# Patient Record
Sex: FEMALE | Race: WHITE | Employment: OTHER | URBAN - METROPOLITAN AREA
[De-identification: names, ages, dates, MRNs, and addresses within clinical notes are randomized per-mention and may not be internally consistent; named-entity substitution may affect disease eponyms.]

---

## 2017-01-05 RX ORDER — PRAVASTATIN SODIUM 40 MG
TABLET ORAL
Qty: 30 TABLET | Refills: 0 | Status: SHIPPED | OUTPATIENT
Start: 2017-01-05 | End: 2017-02-06

## 2017-01-09 RX ORDER — LOSARTAN POTASSIUM 100 MG/1
TABLET ORAL
Qty: 90 TABLET | Refills: 0 | Status: SHIPPED | OUTPATIENT
Start: 2017-01-09 | End: 2017-01-13

## 2017-01-13 ENCOUNTER — OFFICE VISIT (OUTPATIENT)
Dept: INTERNAL MEDICINE CLINIC | Facility: CLINIC | Age: 77
End: 2017-01-13

## 2017-01-13 VITALS
DIASTOLIC BLOOD PRESSURE: 70 MMHG | TEMPERATURE: 98 F | SYSTOLIC BLOOD PRESSURE: 130 MMHG | HEART RATE: 88 BPM | WEIGHT: 124.25 LBS | OXYGEN SATURATION: 96 % | BODY MASS INDEX: 21 KG/M2

## 2017-01-13 DIAGNOSIS — M79.7 FIBROMYALGIA: ICD-10-CM

## 2017-01-13 DIAGNOSIS — J32.9 CHRONIC SINUSITIS, UNSPECIFIED LOCATION: ICD-10-CM

## 2017-01-13 DIAGNOSIS — F41.1 GAD (GENERALIZED ANXIETY DISORDER): ICD-10-CM

## 2017-01-13 DIAGNOSIS — F33.9 MAJOR DEPRESSIVE DISORDER, RECURRENT EPISODE, UNSPECIFIED: ICD-10-CM

## 2017-01-13 DIAGNOSIS — R11.2 NAUSEA AND VOMITING, INTRACTABILITY OF VOMITING NOT SPECIFIED, UNSPECIFIED VOMITING TYPE: Primary | ICD-10-CM

## 2017-01-13 PROCEDURE — 99214 OFFICE O/P EST MOD 30 MIN: CPT | Performed by: INTERNAL MEDICINE

## 2017-01-13 RX ORDER — ONDANSETRON 4 MG/1
4 TABLET, FILM COATED ORAL EVERY 8 HOURS PRN
Qty: 30 TABLET | Refills: 0 | Status: SHIPPED | OUTPATIENT
Start: 2017-01-13 | End: 2017-03-01 | Stop reason: ALTCHOICE

## 2017-01-13 RX ORDER — DULOXETIN HYDROCHLORIDE 60 MG/1
60 CAPSULE, DELAYED RELEASE ORAL DAILY
Refills: 1 | COMMUNITY
Start: 2016-12-16 | End: 2017-01-18

## 2017-01-13 NOTE — PROGRESS NOTES
Jimmie Correa is a 78year old female. HPI:   Patient presents for multiple issues. Accompanied by . 1. Started to feel poorly on 12/25/2016. Started vomiting following day and vomits daily or every other day.  Usually vomits every time she eat lb  11/29/16 : 125 lb 8 oz        Penicillins             Hives    Comment:Throat swells, difficulty breathing  Bactrim                 Nausea and vomiting    Comment:Tabs  Dairy Products              Comment:Itchy, eyes swelling  Seasonal Nicho Camilo NDL/CATH SPI DX/THER Benjie Chand  2/26/2013    Comment Procedure: LUMBAR EPIDURAL;  Surgeon: Pablito Izaguirre MD;  Location: [facility]    PATIENT [address] FOR IV ANTIBIOTIC SURGICAL SITE INFECTION PROPHYLAXIS.  2/26/ Packs/Day: 0.20  Years: 20        Types: Cigarettes      Quit date: 01/01/1997    Smokeless Status: Never Used                        Alcohol Use: No                      EXAM:   /70 mmHg  Pulse 88  Temp(Src) 97.9 °F (36.6 °C) (Oral)  Wt 124 lb 4 oz Valdo on 11/23/2016. Current management by Dr. Sanjeev Antony. On trazodone, seroquel, luvoxamine, buspirone. He plans to slowly wean her off clonopin per patient. She self d/c'ed the gabapentin as felt it was not helping.  She thinks she is still taking the dulox

## 2017-01-13 NOTE — PATIENT INSTRUCTIONS
Please make sure you are taking both the duloxetine and fluvoxamine. Confirm our medication list with what you are actually taking. Please take ondansetron (zofran) 4 mg 30 minutes prior to meals until your nausea/vomiting improves.

## 2017-01-17 RX ORDER — DULOXETIN HYDROCHLORIDE 60 MG/1
CAPSULE, DELAYED RELEASE ORAL
Qty: 90 CAPSULE | Refills: 0 | OUTPATIENT
Start: 2017-01-17

## 2017-01-17 NOTE — TELEPHONE ENCOUNTER
Should be ordered by her psychiatrist  Pt provided with Dr Dolan instructions and verbalized understanding.

## 2017-01-18 ENCOUNTER — APPOINTMENT (OUTPATIENT)
Dept: LAB | Age: 77
End: 2017-01-18
Attending: Other
Payer: MEDICARE

## 2017-01-18 DIAGNOSIS — E78.2 MIXED HYPERLIPIDEMIA: ICD-10-CM

## 2017-01-18 DIAGNOSIS — M81.0 OSTEOPOROSIS: ICD-10-CM

## 2017-01-18 DIAGNOSIS — E83.52 HYPERCALCEMIA: ICD-10-CM

## 2017-01-18 DIAGNOSIS — R11.2 NAUSEA AND VOMITING, INTRACTABILITY OF VOMITING NOT SPECIFIED, UNSPECIFIED VOMITING TYPE: ICD-10-CM

## 2017-01-18 LAB
ALBUMIN SERPL-MCNC: 3.8 G/DL (ref 3.5–4.8)
ALP LIVER SERPL-CCNC: 57 U/L (ref 55–142)
ALT SERPL-CCNC: 14 U/L (ref 14–54)
AST SERPL-CCNC: 19 U/L (ref 15–41)
BILIRUB SERPL-MCNC: 0.2 MG/DL (ref 0.1–2)
BUN BLD-MCNC: 20 MG/DL (ref 8–20)
CALCIUM BLD-MCNC: 9 MG/DL (ref 8.3–10.3)
CHLORIDE: 102 MMOL/L (ref 101–111)
CO2: 30 MMOL/L (ref 22–32)
CREAT BLD-MCNC: 1.37 MG/DL (ref 0.55–1.02)
GLUCOSE BLD-MCNC: 83 MG/DL (ref 70–99)
M PROTEIN MFR SERPL ELPH: 7.6 G/DL (ref 6.1–8.3)
POTASSIUM SERPL-SCNC: 3.4 MMOL/L (ref 3.6–5.1)
PROLACTIN: 8.4 NG/ML
SODIUM SERPL-SCNC: 140 MMOL/L (ref 136–144)

## 2017-01-18 PROCEDURE — 84146 ASSAY OF PROLACTIN: CPT

## 2017-01-18 PROCEDURE — 36415 COLL VENOUS BLD VENIPUNCTURE: CPT

## 2017-01-18 PROCEDURE — 80053 COMPREHEN METABOLIC PANEL: CPT

## 2017-01-19 NOTE — PROGRESS NOTES
Quick Note:    [phone] (home)   Patient informed of Dr. Tequila Hedrick result note.  Patient verbalized understanding and agrees with plan.     ______

## 2017-01-25 ENCOUNTER — APPOINTMENT (OUTPATIENT)
Dept: GENERAL RADIOLOGY | Age: 77
End: 2017-01-25
Attending: EMERGENCY MEDICINE
Payer: MEDICARE

## 2017-01-25 ENCOUNTER — TELEPHONE (OUTPATIENT)
Dept: INTERNAL MEDICINE CLINIC | Facility: CLINIC | Age: 77
End: 2017-01-25

## 2017-01-25 ENCOUNTER — APPOINTMENT (OUTPATIENT)
Dept: CT IMAGING | Age: 77
End: 2017-01-25
Attending: EMERGENCY MEDICINE
Payer: MEDICARE

## 2017-01-25 ENCOUNTER — HOSPITAL ENCOUNTER (EMERGENCY)
Age: 77
Discharge: HOME | End: 2017-01-25
Attending: EMERGENCY MEDICINE
Payer: MEDICARE

## 2017-01-25 VITALS
SYSTOLIC BLOOD PRESSURE: 126 MMHG | HEIGHT: 65 IN | WEIGHT: 120 LBS | HEART RATE: 76 BPM | TEMPERATURE: 99 F | BODY MASS INDEX: 19.99 KG/M2 | OXYGEN SATURATION: 100 % | DIASTOLIC BLOOD PRESSURE: 67 MMHG | RESPIRATION RATE: 16 BRPM

## 2017-01-25 DIAGNOSIS — R11.2 NAUSEA AND VOMITING IN ADULT: Primary | ICD-10-CM

## 2017-01-25 LAB
ALBUMIN SERPL-MCNC: 3.8 G/DL (ref 3.5–4.8)
ALP LIVER SERPL-CCNC: 51 U/L (ref 55–142)
ALT SERPL-CCNC: 17 U/L (ref 14–54)
AMPHETAMINE URINE: NEGATIVE
AST SERPL-CCNC: 14 U/L (ref 15–41)
BARBITURATES URINE: NEGATIVE
BASOPHILS # BLD AUTO: 0.08 X10(3) UL (ref 0–0.1)
BASOPHILS NFR BLD AUTO: 0.7 %
BILIRUB SERPL-MCNC: 0.2 MG/DL (ref 0.1–2)
BILIRUB UR QL STRIP.AUTO: NEGATIVE
BUN BLD-MCNC: 23 MG/DL (ref 8–20)
CALCIUM BLD-MCNC: 8.9 MG/DL (ref 8.3–10.3)
CHLORIDE: 106 MMOL/L (ref 101–111)
CLARITY UR REFRACT.AUTO: CLEAR
CO2: 27 MMOL/L (ref 22–32)
COCAINE URINE: NEGATIVE
COLOR UR AUTO: YELLOW
CREAT BLD-MCNC: 1.43 MG/DL (ref 0.55–1.02)
EOSINOPHIL # BLD AUTO: 0.17 X10(3) UL (ref 0–0.3)
EOSINOPHIL NFR BLD AUTO: 1.4 %
ERYTHROCYTE [DISTWIDTH] IN BLOOD BY AUTOMATED COUNT: 13.6 % (ref 11.5–16)
GLUCOSE BLD-MCNC: 139 MG/DL (ref 70–99)
GLUCOSE UR STRIP.AUTO-MCNC: NEGATIVE MG/DL
HCT VFR BLD AUTO: 32.5 % (ref 34–50)
HGB BLD-MCNC: 10.8 G/DL (ref 12–16)
IMMATURE GRANULOCYTE COUNT: 0.04 X10(3) UL (ref 0–1)
IMMATURE GRANULOCYTE RATIO %: 0.3 %
KETONES UR STRIP.AUTO-MCNC: NEGATIVE MG/DL
LEUKOCYTE ESTERASE UR QL STRIP.AUTO: NEGATIVE
LIPASE: 172 U/L (ref 73–393)
LYMPHOCYTES # BLD AUTO: 3.58 X10(3) UL (ref 0.9–4)
LYMPHOCYTES NFR BLD AUTO: 30 %
M PROTEIN MFR SERPL ELPH: 7.2 G/DL (ref 6.1–8.3)
MCH RBC QN AUTO: 30.7 PG (ref 27–33.2)
MCHC RBC AUTO-ENTMCNC: 33.2 G/DL (ref 31–37)
MCV RBC AUTO: 92.3 FL (ref 81–100)
MONOCYTES # BLD AUTO: 0.87 X10(3) UL (ref 0.1–0.6)
MONOCYTES NFR BLD AUTO: 7.3 %
NEUTROPHIL ABS PRELIM: 7.18 X10 (3) UL (ref 1.3–6.7)
NEUTROPHILS # BLD AUTO: 7.18 X10(3) UL (ref 1.3–6.7)
NEUTROPHILS NFR BLD AUTO: 60.3 %
NITRITE UR QL STRIP.AUTO: NEGATIVE
OPIATE URINE: NEGATIVE
PCP URINE: NEGATIVE
PH UR STRIP.AUTO: 7 [PH] (ref 4.5–8)
PLATELET # BLD AUTO: 299 10(3)UL (ref 150–450)
POTASSIUM SERPL-SCNC: 3 MMOL/L (ref 3.6–5.1)
PROT UR STRIP.AUTO-MCNC: NEGATIVE MG/DL
RBC # BLD AUTO: 3.52 X10(6)UL (ref 3.8–5.1)
RED CELL DISTRIBUTION WIDTH-SD: 46.3 FL (ref 35.1–46.3)
SODIUM SERPL-SCNC: 143 MMOL/L (ref 136–144)
SP GR UR STRIP.AUTO: 1.01 (ref 1–1.03)
UROBILINOGEN UR STRIP.AUTO-MCNC: 0.2 MG/DL
WBC # BLD AUTO: 11.9 X10(3) UL (ref 4–13)

## 2017-01-25 PROCEDURE — 96374 THER/PROPH/DIAG INJ IV PUSH: CPT

## 2017-01-25 PROCEDURE — 99284 EMERGENCY DEPT VISIT MOD MDM: CPT

## 2017-01-25 PROCEDURE — 85025 COMPLETE CBC W/AUTO DIFF WBC: CPT | Performed by: EMERGENCY MEDICINE

## 2017-01-25 PROCEDURE — 83690 ASSAY OF LIPASE: CPT | Performed by: EMERGENCY MEDICINE

## 2017-01-25 PROCEDURE — 81001 URINALYSIS AUTO W/SCOPE: CPT | Performed by: EMERGENCY MEDICINE

## 2017-01-25 PROCEDURE — 71020 XR CHEST PA + LAT CHEST (CPT=71020): CPT

## 2017-01-25 PROCEDURE — 80053 COMPREHEN METABOLIC PANEL: CPT | Performed by: EMERGENCY MEDICINE

## 2017-01-25 PROCEDURE — 80307 DRUG TEST PRSMV CHEM ANLYZR: CPT | Performed by: EMERGENCY MEDICINE

## 2017-01-25 PROCEDURE — 96361 HYDRATE IV INFUSION ADD-ON: CPT

## 2017-01-25 PROCEDURE — 96375 TX/PRO/DX INJ NEW DRUG ADDON: CPT

## 2017-01-25 PROCEDURE — 74176 CT ABD & PELVIS W/O CONTRAST: CPT

## 2017-01-25 RX ORDER — DIPHENHYDRAMINE HYDROCHLORIDE 50 MG/ML
50 INJECTION INTRAMUSCULAR; INTRAVENOUS ONCE
Status: COMPLETED | OUTPATIENT
Start: 2017-01-25 | End: 2017-01-25

## 2017-01-25 RX ORDER — METOCLOPRAMIDE HYDROCHLORIDE 5 MG/ML
10 INJECTION INTRAMUSCULAR; INTRAVENOUS ONCE
Status: COMPLETED | OUTPATIENT
Start: 2017-01-25 | End: 2017-01-25

## 2017-01-25 RX ORDER — METOCLOPRAMIDE 10 MG/1
10 TABLET ORAL 3 TIMES DAILY PRN
Qty: 20 TABLET | Refills: 0 | Status: SHIPPED | OUTPATIENT
Start: 2017-01-25 | End: 2017-02-07 | Stop reason: ALTCHOICE

## 2017-01-25 NOTE — TELEPHONE ENCOUNTER
Pt c/o multiple BM \"all day and night long, keeping her up at night\", vomiting, dry mouth, weakness, being light headed, PND, chills mild abdominal bloating/cramping and the inability to tolerate food-except for pasta x 2 weeks.  Pt denies fever/bright re

## 2017-01-26 NOTE — ED PROVIDER NOTES
Patient Seen in: THE [facility] Emergency Department In [place]    History   Patient presents with:  Nausea/Vomiting/Diarrhea (gastrointestinal)    Stated Complaint: VOMITING AND DIARRHEA FOR PAST 2 WEEKS    HPI    30-year-old female presents to the emergency Procedure: LUMBAR EPIDURAL;  Surgeon: Patricia Lopez MD;  Location: [address] [place] [address] BY [facility] [address] [facility] [address]  [address] [facility] 5+ YR  2/26/2013    Comment Procedure: LUMBAR EPIDURAL;  Surgeon: Patricia Lopez MD;  Location: [address] [place] [address] 2014    Comment parathyroidectomy    OTHER SURGICAL HISTORY      Comment splenectomy    TONSILLECTOMY      ANGIOPLASTY (CORONARY)      Comment stent    CATH PERCUTANEOUS  TRANSLUMINAL CORONARY ANGIOPLASTY         Medications :   Metoclopramide HCl 10 MG O Hypertension Father    • Heart Attack Father    • Hypertension Daughter    • Cancer Sister      lung with mets to the lymph nodes   • Lipids Sister    • Hypertension Sister    • Lipids Brother    • Heart Disorder Brother      rheumatic heart disease guarding no masses appreciated   Musculoskeletal: Normal range of motion. She exhibits no edema or tenderness. Lymphadenopathy:     She has no cervical adenopathy. Neurological: She is alert and oriented to person, place, and time.  No cranial nerve def CBC W/ DIFFERENTIAL[[id number]]          Abnormal            Final result                 Please view results for these tests on the individual orders.    RAINBOW DRAW BLUE   RAINBOW DRAW GOLD   RAINBOW DRAW LAVENDER   RAINBOW DRAW LIGHT GREE

## 2017-02-06 RX ORDER — PRAVASTATIN SODIUM 40 MG
TABLET ORAL
Qty: 30 TABLET | Refills: 4 | Status: SHIPPED | OUTPATIENT
Start: 2017-02-06 | End: 2017-06-26

## 2017-02-07 ENCOUNTER — TELEPHONE (OUTPATIENT)
Dept: SURGERY | Facility: CLINIC | Age: 77
End: 2017-02-07

## 2017-02-07 ENCOUNTER — OFFICE VISIT (OUTPATIENT)
Dept: SURGERY | Facility: CLINIC | Age: 77
End: 2017-02-07

## 2017-02-07 VITALS
SYSTOLIC BLOOD PRESSURE: 108 MMHG | BODY MASS INDEX: 20.49 KG/M2 | HEIGHT: 64 IN | HEART RATE: 70 BPM | WEIGHT: 120 LBS | RESPIRATION RATE: 16 BRPM | DIASTOLIC BLOOD PRESSURE: 64 MMHG

## 2017-02-07 DIAGNOSIS — R45.851 SUICIDAL IDEATION: ICD-10-CM

## 2017-02-07 DIAGNOSIS — M79.7 FIBROMYALGIA: ICD-10-CM

## 2017-02-07 DIAGNOSIS — M54.16 LUMBAR RADICULITIS: Primary | ICD-10-CM

## 2017-02-07 DIAGNOSIS — M46.1 SACROILIITIS (HCC): ICD-10-CM

## 2017-02-07 PROCEDURE — 99214 OFFICE O/P EST MOD 30 MIN: CPT | Performed by: PHYSICIAN ASSISTANT

## 2017-02-07 NOTE — PROGRESS NOTES
Name: Yuval Lew   : 1940   DOS: 2017     Pain Clinic Follow Up Visit:   Patient presents with:   Follow - Up: sharp pain in the legs      Yuval Lew is a 78year old female who presents for recheck of chronic neck and low back pain with HPI.      Penicillins             Hives    Comment:Throat swells, difficulty breathing  Bactrim                 Nausea and vomiting    Comment:Tabs  Dairy Products              Comment:Itchy, eyes swelling  Seasonal                  [facility]* daily. Disp:  Rfl:          EXAM:   /64 mmHg  Pulse 70  Resp 16  Ht 64\"  Wt 120 lb  BMI 20.59 kg/m2  General:  Patient is a(n) 78year old year old female in no acute distress.   Neurologic[de-identified] WNL-Orientation to time, place and person, low mood & affe of these issues and agrees to the plan. Return in about 1 day (around 2/8/2017) for see Dr Nadira Tolliver.     Abelardo Jean Baptiste PA-C, 2/7/2017, 1:29 PM

## 2017-02-07 NOTE — TELEPHONE ENCOUNTER
Spoke with pt who reports back pain that radiates to frandy legs. Scheduled follow up appointment with ALLEN Meehan today 1:30pm. Pt thankful, no further questions.

## 2017-02-07 NOTE — PATIENT INSTRUCTIONS
Refill policies:    • Allow 2 business days for refills; controlled substances may take longer.   • Contact your pharmacy at least 5 days prior to running out of medication and have them send an electronic request or submit request through the “request re your physician has recommended that you have a procedure or additional testing performed. [facility] will contact your insurance carrier to obtain pre-certification or prior authorization.     Unfortunately, SESAR has seen an increas

## 2017-02-08 ENCOUNTER — OFFICE VISIT (OUTPATIENT)
Dept: SURGERY | Facility: CLINIC | Age: 77
End: 2017-02-08

## 2017-02-08 VITALS
BODY MASS INDEX: 20.49 KG/M2 | HEIGHT: 64 IN | RESPIRATION RATE: 18 BRPM | SYSTOLIC BLOOD PRESSURE: 108 MMHG | HEART RATE: 60 BPM | WEIGHT: 120 LBS | DIASTOLIC BLOOD PRESSURE: 64 MMHG

## 2017-02-08 DIAGNOSIS — M54.16 LUMBAR RADICULOPATHY: Primary | ICD-10-CM

## 2017-02-08 DIAGNOSIS — M96.1 FAILED BACK SURGICAL SYNDROME: ICD-10-CM

## 2017-02-08 PROCEDURE — 99214 OFFICE O/P EST MOD 30 MIN: CPT | Performed by: ANESTHESIOLOGY

## 2017-02-10 ENCOUNTER — PRIOR ORIGINAL RECORDS (OUTPATIENT)
Dept: OTHER | Age: 77
End: 2017-02-10

## 2017-02-11 ENCOUNTER — HOSPITAL ENCOUNTER (EMERGENCY)
Age: 77
Discharge: HOME | End: 2017-02-11
Attending: EMERGENCY MEDICINE
Payer: MEDICARE

## 2017-02-11 VITALS
OXYGEN SATURATION: 96 % | HEART RATE: 76 BPM | DIASTOLIC BLOOD PRESSURE: 76 MMHG | BODY MASS INDEX: 20.49 KG/M2 | TEMPERATURE: 99 F | WEIGHT: 120 LBS | SYSTOLIC BLOOD PRESSURE: 136 MMHG | HEIGHT: 64 IN | RESPIRATION RATE: 18 BRPM

## 2017-02-11 DIAGNOSIS — M54.41 CHRONIC BILATERAL LOW BACK PAIN WITH BILATERAL SCIATICA: Primary | ICD-10-CM

## 2017-02-11 DIAGNOSIS — G89.29 CHRONIC BILATERAL LOW BACK PAIN WITH BILATERAL SCIATICA: Primary | ICD-10-CM

## 2017-02-11 DIAGNOSIS — M54.42 CHRONIC BILATERAL LOW BACK PAIN WITH BILATERAL SCIATICA: Primary | ICD-10-CM

## 2017-02-11 PROCEDURE — 99283 EMERGENCY DEPT VISIT LOW MDM: CPT

## 2017-02-11 RX ORDER — DIAZEPAM 5 MG/1
5 TABLET ORAL EVERY 6 HOURS PRN
Qty: 5 TABLET | Refills: 0 | Status: SHIPPED | OUTPATIENT
Start: 2017-02-11 | End: 2017-03-01 | Stop reason: ALTCHOICE

## 2017-02-11 RX ORDER — HYDROCODONE BITARTRATE AND ACETAMINOPHEN 5; 325 MG/1; MG/1
1-2 TABLET ORAL EVERY 4 HOURS PRN
Qty: 10 TABLET | Refills: 0 | Status: SHIPPED | OUTPATIENT
Start: 2017-02-11 | End: 2017-03-01 | Stop reason: ALTCHOICE

## 2017-02-11 NOTE — ED INITIAL ASSESSMENT (HPI)
Patient states that she was sent her by her pain doctor to get a rx for pain pills until she can follow-up.

## 2017-02-11 NOTE — ED PROVIDER NOTES
Patient Seen in: THE [facility] Emergency Department In [place]    History   Patient presents with:  Back Pain (musculoskeletal)    Stated Complaint: back pain     HPI    Patient is a 62-year-old woman presents with lower back pain.   This is an ongoing proble PAIN MANAGEMENT    FLUOR ARIANA & Denver Holman NDL/CATH SPI DX/THER NJX  2/26/2013    Comment Procedure: LUMBAR EPIDURAL;  Surgeon: Efren Agustin MD;  Location: [facility] FOR PAIN [facility]NA[facility]    PATIENT [address] FOR IV ANTIBIOTIC SURGICAL SIT HYDROcodone-acetaminophen 5-325 MG Oral Tab,  Take 1-2 tablets by mouth every 4 (four) hours as needed for Pain.   diazepam 5 MG Oral Tab,  Take 1 tablet (5 mg total) by mouth every 6 (six) hours as needed.    PRAVASTATIN SODIUM 40 MG Oral Tab,  TAKE 1 TABL Packs/Day: 0.20  Years: 20        Types: Cigarettes      Quit date: 01/01/1997    Smokeless Status: Never Used                        Alcohol Use: No                Review of Systems    Positive for stated complaint: back pain   Other systems are a [place] [id number] [phone]    In 2 days        Medications Prescribed:  Current Discharge Medication List    START taking these medications    HYDROcodone-acetaminophen 5-325 MG Oral Tab  Take 1-2 tablets by mouth every 4 (four) hours as needed for Pain.

## 2017-02-11 NOTE — ED NOTES
Patient informed that she needs to change into a gown to be evaluated by ERMD. Patient states that she is not changing into a gown that all she needs is a prescription of pain pills until she can follow-up with Dr. Adelaida Cardenas to get scheduled for a neurostimulat

## 2017-02-12 NOTE — PROGRESS NOTES
Name: Deja Dumont   : 1940   DOS: 2017     Pain Clinic Follow Up Visit:   Deja Dumont is a 78year old female who presents for recheck of her chronic low back and neck pain. Her lower back pain is significantly worse.   She has chronic emmanuel (30 mg total) by mouth 2 (two) times daily with meals. Disp: 180 tablet Rfl: 2   Losartan Potassium 100 MG Oral Tab Take 1 tablet (100 mg total) by mouth daily.  Disp: 90 tablet Rfl: 3   AmLODIPine Besylate 10 MG Oral Tab Take 1 tablet (10 mg total) by mout patient. The patient wanted to proceed with the procedure.   The patient was asking for narcotic but I could not prescribe narcotic because of history of suicidal ideation and drug overdose in the past.  I recommended her to be followed with the psychologi

## 2017-02-13 ENCOUNTER — TELEPHONE (OUTPATIENT)
Dept: SURGERY | Facility: CLINIC | Age: 77
End: 2017-02-13

## 2017-02-13 NOTE — TELEPHONE ENCOUNTER
Spoke w/ pt regarding SCS trial.  Pt states she has office visit w/ Dr. Og Bravo 2/21/17. Informed pt of SCS trial process. Once evaluation received from Dr. Michael Gan office, pt to receive call with next steps for scheduling if deemed appropriate.   Pt verb

## 2017-02-24 ENCOUNTER — TELEPHONE (OUTPATIENT)
Dept: SURGERY | Facility: CLINIC | Age: 77
End: 2017-02-24

## 2017-02-24 NOTE — TELEPHONE ENCOUNTER
Spoke with patient who states she woke up feeling ok this AM, but is now experiencing \"horrendous back and leg pain\". Reports pain to lower back/lumbar region with pain radiating into both legs.  Denies any incident this AM, states pain has been increasin

## 2017-02-28 ENCOUNTER — TELEPHONE (OUTPATIENT)
Dept: SURGERY | Facility: CLINIC | Age: 77
End: 2017-02-28

## 2017-02-28 NOTE — TELEPHONE ENCOUNTER
Spoke w/ pt regarding SCS process. Informed pt our office has not received evaluation documents from Dr. Jag Mesa office. Pt verbalized understanding. Pt also inquiring about medications as pain is not currently managed.   Informed pt providers would be

## 2017-02-28 NOTE — TELEPHONE ENCOUNTER
Received psyche evaluation from Dr. Patito Pisano office. Document endorsed to providers. Copy sent to scanning.

## 2017-03-01 ENCOUNTER — LAB ENCOUNTER (OUTPATIENT)
Dept: LAB | Age: 77
End: 2017-03-01
Attending: INTERNAL MEDICINE
Payer: MEDICARE

## 2017-03-01 ENCOUNTER — OFFICE VISIT (OUTPATIENT)
Dept: INTERNAL MEDICINE CLINIC | Facility: CLINIC | Age: 77
End: 2017-03-01

## 2017-03-01 VITALS
BODY MASS INDEX: 21.34 KG/M2 | HEIGHT: 64 IN | RESPIRATION RATE: 24 BRPM | TEMPERATURE: 99 F | HEART RATE: 92 BPM | OXYGEN SATURATION: 97 % | SYSTOLIC BLOOD PRESSURE: 126 MMHG | DIASTOLIC BLOOD PRESSURE: 70 MMHG | WEIGHT: 125 LBS

## 2017-03-01 DIAGNOSIS — F41.1 GAD (GENERALIZED ANXIETY DISORDER): ICD-10-CM

## 2017-03-01 DIAGNOSIS — F13.20 BENZODIAZEPINE DEPENDENCE (HCC): ICD-10-CM

## 2017-03-01 DIAGNOSIS — N20.0 NEPHROLITHIASIS: ICD-10-CM

## 2017-03-01 DIAGNOSIS — R10.9 LEFT FLANK PAIN: ICD-10-CM

## 2017-03-01 DIAGNOSIS — E78.2 MIXED HYPERLIPIDEMIA: ICD-10-CM

## 2017-03-01 DIAGNOSIS — I10 ESSENTIAL HYPERTENSION: ICD-10-CM

## 2017-03-01 DIAGNOSIS — M79.7 FIBROMYALGIA: ICD-10-CM

## 2017-03-01 DIAGNOSIS — G47.00 INSOMNIA, UNSPECIFIED TYPE: ICD-10-CM

## 2017-03-01 DIAGNOSIS — I25.118 CORONARY ARTERY DISEASE INVOLVING NATIVE CORONARY ARTERY OF NATIVE HEART WITH OTHER FORM OF ANGINA PECTORIS (HCC): ICD-10-CM

## 2017-03-01 DIAGNOSIS — D50.9 MICROCYTIC ANEMIA: ICD-10-CM

## 2017-03-01 DIAGNOSIS — Z00.00 ENCOUNTER FOR ANNUAL HEALTH EXAMINATION: ICD-10-CM

## 2017-03-01 DIAGNOSIS — Z13.31 DEPRESSION SCREENING: ICD-10-CM

## 2017-03-01 DIAGNOSIS — N28.1 BILATERAL RENAL CYSTS: ICD-10-CM

## 2017-03-01 DIAGNOSIS — M81.0 OSTEOPOROSIS: ICD-10-CM

## 2017-03-01 DIAGNOSIS — Z00.00 ENCOUNTER FOR ANNUAL HEALTH EXAMINATION: Primary | ICD-10-CM

## 2017-03-01 DIAGNOSIS — F41.9 ANXIETY: ICD-10-CM

## 2017-03-01 DIAGNOSIS — R63.4 WEIGHT LOSS: ICD-10-CM

## 2017-03-01 DIAGNOSIS — E83.52 HYPERCALCEMIA: ICD-10-CM

## 2017-03-01 DIAGNOSIS — F33.1 MODERATE EPISODE OF RECURRENT MAJOR DEPRESSIVE DISORDER (HCC): ICD-10-CM

## 2017-03-01 DIAGNOSIS — I31.3 PERICARDIAL EFFUSION: ICD-10-CM

## 2017-03-01 LAB
APPEARANCE: CLEAR
BASOPHILS # BLD AUTO: 0.08 X10(3) UL (ref 0–0.1)
BASOPHILS NFR BLD AUTO: 0.7 %
EOSINOPHIL # BLD AUTO: 0.07 X10(3) UL (ref 0–0.3)
EOSINOPHIL NFR BLD AUTO: 0.6 %
ERYTHROCYTE [DISTWIDTH] IN BLOOD BY AUTOMATED COUNT: 14.3 % (ref 11.5–16)
HCT VFR BLD AUTO: 38.6 % (ref 34–50)
HGB BLD-MCNC: 12.3 G/DL (ref 12–16)
IMMATURE GRANULOCYTE COUNT: 0.03 X10(3) UL (ref 0–1)
IMMATURE GRANULOCYTE RATIO %: 0.3 %
LYMPHOCYTES # BLD AUTO: 3.91 X10(3) UL (ref 0.9–4)
LYMPHOCYTES NFR BLD AUTO: 33.4 %
MCH RBC QN AUTO: 30.6 PG (ref 27–33.2)
MCHC RBC AUTO-ENTMCNC: 31.9 G/DL (ref 31–37)
MCV RBC AUTO: 96 FL (ref 81–100)
MONOCYTES # BLD AUTO: 1.14 X10(3) UL (ref 0.1–0.6)
MONOCYTES NFR BLD AUTO: 9.7 %
MULTISTIX LOT#: NORMAL NUMERIC
NEUTROPHIL ABS PRELIM: 6.49 X10 (3) UL (ref 1.3–6.7)
NEUTROPHILS # BLD AUTO: 6.49 X10(3) UL (ref 1.3–6.7)
NEUTROPHILS NFR BLD AUTO: 55.3 %
PH, URINE: 7 (ref 4.5–8)
PLATELET # BLD AUTO: 398 10(3)UL (ref 150–450)
PROTEIN (URINE DIPSTICK): 30 MG/DL
RBC # BLD AUTO: 4.02 X10(6)UL (ref 3.8–5.1)
RED CELL DISTRIBUTION WIDTH-SD: 50.4 FL (ref 35.1–46.3)
SPECIFIC GRAVITY: 1.02 (ref 1–1.03)
URINE-COLOR: YELLOW
UROBILINOGEN,SEMI-QN: 0.2 MG/DL (ref 0–1.9)
WBC # BLD AUTO: 11.7 X10(3) UL (ref 4–13)

## 2017-03-01 PROCEDURE — G0439 PPPS, SUBSEQ VISIT: HCPCS | Performed by: INTERNAL MEDICINE

## 2017-03-01 PROCEDURE — 81003 URINALYSIS AUTO W/O SCOPE: CPT | Performed by: INTERNAL MEDICINE

## 2017-03-01 PROCEDURE — 87086 URINE CULTURE/COLONY COUNT: CPT | Performed by: INTERNAL MEDICINE

## 2017-03-01 PROCEDURE — 84439 ASSAY OF FREE THYROXINE: CPT

## 2017-03-01 PROCEDURE — 36415 COLL VENOUS BLD VENIPUNCTURE: CPT

## 2017-03-01 PROCEDURE — G0444 DEPRESSION SCREEN ANNUAL: HCPCS | Performed by: INTERNAL MEDICINE

## 2017-03-01 PROCEDURE — 99214 OFFICE O/P EST MOD 30 MIN: CPT | Performed by: INTERNAL MEDICINE

## 2017-03-01 PROCEDURE — 85025 COMPLETE CBC W/AUTO DIFF WBC: CPT

## 2017-03-01 PROCEDURE — 82728 ASSAY OF FERRITIN: CPT

## 2017-03-01 PROCEDURE — 80048 BASIC METABOLIC PNL TOTAL CA: CPT

## 2017-03-01 PROCEDURE — 82746 ASSAY OF FOLIC ACID SERUM: CPT

## 2017-03-01 PROCEDURE — 84443 ASSAY THYROID STIM HORMONE: CPT

## 2017-03-01 PROCEDURE — 82607 VITAMIN B-12: CPT

## 2017-03-01 PROCEDURE — 84481 FREE ASSAY (FT-3): CPT

## 2017-03-01 RX ORDER — FLUVOXAMINE MALEATE 100 MG
50 TABLET ORAL NIGHTLY
Refills: 2 | COMMUNITY
Start: 2017-02-24 | End: 2017-04-17

## 2017-03-01 RX ORDER — MONTELUKAST SODIUM 10 MG/1
10 TABLET ORAL NIGHTLY
COMMUNITY
End: 2017-11-20

## 2017-03-01 NOTE — PROGRESS NOTES
HPI:   Casey Garcia is a 78year old female who presents for a medicare wellness exam and additional issues noted below. Comes in with . 1. HTN: tolerating losartan well  2.  L sided kidney pain began at 1 am today and woke her from her sleep artery disease     Insomnia     Lumbar radiculopathy     Hypertension     Hypercalcemia     Major depressive disorder, recurrent episode, unspecified     Benzodiazepine dependence ([facility] [address]      Last Cholesterol Labs:     Lab Results  Component Value Date   [facility] daily. Fluticasone Propionate (FLONASE) 50 MCG/ACT Nasal Suspension 2 sprays by Each Nare route daily. B Complex Vitamins (VITAMIN B COMPLEX) Oral Tab Take 1 tablet by mouth daily.       MEDICAL INFORMATION:   She  has a past medical history of Unspec and cath percutaneous  transluminal coronary angioplasty. Her family history includes Cancer in her sister; Heart Attack in her father; Heart Disorder in her brother; Hypertension in her daughter, father, and sister; Lipids in her brother and sister. nondistended no hsm bs throughout  NEURO: CN 2-12 grossly intact, strength 5/5 b/l LE  PSYCH: pleasant  EXTREMITIES: no cyanosis, clubbing or edema  BACK: she has pain to palpation over entire lumbar spine and paraspinal region.        Visual Acuity  Right at another office such as Influenza, Hepatitis B, Tetanus, or Pneumococcal?: No     Functional Ability     Bathing or Showering: Able without help    Toileting: Able without help    Dressing: Need some help    Eating: Able without help    Driving: Able wit as reading the newspaper or watching television: Nearly every day    Moving or speaking so slowly that other people could have noticed.  Or the opposite - being so fidgety or restless that you have been moving around a lot more than usual: Several days    T found for this or any previous visit. No flowsheet data found. Fecal Occult Blood Annually No results found for: FOB No flowsheet data found.     Glaucoma Screening      Ophthalmology Visit Annually: Diabetics, FHx Glaucoma, AA>50, > 65 No flows BUN (mg/dL)   Date Value   01/25/2017 23*   08/05/2014 20    No flowsheet data found. Drug Serum Conc  Annually No results found for: DIGOXIN, DIG, VALP No flowsheet data found.     Diabetes      HgbA1C  Annually HEMOGLOBIN A1C (%)   Date Value   01/28/ pain team) as she has h/o inappropriate use.    # Insomnia: already practices sleep hygiene. Has not seen sleep medicine yet. I have advised her to wean off the clonopin and trazodone under direction by Dr. Dean Dumas and melatonin did not help.

## 2017-03-01 NOTE — PATIENT INSTRUCTIONS
1. Please STOP any over the counter medications or supplements EXCEPT for the florastor and the Aspirin 325 mg.    2. Please try to only use the clonopin during the day and avoid the dose at night for 4 weeks.  Then try to stop taking the trazodone at night

## 2017-03-02 ENCOUNTER — TELEPHONE (OUTPATIENT)
Dept: SURGERY | Facility: CLINIC | Age: 77
End: 2017-03-02

## 2017-03-02 PROBLEM — D50.9 MICROCYTIC ANEMIA: Status: ACTIVE | Noted: 2017-03-02

## 2017-03-02 LAB
BUN BLD-MCNC: 15 MG/DL (ref 8–20)
CALCIUM BLD-MCNC: 10.5 MG/DL (ref 8.3–10.3)
CHLORIDE: 105 MMOL/L (ref 101–111)
CO2: 28 MMOL/L (ref 22–32)
CREAT BLD-MCNC: 1.14 MG/DL (ref 0.55–1.02)
DEPRECATED HBV CORE AB SER IA-ACNC: 38.6 NG/ML (ref 10–291)
FOLATE (FOLIC ACID), SERUM: 40.3 NG/ML (ref 8.7–24)
FREE T4: 1 NG/DL (ref 0.9–1.8)
GLUCOSE BLD-MCNC: 93 MG/DL (ref 70–99)
HAV AB SERPL IA-ACNC: 717 PG/ML (ref 193–986)
POTASSIUM SERPL-SCNC: 3.5 MMOL/L (ref 3.6–5.1)
SODIUM SERPL-SCNC: 142 MMOL/L (ref 136–144)
T3FREE SERPL-MCNC: 2.54 PG/ML (ref 2.3–4.2)
TSI SER-ACNC: 1.83 MIU/ML (ref 0.35–5.5)

## 2017-03-03 NOTE — TELEPHONE ENCOUNTER
Unfortunately, we are not able to offer anything additional for medications for this patient. Please track down the eval from Dr. Bonny Cain so we can proceed with SCS ASAP. Thanks!

## 2017-03-08 ENCOUNTER — TELEPHONE (OUTPATIENT)
Dept: SURGERY | Facility: CLINIC | Age: 77
End: 2017-03-08

## 2017-03-08 DIAGNOSIS — M96.1 FAILED BACK SURGICAL SYNDROME: Primary | ICD-10-CM

## 2017-03-09 NOTE — TELEPHONE ENCOUNTER
LMTCB. Call attempted to [employer]. Need to be made aware of scheduled SCS trial on 4/4/17 @ 2:30pm arriving at 1:30pm AND lead removal time on 4/7/17.

## 2017-03-09 NOTE — TELEPHONE ENCOUNTER
Spoke with pt and pt scheduled for SCS trial 4/4/17. Reviewed pre-procedural instructions with pt. Pt verbalized understanding. Pt informed OV for lead removal will need to be made for 4/7/17. Informed pt office will contact pt for OV scheduling.   Pt v ? Eliquis (Apixaban) 3 days  ? Xarelto (Rivaroxaban) 3 days  ? Lovenox (Enoxaparin) 24 hours  ? Aspirin  ? 81mg day of procedure  ? Greater than 81 mg (325mg) 7 days  ? Coumadin Procedure may be cancelled if INR is elevated. ? Epidural ____ - 7 days  ?  O It is normal to have increased pain at injection site for up to 3-5 days after procedure, you can use heat for the first 24 hours (20 minutes on 20 minutes off) after the first 24 hours you can use heat or cold.          Spinal Cord Stimulation  What is a s One of the pain clinic nurses will notify you if you are deemed appropriate for the stimulator.  We will then start the process of obtaining prior authorization from your insurance carrier for a trial. A trial will be scheduled before permanent implantation You will be monitored with an EKG, blood pressure cuff and an oxygen-monitoring device. The procedure is performed under sterile conditions.  In a spinal cord stimulator trial, temporary electrodes are placed and the patient uses an external device to gener If the procedure is successful, your pain may be gone or diminished. You may feel a constant sensation of stimulation, often described as warm or tingly. You may have soreness due to the needle placement for a day or two.  Most patients with successful stim Generally speaking, this procedure is safe. However, with any procedure there are risks, side effects and the possibility of complications. Common side effects are bruising and soreness.  Less common risks are short-term weakness or numbness, headache, blee

## 2017-03-10 PROBLEM — I25.118 CORONARY ARTERY DISEASE INVOLVING NATIVE CORONARY ARTERY OF NATIVE HEART WITH OTHER FORM OF ANGINA PECTORIS: Status: ACTIVE | Noted: 2017-03-10

## 2017-03-10 PROBLEM — I25.118 CORONARY ARTERY DISEASE INVOLVING NATIVE CORONARY ARTERY OF NATIVE HEART WITH OTHER FORM OF ANGINA PECTORIS (HCC): Status: ACTIVE | Noted: 2017-03-10

## 2017-03-10 PROBLEM — E21.0 PRIMARY HYPERPARATHYROIDISM (HCC): Status: ACTIVE | Noted: 2017-03-10

## 2017-03-10 NOTE — TELEPHONE ENCOUNTER
Hailee Santiago informed of pt's SCS trial date/time and lead removal date/time. Hailee Santiago verbalized understanding. No further questions.

## 2017-03-13 ENCOUNTER — OFFICE VISIT (OUTPATIENT)
Dept: FAMILY MEDICINE CLINIC | Facility: CLINIC | Age: 77
End: 2017-03-13

## 2017-03-13 VITALS
HEART RATE: 68 BPM | SYSTOLIC BLOOD PRESSURE: 90 MMHG | HEIGHT: 63.25 IN | DIASTOLIC BLOOD PRESSURE: 60 MMHG | BODY MASS INDEX: 21.87 KG/M2 | WEIGHT: 125 LBS

## 2017-03-13 DIAGNOSIS — J30.89 NON-SEASONAL ALLERGIC RHINITIS DUE TO OTHER ALLERGIC TRIGGER: ICD-10-CM

## 2017-03-13 DIAGNOSIS — Z91.09 ENVIRONMENTAL ALLERGIES: Primary | ICD-10-CM

## 2017-03-13 PROCEDURE — 99213 OFFICE O/P EST LOW 20 MIN: CPT | Performed by: FAMILY MEDICINE

## 2017-03-13 RX ORDER — IPRATROPIUM BROMIDE 42 UG/1
2 SPRAY, METERED NASAL 4 TIMES DAILY
Qty: 1 BOTTLE | Refills: 11 | Status: SHIPPED | OUTPATIENT
Start: 2017-03-13 | End: 2017-04-07 | Stop reason: ALTCHOICE

## 2017-03-13 NOTE — PATIENT INSTRUCTIONS
SCHEDULING [facility] LAB APPOINTMENTS ONLINE    Lab appointments can now be scheduled online at [email or web address]    · Go to [email or web address]  · In Search type Lab  · Click \"Lab services\"  · Click \"Schedule Your Test Online\"  · Follow the prompts  · If you box spring, and pillows with allergy covers.   · If possible, sleep in a room with no carpet, curtains, or upholstered furniture. Cockroaches:  · Store food in sealed containers. · Remove garbage from the home promptly.   · Fix water leaks  Mold:  · Keep a closet, with the door shut. · Make sure the room is not too humid. It should be below 50% humidity. · Choose wood, leather, or vinyl for furniture instead of upholstery. · Wash all bedding, pillows, and stuffed toys in hot water (130°F) every week.  Re with pull-down shades or vertical blinds. · Use washable curtains instead of heavy drapes. · Have as little carpeting as possible. · Cover your mattress, box spring, and pillows in allergy-proof casings.   Housecleaning  Here are some tips:  · Wash sheet bedding, carpets, and upholstered furniture. They can be carried on indoor dust. They thrive in warm, moist environments. ? Wash bedding in hot water (130°F/54.4°C) each week. This kills the dust mites.   ? Cover mattress and pillows with special dust-mite trees, grasses, and weeds is a common allergen. Flower pollens are generally not a problem. ?Yifan Austin to learn what types of pollen affect you most. Pollen levels vary depending on the plant, the season, and the time of day.   ? Use air conditioning instead of

## 2017-03-13 NOTE — PROGRESS NOTES
Senait Vazquez is a 78year old female. HPI:   New patient in this office here to possibly establish care with a new primary care provider. .  Lives in [place] and wants to be closer to her home.   Complains of allergy symptoms all the time does get some AmLODIPine Besylate 10 MG Oral Tab Take 1 tablet (10 mg total) by mouth once daily. Disp: 90 tablet Rfl: 3   Fexofenadine HCl (ALLEGRA ALLERGY) 180 MG Oral Tab Take 180 mg by mouth daily as needed.    Disp:  Rfl:    aspirin 325 MG Oral Tab EC Take 325 mg deficits  Psych see above  EXAM:   BP 90/60 mmHg  Pulse 68  Ht 63.25\"  Wt 125 lb  BMI 21.96 kg/m2  GENERAL: well developed, well nourished,in no apparent distress  SKIN: no rashes,no suspicious lesions  HEENT: TM clear bilaterally, nose no congestion fauzia

## 2017-03-16 ENCOUNTER — TELEPHONE (OUTPATIENT)
Dept: SURGERY | Facility: CLINIC | Age: 77
End: 2017-03-16

## 2017-03-16 NOTE — TELEPHONE ENCOUNTER
Pt requesting to cancel SCS trial and stating, \"everything in my being is telling me not to do this. \"  Confirmed pt request to cancel procedure on 4/4/17. Pt confirmed and verbalized understanding.   Call transferred to  for OV to discuss plan o

## 2017-03-16 NOTE — TELEPHONE ENCOUNTER
LMTCB for Yuni Richardson:        To needing to be informed:  4/4/17 @ 2:30pm CANCELLED  4/7/17 @ 12:00pm lead removal CANCELLED.

## 2017-03-17 NOTE — TELEPHONE ENCOUNTER
Informed [employer] rep Tim Fleet of cancellations below. Rep verbalized understanding. No further needs.

## 2017-03-21 ENCOUNTER — TELEPHONE (OUTPATIENT)
Dept: SURGERY | Facility: CLINIC | Age: 77
End: 2017-03-21

## 2017-03-21 DIAGNOSIS — M96.1 FAILED BACK SURGICAL SYNDROME: Primary | ICD-10-CM

## 2017-03-22 NOTE — TELEPHONE ENCOUNTER
Pt scheduled fup appt 5/5 with Dr Belinda Ayala in [address] [place] [phone] for lead removal

## 2017-03-22 NOTE — TELEPHONE ENCOUNTER
Spoke w/ pt who is requesting to proceed with SCS trial.  OR HOLD placed on 5/1/17 @ 12:30pm.  Pre-procedural instructions reviewed.       [address]  PRE-PROCEDURE INSTRUCTIONS WITH IV SEDATION    Appointment Date: 5/1/17   Arrival Time: NSAIDs: 24 hours    ?  Ibuprofen (Motrin, Advil, Vicoprofen), Naproxen (Naprosyn, Aleve), Piroxcam (Feldene), Meloxicam (Mobic), Oxaprozin (Daypro), Diclofenac (Voltaren),  Indomethacin (Indocin), Etodolac (Lodine), Nabumetone (Relafen) A spinal cord stimulator is a specialized device that stimulates the spinal cord and spinal nerves by emitting tiny electrical impulses via a small electrical wire placed behind and just outside the spinal cord in the epidural space.  The electrical wire or You will be scheduled for the trial once prior authorization had been obtained.  The final step is the follow up appointment after the trial. At this appointment it will be determined how well your pain was relieved and if you are a candidate for permanent You will be monitored with an EKG, blood pressure cuff and an oxygen-monitoring device. The procedure is performed under sterile conditions.  In a spinal cord stimulator trial, temporary electrodes are placed and the patient uses an external device to gener If the procedure is successful, your pain may be gone or diminished. You may feel a constant sensation of stimulation, often described as warm or tingly. You may have soreness due to the needle placement for a day or two.  Most patients with successful stim Generally speaking, this procedure is safe. However, with any procedure there are risks, side effects and the possibility of complications. Common side effects are bruising and soreness.  Less common risks are short-term weakness or numbness, headache, blee

## 2017-03-22 NOTE — TELEPHONE ENCOUNTER
Informed [employer] rep Niesha Oscar of procedure and lead removal dates and times. Rep verbalized understanding and dates placed in calendar. No further questions.

## 2017-04-07 ENCOUNTER — OFFICE VISIT (OUTPATIENT)
Dept: SURGERY | Facility: CLINIC | Age: 77
End: 2017-04-07

## 2017-04-07 VITALS
DIASTOLIC BLOOD PRESSURE: 62 MMHG | SYSTOLIC BLOOD PRESSURE: 124 MMHG | WEIGHT: 123 LBS | RESPIRATION RATE: 14 BRPM | HEART RATE: 78 BPM | BODY MASS INDEX: 22 KG/M2

## 2017-04-07 DIAGNOSIS — M96.1 FAILED BACK SURGICAL SYNDROME: Primary | ICD-10-CM

## 2017-04-07 PROCEDURE — 99214 OFFICE O/P EST MOD 30 MIN: CPT | Performed by: ANESTHESIOLOGY

## 2017-04-07 RX ORDER — LIDOCAINE 50 MG/G
2 PATCH TOPICAL EVERY 24 HOURS
Qty: 60 PATCH | Refills: 3 | Status: SHIPPED | OUTPATIENT
Start: 2017-04-07 | End: 2017-05-18

## 2017-04-07 NOTE — PATIENT INSTRUCTIONS
Refill policies:    • Allow 2 business days for refills; controlled substances may take longer.   • Contact your pharmacy at least 5 days prior to running out of medication and have them send an electronic request or submit request through the “request re insurance carrier to obtain pre-certification or prior authorization. Unfortunately, SESAR has seen an increase in denial of payment even though the procedure/test has been pre-certified.   You are strongly encouraged to contact your insurance carrier to v

## 2017-04-07 NOTE — PROGRESS NOTES
Patient had SCS procedure cancelled and rescheduled. States this appointment was kept. Here to discuss the next steps.

## 2017-04-09 NOTE — PROGRESS NOTES
Name: Wilfred Gastelum   : 1940   DOS: 2017     Pain Clinic Follow Up Visit:   Wilfred Gastelum is a 78year old female who presents for recheck of her chronic low back and neck pain. Her lower back pain is significantly worse.   She has chronic emmanuel total) by mouth 2 (two) times daily with meals. Disp: 180 tablet Rfl: 2   Losartan Potassium 100 MG Oral Tab Take 1 tablet (100 mg total) by mouth daily.  Disp: 90 tablet Rfl: 3   AmLODIPine Besylate 10 MG Oral Tab Take 1 tablet (10 mg total) by mouth once types were placed in this encounter. Medications filled today:  Signed Prescriptions Disp Refills    lidocaine 5 % External Patch 60 patch 3      Sig: Place 2 patches onto the skin daily.            Radiology orders and consultations:None    The patie

## 2017-04-17 ENCOUNTER — TELEPHONE (OUTPATIENT)
Dept: FAMILY MEDICINE CLINIC | Facility: CLINIC | Age: 77
End: 2017-04-17

## 2017-04-19 NOTE — TELEPHONE ENCOUNTER
1.  I have not seen this patient since May 2013.  2.  Patient saw Chacha Roberts one time in March 2017.  3.  It appears that in the last 6 months patient has been most frequently seeing Dr. Carlo Krishna.  4.  Since I have not personally seen and assessed this patient I

## 2017-04-20 ENCOUNTER — OFFICE VISIT (OUTPATIENT)
Dept: FAMILY MEDICINE CLINIC | Facility: CLINIC | Age: 77
End: 2017-04-20

## 2017-04-20 VITALS
DIASTOLIC BLOOD PRESSURE: 58 MMHG | BODY MASS INDEX: 21.73 KG/M2 | SYSTOLIC BLOOD PRESSURE: 104 MMHG | TEMPERATURE: 99 F | HEART RATE: 63 BPM | WEIGHT: 124.19 LBS | HEIGHT: 63.25 IN

## 2017-04-20 DIAGNOSIS — J01.10 ACUTE NON-RECURRENT FRONTAL SINUSITIS: ICD-10-CM

## 2017-04-20 DIAGNOSIS — J01.00 ACUTE NON-RECURRENT MAXILLARY SINUSITIS: Primary | ICD-10-CM

## 2017-04-20 PROCEDURE — 99213 OFFICE O/P EST LOW 20 MIN: CPT | Performed by: FAMILY MEDICINE

## 2017-04-20 RX ORDER — LEVOFLOXACIN 500 MG/1
500 TABLET, FILM COATED ORAL DAILY
Qty: 10 TABLET | Refills: 0 | Status: SHIPPED | OUTPATIENT
Start: 2017-04-20 | End: 2017-04-30

## 2017-04-20 RX ORDER — ONDANSETRON 4 MG/1
4 TABLET, FILM COATED ORAL EVERY 8 HOURS PRN
Qty: 15 TABLET | Refills: 0 | Status: SHIPPED | OUTPATIENT
Start: 2017-04-20 | End: 2017-05-22

## 2017-04-20 NOTE — PROGRESS NOTES
HPI:   Dania Mercer is a 78year old female who presents for upper respiratory symptoms for  1  weeks. Patient reports congestion, sinus pain, prior history of sinusitis. Sinus pain maxillary and frontal sinus. No fever,chills or bodyaches.    Sleep ok do 325 MG Oral Tab EC Take 325 mg by mouth daily. Disp:  Rfl:    Fluticasone Propionate (FLONASE) 50 MCG/ACT Nasal Suspension 2 sprays by Each Nare route daily.  (Patient taking differently: 2 sprays by Each Nare route daily as needed.  ) Disp: 1 Bottle Rfl: Location[facility]    PATIENT [address] FOR IV ANTIBIOTIC SURGICAL SITE INFECTION PROPHYLAXIS.  2/26/2013    Comment Procedure: LUMBAR EPIDURAL;  Surgeon: Fernando Gomez MD;  Location: [address] [place] [address] lymph nodes   • Lipids Sister    • Hypertension Sister    • Lipids Brother    • Heart Disorder Brother      rheumatic heart disease   • Depression Mother    • Cancer Sister      liver cancer   • Bipolar Disorder Sister         Smoking Status: Former Smoker as needed for Nausea. levofloxacin (LEVAQUIN) 500 MG Oral Tab 10 tablet 0      Sig: Take 1 tablet (500 mg total) by mouth daily. Imaging & Consults:  None  1.  Acute non-recurrent maxillary sinusitis   Acute non-recurrent frontal sinusitis  G

## 2017-04-20 NOTE — PATIENT INSTRUCTIONS
Guaifenesin generic (Mucinex) for expectorant   Josiane pot-- saline irrigation or saline spray  Vitamin C , Zinc and Echinacea   OTC Flonase for nasal congestion and sinus pressure  Warm steams increase humidity.     Gas X strip for gas         Acute Sinusit your sinuses make. If you have a bacterial infection, you will need to take antibiotic medicine for 10 to 14 days. Take this medicine until it is gone, even if you feel better.   Date Last Reviewed: 10/1/2016  © 5964-3054 [employer] sprays for short periods only under the direction of your doctor. If you have allergies, your doctor may prescribe medications to help relieve them.    Date Last Reviewed: 10/1/2016  © [id number] [address]

## 2017-04-21 ENCOUNTER — HOSPITAL ENCOUNTER (EMERGENCY)
Age: 77
Discharge: HOME | End: 2017-04-22
Attending: EMERGENCY MEDICINE
Payer: MEDICARE

## 2017-04-21 DIAGNOSIS — E87.6 HYPOKALEMIA: ICD-10-CM

## 2017-04-21 DIAGNOSIS — R11.2 NAUSEA AND VOMITING IN ADULT: Primary | ICD-10-CM

## 2017-04-21 DIAGNOSIS — M79.10 MYALGIA: ICD-10-CM

## 2017-04-21 PROCEDURE — 96365 THER/PROPH/DIAG IV INF INIT: CPT

## 2017-04-21 PROCEDURE — 93010 ELECTROCARDIOGRAM REPORT: CPT

## 2017-04-21 PROCEDURE — 99285 EMERGENCY DEPT VISIT HI MDM: CPT

## 2017-04-21 PROCEDURE — 85025 COMPLETE CBC W/AUTO DIFF WBC: CPT | Performed by: EMERGENCY MEDICINE

## 2017-04-21 PROCEDURE — 96375 TX/PRO/DX INJ NEW DRUG ADDON: CPT

## 2017-04-21 PROCEDURE — 80053 COMPREHEN METABOLIC PANEL: CPT | Performed by: EMERGENCY MEDICINE

## 2017-04-21 PROCEDURE — 96376 TX/PRO/DX INJ SAME DRUG ADON: CPT

## 2017-04-21 RX ORDER — ONDANSETRON 2 MG/ML
4 INJECTION INTRAMUSCULAR; INTRAVENOUS ONCE
Status: COMPLETED | OUTPATIENT
Start: 2017-04-21 | End: 2017-04-21

## 2017-04-22 ENCOUNTER — APPOINTMENT (OUTPATIENT)
Dept: CT IMAGING | Age: 77
End: 2017-04-22
Attending: EMERGENCY MEDICINE
Payer: MEDICARE

## 2017-04-22 VITALS
WEIGHT: 124 LBS | HEART RATE: 95 BPM | TEMPERATURE: 98 F | SYSTOLIC BLOOD PRESSURE: 136 MMHG | BODY MASS INDEX: 22 KG/M2 | DIASTOLIC BLOOD PRESSURE: 79 MMHG | OXYGEN SATURATION: 99 % | RESPIRATION RATE: 18 BRPM

## 2017-04-22 PROCEDURE — 74176 CT ABD & PELVIS W/O CONTRAST: CPT

## 2017-04-22 PROCEDURE — 93005 ELECTROCARDIOGRAM TRACING: CPT

## 2017-04-22 PROCEDURE — 81001 URINALYSIS AUTO W/SCOPE: CPT | Performed by: EMERGENCY MEDICINE

## 2017-04-22 RX ORDER — POTASSIUM CHLORIDE 14.9 MG/ML
20 INJECTION INTRAVENOUS ONCE
Status: COMPLETED | OUTPATIENT
Start: 2017-04-22 | End: 2017-04-22

## 2017-04-22 RX ORDER — DIPHENHYDRAMINE HYDROCHLORIDE 50 MG/ML
12.5 INJECTION INTRAMUSCULAR; INTRAVENOUS ONCE
Status: COMPLETED | OUTPATIENT
Start: 2017-04-22 | End: 2017-04-22

## 2017-04-22 RX ORDER — ONDANSETRON 2 MG/ML
4 INJECTION INTRAMUSCULAR; INTRAVENOUS ONCE
Status: COMPLETED | OUTPATIENT
Start: 2017-04-22 | End: 2017-04-22

## 2017-04-22 RX ORDER — ONDANSETRON 4 MG/1
4 TABLET, ORALLY DISINTEGRATING ORAL EVERY 4 HOURS PRN
Qty: 10 TABLET | Refills: 0 | Status: SHIPPED | OUTPATIENT
Start: 2017-04-22 | End: 2017-04-29

## 2017-04-22 RX ORDER — POTASSIUM CHLORIDE 20 MEQ/1
20 TABLET, EXTENDED RELEASE ORAL 2 TIMES DAILY
Qty: 2 TABLET | Refills: 0 | Status: SHIPPED | OUTPATIENT
Start: 2017-04-22 | End: 2017-04-23

## 2017-04-22 RX ORDER — METOCLOPRAMIDE HYDROCHLORIDE 5 MG/ML
5 INJECTION INTRAMUSCULAR; INTRAVENOUS ONCE
Status: COMPLETED | OUTPATIENT
Start: 2017-04-22 | End: 2017-04-22

## 2017-04-22 NOTE — ED NOTES
Report and care endorsed to linda. Pt c/o nausea and pain to left arm of po  tasium drip, potassium slowed down pt states pain improved, ice pack applied pt states pain improved.

## 2017-04-22 NOTE — ED PROVIDER NOTES
Patient Seen in: THE [facility] Emergency Department In [place]    History   Patient presents with:  Abdomen/Flank Pain (GI/)  Nausea/Vomiting/Diarrhea (gastrointestinal)    Stated Complaint: abdominal pain, vomiting    HPI    This is a 72-year-old female w years old           Past Surgical History    TOTAL ABDOM HYSTERECTOMY      INJECTION, W/WO CONTRAST, DX/THERAPEUTIC SUBSTANCE, EPIDURAL/SUBARACHNOID; LUMBAR/SACRAL  2/26/2013    Comment Procedure: LUMBAR EPIDURAL;  Surgeon: Marcella Rose MD;  Location: [facility]    SPINAL FUSION  2001    Comment Neck Fusion; [facility], [place], [address] removed ovaries    TUBAL LIGATION      OTHER SURGICAL HISTORY  2009    Comment Cath Stent Placement x 1    OTHER SURGICAL HISTORY total) by mouth once daily. Fexofenadine HCl (ALLEGRA ALLERGY) 180 MG Oral Tab,  Take 180 mg by mouth daily as needed. aspirin 325 MG Oral Tab EC,  Take 325 mg by mouth daily.      Fluticasone Propionate (FLONASE) 50 MCG/ACT Nasal Suspension,  2 spray evening. HEENT:  Pupils equally round and reactive to light. Conjuctiva clear. Oropharynx is clear and moist.   Lungs: Clear to auscultation bilaterally with no rales, no retractions, and no wheezing. HEART:  Regular rate and rhythm. S1 and S2.  No murmu count was slightly elevated at 16.8. Hemoglobin 12.8. Platelet 310. CMP: BUN 20. Creatinine 1.2. Glucose 141. Potassium is low at 3. Bicarb 27. Urinalysis was positive for ketones. Patient was given a potassium rider for her low potassium.   Repeat 0

## 2017-05-01 ENCOUNTER — HOSPITAL ENCOUNTER (OUTPATIENT)
Facility: HOSPITAL | Age: 77
Setting detail: HOSPITAL OUTPATIENT SURGERY
Discharge: HOME | End: 2017-05-01
Attending: ANESTHESIOLOGY | Admitting: ANESTHESIOLOGY
Payer: MEDICARE

## 2017-05-01 ENCOUNTER — SURGERY (OUTPATIENT)
Age: 77
End: 2017-05-01

## 2017-05-01 ENCOUNTER — APPOINTMENT (OUTPATIENT)
Dept: GENERAL RADIOLOGY | Facility: HOSPITAL | Age: 77
End: 2017-05-01
Attending: ANESTHESIOLOGY
Payer: MEDICARE

## 2017-05-01 VITALS
DIASTOLIC BLOOD PRESSURE: 68 MMHG | TEMPERATURE: 99 F | RESPIRATION RATE: 18 BRPM | HEART RATE: 79 BPM | OXYGEN SATURATION: 90 % | SYSTOLIC BLOOD PRESSURE: 113 MMHG

## 2017-05-01 DIAGNOSIS — M96.1 FAILED BACK SURGICAL SYNDROME: ICD-10-CM

## 2017-05-01 PROCEDURE — 99152 MOD SED SAME PHYS/QHP 5/>YRS: CPT | Performed by: ANESTHESIOLOGY

## 2017-05-01 PROCEDURE — S0077 INJECTION, CLINDAMYCIN PHOSP: HCPCS | Performed by: NURSE PRACTITIONER

## 2017-05-01 PROCEDURE — 00HU3MZ INSERTION OF NEUROSTIMULATOR LEAD INTO SPINAL CANAL, PERCUTANEOUS APPROACH: ICD-10-PCS | Performed by: ANESTHESIOLOGY

## 2017-05-01 RX ORDER — CLINDAMYCIN PHOSPHATE 900 MG/50ML
900 INJECTION INTRAVENOUS ONCE
Status: DISCONTINUED | OUTPATIENT
Start: 2017-05-01 | End: 2017-05-01

## 2017-05-01 RX ORDER — CLINDAMYCIN PHOSPHATE 600 MG/50ML
600 INJECTION INTRAVENOUS ONCE
Status: DISCONTINUED | OUTPATIENT
Start: 2017-05-01 | End: 2017-05-01

## 2017-05-01 RX ORDER — SODIUM CHLORIDE, SODIUM LACTATE, POTASSIUM CHLORIDE, CALCIUM CHLORIDE 600; 310; 30; 20 MG/100ML; MG/100ML; MG/100ML; MG/100ML
100 INJECTION, SOLUTION INTRAVENOUS CONTINUOUS
Status: DISCONTINUED | OUTPATIENT
Start: 2017-05-01 | End: 2017-05-01

## 2017-05-01 RX ORDER — CLINDAMYCIN PHOSPHATE 900 MG/50ML
INJECTION INTRAVENOUS
Status: COMPLETED
Start: 2017-05-01 | End: 2017-05-01

## 2017-05-01 RX ORDER — LIDOCAINE HYDROCHLORIDE 10 MG/ML
INJECTION, SOLUTION EPIDURAL; INFILTRATION; INTRACAUDAL; PERINEURAL AS NEEDED
Status: DISCONTINUED | OUTPATIENT
Start: 2017-05-01 | End: 2017-05-01 | Stop reason: HOSPADM

## 2017-05-01 NOTE — OPERATIVE REPORT
[facility]  Operative Report  2017     Lilian  Patient Status:  Hospital Outpatient Surgery    1940 MRN [id number]   Location [address] [place] [address] Attending Daphney Jefferson MD   [facility] Day # 0 PCP Patricia Pittman Tuohy needle was inserted and directed to T12-L1 intervertebral interlaminar space in the left of the midline. The needle was advanced under AP and lateral fluoroscopy. The epidural space was accessed by using the loss of resistance technique.   The needl patient understood fully and was discharged in good condition. Complications: None. Follow up: The patient will be followed in the pain clinic for trial evaluation in 7 days.     Kacey Ahmadi MD
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
## 2017-05-05 ENCOUNTER — OFFICE VISIT (OUTPATIENT)
Dept: SURGERY | Facility: CLINIC | Age: 77
End: 2017-05-05

## 2017-05-05 VITALS
RESPIRATION RATE: 18 BRPM | BODY MASS INDEX: 21.26 KG/M2 | OXYGEN SATURATION: 97 % | WEIGHT: 120 LBS | DIASTOLIC BLOOD PRESSURE: 74 MMHG | SYSTOLIC BLOOD PRESSURE: 110 MMHG | HEART RATE: 89 BPM | HEIGHT: 63 IN

## 2017-05-05 DIAGNOSIS — M54.16 LUMBAR RADICULOPATHY: Primary | ICD-10-CM

## 2017-05-05 PROCEDURE — 99024 POSTOP FOLLOW-UP VISIT: CPT | Performed by: ANESTHESIOLOGY

## 2017-05-06 NOTE — PROGRESS NOTES
Name: Andrea Gomez   : 1940   DOS: 2017     Pain Clinic Follow Up Visit:   Andrea Gomez is a 78year old female who presents for recheck of her chronic low back pain.   She is status post spinal cord stimulator trial.  Today she was here for 100 MG Oral Tab Take 1 tablet (100 mg total) by mouth daily. Disp: 90 tablet Rfl: 3   AmLODIPine Besylate 10 MG Oral Tab Take 1 tablet (10 mg total) by mouth once daily.  Disp: 90 tablet Rfl: 3   Fexofenadine HCl (ALLEGRA ALLERGY) 180 MG Oral Tab Take 180 m

## 2017-05-16 ENCOUNTER — TELEPHONE (OUTPATIENT)
Dept: SURGERY | Facility: CLINIC | Age: 77
End: 2017-05-16

## 2017-05-16 ENCOUNTER — OFFICE VISIT (OUTPATIENT)
Dept: SURGERY | Facility: CLINIC | Age: 77
End: 2017-05-16

## 2017-05-16 VITALS
DIASTOLIC BLOOD PRESSURE: 64 MMHG | SYSTOLIC BLOOD PRESSURE: 106 MMHG | HEART RATE: 72 BPM | BODY MASS INDEX: 21.26 KG/M2 | HEIGHT: 63 IN | WEIGHT: 120 LBS | RESPIRATION RATE: 16 BRPM

## 2017-05-16 DIAGNOSIS — M51.36 DDD (DEGENERATIVE DISC DISEASE), LUMBAR: ICD-10-CM

## 2017-05-16 DIAGNOSIS — G89.29 CHRONIC LOW BACK PAIN WITH SCIATICA, SCIATICA LATERALITY UNSPECIFIED, UNSPECIFIED BACK PAIN LATERALITY: ICD-10-CM

## 2017-05-16 DIAGNOSIS — M54.16 LUMBAR RADICULOPATHY: Primary | ICD-10-CM

## 2017-05-16 DIAGNOSIS — M54.40 CHRONIC LOW BACK PAIN WITH SCIATICA, SCIATICA LATERALITY UNSPECIFIED, UNSPECIFIED BACK PAIN LATERALITY: ICD-10-CM

## 2017-05-16 PROBLEM — M54.9 BACK PAIN: Status: ACTIVE | Noted: 2017-05-16

## 2017-05-16 PROCEDURE — 99203 OFFICE O/P NEW LOW 30 MIN: CPT | Performed by: NEUROLOGICAL SURGERY

## 2017-05-16 RX ORDER — AZELASTINE HCL 205.5 UG/1
SPRAY NASAL
Refills: 5 | COMMUNITY
Start: 2017-05-09 | End: 2017-11-20

## 2017-05-16 NOTE — TELEPHONE ENCOUNTER
You are scheduled for a Spinal cord stimulator implant on 5/31/17 with Dr. Ja Brown.     Pre-op instructions discussed with patient and surgical packet provided:      · You will need to contact the Pre-admission department at [phone] to schedule your p
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
## 2017-05-16 NOTE — PATIENT INSTRUCTIONS
Refill policies:    • Allow 2 business days for refills; controlled substances may take longer.   • Contact your pharmacy at least 5 days prior to running out of medication and have them send an electronic request or submit request through the “request re insurance carrier to obtain pre-certification or prior authorization. Unfortunately, [facility] has seen an increase in denial of payment even though the procedure/test has been pre-certified.   You are strongly encouraged to contact your insurance carrier to v implant.    · If you were on blood thinners (such as Coumadin, Pradaxa, Xarelto, etc) prior to surgery that we had you stop for surgery, please make sure you get instructions about when to resume the medication before you are discharged from the hospital  ·

## 2017-05-22 ENCOUNTER — HOSPITAL ENCOUNTER (OUTPATIENT)
Dept: MRI IMAGING | Age: 77
End: 2017-05-22
Attending: NEUROLOGICAL SURGERY
Payer: MEDICARE

## 2017-05-22 ENCOUNTER — OFFICE VISIT (OUTPATIENT)
Dept: FAMILY MEDICINE CLINIC | Facility: CLINIC | Age: 77
End: 2017-05-22

## 2017-05-22 ENCOUNTER — HOSPITAL ENCOUNTER (OUTPATIENT)
Dept: MRI IMAGING | Age: 77
Discharge: HOME | End: 2017-05-22
Attending: NEUROLOGICAL SURGERY
Payer: MEDICARE

## 2017-05-22 VITALS
BODY MASS INDEX: 21 KG/M2 | SYSTOLIC BLOOD PRESSURE: 122 MMHG | DIASTOLIC BLOOD PRESSURE: 80 MMHG | HEART RATE: 76 BPM | WEIGHT: 120 LBS

## 2017-05-22 DIAGNOSIS — E21.0 PRIMARY HYPERPARATHYROIDISM (HCC): ICD-10-CM

## 2017-05-22 DIAGNOSIS — M54.40 CHRONIC LOW BACK PAIN WITH SCIATICA, SCIATICA LATERALITY UNSPECIFIED, UNSPECIFIED BACK PAIN LATERALITY: ICD-10-CM

## 2017-05-22 DIAGNOSIS — G89.29 CHRONIC LOW BACK PAIN WITH SCIATICA, SCIATICA LATERALITY UNSPECIFIED, UNSPECIFIED BACK PAIN LATERALITY: ICD-10-CM

## 2017-05-22 DIAGNOSIS — I25.118 CORONARY ARTERY DISEASE INVOLVING NATIVE CORONARY ARTERY OF NATIVE HEART WITH OTHER FORM OF ANGINA PECTORIS (HCC): ICD-10-CM

## 2017-05-22 DIAGNOSIS — J01.01 ACUTE RECURRENT MAXILLARY SINUSITIS: ICD-10-CM

## 2017-05-22 DIAGNOSIS — M54.16 LUMBAR RADICULOPATHY: ICD-10-CM

## 2017-05-22 DIAGNOSIS — M51.36 DDD (DEGENERATIVE DISC DISEASE), LUMBAR: ICD-10-CM

## 2017-05-22 DIAGNOSIS — Z01.818 PRE-OP TESTING: Primary | ICD-10-CM

## 2017-05-22 DIAGNOSIS — N18.30 CKD (CHRONIC KIDNEY DISEASE) STAGE 3, GFR 30-59 ML/MIN (HCC): ICD-10-CM

## 2017-05-22 PROCEDURE — 87081 CULTURE SCREEN ONLY: CPT | Performed by: FAMILY MEDICINE

## 2017-05-22 PROCEDURE — 99214 OFFICE O/P EST MOD 30 MIN: CPT | Performed by: FAMILY MEDICINE

## 2017-05-22 PROCEDURE — 72146 MRI CHEST SPINE W/O DYE: CPT | Performed by: NEUROLOGICAL SURGERY

## 2017-05-22 RX ORDER — ONDANSETRON 4 MG/1
4 TABLET, FILM COATED ORAL EVERY 8 HOURS PRN
Qty: 15 TABLET | Refills: 0 | Status: SHIPPED | OUTPATIENT
Start: 2017-05-22 | End: 2017-06-07

## 2017-05-22 RX ORDER — AZITHROMYCIN 250 MG/1
TABLET, FILM COATED ORAL
Qty: 6 TABLET | Refills: 0 | Status: SHIPPED | OUTPATIENT
Start: 2017-05-22 | End: 2017-06-02 | Stop reason: ALTCHOICE

## 2017-05-22 NOTE — PROGRESS NOTES
Spoke to Agata/Dr. Satish Duarte office and she said that patient has surgical clearance and it was placed in his OV note. She will fax it over.

## 2017-05-22 NOTE — PATIENT INSTRUCTIONS
Rebound Headache     Overuse of pain medications can lead to rebound headaches. You use pain medicines called analgesics to treat your headaches. You are now having more frequent or intense headaches (rebound headaches).  They are your body’s response t · Caffeine can make rebound headaches worse. If you have caffeinated drinks every day, slowly cut your intake. · Keep a written log of your headaches. This can help you and your healthcare provider track your progress. · Be patient.  It can take about 2 t

## 2017-05-22 NOTE — H&P
Andrea Gomez is a 78year old female who presents for a pre-operative physical exam.    Prior anesthesia no prior issues with anesthesia.   PMH negative for angina, arrhythmia, CHF, liver disease,  no coagulation delay, no primary hypercoagulability, no P tablet Rfl: 2   ClonazePAM 0.5 MG Oral Tab Take 1 tablet (0.5 mg total) by mouth 2 (two) times daily as needed for Anxiety. Disp: 60 tablet Rfl: 2   BusPIRone HCl 5 MG Oral Tab Take 1 tablet (5 mg total) by mouth 2 (two) times daily.  Disp: 60 tablet Rfl: 1 • Parathyroid adenoma    • Atherosclerosis of coronary artery    • Autoimmune disease ([facility] [address]      fibro   • History of blood transfusion    • Visual impairment    • Tinnitus    • Heart murmur    • Cataract    • Hearing impairment      bilateral hearing ai MD;  Location: [facility]    PATIENT [address] FOR IV ANTIBIOTIC SURGICAL SITE INFECTION PROPHYLAXIS.   3/18/2013    Comment Procedure: LUMBAR EPIDURAL;  Surgeon: Ro Hernandez MD;  Location: [address] [place] [address] antibiotics.  It has been awhile since antibiotics  LUNGS: denies shortness of breath with exertion  CARDIOVASCULAR: denies chest pain on exertion  GI: denies abdominal pain,denies heartburn  : denies dysuria, vaginal discharge or itching,  MUSCULOSKELETA Ondansetron HCl (ZOFRAN) 4 mg tablet 15 tablet 0      Sig: Take 1 tablet (4 mg total) by mouth every 8 (eight) hours as needed for Nausea.       azithromycin (ZITHROMAX Z-RODO) 250 MG Oral Tab 6 tablet 0      Sig: Take two tablets by mouth today, then one ta sinusitis  Z-Doug   Follow-up on Friday with symptoms. Patient can proceed on with surgery if sinus symptoms resolved with antibiotic.

## 2017-05-23 ENCOUNTER — LAB ENCOUNTER (OUTPATIENT)
Dept: LAB | Age: 77
End: 2017-05-23
Attending: FAMILY MEDICINE
Payer: MEDICARE

## 2017-05-23 DIAGNOSIS — Z01.818 PRE-OP TESTING: ICD-10-CM

## 2017-05-23 PROCEDURE — 85730 THROMBOPLASTIN TIME PARTIAL: CPT

## 2017-05-23 PROCEDURE — 85025 COMPLETE CBC W/AUTO DIFF WBC: CPT

## 2017-05-23 PROCEDURE — 85610 PROTHROMBIN TIME: CPT

## 2017-05-23 PROCEDURE — 80053 COMPREHEN METABOLIC PANEL: CPT

## 2017-05-23 PROCEDURE — 36415 COLL VENOUS BLD VENIPUNCTURE: CPT

## 2017-05-24 ENCOUNTER — TELEPHONE (OUTPATIENT)
Dept: NEUROLOGY | Facility: CLINIC | Age: 77
End: 2017-05-24

## 2017-05-24 ENCOUNTER — TELEPHONE (OUTPATIENT)
Dept: FAMILY MEDICINE CLINIC | Facility: CLINIC | Age: 77
End: 2017-05-24

## 2017-05-24 DIAGNOSIS — E87.6 LOW SERUM POTASSIUM LEVEL: Primary | ICD-10-CM

## 2017-05-24 DIAGNOSIS — R94.4 DECREASED GFR: ICD-10-CM

## 2017-05-24 DIAGNOSIS — R77.8 ELEVATED TOTAL PROTEIN: ICD-10-CM

## 2017-05-24 NOTE — TELEPHONE ENCOUNTER
Patient called back, asking why surgery was cancelled  Explained that hospital due to lack of reimbursement has decided we can no longer do implant her at THE [facility]  Patient verbalized understanding.

## 2017-05-24 NOTE — PROGRESS NOTES
Quick Note:    Refer to nephrology the GFR is lower @ 37 and was @ 71 on 10/2016  Start Anirudh Sears (potassium) 20 meq daily repeat electrolytes in 1 week  Add protein electrophoresis elevated protein  CBC is now normal  Call patient to review electrolytes before

## 2017-05-24 NOTE — TELEPHONE ENCOUNTER
Informed patient that [facility] has informed us that we will no longer be doing SCS placement due to lack of reimbursement  Patient verbalized understanding.

## 2017-05-25 RX ORDER — POTASSIUM CHLORIDE 1500 MG/1
20 TABLET, FILM COATED, EXTENDED RELEASE ORAL DAILY
Qty: 30 TABLET | Refills: 0 | Status: SHIPPED | OUTPATIENT
Start: 2017-05-25 | End: 2017-06-24

## 2017-06-02 ENCOUNTER — OFFICE VISIT (OUTPATIENT)
Dept: FAMILY MEDICINE CLINIC | Facility: CLINIC | Age: 77
End: 2017-06-02

## 2017-06-02 VITALS
BODY MASS INDEX: 21.44 KG/M2 | TEMPERATURE: 98 F | RESPIRATION RATE: 16 BRPM | SYSTOLIC BLOOD PRESSURE: 126 MMHG | HEIGHT: 63 IN | WEIGHT: 121 LBS | DIASTOLIC BLOOD PRESSURE: 80 MMHG | HEART RATE: 75 BPM | OXYGEN SATURATION: 98 %

## 2017-06-02 DIAGNOSIS — R68.83 CHILLS: ICD-10-CM

## 2017-06-02 DIAGNOSIS — R19.7 DIARRHEA OF PRESUMED INFECTIOUS ORIGIN: Primary | ICD-10-CM

## 2017-06-02 PROCEDURE — 99213 OFFICE O/P EST LOW 20 MIN: CPT | Performed by: FAMILY MEDICINE

## 2017-06-02 NOTE — PROGRESS NOTES
Ilir Beach is a 78year old female. HPI:     Patient is in for evaluation of chills and diarrhea that started yesterday. She denies any documented fever, no nausea or vomiting. Has had  2 episodes of watery diarrhea yesterday and a slight headache. Disp: 90 tablet Rfl: 3   AmLODIPine Besylate 10 MG Oral Tab Take 1 tablet (10 mg total) by mouth once daily. Disp: 90 tablet Rfl: 3   Fexofenadine HCl (ALLEGRA ALLERGY) 180 MG Oral Tab Take 180 mg by mouth daily as needed.    Disp:  Rfl:    aspirin 325 MG O lesions or rashes  RESPIRATORY: denies shortness of breath with exertion  CARDIOVASCULAR: denies chest pain on exertion  GI: see above   NEURO: denies headaches  Musculoskeletal: No motor deficits    EXAM:   /80 mmHg  Pulse 75  Temp(Src) 98.4 °F (36.

## 2017-06-02 NOTE — PATIENT INSTRUCTIONS
Treating Diarrhea    Diarrhea happens when you have loose, watery, or frequent bowel movements. It is a common problem with many causes. Most cases of diarrhea clear up on their own. But certain cases may need treatment.  Be sure to see your healthcare pr © 2228-9756 [address], [address]. All rights reserved. This information is not intended as a substitute for professional medical care. Always follow your healthcare professional's instructions.         Angelique Fuchs · You may use acetaminophen or NSAID medicines like ibuprofen or naproxen to reduce pain and fever. Don’t use these if you have chronic liver or kidney disease, or ever had a stomach ulcer or gastrointestinal bleeding.  Don't use NSAID medicines if you are · Don’t force yourself to eat, especially if you are having cramping, vomiting, or diarrhea. Don’t eat large amounts at a time, even if you are hungry. · If you eat, avoid fatty, greasy, spicy, or fried foods.   · Don’t eat dairy foods or drink milk if you · Abdominal pain that gets worse  · Constant lower right abdominal pain  · Continued vomiting and inability to keep liquids down  · Diarrhea more than 5 times a day  · Blood in vomit or stool  · Dark urine or no urine for 8 hours, dry mouth and tongue, tir

## 2017-06-07 RX ORDER — ONDANSETRON 4 MG/1
TABLET, FILM COATED ORAL
Qty: 15 TABLET | Refills: 0 | Status: ON HOLD | OUTPATIENT
Start: 2017-06-07 | End: 2017-06-29

## 2017-06-19 ENCOUNTER — TELEPHONE (OUTPATIENT)
Dept: FAMILY MEDICINE CLINIC | Facility: CLINIC | Age: 77
End: 2017-06-19

## 2017-06-26 ENCOUNTER — TELEPHONE (OUTPATIENT)
Dept: FAMILY MEDICINE CLINIC | Facility: CLINIC | Age: 77
End: 2017-06-26

## 2017-06-26 RX ORDER — PRAVASTATIN SODIUM 40 MG
TABLET ORAL
Qty: 30 TABLET | Refills: 0 | Status: SHIPPED | OUTPATIENT
Start: 2017-06-26 | End: 2017-07-23

## 2017-06-26 NOTE — TELEPHONE ENCOUNTER
Recommend that patient be seen for this since she has had symptoms for 2 weeks  Future Appointments  Date Time Provider Damaris Linares   6/27/2017 12:30 PM Cora Perrin MD EM[facility] [place]   7/22/2017 3:15 PM  CARD STRESS ECHO RM 1 ECNUNU ECHO E

## 2017-06-27 ENCOUNTER — OFFICE VISIT (OUTPATIENT)
Dept: FAMILY MEDICINE CLINIC | Facility: CLINIC | Age: 77
End: 2017-06-27

## 2017-06-27 VITALS
HEIGHT: 63 IN | TEMPERATURE: 98 F | HEART RATE: 90 BPM | DIASTOLIC BLOOD PRESSURE: 74 MMHG | SYSTOLIC BLOOD PRESSURE: 130 MMHG | WEIGHT: 119.81 LBS | BODY MASS INDEX: 21.23 KG/M2 | RESPIRATION RATE: 18 BRPM

## 2017-06-27 DIAGNOSIS — R11.2 NON-INTRACTABLE VOMITING WITH NAUSEA, UNSPECIFIED VOMITING TYPE: Primary | ICD-10-CM

## 2017-06-27 PROCEDURE — 99214 OFFICE O/P EST MOD 30 MIN: CPT | Performed by: FAMILY MEDICINE

## 2017-06-27 PROCEDURE — 93000 ELECTROCARDIOGRAM COMPLETE: CPT | Performed by: FAMILY MEDICINE

## 2017-06-27 RX ORDER — ONDANSETRON 4 MG/1
4 TABLET, FILM COATED ORAL EVERY 8 HOURS PRN
Qty: 20 TABLET | Refills: 0 | Status: SHIPPED | OUTPATIENT
Start: 2017-06-27 | End: 2017-07-17

## 2017-06-27 NOTE — PROGRESS NOTES
Lolis Pablo is a 78year old female here for Patient presents with:  Nausea: Rm 3: x 2 weeks, body aches, chills and fatigue.  cw  Vomiting      HPI:     Nausea/Vomiting  -started 2 weeks ago in setting of sinus issues  -nausea and vomiting continued   - NDL/CATH SPI DX/THER YAK      Comment: Procedure: LUMBAR EPIDURAL;  Surgeon:                Olga Bautista MD;  Location: [facility] 860: HYSTERECTOMY      Comment: removed ovaries  2/26/2013: INJECTION, W/WO CONTRAST date:  TOTAL ABDOM HYSTERECTOMY  No date: TUBAL LIGATION   Family History   Problem Relation Age of Onset   • Hypertension Father    • Heart Attack Father    • Hypertension Daughter    • Cancer Sister      lung with mets to the lymph nodes   • Lipids Sister Potassium 100 MG Oral Tab Take 1 tablet (100 mg total) by mouth daily. Disp: 90 tablet Rfl: 3   AmLODIPine Besylate 10 MG Oral Tab Take 1 tablet (10 mg total) by mouth once daily.  Disp: 90 tablet Rfl: 3   Fexofenadine HCl (ALLEGRA ALLERGY) 180 MG Oral Tab Lipase [E]      UA/M With Culture Reflex [E]    Meds This Visit:    Signed Prescriptions Disp Refills    Ondansetron HCl (ZOFRAN) 4 mg tablet 20 tablet 0      Sig: Take 1 tablet (4 mg total) by mouth every 8 (eight) hours as needed for Nausea.            Im

## 2017-06-27 NOTE — PATIENT INSTRUCTIONS
-- go to lab for bloodwork and urine tests  -- we will call with results  -- slowly try to advance diet as tolerated, start with bland foods and soups  -- ondansetron as needed for nausea  -- followup in 1-2 wks if not improving, sooner if worsening  --emily

## 2017-06-28 ENCOUNTER — LAB ENCOUNTER (OUTPATIENT)
Dept: LAB | Age: 77
End: 2017-06-28
Attending: FAMILY MEDICINE
Payer: MEDICARE

## 2017-06-28 ENCOUNTER — HOSPITAL ENCOUNTER (OUTPATIENT)
Facility: HOSPITAL | Age: 77
Setting detail: OBSERVATION
LOS: 1 days | Discharge: HOME | End: 2017-06-29
Attending: STUDENT IN AN ORGANIZED HEALTH CARE EDUCATION/TRAINING PROGRAM | Admitting: HOSPITALIST
Payer: MEDICARE

## 2017-06-28 ENCOUNTER — TELEPHONE (OUTPATIENT)
Dept: FAMILY MEDICINE CLINIC | Facility: CLINIC | Age: 77
End: 2017-06-28

## 2017-06-28 DIAGNOSIS — E87.6 HYPOKALEMIA: ICD-10-CM

## 2017-06-28 DIAGNOSIS — R19.7 NAUSEA VOMITING AND DIARRHEA: ICD-10-CM

## 2017-06-28 DIAGNOSIS — R11.2 NON-INTRACTABLE VOMITING WITH NAUSEA, UNSPECIFIED VOMITING TYPE: ICD-10-CM

## 2017-06-28 DIAGNOSIS — N28.9 RENAL INSUFFICIENCY: ICD-10-CM

## 2017-06-28 DIAGNOSIS — R11.2 NAUSEA VOMITING AND DIARRHEA: ICD-10-CM

## 2017-06-28 DIAGNOSIS — E86.0 DEHYDRATION: Primary | ICD-10-CM

## 2017-06-28 DIAGNOSIS — R77.8 ELEVATED TOTAL PROTEIN: ICD-10-CM

## 2017-06-28 PROBLEM — K85.90 ACUTE PANCREATITIS (HCC): Status: ACTIVE | Noted: 2017-06-28

## 2017-06-28 PROBLEM — K85.90 ACUTE PANCREATITIS: Status: ACTIVE | Noted: 2017-06-28

## 2017-06-28 LAB
ALBUMIN SERPL-MCNC: 3.8 G/DL (ref 3.5–4.8)
ALP LIVER SERPL-CCNC: 57 U/L (ref 55–142)
ALT SERPL-CCNC: 13 U/L (ref 14–54)
AST SERPL-CCNC: 11 U/L (ref 15–41)
BASOPHILS # BLD AUTO: 0.09 X10(3) UL (ref 0–0.1)
BASOPHILS NFR BLD AUTO: 0.8 %
BILIRUB SERPL-MCNC: 0.4 MG/DL (ref 0.1–2)
BUN BLD-MCNC: 24 MG/DL (ref 8–20)
CALCIUM BLD-MCNC: 8.2 MG/DL (ref 8.3–10.3)
CHLORIDE: 101 MMOL/L (ref 101–111)
CO2: 30 MMOL/L (ref 22–32)
CREAT BLD-MCNC: 1.68 MG/DL (ref 0.55–1.02)
EOSINOPHIL # BLD AUTO: 0.13 X10(3) UL (ref 0–0.3)
EOSINOPHIL NFR BLD AUTO: 1.1 %
ERYTHROCYTE [DISTWIDTH] IN BLOOD BY AUTOMATED COUNT: 13.1 % (ref 11.5–16)
GLUCOSE BLD-MCNC: 85 MG/DL (ref 70–99)
HCT VFR BLD AUTO: 31.5 % (ref 34–50)
HGB BLD-MCNC: 10.6 G/DL (ref 12–16)
IMMATURE GRANULOCYTE COUNT: 0.03 X10(3) UL (ref 0–1)
IMMATURE GRANULOCYTE RATIO %: 0.3 %
LIPASE: 353 U/L (ref 73–393)
LYMPHOCYTES # BLD AUTO: 5 X10(3) UL (ref 0.9–4)
LYMPHOCYTES NFR BLD AUTO: 43 %
M PROTEIN MFR SERPL ELPH: 7.5 G/DL (ref 6.1–8.3)
MCH RBC QN AUTO: 30 PG (ref 27–33.2)
MCHC RBC AUTO-ENTMCNC: 33.7 G/DL (ref 31–37)
MCV RBC AUTO: 89.2 FL (ref 81–100)
MONOCYTES # BLD AUTO: 1.05 X10(3) UL (ref 0.1–0.6)
MONOCYTES NFR BLD AUTO: 9 %
NEUTROPHIL ABS PRELIM: 5.34 X10 (3) UL (ref 1.3–6.7)
NEUTROPHILS # BLD AUTO: 5.34 X10(3) UL (ref 1.3–6.7)
NEUTROPHILS NFR BLD AUTO: 45.8 %
PLATELET # BLD AUTO: 296 10(3)UL (ref 150–450)
POTASSIUM SERPL-SCNC: 3.1 MMOL/L (ref 3.6–5.1)
RBC # BLD AUTO: 3.53 X10(6)UL (ref 3.8–5.1)
RED CELL DISTRIBUTION WIDTH-SD: 42.5 FL (ref 35.1–46.3)
SODIUM SERPL-SCNC: 140 MMOL/L (ref 136–144)
WBC # BLD AUTO: 11.6 X10(3) UL (ref 4–13)

## 2017-06-28 PROCEDURE — 86334 IMMUNOFIX E-PHORESIS SERUM: CPT

## 2017-06-28 PROCEDURE — 83690 ASSAY OF LIPASE: CPT

## 2017-06-28 PROCEDURE — 83883 ASSAY NEPHELOMETRY NOT SPEC: CPT

## 2017-06-28 PROCEDURE — 87086 URINE CULTURE/COLONY COUNT: CPT

## 2017-06-28 PROCEDURE — 36415 COLL VENOUS BLD VENIPUNCTURE: CPT

## 2017-06-28 PROCEDURE — 85025 COMPLETE CBC W/AUTO DIFF WBC: CPT

## 2017-06-28 PROCEDURE — 99223 1ST HOSP IP/OBS HIGH 75: CPT | Performed by: HOSPITALIST

## 2017-06-28 PROCEDURE — 82150 ASSAY OF AMYLASE: CPT

## 2017-06-28 PROCEDURE — 81001 URINALYSIS AUTO W/SCOPE: CPT

## 2017-06-28 PROCEDURE — 80053 COMPREHEN METABOLIC PANEL: CPT

## 2017-06-28 PROCEDURE — 84165 PROTEIN E-PHORESIS SERUM: CPT

## 2017-06-28 RX ORDER — BUSPIRONE HYDROCHLORIDE 5 MG/1
5 TABLET ORAL 2 TIMES DAILY
Status: DISCONTINUED | OUTPATIENT
Start: 2017-06-28 | End: 2017-06-29

## 2017-06-28 RX ORDER — ATORVASTATIN CALCIUM 20 MG/1
20 TABLET, FILM COATED ORAL NIGHTLY
Status: DISCONTINUED | OUTPATIENT
Start: 2017-06-28 | End: 2017-06-29

## 2017-06-28 RX ORDER — SODIUM CHLORIDE 9 MG/ML
INJECTION, SOLUTION INTRAVENOUS CONTINUOUS
Status: DISCONTINUED | OUTPATIENT
Start: 2017-06-28 | End: 2017-06-29

## 2017-06-28 RX ORDER — ONDANSETRON 2 MG/ML
4 INJECTION INTRAMUSCULAR; INTRAVENOUS AS NEEDED
Status: ACTIVE | OUTPATIENT
Start: 2017-06-28 | End: 2017-06-28

## 2017-06-28 RX ORDER — LOSARTAN POTASSIUM 100 MG/1
100 TABLET ORAL DAILY
Status: DISCONTINUED | OUTPATIENT
Start: 2017-06-29 | End: 2017-06-29

## 2017-06-28 RX ORDER — ACETAMINOPHEN 325 MG/1
650 TABLET ORAL EVERY 6 HOURS PRN
Status: DISCONTINUED | OUTPATIENT
Start: 2017-06-28 | End: 2017-06-29

## 2017-06-28 RX ORDER — HEPARIN SODIUM 5000 [USP'U]/ML
5000 INJECTION, SOLUTION INTRAVENOUS; SUBCUTANEOUS EVERY 8 HOURS SCHEDULED
Status: DISCONTINUED | OUTPATIENT
Start: 2017-06-28 | End: 2017-06-29

## 2017-06-28 RX ORDER — MONTELUKAST SODIUM 10 MG/1
10 TABLET ORAL NIGHTLY
Status: DISCONTINUED | OUTPATIENT
Start: 2017-06-28 | End: 2017-06-29

## 2017-06-28 RX ORDER — CLONAZEPAM 0.5 MG/1
0.5 TABLET ORAL 2 TIMES DAILY PRN
Status: DISCONTINUED | OUTPATIENT
Start: 2017-06-28 | End: 2017-06-29

## 2017-06-28 RX ORDER — CINACALCET 30 MG/1
30 TABLET, FILM COATED ORAL 2 TIMES DAILY WITH MEALS
Status: DISCONTINUED | OUTPATIENT
Start: 2017-06-29 | End: 2017-06-29

## 2017-06-28 RX ORDER — POTASSIUM CHLORIDE 14.9 MG/ML
20 INJECTION INTRAVENOUS ONCE
Status: COMPLETED | OUTPATIENT
Start: 2017-06-28 | End: 2017-06-29

## 2017-06-28 RX ORDER — SODIUM CHLORIDE 9 MG/ML
INJECTION, SOLUTION INTRAVENOUS CONTINUOUS
Status: ACTIVE | OUTPATIENT
Start: 2017-06-28 | End: 2017-06-29

## 2017-06-28 RX ORDER — METOCLOPRAMIDE HYDROCHLORIDE 5 MG/ML
10 INJECTION INTRAMUSCULAR; INTRAVENOUS ONCE
Status: COMPLETED | OUTPATIENT
Start: 2017-06-28 | End: 2017-06-28

## 2017-06-28 RX ORDER — ONDANSETRON 4 MG/1
4 TABLET, FILM COATED ORAL EVERY 8 HOURS PRN
Status: DISCONTINUED | OUTPATIENT
Start: 2017-06-28 | End: 2017-06-29

## 2017-06-28 RX ORDER — ZOLPIDEM TARTRATE 5 MG/1
5 TABLET ORAL NIGHTLY PRN
Status: DISCONTINUED | OUTPATIENT
Start: 2017-06-28 | End: 2017-06-29

## 2017-06-28 RX ORDER — POTASSIUM CHLORIDE 20 MEQ/1
40 TABLET, EXTENDED RELEASE ORAL ONCE
Status: COMPLETED | OUTPATIENT
Start: 2017-06-28 | End: 2017-06-28

## 2017-06-28 RX ORDER — ONDANSETRON 2 MG/ML
8 INJECTION INTRAMUSCULAR; INTRAVENOUS EVERY 6 HOURS PRN
Status: DISCONTINUED | OUTPATIENT
Start: 2017-06-28 | End: 2017-06-29

## 2017-06-28 RX ORDER — TRAZODONE HYDROCHLORIDE 50 MG/1
50 TABLET ORAL NIGHTLY PRN
Status: DISCONTINUED | OUTPATIENT
Start: 2017-06-28 | End: 2017-06-29

## 2017-06-28 RX ORDER — ASPIRIN 325 MG
325 TABLET, DELAYED RELEASE (ENTERIC COATED) ORAL DAILY
Status: DISCONTINUED | OUTPATIENT
Start: 2017-06-29 | End: 2017-06-29

## 2017-06-28 RX ORDER — DIPHENHYDRAMINE HYDROCHLORIDE 50 MG/ML
25 INJECTION INTRAMUSCULAR; INTRAVENOUS ONCE
Status: COMPLETED | OUTPATIENT
Start: 2017-06-28 | End: 2017-06-28

## 2017-06-28 RX ORDER — ONDANSETRON 2 MG/ML
4 INJECTION INTRAMUSCULAR; INTRAVENOUS ONCE
Status: DISCONTINUED | OUTPATIENT
Start: 2017-06-28 | End: 2017-06-28

## 2017-06-28 RX ORDER — AZELASTINE 1 MG/ML
2 SPRAY, METERED NASAL 2 TIMES DAILY
Status: DISCONTINUED | OUTPATIENT
Start: 2017-06-28 | End: 2017-06-29

## 2017-06-28 RX ORDER — FLUVOXAMINE MALEATE 50 MG/1
150 TABLET, COATED ORAL NIGHTLY
Status: DISCONTINUED | OUTPATIENT
Start: 2017-06-28 | End: 2017-06-29

## 2017-06-28 RX ORDER — AMLODIPINE BESYLATE 5 MG/1
10 TABLET ORAL
Status: DISCONTINUED | OUTPATIENT
Start: 2017-06-29 | End: 2017-06-29

## 2017-06-29 ENCOUNTER — SURGERY (OUTPATIENT)
Age: 77
End: 2017-06-29

## 2017-06-29 ENCOUNTER — APPOINTMENT (OUTPATIENT)
Dept: ULTRASOUND IMAGING | Facility: HOSPITAL | Age: 77
End: 2017-06-29
Attending: HOSPITALIST
Payer: MEDICARE

## 2017-06-29 VITALS
HEART RATE: 72 BPM | SYSTOLIC BLOOD PRESSURE: 130 MMHG | RESPIRATION RATE: 18 BRPM | HEIGHT: 63 IN | TEMPERATURE: 98 F | BODY MASS INDEX: 22.86 KG/M2 | WEIGHT: 129 LBS | OXYGEN SATURATION: 99 % | DIASTOLIC BLOOD PRESSURE: 87 MMHG

## 2017-06-29 LAB
ATRIAL RATE: 67 BPM
BUN BLD-MCNC: 20 MG/DL (ref 8–20)
CALCIUM BLD-MCNC: 7.7 MG/DL (ref 8.3–10.3)
CHLORIDE: 110 MMOL/L (ref 101–111)
CO2: 28 MMOL/L (ref 22–32)
CREAT BLD-MCNC: 1.29 MG/DL (ref 0.55–1.02)
GLUCOSE BLD-MCNC: 84 MG/DL (ref 70–99)
HAV IGM SER QL: 1.6 MG/DL (ref 1.7–3)
P AXIS: 46 DEGREES
P-R INTERVAL: 172 MS
POTASSIUM SERPL-SCNC: 3.2 MMOL/L (ref 3.6–5.1)
Q-T INTERVAL: 442 MS
QRS DURATION: 70 MS
QTC CALCULATION (BEZET): 467 MS
R AXIS: -18 DEGREES
SODIUM SERPL-SCNC: 145 MMOL/L (ref 136–144)
T AXIS: 62 DEGREES
VENTRICULAR RATE: 67 BPM

## 2017-06-29 PROCEDURE — 0DB68ZX EXCISION OF STOMACH, VIA NATURAL OR ARTIFICIAL OPENING ENDOSCOPIC, DIAGNOSTIC: ICD-10-PCS | Performed by: INTERNAL MEDICINE

## 2017-06-29 PROCEDURE — 0DB98ZX EXCISION OF DUODENUM, VIA NATURAL OR ARTIFICIAL OPENING ENDOSCOPIC, DIAGNOSTIC: ICD-10-PCS | Performed by: INTERNAL MEDICINE

## 2017-06-29 PROCEDURE — 99225 SUBSEQUENT OBSERVATION CARE: CPT | Performed by: HOSPITALIST

## 2017-06-29 PROCEDURE — 76700 US EXAM ABDOM COMPLETE: CPT | Performed by: HOSPITALIST

## 2017-06-29 RX ORDER — PANTOPRAZOLE SODIUM 40 MG/1
40 TABLET, DELAYED RELEASE ORAL
Status: DISCONTINUED | OUTPATIENT
Start: 2017-06-29 | End: 2017-06-29

## 2017-06-29 RX ORDER — POTASSIUM CHLORIDE 14.9 MG/ML
20 INJECTION INTRAVENOUS ONCE
Status: COMPLETED | OUTPATIENT
Start: 2017-06-29 | End: 2017-06-29

## 2017-06-29 RX ORDER — MAGNESIUM OXIDE 400 MG (241.3 MG MAGNESIUM) TABLET
400 TABLET ONCE
Status: COMPLETED | OUTPATIENT
Start: 2017-06-29 | End: 2017-06-29

## 2017-06-29 RX ORDER — POTASSIUM CHLORIDE 20 MEQ/1
40 TABLET, EXTENDED RELEASE ORAL EVERY 4 HOURS
Status: DISCONTINUED | OUTPATIENT
Start: 2017-06-29 | End: 2017-06-29

## 2017-06-29 RX ORDER — PANTOPRAZOLE SODIUM 40 MG/1
40 TABLET, DELAYED RELEASE ORAL
Qty: 30 TABLET | Refills: 0 | Status: SHIPPED | OUTPATIENT
Start: 2017-06-30 | End: 2017-08-03

## 2017-06-29 RX ORDER — SODIUM CHLORIDE 9 MG/ML
INJECTION, SOLUTION INTRAVENOUS ONCE
Status: COMPLETED | OUTPATIENT
Start: 2017-06-29 | End: 2017-06-29

## 2017-06-29 RX ORDER — PANTOPRAZOLE SODIUM 40 MG/1
40 TABLET, DELAYED RELEASE ORAL
Status: DISCONTINUED | OUTPATIENT
Start: 2017-06-30 | End: 2017-06-29

## 2017-06-29 NOTE — CONSULTS
[facility]                       Gastroenterology [address] [facility] Gastroenterology    Wes Lyman Patient Status:  Inpatient    1940 MRN [id number]   [facility] 5NW-A Attending Ava Merlos MD   [facility] Day (CORONARY)      Comment: stent  No date: CATH PERCUTANEOUS  TRANSLUMINAL CORONARY ANGIO*  1963:  DELIVERY ONLY      [place]: [place] NDL/CATH SPI DX/THER BEKA      Comment: Procedure: LUMBAR EPIDURAL;  Surgeon: MANAGEMENT  3/18/2013: PATIENT [place] PREOPERATIVE ORDER FOR IV ANT*      Comment: Procedure: LUMBAR EPIDURAL;  Surgeon:                Fernando Gomez MD;  Location: [address] MANAGEMENT  2001: SPINAL FUSION      Comment: Neck mg Oral Q8H PRN   0.9%  NaCl infusion  Intravenous Continuous   ondansetron HCl (ZOFRAN) injection 8 mg 8 mg Intravenous Q6H PRN   Heparin Sodium (Porcine) 5000 UNIT/ML injection 5,000 Units 5,000 Units Subcutaneous Q8H [address]   acetaminophen (TYLENOL) tab 65 frequent headaches    PE: /87 (BP Location: Right arm)   Pulse 87   Temp 98 °F (36.7 °C) (Oral)   Resp 18   Ht 160 cm (5' 3\")   Wt 129 lb (58.5 kg)   SpO2 96%   BMI 22.85 kg/m²   Gen: AAO x 3, able to speak in complete sentences  HENT: NCAT, EOMI, P ABDOMEN+PELVIS(CPT=74176), 4/22/2017, 0:58. INDICATIONS:  VOMITING, ABN LIVER ENZYMES/LABS LABS     TECHNIQUE:  Real time gray-scale ultrasound was used to evaluate the abdomen.   The exam includes images of the liver, gallbladder, common bile duct, pan abd/pelvis      Thank you for the consultation, we will follow the patient with you.     Fran Alanis MD  12:52 PM  6/29/2017  [facility] Gastroenterology  [phone]

## 2017-06-29 NOTE — H&P
[facility]  History and Physical     Ade Serra Patient Status:  Emergency    1940 MRN [id number]   Location [address] [facility] Attending Irma Franco MD   Hosp Day # 0 PCP Turner Lynn DO     Chief Complaint: EPIDURAL;  Surgeon:                Clif Rios MD;  Location: [place] [address] MANAGEMENT  3/18/2013: [address] Alonso Coombs NDL/CATH SPI DX/THER CQW      Comment: Procedure: LUMBAR EPIDURAL;  Surgeon:                Clif Rios MD Comment: Neck Fusion; [facility], [address]: SPLENECTOMY      Comment: [facility], [facility]  No date: TONSILLECTOMY  No date: TOTAL ABDOM HYSTERECTOMY  No date: TUBAL LIGATION    Social History:  reports that she quit smoking abo Sleep (insomnia). Disp: 30 tablet Rfl: 2   ClonazePAM 0.5 MG Oral Tab Take 1 tablet (0.5 mg total) by mouth 2 (two) times daily as needed for Anxiety.  Disp: 60 tablet Rfl: 2   BusPIRone HCl 5 MG Oral Tab Take 1 tablet (5 mg total) by mouth 2 (two) times da organomegaly. Neurologic: No focal neurological deficits. CNII-XII grossly intact. Musculoskeletal: Moves all extremities. Extremities: No edema or cyanosis. Integument: No rashes or lesions. Psychiatric: Appropriate mood and affect.       Diagnostic

## 2017-06-29 NOTE — TELEPHONE ENCOUNTER
Roberto Hardy PA-C on-call for Dr. Ana de dios from laboratory critical value potassium 2.9 on 6/28/17. Upon review of labs patient has elevated amylase and lipase.   Patient was notified to go to [facility] [place] [facility] emergency room and Kris for further medical

## 2017-06-29 NOTE — ED PROVIDER NOTES
Patient Seen in: [facility] Emergency Department    History   Patient presents with:  Nausea/Vomiting/Diarrhea (gastrointestinal)  Abnormal Result (metabolic, cardiac)    Stated Complaint: VOMITING    HPI    Patient is a 72-year-old female presentin Comment: stent  No date: CATH PERCUTANEOUS  TRANSLUMINAL CORONARY ANGIO*  1963:  DELIVERY ONLY      [place]: Seymour Sykes NDL/CATH SPI DX/THER KATELYN      Comment: Procedure: LUMBAR EPIDURAL;  Surgeon:                Aida aRo, Suleman Alarcon PREOPERATIVE ORDER FOR IV ANT*      Comment: Procedure: LUMBAR EPIDURAL;  Surgeon:                Tita Lee MD;  Location: [place] [address] MANAGEMENT  2001: SPINAL FUSION      Comment: Neck Fusion; [facility], [place] daily.        Family History   Problem Relation Age of Onset   • Hypertension Father    • Heart Attack Father    • Hypertension Daughter    • Cancer Sister      lung with mets to the lymph nodes   • Lipids Sister    • Hypertension Sister    • Lipids Brother mass. There is no tenderness. There is no rebound and no guarding. Musculoskeletal: Normal range of motion, no edema. Neurological: alert and oriented to person, place, and time. Skin: Skin is warm and dry. No rash noted.    Psychiatric: normal mood a was also initiated. Dehydrated with worsening renal insufficiency and anemia and hypokalemia. No significant abdominal pain was identified. Abdomen is soft and nontender.   Patient will be admitted to the hospital for rehydration and correction of her

## 2017-06-29 NOTE — PROGRESS NOTES
Pt is being discharged home, she ate no nausea, no vomiting. All dc info discussed, spoke to Dr James Newsome, she stated it was ok for aspirin , also script given for protonix, follow-up appts discussed, pt verbalized understanding. IV and tele dcd.  Transport c

## 2017-06-29 NOTE — ED INITIAL ASSESSMENT (HPI)
Pt with vomiting and diarrhea for past couple weeks. Called with abnormal blood work results today. Denies pain at this time.

## 2017-06-29 NOTE — PROGRESS NOTES
[facility]  Progress Note     Nate Heath Patient Status:  Inpatient    1940 MRN [id number]   [facility] 5NW-A Attending Juan J Fairchild MD   Hosp Day # 1 PCP Marko Mathias DO     Chief Complaint: nausea and vomiting    S: Cinacalcet HCl  30 mg Oral BID with meals   • Montelukast Sodium  10 mg Oral Nightly   • FluvoxaMINE Maleate  150 mg Oral Nightly   • BusPIRone HCl  5 mg Oral BID   • Azelastine HCl  2 spray Nasal BID   • atorvastatin  20 mg Oral Nightly   • Heparin Sodium

## 2017-06-29 NOTE — OPERATIVE REPORT
Operative Report-Esophagogastroduodenoscopy      PREOPERATIVE DIAGNOSIS/INDICATION:  1. Nausea, vomiting  POSTOPERTATIVE DIAGNOSIS:  1. Erosive gastritis with gastric ulcers  2. Bulbar duodenitis  3.  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  If she has recurrent vomiting, plan CT abd/pelvis  - Follow up in office in 3-4 wks, will need OP colonosco

## 2017-06-29 NOTE — CM/SW NOTE
06/29/17 1400   CM/SW Screening   Referral [id number] staff; Chart review;Nursing rounds   Patient's Mental Status Alert;Oriented   Patient lives with Spouse   Patient Status Prior to Admission   Independent with ADLs an

## 2017-06-29 NOTE — CM/SW NOTE
Patient failed inpatient criteria. Second level of review completed and supports observation. UR committee in agreement. Discussed with Dr. Jonathan Villalobos  who approves observation status. MOON given to the patient and order written.

## 2017-06-30 ENCOUNTER — TELEPHONE (OUTPATIENT)
Dept: FAMILY MEDICINE CLINIC | Facility: CLINIC | Age: 77
End: 2017-06-30

## 2017-06-30 ENCOUNTER — PATIENT OUTREACH (OUTPATIENT)
Dept: CASE MANAGEMENT | Age: 77
End: 2017-06-30

## 2017-06-30 DIAGNOSIS — Z02.9 ENCOUNTERS FOR ADMINISTRATIVE PURPOSE: ICD-10-CM

## 2017-06-30 NOTE — PROGRESS NOTES
Initial Post Discharge Follow Up   Discharge Date: 6/29/17  Contact Date: 6/30/2017    Consent Verification:  Assessment Completed With: Patient  HIPAA Verified?   Yes    Discharge Dx:  Erosive gastritis with gastric ulcers    General:   • How have you b mouth 2 (two) times daily as needed for Anxiety. Disp: 60 tablet Rfl: 2   BusPIRone HCl 5 MG Oral Tab Take 1 tablet (5 mg total) by mouth 2 (two) times daily. Disp: 60 tablet Rfl: 1   Montelukast Sodium 10 MG Oral Tab Take 10 mg by mouth nightly.  Disp:  Rf TCMORTHOSPINETEMP  Re-admit:  . TCMREADMITTEMP      Needs post D/C:   Now that you are home, are there any needs or concerns you need addressed before your next visit with your PCP?  (DME, meds, disease concerns, Etc): No     Follow up appointments:   NCM o pt.  Reviewed when to call MD vs when to go to ER/call 911. Pt verbalized understanding and will contact office with any further questions or concerns. TE sent to PCP office regarding HFU appt.       [x]  Discharge Summary, medications reviewed/discussed/

## 2017-06-30 NOTE — DISCHARGE SUMMARY
[facility] [place] HOSPITALIST  DISCHARGE SUMMARY     Senait Vazquez Patient Status:  Observation    1940 MRN [id number]   [facility] 5NW-A Attending No att. providers found   Hosp Day # 1 PCP Jovani Mcbride DO     Date of Admission: 2017  D and she tolerated it well and she is discharged home in good condition    Procedures during hospitalization:   • EGD    Incidental or significant findings and recommendations (brief descriptions):  • none    Lab/Test results pending at Discharge:   · none COZAAR      Take 1 tablet (100 mg total) by mouth daily. Quantity:  90 tablet  Refills:  3     Montelukast Sodium 10 MG Tabs  Commonly known as:  SINGULAIR      Take 10 mg by mouth nightly.    Refills:  0     Ondansetron HCl 4 mg tablet  Commonly known as Positive bowel sounds. No rebound or guarding. Neurologic: No focal neurological deficits. Musculoskeletal: Moves all extremities. Extremities: No edema.   -----------------------------------------------------------------------------------------------

## 2017-06-30 NOTE — TELEPHONE ENCOUNTER
Spoke to pt for TCM today. Pt does not have HFU appt scheduled yet. Pt stated she is too tired to schedule appt today and requested call back next week to schedule. TCM/HFU appt recommended by 7/13/17 as pt is a moderate risk for readmission.   Please ad

## 2017-07-05 RX ORDER — LIDOCAINE 50 MG/G
2 PATCH TOPICAL EVERY 24 HOURS
Qty: 60 PATCH | Refills: 3 | Status: SHIPPED | OUTPATIENT
Start: 2017-07-05 | End: 2018-10-29

## 2017-07-05 NOTE — TELEPHONE ENCOUNTER
Pt states that she is in \"dire pain. \" Can't move and cannot get out of bed this morning. Pain to low back down to legs. Upset that she has to wait 48 hours for new patches. Pt states she put her last patch on yesterday and that they do not help.  Informed

## 2017-07-05 NOTE — TELEPHONE ENCOUNTER
We cannot offer pt pain meds due to major depression. I can refill her lidocaine patches. I would also like her to try topical compounding cream sample. And see if that helps her.

## 2017-07-07 NOTE — TELEPHONE ENCOUNTER
Spoke with patient and informed patient of message below. Pt verbalized understanding and states she already received cream in mail yesterday. No further questions at this time.

## 2017-07-11 ENCOUNTER — OFFICE VISIT (OUTPATIENT)
Dept: FAMILY MEDICINE CLINIC | Facility: CLINIC | Age: 77
End: 2017-07-11

## 2017-07-11 ENCOUNTER — LAB ENCOUNTER (OUTPATIENT)
Dept: LAB | Age: 77
End: 2017-07-11
Attending: FAMILY MEDICINE
Payer: MEDICARE

## 2017-07-11 VITALS
SYSTOLIC BLOOD PRESSURE: 102 MMHG | BODY MASS INDEX: 21.26 KG/M2 | HEIGHT: 63 IN | WEIGHT: 120 LBS | TEMPERATURE: 98 F | HEART RATE: 72 BPM | DIASTOLIC BLOOD PRESSURE: 58 MMHG

## 2017-07-11 DIAGNOSIS — D50.9 MICROCYTIC ANEMIA: ICD-10-CM

## 2017-07-11 DIAGNOSIS — K29.00 ACUTE SUPERFICIAL GASTRITIS WITHOUT HEMORRHAGE: Primary | ICD-10-CM

## 2017-07-11 DIAGNOSIS — M79.7 FIBROMYALGIA: ICD-10-CM

## 2017-07-11 DIAGNOSIS — I10 ESSENTIAL HYPERTENSION: ICD-10-CM

## 2017-07-11 DIAGNOSIS — K25.3 ACUTE GASTRIC ULCER WITHOUT HEMORRHAGE OR PERFORATION: ICD-10-CM

## 2017-07-11 DIAGNOSIS — N18.30 CHRONIC KIDNEY INSUFFICIENCY, STAGE 3 (MODERATE) (HCC): ICD-10-CM

## 2017-07-11 DIAGNOSIS — R79.0 LOW MAGNESIUM LEVEL: ICD-10-CM

## 2017-07-11 DIAGNOSIS — N18.30 CKD (CHRONIC KIDNEY DISEASE) STAGE 3, GFR 30-59 ML/MIN (HCC): ICD-10-CM

## 2017-07-11 DIAGNOSIS — E87.6 HYPOKALEMIA: ICD-10-CM

## 2017-07-11 LAB
BUN BLD-MCNC: 16 MG/DL (ref 8–20)
CALCIUM BLD-MCNC: 8.4 MG/DL (ref 8.3–10.3)
CHLORIDE: 104 MMOL/L (ref 101–111)
CO2: 29 MMOL/L (ref 22–32)
CREAT BLD-MCNC: 1.49 MG/DL (ref 0.55–1.02)
GLUCOSE BLD-MCNC: 92 MG/DL (ref 70–99)
HAV IGM SER QL: 1.6 MG/DL (ref 1.7–3)
POTASSIUM SERPL-SCNC: 3.2 MMOL/L (ref 3.6–5.1)
SODIUM SERPL-SCNC: 140 MMOL/L (ref 136–144)

## 2017-07-11 PROCEDURE — 36415 COLL VENOUS BLD VENIPUNCTURE: CPT

## 2017-07-11 PROCEDURE — 99495 TRANSJ CARE MGMT MOD F2F 14D: CPT | Performed by: FAMILY MEDICINE

## 2017-07-11 PROCEDURE — 80048 BASIC METABOLIC PNL TOTAL CA: CPT

## 2017-07-11 PROCEDURE — 83735 ASSAY OF MAGNESIUM: CPT

## 2017-07-11 RX ORDER — HYDROCODONE BITARTRATE AND ACETAMINOPHEN 5; 325 MG/1; MG/1
1 TABLET ORAL EVERY 6 HOURS PRN
Qty: 15 TABLET | Refills: 0 | Status: SHIPPED | OUTPATIENT
Start: 2017-07-11 | End: 2018-03-09 | Stop reason: ALTCHOICE

## 2017-07-11 RX ORDER — AMLODIPINE BESYLATE 10 MG/1
10 TABLET ORAL
Qty: 90 TABLET | Refills: 1 | Status: SHIPPED | OUTPATIENT
Start: 2017-07-11 | End: 2018-02-08

## 2017-07-11 RX ORDER — NYSTATIN 500000 [USP'U]/1
1 TABLET, COATED ORAL 4 TIMES DAILY PRN
Refills: 2 | COMMUNITY
Start: 2017-07-11 | End: 2021-10-09

## 2017-07-11 NOTE — PATIENT INSTRUCTIONS
SCHEDULING [facility] LAB APPOINTMENTS ONLINE    Lab appointments can now be scheduled online at [email or web address]    · Go to [email or web address]  · In Search type Lab  · Click \"Lab services\"  · Click \"Schedule Your Test Online\"  · Follow the prompts  · If you evening to avoid having to get up to urinate. Other things you can do  No one knows what causes fibromyalgia. But stress, poor eating habits, and extra weight can make it worse.  These tips may help you feel better:  · Eat a balanced diet with plenty of fr lab test to diagnose fibromyalgia. Instead, your healthcare provider will take your health history and examine your joints and muscles. Certain criteria are used when diagnosing fibromyalgia. Symptoms need to be present for at least 3 months.  Tests may be

## 2017-07-11 NOTE — PROGRESS NOTES
HPI:    Jackelyn Mann is a 78year old female here today for hospital follow up. She was discharged from Inpatient hospital, [facility] to Home   Admission Date: 6/28/17   Discharge Date: 6/29/17  Hospital Discharge Diagnosis:   1.  Erosive Delford Almas Protonix has no abdominal pain, mild nausea and no vomiting. Has follow-up with Dr. Domo Brandt on Friday. Has been avoiding ibuprofen due to ulcer. Was told to continue with enteric-coated aspirin due to history of coronary artery disease and stent. PROTEIN      6.1 - 8.3 g/dL  7.5   Albumin      3.5 - 4.8 g/dL  3.8   Sodium      136 - 144 mmol/L 145 (H) 140   Potassium      3.6 - 5.1 mmol/L 3.2 (L) 3.1 (L)   Chloride      101 - 111 mmol/L 110 101   Carbon Dioxide, Total      22.0 - 32.0 mmol/L 28.0 3 Take 1 tablet (5 mg total) by mouth 2 (two) times daily. Montelukast Sodium 10 MG Oral Tab Take 10 mg by mouth nightly. SENSIPAR 30 MG Oral Tab Take 1 tablet (30 mg total) by mouth 2 (two) times daily with meals.    Losartan Potassium 100 MG Oral Tab Ta surgical site infection prophylaxis.  (2/26/2013); patient documented not to have experienced any of the following events (2/26/2013); injection, w/wo contrast, dx/therapeutic substance, epidural/subarachnoid; lumbar/sacral (3/18/2013); fluor dimitriosd & kathy n Estimated body mass index is 21.26 kg/m² as calculated from the following:    Height as of this encounter: 63\". Weight as of this encounter: 120 lb. /58 (BP Location: Left arm, Patient Position: Sitting, Cuff Size: adult)   Pulse 72   Temp 98. 2 with significant decreased GFR needs evaluation    Component      Latest Ref Rng & Units 7/11/2017 6/29/2017 6/28/2017 6/28/2017             8:54 PM  2:27 PM   CREATININE      0.55 - 1.02 mg/dL 1.49 (H) 1.29 (H) 1.68 (H) 1.67 (H)   GFR      >=60 34 (L) 41 made within 2 business days of discharge on date above   Medical Decision Making- Based on service period of discharge to 30 days:   · Number of Possible Diagnoses and/or Management Options: moderate  · Amount and/or Complexity of Data to Be Reviewed: alta

## 2017-07-12 PROBLEM — R19.7 NAUSEA VOMITING AND DIARRHEA: Status: RESOLVED | Noted: 2017-06-28 | Resolved: 2017-07-12

## 2017-07-12 PROBLEM — K25.3 ACUTE GASTRIC ULCER WITHOUT HEMORRHAGE OR PERFORATION: Status: ACTIVE | Noted: 2017-07-12

## 2017-07-12 PROBLEM — K85.90 ACUTE PANCREATITIS: Status: RESOLVED | Noted: 2017-06-28 | Resolved: 2017-07-12

## 2017-07-12 PROBLEM — E86.0 DEHYDRATION: Status: RESOLVED | Noted: 2017-06-28 | Resolved: 2017-07-12

## 2017-07-12 PROBLEM — R11.2 NAUSEA VOMITING AND DIARRHEA: Status: RESOLVED | Noted: 2017-06-28 | Resolved: 2017-07-12

## 2017-07-12 PROBLEM — K29.00 ACUTE SUPERFICIAL GASTRITIS WITHOUT HEMORRHAGE: Status: ACTIVE | Noted: 2017-07-12

## 2017-07-12 PROBLEM — K85.90 ACUTE PANCREATITIS (HCC): Status: RESOLVED | Noted: 2017-06-28 | Resolved: 2017-07-12

## 2017-07-12 PROBLEM — N28.9 RENAL INSUFFICIENCY: Status: RESOLVED | Noted: 2017-06-28 | Resolved: 2017-07-12

## 2017-07-12 NOTE — PROGRESS NOTES
GFR still low despite no NSAID, refer her to nephrologist for management in the next 1-2 weeks.   Potassium low start Potassium 20 meq and repeat in 1 week BMP  Magnesium low start magnesium 400-500 mg OTC  Call patient and review  Order future tests/medica

## 2017-07-17 ENCOUNTER — PRIOR ORIGINAL RECORDS (OUTPATIENT)
Dept: OTHER | Age: 77
End: 2017-07-17

## 2017-07-17 RX ORDER — ONDANSETRON 4 MG/1
TABLET, FILM COATED ORAL
Qty: 20 TABLET | Refills: 0 | Status: SHIPPED | OUTPATIENT
Start: 2017-07-17 | End: 2017-08-04 | Stop reason: ALTCHOICE

## 2017-07-21 ENCOUNTER — TELEPHONE (OUTPATIENT)
Dept: FAMILY MEDICINE CLINIC | Facility: CLINIC | Age: 77
End: 2017-07-21

## 2017-07-21 RX ORDER — ONDANSETRON HYDROCHLORIDE 8 MG/1
8 TABLET, FILM COATED ORAL EVERY 8 HOURS PRN
Qty: 30 TABLET | Refills: 0 | Status: SHIPPED | OUTPATIENT
Start: 2017-07-21 | End: 2017-08-03

## 2017-07-21 NOTE — TELEPHONE ENCOUNTER
Per bessy Rushing to prescribe zofran 8 mg 1tab q 8 hours prn  Rx has been sent to pharmacy  Patient informed

## 2017-07-21 NOTE — TELEPHONE ENCOUNTER
Patria Jeff is calling to let Marcia Coto know that she has been taking her nausea meds for 2 weeks now and she is not feeling any better, she woke up last night with severe pain in her kidney, she is worried she may have a infection, she just wants to know what sh

## 2017-07-22 ENCOUNTER — HOSPITAL ENCOUNTER (OUTPATIENT)
Dept: CV DIAGNOSTICS | Facility: HOSPITAL | Age: 77
Discharge: HOME | End: 2017-07-22
Attending: INTERNAL MEDICINE

## 2017-07-22 ENCOUNTER — HOSPITAL ENCOUNTER (EMERGENCY)
Facility: HOSPITAL | Age: 77
Discharge: HOME | End: 2017-07-22
Attending: EMERGENCY MEDICINE
Payer: MEDICARE

## 2017-07-22 ENCOUNTER — MYAURORA ACCOUNT LINK (OUTPATIENT)
Dept: OTHER | Age: 77
End: 2017-07-22

## 2017-07-22 VITALS
OXYGEN SATURATION: 96 % | HEART RATE: 87 BPM | DIASTOLIC BLOOD PRESSURE: 78 MMHG | BODY MASS INDEX: 21.09 KG/M2 | SYSTOLIC BLOOD PRESSURE: 140 MMHG | HEIGHT: 63 IN | RESPIRATION RATE: 16 BRPM | TEMPERATURE: 97 F | WEIGHT: 119 LBS

## 2017-07-22 DIAGNOSIS — R01.1 MURMUR: ICD-10-CM

## 2017-07-22 DIAGNOSIS — I25.119 ATHEROSCLEROSIS OF NATIVE CORONARY ARTERY WITH ANGINA PECTORIS, UNSPECIFIED WHETHER NATIVE OR TRANSPLANTED HEART (HCC): ICD-10-CM

## 2017-07-22 DIAGNOSIS — I35.1 AORTIC VALVE REGURGITATION, UNSPECIFIED ETIOLOGY: ICD-10-CM

## 2017-07-22 DIAGNOSIS — R11.0 NAUSEA: Primary | ICD-10-CM

## 2017-07-22 DIAGNOSIS — I10 ESSENTIAL HYPERTENSION, BENIGN: ICD-10-CM

## 2017-07-22 LAB
ALBUMIN SERPL-MCNC: 4.3 G/DL (ref 3.5–4.8)
ALP LIVER SERPL-CCNC: 67 U/L (ref 55–142)
ALT SERPL-CCNC: 18 U/L (ref 14–54)
AST SERPL-CCNC: 16 U/L (ref 15–41)
BASOPHILS # BLD AUTO: 0.09 X10(3) UL (ref 0–0.1)
BASOPHILS NFR BLD AUTO: 0.7 %
BILIRUB SERPL-MCNC: 0.4 MG/DL (ref 0.1–2)
BILIRUB UR QL STRIP.AUTO: NEGATIVE
BUN BLD-MCNC: 22 MG/DL (ref 8–20)
CALCIUM BLD-MCNC: 9.3 MG/DL (ref 8.3–10.3)
CHLORIDE: 103 MMOL/L (ref 101–111)
CLARITY UR REFRACT.AUTO: CLEAR
CO2: 26 MMOL/L (ref 22–32)
COLOR UR AUTO: YELLOW
CREAT BLD-MCNC: 1.71 MG/DL (ref 0.55–1.02)
EOSINOPHIL # BLD AUTO: 0.13 X10(3) UL (ref 0–0.3)
EOSINOPHIL NFR BLD AUTO: 1 %
ERYTHROCYTE [DISTWIDTH] IN BLOOD BY AUTOMATED COUNT: 13.3 % (ref 11.5–16)
GLUCOSE BLD-MCNC: 100 MG/DL (ref 70–99)
GLUCOSE UR STRIP.AUTO-MCNC: NEGATIVE MG/DL
HCT VFR BLD AUTO: 36 % (ref 34–50)
HGB BLD-MCNC: 12.2 G/DL (ref 12–16)
HYALINE CASTS #/AREA URNS AUTO: PRESENT /LPF
IMMATURE GRANULOCYTE COUNT: 0.02 X10(3) UL (ref 0–1)
IMMATURE GRANULOCYTE RATIO %: 0.2 %
KETONES UR STRIP.AUTO-MCNC: NEGATIVE MG/DL
LEUKOCYTE ESTERASE UR QL STRIP.AUTO: NEGATIVE
LIPASE: 128 U/L (ref 73–393)
LYMPHOCYTES # BLD AUTO: 4.56 X10(3) UL (ref 0.9–4)
LYMPHOCYTES NFR BLD AUTO: 35.7 %
M PROTEIN MFR SERPL ELPH: 8.2 G/DL (ref 6.1–8.3)
MCH RBC QN AUTO: 29.9 PG (ref 27–33.2)
MCHC RBC AUTO-ENTMCNC: 33.9 G/DL (ref 31–37)
MCV RBC AUTO: 88.2 FL (ref 81–100)
MONOCYTES # BLD AUTO: 1.1 X10(3) UL (ref 0.1–0.6)
MONOCYTES NFR BLD AUTO: 8.6 %
NEUTROPHIL ABS PRELIM: 6.89 X10 (3) UL (ref 1.3–6.7)
NEUTROPHILS # BLD AUTO: 6.89 X10(3) UL (ref 1.3–6.7)
NEUTROPHILS NFR BLD AUTO: 53.8 %
NITRITE UR QL STRIP.AUTO: NEGATIVE
PH UR STRIP.AUTO: 7 [PH] (ref 4.5–8)
PLATELET # BLD AUTO: 318 10(3)UL (ref 150–450)
POTASSIUM SERPL-SCNC: 3.6 MMOL/L (ref 3.6–5.1)
PROT UR STRIP.AUTO-MCNC: 30 MG/DL
RBC # BLD AUTO: 4.08 X10(6)UL (ref 3.8–5.1)
RED CELL DISTRIBUTION WIDTH-SD: 43 FL (ref 35.1–46.3)
SODIUM SERPL-SCNC: 140 MMOL/L (ref 136–144)
SP GR UR STRIP.AUTO: 1.01 (ref 1–1.03)
UROBILINOGEN UR STRIP.AUTO-MCNC: <2 MG/DL
WBC # BLD AUTO: 12.8 X10(3) UL (ref 4–13)

## 2017-07-22 PROCEDURE — 96361 HYDRATE IV INFUSION ADD-ON: CPT

## 2017-07-22 PROCEDURE — 96374 THER/PROPH/DIAG INJ IV PUSH: CPT

## 2017-07-22 PROCEDURE — 99284 EMERGENCY DEPT VISIT MOD MDM: CPT

## 2017-07-22 PROCEDURE — 85025 COMPLETE CBC W/AUTO DIFF WBC: CPT | Performed by: EMERGENCY MEDICINE

## 2017-07-22 PROCEDURE — 96375 TX/PRO/DX INJ NEW DRUG ADDON: CPT

## 2017-07-22 PROCEDURE — 81001 URINALYSIS AUTO W/SCOPE: CPT | Performed by: EMERGENCY MEDICINE

## 2017-07-22 PROCEDURE — 83690 ASSAY OF LIPASE: CPT | Performed by: EMERGENCY MEDICINE

## 2017-07-22 PROCEDURE — 80053 COMPREHEN METABOLIC PANEL: CPT | Performed by: EMERGENCY MEDICINE

## 2017-07-22 PROCEDURE — 93010 ELECTROCARDIOGRAM REPORT: CPT

## 2017-07-22 PROCEDURE — 93005 ELECTROCARDIOGRAM TRACING: CPT

## 2017-07-22 PROCEDURE — 87086 URINE CULTURE/COLONY COUNT: CPT | Performed by: EMERGENCY MEDICINE

## 2017-07-22 RX ORDER — LORAZEPAM 2 MG/ML
1 INJECTION INTRAMUSCULAR ONCE
Status: COMPLETED | OUTPATIENT
Start: 2017-07-22 | End: 2017-07-22

## 2017-07-22 RX ORDER — PROCHLORPERAZINE MALEATE 10 MG
5 TABLET ORAL EVERY 6 HOURS PRN
Qty: 12 TABLET | Refills: 0 | Status: SHIPPED | OUTPATIENT
Start: 2017-07-22 | End: 2017-08-04 | Stop reason: ALTCHOICE

## 2017-07-22 RX ORDER — ONDANSETRON 2 MG/ML
4 INJECTION INTRAMUSCULAR; INTRAVENOUS ONCE
Status: COMPLETED | OUTPATIENT
Start: 2017-07-22 | End: 2017-07-22

## 2017-07-22 NOTE — ED INITIAL ASSESSMENT (HPI)
Pt presents to the ED with complaints of nausea for 2 weeks. Pt states that the first week she was vomiting, but no emesis for past week. Pt was scheduled for an echo today so decided to come to the ED. Pt denies any pain.  Pt awake and alert,skin w/d,resps

## 2017-07-22 NOTE — ED NOTES
Pt stating she is always sick . Pt is smiling stating I have a colonoscopy scheduled for this Friday. Pt stating nausea has subsided but she had a slight h/a.  at bs.

## 2017-07-22 NOTE — ED PROVIDER NOTES
Patient Seen in: [facility] Emergency Department    History   Patient presents with:  Nausea/Vomiting/Diarrhea (gastrointestinal): vomiting for 2 weeks. No abdominal pain. Stated Complaint: vomiting. possible kidney infection.     HPI    77-ye Comment: stent  No date: CATH PERCUTANEOUS  TRANSLUMINAL CORONARY ANGIO*  1963:  DELIVERY ONLY      [place]  2013: Jennifer Sy NDL/CATH SPI DX/THER EJK      Comment: Procedure: LUMBAR EPIDURAL;  Surgeon:                Beth Frias, [facility] PREOPERATIVE ORDER FOR IV ANT*      Comment: Procedure: LUMBAR EPIDURAL;  Surgeon:                Pablito Izaguirre MD;  Location: [address] MANAGEMENT  2001: SPINAL FUSION      Comment: Neck Fusion; [facility], John Montgomery mg total) by mouth nightly as needed for Sleep (insomnia). ClonazePAM 0.5 MG Oral Tab,  Take 1 tablet (0.5 mg total) by mouth 2 (two) times daily as needed for Anxiety.    BusPIRone HCl 5 MG Oral Tab,  Take 1 tablet (5 mg total) by mouth 2 (two) times mariann HPI.    Physical Exam   ED Triage Vitals [07/22/17 3235]  BP: 130/80  Pulse: 74  Resp: 18  Temp: (!) 97 °F (36.1 °C)  Temp src: Temporal  SpO2: 99 %  O2 Device: None (Room air)    Current:/78   Pulse 87   Temp (!) 97 °F (36.1 °C) (Temporal)   Resp 16 for panel order CBC WITH DIFFERENTIAL WITH PLATELET.   Procedure                               Abnormality         Status                     ---------                               -----------         ------                     CBC W/ DIFFERENTIAL[[id number] total) by mouth every 6 (six) hours as needed for Nausea.   Qty: 12 tablet Refills: 0

## 2017-07-23 LAB
ATRIAL RATE: 96 BPM
P AXIS: 67 DEGREES
P-R INTERVAL: 186 MS
Q-T INTERVAL: 380 MS
QRS DURATION: 68 MS
QTC CALCULATION (BEZET): 480 MS
R AXIS: 0 DEGREES
T AXIS: 77 DEGREES
VENTRICULAR RATE: 96 BPM

## 2017-07-24 RX ORDER — PRAVASTATIN SODIUM 40 MG
TABLET ORAL
Qty: 30 TABLET | Refills: 0 | Status: SHIPPED | OUTPATIENT
Start: 2017-07-24 | End: 2017-08-22

## 2017-07-27 ENCOUNTER — PRIOR ORIGINAL RECORDS (OUTPATIENT)
Dept: OTHER | Age: 77
End: 2017-07-27

## 2017-08-01 ENCOUNTER — OFFICE VISIT (OUTPATIENT)
Dept: NEPHROLOGY | Facility: CLINIC | Age: 77
End: 2017-08-01

## 2017-08-01 VITALS — BODY MASS INDEX: 21 KG/M2 | DIASTOLIC BLOOD PRESSURE: 72 MMHG | WEIGHT: 121 LBS | SYSTOLIC BLOOD PRESSURE: 136 MMHG

## 2017-08-01 DIAGNOSIS — N17.9 AKI (ACUTE KIDNEY INJURY) (HCC): Primary | ICD-10-CM

## 2017-08-01 DIAGNOSIS — N18.30 CKD (CHRONIC KIDNEY DISEASE) STAGE 3, GFR 30-59 ML/MIN (HCC): ICD-10-CM

## 2017-08-01 PROCEDURE — 99204 OFFICE O/P NEW MOD 45 MIN: CPT | Performed by: INTERNAL MEDICINE

## 2017-08-01 NOTE — PROGRESS NOTES
Nephrology Consult Note    REASON FOR CONSULT: CKD 3    ASSESSMENT/PLAN:        1) CKD 3- serum creatinine is increased from 1.0 -> 1.7 mg/dL over the last year in the absence of any clear insults and is suggestive of an underlying myelopathy or interstiti renal failure gross or microscopic hematuria proteinuria kidney stones or infections. Denies any history of cardiac pulmonary or hepatic disease. Was taking aspirin for back pain and headaches over the past year but discontinued this about a month ago. [facility]  3/18/2013: [address] [facility] Alonso Coombs NDL/CATH SPI DX/THER ZKF      Comment: Procedure: LUMBAR EPIDURAL;  Surgeon:                Clif Rios MD;  Location: [place] [address]: Nick Catalans Comment: [address], [place]  No date: TONSILLECTOMY  No date: TOTAL ABDOM HYSTERECTOMY  No date: TUBAL LIGATION    Medications (Active prior to today's visit):    Current Outpatient Prescriptions:  PRAVASTATIN SODIUM 40 MG Oral Tab TAKE 1 TAB Potassium Chloride ER 20 MEQ Oral Tab CR Take 20 mEq by mouth daily. Disp: 30 tablet Rfl: 1   nystatin 582015 units Oral Tab Take 1 tablet (500,000 Units total) by mouth 4 (four) times daily as needed.  Daily Disp:  Rfl: 2   HYDROcodone-acetaminophen (NOR 20. 00          Types: Cigarettes       Quit date: 1/1/1997    Smokeless tobacco: Never Used                        Alcohol use: No              Drug use:  No                 Comment: formerly medical marijuana  Other Topics            Concern  Caffeine Conc

## 2017-08-02 ENCOUNTER — LAB ENCOUNTER (OUTPATIENT)
Dept: LAB | Age: 77
End: 2017-08-02
Attending: FAMILY MEDICINE
Payer: MEDICARE

## 2017-08-02 DIAGNOSIS — E87.6 HYPOKALEMIA: ICD-10-CM

## 2017-08-02 DIAGNOSIS — E78.00 HYPERCHOLESTEREMIA: ICD-10-CM

## 2017-08-02 DIAGNOSIS — E83.42 HYPOMAGNESEMIA: ICD-10-CM

## 2017-08-02 DIAGNOSIS — D64.9 ANEMIA, UNSPECIFIED TYPE: ICD-10-CM

## 2017-08-02 DIAGNOSIS — I10 HYPERTENSION, UNSPECIFIED TYPE: ICD-10-CM

## 2017-08-02 DIAGNOSIS — R11.0 NAUSEA: ICD-10-CM

## 2017-08-02 LAB
ALBUMIN SERPL-MCNC: 3.9 G/DL (ref 3.5–4.8)
ALP LIVER SERPL-CCNC: 73 U/L (ref 55–142)
ALT SERPL-CCNC: 14 U/L (ref 14–54)
AST SERPL-CCNC: 9 U/L (ref 15–41)
BASOPHILS # BLD AUTO: 0.07 X10(3) UL (ref 0–0.1)
BASOPHILS NFR BLD AUTO: 0.6 %
BILIRUB SERPL-MCNC: 0.4 MG/DL (ref 0.1–2)
BUN BLD-MCNC: 18 MG/DL (ref 8–20)
CALCIUM BLD-MCNC: 9.2 MG/DL (ref 8.3–10.3)
CHLORIDE: 102 MMOL/L (ref 101–111)
CO2: 31 MMOL/L (ref 22–32)
CREAT BLD-MCNC: 1.51 MG/DL (ref 0.55–1.02)
DEPRECATED HBV CORE AB SER IA-ACNC: 70 NG/ML (ref 10–291)
EOSINOPHIL # BLD AUTO: 0.03 X10(3) UL (ref 0–0.3)
EOSINOPHIL NFR BLD AUTO: 0.2 %
ERYTHROCYTE [DISTWIDTH] IN BLOOD BY AUTOMATED COUNT: 13.7 % (ref 11.5–16)
FOLATE (FOLIC ACID), SERUM: 29.3 NG/ML (ref 8.7–24)
FREE T4: 1.2 NG/DL (ref 0.9–1.8)
GLUCOSE BLD-MCNC: 104 MG/DL (ref 70–99)
HAV AB SERPL IA-ACNC: 527 PG/ML (ref 193–986)
HAV IGM SER QL: 1.8 MG/DL (ref 1.7–3)
HCT VFR BLD AUTO: 34.5 % (ref 34–50)
HGB BLD-MCNC: 11 G/DL (ref 12–16)
IMMATURE GRANULOCYTE COUNT: 0.05 X10(3) UL (ref 0–1)
IMMATURE GRANULOCYTE RATIO %: 0.4 %
IMMUNOGLOBULIN A: 177 MG/DL (ref 70–312)
IRON SATURATION: 7 % (ref 13–45)
IRON: 24 UG/DL (ref 28–170)
LYMPHOCYTES # BLD AUTO: 3.01 X10(3) UL (ref 0.9–4)
LYMPHOCYTES NFR BLD AUTO: 25 %
M PROTEIN MFR SERPL ELPH: 8.1 G/DL (ref 6.1–8.3)
MCH RBC QN AUTO: 29.8 PG (ref 27–33.2)
MCHC RBC AUTO-ENTMCNC: 31.9 G/DL (ref 31–37)
MCV RBC AUTO: 93.5 FL (ref 81–100)
MONOCYTES # BLD AUTO: 1.23 X10(3) UL (ref 0.1–0.6)
MONOCYTES NFR BLD AUTO: 10.2 %
NEUTROPHIL ABS PRELIM: 7.66 X10 (3) UL (ref 1.3–6.7)
NEUTROPHILS # BLD AUTO: 7.66 X10(3) UL (ref 1.3–6.7)
NEUTROPHILS NFR BLD AUTO: 63.6 %
PLATELET # BLD AUTO: 314 10(3)UL (ref 150–450)
POTASSIUM SERPL-SCNC: 3.3 MMOL/L (ref 3.6–5.1)
RBC # BLD AUTO: 3.69 X10(6)UL (ref 3.8–5.1)
RED CELL DISTRIBUTION WIDTH-SD: 46.8 FL (ref 35.1–46.3)
SODIUM SERPL-SCNC: 141 MMOL/L (ref 136–144)
TOTAL IRON BINDING CAPACITY: 326 UG/DL (ref 298–536)
TRANSFERRIN: 219 MG/DL (ref 200–360)
TSI SER-ACNC: 1.75 MIU/ML (ref 0.35–5.5)
WBC # BLD AUTO: 12.1 X10(3) UL (ref 4–13)

## 2017-08-02 PROCEDURE — 84439 ASSAY OF FREE THYROXINE: CPT

## 2017-08-02 PROCEDURE — 82784 ASSAY IGA/IGD/IGG/IGM EACH: CPT

## 2017-08-02 PROCEDURE — 82746 ASSAY OF FOLIC ACID SERUM: CPT

## 2017-08-02 PROCEDURE — 83540 ASSAY OF IRON: CPT

## 2017-08-02 PROCEDURE — 83516 IMMUNOASSAY NONANTIBODY: CPT

## 2017-08-02 PROCEDURE — 84443 ASSAY THYROID STIM HORMONE: CPT

## 2017-08-02 PROCEDURE — 82607 VITAMIN B-12: CPT

## 2017-08-02 PROCEDURE — 83735 ASSAY OF MAGNESIUM: CPT

## 2017-08-02 PROCEDURE — 36415 COLL VENOUS BLD VENIPUNCTURE: CPT

## 2017-08-02 PROCEDURE — 85025 COMPLETE CBC W/AUTO DIFF WBC: CPT

## 2017-08-02 PROCEDURE — 80053 COMPREHEN METABOLIC PANEL: CPT

## 2017-08-02 PROCEDURE — 82728 ASSAY OF FERRITIN: CPT

## 2017-08-02 PROCEDURE — 83550 IRON BINDING TEST: CPT

## 2017-08-03 ENCOUNTER — TELEPHONE (OUTPATIENT)
Dept: FAMILY MEDICINE CLINIC | Facility: CLINIC | Age: 77
End: 2017-08-03

## 2017-08-03 LAB
TISSUE TRANSGLUTAMINASE AB,IGA: 5.4 U/ML (ref ?–15)
TISSUE TRANSGLUTAMINASE IGA QUALITATIVE: NEGATIVE

## 2017-08-03 RX ORDER — ONDANSETRON HYDROCHLORIDE 8 MG/1
8 TABLET, FILM COATED ORAL EVERY 8 HOURS PRN
Qty: 20 TABLET | Refills: 0 | Status: SHIPPED | OUTPATIENT
Start: 2017-08-03 | End: 2017-08-04 | Stop reason: ALTCHOICE

## 2017-08-03 RX ORDER — PANTOPRAZOLE SODIUM 40 MG/1
40 TABLET, DELAYED RELEASE ORAL
Qty: 30 TABLET | Refills: 2 | Status: SHIPPED | OUTPATIENT
Start: 2017-08-03 | End: 2017-09-25 | Stop reason: ALTCHOICE

## 2017-08-03 NOTE — TELEPHONE ENCOUNTER
It looks like she has been tapering off.   This was probably from her psychiatrist. If she is tapering off and has not found it helpful she can go off or she can speak to her psychiatrist.

## 2017-08-03 NOTE — TELEPHONE ENCOUNTER
Pt called in and asked for refills on the Pantoprazole and the Zofran 8mg tabs. Refills sent      Pt also asking though if she can stop the buspar? She does not feel that it is working and she takes so many pills now as is. Please advise on the Buspar.

## 2017-08-04 ENCOUNTER — OFFICE VISIT (OUTPATIENT)
Dept: SURGERY | Facility: CLINIC | Age: 77
End: 2017-08-04

## 2017-08-04 VITALS
HEART RATE: 88 BPM | RESPIRATION RATE: 16 BRPM | WEIGHT: 120 LBS | SYSTOLIC BLOOD PRESSURE: 142 MMHG | BODY MASS INDEX: 19.99 KG/M2 | DIASTOLIC BLOOD PRESSURE: 82 MMHG | HEIGHT: 65 IN

## 2017-08-04 DIAGNOSIS — G89.29 CHRONIC LOW BACK PAIN WITH BILATERAL SCIATICA, UNSPECIFIED BACK PAIN LATERALITY: ICD-10-CM

## 2017-08-04 DIAGNOSIS — M54.41 CHRONIC LOW BACK PAIN WITH BILATERAL SCIATICA, UNSPECIFIED BACK PAIN LATERALITY: ICD-10-CM

## 2017-08-04 DIAGNOSIS — M47.816 FACET ARTHROPATHY, LUMBAR: ICD-10-CM

## 2017-08-04 DIAGNOSIS — M46.1 SACROILIITIS (HCC): ICD-10-CM

## 2017-08-04 DIAGNOSIS — M43.07 LUMBOSACRAL SPONDYLOLYSIS: Primary | ICD-10-CM

## 2017-08-04 DIAGNOSIS — M54.16 LUMBAR RADICULITIS: ICD-10-CM

## 2017-08-04 DIAGNOSIS — M54.42 CHRONIC LOW BACK PAIN WITH BILATERAL SCIATICA, UNSPECIFIED BACK PAIN LATERALITY: ICD-10-CM

## 2017-08-04 PROCEDURE — 99213 OFFICE O/P EST LOW 20 MIN: CPT | Performed by: NURSE PRACTITIONER

## 2017-08-04 RX ORDER — HYDROCODONE BITARTRATE AND ACETAMINOPHEN 5; 325 MG/1; MG/1
1 TABLET ORAL DAILY PRN
Qty: 30 TABLET | Refills: 0 | Status: SHIPPED | OUTPATIENT
Start: 2017-08-04 | End: 2017-09-03

## 2017-08-04 NOTE — PATIENT INSTRUCTIONS
Refill policies:    • Allow 2-3 business days for refills; controlled substances may take longer.   • Contact your pharmacy at least 5 days prior to running out of medication and have them send an electronic request or submit request through the [facility] have a procedure or additional testing performed. [facility]) will contact your insurance carrier to obtain pre-certification or prior authorization.     Unfortunately, [facility]AR has seen an increase in denial of payment even though the p

## 2017-08-04 NOTE — PROGRESS NOTES
Name: Katerine Esteves   : 1940   DOS: 2017     Pain Clinic Follow Up Visit:   Katerine Esteves is a 78year old female who presents for recheck of her chronic low back pain.  Patient is s/p SCS trial with 100% pain relief from her low back and radia to affected area three to four times daily for treatment of pain. Dispense one 480 gram tube for 30 day supply.  Disp: 1 each Rfl: 11   Azelastine HCl 0.15 % Nasal Solution U 2 SPRAYS IEN ONCE TO BID Disp:  Rfl: 5   FluvoxaMINE Maleate 100 MG Oral Tab Take lumbar  Sacroiliitis (hcc)     1) Patient to have SCS implantation on 8/31/17 at [facility] by Dr. Oj Serrato. 2) Per Dr. Satish noble to give 1 month prescription of Norco 5/325mg 1 tab daily. #30 given today in office.  Patient and  advised that this is only a

## 2017-08-07 ENCOUNTER — OFFICE VISIT (OUTPATIENT)
Dept: FAMILY MEDICINE CLINIC | Facility: CLINIC | Age: 77
End: 2017-08-07

## 2017-08-07 VITALS
HEART RATE: 110 BPM | WEIGHT: 120 LBS | BODY MASS INDEX: 20 KG/M2 | SYSTOLIC BLOOD PRESSURE: 130 MMHG | RESPIRATION RATE: 20 BRPM | OXYGEN SATURATION: 99 % | TEMPERATURE: 99 F | DIASTOLIC BLOOD PRESSURE: 82 MMHG

## 2017-08-07 DIAGNOSIS — J01.00 ACUTE MAXILLARY SINUSITIS, RECURRENCE NOT SPECIFIED: Primary | ICD-10-CM

## 2017-08-07 PROCEDURE — 99213 OFFICE O/P EST LOW 20 MIN: CPT | Performed by: NURSE PRACTITIONER

## 2017-08-07 RX ORDER — DOXYCYCLINE HYCLATE 100 MG/1
100 CAPSULE ORAL 2 TIMES DAILY
Qty: 20 CAPSULE | Refills: 0 | Status: SHIPPED | OUTPATIENT
Start: 2017-08-07 | End: 2017-10-10 | Stop reason: ALTCHOICE

## 2017-08-07 RX ORDER — DOXYCYCLINE HYCLATE 100 MG/1
100 CAPSULE ORAL 2 TIMES DAILY
Qty: 20 CAPSULE | Refills: 0 | Status: SHIPPED | OUTPATIENT
Start: 2017-08-07 | End: 2017-08-07 | Stop reason: CLARIF

## 2017-08-07 NOTE — PROGRESS NOTES
CHIEF COMPLAINT:   Patient presents with:  Nasal Congestion: headache, runny nose, sore throat and coughing       HPI:   Lolis Pablo is a 78year old female who presents for cold symptoms for  10  days.  Symptoms have progressed into sinus congestion and 0.15 % Nasal Solution U 2 SPRAYS IEN ONCE TO BID Disp:  Rfl: 5   FluvoxaMINE Maleate 100 MG Oral Tab Take 1.5 tablets (150 mg total) by mouth nightly.  (Patient taking differently: Take 100 mg by mouth nightly.  ) Disp: 45 tablet Rfl: 2   TraZODone HCl 50 M • PONV (postoperative nausea and vomiting)     only with ether years ago   • Postmenopausal status     Age of onset: 30years old   • Rheumatic heart disease, unspecified    • Tinnitus    • Unspecified essential hypertension    • Visual impairment Alycia Fields MD;  Location: [place] [address] MANAGEMENT  2/26/2013: [facility] FOR IV ANT*      Comment: Procedure: LUMBAR EPIDURAL;  Surgeon:                Alycia Fields MD;  Location: [address] [place] [address]  normocephalic, +  tenderness on palpation of maxillary sinuses  EYES: conjunctiva clear, EOM intact  EARS: TM's Pearly grey bilaterally.   NOSE: nostrils patent, scant nasal mucous, nasal mucosa reddened and swollen bilateral turbinates  THROAT: oral mucosa

## 2017-08-14 ENCOUNTER — TELEPHONE (OUTPATIENT)
Dept: FAMILY MEDICINE CLINIC | Facility: CLINIC | Age: 77
End: 2017-08-14

## 2017-08-14 RX ORDER — ONDANSETRON HYDROCHLORIDE 8 MG/1
TABLET, FILM COATED ORAL
Qty: 20 TABLET | Refills: 0 | Status: SHIPPED | OUTPATIENT
Start: 2017-08-14 | End: 2017-09-12

## 2017-08-14 NOTE — TELEPHONE ENCOUNTER
Patria Jeff is calling to let Kennedy Nnamdi know that she has been vomiting for the last 2 weeks,she cannot hold anything down, and she said it is all mucus that she is throwing up. She would like to see if Marcia Coto can give her something for the vomiting.  Please emily

## 2017-08-15 ENCOUNTER — TELEPHONE (OUTPATIENT)
Dept: FAMILY MEDICINE CLINIC | Facility: CLINIC | Age: 77
End: 2017-08-15

## 2017-08-15 DIAGNOSIS — R11.2 NAUSEA AND VOMITING, INTRACTABILITY OF VOMITING NOT SPECIFIED, UNSPECIFIED VOMITING TYPE: Primary | ICD-10-CM

## 2017-08-15 NOTE — TELEPHONE ENCOUNTER
Give her Dexilant samples  For 1 month take instead of Protonix.   Check CMP, CBC and Lipase  May need appointment with Dr Milady Richardson tomorrow

## 2017-08-15 NOTE — TELEPHONE ENCOUNTER
Can't keep any food down for the past week. Stomach always upset. Pt has not taken anything over the counter. No fever. Anything she eats comes up in 20 minutes. I explained per Saba Andrade, medication zofran was sent over.   Pt would still like to

## 2017-08-16 ENCOUNTER — LAB ENCOUNTER (OUTPATIENT)
Dept: LAB | Age: 77
End: 2017-08-16
Attending: FAMILY MEDICINE
Payer: MEDICARE

## 2017-08-16 ENCOUNTER — PRIOR ORIGINAL RECORDS (OUTPATIENT)
Dept: OTHER | Age: 77
End: 2017-08-16

## 2017-08-16 DIAGNOSIS — R11.2 NAUSEA AND VOMITING, INTRACTABILITY OF VOMITING NOT SPECIFIED, UNSPECIFIED VOMITING TYPE: ICD-10-CM

## 2017-08-16 LAB
ALBUMIN SERPL-MCNC: 3.5 G/DL (ref 3.5–4.8)
ALP LIVER SERPL-CCNC: 62 U/L (ref 55–142)
ALT SERPL-CCNC: 20 U/L (ref 14–54)
AST SERPL-CCNC: 16 U/L (ref 15–41)
BASOPHILS # BLD AUTO: 0.07 X10(3) UL (ref 0–0.1)
BASOPHILS NFR BLD AUTO: 0.5 %
BILIRUB SERPL-MCNC: 0.3 MG/DL (ref 0.1–2)
BUN BLD-MCNC: 31 MG/DL (ref 8–20)
CALCIUM BLD-MCNC: 9.1 MG/DL (ref 8.3–10.3)
CHLORIDE: 102 MMOL/L (ref 101–111)
CO2: 28 MMOL/L (ref 22–32)
CREAT BLD-MCNC: 1.76 MG/DL (ref 0.55–1.02)
EOSINOPHIL # BLD AUTO: 0.15 X10(3) UL (ref 0–0.3)
EOSINOPHIL NFR BLD AUTO: 1.2 %
ERYTHROCYTE [DISTWIDTH] IN BLOOD BY AUTOMATED COUNT: 13.3 % (ref 11.5–16)
GLUCOSE BLD-MCNC: 98 MG/DL (ref 70–99)
HCT VFR BLD AUTO: 31.8 % (ref 34–50)
HGB BLD-MCNC: 10.4 G/DL (ref 12–16)
IMMATURE GRANULOCYTE COUNT: 0.05 X10(3) UL (ref 0–1)
IMMATURE GRANULOCYTE RATIO %: 0.4 %
LIPASE: 97 U/L (ref 73–393)
LYMPHOCYTES # BLD AUTO: 3.58 X10(3) UL (ref 0.9–4)
LYMPHOCYTES NFR BLD AUTO: 27.5 %
M PROTEIN MFR SERPL ELPH: 6.9 G/DL (ref 6.1–8.3)
MCH RBC QN AUTO: 30.2 PG (ref 27–33.2)
MCHC RBC AUTO-ENTMCNC: 32.7 G/DL (ref 31–37)
MCV RBC AUTO: 92.4 FL (ref 81–100)
MONOCYTES # BLD AUTO: 1.21 X10(3) UL (ref 0.1–0.6)
MONOCYTES NFR BLD AUTO: 9.3 %
NEUTROPHIL ABS PRELIM: 7.95 X10 (3) UL (ref 1.3–6.7)
NEUTROPHILS # BLD AUTO: 7.95 X10(3) UL (ref 1.3–6.7)
NEUTROPHILS NFR BLD AUTO: 61.1 %
PLATELET # BLD AUTO: 305 10(3)UL (ref 150–450)
POTASSIUM SERPL-SCNC: 3.7 MMOL/L (ref 3.6–5.1)
RBC # BLD AUTO: 3.44 X10(6)UL (ref 3.8–5.1)
RED CELL DISTRIBUTION WIDTH-SD: 45.4 FL (ref 35.1–46.3)
SODIUM SERPL-SCNC: 139 MMOL/L (ref 136–144)
WBC # BLD AUTO: 13 X10(3) UL (ref 4–13)

## 2017-08-16 PROCEDURE — 36415 COLL VENOUS BLD VENIPUNCTURE: CPT

## 2017-08-16 PROCEDURE — 83690 ASSAY OF LIPASE: CPT

## 2017-08-16 PROCEDURE — 85025 COMPLETE CBC W/AUTO DIFF WBC: CPT

## 2017-08-16 PROCEDURE — 80053 COMPREHEN METABOLIC PANEL: CPT

## 2017-08-16 RX ORDER — DEXLANSOPRAZOLE 60 MG/1
60 CAPSULE, DELAYED RELEASE ORAL DAILY
Qty: 30 CAPSULE | Refills: 0 | COMMUNITY
Start: 2017-08-16 | End: 2018-03-09 | Stop reason: ALTCHOICE

## 2017-08-16 NOTE — TELEPHONE ENCOUNTER
Labs ordered and dexilant samples put aside for patient  Patient advised of lab orders and dexilant samples. Patient advised to hold protonix while taking dexilant. Samples at  for pickup. Patient will try to get in today before the lab closes.

## 2017-08-17 ENCOUNTER — TELEPHONE (OUTPATIENT)
Dept: FAMILY MEDICINE CLINIC | Facility: CLINIC | Age: 77
End: 2017-08-17

## 2017-08-17 NOTE — TELEPHONE ENCOUNTER
----- Message from Gladys Hartman PA-C sent at 8/16/2017  8:27 PM CDT -----  Forward to GI is the colonoscopy scheduled? The hemoglobin is going down. Also forward the CMP to Dr Alejandro Champion the GFR continues to decline.   Lipase is normal.  Did she get Clayburn Barrington

## 2017-08-17 NOTE — PROGRESS NOTES
Forward to GI is the colonoscopy scheduled? The hemoglobin is going down. Also forward the CMP to Dr Gloria Harada the GFR continues to decline. Lipase is normal.  Did she get Dexilant for the stomach issues is it helping?   Order future tests/medications sent to

## 2017-08-17 NOTE — TELEPHONE ENCOUNTER
CMP was successfully faxed to Dr. Jose R Cummins at [phone]. Per Coletet Carl PA-C, I asked the patient if she scheduled the colonoscopy, and she stated that she will call to schedule sometime next month. The patient was informed of her test results.  Negrita

## 2017-08-18 ENCOUNTER — TELEPHONE (OUTPATIENT)
Dept: SURGERY | Facility: CLINIC | Age: 77
End: 2017-08-18

## 2017-08-18 NOTE — TELEPHONE ENCOUNTER
Spoke w/ pt regarding pain. Pt states she has not been taking her pain medication and pain has increased. Last dose taken 3 days prior.   Educated pt on breakthrough medication timing and need to medicate safely and appropriately prior to pain getting out

## 2017-08-22 RX ORDER — PRAVASTATIN SODIUM 40 MG
TABLET ORAL
Qty: 30 TABLET | Refills: 0 | Status: SHIPPED | OUTPATIENT
Start: 2017-08-22 | End: 2017-09-18

## 2017-08-28 ENCOUNTER — TELEPHONE (OUTPATIENT)
Dept: NEUROLOGY | Facility: CLINIC | Age: 77
End: 2017-08-28

## 2017-08-29 ENCOUNTER — TELEPHONE (OUTPATIENT)
Dept: FAMILY MEDICINE CLINIC | Facility: CLINIC | Age: 77
End: 2017-08-29

## 2017-08-29 NOTE — TELEPHONE ENCOUNTER
Needs colonoscopy ASAP has had low hemoglobin and now with the constipation.   Can try samples of Linzess 290 mcg daily give #20 if we have it

## 2017-08-29 NOTE — TELEPHONE ENCOUNTER
Patient advised of linzess samples-at  for pickup  Encouraged patient to schedule colonoscopy asap  Patient will call back tomorrow to get contact inf for Dr Anaid Alcantar

## 2017-08-29 NOTE — TELEPHONE ENCOUNTER
Pt said she hasn't had a BM in 2 months without taking something. She wants to know what she can use naturally.

## 2017-08-29 NOTE — TELEPHONE ENCOUNTER
Kristan,please advise. Patient states that she has tried many OTC's. Mylanta,miralax,ducolax. She said the ducolax causes diarrhea so she can't take that.   I think patient's ultimate goal is to be able to have a BM without having to take something first.

## 2017-08-31 NOTE — TELEPHONE ENCOUNTER
Patient given contact info for Dr Tomasz Smith.  Again stressed to the patient the importance of scheduling a colonoscopy

## 2017-09-05 RX ORDER — PRAVASTATIN SODIUM 40 MG
TABLET ORAL
Qty: 30 TABLET | Refills: 0 | Status: SHIPPED | OUTPATIENT
Start: 2017-09-05 | End: 2017-09-12

## 2017-09-07 ENCOUNTER — TELEPHONE (OUTPATIENT)
Dept: SURGERY | Facility: CLINIC | Age: 77
End: 2017-09-07

## 2017-09-07 NOTE — TELEPHONE ENCOUNTER
Spoke to pt regarding severe back pain. Lower part radiating to her legs. PerTom last OV's note pt to have SCS implanted by August 31, 2017. Pt explained that she will not have SCS placed until October or November of this year.  Provider noti

## 2017-09-11 ENCOUNTER — TELEPHONE (OUTPATIENT)
Dept: FAMILY MEDICINE CLINIC | Facility: CLINIC | Age: 77
End: 2017-09-11

## 2017-09-12 ENCOUNTER — LAB ENCOUNTER (OUTPATIENT)
Dept: LAB | Age: 77
End: 2017-09-12
Attending: FAMILY MEDICINE
Payer: MEDICARE

## 2017-09-12 ENCOUNTER — TELEPHONE (OUTPATIENT)
Dept: FAMILY MEDICINE CLINIC | Facility: CLINIC | Age: 77
End: 2017-09-12

## 2017-09-12 ENCOUNTER — OFFICE VISIT (OUTPATIENT)
Dept: FAMILY MEDICINE CLINIC | Facility: CLINIC | Age: 77
End: 2017-09-12

## 2017-09-12 VITALS
HEART RATE: 87 BPM | HEIGHT: 62.5 IN | OXYGEN SATURATION: 99 % | WEIGHT: 120.5 LBS | TEMPERATURE: 98 F | DIASTOLIC BLOOD PRESSURE: 60 MMHG | SYSTOLIC BLOOD PRESSURE: 100 MMHG | BODY MASS INDEX: 21.62 KG/M2 | RESPIRATION RATE: 16 BRPM

## 2017-09-12 DIAGNOSIS — K25.7 CHRONIC STOMACH ULCER DUE TO NONSTEROIDAL ANTI-INFLAMMATORY DRUG (NSAID): Primary | ICD-10-CM

## 2017-09-12 DIAGNOSIS — E87.6 HYPOKALEMIA: Primary | ICD-10-CM

## 2017-09-12 DIAGNOSIS — R11.2 INTRACTABLE VOMITING WITH NAUSEA, UNSPECIFIED VOMITING TYPE: ICD-10-CM

## 2017-09-12 DIAGNOSIS — N18.30 CKD (CHRONIC KIDNEY DISEASE) STAGE 3, GFR 30-59 ML/MIN (HCC): ICD-10-CM

## 2017-09-12 DIAGNOSIS — T39.395A CHRONIC STOMACH ULCER DUE TO NONSTEROIDAL ANTI-INFLAMMATORY DRUG (NSAID): Primary | ICD-10-CM

## 2017-09-12 DIAGNOSIS — D64.9 LOW HEMOGLOBIN: ICD-10-CM

## 2017-09-12 LAB
ALBUMIN SERPL-MCNC: 3.7 G/DL (ref 3.5–4.8)
ALP LIVER SERPL-CCNC: 60 U/L (ref 55–142)
ALT SERPL-CCNC: 15 U/L (ref 14–54)
AST SERPL-CCNC: 14 U/L (ref 15–41)
BASOPHILS # BLD AUTO: 0.08 X10(3) UL (ref 0–0.1)
BASOPHILS NFR BLD AUTO: 0.7 %
BILIRUB SERPL-MCNC: 0.3 MG/DL (ref 0.1–2)
BUN BLD-MCNC: 22 MG/DL (ref 8–20)
CALCIUM BLD-MCNC: 8.8 MG/DL (ref 8.3–10.3)
CHLORIDE: 101 MMOL/L (ref 101–111)
CO2: 32 MMOL/L (ref 22–32)
CREAT BLD-MCNC: 1.46 MG/DL (ref 0.55–1.02)
EOSINOPHIL # BLD AUTO: 0.17 X10(3) UL (ref 0–0.3)
EOSINOPHIL NFR BLD AUTO: 1.6 %
ERYTHROCYTE [DISTWIDTH] IN BLOOD BY AUTOMATED COUNT: 13.7 % (ref 11.5–16)
GLUCOSE BLD-MCNC: 88 MG/DL (ref 70–99)
HCT VFR BLD AUTO: 33.8 % (ref 34–50)
HGB BLD-MCNC: 11.5 G/DL (ref 12–16)
IMMATURE GRANULOCYTE COUNT: 0.03 X10(3) UL (ref 0–1)
IMMATURE GRANULOCYTE RATIO %: 0.3 %
LIPASE: 135 U/L (ref 73–393)
LYMPHOCYTES # BLD AUTO: 3.34 X10(3) UL (ref 0.9–4)
LYMPHOCYTES NFR BLD AUTO: 30.8 %
M PROTEIN MFR SERPL ELPH: 7.7 G/DL (ref 6.1–8.3)
MCH RBC QN AUTO: 31.2 PG (ref 27–33.2)
MCHC RBC AUTO-ENTMCNC: 34 G/DL (ref 31–37)
MCV RBC AUTO: 91.6 FL (ref 81–100)
MONOCYTES # BLD AUTO: 1.11 X10(3) UL (ref 0.1–0.6)
MONOCYTES NFR BLD AUTO: 10.2 %
NEUTROPHIL ABS PRELIM: 6.1 X10 (3) UL (ref 1.3–6.7)
NEUTROPHILS # BLD AUTO: 6.1 X10(3) UL (ref 1.3–6.7)
NEUTROPHILS NFR BLD AUTO: 56.4 %
PLATELET # BLD AUTO: 321 10(3)UL (ref 150–450)
POTASSIUM SERPL-SCNC: 2.9 MMOL/L (ref 3.6–5.1)
RBC # BLD AUTO: 3.69 X10(6)UL (ref 3.8–5.1)
RED CELL DISTRIBUTION WIDTH-SD: 46.5 FL (ref 35.1–46.3)
SODIUM SERPL-SCNC: 140 MMOL/L (ref 136–144)
WBC # BLD AUTO: 10.8 X10(3) UL (ref 4–13)

## 2017-09-12 PROCEDURE — 99214 OFFICE O/P EST MOD 30 MIN: CPT | Performed by: FAMILY MEDICINE

## 2017-09-12 PROCEDURE — 85025 COMPLETE CBC W/AUTO DIFF WBC: CPT

## 2017-09-12 PROCEDURE — 83690 ASSAY OF LIPASE: CPT

## 2017-09-12 PROCEDURE — 36415 COLL VENOUS BLD VENIPUNCTURE: CPT

## 2017-09-12 PROCEDURE — 80053 COMPREHEN METABOLIC PANEL: CPT

## 2017-09-12 RX ORDER — ONDANSETRON HYDROCHLORIDE 8 MG/1
TABLET, FILM COATED ORAL
Qty: 30 TABLET | Refills: 3 | Status: SHIPPED | OUTPATIENT
Start: 2017-09-12 | End: 2017-11-20

## 2017-09-12 NOTE — TELEPHONE ENCOUNTER
Potassium 40 meq right now and another 40 meq tonight then again in AM 40 meq and recheck potassium in the AM

## 2017-09-12 NOTE — TELEPHONE ENCOUNTER
Spoke to pt with the directions and pt verbalized understanding. Pt states she has 20meq tablets of potassium already at home so she will take 2 tabs now, 2 tabs before bed and 2 tabs in the morning then go have her lab drawn in the morning as well.   Shanita

## 2017-09-12 NOTE — PATIENT INSTRUCTIONS
Stomach Ulcer (Gastric Ulcer)   What is a stomach ulcer? A stomach ulcer, also called a gastric ulcer, is a raw area or open sore in the lining of the stomach. How does it occur?    The stomach's lining has a protective layer of cells that produce mucus from a bleeding ulcer)   blood test for anemia (which may be a sign of internal bleeding)   upper GI X-ray (for this test you swallow liquid barium, which may allow your healthcare provider to see the ulcer on an X-ray)   an upper endoscopy, which may allo percent of stomach ulcers become stomach cancer. Make sure you report all continuing or recurrent symptoms to your healthcare provider. How can I take care of myself? Follow the full treatment prescribed by your healthcare provider.  Keep your follow-up

## 2017-09-13 ENCOUNTER — LAB ENCOUNTER (OUTPATIENT)
Dept: LAB | Age: 77
End: 2017-09-13
Attending: FAMILY MEDICINE
Payer: MEDICARE

## 2017-09-13 ENCOUNTER — PRIOR ORIGINAL RECORDS (OUTPATIENT)
Dept: OTHER | Age: 77
End: 2017-09-13

## 2017-09-13 DIAGNOSIS — E87.6 HYPOKALEMIA: ICD-10-CM

## 2017-09-13 LAB — POTASSIUM SERPL-SCNC: 4.4 MMOL/L (ref 3.6–5.1)

## 2017-09-13 PROCEDURE — 84132 ASSAY OF SERUM POTASSIUM: CPT

## 2017-09-13 PROCEDURE — 36415 COLL VENOUS BLD VENIPUNCTURE: CPT

## 2017-09-13 NOTE — PROGRESS NOTES
Take potassium as instructed. Repeat electrolytes tomorrow. Slight improvement in creatinine get blood test by Dr. Carrillo Waldron ordered as soon as possible. Hemoglobin improved enough that she can go for procedure with neurosurgeon.   Sent to my chart

## 2017-09-13 NOTE — PROGRESS NOTES
Wes Mast is a 78year old female. HPI:   Patient is in for symptoms of severe nausea and vomiting over the last several weeks. Patient was changed from Protonix to [facility] viavoo Treece 3 weeks ago due to history of gastric ulcer.   Negative H. pylori endoscopy Cinacalcet HCl (SENSIPAR) 30 MG Oral Tab Take 1 tablet twice daily.  APPT NEEDED FOR REFILLS Disp: 180 tablet Rfl: 0   PRAVASTATIN SODIUM 40 MG Oral Tab TAKE 1 TABLET BY MOUTH EVERY NIGHT AT BEDTIME Disp: 30 tablet Rfl: 0   Dexlansoprazole (DEXILANT) 61 M nightly. Disp:  Rfl:    Losartan Potassium 100 MG Oral Tab Take 1 tablet (100 mg total) by mouth daily. Disp: 90 tablet Rfl: 3   Fexofenadine HCl (ALLEGRA ALLERGY) 180 MG Oral Tab Take 180 mg by mouth daily as needed.    Disp:  Rfl:    Fluticasone Propionat breath with exertion  CARDIOVASCULAR: denies chest pain on exertion  GI: see above  NEURO: denies headaches  Musculoskeletal: No motor deficits  EXAM:   /60 (BP Location: Left arm, Patient Position: Sitting, Cuff Size: adult)   Pulse 87   Temp 98.1 ° HCl (ZOFRAN) 8 MG tablet; TAKE 1 TABLET(8 MG) BY MOUTH EVERY 8 HOURS AS NEEDED FOR NAUSEA  Dispense: 30 tablet; Refill: 3    2.  Intractable vomiting with nausea, unspecified vomiting type  Stop NSAID  - Ondansetron HCl (ZOFRAN) 8 MG tablet; TAKE 1 TABLET(8

## 2017-09-14 ENCOUNTER — TELEPHONE (OUTPATIENT)
Dept: FAMILY MEDICINE CLINIC | Facility: CLINIC | Age: 77
End: 2017-09-14

## 2017-09-14 DIAGNOSIS — E87.6 HYPOKALEMIA: Primary | ICD-10-CM

## 2017-09-14 RX ORDER — POTASSIUM CHLORIDE 750 MG/1
20 TABLET, FILM COATED, EXTENDED RELEASE ORAL DAILY
Qty: 60 TABLET | Refills: 0 | Status: SHIPPED | OUTPATIENT
Start: 2017-09-14 | End: 2017-11-20 | Stop reason: DRUGHIGH

## 2017-09-14 NOTE — TELEPHONE ENCOUNTER
----- Message from Roberto Hardy PA-C sent at 9/14/2017 11:24 AM CDT -----  Potassium is normal take 20 meq daily and repeat the potassium in 7-10 days

## 2017-09-14 NOTE — TELEPHONE ENCOUNTER
lmom for pt with results/instructions and med sent to pharmacy and lab in system  Asked pt after reviewing message to call office with any questions.

## 2017-09-15 ENCOUNTER — OFFICE VISIT (OUTPATIENT)
Dept: SURGERY | Facility: CLINIC | Age: 77
End: 2017-09-15

## 2017-09-15 VITALS — HEART RATE: 64 BPM | DIASTOLIC BLOOD PRESSURE: 60 MMHG | SYSTOLIC BLOOD PRESSURE: 110 MMHG

## 2017-09-15 DIAGNOSIS — G89.29 CHRONIC LOW BACK PAIN WITH BILATERAL SCIATICA, UNSPECIFIED BACK PAIN LATERALITY: Primary | ICD-10-CM

## 2017-09-15 DIAGNOSIS — M54.41 CHRONIC LOW BACK PAIN WITH BILATERAL SCIATICA, UNSPECIFIED BACK PAIN LATERALITY: Primary | ICD-10-CM

## 2017-09-15 DIAGNOSIS — M54.42 CHRONIC LOW BACK PAIN WITH BILATERAL SCIATICA, UNSPECIFIED BACK PAIN LATERALITY: Primary | ICD-10-CM

## 2017-09-15 PROCEDURE — 99213 OFFICE O/P EST LOW 20 MIN: CPT | Performed by: PHYSICIAN ASSISTANT

## 2017-09-15 NOTE — H&P
Neurosurgery Clinic Visit  9/15/2017    Anthony Chauhan PCP:  Adan Amador DO    1940 MRN [id number]     HISTORY OF PRESENT ILLNESS:  Anthony Chauhan is a(n) 78year old female who is here for follow-up of low back pain.   She was last seen in May EXAMINATION:  Vital Signs:  /60 (BP Location: Right arm, Patient Position: Sitting, Cuff Size: adult)   Pulse 64   Gen:  NAD. Neuro:   A&Ox4. Speech fluent. Able to follow commands.    Heart:  Regular rate and rhythm  Lungs: Clear to auscultation bi

## 2017-09-15 NOTE — PROGRESS NOTES
Neurosurgery Clinic Visit  9/15/2017    Nate Heath PCP:  Marko Mathias DO DOB 1940 MRN [id number]     HISTORY OF PRESENT ILLNESS:  Nate Heath is a(n) 78year old female ***      PHYSICAL EXAMINATION:  Vital Signs:  /60 (BP Location:

## 2017-09-18 RX ORDER — PRAVASTATIN SODIUM 40 MG
TABLET ORAL
Qty: 30 TABLET | Refills: 0 | Status: SHIPPED | OUTPATIENT
Start: 2017-09-18 | End: 2018-03-27

## 2017-09-25 ENCOUNTER — OFFICE VISIT (OUTPATIENT)
Dept: FAMILY MEDICINE CLINIC | Facility: CLINIC | Age: 77
End: 2017-09-25

## 2017-09-25 VITALS
WEIGHT: 119 LBS | TEMPERATURE: 99 F | SYSTOLIC BLOOD PRESSURE: 126 MMHG | HEIGHT: 62.5 IN | BODY MASS INDEX: 21.35 KG/M2 | HEART RATE: 75 BPM | DIASTOLIC BLOOD PRESSURE: 72 MMHG

## 2017-09-25 DIAGNOSIS — R30.0 DYSURIA: Primary | ICD-10-CM

## 2017-09-25 LAB
BILIRUBIN: NEGATIVE
GLUCOSE (URINE DIPSTICK): NEGATIVE MG/DL
KETONES (URINE DIPSTICK): NEGATIVE MG/DL
MULTISTIX LOT#: NORMAL NUMERIC
NITRITE, URINE: POSITIVE
PH, URINE: 7.5 (ref 4.5–8)
PROTEIN (URINE DIPSTICK): 30 MG/DL
SPECIFIC GRAVITY: 1.01 (ref 1–1.03)
URINE-COLOR: YELLOW
UROBILINOGEN,SEMI-QN: 1 MG/DL (ref 0–1.9)

## 2017-09-25 PROCEDURE — 87186 SC STD MICRODIL/AGAR DIL: CPT | Performed by: FAMILY MEDICINE

## 2017-09-25 PROCEDURE — 81003 URINALYSIS AUTO W/O SCOPE: CPT | Performed by: FAMILY MEDICINE

## 2017-09-25 PROCEDURE — 87086 URINE CULTURE/COLONY COUNT: CPT | Performed by: FAMILY MEDICINE

## 2017-09-25 PROCEDURE — 87184 SC STD DISK METHOD PER PLATE: CPT | Performed by: FAMILY MEDICINE

## 2017-09-25 PROCEDURE — 87088 URINE BACTERIA CULTURE: CPT | Performed by: FAMILY MEDICINE

## 2017-09-25 PROCEDURE — 99214 OFFICE O/P EST MOD 30 MIN: CPT | Performed by: FAMILY MEDICINE

## 2017-09-25 RX ORDER — CIPROFLOXACIN 500 MG/1
500 TABLET, FILM COATED ORAL 2 TIMES DAILY
Qty: 14 TABLET | Refills: 0 | Status: SHIPPED | OUTPATIENT
Start: 2017-09-25 | End: 2017-10-02

## 2017-09-25 NOTE — PATIENT INSTRUCTIONS
-- start antibiotics 2x/day with food for next 7 days  -lots of water throughout the day  --otc azo as needed for discomfort  -- we will call with culture results  -- come back in 1 wk if not improving or worsening

## 2017-09-25 NOTE — PROGRESS NOTES
Anthony Chauhan is a 78year old female here for Patient presents with:  Burning On Urination: started yesterday       HPI:     ? UTI  -dysuria started about 3 days ago  -continues to have daily nausea - taking zofran daily - no change since urine symptoms removed ovaries  2/26/2013: INJECTION, W/WO CONTRAST, DX/THERAPEUTIC SUBST*      Comment: Procedure: LUMBAR EPIDURAL;  Surgeon:                Ashia Forte MD;  Location: [address] [place] [address] Drive MANAGEMENT  3/18/2013: INJECTION, W/WO CONTRAS • Cancer Sister      lung with mets to the lymph nodes   • Lipids Sister    • Hypertension Sister    • Lipids Brother    • Heart Disorder Brother      rheumatic heart disease   • Cancer Sister      liver cancer   • Bipolar Disorder Sister       Social Hi (500,000 Units total) by mouth 4 (four) times daily as needed. Daily Disp:  Rfl: 2   AmLODIPine Besylate 10 MG Oral Tab Take 1 tablet (10 mg total) by mouth once daily.  Disp: 90 tablet Rfl: 1   HYDROcodone-acetaminophen (NORCO) 5-325 MG Oral Tab Take 1 tab Pulse 75   Temp 98.8 °F (37.1 °C) (Oral)   Ht 62.5\"   Wt 119 lb   BMI 21.42 kg/m²     Gen: NAD, alert and oriented x 3  HEENT: NCAT, pupils equal and round  Pulm: CTAB, no wheezing  CV: RRR, no murmurs  Abd: soft, ND, NT, +BS, no cva tenderness b/l  Ext:

## 2017-09-28 ENCOUNTER — OFFICE VISIT (OUTPATIENT)
Dept: SURGERY | Facility: CLINIC | Age: 77
End: 2017-09-28

## 2017-09-28 VITALS
HEIGHT: 62 IN | OXYGEN SATURATION: 97 % | WEIGHT: 120 LBS | HEART RATE: 85 BPM | DIASTOLIC BLOOD PRESSURE: 84 MMHG | RESPIRATION RATE: 16 BRPM | BODY MASS INDEX: 22.08 KG/M2 | SYSTOLIC BLOOD PRESSURE: 138 MMHG

## 2017-09-28 DIAGNOSIS — M47.816 LUMBAR FACET ARTHROPATHY: ICD-10-CM

## 2017-09-28 DIAGNOSIS — M46.1 SACROILIITIS (HCC): Primary | ICD-10-CM

## 2017-09-28 PROCEDURE — 99214 OFFICE O/P EST MOD 30 MIN: CPT | Performed by: NURSE PRACTITIONER

## 2017-09-28 NOTE — PROGRESS NOTES
Name: Ilir Beach   : 1940   DOS: 2017     Pain Clinic Follow Up Visit:   Ilir Beach is a 78year old female who presents for recheck of her chronic low back pain which is currently in the left lumbar and sacroiliac areas.  Pain is radiat the pain. Psychiatric: History of suicidal ideation related to pain. Current Outpatient Prescriptions:  Ciprofloxacin HCl 500 MG Oral Tab Take 1 tablet (500 mg total) by mouth 2 (two) times daily.  Disp: 14 tablet Rfl: 0   PRAVASTATIN SODIUM 40 MG Ora Gabapentin 1%, Lidocaine 2.25%, Prilocaine 2.25%  Apply 4 grams to affected area three to four times daily for treatment of pain. Dispense one 480 gram tube for 30 day supply.  Disp: 1 each Rfl: 11   Azelastine HCl 0.15 % Nasal Solution U 2 SPRAYS IEN ONCE have discussed repeating RFA procedures further with Dr. Michael Parekh. We are going to defer scheduling them at this time.  We would like to see her back in the office approximately 1 month after she has had her stimulator placed and will see how she is doing at th

## 2017-09-28 NOTE — PATIENT INSTRUCTIONS
Refill policies:    • Allow 2-3 business days for refills; controlled substances may take longer.   • Contact your pharmacy at least 5 days prior to running out of medication and have them send an electronic request or submit request through the [facility] have a procedure or additional testing performed. [facility]) will contact your insurance carrier to obtain pre-certification or prior authorization.     Unfortunately, [facility]AR has seen an increase in denial of payment even though the p royce.  • Please note-No prescriptions will be written by Pain Clinic in [place] on the day of procedure. If you require a refill of medications, please contact the office 48 hours prior to your procedure.   • If you have an implanted Spinal Cord or Peripheral N visit within 6 to 10 weeks after your last procedure date   Please call our office with any questions or concerns before or after your procedure at [phone].   If you are a diabetic, please increase the frequency of your glucose monitoring after the pro is numbed with a local anesthetic. Then X-ray or fluoroscopy is used to guide placement of the introducer needles.  Since nerves cannot actually be seen on x-ray, the introducer needles are positioned using bony landmarks that indicate where the nerves usua to be awake enough to communicate easily with the physician during the procedure. However, some patients receive enough sedation that they have amnesia and cannot always remember parts or all of the actual procedure.    What should I expect after the radiof risks, side effects and the possibility of complications. The risks and complications are dependent upon the sites that are ablated.  Since the introducer needles have to go through skin and soft tissues, there will usually be some soreness and occasionally

## 2017-09-28 NOTE — PROGRESS NOTES
Spoke with patient and scheduled injections. Reviewed pre-op instructions. Patient verbalized understanding, no further questions at this time. Pre-op instructions sent via [employer].

## 2017-09-29 ENCOUNTER — PRIOR ORIGINAL RECORDS (OUTPATIENT)
Dept: OTHER | Age: 77
End: 2017-09-29

## 2017-09-29 ENCOUNTER — MYAURORA ACCOUNT LINK (OUTPATIENT)
Dept: OTHER | Age: 77
End: 2017-09-29

## 2017-09-29 ENCOUNTER — TELEPHONE (OUTPATIENT)
Dept: SURGERY | Facility: CLINIC | Age: 77
End: 2017-09-29

## 2017-09-29 DIAGNOSIS — M46.1 SACROILIITIS (HCC): Primary | ICD-10-CM

## 2017-09-29 NOTE — TELEPHONE ENCOUNTER
Spoke with patient and informed patient of message below. Pt verbalized understanding. No further questions at this time. OR holds for RFA removed.

## 2017-09-29 NOTE — TELEPHONE ENCOUNTER
I have discussed with Dr. Jaylin Calle radiofrequency ablation procedures. We are going to defer scheduling them at this time please cancel the dates that we have given her and let her know.   We would like to see her back in the office approximately 1 month after

## 2017-10-02 ENCOUNTER — TELEPHONE (OUTPATIENT)
Dept: FAMILY MEDICINE CLINIC | Facility: CLINIC | Age: 77
End: 2017-10-02

## 2017-10-02 NOTE — TELEPHONE ENCOUNTER
----- Message from Camila Maxwell MD sent at 9/30/2017 12:40 PM CDT -----  Culture shows that cipro should have treated infection - if symptoms continue, followup

## 2017-10-02 NOTE — TELEPHONE ENCOUNTER
I left a detailed message on the patient's confidential stating that per Dr. Mitzy Ortiz, the urine culture shows that cipro should have treated the infection. Per Dr. Mitzy Ortiz, I stated that if her symptoms continue she should follow up.  I asked the patient to emily

## 2017-10-04 ENCOUNTER — TELEPHONE (OUTPATIENT)
Dept: SURGERY | Facility: CLINIC | Age: 77
End: 2017-10-04

## 2017-10-04 NOTE — TELEPHONE ENCOUNTER
Spouse calling to verify date and time of surgery at [facility]. Informed patient's spouse that we have no access to any information for [facility]. Recommended he contact Dr. Lu office to get information.  I did tell him that Dr. Princess Bullock

## 2017-10-05 LAB
ALBUMIN: 3.5 G/DL
ALBUMIN: 3.7 G/DL
ALKALINE PHOSPHATATE(ALK PHOS): 60 IU/L
ALKALINE PHOSPHATATE(ALK PHOS): 62 IU/L
BILIRUBIN TOTAL: 0.3 MG/DL
BILIRUBIN TOTAL: 0.3 MG/DL
BUN: 22 MG/DL
BUN: 31 MG/DL
CALCIUM: 8.8 MG/DL
CALCIUM: 9.1 MG/DL
CHLORIDE: 101 MEQ/L
CHLORIDE: 102 MEQ/L
CREATININE, SERUM: 1.46 MG/DL
CREATININE, SERUM: 1.76 MG/DL
GLUCOSE: 88 MG/DL
GLUCOSE: 98 MG/DL
HEMATOCRIT: 31.8 %
HEMATOCRIT: 33.8 %
HEMOGLOBIN: 10.4 G/DL
HEMOGLOBIN: 11.5 G/DL
PLATELETS: 305 K/UL
PLATELETS: 321 K/UL
POTASSIUM, SERUM: 2.9 MEQ/L
POTASSIUM, SERUM: 3.7 MEQ/L
POTASSIUM, SERUM: 4.4 MEQ/L
PROTEIN, TOTAL: 6.9 G/DL
PROTEIN, TOTAL: 7.7 G/DL
RED BLOOD COUNT: 3.44 X 10-6/U
RED BLOOD COUNT: 3.69 X 10-6/U
SGOT (AST): 14 IU/L
SGOT (AST): 16 IU/L
SGPT (ALT): 15 IU/L
SGPT (ALT): 20 IU/L
SODIUM: 139 MEQ/L
SODIUM: 140 MEQ/L
WHITE BLOOD COUNT: 10.8 X 10-3/U
WHITE BLOOD COUNT: 13 X 10-3/U

## 2017-10-09 NOTE — TELEPHONE ENCOUNTER
Returned call from [employer] Dr. Criss Muse office. Need to see if they were able to open email and if not a fax # to where things can be sent. Email sent provided patients name,  and request for an appt with Dr. Latrice Chavez for SCS.  It also asked what they ne

## 2017-10-10 ENCOUNTER — OFFICE VISIT (OUTPATIENT)
Dept: FAMILY MEDICINE CLINIC | Facility: CLINIC | Age: 77
End: 2017-10-10

## 2017-10-10 ENCOUNTER — LABORATORY ENCOUNTER (OUTPATIENT)
Dept: LAB | Age: 77
End: 2017-10-10
Attending: FAMILY MEDICINE
Payer: MEDICARE

## 2017-10-10 VITALS
HEART RATE: 62 BPM | SYSTOLIC BLOOD PRESSURE: 124 MMHG | WEIGHT: 120 LBS | BODY MASS INDEX: 21.53 KG/M2 | DIASTOLIC BLOOD PRESSURE: 62 MMHG | HEIGHT: 62.5 IN

## 2017-10-10 DIAGNOSIS — Z01.818 PREOP EXAMINATION: Primary | ICD-10-CM

## 2017-10-10 DIAGNOSIS — D50.9 MICROCYTIC ANEMIA: ICD-10-CM

## 2017-10-10 DIAGNOSIS — Z01.818 PREOPERATIVE CLEARANCE: Primary | ICD-10-CM

## 2017-10-10 DIAGNOSIS — I25.118 CORONARY ARTERY DISEASE INVOLVING NATIVE CORONARY ARTERY OF NATIVE HEART WITH OTHER FORM OF ANGINA PECTORIS (HCC): ICD-10-CM

## 2017-10-10 DIAGNOSIS — N17.9 AKI (ACUTE KIDNEY INJURY) (HCC): ICD-10-CM

## 2017-10-10 DIAGNOSIS — Z01.818 PREOPERATIVE CLEARANCE: ICD-10-CM

## 2017-10-10 DIAGNOSIS — N18.30 CKD (CHRONIC KIDNEY DISEASE) STAGE 3, GFR 30-59 ML/MIN (HCC): ICD-10-CM

## 2017-10-10 DIAGNOSIS — E87.6 HYPOKALEMIA: ICD-10-CM

## 2017-10-10 DIAGNOSIS — I10 ESSENTIAL HYPERTENSION: ICD-10-CM

## 2017-10-10 PROCEDURE — 81001 URINALYSIS AUTO W/SCOPE: CPT

## 2017-10-10 PROCEDURE — 86160 COMPLEMENT ANTIGEN: CPT

## 2017-10-10 PROCEDURE — 82570 ASSAY OF URINE CREATININE: CPT

## 2017-10-10 PROCEDURE — 86255 FLUORESCENT ANTIBODY SCREEN: CPT

## 2017-10-10 PROCEDURE — 84165 PROTEIN E-PHORESIS SERUM: CPT

## 2017-10-10 PROCEDURE — 36415 COLL VENOUS BLD VENIPUNCTURE: CPT

## 2017-10-10 PROCEDURE — 86038 ANTINUCLEAR ANTIBODIES: CPT

## 2017-10-10 PROCEDURE — 85025 COMPLETE CBC W/AUTO DIFF WBC: CPT

## 2017-10-10 PROCEDURE — 82043 UR ALBUMIN QUANTITATIVE: CPT

## 2017-10-10 PROCEDURE — 85610 PROTHROMBIN TIME: CPT

## 2017-10-10 PROCEDURE — 86334 IMMUNOFIX E-PHORESIS SERUM: CPT

## 2017-10-10 PROCEDURE — 84132 ASSAY OF SERUM POTASSIUM: CPT

## 2017-10-10 PROCEDURE — 83883 ASSAY NEPHELOMETRY NOT SPEC: CPT

## 2017-10-10 PROCEDURE — 83876 ASSAY MYELOPEROXIDASE: CPT

## 2017-10-10 PROCEDURE — 83516 IMMUNOASSAY NONANTIBODY: CPT

## 2017-10-10 PROCEDURE — 99215 OFFICE O/P EST HI 40 MIN: CPT | Performed by: FAMILY MEDICINE

## 2017-10-10 PROCEDURE — 80053 COMPREHEN METABOLIC PANEL: CPT

## 2017-10-10 NOTE — PROGRESS NOTES
PREOPERATIVE HISTORY AND PHYSICAL     Pre-op clearance requested by:  Dr. Lena Prado   Proposed surgery: Spine Cord Stimulator   Date of Procedure: 10/13/17  Location: hospital/[facility]       Patient presents with:  Pre-Op Exam: Stimulator in spine by  Location: [facility] FOR PAIN MANAGEMENT   • Fluor gid & loclzj ndl/cath spi dx/ther njx  3/18/2013    Procedure: LUMBAR EPIDURAL;  Surgeon: Ken Fairbanks MD;  Location: [address] [place] [address] FOR PAIN MANAGEMENT   • Hysterectomy  1979    removed ovaries   • Inje MEDS:    PRAVASTATIN SODIUM 40 MG Oral Tab TAKE 1 TABLET BY MOUTH EVERY NIGHT AT BEDTIME Disp: 30 tablet Rfl: 0   Potassium Chloride ER 10 MEQ Oral Tab CR Take 2 tablets (20 mEq total) by mouth daily.  Disp: 60 tablet Rfl: 0   Ondansetron HCl (ZOFRAN) Fexofenadine HCl (ALLEGRA ALLERGY) 180 MG Oral Tab Take 180 mg by mouth daily as needed. Disp:  Rfl:    Fluticasone Propionate (FLONASE) 50 MCG/ACT Nasal Suspension 2 sprays by Each Nare route daily.  (Patient taking differently: 2 sprays by Each Nare r labs reviewed - stable - she does not have symptoms of UTI at this point - Potassium mildly low - will increase oral K  -NPO after midnight day of procedure    CAD  -cleared by cardiology  -advised patient to have cardiologist office fax EKG and consult no

## 2017-10-10 NOTE — PATIENT INSTRUCTIONS
--stop all vitamins  --no blood thinner - aspirin, ibuprofen, aleve  -- call preop about your morning medication  -- nothing by mouth after midnight before procedure  -- call with issues or questions

## 2017-10-11 ENCOUNTER — TELEPHONE (OUTPATIENT)
Dept: FAMILY MEDICINE CLINIC | Facility: CLINIC | Age: 77
End: 2017-10-11

## 2017-10-11 DIAGNOSIS — E87.6 LOW BLOOD POTASSIUM: Primary | ICD-10-CM

## 2017-10-11 NOTE — TELEPHONE ENCOUNTER
----- Message from Jessy Call PA-C sent at 10/10/2017  9:09 PM CDT -----  Increase potassium to 40 mEq daily repeat potassium in 1 week.   Order future tests/medications sent to my chart    Left message for patient with test results  Asked patient to

## 2017-10-11 NOTE — PROGRESS NOTES
Increase potassium to 40 mEq daily repeat potassium in 1 week.   Order future tests/medications sent to my chart

## 2017-10-12 RX ORDER — POTASSIUM CHLORIDE 1500 MG/1
TABLET, FILM COATED, EXTENDED RELEASE ORAL
Qty: 60 TABLET | Refills: 0 | Status: SHIPPED | OUTPATIENT
Start: 2017-10-12 | End: 2018-09-17

## 2017-10-13 RX ORDER — PRAVASTATIN SODIUM 40 MG
TABLET ORAL
Qty: 30 TABLET | Refills: 0 | Status: SHIPPED | OUTPATIENT
Start: 2017-10-13 | End: 2017-11-02

## 2017-10-15 ENCOUNTER — TELEPHONE (OUTPATIENT)
Dept: NEPHROLOGY | Facility: CLINIC | Age: 77
End: 2017-10-15

## 2017-10-15 NOTE — TELEPHONE ENCOUNTER
Left VM for pt- serologies / SPEP negative but UA with microscopic hematuria and proteinuria; may need renal biopsy.     Yesi- can you please call pt to schedule an office appt- karo Armenta

## 2017-10-16 ENCOUNTER — TELEPHONE (OUTPATIENT)
Dept: NEPHROLOGY | Facility: CLINIC | Age: 77
End: 2017-10-16

## 2017-11-02 ENCOUNTER — OFFICE VISIT (OUTPATIENT)
Dept: SURGERY | Facility: CLINIC | Age: 77
End: 2017-11-02

## 2017-11-02 VITALS
BODY MASS INDEX: 22 KG/M2 | SYSTOLIC BLOOD PRESSURE: 100 MMHG | DIASTOLIC BLOOD PRESSURE: 60 MMHG | HEART RATE: 72 BPM | WEIGHT: 120 LBS

## 2017-11-02 DIAGNOSIS — M96.1 FAILED BACK SURGICAL SYNDROME: Primary | ICD-10-CM

## 2017-11-02 PROCEDURE — 99024 POSTOP FOLLOW-UP VISIT: CPT | Performed by: NEUROLOGICAL SURGERY

## 2017-11-02 NOTE — PROGRESS NOTES
Neurosurgery Clinic Visit  2017    Wes Lyman PCP:  Kristan Wheeler DO    1940 MRN [id number]     HISTORY OF PRESENT ILLNESS:  Wes Lyman is a(n) 78year old female here for f/u s/p scs placement  Doing well  100% pain relief  Walking

## 2017-11-02 NOTE — PROGRESS NOTES
Patient here to follow up after SCS implant 10/13/17. States pain is more under control and able to walk better. No complaints of pain today.

## 2017-11-02 NOTE — PATIENT INSTRUCTIONS
Refill policies:    • Allow 2-3 business days for refills; controlled substances may take longer.   • Contact your pharmacy at least 5 days prior to running out of medication and have them send an electronic request or submit request through the [facility] have a procedure or additional testing performed. [facility]) will contact your insurance carrier to obtain pre-certification or prior authorization.     Unfortunately, [facility] has seen an increase in denial of payment even though the p

## 2017-11-04 DIAGNOSIS — E83.52 HYPERCALCEMIA: ICD-10-CM

## 2017-11-04 DIAGNOSIS — I10 ESSENTIAL HYPERTENSION: ICD-10-CM

## 2017-11-08 RX ORDER — LOSARTAN POTASSIUM 100 MG/1
TABLET ORAL
Qty: 90 TABLET | Refills: 0 | OUTPATIENT
Start: 2017-11-08

## 2017-11-08 NOTE — TELEPHONE ENCOUNTER
Requesting: Losartan 100 mg  LOV: 03/01/2017  RTC: No Future Appointment  Last Relevant Labs: CMP 10/10/17  Filled:   Losartan Potassium 100 MG Oral Tab 90 tablet 3 11/1/2016         Future Appointments  Date Time Provider Damaris Linares   11/13/2017 2:

## 2017-11-14 RX ORDER — PRAVASTATIN SODIUM 40 MG
TABLET ORAL
Qty: 30 TABLET | Refills: 0 | Status: SHIPPED | OUTPATIENT
Start: 2017-11-14 | End: 2017-11-20

## 2017-11-20 ENCOUNTER — OFFICE VISIT (OUTPATIENT)
Dept: FAMILY MEDICINE CLINIC | Facility: CLINIC | Age: 77
End: 2017-11-20

## 2017-11-20 ENCOUNTER — OFFICE VISIT (OUTPATIENT)
Dept: SURGERY | Facility: CLINIC | Age: 77
End: 2017-11-20

## 2017-11-20 VITALS
BODY MASS INDEX: 20.47 KG/M2 | WEIGHT: 117 LBS | HEART RATE: 72 BPM | SYSTOLIC BLOOD PRESSURE: 98 MMHG | DIASTOLIC BLOOD PRESSURE: 58 MMHG | TEMPERATURE: 98 F | HEIGHT: 63.25 IN

## 2017-11-20 VITALS
WEIGHT: 117 LBS | HEIGHT: 62 IN | HEART RATE: 78 BPM | SYSTOLIC BLOOD PRESSURE: 138 MMHG | BODY MASS INDEX: 21.53 KG/M2 | DIASTOLIC BLOOD PRESSURE: 72 MMHG

## 2017-11-20 DIAGNOSIS — M96.1 FAILED BACK SURGICAL SYNDROME: ICD-10-CM

## 2017-11-20 DIAGNOSIS — K59.01 SLOW TRANSIT CONSTIPATION: ICD-10-CM

## 2017-11-20 DIAGNOSIS — J01.00 ACUTE NON-RECURRENT MAXILLARY SINUSITIS: Primary | ICD-10-CM

## 2017-11-20 DIAGNOSIS — J30.2 CHRONIC SEASONAL ALLERGIC RHINITIS DUE TO OTHER ALLERGEN: ICD-10-CM

## 2017-11-20 DIAGNOSIS — M54.16 LUMBAR RADICULITIS: ICD-10-CM

## 2017-11-20 DIAGNOSIS — Z96.89 S/P INSERTION OF SPINAL CORD STIMULATOR: Primary | ICD-10-CM

## 2017-11-20 DIAGNOSIS — R11.2 INTRACTABLE VOMITING WITH NAUSEA, UNSPECIFIED VOMITING TYPE: ICD-10-CM

## 2017-11-20 PROBLEM — M54.9 BACK PAIN: Status: RESOLVED | Noted: 2017-05-16 | Resolved: 2017-11-20

## 2017-11-20 PROBLEM — K25.3 ACUTE GASTRIC ULCER WITHOUT HEMORRHAGE OR PERFORATION: Status: RESOLVED | Noted: 2017-07-12 | Resolved: 2017-11-20

## 2017-11-20 PROBLEM — K29.00 ACUTE SUPERFICIAL GASTRITIS WITHOUT HEMORRHAGE: Status: RESOLVED | Noted: 2017-07-12 | Resolved: 2017-11-20

## 2017-11-20 PROBLEM — J30.9 ALLERGIC RHINITIS DUE TO ALLERGEN: Status: ACTIVE | Noted: 2017-11-20

## 2017-11-20 PROCEDURE — 99214 OFFICE O/P EST MOD 30 MIN: CPT | Performed by: FAMILY MEDICINE

## 2017-11-20 PROCEDURE — 99214 OFFICE O/P EST MOD 30 MIN: CPT | Performed by: PHYSICIAN ASSISTANT

## 2017-11-20 RX ORDER — ONDANSETRON HYDROCHLORIDE 8 MG/1
TABLET, FILM COATED ORAL
Qty: 30 TABLET | Refills: 3 | Status: SHIPPED | OUTPATIENT
Start: 2017-11-20 | End: 2018-04-17 | Stop reason: ALTCHOICE

## 2017-11-20 RX ORDER — AZELASTINE HCL 205.5 UG/1
SPRAY NASAL
Qty: 1 EACH | Refills: 5 | Status: SHIPPED | OUTPATIENT
Start: 2017-11-20 | End: 2018-04-12

## 2017-11-20 RX ORDER — AZITHROMYCIN 250 MG/1
TABLET, FILM COATED ORAL
Qty: 6 TABLET | Refills: 0 | Status: SHIPPED | OUTPATIENT
Start: 2017-11-20 | End: 2018-01-02

## 2017-11-20 RX ORDER — FLUTICASONE PROPIONATE 50 MCG
2 SPRAY, SUSPENSION (ML) NASAL DAILY
Qty: 1 BOTTLE | Refills: 5 | Status: SHIPPED | OUTPATIENT
Start: 2017-11-20 | End: 2018-01-02

## 2017-11-20 RX ORDER — MONTELUKAST SODIUM 10 MG/1
10 TABLET ORAL NIGHTLY
Qty: 30 TABLET | Refills: 5 | Status: SHIPPED | OUTPATIENT
Start: 2017-11-20 | End: 2018-05-28

## 2017-11-20 NOTE — PATIENT INSTRUCTIONS
Refill policies:    • Allow 2-3 business days for refills; controlled substances may take longer.   • Contact your pharmacy at least 5 days prior to running out of medication and have them send an electronic request or submit request through the [facility] have a procedure or additional testing performed. [facility]) will contact your insurance carrier to obtain pre-certification or prior authorization.     Unfortunately, [facility] has seen an increase in denial of payment even though the p

## 2017-11-20 NOTE — PROGRESS NOTES
Hollie Sheppard is a 78year old female. HPI:   Patient is in for evaluation of her extra congestion causing her to get postnasal drainage and emesis over the last 2 weeks.   Does state that she ran out of Zofran which does help usually using it every 8 jessi total) by mouth nightly as needed for Sleep (insomnia). Disp: 30 tablet Rfl: 2   ClonazePAM 0.5 MG Oral Tab Take 1 tablet (0.5 mg total) by mouth 2 (two) times daily as needed for Anxiety.  Disp: 60 tablet Rfl: 2   QUEtiapine Fumarate 50 MG Oral Tab Take 1 fibro   • Back problem    • Calculus of kidney    • Cataract    • Chronic back pain    • Dairy allergy    • Depression    • Fibromyalgia    • Hearing impairment     bilateral hearing aids   • Heart murmur    • High blood pressure    • High cholesterol    • over the maxillary sinuses bilateral.  EYES: PERRLA, EOM intact, sclera clear without injection  NECK: supple,no adenopathy, thyroid normal to palpation  LUNGS: clear to auscultation no rales, rhonchi or wheezes  CARDIO: RRR without murmur  GI: Normal lyndon Fluticasone Propionate 50 MCG/ACT Nasal Suspension; 2 sprays by Each Nare route daily. Dispense: 1 Bottle; Refill: 5  - azithromycin (ZITHROMAX Z-RODO) 250 MG Oral Tab; Take two tablets by mouth today, then one tablet daily. Dispense: 6 tablet;  Refill: 0

## 2017-11-20 NOTE — PATIENT INSTRUCTIONS
SCHEDULING [facility] LAB APPOINTMENTS ONLINE    Lab appointments can now be scheduled online at [email or web address]    · Go to [email or web address]  · In Search type Lab  · Click \"Lab services\"  · Click \"Schedule Your Test Online\"  · Follow the prompts  · If you rights reserved. This information is not intended as a substitute for professional medical care. Always follow your healthcare professional's instructions.

## 2017-11-20 NOTE — PROGRESS NOTES
Name: Kar Chapman   : 1940   DOS: 2017     Pain Clinic Follow Up Visit:   Patient presents with: Follow - Up: SCS implant      Kar Chapman is a 78year old female who presents for recheck of chronic low back pain and radicular pain.  Pt 30 tablet Rfl: 3   Azelastine HCl 0.15 % Nasal Solution U 2 SPRAYS IEN ONCE TO BID Disp: 1 each Rfl: 5   Montelukast Sodium 10 MG Oral Tab Take 1 tablet (10 mg total) by mouth nightly.  Disp: 30 tablet Rfl: 5   Fluticasone Propionate 50 MCG/ACT Nasal Suspen grams to affected area three to four times daily for treatment of pain. Dispense one 480 gram tube for 30 day supply. Disp: 1 each Rfl: 11   Losartan Potassium 100 MG Oral Tab Take 1 tablet (100 mg total) by mouth daily.  Disp: 90 tablet Rfl: 3   Fexofenad consultations:None  The patient indicates understanding of these issues and agrees to the plan. Return if symptoms worsen or fail to improve.     Surekha Gomez PA-C, 11/20/2017, 2:42 PM

## 2017-12-07 ENCOUNTER — TELEPHONE (OUTPATIENT)
Dept: FAMILY MEDICINE CLINIC | Facility: CLINIC | Age: 77
End: 2017-12-07

## 2017-12-07 NOTE — TELEPHONE ENCOUNTER
lmom for pt that our office is now closed but she could either 1) go to a walk in clinic tonight or call our office in the AM and we will work with her in getting her in to see Dena Tillman

## 2017-12-07 NOTE — TELEPHONE ENCOUNTER
Pt called and said she believes she has a bladder infection because she has burning with urination. She wants to know if she can get an antibiotic.   I told her Alecia Carrillo may not prescribe an antibiotic without seeing her and she wants to if Alecia Carrillo will s

## 2017-12-13 DIAGNOSIS — I10 ESSENTIAL HYPERTENSION: ICD-10-CM

## 2017-12-13 DIAGNOSIS — E83.52 HYPERCALCEMIA: ICD-10-CM

## 2017-12-19 RX ORDER — LOSARTAN POTASSIUM 100 MG/1
TABLET ORAL
Qty: 90 TABLET | Refills: 0 | Status: SHIPPED | OUTPATIENT
Start: 2017-12-19 | End: 2018-03-09

## 2018-01-02 ENCOUNTER — OFFICE VISIT (OUTPATIENT)
Dept: FAMILY MEDICINE CLINIC | Facility: CLINIC | Age: 78
End: 2018-01-02

## 2018-01-02 VITALS
HEART RATE: 100 BPM | OXYGEN SATURATION: 96 % | TEMPERATURE: 99 F | BODY MASS INDEX: 21.53 KG/M2 | WEIGHT: 117 LBS | HEIGHT: 62 IN | SYSTOLIC BLOOD PRESSURE: 146 MMHG | DIASTOLIC BLOOD PRESSURE: 80 MMHG

## 2018-01-02 DIAGNOSIS — R30.0 DYSURIA: ICD-10-CM

## 2018-01-02 DIAGNOSIS — R25.1 TREMORS OF NERVOUS SYSTEM: ICD-10-CM

## 2018-01-02 DIAGNOSIS — R09.81 SINUS CONGESTION: ICD-10-CM

## 2018-01-02 DIAGNOSIS — R11.15 NON-INTRACTABLE CYCLICAL VOMITING WITH NAUSEA: ICD-10-CM

## 2018-01-02 DIAGNOSIS — J32.9 RECURRENT SINUS INFECTIONS: Primary | ICD-10-CM

## 2018-01-02 LAB
BILIRUBIN: NEGATIVE
GLUCOSE (URINE DIPSTICK): NEGATIVE MG/DL
KETONES (URINE DIPSTICK): NEGATIVE MG/DL
MULTISTIX LOT#: ABNORMAL NUMERIC
NITRITE, URINE: NEGATIVE
PH, URINE: 7 (ref 4.5–8)
PROTEIN (URINE DIPSTICK): 30 MG/DL
SPECIFIC GRAVITY: 1.01 (ref 1–1.03)
URINE-COLOR: YELLOW
UROBILINOGEN,SEMI-QN: 0.2 MG/DL (ref 0–1.9)

## 2018-01-02 PROCEDURE — 87077 CULTURE AEROBIC IDENTIFY: CPT | Performed by: FAMILY MEDICINE

## 2018-01-02 PROCEDURE — 87086 URINE CULTURE/COLONY COUNT: CPT | Performed by: FAMILY MEDICINE

## 2018-01-02 PROCEDURE — 87186 SC STD MICRODIL/AGAR DIL: CPT | Performed by: FAMILY MEDICINE

## 2018-01-02 PROCEDURE — 81003 URINALYSIS AUTO W/O SCOPE: CPT | Performed by: FAMILY MEDICINE

## 2018-01-02 PROCEDURE — 99214 OFFICE O/P EST MOD 30 MIN: CPT | Performed by: FAMILY MEDICINE

## 2018-01-02 RX ORDER — LEVOFLOXACIN 250 MG/1
250 TABLET ORAL DAILY
Qty: 20 TABLET | Refills: 0 | Status: SHIPPED | OUTPATIENT
Start: 2018-01-02 | End: 2018-03-09 | Stop reason: ALTCHOICE

## 2018-01-02 NOTE — PROGRESS NOTES
HPI:   Andrea Gomez is a 78year old female who presents for upper respiratory symptoms for  3  weeks. Patient reports congestion, sinus pain. Shaking past 2 days  Zofran using it not helping with the nausea and vomiting.   Patient has not had any abdomin (insomnia). Disp: 30 tablet Rfl: 2   ClonazePAM 0.5 MG Oral Tab Take 1 tablet (0.5 mg total) by mouth 2 (two) times daily as needed for Anxiety. Disp: 60 tablet Rfl: 2   QUEtiapine Fumarate 50 MG Oral Tab Take 1 tablet (50 mg total) by mouth nightly.  Disp: Hearing impairment     bilateral hearing aids   • Heart murmur    • High blood pressure    • High cholesterol    • History of blood transfusion    • IBS (irritable bowel syndrome)     constipation   • Kidney stones    • Muscle weakness     bilateral legs SURGICAL HISTORY      Comment: Cath Stent Placement x 1  2014: OTHER SURGICAL HISTORY      Comment: parathyroidectomy  2/26/2013: PATIENT DOCUMENTED NOT TO HAVE EXPERIENCED ANY*      Comment: Procedure: LUMBAR EPIDURAL;  Surgeon:                Viviana Dewitt in vision  HENT: See above   LUNGS: Denies shortness of breath, wheezing gets a postnasal drainage cough that usually causes her to feel nauseous and throw up sputum   CARDIOVASCULAR: denies chest pain  GI: See above  NEURO: denies headache/weakness.   Has infections  Patient is to use Mucinex 1200 mg every 12 hours along with saline irrigation  Due to recurrent sinus infection CT of sinuses indicated follow-up with Dr. Howard Abarca  - CT SINUS (TGU=61577); Future  - levofloxacin (LEVAQUIN) 250 MG Oral Tab;  Take 1 t

## 2018-01-03 PROBLEM — J32.9 RECURRENT SINUS INFECTIONS: Status: ACTIVE | Noted: 2018-01-03

## 2018-01-03 PROBLEM — R25.1 TREMORS OF NERVOUS SYSTEM: Status: ACTIVE | Noted: 2018-01-03

## 2018-01-03 PROBLEM — R11.15 NON-INTRACTABLE CYCLICAL VOMITING WITH NAUSEA: Status: ACTIVE | Noted: 2018-01-03

## 2018-01-08 ENCOUNTER — TELEPHONE (OUTPATIENT)
Dept: FAMILY MEDICINE CLINIC | Facility: CLINIC | Age: 78
End: 2018-01-08

## 2018-01-08 NOTE — TELEPHONE ENCOUNTER
----- Message from Mely Forman PA-C sent at 1/6/2018  9:16 AM CST -----  Patient is on Levaquin get update on urinary symptoms repeat urine culture in 2 weeks.

## 2018-01-11 ENCOUNTER — TELEPHONE (OUTPATIENT)
Dept: FAMILY MEDICINE CLINIC | Facility: CLINIC | Age: 78
End: 2018-01-11

## 2018-01-11 NOTE — TELEPHONE ENCOUNTER
Per Paco Tavera PA-C, the patient was informed that Wolfgang Barajas reviewed her chart and did not see a follow-up with Dr. James Arciniega.   The patient was informed that there was a message by Dr. James Arciniega stating that he wanted to see her back in the office for her sta

## 2018-01-11 NOTE — TELEPHONE ENCOUNTER
lmom for pt to please call office to schedule a nurse visit for a urine specimen/culture repeat to make sure the bacteria is gone.

## 2018-01-15 ENCOUNTER — TELEPHONE (OUTPATIENT)
Dept: FAMILY MEDICINE CLINIC | Facility: CLINIC | Age: 78
End: 2018-01-15

## 2018-01-15 NOTE — TELEPHONE ENCOUNTER
Rosa Liane is having hot flashes, and she is getting them frequently, she is just wondering what could be going on with her, Please call her at [phone]

## 2018-01-22 ENCOUNTER — TELEPHONE (OUTPATIENT)
Dept: FAMILY MEDICINE CLINIC | Facility: CLINIC | Age: 78
End: 2018-01-22

## 2018-01-22 NOTE — TELEPHONE ENCOUNTER
Patient called back and she was given Kristan's advice below.    Patient will contact Dr Ochoa Spotted office

## 2018-01-22 NOTE — TELEPHONE ENCOUNTER
Per Kristan,patient needs to complete ordered CT of sinus and f/u with ENT.  She can see Dr Walt Antony who she has seen in the past or BODØ recommends she see Dr Soni Greer [phone]  Per phone consent left message for patient

## 2018-01-22 NOTE — TELEPHONE ENCOUNTER
Nel Ocampo is calling to let Elijah Martinez know that for the past 2 weeks she has been coughing up mucus and she cannot stop, she would like Elijah Martinez to give her something or have her see somebody regarding this, Please call Nel Ocampo at [phone]

## 2018-02-07 ENCOUNTER — TELEPHONE (OUTPATIENT)
Dept: FAMILY MEDICINE CLINIC | Facility: CLINIC | Age: 78
End: 2018-02-07

## 2018-02-07 RX ORDER — LIDOCAINE HCL AND HYDROCORTISONE ACETATE 20; 20 MG/G; MG/G
1 CREAM RECTAL AS NEEDED
Qty: 1 KIT | Refills: 0 | Status: SHIPPED | OUTPATIENT
Start: 2018-02-07 | End: 2018-02-12

## 2018-02-07 NOTE — TELEPHONE ENCOUNTER
lmom for pt with instructions and med was sent to pharmacy. Asked pt after reviewing message to call office with any questions.

## 2018-02-07 NOTE — TELEPHONE ENCOUNTER
Rx Natalie-Wilian (lidocaine/hydrocortisone rectal gel 2%/2.5%) Local Anesthetic / Corticosteroid Combination  If not covered by Medicare is $33 in good Rx     MiraLAX over-the-counter with a magnesium citrate today if constipation if abdominal pain is persistent

## 2018-02-07 NOTE — TELEPHONE ENCOUNTER
Pt called in states that she started yesterday with external hemorrhoids and they are very painful. She states she has tried OTC meds with no relief. She states she is constipated with belly ache.  She is asking for something for the pain if possible    P

## 2018-02-08 RX ORDER — AMLODIPINE BESYLATE 10 MG/1
TABLET ORAL
Qty: 90 TABLET | Refills: 0 | Status: SHIPPED | OUTPATIENT
Start: 2018-02-08 | End: 2018-03-09

## 2018-02-10 ENCOUNTER — HOSPITAL ENCOUNTER (EMERGENCY)
Facility: HOSPITAL | Age: 78
Discharge: HOME | End: 2018-02-10
Attending: EMERGENCY MEDICINE
Payer: MEDICARE

## 2018-02-10 VITALS
DIASTOLIC BLOOD PRESSURE: 83 MMHG | TEMPERATURE: 98 F | HEART RATE: 71 BPM | WEIGHT: 116 LBS | HEIGHT: 63 IN | SYSTOLIC BLOOD PRESSURE: 155 MMHG | RESPIRATION RATE: 16 BRPM | BODY MASS INDEX: 20.55 KG/M2 | OXYGEN SATURATION: 100 %

## 2018-02-10 DIAGNOSIS — K64.9 HEMORRHOIDS, UNSPECIFIED HEMORRHOID TYPE: Primary | ICD-10-CM

## 2018-02-10 PROCEDURE — 99282 EMERGENCY DEPT VISIT SF MDM: CPT

## 2018-02-10 PROCEDURE — 93005 ELECTROCARDIOGRAM TRACING: CPT

## 2018-02-10 NOTE — ED PROVIDER NOTES
Patient Seen in: [facility] Emergency Department    History   Patient presents with:  Hemorrhoids    Stated Complaint: gi bleed    HPI    Patient is a 33-year-old female, presenting for evaluation of hemorrhoids.       Patient has a long-standing his Comment: Procedure: LUMBAR EPIDURAL;  Surgeon:                Lamonte Lawn, MD;  Location: [facility] 860: HYSTERECTOMY      Comment: removed ovaries  2/26/2013: INJECTION, W/WO CONTRAST, DX/THERAPEUTIC SUBST* No date: TOTAL ABDOM HYSTERECTOMY  No date: TUBAL LIGATION        Smoking status: Former Smoker                                                              Packs/day: 0.20      Years: 20.00        Types: Cigarettes     Quit date: 1/1/1997  Smokeless tobac Discharge and close outpatient surgical follow-up was recommended. Prescription for Anusol suppositories was provided for use at home. Sitz baths were also encouraged.   Patient was invited to return for reevaluation of any problems, worsening symptoms, o

## 2018-02-12 ENCOUNTER — OFFICE VISIT (OUTPATIENT)
Dept: SURGERY | Facility: CLINIC | Age: 78
End: 2018-02-12

## 2018-02-12 VITALS
WEIGHT: 117 LBS | TEMPERATURE: 98 F | BODY MASS INDEX: 20.73 KG/M2 | SYSTOLIC BLOOD PRESSURE: 118 MMHG | HEIGHT: 63 IN | HEART RATE: 75 BPM | DIASTOLIC BLOOD PRESSURE: 59 MMHG

## 2018-02-12 DIAGNOSIS — K60.0 ACUTE POSTERIOR ANAL FISSURE: Primary | ICD-10-CM

## 2018-02-12 PROCEDURE — 99203 OFFICE O/P NEW LOW 30 MIN: CPT | Performed by: SURGERY

## 2018-02-12 RX ORDER — LIDOCAINE HCL AND HYDROCORTISONE ACETATE 20; 20 MG/G; MG/G
1 CREAM RECTAL AS NEEDED
Qty: 1 KIT | Refills: 0 | Status: SHIPPED | OUTPATIENT
Start: 2018-02-12 | End: 2018-04-17

## 2018-02-12 NOTE — PATIENT INSTRUCTIONS
HIGH FIBER DIET    This high fiber diet includes two major components. One is a natural high fiber that is dietary high. The other is a fiber supplement.     Natural Dietary Fiber:    These are very few products on the market that have high enough fiber c are simply an equivalent to a ground up apple peel. That is they just provide extra natural fiber. They will enter your colon in a non-digestable form providing bulk to your stool.   Therefore, they are safe to take on a daily basis for the rest of you li problems:    1. Most people describe a gassy or bloated feeling for the first ten days to two weeks. This will pass with time. I recommend that once you get on the diet that you stay on it and complete it as prescribed.   Again, you will enjoy normal lyndon
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
## 2018-02-21 ENCOUNTER — OFFICE VISIT (OUTPATIENT)
Dept: SURGERY | Facility: CLINIC | Age: 78
End: 2018-02-21

## 2018-02-21 VITALS — BODY MASS INDEX: 20.73 KG/M2 | WEIGHT: 117 LBS | HEART RATE: 75 BPM | HEIGHT: 63 IN | TEMPERATURE: 98 F

## 2018-02-21 DIAGNOSIS — R11.2 INTRACTABLE VOMITING WITH NAUSEA, UNSPECIFIED VOMITING TYPE: ICD-10-CM

## 2018-02-21 DIAGNOSIS — K60.0 ACUTE ANTERIOR ANAL FISSURE: Primary | ICD-10-CM

## 2018-02-21 DIAGNOSIS — K64.4 EXTERNAL HEMORRHOID: ICD-10-CM

## 2018-02-21 DIAGNOSIS — K64.8 INTERNAL PROLAPSED HEMORRHOIDS: ICD-10-CM

## 2018-02-21 PROCEDURE — 99212 OFFICE O/P EST SF 10 MIN: CPT | Performed by: SURGERY

## 2018-02-21 RX ORDER — ONDANSETRON HYDROCHLORIDE 8 MG/1
TABLET, FILM COATED ORAL
Qty: 30 TABLET | Refills: 0 | OUTPATIENT
Start: 2018-02-21

## 2018-02-21 NOTE — PROGRESS NOTES
Follow Up Visit Note       Active Problems      1. Acute anterior anal fissure    2. Internal prolapsed hemorrhoids    3. External hemorrhoid          Chief Complaint   Patient presents with:  Anal / Rectal Problem: 3 week follow up---anal fissure.  Using N cholesterol    • History of blood transfusion    • History of cardiac murmur    • History of eating disorder    • History of rheumatic fever    • IBS (irritable bowel syndrome)     constipation   • Irregular bowel habits    • Kidney stones    • Muscle weak PAIN MANAGEMENT  2/26/2013: M-SEDAJ BY HOMERO CROWELL [facility] 5[facility]R      Comment: Procedure: LUMBAR EPIDURAL;  Surgeon:                Ashia Forte MD;  Location: [place] [address] MANAGEMENT  2009: OTHER SURGICAL HISTORY      Comment: Ca Years of education:                 Number of children:               Social History Main Topics    Smoking status: Former Smoker                                                                Packs/day: 0.20      Years: 20.00          Types: Cigarettes tablet (0.5 mg total) by mouth 2 (two) times daily as needed for Anxiety. Disp: 60 tablet Rfl: 2   QUEtiapine Fumarate 50 MG Oral Tab Take 1 tablet (50 mg total) by mouth nightly.  Disp: 30 tablet Rfl: 2   Potassium Chloride ER 20 MEQ Oral Tab CR Take 2 tab rectal pain. Negative for abdominal distention, abdominal pain, blood in stool, constipation, diarrhea, nausea and vomiting. Genitourinary: Negative for difficulty urinating, dysuria, frequency and urgency.    Musculoskeletal: Negative for arthralgias and (primary encounter diagnosis)  Internal prolapsed hemorrhoids  External hemorrhoid    Plan   The patient has a persistent anterior midline anal fissure.   She also has circumferential external hemorrhoids which do not appear to be thrombosed or contributing

## 2018-03-09 ENCOUNTER — OFFICE VISIT (OUTPATIENT)
Dept: FAMILY MEDICINE CLINIC | Facility: CLINIC | Age: 78
End: 2018-03-09

## 2018-03-09 VITALS
TEMPERATURE: 98 F | HEART RATE: 64 BPM | WEIGHT: 117 LBS | SYSTOLIC BLOOD PRESSURE: 94 MMHG | DIASTOLIC BLOOD PRESSURE: 58 MMHG | BODY MASS INDEX: 20.73 KG/M2 | HEIGHT: 62.87 IN

## 2018-03-09 DIAGNOSIS — F51.04 PSYCHOPHYSIOLOGICAL INSOMNIA: ICD-10-CM

## 2018-03-09 DIAGNOSIS — I10 ESSENTIAL HYPERTENSION: Primary | ICD-10-CM

## 2018-03-09 DIAGNOSIS — F41.1 GAD (GENERALIZED ANXIETY DISORDER): ICD-10-CM

## 2018-03-09 DIAGNOSIS — R19.8 GAGGING EPISODE: ICD-10-CM

## 2018-03-09 DIAGNOSIS — Z79.899 MEDICATION MANAGEMENT: ICD-10-CM

## 2018-03-09 DIAGNOSIS — J32.0 CHRONIC MAXILLARY SINUSITIS: ICD-10-CM

## 2018-03-09 PROCEDURE — 99214 OFFICE O/P EST MOD 30 MIN: CPT | Performed by: FAMILY MEDICINE

## 2018-03-09 RX ORDER — LOSARTAN POTASSIUM 100 MG/1
TABLET ORAL
Qty: 90 TABLET | Refills: 1 | Status: SHIPPED | OUTPATIENT
Start: 2018-03-09 | End: 2018-10-02

## 2018-03-09 RX ORDER — ONDANSETRON 4 MG/1
TABLET, FILM COATED ORAL
Refills: 2 | COMMUNITY
Start: 2018-02-24 | End: 2018-04-17

## 2018-03-09 RX ORDER — IPRATROPIUM BROMIDE 42 UG/1
2 SPRAY, METERED NASAL 4 TIMES DAILY
Qty: 1 BOTTLE | Refills: 11 | Status: ON HOLD | OUTPATIENT
Start: 2018-03-09 | End: 2018-04-09

## 2018-03-09 RX ORDER — TRAZODONE HYDROCHLORIDE 50 MG/1
50 TABLET ORAL NIGHTLY PRN
Qty: 30 TABLET | Refills: 5 | Status: SHIPPED | OUTPATIENT
Start: 2018-03-09 | End: 2018-08-30

## 2018-03-09 RX ORDER — AMLODIPINE BESYLATE 10 MG/1
TABLET ORAL
Qty: 90 TABLET | Refills: 1 | Status: SHIPPED | OUTPATIENT
Start: 2018-03-09 | End: 2018-11-26

## 2018-03-09 RX ORDER — AZITHROMYCIN 250 MG/1
TABLET, FILM COATED ORAL
Qty: 6 TABLET | Refills: 1 | Status: SHIPPED | OUTPATIENT
Start: 2018-03-09 | End: 2018-03-29

## 2018-03-09 RX ORDER — CLONAZEPAM 0.5 MG/1
0.5 TABLET ORAL 2 TIMES DAILY PRN
Qty: 60 TABLET | Refills: 2 | Status: SHIPPED | OUTPATIENT
Start: 2018-03-09 | End: 2018-09-13

## 2018-03-09 NOTE — PROGRESS NOTES
Keerthi Jones is a 78year old female.   HPI:     Complete Form: Handicap Parking Placard for Fibromyalgia/degenerative disc disease does cause it to be difficult for her to walk more than 200 feet without having to rest.  Had been seeing pain clinic in th Patient does tolerate well does not drink or drive with medication. PHQ 9 is at a 11 without any suicidal or homicidal ideations. Mostly suffers from trouble concentrating and little energy.   Does have issues with little interest and pleasure in doing th Oral Tab TAKE 1 TABLET BY MOUTH EVERY NIGHT AT BEDTIME Disp: 30 tablet Rfl: 0   Cinacalcet HCl (SENSIPAR) 30 MG Oral Tab Take 1 tablet twice daily.  APPT NEEDED FOR REFILLS Disp: 180 tablet Rfl: 0   nystatin 745633 units Oral Tab Take 1 tablet (500,000 Unit years ago   • Postmenopausal status     Age of onset: 30years old   • Presence of other cardiac implants and grafts    • Rheumatic heart disease, unspecified    • Sleep disturbance    • Stented coronary artery    • Stress    • Tinnitus    • Uncomfortable is severely anxious anxious has some tremors when her anxiety worsens. Is accompanied with her  who helps with the communication of her issues. Patient gets confused easily when discussing medications and symptoms.   ASSESSMENT AND PLAN:   Lavelle Solis recently retired. Is benzodiazepine dependent.  - ClonazePAM 0.5 MG Oral Tab; Take 1 tablet (0.5 mg total) by mouth 2 (two) times daily as needed for Anxiety. Dispense: 60 tablet; Refill: 2    3.  Psychophysiological insomnia  Continue with appropriate sl

## 2018-03-10 PROBLEM — I10 ESSENTIAL HYPERTENSION: Status: ACTIVE | Noted: 2018-03-10

## 2018-03-10 PROBLEM — J32.9 RECURRENT SINUS INFECTIONS: Status: RESOLVED | Noted: 2018-01-03 | Resolved: 2018-03-10

## 2018-03-10 PROBLEM — Z79.899 MEDICATION MANAGEMENT: Status: ACTIVE | Noted: 2018-03-10

## 2018-03-10 PROBLEM — R19.8 GAGGING EPISODE: Status: ACTIVE | Noted: 2018-03-10

## 2018-03-10 PROBLEM — J32.0 CHRONIC MAXILLARY SINUSITIS: Status: ACTIVE | Noted: 2018-03-10

## 2018-03-10 PROBLEM — F51.04 PSYCHOPHYSIOLOGICAL INSOMNIA: Status: ACTIVE | Noted: 2018-03-10

## 2018-03-12 RX ORDER — PRAVASTATIN SODIUM 40 MG
TABLET ORAL
Qty: 30 TABLET | Refills: 0 | OUTPATIENT
Start: 2018-03-12

## 2018-03-26 RX ORDER — PRAVASTATIN SODIUM 40 MG
TABLET ORAL
Qty: 30 TABLET | Refills: 0 | OUTPATIENT
Start: 2018-03-26

## 2018-03-26 NOTE — TELEPHONE ENCOUNTER
Refill Request: Pravastatin 40 mg     Failed protocol due to labs    Last appt 3/1/17 - Has seen PCP's at different offices since  Next appt - Not scheduled  Last refill: 9/18/17 #30 NR        Last lipid? 11/18/16    LMTCB for pt - has she changed her PCP?

## 2018-03-27 ENCOUNTER — TELEPHONE (OUTPATIENT)
Dept: FAMILY MEDICINE CLINIC | Facility: CLINIC | Age: 78
End: 2018-03-27

## 2018-03-27 DIAGNOSIS — E78.2 MIXED HYPERLIPIDEMIA: Primary | ICD-10-CM

## 2018-03-27 RX ORDER — PRAVASTATIN SODIUM 40 MG
TABLET ORAL
Qty: 30 TABLET | Refills: 0 | Status: SHIPPED | OUTPATIENT
Start: 2018-03-27 | End: 2018-04-28

## 2018-03-27 NOTE — TELEPHONE ENCOUNTER
A message was left on the patient's voicemail notifying her that Lacie Scheuermann would like her to return to the office soon for a Medicare Annual Wellness Visit.   The patient was informed that she was supposed to repeat a urinalysis at the last office visit, but

## 2018-03-27 NOTE — TELEPHONE ENCOUNTER
30 day supply of pravastatin sent to pharmacy for patient  Patient's last lipid panel was 11/18/16    [facility] you want to order lipid panel for patient?

## 2018-03-29 ENCOUNTER — OFFICE VISIT (OUTPATIENT)
Dept: FAMILY MEDICINE CLINIC | Facility: CLINIC | Age: 78
End: 2018-03-29

## 2018-03-29 VITALS
RESPIRATION RATE: 20 BRPM | WEIGHT: 117 LBS | OXYGEN SATURATION: 98 % | DIASTOLIC BLOOD PRESSURE: 62 MMHG | TEMPERATURE: 98 F | HEIGHT: 63 IN | SYSTOLIC BLOOD PRESSURE: 110 MMHG | HEART RATE: 76 BPM | BODY MASS INDEX: 20.73 KG/M2

## 2018-03-29 DIAGNOSIS — J01.90 ACUTE SINUSITIS TREATED WITH ANTIBIOTICS IN THE PAST 60 DAYS: Primary | ICD-10-CM

## 2018-03-29 PROCEDURE — 99213 OFFICE O/P EST LOW 20 MIN: CPT | Performed by: NURSE PRACTITIONER

## 2018-03-29 RX ORDER — DOXYCYCLINE HYCLATE 100 MG
100 TABLET ORAL 2 TIMES DAILY
Qty: 14 TABLET | Refills: 0 | Status: SHIPPED | OUTPATIENT
Start: 2018-03-29 | End: 2018-04-05

## 2018-03-29 NOTE — PROGRESS NOTES
CHIEF COMPLAINT:   Patient presents with:  Nasal Congestion: sinus pressure,nausea,sore throat and headache x 2 wks      HPI:   Mir Post is a 78year old female who presents for cold symptoms for  3  weeks.  Symptoms have progressed into sinus congest FluvoxaMINE Maleate 100 MG Oral Tab Take 1.5 tablets (150 mg total) by mouth nightly. Disp: 45 tablet Rfl: 2   QUEtiapine Fumarate 50 MG Oral Tab Take 1 tablet (50 mg total) by mouth nightly.  Disp: 30 tablet Rfl: 2   Potassium Chloride ER 20 MEQ Oral Tab C only with ether years ago   • Postmenopausal status     Age of onset: 30years old   • Presence of other cardiac implants and grafts    • Rheumatic heart disease, unspecified    • Sleep disturbance    • Stented coronary artery    • Stress    • Tinnitus PAIN MANAGEMENT  3/18/2013: PATIENT DOCUMENTED NOT TO HAVE EXPERIENCED ANY*      Comment: Procedure: LUMBAR EPIDURAL;  Surgeon:                Oscar Mcfadden MD;  Location: [address] [place] [address] Drive MANAGEMENT  2/26/2013: PATIENT [place] /62   Pulse 76   Temp 98 °F (36.7 °C) (Oral)   Resp 20   Ht 63\"   Wt 117 lb   SpO2 98%   BMI 20.73 kg/m²   GENERAL: well developed, well nourished,in no apparent distress  SKIN: no rashes,no suspicious lesions  HEAD: atraumatic, normocephalic, + ten The sinuses are air-filled spaces within the bones of the face. They connect to the inside of the nose. Sinusitis is an inflammation of the tissue lining the sinus cavity. Sinus inflammation can occur during a cold.  It can also be due to allergies to polle · Do not use nasal rinses or irrigation during an acute sinus infection, unless told to by your health care provider. Rinsing may spread the infection to other sinuses.   · Use acetaminophen or ibuprofen to control pain, unless another pain medicine was pre

## 2018-03-29 NOTE — PATIENT INSTRUCTIONS
Humidifier in room  Sleep propped  Push fluids  Limit dairy  Claritin in the day  Flonase  Can take Benadryl at night, but be cautious because it makes you sleepy  Follow up with PCP for worsening symptoms or no improvement    Sinusitis (Antibiotic Treat · Over-the-counter decongestants may be used unless a similar medicine was prescribed. Nasal sprays work the fastest. Use one that contains phenylephrine or oxymetazoline. First blow the nose gently. Then use the spray.  Do not use these medicines more ofte © 1723-9845 [employer] [address] [facility], [facility] [place]. All rights reserved. This information is not intended as a substitute for professional medical care. Always follow your healthcare professional's instructions.

## 2018-04-09 ENCOUNTER — APPOINTMENT (OUTPATIENT)
Dept: CT IMAGING | Facility: HOSPITAL | Age: 78
End: 2018-04-09
Attending: EMERGENCY MEDICINE
Payer: MEDICARE

## 2018-04-09 ENCOUNTER — APPOINTMENT (OUTPATIENT)
Dept: GENERAL RADIOLOGY | Facility: HOSPITAL | Age: 78
End: 2018-04-09
Attending: EMERGENCY MEDICINE
Payer: MEDICARE

## 2018-04-09 ENCOUNTER — HOSPITAL ENCOUNTER (OUTPATIENT)
Facility: HOSPITAL | Age: 78
Setting detail: OBSERVATION
Discharge: HOME | End: 2018-04-11
Attending: EMERGENCY MEDICINE | Admitting: INTERNAL MEDICINE
Payer: MEDICARE

## 2018-04-09 ENCOUNTER — HOSPITAL ENCOUNTER (OUTPATIENT)
Age: 78
Discharge: ANOTHER HOSPITAL | End: 2018-04-09
Attending: FAMILY MEDICINE
Payer: MEDICARE

## 2018-04-09 VITALS
RESPIRATION RATE: 20 BRPM | SYSTOLIC BLOOD PRESSURE: 151 MMHG | DIASTOLIC BLOOD PRESSURE: 62 MMHG | OXYGEN SATURATION: 100 % | HEART RATE: 95 BPM

## 2018-04-09 DIAGNOSIS — E87.6 HYPOKALEMIA: Primary | ICD-10-CM

## 2018-04-09 DIAGNOSIS — R10.13 EPIGASTRIC PAIN: ICD-10-CM

## 2018-04-09 DIAGNOSIS — R93.5 ABNORMAL CT OF THE ABDOMEN: ICD-10-CM

## 2018-04-09 DIAGNOSIS — R10.13 ABDOMINAL PAIN, EPIGASTRIC: Primary | ICD-10-CM

## 2018-04-09 DIAGNOSIS — R19.8 GAGGING EPISODE: ICD-10-CM

## 2018-04-09 DIAGNOSIS — E87.6 HYPOKALEMIA: ICD-10-CM

## 2018-04-09 DIAGNOSIS — R11.2 NON-INTRACTABLE VOMITING WITH NAUSEA, UNSPECIFIED VOMITING TYPE: ICD-10-CM

## 2018-04-09 PROBLEM — E87.20 METABOLIC ACIDOSIS: Status: ACTIVE | Noted: 2018-04-09

## 2018-04-09 PROBLEM — R73.9 HYPERGLYCEMIA: Status: ACTIVE | Noted: 2018-04-09

## 2018-04-09 PROBLEM — E87.2 METABOLIC ACIDOSIS: Status: ACTIVE | Noted: 2018-04-09

## 2018-04-09 PROBLEM — N17.9 ACUTE KIDNEY INJURY: Status: ACTIVE | Noted: 2018-04-09

## 2018-04-09 PROBLEM — N17.9 ACUTE KIDNEY INJURY (HCC): Status: ACTIVE | Noted: 2018-04-09

## 2018-04-09 PROBLEM — D64.9 ANEMIA: Status: ACTIVE | Noted: 2018-04-09

## 2018-04-09 PROCEDURE — 96374 THER/PROPH/DIAG INJ IV PUSH: CPT

## 2018-04-09 PROCEDURE — 93005 ELECTROCARDIOGRAM TRACING: CPT

## 2018-04-09 PROCEDURE — 80053 COMPREHEN METABOLIC PANEL: CPT | Performed by: PHYSICIAN ASSISTANT

## 2018-04-09 PROCEDURE — 99220 INITIAL OBSERVATION CARE,LEVL III: CPT | Performed by: INTERNAL MEDICINE

## 2018-04-09 PROCEDURE — 71045 X-RAY EXAM CHEST 1 VIEW: CPT | Performed by: EMERGENCY MEDICINE

## 2018-04-09 PROCEDURE — 93010 ELECTROCARDIOGRAM REPORT: CPT

## 2018-04-09 PROCEDURE — 85025 COMPLETE CBC W/AUTO DIFF WBC: CPT | Performed by: PHYSICIAN ASSISTANT

## 2018-04-09 PROCEDURE — 83690 ASSAY OF LIPASE: CPT | Performed by: PHYSICIAN ASSISTANT

## 2018-04-09 PROCEDURE — 99214 OFFICE O/P EST MOD 30 MIN: CPT

## 2018-04-09 PROCEDURE — 99205 OFFICE O/P NEW HI 60 MIN: CPT

## 2018-04-09 PROCEDURE — 80047 BASIC METABLC PNL IONIZED CA: CPT

## 2018-04-09 PROCEDURE — 74176 CT ABD & PELVIS W/O CONTRAST: CPT | Performed by: EMERGENCY MEDICINE

## 2018-04-09 PROCEDURE — 96361 HYDRATE IV INFUSION ADD-ON: CPT

## 2018-04-09 RX ORDER — ENOXAPARIN SODIUM 100 MG/ML
30 INJECTION SUBCUTANEOUS DAILY
Status: DISCONTINUED | OUTPATIENT
Start: 2018-04-09 | End: 2018-04-11

## 2018-04-09 RX ORDER — ACETAMINOPHEN 500 MG
1000 TABLET ORAL ONCE
Status: COMPLETED | OUTPATIENT
Start: 2018-04-09 | End: 2018-04-09

## 2018-04-09 RX ORDER — SODIUM CHLORIDE 9 MG/ML
INJECTION, SOLUTION INTRAVENOUS ONCE
Status: COMPLETED | OUTPATIENT
Start: 2018-04-09 | End: 2018-04-09

## 2018-04-09 RX ORDER — POTASSIUM CHLORIDE 20 MEQ/1
40 TABLET, EXTENDED RELEASE ORAL ONCE
Status: COMPLETED | OUTPATIENT
Start: 2018-04-09 | End: 2018-04-09

## 2018-04-09 RX ORDER — FLUVOXAMINE MALEATE 50 MG/1
150 TABLET, COATED ORAL NIGHTLY
Status: DISCONTINUED | OUTPATIENT
Start: 2018-04-09 | End: 2018-04-11

## 2018-04-09 RX ORDER — CLONAZEPAM 0.5 MG/1
0.5 TABLET ORAL 2 TIMES DAILY PRN
Status: DISCONTINUED | OUTPATIENT
Start: 2018-04-09 | End: 2018-04-11

## 2018-04-09 RX ORDER — AZELASTINE 1 MG/ML
1-2 SPRAY, METERED NASAL 2 TIMES DAILY
Status: DISCONTINUED | OUTPATIENT
Start: 2018-04-09 | End: 2018-04-11

## 2018-04-09 RX ORDER — IPRATROPIUM BROMIDE 42 UG/1
2 SPRAY, METERED NASAL 4 TIMES DAILY
Status: DISCONTINUED | OUTPATIENT
Start: 2018-04-09 | End: 2018-04-11

## 2018-04-09 RX ORDER — ATORVASTATIN CALCIUM 10 MG/1
10 TABLET, FILM COATED ORAL NIGHTLY
Status: DISCONTINUED | OUTPATIENT
Start: 2018-04-09 | End: 2018-04-11

## 2018-04-09 RX ORDER — ONDANSETRON 2 MG/ML
4 INJECTION INTRAMUSCULAR; INTRAVENOUS ONCE
Status: COMPLETED | OUTPATIENT
Start: 2018-04-09 | End: 2018-04-09

## 2018-04-09 RX ORDER — SODIUM CHLORIDE AND POTASSIUM CHLORIDE .9; .15 G/100ML; G/100ML
SOLUTION INTRAVENOUS CONTINUOUS
Status: DISCONTINUED | OUTPATIENT
Start: 2018-04-09 | End: 2018-04-11

## 2018-04-09 RX ORDER — ONDANSETRON 2 MG/ML
4 INJECTION INTRAMUSCULAR; INTRAVENOUS EVERY 6 HOURS PRN
Status: DISCONTINUED | OUTPATIENT
Start: 2018-04-09 | End: 2018-04-11

## 2018-04-09 RX ORDER — QUETIAPINE 50 MG/1
50 TABLET, FILM COATED ORAL NIGHTLY
Status: DISCONTINUED | OUTPATIENT
Start: 2018-04-09 | End: 2018-04-11

## 2018-04-09 RX ORDER — ONDANSETRON 2 MG/ML
4 INJECTION INTRAMUSCULAR; INTRAVENOUS EVERY 4 HOURS PRN
Status: CANCELLED | OUTPATIENT
Start: 2018-04-09

## 2018-04-09 RX ORDER — LOSARTAN POTASSIUM 100 MG/1
100 TABLET ORAL DAILY
Status: DISCONTINUED | OUTPATIENT
Start: 2018-04-09 | End: 2018-04-11

## 2018-04-09 RX ORDER — AMLODIPINE BESYLATE 5 MG/1
10 TABLET ORAL DAILY
Status: DISCONTINUED | OUTPATIENT
Start: 2018-04-09 | End: 2018-04-11

## 2018-04-09 RX ORDER — DICYCLOMINE HYDROCHLORIDE 10 MG/ML
20 INJECTION INTRAMUSCULAR ONCE
Status: COMPLETED | OUTPATIENT
Start: 2018-04-09 | End: 2018-04-09

## 2018-04-09 RX ORDER — MONTELUKAST SODIUM 10 MG/1
10 TABLET ORAL NIGHTLY
Status: DISCONTINUED | OUTPATIENT
Start: 2018-04-09 | End: 2018-04-11

## 2018-04-09 RX ORDER — METOCLOPRAMIDE HYDROCHLORIDE 5 MG/ML
10 INJECTION INTRAMUSCULAR; INTRAVENOUS EVERY 8 HOURS PRN
Status: DISCONTINUED | OUTPATIENT
Start: 2018-04-09 | End: 2018-04-11

## 2018-04-09 RX ORDER — TRAZODONE HYDROCHLORIDE 50 MG/1
50 TABLET ORAL NIGHTLY PRN
Status: DISCONTINUED | OUTPATIENT
Start: 2018-04-09 | End: 2018-04-11

## 2018-04-09 RX ORDER — SODIUM CHLORIDE 9 MG/ML
INJECTION, SOLUTION INTRAVENOUS CONTINUOUS
Status: CANCELLED | OUTPATIENT
Start: 2018-04-09 | End: 2018-04-09

## 2018-04-09 RX ORDER — LIDOCAINE 50 MG/G
2 PATCH TOPICAL EVERY 24 HOURS
Status: DISCONTINUED | OUTPATIENT
Start: 2018-04-09 | End: 2018-04-11

## 2018-04-09 RX ORDER — CINACALCET 30 MG/1
30 TABLET, FILM COATED ORAL 2 TIMES DAILY WITH MEALS
Status: DISCONTINUED | OUTPATIENT
Start: 2018-04-10 | End: 2018-04-11

## 2018-04-09 RX ORDER — SODIUM CHLORIDE 9 MG/ML
INJECTION, SOLUTION INTRAVENOUS ONCE
Status: COMPLETED | OUTPATIENT
Start: 2018-04-09 | End: 2018-04-10

## 2018-04-09 NOTE — ED NOTES
>patient ambulatory to room 2 , alert x oriented x 3 breathing easy,  with gait steady. Pt noted to be shaking appears a little pale. Here for c/o vomiting x 2 weeks with left upper abdominal pain x 1 month.  Pt just recently completed 2 antibiotics for si

## 2018-04-09 NOTE — ED PROVIDER NOTES
Patient Seen in: [facility] In [place]    History   Patient presents with:  Vomiting  Abdominal Pain    Stated Complaint: vomitting with excessive shaking x 1 week     HPI    Patient is a 68-year-old female with moderate medical history. Postmenopausal status     Age of onset: 30years old   • Presence of other cardiac implants and grafts    • Rheumatic heart disease, unspecified    • Sleep disturbance    • Stented coronary artery    • Stress    • Tinnitus    • Uncomfortable fullness after MANAGEMENT  3/18/2013: PATIENT DOCUMENTED NOT TO HAVE EXPERIENCED ANY*      Comment: Procedure: LUMBAR EPIDURAL;  Surgeon:                Oscar Mcfadden MD;  Location: [address]               PAIN MANAGEMENT  2/26/2013: PATIENT Roger Gaona mucosa without audible nasal congestion. Oropharynx is patent without evidence of erythema, exudates or deviation.   No stridor to auscultation  Lung: No distress, RR, no retraction, breath sounds are clear bilaterally  Cardio: Regular rate and rhythm, nor pancreatitis    Creatinine of 1.7. BUN 22. We will cancel CT scan and arrange for patient to be transported to the [employer] via ambulance.   My supervising physician is in agreement    Disposition and Plan     Clinical Impression:  Hypokalemi

## 2018-04-09 NOTE — ED NOTES
Warm blanket and pillow provided. Pt made comfortable. Bed on low fowlers. Lights dimmed. 1 side rail up. Call light within reach.  Spouse at bedside

## 2018-04-09 NOTE — ED PROVIDER NOTES
Patient Seen in: [facility] Emergency Department    History   Patient presents with:  Nausea/Vomiting/Diarrhea (gastrointestinal)    Stated Complaint: Nausea Vomiting    HPI    Patient is a pleasant 53-year-old female, presenting for evaluation of n • Presence of other cardiac implants and grafts    • Rheumatic heart disease, unspecified    • Sleep disturbance    • Stented coronary artery    • Stress    • Tinnitus    • Uncomfortable fullness after meals    • Unspecified essential hypertension    • V ANY*      Comment: Procedure: LUMBAR EPIDURAL;  Surgeon:                Sindi Mendez MD;  Location:  [address] MANAGEMENT  [facility]26[facility]2013: [facility]*      Comment: Procedure: LUMBAR EPIDURAL;  [address] moist.  NECK: Neck is supple and nontender. The trachea is midline. LUNGS: Lungs are clear to auscultation bilaterally, respirations are unlabored. HEART: Regular rate and rhythm. There are no rubs or gallops.    ABDOMEN: The abdomen is soft, nondistend Please view results for these tests on the individual orders. EKG: The EKG revealed rate, intervals, and axis as noted on the EKG report. I have reviewed and agree with these readings. Sinus rhythm 83 bpm.  No acute changes.   Ct Abdomen+pelvis(c right kidney there is mild left renal cortical thinning. ADRENALS:  There is a 1.3 x 0.7 cm stable right adrenal mass. AORTA/VASCULAR:  Atherosclerotic calcified plaque seen throughout the aorta and aortic branches. No evidence for aortic aneurysm.  RETROP by: Saul Espino MD on 4/09/2018 at 19:27     Approved by: Saul Espino MD            Xr Chest Ap Portable  (cpt=71045)    Result Date: 4/9/2018  PROCEDURE:  XR CHEST AP PORTABLE  (CPT=71045)  TECHNIQUE:  AP chest radiograph was obtained.   COMP Clinical Impression:  Abdominal pain, epigastric  (primary encounter diagnosis)  Non-intractable vomiting with nausea, unspecified vomiting type  Hypokalemia  Abnormal CT of the abdomen    Disposition:  Admit  4/9/2018  9:05 pm    Follow-up:  No follow

## 2018-04-09 NOTE — ED INITIAL ASSESSMENT (HPI)
Left upper abdominal - pain x 1 month  Pain localized , denies blood in urine or stool. Denies fever  Vomiting- clear mucus after eating x 2 weeks. Took claritin , benadryl, aspirin.   Pt was on 2 antibiotics ,which was completed 2 days ago for sinus infec

## 2018-04-09 NOTE — ED INITIAL ASSESSMENT (HPI)
Pt here for nausea and vomiting. Pt vomited after lunch and went to immediate care for treatment. Pt potassium is 2.6 pt was transported to ED for further evaluation.

## 2018-04-09 NOTE — ED NOTES
Waiting for ambulance to arrive. Ptls family asked if she could go be car.  Dr. Janann Skiff aware and  Spoke to family

## 2018-04-10 ENCOUNTER — ANESTHESIA EVENT (OUTPATIENT)
Dept: ENDOSCOPY | Facility: HOSPITAL | Age: 78
End: 2018-04-10
Payer: MEDICARE

## 2018-04-10 PROCEDURE — 99225 SUBSEQUENT OBSERVATION CARE: CPT | Performed by: HOSPITALIST

## 2018-04-10 RX ORDER — MAGNESIUM OXIDE 400 MG (241.3 MG MAGNESIUM) TABLET
800 TABLET ONCE
Status: COMPLETED | OUTPATIENT
Start: 2018-04-10 | End: 2018-04-10

## 2018-04-10 RX ORDER — ACETAMINOPHEN 500 MG
1000 TABLET ORAL EVERY 6 HOURS PRN
Status: DISCONTINUED | OUTPATIENT
Start: 2018-04-10 | End: 2018-04-11

## 2018-04-10 NOTE — ED NOTES
RE-EVAL PER MD WITH DETAILED DISCUSSION ON BOTH LABS/RADIOGRAPHICS. TO BE ADMITTED. QUESTIONS ANSWERED PER ZAK.MD.  IN AGREEMENT WITH POC.

## 2018-04-10 NOTE — PLAN OF CARE
GASTROINTESTINAL - ADULT    • Minimal or absence of nausea and vomiting Progressing        PAIN - ADULT    • Verbalizes/displays adequate comfort level or patient's stated pain goal Progressing        Pt is alert oriented x 4.  Pt c/o belly pain x 1 month,

## 2018-04-10 NOTE — PROGRESS NOTES
Pt was to be discharged today, she ate her lunch and is having phlegm and dry heaves at bedside, and nauseated, zofran IV given.  Pt crying and upset, she was wanting to go home but she stated she doesn't think she can go home, just spoke to Dr Felicita Bhakta and alee

## 2018-04-10 NOTE — PROGRESS NOTES
04/10/18 [address] [place] [address] Rd of Sick-Anointing Anointed  (Father Soco Odell provided prayer, Scripture, support and Sacrament of the Sick.    to remain available at pager [phone].)

## 2018-04-10 NOTE — ED NOTES
PCT 5 M/S WITH REPORT GIVEN TO AND ACCEPTED BY [facility] RN. PREPARED FOR TX  PER CART VIA TRANSPORT.

## 2018-04-10 NOTE — H&P
[facility]                                                               History & Physical         Kar Chapman Patient Status:  Emergency    1940 MRN [id number]   Location [address] [facility] Attending Vic Rodríguez MD generalized or localized, unspecified site    • Other and unspecified hyperlipidemia    • Parathyroid adenoma    • PONV (postoperative nausea and vomiting)     only with ether years ago   • Postmenopausal status     Age of onset: 30years old   • Presence PATIENT DOCUMENTED NOT TO HAVE EXPERIENCED ANY*      Comment: Procedure: LUMBAR EPIDURAL;  Surgeon:                Charyl Hatchet, MD;  Location: [place] [address] MANAGEMENT  3/18/2013: PATIENT DOCUMENTED NOT TO HAVE EXPERIENCED ANY* [facility]*    Nausea and vomiting    Home Medications:    (Not in a hospital admission)    Review of Systems:  A comprehensive 14 point review of systems was completed. Pertinent positives and negatives noted in the the HPI.     Physical Exam: pubic symphysis. Dose reduction techniques were used. Dose information is transmitted to the [facility] [employer] of Radiology) NRDR ([address]) which includes the Dose Index Registry.      PATIENT STATED HISTORY: (As transcribed There is no evidence for bowel   obstruction. There is no free air or free fluid seen. No evidence for appendicitis. ABDOMINAL WALL:  No mass or hernia.   There is a nerve stimulator battery pack seen projecting in the subcutaneous soft tissues of the ri pulmonary opacity. Stimulator device projected over the lower thoracic spine.   Postsurgical changes of the cervical spine and degenerative changes of   thoracic spine again demonstrated.     =====  CONCLUSION:  No evidence of active cardiopulmonary diseas

## 2018-04-10 NOTE — PROGRESS NOTES
04/10/18 0118   Provider Notification   Reason for Communication Patient request;Review case   Provider Name Other (comment)  (Singal)   Method of Communication Page   Response Waiting for response   Notification Time 0119   Pt with chronic back/RLE emmanuel

## 2018-04-10 NOTE — CM/SW NOTE
04/10/18 1400   CM/SW Screening   Referral Source    Information Source Chart review;Nursing rounds   Patient's Mental Status Alert;Oriented;Memory Impairments   Patient's [address]   Patient lives with Spouse   Patient Status P

## 2018-04-10 NOTE — PROGRESS NOTES
NURSING ADMISSION NOTE      Patient admitted via Cart  Oriented to room. Safety precautions initiated. Bed in low position. Call light in reach. Received pt from ED. VSS upon arrival. Admission database completed. Admission orders received.

## 2018-04-10 NOTE — CONSULTS
Gastroenterology Initial Consultation  I have personally seen and examined the patient.     Patient Name: Kar Chapman  Referring physician: Dr. Timmy Joseph  Reason for consultation: Abdominal pain, vomiting  CC: Abdominal pain, vomiting  HPI: This is a 77 yo • Back problem    • Calculus of kidney    • Cataract    • Chronic back pain    • Constipation    • Dairy allergy    • Depression    • Fatigue    • Fibromyalgia    • Food intolerance    • Hearing impairment     bilateral hearing aids   • Heart murmur    • LUMBAR EPIDURAL;  Surgeon:                May Kamara MD;  Location: [place] [address] MANAGEMENT  3/18/2013: INJECTION, W/WO CONTRAST, DX/THERAPEUTIC SUBST*      Comment: Procedure: LUMBAR EPIDURAL;  Surgeon:                Hiram Weller 0.9%  NaCl infusion  Intravenous Once   [COMPLETED] ondansetron HCl (ZOFRAN) injection 4 mg 4 mg Intravenous Once   [COMPLETED] ondansetron HCl (ZOFRAN) injection 4 mg 4 mg Intravenous Once   [COMPLETED] sodium chloride 0.9% IV bolus 1,000 mL 1,000 mL Intr and vomiting  SocHx: (+) former smoker; The patient drinks alcohol on social occasions; The patient has no history of IV drug use or other illicit substances.   FamHx: The patient has no family history of colon cancer or other gastrointestinal malignancies No hepatosplenomegaly; no rebound or guarding;  No ascites is clinically apparent; no tympany to percussion  Ext: No peripheral edema or cyanosis  Skin: Warm and dry  Psychiatric: Appropriate mood and congruent affect without obvious depression or anxiety been vomiting since yesterday and feels nauseous.        FINDINGS:    LIVER:  Unremarkable noncontrast appearance of the liver. BILIARY:  The gallbladder is contracted.  No obvious gallstones.  No obvious gallstones seen in the common bile duct.   PANCREA extending into the lower thoracic spinal canal.  There is a stable intramuscular lipomatous   lesion in the lateral mid abdominal wall that measures 3.5 x 1.7 cm.  No change from previous.   URINARY BLADDER:  No visible focal wall thickening, lesion, or emily within the next 1-2 weeks. If unable to tolerate, would switch to clear liquid diet, and prep for colonoscopy tomorrow.

## 2018-04-10 NOTE — PROGRESS NOTES
[facility]  Progress Note     Keerthi Jones Patient Status:  Observation    1940 MRN [id number]   [facility] 5NW-A Attending Essence Stuart MD   Hosp Day # 0 PCP Radha Garcia DO     Chief Complaint: N, V, abdominal pain data reviewed in Epic.     Medications:   • AmLODIPine Besylate  10 mg Oral Daily   • Azelastine HCl  1-2 spray Nasal BID   • Cinacalcet HCl  30 mg Oral BID with meals   • FluvoxaMINE Maleate  150 mg Oral Nightly   • Ipratropium Bromide  2 spray Nasal QID

## 2018-04-10 NOTE — ANESTHESIA PREPROCEDURE EVALUATION
PRE-OP EVALUATION    Patient Name: Jackelyn Mann    Pre-op Diagnosis: INPT    Procedure(s):  ESOPHAGOGASTRODUODENOSCOPY     Surgeon(s) and Role:     * Efe Lloyd MD - Primary    Pre-op vitals reviewed.   Temp: 98 °F (36.7 °C)  Pulse: 63  Resp: 18  B Enoxaparin Sodium (LOVENOX) 30 MG/0.3ML injection 30 mg 30 mg Subcutaneous Daily   ondansetron HCl (ZOFRAN) injection 4 mg 4 mg Intravenous Q6H PRN   Metoclopramide HCl (REGLAN) injection 10 mg 10 mg Intravenous Q8H PRN   [COMPLETED] 0.9%  NaCl infusion mouth 4 (four) times daily as needed. Daily Disp:  Rfl: 2   lidocaine 5 % External Patch Place 2 patches onto the skin daily. Disp: 60 patch Rfl: 3   Fexofenadine HCl (ALLEGRA ALLERGY) 180 MG Oral Tab Take 180 mg by mouth daily as needed.    Disp:  Rfl: Non-intractable vomiting with nausea, unspecified vomiting type     Abnormal CT of the abdomen          Past Surgical History:  2006: ANGIOPLASTY (CORONARY)      Comment: stent  No date: BACK SURGERY  No date: CATH PERCUTANEOUS  TRANSLUMINAL CORONARY ANGIO ORDER FOR IV ANT*      Comment: Procedure: LUMBAR EPIDURAL;  Surgeon:                Sindi Mendez MD;  Location: [place] [address] MANAGEMENT  3/18/2013: [facility] FOR [facility]T*      Comment: Procedure: LUMBAR E

## 2018-04-10 NOTE — ED NOTES
Final result received today:  CBC with diff  Medication:   Pending test:  Sent for Review to: Dr. Briana Poole  Notes: pt transferred to [facility] ER

## 2018-04-10 NOTE — PROGRESS NOTES
04/10/18 0100   Provider Notification   Reason for Communication New consult   Provider Name Other (comment)  Orpah Jayden)   Method of Communication Page   Response Waiting for response; Other (Comment)  (answering service will page [phone])   Notification

## 2018-04-10 NOTE — PROGRESS NOTES
[facility] Pharmacy Note: Renal dose adjustment for Enoxaparin (Lovenox)  Keerthi Jones has been prescribed Enoxaparin (Lovenox)  40 mg subcutaneously every 24 hours. Estimated Creatinine Clearance: 23.4 mL/min (A) (based on SCr of 1.64 mg/dL (H)).     Her ca

## 2018-04-11 ENCOUNTER — SURGERY (OUTPATIENT)
Age: 78
End: 2018-04-11

## 2018-04-11 ENCOUNTER — ANESTHESIA (OUTPATIENT)
Dept: ENDOSCOPY | Facility: HOSPITAL | Age: 78
End: 2018-04-11
Payer: MEDICARE

## 2018-04-11 VITALS
BODY MASS INDEX: 22.3 KG/M2 | OXYGEN SATURATION: 100 % | RESPIRATION RATE: 20 BRPM | HEIGHT: 62 IN | TEMPERATURE: 98 F | HEART RATE: 96 BPM | SYSTOLIC BLOOD PRESSURE: 157 MMHG | DIASTOLIC BLOOD PRESSURE: 88 MMHG | WEIGHT: 121.19 LBS

## 2018-04-11 PROCEDURE — 99217 OBSERVATION CARE DISCHARGE: CPT | Performed by: HOSPITALIST

## 2018-04-11 PROCEDURE — 0DB68ZX EXCISION OF STOMACH, VIA NATURAL OR ARTIFICIAL OPENING ENDOSCOPIC, DIAGNOSTIC: ICD-10-PCS | Performed by: INTERNAL MEDICINE

## 2018-04-11 PROCEDURE — 0DB78ZX EXCISION OF STOMACH, PYLORUS, VIA NATURAL OR ARTIFICIAL OPENING ENDOSCOPIC, DIAGNOSTIC: ICD-10-PCS | Performed by: INTERNAL MEDICINE

## 2018-04-11 PROCEDURE — 0DB98ZX EXCISION OF DUODENUM, VIA NATURAL OR ARTIFICIAL OPENING ENDOSCOPIC, DIAGNOSTIC: ICD-10-PCS | Performed by: INTERNAL MEDICINE

## 2018-04-11 RX ORDER — METOCLOPRAMIDE HYDROCHLORIDE 5 MG/ML
5 INJECTION INTRAMUSCULAR; INTRAVENOUS EVERY 8 HOURS PRN
Status: DISCONTINUED | OUTPATIENT
Start: 2018-04-11 | End: 2018-04-11

## 2018-04-11 RX ORDER — PANTOPRAZOLE SODIUM 40 MG/1
40 TABLET, DELAYED RELEASE ORAL
Qty: 60 TABLET | Refills: 0 | Status: SHIPPED | OUTPATIENT
Start: 2018-04-11 | End: 2018-12-10

## 2018-04-11 RX ORDER — PANTOPRAZOLE SODIUM 40 MG/1
40 TABLET, DELAYED RELEASE ORAL
Status: DISCONTINUED | OUTPATIENT
Start: 2018-04-11 | End: 2018-04-11

## 2018-04-11 RX ORDER — ENOXAPARIN SODIUM 100 MG/ML
40 INJECTION SUBCUTANEOUS DAILY
Status: DISCONTINUED | OUTPATIENT
Start: 2018-04-11 | End: 2018-04-11

## 2018-04-11 RX ORDER — ONDANSETRON 4 MG/1
4 TABLET, FILM COATED ORAL 3 TIMES DAILY PRN
Qty: 30 TABLET | Refills: 0 | Status: SHIPPED | OUTPATIENT
Start: 2018-04-11 | End: 2018-08-02

## 2018-04-11 RX ORDER — POTASSIUM CHLORIDE 20 MEQ/1
40 TABLET, EXTENDED RELEASE ORAL EVERY 4 HOURS
Status: DISCONTINUED | OUTPATIENT
Start: 2018-04-11 | End: 2018-04-11

## 2018-04-11 NOTE — PROGRESS NOTES
Pharmacy Note: Renal dose adjustment for Metaclopramide (Reglan)  Dania Mercer has been prescribed Metoclopramide (Reglan) 10 mg every 8 hours as needed. Estimated Creatinine Clearance: 31.8 mL/min (A) (based on SCr of 1.21 mg/dL (H)).     Her calculat

## 2018-04-11 NOTE — PROGRESS NOTES
Pharmacy Note: Renal dose adjustment   Alex Alvarez was originally prescribed Lovenox 40 mg once daily which was renally dose adjusted at the time of the original order per P&T approved renal dosing protocol. Renal function has now improved.     Estimate

## 2018-04-11 NOTE — DISCHARGE SUMMARY
[facility] [place] HOSPITALIST  DISCHARGE SUMMARY     Keerthi Jones Patient Status:  Observation    1940 MRN [id number]   [facility] 5NW-A Attending Mynor Quevedo MD   Hosp Day # 0 PCP Radha Garcia DO     Date of Admission: 2018  Date of Akilah Kaur medications      Instructions Prescription details   ALLEGRA ALLERGY 180 MG Tabs  Generic drug:  Fexofenadine HCl      Take 180 mg by mouth daily as needed.    Refills:  0     AmLODIPine Besylate 10 MG Tabs  Commonly known as:  NORVASC      TAKE 1 TABLET(10 Quantity:  60 tablet  Refills:  0     Pravastatin Sodium 40 MG Tabs  Commonly known as:  PRAVACHOL      TAKE 1 TABLET BY MOUTH EVERY NIGHT AT BEDTIME   Quantity:  30 tablet  Refills:  0     QUEtiapine Fumarate 50 MG Tabs  Commonly known as:  SEROQUEL

## 2018-04-11 NOTE — ANESTHESIA POSTPROCEDURE EVALUATION
[address] Patient Status:  Observation   Age/Gender 78year old female MRN [id number]   Location [address] [facility]. Attending Kanwal Alberto MD   Hosp Day # 0 PCP Siri Pastrana DO       Anesthesia Post-op Note    Procedure(s)

## 2018-04-11 NOTE — OPERATIVE REPORT
EGD operative report  Patient Name: Ila Hernandez  Procedure: Esophagogastroduodenoscopy with biopsy   Indication: Nausea/vomiting  Attending: Ros Waters M.D. Consent:  The risks, benefits, and alternatives were discussed with the patient / POA. antrum reveals mild edema, but no obstruction. The fundus, cardia, and angularis were normal, without masses, polyps, ulcers, erosions, diverticula, or varices. Biopsies were obtained from the antrum, body, and fundus, to evaluate for H.pylori.    Duodenu

## 2018-04-11 NOTE — PROGRESS NOTES
NURSING DISCHARGE NOTE    Discharged Home via Wheelchair. Accompanied by Family member  Belongings Taken by patient/family. VSS. Iv discontinued. Patient able to tolerate full liquids. Around 1430 c/o nausea and anxiety. No emesis or diarrhea.  Iv z

## 2018-04-12 ENCOUNTER — TELEPHONE (OUTPATIENT)
Dept: FAMILY MEDICINE CLINIC | Facility: CLINIC | Age: 78
End: 2018-04-12

## 2018-04-12 ENCOUNTER — PATIENT OUTREACH (OUTPATIENT)
Dept: CASE MANAGEMENT | Age: 78
End: 2018-04-12

## 2018-04-12 DIAGNOSIS — D64.9 LOW HEMOGLOBIN: ICD-10-CM

## 2018-04-12 DIAGNOSIS — J01.00 ACUTE NON-RECURRENT MAXILLARY SINUSITIS: ICD-10-CM

## 2018-04-12 DIAGNOSIS — R79.0 LOW MAGNESIUM LEVEL: ICD-10-CM

## 2018-04-12 DIAGNOSIS — R11.15 INTRACTABLE CYCLICAL VOMITING WITH NAUSEA: ICD-10-CM

## 2018-04-12 DIAGNOSIS — R10.13 ABDOMINAL PAIN, EPIGASTRIC: ICD-10-CM

## 2018-04-12 DIAGNOSIS — K29.80 DUODENITIS DETERMINED BY BIOPSY: ICD-10-CM

## 2018-04-12 RX ORDER — IPRATROPIUM BROMIDE 42 UG/1
SPRAY, METERED NASAL
Refills: 11 | COMMUNITY
Start: 2018-03-09 | End: 2018-06-12

## 2018-04-12 RX ORDER — AZELASTINE HCL 205.5 UG/1
SPRAY NASAL
Qty: 1 EACH | Refills: 5 | Status: SHIPPED | OUTPATIENT
Start: 2018-04-12 | End: 2019-01-21 | Stop reason: ALTCHOICE

## 2018-04-12 NOTE — TELEPHONE ENCOUNTER
lmom for pt with instructions. Refills have been sent. Asked pt after reviewing message to call office with any questions.

## 2018-04-12 NOTE — TELEPHONE ENCOUNTER
Restart Astelin and Ipratropium Bromide 0.06 % Nasal Solution 2 sprays by nasal 4 times per day.   Make appointment with Dr Massimo Adams ENT for second opinion

## 2018-04-12 NOTE — TELEPHONE ENCOUNTER
Pt called and states that she is having an excessive amount of mucus that she cant seem to get out. She states she seems to swallow more than she can cough up and it is making her nauseated.   She states she tried Mucinex OTC weeks ago prior to her [employer]

## 2018-04-17 ENCOUNTER — OFFICE VISIT (OUTPATIENT)
Dept: FAMILY MEDICINE CLINIC | Facility: CLINIC | Age: 78
End: 2018-04-17

## 2018-04-17 VITALS
DIASTOLIC BLOOD PRESSURE: 60 MMHG | HEART RATE: 72 BPM | BODY MASS INDEX: 20.55 KG/M2 | WEIGHT: 116 LBS | HEIGHT: 63 IN | SYSTOLIC BLOOD PRESSURE: 106 MMHG | TEMPERATURE: 98 F

## 2018-04-17 DIAGNOSIS — D64.9 LOW HEMOGLOBIN: ICD-10-CM

## 2018-04-17 DIAGNOSIS — K25.3 ACUTE GASTRIC ULCER WITHOUT HEMORRHAGE OR PERFORATION: ICD-10-CM

## 2018-04-17 DIAGNOSIS — R11.15 INTRACTABLE CYCLICAL VOMITING WITH NAUSEA: ICD-10-CM

## 2018-04-17 DIAGNOSIS — E87.6 HYPOKALEMIA: ICD-10-CM

## 2018-04-17 DIAGNOSIS — K29.80 DUODENITIS DETERMINED BY BIOPSY: ICD-10-CM

## 2018-04-17 DIAGNOSIS — R79.0 LOW MAGNESIUM LEVEL: ICD-10-CM

## 2018-04-17 DIAGNOSIS — R93.5 ABNORMAL CT OF THE ABDOMEN: ICD-10-CM

## 2018-04-17 PROCEDURE — 1111F DSCHRG MED/CURRENT MED MERGE: CPT | Performed by: FAMILY MEDICINE

## 2018-04-17 PROCEDURE — 99495 TRANSJ CARE MGMT MOD F2F 14D: CPT | Performed by: FAMILY MEDICINE

## 2018-04-17 RX ORDER — ONDANSETRON HYDROCHLORIDE 8 MG/1
8 TABLET, FILM COATED ORAL EVERY 8 HOURS PRN
Qty: 30 TABLET | Refills: 0 | Status: SHIPPED | OUTPATIENT
Start: 2018-04-17 | End: 2018-05-21

## 2018-04-17 NOTE — PROGRESS NOTES
HPI:    Jimmie Correa is a 78year old female here today for hospital follow up.    She was discharged from Inpatient hospital, [facility] to Home   Admission Date: 4/9/18   Discharge Date: 4/11/18  Hospital Discharge Diagnoses (since 3/18/2018) hyp clearing of her throat with sinus pain. Over the past 2 weeks abdominal pain was getting worse. She was admitted had a CT scan and GI consultation. Upon GI consultation endoscopy done and findings of several 5 mm to 1 cm ulcers in the gastric body.   The %  10.8    Eosinophils %      %  1.4    Basophils %      %  0.7    Immature Granulocyte %      %  0.2    Glucose      70 - 99 mg/dL  85 115 (H)   BUN      8 - 20 mg/dL  15 22 (H)   CREATININE      0.55 - 1.02 mg/dL  1.21 (H) 1.64 (H)   GFR, Non- A Visit:  Ipratropium Bromide 0.06 % Nasal Solution U 2 SPRAYS IEN QID   Azelastine HCl 0.15 % Nasal Solution U 2 SPRAYS IEN ONCE TO BID   Pantoprazole Sodium 40 MG Oral Tab EC Take 1 tablet (40 mg total) by mouth 2 (two) times daily before meals.    Odessia Moritz Arthritis; Atherosclerosis of coronary artery; Autoimmune disease ([facility] [address] Back pain; Back problem; Calculus of kidney; Cataract; Chronic back pain; Constipation; Dairy allergy; Depression; Fatigue; Fibromyalgia; Food intolerance; Hearing impairment;  Heart m surgical history (2009); other surgical history (2014); tonsillectomy; angioplasty (coronary) (2006); cath percutaneous  transluminal coronary angioplasty; and back surgery.     She family history includes Bipolar Disorder in her sister; Cancer in her siste atraumatic, normocephalic, ears and throat are clear sinus pain  EYES: PERRLA, EOMI, conjunctiva are clear  NECK: supple, no adenopathy, no bruits  CHEST: no chest tenderness  LUNGS: clear to auscultation  CARDIO: RRR without murmur  GI: good BS's, no mass Management Certification:  I certify that the following are true:  Communication with the patient was made within 2 business days of discharge on date above   Medical Decision Making- Based on service period of discharge to 30 days:   · Number of Possible

## 2018-04-18 PROBLEM — K29.80 DUODENITIS DETERMINED BY BIOPSY: Status: ACTIVE | Noted: 2018-04-18

## 2018-04-18 PROBLEM — R11.15 INTRACTABLE CYCLICAL VOMITING WITH NAUSEA: Status: ACTIVE | Noted: 2018-04-18

## 2018-04-18 PROBLEM — D64.9 LOW HEMOGLOBIN: Status: ACTIVE | Noted: 2018-04-18

## 2018-04-18 PROBLEM — R79.0 LOW MAGNESIUM LEVEL: Status: ACTIVE | Noted: 2018-04-18

## 2018-04-23 ENCOUNTER — TELEPHONE (OUTPATIENT)
Dept: FAMILY MEDICINE CLINIC | Facility: CLINIC | Age: 78
End: 2018-04-23

## 2018-04-23 DIAGNOSIS — J32.0 CHRONIC MAXILLARY SINUSITIS: Primary | ICD-10-CM

## 2018-04-23 RX ORDER — LIDOCAINE HCL AND HYDROCORTISONE ACETATE 20; 20 MG/G; MG/G
CREAM RECTAL
Refills: 0 | COMMUNITY
Start: 2018-02-09 | End: 2018-06-12 | Stop reason: ALTCHOICE

## 2018-04-23 NOTE — TELEPHONE ENCOUNTER
Pt called and said she was given antibiotics about 2 weeks ago and she finished them but her symptoms have returned. She wants to know if Marcia Coto would call in more antibiotics.

## 2018-04-23 NOTE — TELEPHONE ENCOUNTER
I referred her to Dr Ladi Ortiz for further management she has been on antibiotics constantly. Also they determined nausea was from ulcers when she was hospitalized. Lincoln Culp

## 2018-04-27 ENCOUNTER — LAB ENCOUNTER (OUTPATIENT)
Dept: LAB | Age: 78
End: 2018-04-27
Attending: FAMILY MEDICINE
Payer: MEDICARE

## 2018-04-27 DIAGNOSIS — E87.6 HYPOKALEMIA: ICD-10-CM

## 2018-04-27 DIAGNOSIS — D64.9 LOW HEMOGLOBIN: ICD-10-CM

## 2018-04-27 DIAGNOSIS — R79.0 LOW MAGNESIUM LEVEL: ICD-10-CM

## 2018-04-27 PROCEDURE — 80048 BASIC METABOLIC PNL TOTAL CA: CPT

## 2018-04-27 PROCEDURE — 82728 ASSAY OF FERRITIN: CPT

## 2018-04-27 PROCEDURE — 83735 ASSAY OF MAGNESIUM: CPT

## 2018-04-27 PROCEDURE — 36415 COLL VENOUS BLD VENIPUNCTURE: CPT

## 2018-04-27 PROCEDURE — 83540 ASSAY OF IRON: CPT

## 2018-04-28 NOTE — PROGRESS NOTES
Iron storage and iron level  normal, magnesium normal.  Kidney function has decreased from last check make appointment with nephrology as soon as possible. If she does not get in next week repeat the kidney function next week.   Please call patient do not

## 2018-04-30 RX ORDER — PRAVASTATIN SODIUM 40 MG
TABLET ORAL
Qty: 30 TABLET | Refills: 0 | Status: SHIPPED | OUTPATIENT
Start: 2018-04-30 | End: 2018-05-28

## 2018-05-02 ENCOUNTER — TELEPHONE (OUTPATIENT)
Dept: FAMILY MEDICINE CLINIC | Facility: CLINIC | Age: 78
End: 2018-05-02

## 2018-05-02 DIAGNOSIS — N28.9 DECREASED RENAL FUNCTION: Primary | ICD-10-CM

## 2018-05-02 NOTE — TELEPHONE ENCOUNTER
The patient called back to inform me that she wasn't able to get in this week, but she did schedule an appointment with Dr. Tanja Valle for 05/25/2018. The patient was directed to return to the lab for a BMP this Friday.

## 2018-05-02 NOTE — TELEPHONE ENCOUNTER
A message was left on the patient's cell voicemail asking her to call back to go over her test results. Another attempt was made to reach the patient on her home number. The results were discussed with her , who is on her HIPAA.   He was inform

## 2018-05-02 NOTE — TELEPHONE ENCOUNTER
----- Message from Marco Rankin PA-C sent at 4/27/2018  9:01 PM CDT -----  Iron storage and iron level  normal, magnesium normal.  Kidney function has decreased from last check make appointment with nephrology as soon as possible.   If she does not get

## 2018-05-07 RX ORDER — AMLODIPINE BESYLATE 10 MG/1
TABLET ORAL
Qty: 90 TABLET | Refills: 0 | OUTPATIENT
Start: 2018-05-07

## 2018-05-10 ENCOUNTER — TELEPHONE (OUTPATIENT)
Dept: FAMILY MEDICINE CLINIC | Facility: CLINIC | Age: 78
End: 2018-05-10

## 2018-05-10 NOTE — TELEPHONE ENCOUNTER
Pt called requesting an antibiotic for a sore throat, she also states she is swallowing a lot of mucus. Would like the antibiotic sent to her [facility] on [address] in [place].

## 2018-05-10 NOTE — TELEPHONE ENCOUNTER
lmom for pt that she will need to be seen or go to a walk in clinic because the providers do not prescribe antibiotics over the phone without being seen. Asked pt after reviewing message to call office with any questions.

## 2018-05-16 DIAGNOSIS — J32.0 CHRONIC MAXILLARY SINUSITIS: ICD-10-CM

## 2018-05-16 RX ORDER — AZITHROMYCIN 250 MG/1
TABLET, FILM COATED ORAL
Qty: 6 TABLET | Refills: 0 | OUTPATIENT
Start: 2018-05-16

## 2018-05-18 DIAGNOSIS — J32.0 CHRONIC MAXILLARY SINUSITIS: ICD-10-CM

## 2018-05-18 RX ORDER — AZITHROMYCIN 250 MG/1
TABLET, FILM COATED ORAL
Qty: 6 TABLET | Refills: 0 | OUTPATIENT
Start: 2018-05-18

## 2018-05-18 NOTE — TELEPHONE ENCOUNTER
Unable to refill zpak for patient  Future Appointments  Date Time Provider Damaris Linares   5/21/2018 12:30 PM Lit River MD [facility] [place]   5/25/2018 11:30 AM Matteo Cuba MD [facility]

## 2018-05-21 ENCOUNTER — OFFICE VISIT (OUTPATIENT)
Dept: FAMILY MEDICINE CLINIC | Facility: CLINIC | Age: 78
End: 2018-05-21

## 2018-05-21 VITALS
HEART RATE: 58 BPM | OXYGEN SATURATION: 96 % | HEIGHT: 63 IN | DIASTOLIC BLOOD PRESSURE: 58 MMHG | TEMPERATURE: 99 F | WEIGHT: 119 LBS | BODY MASS INDEX: 21.09 KG/M2 | SYSTOLIC BLOOD PRESSURE: 132 MMHG

## 2018-05-21 DIAGNOSIS — J30.2 SEASONAL ALLERGIES: Primary | ICD-10-CM

## 2018-05-21 PROCEDURE — 99214 OFFICE O/P EST MOD 30 MIN: CPT | Performed by: FAMILY MEDICINE

## 2018-05-21 NOTE — PROGRESS NOTES
CC:  Abran Ortiz is a 70year old female here for Patient presents with:  Sinus Problem: Sinus pressure, post nasal drip x 1 week      HPI:     URI  -started 1 wk ago  -associated with sinus pressure, throat pain, post nasal drip  -previous treatment: b tablets (150 mg total) by mouth nightly.  (Patient taking differently: Take 100 mg by mouth nightly.  ) Disp: 45 tablet Rfl: 2   Potassium Chloride ER 20 MEQ Oral Tab CR Take 2 tablets daily Disp: 60 tablet Rfl: 0   nystatin 224350 units Oral Tab Take 1 tab Back problem    • Calculus of kidney    • Cataract    • Chronic back pain    • Constipation    • Dairy allergy    • Depression    • Fatigue    • Fibromyalgia    • Food intolerance    • Hearing impairment     bilateral hearing aids   • Heart murmur    • Hem SUBST*      Comment: Procedure: LUMBAR EPIDURAL;  Surgeon:                Garcia Lmoas MD;  Location: [address] [place] [address] Drive MANAGEMENT  3/18/2013: INJECTION, W/WO CONTRAST, DX/THERAPEUTIC SUBST*      Comment: Procedure: LUMBAR EPIDURAL;  S Never Used                      Alcohol use:  No                    EXAM:   /58 (BP Location: Left arm, Patient Position: Sitting, Cuff Size: adult)   Pulse 58   Temp 98.5 °F (36.9 °C) (Oral)   Ht 63\"   Wt 119 lb   SpO2 96%   BMI 21.08 kg/m²     GENE

## 2018-05-21 NOTE — PATIENT INSTRUCTIONS
--rest, fluids, soups, humidifier, tea with lemon or honey, tylenol/advil as needed for discomfort  -- start flonase nasal spray as daily for 4-6 wks to encourage continued sinus drainage   -- generic claritin, zyrtec or allegra daily for 2-4 wks at [employer]

## 2018-05-23 ENCOUNTER — TELEPHONE (OUTPATIENT)
Dept: FAMILY MEDICINE CLINIC | Facility: CLINIC | Age: 78
End: 2018-05-23

## 2018-05-23 RX ORDER — AZITHROMYCIN 250 MG/1
TABLET, FILM COATED ORAL
Qty: 6 TABLET | Refills: 0 | Status: SHIPPED | OUTPATIENT
Start: 2018-05-23 | End: 2018-06-12 | Stop reason: ALTCHOICE

## 2018-05-23 NOTE — TELEPHONE ENCOUNTER
Pt was seen for a sinus infection on 5/21 was not prescribed anything advised to call if things did not get better, pt states the sinus infection has gotten worse and would like something called in to her [address] on [facility]

## 2018-05-25 ENCOUNTER — OFFICE VISIT (OUTPATIENT)
Dept: NEPHROLOGY | Facility: CLINIC | Age: 78
End: 2018-05-25

## 2018-05-25 VITALS — DIASTOLIC BLOOD PRESSURE: 56 MMHG | SYSTOLIC BLOOD PRESSURE: 90 MMHG | WEIGHT: 118 LBS | BODY MASS INDEX: 21 KG/M2

## 2018-05-25 DIAGNOSIS — N18.30 CKD (CHRONIC KIDNEY DISEASE) STAGE 3, GFR 30-59 ML/MIN (HCC): ICD-10-CM

## 2018-05-25 DIAGNOSIS — N17.9 AKI (ACUTE KIDNEY INJURY) (HCC): Primary | ICD-10-CM

## 2018-05-25 PROCEDURE — 99214 OFFICE O/P EST MOD 30 MIN: CPT | Performed by: INTERNAL MEDICINE

## 2018-05-26 NOTE — PROGRESS NOTES
Nephrology Progress Note      ASSESSMENT/PLAN:        1) CKD 3- fluctuating serum creatinine 1.9 mg/dl on admit to [facility] last month for peptic ulcers which dropped rapidly to 1.2 mg/dl within 24 hrs with IVF; now back up to 2.0 mg/dl in large par discontinued this about a month ago. There is no family history of kidney disease.     ROS:    Denies fever/chills  Denies wt loss/gain  Denies HA or visual changes  Denies CP or palpitations  Denies SOB/cough/hemoptysis  Denies abd or flank pain  Denies N CORONARY ANGIO*  1963:  DELIVERY ONLY      Comment: NBonifacioYBonifacio  2013: [place] Deep NDL/CATH SPI DX/THER WVX      Comment: Procedure: LUMBAR EPIDURAL;  Surgeon:                Paul Arzola MD;  Location: [address] [place] [address] Procedure: LUMBAR EPIDURAL;  Surgeon:                Charyl Hatchet, MD;  Location: [address] MANAGEMENT  2001: SPINAL FUSION      Comment: Neck Fusion; [facility], [facility], [address]: SPLENECTOMY      Comment: Lucretia Faria total) by mouth nightly.  (Patient taking differently: Take 100 mg by mouth nightly.  ) Disp: 45 tablet Rfl: 2   Potassium Chloride ER 20 MEQ Oral Tab CR Take 2 tablets daily Disp: 60 tablet Rfl: 0   nystatin 008008 units Oral Tab Take 1 tablet (500,000 Uni Yes         Family History:  Family History   Problem Relation Age of Onset   • Hypertension Father    • Heart Attack Father    • Depression Mother    • Hypertension Daughter    • Cancer Sister      lung with mets to the lymph nodes   • Lipids Sister

## 2018-05-28 DIAGNOSIS — J01.00 ACUTE NON-RECURRENT MAXILLARY SINUSITIS: ICD-10-CM

## 2018-05-29 RX ORDER — PRAVASTATIN SODIUM 40 MG
TABLET ORAL
Qty: 30 TABLET | Refills: 0 | Status: SHIPPED | OUTPATIENT
Start: 2018-05-29 | End: 2018-07-02

## 2018-05-29 RX ORDER — MONTELUKAST SODIUM 10 MG/1
TABLET ORAL
Qty: 30 TABLET | Refills: 2 | Status: SHIPPED | OUTPATIENT
Start: 2018-05-29 | End: 2018-08-21

## 2018-05-29 NOTE — TELEPHONE ENCOUNTER
singulair approved qty 30 + 2 refills  Pravastatin approved qty 30 NR  Copy of lipid panel order mailed to patient

## 2018-06-12 ENCOUNTER — OFFICE VISIT (OUTPATIENT)
Dept: FAMILY MEDICINE CLINIC | Facility: CLINIC | Age: 78
End: 2018-06-12

## 2018-06-12 ENCOUNTER — LABORATORY ENCOUNTER (OUTPATIENT)
Dept: LAB | Age: 78
End: 2018-06-12
Attending: FAMILY MEDICINE
Payer: MEDICARE

## 2018-06-12 VITALS
BODY MASS INDEX: 21.26 KG/M2 | HEIGHT: 63 IN | DIASTOLIC BLOOD PRESSURE: 60 MMHG | HEART RATE: 60 BPM | WEIGHT: 120 LBS | SYSTOLIC BLOOD PRESSURE: 100 MMHG

## 2018-06-12 DIAGNOSIS — R10.12 LUQ ABDOMINAL PAIN: ICD-10-CM

## 2018-06-12 DIAGNOSIS — N18.30 CKD (CHRONIC KIDNEY DISEASE) STAGE 3, GFR 30-59 ML/MIN (HCC): ICD-10-CM

## 2018-06-12 DIAGNOSIS — K27.9 PEPTIC ULCER: ICD-10-CM

## 2018-06-12 DIAGNOSIS — N28.9 DECREASED RENAL FUNCTION: ICD-10-CM

## 2018-06-12 DIAGNOSIS — D64.9 LOW HEMOGLOBIN: ICD-10-CM

## 2018-06-12 DIAGNOSIS — D50.9 MICROCYTIC ANEMIA: ICD-10-CM

## 2018-06-12 DIAGNOSIS — R10.13 EPIGASTRIC PAIN: Primary | ICD-10-CM

## 2018-06-12 DIAGNOSIS — N17.9 AKI (ACUTE KIDNEY INJURY) (HCC): ICD-10-CM

## 2018-06-12 PROCEDURE — 99214 OFFICE O/P EST MOD 30 MIN: CPT | Performed by: FAMILY MEDICINE

## 2018-06-12 PROCEDURE — 83690 ASSAY OF LIPASE: CPT

## 2018-06-12 PROCEDURE — 85025 COMPLETE CBC W/AUTO DIFF WBC: CPT

## 2018-06-12 PROCEDURE — 36415 COLL VENOUS BLD VENIPUNCTURE: CPT

## 2018-06-12 PROCEDURE — 80048 BASIC METABOLIC PNL TOTAL CA: CPT

## 2018-06-12 RX ORDER — ACETAMINOPHEN AND CODEINE PHOSPHATE 300; 30 MG/1; MG/1
1 TABLET ORAL EVERY 4 HOURS PRN
Qty: 20 TABLET | Refills: 0 | Status: SHIPPED | OUTPATIENT
Start: 2018-06-12 | End: 2018-08-02 | Stop reason: ALTCHOICE

## 2018-06-12 NOTE — PATIENT INSTRUCTIONS
SCHEDULING [facility] LAB APPOINTMENTS ONLINE    Lab appointments can now be scheduled online at [email or web address]    · Go to [email or web address]  · In Search type Lab  · Click \"Lab services\"  · Click \"Schedule Your Test Online\"  · Follow the prompts  · If you heartburn   nausea   pain that may get better when you eat or take antacids   pain that may be worse just before meals or that may get worse a couple of hours after meals   pain that awakens you during the night.    If an ulcer is bleeding, you may have: while. Follow your healthcare provider's instructions carefully, and report any problems promptly. You will probably take the antibiotics for 1 to 2 weeks. You may take medicine to decrease acid for at least 6 weeks.  Sometimes medicine needs to be taken Change your lifestyle in ways that might help prevent ulcers. Ask your healthcare provider if you need to take medicine to prevent new ulcers.

## 2018-06-12 NOTE — PROGRESS NOTES
Senait Vazquez is a 70year old female. HPI:   Patient presents with:  Pain: Rm. 9  Patient is in for evaluation of left upper quadrant abdominal pain that started 2 weeks ago.   Is not using Protonix uses over-the-counter omeprazole usually once a day rabia ClonazePAM 0.5 MG Oral Tab Take 1 tablet (0.5 mg total) by mouth 2 (two) times daily as needed for Anxiety.  Disp: 60 tablet Rfl: 2   losartan 100 MG Oral Tab TAKE 1 TABLET(100 MG) BY MOUTH DAILY Disp: 90 tablet Rfl: 1   AmLODIPine Besylate 10 MG Oral Tab pressure    • High cholesterol    • History of blood transfusion    • History of cardiac murmur    • History of eating disorder    • History of rheumatic fever    • IBS (irritable bowel syndrome)     constipation   • Irregular bowel habits    • Kidney ston rales, rhonchi or wheezes  CARDIO: RRR without murmur  GI: Normal bowel sounds ×4 quadrant, no hepatosplenomegaly or masses no CVA tenderness tenderness in the midepigastric area without guarding, rigidity or rebound.    EXTREMITIES: no cyanosis, clubbing o Dr. Waqas Malloy for calcium level also. The patient indicates understanding of these issues and agrees to the plan. The patient is asked to return in as needed for worsening symptoms including change in bowel movements or any vomiting. Kristi Zheng

## 2018-06-13 ENCOUNTER — TELEPHONE (OUTPATIENT)
Dept: FAMILY MEDICINE CLINIC | Facility: CLINIC | Age: 78
End: 2018-06-13

## 2018-06-13 NOTE — PROGRESS NOTES
Forward BMP to Dr. Cabrera Medeiros  and Dr. Jass Gabriel. Improved GFR and decreased creatinine. Calcium elevated again Dr. Cabrera Medeiros managing  Lipase normal  CBC hemoglobin improving follow-up with GI as discussed.   Forward labs sent to my chart

## 2018-06-13 NOTE — TELEPHONE ENCOUNTER
Per Lincoln Carter PA-C, the patient's BMP was successfully faxed to Dr. Dago Chapman and Dr. Carrillo Waldron.

## 2018-06-13 NOTE — TELEPHONE ENCOUNTER
----- Message from Jessy Call PA-C sent at 6/12/2018  7:08 PM CDT -----  Forward BMP to Dr. Marbella Gilmore  and Dr. Mateusz Newton. Improved GFR and decreased creatinine.   Calcium elevated again Dr. Marbella Gilmore managing  Lipase normal  CBC hemoglobin impr

## 2018-06-14 ENCOUNTER — TELEPHONE (OUTPATIENT)
Dept: NEPHROLOGY | Facility: CLINIC | Age: 78
End: 2018-06-14

## 2018-06-15 NOTE — TELEPHONE ENCOUNTER
Left VM for pt- repeat Cr 1.22 mg/dl with hydration- back to baseline- no need for further w/u- thx fang

## 2018-06-22 ENCOUNTER — TELEPHONE (OUTPATIENT)
Dept: NEPHROLOGY | Facility: CLINIC | Age: 78
End: 2018-06-22

## 2018-06-25 ENCOUNTER — OFFICE VISIT (OUTPATIENT)
Dept: FAMILY MEDICINE CLINIC | Facility: CLINIC | Age: 78
End: 2018-06-25

## 2018-06-25 VITALS
TEMPERATURE: 99 F | SYSTOLIC BLOOD PRESSURE: 100 MMHG | DIASTOLIC BLOOD PRESSURE: 60 MMHG | HEIGHT: 62.28 IN | HEART RATE: 68 BPM | BODY MASS INDEX: 21.8 KG/M2 | WEIGHT: 120 LBS

## 2018-06-25 DIAGNOSIS — E21.0 PRIMARY HYPERPARATHYROIDISM (HCC): ICD-10-CM

## 2018-06-25 DIAGNOSIS — E83.52 HYPERCALCEMIA: ICD-10-CM

## 2018-06-25 DIAGNOSIS — J01.01 ACUTE RECURRENT MAXILLARY SINUSITIS: ICD-10-CM

## 2018-06-25 DIAGNOSIS — J32.9 RECURRENT SINUS INFECTIONS: Primary | ICD-10-CM

## 2018-06-25 PROBLEM — E87.2 METABOLIC ACIDOSIS: Status: RESOLVED | Noted: 2018-04-09 | Resolved: 2018-06-25

## 2018-06-25 PROBLEM — R93.5 ABNORMAL CT OF THE ABDOMEN: Status: RESOLVED | Noted: 2018-04-09 | Resolved: 2018-06-25

## 2018-06-25 PROBLEM — R11.2 NON-INTRACTABLE VOMITING WITH NAUSEA, UNSPECIFIED VOMITING TYPE: Status: RESOLVED | Noted: 2018-04-09 | Resolved: 2018-06-25

## 2018-06-25 PROBLEM — R19.8 GAGGING EPISODE: Status: RESOLVED | Noted: 2018-03-10 | Resolved: 2018-06-25

## 2018-06-25 PROBLEM — R11.15 INTRACTABLE CYCLICAL VOMITING WITH NAUSEA: Status: RESOLVED | Noted: 2018-04-18 | Resolved: 2018-06-25

## 2018-06-25 PROBLEM — E87.20 METABOLIC ACIDOSIS: Status: RESOLVED | Noted: 2018-04-09 | Resolved: 2018-06-25

## 2018-06-25 PROBLEM — R10.13 ABDOMINAL PAIN, EPIGASTRIC: Status: RESOLVED | Noted: 2018-04-09 | Resolved: 2018-06-25

## 2018-06-25 PROBLEM — N17.9 ACUTE KIDNEY INJURY: Status: RESOLVED | Noted: 2018-04-09 | Resolved: 2018-06-25

## 2018-06-25 PROBLEM — N17.9 ACUTE KIDNEY INJURY (HCC): Status: RESOLVED | Noted: 2018-04-09 | Resolved: 2018-06-25

## 2018-06-25 PROBLEM — R11.15 NON-INTRACTABLE CYCLICAL VOMITING WITH NAUSEA: Status: RESOLVED | Noted: 2018-01-03 | Resolved: 2018-06-25

## 2018-06-25 PROCEDURE — 99214 OFFICE O/P EST MOD 30 MIN: CPT | Performed by: FAMILY MEDICINE

## 2018-06-25 RX ORDER — LEVOFLOXACIN 250 MG/1
250 TABLET ORAL DAILY
Qty: 20 TABLET | Refills: 0 | Status: SHIPPED | OUTPATIENT
Start: 2018-06-25 | End: 2018-08-02 | Stop reason: ALTCHOICE

## 2018-06-25 NOTE — PROGRESS NOTES
HPI:   Marianne Spencer is a 70year old female who presents for sinus pain and pressure for  1  weeks. Patient reports congestion, dry cough, ear pain, sinus pain, prior history of sinusitis, denies fever.   Patient states every time she goes on antibiotics Pantoprazole Sodium 40 MG Oral Tab EC Take 1 tablet (40 mg total) by mouth 2 (two) times daily before meals. Disp: 60 tablet Rfl: 0   Ondansetron HCl (ZOFRAN) 4 mg tablet Take 1 tablet (4 mg total) by mouth 3 (three) times daily as needed for Nausea.  Dis problem    • Calculus of kidney    • Cataract    • Chronic back pain    • Constipation    • Dairy allergy    • Depression    • Fatigue    • Fibromyalgia    • Food intolerance    • Hearing impairment     bilateral hearing aids   • Heart murmur    • Hemorrho SUBST*      Comment: Procedure: LUMBAR EPIDURAL;  Surgeon:                Paul Arzola MD;  Location: [place] [address] MANAGEMENT  3/18/2013: INJECTION, W/WO CONTRAST, DX/THERAPEUTIC SUBST*      Comment: Procedure: LUMBAR EPIDURAL;  S Sister    • Hypertension Sister    • Lipids Brother    • Heart Disorder Brother      rheumatic heart disease   • Cancer Sister      liver cancer   • Bipolar Disorder Sister       Smoking status: Former Smoker Oral Tab 20 tablet 0      Sig: Take 1 tablet (250 mg total) by mouth daily. Imaging & Consults:  ENDOCRINOLOGY - INTERNAL  1. Recurrent sinus infections   Acute recurrent maxillary sinusitis  - levofloxacin (LEVAQUIN) 250 MG Oral Tab;  Take 1 tabl

## 2018-06-27 RX ORDER — PRAVASTATIN SODIUM 40 MG
TABLET ORAL
Qty: 30 TABLET | Refills: 0 | OUTPATIENT
Start: 2018-06-27

## 2018-07-02 RX ORDER — PRAVASTATIN SODIUM 40 MG
TABLET ORAL
Qty: 30 TABLET | Refills: 0 | Status: SHIPPED | OUTPATIENT
Start: 2018-07-02 | End: 2018-07-29

## 2018-07-05 ENCOUNTER — LAB ENCOUNTER (OUTPATIENT)
Dept: LAB | Age: 78
End: 2018-07-05
Attending: FAMILY MEDICINE
Payer: MEDICARE

## 2018-07-05 DIAGNOSIS — I25.118 CORONARY ARTERY DISEASE INVOLVING NATIVE CORONARY ARTERY OF NATIVE HEART WITH OTHER FORM OF ANGINA PECTORIS (HCC): ICD-10-CM

## 2018-07-05 DIAGNOSIS — I10 ESSENTIAL HYPERTENSION: ICD-10-CM

## 2018-07-05 DIAGNOSIS — M81.8 OTHER OSTEOPOROSIS WITHOUT CURRENT PATHOLOGICAL FRACTURE: ICD-10-CM

## 2018-07-05 DIAGNOSIS — E78.2 MIXED HYPERLIPIDEMIA: ICD-10-CM

## 2018-07-05 DIAGNOSIS — E21.0 PRIMARY HYPERPARATHYROIDISM (HCC): ICD-10-CM

## 2018-07-05 DIAGNOSIS — E83.52 HYPERCALCEMIA: ICD-10-CM

## 2018-07-05 LAB
ALBUMIN SERPL-MCNC: 4 G/DL (ref 3.5–4.8)
ALP LIVER SERPL-CCNC: 52 U/L (ref 55–142)
ALT SERPL-CCNC: 20 U/L (ref 14–54)
AST SERPL-CCNC: 15 U/L (ref 15–41)
BILIRUB SERPL-MCNC: 0.4 MG/DL (ref 0.1–2)
BUN BLD-MCNC: 22 MG/DL (ref 8–20)
CALCIUM BLD-MCNC: 9.7 MG/DL (ref 8.3–10.3)
CHLORIDE: 110 MMOL/L (ref 101–111)
CHOLEST SMN-MCNC: 145 MG/DL (ref ?–200)
CO2: 27 MMOL/L (ref 22–32)
CREAT BLD-MCNC: 1.31 MG/DL (ref 0.55–1.02)
GLUCOSE BLD-MCNC: 84 MG/DL (ref 70–99)
HDLC SERPL-MCNC: 51 MG/DL (ref 45–?)
HDLC SERPL: 2.84 {RATIO} (ref ?–4.44)
LDLC SERPL CALC-MCNC: 69 MG/DL (ref ?–130)
M PROTEIN MFR SERPL ELPH: 7.7 G/DL (ref 6.1–8.3)
NONHDLC SERPL-MCNC: 94 MG/DL (ref ?–130)
POTASSIUM SERPL-SCNC: 4 MMOL/L (ref 3.6–5.1)
PTH-INTACT SERPL-MCNC: 130.1 PG/ML (ref 11.1–79.5)
SODIUM SERPL-SCNC: 143 MMOL/L (ref 136–144)
TRIGL SERPL-MCNC: 124 MG/DL (ref ?–150)
VLDLC SERPL CALC-MCNC: 25 MG/DL (ref 5–40)

## 2018-07-05 PROCEDURE — 80061 LIPID PANEL: CPT

## 2018-07-05 PROCEDURE — 36415 COLL VENOUS BLD VENIPUNCTURE: CPT

## 2018-07-05 PROCEDURE — 82941 ASSAY OF GASTRIN: CPT

## 2018-07-05 PROCEDURE — 83970 ASSAY OF PARATHORMONE: CPT

## 2018-07-05 PROCEDURE — 80053 COMPREHEN METABOLIC PANEL: CPT

## 2018-07-06 LAB — GASTRIN LEVEL: 185 PG/ML

## 2018-07-30 ENCOUNTER — TELEPHONE (OUTPATIENT)
Dept: FAMILY MEDICINE CLINIC | Facility: CLINIC | Age: 78
End: 2018-07-30

## 2018-07-30 RX ORDER — PRAVASTATIN SODIUM 40 MG
TABLET ORAL
Qty: 90 TABLET | Refills: 1 | Status: SHIPPED | OUTPATIENT
Start: 2018-07-30 | End: 2019-01-19

## 2018-07-30 NOTE — TELEPHONE ENCOUNTER
Elle Mclaughlin would like to see Barrow Cassette today her allergies are really bad and she thinks she may have a kidney infection, she is having pain in it.  Please call hui at [phone]

## 2018-08-02 ENCOUNTER — OFFICE VISIT (OUTPATIENT)
Dept: FAMILY MEDICINE CLINIC | Facility: CLINIC | Age: 78
End: 2018-08-02
Payer: MEDICARE

## 2018-08-02 ENCOUNTER — LAB ENCOUNTER (OUTPATIENT)
Dept: LAB | Age: 78
End: 2018-08-02
Attending: FAMILY MEDICINE
Payer: MEDICARE

## 2018-08-02 ENCOUNTER — HOSPITAL ENCOUNTER (OUTPATIENT)
Dept: CT IMAGING | Age: 78
Discharge: HOME | End: 2018-08-02
Attending: FAMILY MEDICINE
Payer: MEDICARE

## 2018-08-02 VITALS
SYSTOLIC BLOOD PRESSURE: 128 MMHG | HEART RATE: 64 BPM | TEMPERATURE: 99 F | HEIGHT: 62.8 IN | DIASTOLIC BLOOD PRESSURE: 64 MMHG | BODY MASS INDEX: 21.44 KG/M2 | WEIGHT: 121 LBS

## 2018-08-02 DIAGNOSIS — Z87.448 HISTORY OF CYSTIC KIDNEY DISEASE: ICD-10-CM

## 2018-08-02 DIAGNOSIS — R10.9 LEFT FLANK PAIN: ICD-10-CM

## 2018-08-02 DIAGNOSIS — N18.30 CHRONIC RENAL INSUFFICIENCY, STAGE 3 (MODERATE) (HCC): ICD-10-CM

## 2018-08-02 DIAGNOSIS — R10.9 LEFT FLANK PAIN: Primary | ICD-10-CM

## 2018-08-02 DIAGNOSIS — Z87.442 HISTORY OF KIDNEY STONES: ICD-10-CM

## 2018-08-02 DIAGNOSIS — R11.0 NAUSEA: ICD-10-CM

## 2018-08-02 DIAGNOSIS — R82.90 ABNORMAL URINALYSIS: ICD-10-CM

## 2018-08-02 LAB
ANION GAP SERPL CALC-SCNC: 10 MMOL/L (ref 0–18)
APPEARANCE: CLEAR
BILIRUBIN: NEGATIVE
BUN BLD-MCNC: 19 MG/DL (ref 8–20)
BUN/CREAT SERPL: 15.3 (ref 10–20)
CALCIUM BLD-MCNC: 9.9 MG/DL (ref 8.3–10.3)
CHLORIDE SERPL-SCNC: 106 MMOL/L (ref 101–111)
CO2 SERPL-SCNC: 25 MMOL/L (ref 22–32)
CREAT BLD-MCNC: 1.24 MG/DL (ref 0.55–1.02)
GLUCOSE (URINE DIPSTICK): NEGATIVE MG/DL
GLUCOSE BLD-MCNC: 86 MG/DL (ref 70–99)
KETONES (URINE DIPSTICK): NEGATIVE MG/DL
MULTISTIX LOT#: ABNORMAL NUMERIC
NITRITE, URINE: POSITIVE
OSMOLALITY SERPL CALC.SUM OF ELEC: 294 MOSM/KG (ref 275–295)
PH, URINE: 7 (ref 4.5–8)
POTASSIUM SERPL-SCNC: 3.1 MMOL/L (ref 3.6–5.1)
SODIUM SERPL-SCNC: 141 MMOL/L (ref 136–144)
SPECIFIC GRAVITY: 1.02 (ref 1–1.03)
URINE-COLOR: YELLOW
UROBILINOGEN,SEMI-QN: 0.2 MG/DL (ref 0–1.9)

## 2018-08-02 PROCEDURE — 36415 COLL VENOUS BLD VENIPUNCTURE: CPT

## 2018-08-02 PROCEDURE — 87086 URINE CULTURE/COLONY COUNT: CPT | Performed by: FAMILY MEDICINE

## 2018-08-02 PROCEDURE — 80048 BASIC METABOLIC PNL TOTAL CA: CPT

## 2018-08-02 PROCEDURE — 87088 URINE BACTERIA CULTURE: CPT | Performed by: FAMILY MEDICINE

## 2018-08-02 PROCEDURE — 87186 SC STD MICRODIL/AGAR DIL: CPT | Performed by: FAMILY MEDICINE

## 2018-08-02 PROCEDURE — 74176 CT ABD & PELVIS W/O CONTRAST: CPT | Performed by: FAMILY MEDICINE

## 2018-08-02 PROCEDURE — 81003 URINALYSIS AUTO W/O SCOPE: CPT | Performed by: FAMILY MEDICINE

## 2018-08-02 PROCEDURE — 99214 OFFICE O/P EST MOD 30 MIN: CPT | Performed by: FAMILY MEDICINE

## 2018-08-02 RX ORDER — CIPROFLOXACIN 250 MG/1
250 TABLET, FILM COATED ORAL 2 TIMES DAILY
Qty: 14 TABLET | Refills: 0 | Status: SHIPPED | OUTPATIENT
Start: 2018-08-02 | End: 2018-08-09

## 2018-08-02 RX ORDER — ACETAMINOPHEN AND CODEINE PHOSPHATE 300; 30 MG/1; MG/1
1 TABLET ORAL EVERY 4 HOURS PRN
Qty: 20 TABLET | Refills: 0 | Status: SHIPPED | OUTPATIENT
Start: 2018-08-02 | End: 2018-09-05 | Stop reason: ALTCHOICE

## 2018-08-02 RX ORDER — ONDANSETRON 4 MG/1
4 TABLET, FILM COATED ORAL 3 TIMES DAILY PRN
Qty: 30 TABLET | Refills: 2 | Status: SHIPPED | OUTPATIENT
Start: 2018-08-02 | End: 2018-11-29

## 2018-08-02 NOTE — PROGRESS NOTES
CT scan stable sending antibiotic ciprofloxacin to pharmacy since nitrates in the urine.   Patient notified of test results at 2:47pm 8/2/18

## 2018-08-02 NOTE — PROGRESS NOTES
Chloé Dunn is a 70year old female. HPI:   Patient presents with symptoms of left flank painr 7 days. Symptoms include no urinary frequency, urgency, dysuria, or suprapubic pressure.   No fever, chills, bodyaches, c/o nausea, without vomiting or diarrh BOWEL/MESENTERY:  There is mild focal wall thickening wall thickening in the mid transverse colon. No pericolonic fat infiltration identified. Recommend direct visualization to exclude possibility of colon carcinoma.   There is no evidence for bowel   o needed for Pain. Disp: 20 tablet Rfl: 0   Ciprofloxacin HCl 250 MG Oral Tab Take 1 tablet (250 mg total) by mouth 2 (two) times daily.  Disp: 14 tablet Rfl: 0   PRAVASTATIN SODIUM 40 MG Oral Tab TAKE 1 TABLET BY MOUTH EVERY NIGHT AT BEDTIME Disp: 90 tablet differently: Place 2 patches onto the skin Q24H PRN.  ) Disp: 60 patch Rfl: 3   B Complex Vitamins (VITAMIN B COMPLEX) Oral Tab Take 1 tablet by mouth daily.  Disp:  Rfl:       Past Medical History:   Diagnosis Date   • Anxiety state, unspecified    • Arthr rashes  RESPIRATORY: no shortness of breath with exertion  CARDIOVASCULAR: denies chest pain on exertion  GI: no nausea, vomiting or diarrhea. Denies abdominal pain or CVA tenderness.   : see HPI  NEURO: denies headaches    EXAM:   /64 (BP Location URINALYSIS, AUTO, W/O SCOPE  - Ciprofloxacin HCl 250 MG Oral Tab; Take 1 tablet (250 mg total) by mouth 2 (two) times daily. Dispense: 14 tablet; Refill: 0  - UROLOGY - INTERNAL    2. Nausea  - Ondansetron HCl (ZOFRAN) 4 mg tablet;  Take 1 tablet (4 mg tot

## 2018-08-02 NOTE — PROGRESS NOTES
The kidney function is stable. Potassium is low is she taking her potassium and how much? She is supposed to be taking 40 mEq daily. Please call her and get her present dose. Will need to go up to 60 mEq if she is been taking the 40 regularly.   Repeat april

## 2018-08-06 ENCOUNTER — TELEPHONE (OUTPATIENT)
Dept: FAMILY MEDICINE CLINIC | Facility: CLINIC | Age: 78
End: 2018-08-06

## 2018-08-06 DIAGNOSIS — E87.6 HYPOKALEMIA: Primary | ICD-10-CM

## 2018-08-06 NOTE — PROGRESS NOTES
Ciprofloxacin and sensitive E. coli throughout from urine culture call patient get update on symptoms tell her antibiotics should be effective.

## 2018-08-06 NOTE — TELEPHONE ENCOUNTER
Spoke to patient and she confirms she is taking the 40meq daily so she will go up to the 60meq daily and repeat the lab in 1 week.

## 2018-08-06 NOTE — TELEPHONE ENCOUNTER
----- Message from Mark Bah PA-C sent at 8/2/2018  5:56 PM CDT -----  The kidney function is stable. Potassium is low is she taking her potassium and how much? She is supposed to be taking 40 mEq daily. Please call her and get her present dose.   Migel Arndt

## 2018-08-07 ENCOUNTER — TELEPHONE (OUTPATIENT)
Dept: FAMILY MEDICINE CLINIC | Facility: CLINIC | Age: 78
End: 2018-08-07

## 2018-08-07 NOTE — TELEPHONE ENCOUNTER
----- Message from Jessy Call PA-C sent at 8/6/2018  3:46 PM CDT -----  Ciprofloxacin and sensitive E. coli throughout from urine culture call patient get update on symptoms tell her antibiotics should be effective.

## 2018-08-21 DIAGNOSIS — J01.00 ACUTE NON-RECURRENT MAXILLARY SINUSITIS: ICD-10-CM

## 2018-08-21 RX ORDER — MONTELUKAST SODIUM 10 MG/1
TABLET ORAL
Qty: 30 TABLET | Refills: 1 | Status: SHIPPED | OUTPATIENT
Start: 2018-08-21 | End: 2018-10-14

## 2018-08-24 ENCOUNTER — OFFICE VISIT (OUTPATIENT)
Dept: FAMILY MEDICINE CLINIC | Facility: CLINIC | Age: 78
End: 2018-08-24
Payer: MEDICARE

## 2018-08-24 ENCOUNTER — TELEPHONE (OUTPATIENT)
Dept: FAMILY MEDICINE CLINIC | Facility: CLINIC | Age: 78
End: 2018-08-24

## 2018-08-24 VITALS
OXYGEN SATURATION: 98 % | SYSTOLIC BLOOD PRESSURE: 142 MMHG | HEART RATE: 74 BPM | BODY MASS INDEX: 22.08 KG/M2 | HEIGHT: 62 IN | TEMPERATURE: 98 F | RESPIRATION RATE: 20 BRPM | DIASTOLIC BLOOD PRESSURE: 80 MMHG | WEIGHT: 120 LBS

## 2018-08-24 DIAGNOSIS — J01.40 ACUTE PANSINUSITIS, RECURRENCE NOT SPECIFIED: Primary | ICD-10-CM

## 2018-08-24 PROCEDURE — 99213 OFFICE O/P EST LOW 20 MIN: CPT | Performed by: NURSE PRACTITIONER

## 2018-08-24 RX ORDER — AZITHROMYCIN 250 MG/1
TABLET, FILM COATED ORAL
Qty: 6 TABLET | Refills: 0 | Status: SHIPPED | OUTPATIENT
Start: 2018-08-24 | End: 2018-09-05 | Stop reason: ALTCHOICE

## 2018-08-24 NOTE — PATIENT INSTRUCTIONS
Humidifier in room  Sleep propped  Push fluids  Limit dairy  Mucinex as directed  Zofran as directed  GO TO ER FOR WORSENING SYMPTOMS    Sinusitis (Antibiotic Treatment)    The sinuses are air-filled spaces within the bones of the face.  They connect to t · You can use an over-the-counter decongestant, unless a similar medicine was prescribed to you. Nasal sprays work the fastest. Use one that contains phenylephrine or oxymetazoline. First blow your nose gently. Then use the spray.  Do not use these medicine · Don’t have close contact with people who have sore throats, colds, or other upper respiratory infections. · Don’t smoke, and stay away from secondhand smoke. · Stay up to date with of your vaccines.   Date Last Reviewed: 11/1/2017  © 7189-1408 [employer]

## 2018-08-24 NOTE — TELEPHONE ENCOUNTER
Pt called and said she has a sinus infection and she needs an an antibiotic,  She would like it called in to [facility] in [place].

## 2018-08-25 ENCOUNTER — HOSPITAL ENCOUNTER (EMERGENCY)
Facility: HOSPITAL | Age: 78
Discharge: HOME | End: 2018-08-25
Attending: EMERGENCY MEDICINE
Payer: MEDICARE

## 2018-08-25 ENCOUNTER — APPOINTMENT (OUTPATIENT)
Dept: CT IMAGING | Facility: HOSPITAL | Age: 78
End: 2018-08-25
Attending: EMERGENCY MEDICINE
Payer: MEDICARE

## 2018-08-25 VITALS
DIASTOLIC BLOOD PRESSURE: 80 MMHG | TEMPERATURE: 98 F | HEART RATE: 86 BPM | SYSTOLIC BLOOD PRESSURE: 159 MMHG | BODY MASS INDEX: 21.9 KG/M2 | WEIGHT: 119 LBS | OXYGEN SATURATION: 100 % | HEIGHT: 62 IN | RESPIRATION RATE: 20 BRPM

## 2018-08-25 DIAGNOSIS — E87.6 HYPOKALEMIA: ICD-10-CM

## 2018-08-25 DIAGNOSIS — R10.12 ABDOMINAL PAIN, LEFT UPPER QUADRANT: Primary | ICD-10-CM

## 2018-08-25 LAB
ALBUMIN SERPL-MCNC: 4.3 G/DL (ref 3.5–4.8)
ALBUMIN/GLOB SERPL: 1.1 {RATIO} (ref 1–2)
ALP LIVER SERPL-CCNC: 66 U/L (ref 55–142)
ALT SERPL-CCNC: 23 U/L (ref 14–54)
ANION GAP SERPL CALC-SCNC: 9 MMOL/L (ref 0–18)
AST SERPL-CCNC: 18 U/L (ref 15–41)
BASOPHILS # BLD AUTO: 0.08 X10(3) UL (ref 0–0.1)
BASOPHILS NFR BLD AUTO: 0.7 %
BILIRUB SERPL-MCNC: 0.6 MG/DL (ref 0.1–2)
BILIRUB UR QL STRIP.AUTO: NEGATIVE
BUN BLD-MCNC: 23 MG/DL (ref 8–20)
BUN/CREAT SERPL: 13.7 (ref 10–20)
CALCIUM BLD-MCNC: 10.1 MG/DL (ref 8.3–10.3)
CHLORIDE SERPL-SCNC: 106 MMOL/L (ref 101–111)
CO2 SERPL-SCNC: 26 MMOL/L (ref 22–32)
COLOR UR AUTO: YELLOW
CREAT BLD-MCNC: 1.68 MG/DL (ref 0.55–1.02)
EOSINOPHIL # BLD AUTO: 0.09 X10(3) UL (ref 0–0.3)
EOSINOPHIL NFR BLD AUTO: 0.8 %
ERYTHROCYTE [DISTWIDTH] IN BLOOD BY AUTOMATED COUNT: 12.8 % (ref 11.5–16)
GLOBULIN PLAS-MCNC: 3.8 G/DL (ref 2.5–4)
GLUCOSE BLD-MCNC: 82 MG/DL (ref 70–99)
GLUCOSE UR STRIP.AUTO-MCNC: NEGATIVE MG/DL
HCT VFR BLD AUTO: 35.7 % (ref 34–50)
HGB BLD-MCNC: 12 G/DL (ref 12–16)
HYALINE CASTS #/AREA URNS AUTO: PRESENT /LPF
IMMATURE GRANULOCYTE COUNT: 0.04 X10(3) UL (ref 0–1)
IMMATURE GRANULOCYTE RATIO %: 0.3 %
KETONES UR STRIP.AUTO-MCNC: NEGATIVE MG/DL
LIPASE: 131 U/L (ref 73–393)
LYMPHOCYTES # BLD AUTO: 5.65 X10(3) UL (ref 0.9–4)
LYMPHOCYTES NFR BLD AUTO: 47.6 %
M PROTEIN MFR SERPL ELPH: 8.1 G/DL (ref 6.1–8.3)
MCH RBC QN AUTO: 30.8 PG (ref 27–33.2)
MCHC RBC AUTO-ENTMCNC: 33.6 G/DL (ref 31–37)
MCV RBC AUTO: 91.5 FL (ref 81–100)
MONOCYTES # BLD AUTO: 1.26 X10(3) UL (ref 0.1–1)
MONOCYTES NFR BLD AUTO: 10.6 %
NEUTROPHIL ABS PRELIM: 4.76 X10 (3) UL (ref 1.3–6.7)
NEUTROPHILS # BLD AUTO: 4.76 X10(3) UL (ref 1.3–6.7)
NEUTROPHILS NFR BLD AUTO: 40 %
NITRITE UR QL STRIP.AUTO: NEGATIVE
OSMOLALITY SERPL CALC.SUM OF ELEC: 295 MOSM/KG (ref 275–295)
PH UR STRIP.AUTO: 5 [PH] (ref 4.5–8)
PLATELET # BLD AUTO: 255 10(3)UL (ref 150–450)
POTASSIUM SERPL-SCNC: 2.8 MMOL/L (ref 3.6–5.1)
PROT UR STRIP.AUTO-MCNC: 100 MG/DL
RBC # BLD AUTO: 3.9 X10(6)UL (ref 3.8–5.1)
RBC UR QL AUTO: NEGATIVE
RED CELL DISTRIBUTION WIDTH-SD: 42.6 FL (ref 35.1–46.3)
SODIUM SERPL-SCNC: 141 MMOL/L (ref 136–144)
SP GR UR STRIP.AUTO: 1.01 (ref 1–1.03)
UROBILINOGEN UR STRIP.AUTO-MCNC: <2 MG/DL
WBC # BLD AUTO: 11.9 X10(3) UL (ref 4–13)

## 2018-08-25 PROCEDURE — 96372 THER/PROPH/DIAG INJ SC/IM: CPT

## 2018-08-25 PROCEDURE — 96361 HYDRATE IV INFUSION ADD-ON: CPT

## 2018-08-25 PROCEDURE — 87086 URINE CULTURE/COLONY COUNT: CPT | Performed by: EMERGENCY MEDICINE

## 2018-08-25 PROCEDURE — 96366 THER/PROPH/DIAG IV INF ADDON: CPT

## 2018-08-25 PROCEDURE — 93005 ELECTROCARDIOGRAM TRACING: CPT

## 2018-08-25 PROCEDURE — 80053 COMPREHEN METABOLIC PANEL: CPT | Performed by: EMERGENCY MEDICINE

## 2018-08-25 PROCEDURE — 85025 COMPLETE CBC W/AUTO DIFF WBC: CPT | Performed by: EMERGENCY MEDICINE

## 2018-08-25 PROCEDURE — 74176 CT ABD & PELVIS W/O CONTRAST: CPT | Performed by: EMERGENCY MEDICINE

## 2018-08-25 PROCEDURE — 96375 TX/PRO/DX INJ NEW DRUG ADDON: CPT

## 2018-08-25 PROCEDURE — 83690 ASSAY OF LIPASE: CPT | Performed by: EMERGENCY MEDICINE

## 2018-08-25 PROCEDURE — 81001 URINALYSIS AUTO W/SCOPE: CPT | Performed by: EMERGENCY MEDICINE

## 2018-08-25 PROCEDURE — 96376 TX/PRO/DX INJ SAME DRUG ADON: CPT

## 2018-08-25 PROCEDURE — 93010 ELECTROCARDIOGRAM REPORT: CPT

## 2018-08-25 PROCEDURE — 96365 THER/PROPH/DIAG IV INF INIT: CPT

## 2018-08-25 PROCEDURE — 99285 EMERGENCY DEPT VISIT HI MDM: CPT

## 2018-08-25 RX ORDER — NICOTINE POLACRILEX 4 MG/1
20 GUM, CHEWING ORAL DAILY
Qty: 30 TABLET | Refills: 0 | Status: SHIPPED | OUTPATIENT
Start: 2018-08-25 | End: 2018-09-24

## 2018-08-25 RX ORDER — MORPHINE SULFATE 4 MG/ML
4 INJECTION, SOLUTION INTRAMUSCULAR; INTRAVENOUS EVERY 30 MIN PRN
Status: DISCONTINUED | OUTPATIENT
Start: 2018-08-25 | End: 2018-08-25

## 2018-08-25 RX ORDER — MAGNESIUM HYDROXIDE/ALUMINUM HYDROXICE/SIMETHICONE 120; 1200; 1200 MG/30ML; MG/30ML; MG/30ML
30 SUSPENSION ORAL ONCE
Status: COMPLETED | OUTPATIENT
Start: 2018-08-25 | End: 2018-08-25

## 2018-08-25 RX ORDER — SODIUM CHLORIDE 9 MG/ML
125 INJECTION, SOLUTION INTRAVENOUS CONTINUOUS
Status: DISCONTINUED | OUTPATIENT
Start: 2018-08-25 | End: 2018-08-25

## 2018-08-25 RX ORDER — POTASSIUM CHLORIDE 20 MEQ/1
40 TABLET, EXTENDED RELEASE ORAL ONCE
Status: COMPLETED | OUTPATIENT
Start: 2018-08-25 | End: 2018-08-25

## 2018-08-25 RX ORDER — DICYCLOMINE HYDROCHLORIDE 10 MG/ML
20 INJECTION INTRAMUSCULAR ONCE
Status: COMPLETED | OUTPATIENT
Start: 2018-08-25 | End: 2018-08-25

## 2018-08-25 RX ORDER — POTASSIUM CHLORIDE 14.9 MG/ML
20 INJECTION INTRAVENOUS ONCE
Status: COMPLETED | OUTPATIENT
Start: 2018-08-25 | End: 2018-08-25

## 2018-08-25 NOTE — ED INITIAL ASSESSMENT (HPI)
Patient presents with left upper quadrant pain that started about 1 week ago. She states it has been increasing over the last week. She states it is a sharp/aching pain. She denies injury to the area.

## 2018-08-25 NOTE — ED PROVIDER NOTES
Patient Seen in: [facility] Emergency Department    History   Patient presents with:  Abdomen/Flank Pain (GI/)    Stated Complaint: abdominal pain    HPI    61-year-old female complaining of abdominal pain the patient has left upper quadrant abdom (postoperative nausea and vomiting)     only with ether years ago   • Postmenopausal status     Age of onset: 30years old   • Presence of other cardiac implants and grafts    • Rheumatic heart disease, unspecified    • Sleep disturbance    • Stented coron EPIDURAL;  Surgeon:                Olga Bautista MD;  Location: [address] [place] [address] Drive MANAGEMENT  3/18/2013: PATIENT DOCUMENTED NOT TO HAVE EXPERIENCED ANY*      Comment: Procedure: LUMBAR EPIDURAL;  Surgeon:                Olga Bautista exam shows regular rate and rhythm without murmurs.   Abdomen is soft there is moderate left upper quadrant tenderness there is mild left lower quadrant and mild epigastric and right upper quadrant tenderness there is no masses guarding rebound there is mil [address]  ------------------------------------------------------------      MDM     Patient's potassium was 2.8 the rest labs unremarkable  Urinalysis shows 11-20 WBCs small leukocyte esterase negative nitrites no bacteria was seen large squamous epithelial ce medications    Omeprazole 20 MG Oral Tab EC  Take 20 mg by mouth daily.   Qty: 30 tablet Refills: 0

## 2018-08-25 NOTE — ED NOTES
While EDRN is pushing morphine, \"I think I am getting a little too used to morphine. \" EDMD notified.

## 2018-08-25 NOTE — PROGRESS NOTES
CHIEF COMPLAINT:   Patient presents with:  Cold: dry cough s/s for 3 days  Nausea: Zofran was taken today      HPI:   Katerine Esteves is a 70year old female who presents for cold symptoms for  3  days.  Symptoms have progressed into sinus congestion and bee TraZODone HCl 50 MG Oral Tab Take 1 tablet (50 mg total) by mouth nightly as needed for Sleep (insomnia). Disp: 30 tablet Rfl: 5   FluvoxaMINE Maleate 100 MG Oral Tab Take 1.5 tablets (150 mg total) by mouth nightly.  (Patient taking differently: Take 100 m • Parathyroid adenoma    • PONV (postoperative nausea and vomiting)     only with ether years ago   • Postmenopausal status     Age of onset: 30years old   • Presence of other cardiac implants and grafts    • Rheumatic heart disease, unspecified    • Slee Comment: Procedure: LUMBAR EPIDURAL;  Surgeon:                Paul Arzola MD;  Location: [place] [address] MANAGEMENT  3/18/2013: PATIENT DOCUMENTED NOT TO HAVE EXPERIENCED ANY*      Comment: Procedure: LUMBAR EPIDURAL;  Surgeon: NEURO: + sinus headaches. No numbness or tingling in face. EXAM:   /80   Pulse 74   Temp 98 °F (36.7 °C) (Oral)   Resp 20   Ht 62\"   Wt 120 lb   SpO2 98%   Breastfeeding?  No   BMI 21.95 kg/m²   GENERAL: well developed, well nourished,in no appar The sinuses are air-filled spaces within the bones of the face. They connect to the inside of the nose. Sinusitis is an inflammation of the tissue that lines the sinuses. Sinusitis can occur during a cold.  It can also happen due to allergies to pollens and · Do not use nasal rinses or irrigation during an acute sinus infection, unless your healthcare provider tells you to. Rinsing may spread the infection to other areas in your sinuses.   · Use acetaminophen or ibuprofen to control pain, unless another pain m

## 2018-08-25 NOTE — ED NOTES
While EDRN wheels patient back to d0, she states, \"Is the doctor I am going to have white? Last time I had a black doctor with randy who told me I was a druggie. I should have sued him for that. \"

## 2018-08-25 NOTE — ED NOTES
Patient sends her  to nurses station and states, \"I don't feel the morphine you just gave me, I want more. \"

## 2018-08-26 LAB
ATRIAL RATE: 59 BPM
P AXIS: 38 DEGREES
P-R INTERVAL: 190 MS
Q-T INTERVAL: 440 MS
QRS DURATION: 74 MS
QTC CALCULATION (BEZET): 435 MS
R AXIS: -9 DEGREES
T AXIS: 13 DEGREES
VENTRICULAR RATE: 59 BPM

## 2018-08-26 NOTE — ED NOTES
Pt reports feeling hungry. Up for dispo at this time. Pt reminded to take her potassium supplements as directed that she currently has at home.

## 2018-08-30 DIAGNOSIS — F51.04 PSYCHOPHYSIOLOGICAL INSOMNIA: ICD-10-CM

## 2018-08-31 RX ORDER — TRAZODONE HYDROCHLORIDE 50 MG/1
TABLET ORAL
Qty: 30 TABLET | Refills: 2 | Status: SHIPPED | OUTPATIENT
Start: 2018-08-31 | End: 2018-11-25

## 2018-09-05 ENCOUNTER — OFFICE VISIT (OUTPATIENT)
Dept: FAMILY MEDICINE CLINIC | Facility: CLINIC | Age: 78
End: 2018-09-05
Payer: MEDICARE

## 2018-09-05 ENCOUNTER — TELEPHONE (OUTPATIENT)
Dept: FAMILY MEDICINE CLINIC | Facility: CLINIC | Age: 78
End: 2018-09-05

## 2018-09-05 VITALS
HEART RATE: 68 BPM | SYSTOLIC BLOOD PRESSURE: 116 MMHG | HEIGHT: 62.8 IN | BODY MASS INDEX: 21.79 KG/M2 | WEIGHT: 123 LBS | DIASTOLIC BLOOD PRESSURE: 70 MMHG | TEMPERATURE: 99 F

## 2018-09-05 DIAGNOSIS — J01.10 ACUTE FRONTAL SINUSITIS, RECURRENCE NOT SPECIFIED: Primary | ICD-10-CM

## 2018-09-05 PROCEDURE — 99214 OFFICE O/P EST MOD 30 MIN: CPT | Performed by: FAMILY MEDICINE

## 2018-09-05 RX ORDER — DOXYCYCLINE HYCLATE 100 MG/1
100 CAPSULE ORAL 2 TIMES DAILY
Qty: 14 CAPSULE | Refills: 0 | Status: SHIPPED | OUTPATIENT
Start: 2018-09-05 | End: 2018-10-29 | Stop reason: ALTCHOICE

## 2018-09-05 NOTE — PROGRESS NOTES
CC:  Jimmie Correa is a 70year old female here for Patient presents with:  URI: post nasal drip, headache, sinus pressure x 3 days      HPI:     URI  -started 3-4 days ago  -associated with sore throat and headache  -worsening into nausea, sinus pressure times daily as needed for Anxiety.  Disp: 60 tablet Rfl: 2   losartan 100 MG Oral Tab TAKE 1 TABLET(100 MG) BY MOUTH DAILY Disp: 90 tablet Rfl: 1   AmLODIPine Besylate 10 MG Oral Tab TAKE 1 TABLET(10 MG) BY MOUTH EVERY DAY Disp: 90 tablet Rfl: 1   FluvoxaMI coronary artery    • Autoimmune disease (HCC)     fibro   • Back pain    • Back problem    • Calculus of kidney    • Cataract    • Chronic back pain    • Constipation    • Dairy allergy    • Depression    • Fatigue    • Fibromyalgia    • Food intolerance Comment: removed ovaries  2/26/2013: INJECTION, W/WO CONTRAST, DX/THERAPEUTIC SUBST*      Comment: Procedure: LUMBAR EPIDURAL;  Surgeon:                Ken Fairbanks MD;  Location: [place] [address] MANAGEMENT  3/18/2013: INJECTION, W/W 20. 00        Types: Cigarettes     Quit date: 1/1/1997  Smokeless tobacco: Never Used                      Alcohol use:  No                    EXAM:   /70 (BP Location: Left arm, Patient Position: Sitting, Cuff Size: adult)   Pulse 68   Temp 98.7 °F (

## 2018-09-05 NOTE — TELEPHONE ENCOUNTER
Pt called requesting a RX for a sinus infection states she was advised by Kip Sifuentes to call and request if ever needed. Pt is aware that Kip Sifuentes is not in office today. Pt still uses the [facility] in [place] on [address]

## 2018-09-05 NOTE — PATIENT INSTRUCTIONS
--rest, fluids, soups, humidifier, tea with lemon or honey, tylenol/advil as needed for discomfort  -- otc generic mucinex (or with DM if coughing)  can help loosen up congestion if getting thicker  -- start flonase nasal spray as daily for 4-6 wks to en

## 2018-09-13 DIAGNOSIS — F41.1 GAD (GENERALIZED ANXIETY DISORDER): ICD-10-CM

## 2018-09-13 RX ORDER — CLONAZEPAM 0.5 MG/1
TABLET ORAL
Qty: 60 TABLET | Refills: 0 | Status: SHIPPED | OUTPATIENT
Start: 2018-09-13 | End: 2018-11-29

## 2018-09-17 RX ORDER — POTASSIUM CHLORIDE 20 MEQ/1
TABLET, EXTENDED RELEASE ORAL
Qty: 60 TABLET | Refills: 0 | Status: SHIPPED | OUTPATIENT
Start: 2018-09-17 | End: 2018-10-30

## 2018-10-02 DIAGNOSIS — I10 ESSENTIAL HYPERTENSION: ICD-10-CM

## 2018-10-02 RX ORDER — LOSARTAN POTASSIUM 100 MG/1
TABLET ORAL
Qty: 90 TABLET | Refills: 0 | Status: SHIPPED | OUTPATIENT
Start: 2018-10-02 | End: 2018-12-31

## 2018-10-11 ENCOUNTER — LAB ENCOUNTER (OUTPATIENT)
Dept: LAB | Age: 78
End: 2018-10-11
Attending: FAMILY MEDICINE
Payer: MEDICARE

## 2018-10-11 ENCOUNTER — OFFICE VISIT (OUTPATIENT)
Dept: FAMILY MEDICINE CLINIC | Facility: CLINIC | Age: 78
End: 2018-10-11
Payer: MEDICARE

## 2018-10-11 VITALS
HEIGHT: 62.8 IN | HEART RATE: 68 BPM | BODY MASS INDEX: 21.62 KG/M2 | WEIGHT: 122 LBS | DIASTOLIC BLOOD PRESSURE: 62 MMHG | SYSTOLIC BLOOD PRESSURE: 100 MMHG | TEMPERATURE: 98 F

## 2018-10-11 DIAGNOSIS — N18.30 CKD (CHRONIC KIDNEY DISEASE) STAGE 3, GFR 30-59 ML/MIN (HCC): ICD-10-CM

## 2018-10-11 DIAGNOSIS — J32.0 CHRONIC MAXILLARY SINUSITIS: ICD-10-CM

## 2018-10-11 DIAGNOSIS — J31.0 CHRONIC RHINITIS: Primary | ICD-10-CM

## 2018-10-11 DIAGNOSIS — E87.6 HYPOKALEMIA: ICD-10-CM

## 2018-10-11 DIAGNOSIS — J30.89 NON-SEASONAL ALLERGIC RHINITIS: Primary | ICD-10-CM

## 2018-10-11 DIAGNOSIS — Z78.9 POOR HISTORIAN: ICD-10-CM

## 2018-10-11 PROCEDURE — 82785 ASSAY OF IGE: CPT

## 2018-10-11 PROCEDURE — 36415 COLL VENOUS BLD VENIPUNCTURE: CPT

## 2018-10-11 PROCEDURE — 86003 ALLG SPEC IGE CRUDE XTRC EA: CPT

## 2018-10-11 PROCEDURE — 99214 OFFICE O/P EST MOD 30 MIN: CPT | Performed by: FAMILY MEDICINE

## 2018-10-11 PROCEDURE — 80048 BASIC METABOLIC PNL TOTAL CA: CPT

## 2018-10-11 RX ORDER — IPRATROPIUM BROMIDE 21 UG/1
2 SPRAY, METERED NASAL EVERY 12 HOURS
Qty: 1 BOTTLE | Refills: 0 | Status: SHIPPED | OUTPATIENT
Start: 2018-10-11 | End: 2019-01-21

## 2018-10-11 RX ORDER — LEVOFLOXACIN 250 MG/1
250 TABLET ORAL DAILY
Qty: 14 TABLET | Refills: 0 | Status: SHIPPED | OUTPATIENT
Start: 2018-10-11 | End: 2018-10-25

## 2018-10-11 NOTE — PROGRESS NOTES
HPI:   Lolis Pablo is a 70year old female who presents for chronic sinusitis symptoms. Patient is presenting on a almost monthly basis for antibiotics. Was seen by Dr. Devra Dancer on 7/17/18 had a nasopharyngeal scope done showing deviated septum.   Dr. Chaim Castleman TRAZODONE HCL 50 MG Oral Tab TAKE 1 TABLET(50 MG) BY MOUTH EVERY NIGHT AS NEEDED FOR SLEEP OR INSOMNIA Disp: 30 tablet Rfl: 2   MONTELUKAST SODIUM 10 MG Oral Tab TAKE 1 TABLET(10 MG) BY MOUTH EVERY NIGHT Disp: 30 tablet Rfl: 1   Ondansetron HCl (ZOFRAN) 4 • Autoimmune disease ([facility] [address]     fibro   • Back pain    • Back problem    • Calculus of kidney    • Cataract    • Chronic back pain    • Constipation    • Dairy allergy    • Depression    • Fatigue    • Fibromyalgia    • Food intolerance    • Hearing impairm Procedure: LUMBAR EPIDURAL;  Surgeon: Ken Fairbanks MD;  Location: [address]    removed ovaries   • INJECTION, W/WO CONTRAST, DX/THERAPEUTIC SUBSTANCE, EPIDURAL/SUBARACHNOID; LUMBAR/SACRAL  2/26/2013    Proc Procedure: LUMBAR EPIDURAL;  Surgeon: Siri Horton MD;  Location: [address] [place] [address]   • SACROILIAC JOINT INJECTION BILATERAL Bilateral 6/13/2016    Performed by Minoo Louis MD at [facility] OR   • SACROILIAC JOINT INJECTION RIGHT OR L SKIN: no rashes  EYES: denies change in vision  HENT: Sinus headache ear pain and congestion nasal congestion, marketed post nasal drip, runny nose, and ST  LUNGS: Denies shortness of breath or wheezing intermittent cough secondary to postnasal drainage Si sprays by Nasal route every 12 (twelve) hours. • levofloxacin (LEVAQUIN) 250 MG Oral Tab 14 tablet 0     Sig: Take 1 tablet (250 mg total) by mouth daily for 14 days. Imaging & Consults:  None  1.  Chronic rhinitis  Continue with Flonase an · If you have questions, contact Central Scheduling at [phone] at [phone]    Controlling Allergens:  In the Home  Even a clean home can be full of allergens, so take a m Avoid yard work and pulling weeds. These and other outdoor activities increase your exposure to pollen. If that’s not possible, wear a filter mask. When you’re done, bathe, wash your hair, and change your clothes.    Date Last Reviewed: 9/1/2016  © 2000-201 Your doctor will ask about your symptoms and health history. The doctor will look at your nose and face. You may have imaging studies such as an X-ray or CT scan of the sinuses. Your doctor may also take a sample of the drainage to check for bacteria.  You When traveling on an airplane, use saline nasal spray to keep your sinuses moist. Drink plenty of fluids. You may also want to take a decongestant before you get on the plane. Prevent colds  Do what you can to avoid being exposed to colds and flu.  When p © 3563-5755 [employer] [address]. [address] [facility] [place], [address]. All rights reserved. This information is not intended as a substitute for professional medical care. Always follow your healthcare professional's instructions.         [employer] © 6770-9419 [employer] [address]. 1407 [facility] [place], [facility] [place]. All rights reserved. This information is not intended as a substitute for professional medical care. Always follow your healthcare professional's instructions.

## 2018-10-11 NOTE — PATIENT INSTRUCTIONS
SCHEDULING [facility] LAB APPOINTMENTS ONLINE    Lab appointments can now be scheduled online at [email or web address]    · Go to [email or web address]  · In Search type Lab  · Click \"Lab services\"  · Click \"Schedule Your Test Online\"  · Follow the prompts  · If you a fan to keep the bathroom well ventilated.        Avoid yard work and pulling weeds. These and other outdoor activities increase your exposure to pollen. If that’s not possible, wear a filter mask.  When you’re done, bathe, wash your hair, and change your You may have imaging studies such as an X-ray or CT scan of the sinuses. Your doctor may also take a sample of the drainage to check for bacteria. You may also have an endoscopy.  During this test, the doctor puts a lighted tube through your nose up into yo When possible, take more time to rest when you feel something “coming on.”  · Wash your hands often. This is especially important during cold and flu season. Try not to touch your face. · As much as possible, stay away from infected people.   · Follow thes comfortable. Remember to follow your doctor's instructions closely. This can make a big difference in getting your sinus problem under control. Drink fluids  Drinking extra fluids helps thin your mucus. This lets it drain from your sinuses more easily.  Sam Whitlock

## 2018-10-13 PROBLEM — K60.0 ACUTE ANTERIOR ANAL FISSURE: Status: RESOLVED | Noted: 2018-02-12 | Resolved: 2018-10-13

## 2018-10-13 PROBLEM — K25.3 ACUTE GASTRIC ULCER WITHOUT HEMORRHAGE OR PERFORATION: Status: RESOLVED | Noted: 2017-07-12 | Resolved: 2018-10-13

## 2018-10-13 PROBLEM — J31.0 CHRONIC RHINITIS: Status: ACTIVE | Noted: 2018-10-13

## 2018-10-13 PROBLEM — Z78.9 POOR HISTORIAN: Status: ACTIVE | Noted: 2018-10-13

## 2018-10-13 PROBLEM — D64.9 LOW HEMOGLOBIN: Status: RESOLVED | Noted: 2018-04-18 | Resolved: 2018-10-13

## 2018-10-13 PROBLEM — R79.0 LOW MAGNESIUM LEVEL: Status: RESOLVED | Noted: 2018-04-18 | Resolved: 2018-10-13

## 2018-10-14 DIAGNOSIS — J01.00 ACUTE NON-RECURRENT MAXILLARY SINUSITIS: ICD-10-CM

## 2018-10-14 NOTE — PROGRESS NOTES
Negative for allergens through blood work follow up with ENT to get the skin testing.   Sent to my chart

## 2018-10-15 RX ORDER — MONTELUKAST SODIUM 10 MG/1
TABLET ORAL
Qty: 30 TABLET | Refills: 0 | Status: SHIPPED | OUTPATIENT
Start: 2018-10-15 | End: 2018-11-11

## 2018-10-19 ENCOUNTER — LABORATORY ENCOUNTER (OUTPATIENT)
Dept: LAB | Age: 78
End: 2018-10-19
Attending: FAMILY MEDICINE
Payer: MEDICARE

## 2018-10-19 DIAGNOSIS — J30.89 ENVIRONMENTAL AND SEASONAL ALLERGIES: ICD-10-CM

## 2018-10-19 PROCEDURE — 86003 ALLG SPEC IGE CRUDE XTRC EA: CPT

## 2018-10-19 PROCEDURE — 36415 COLL VENOUS BLD VENIPUNCTURE: CPT

## 2018-10-22 NOTE — PROGRESS NOTES
Informed pt of results per SK. Pt c/o itchy nose and left ear for several months. Wondering next step in treatment.

## 2018-10-26 RX ORDER — LIDOCAINE 50 MG/G
PATCH TOPICAL
Qty: 60 PATCH | Refills: 0 | OUTPATIENT
Start: 2018-10-26

## 2018-10-29 ENCOUNTER — OFFICE VISIT (OUTPATIENT)
Dept: PAIN CLINIC | Facility: CLINIC | Age: 78
End: 2018-10-29
Payer: MEDICARE

## 2018-10-29 VITALS
WEIGHT: 122 LBS | OXYGEN SATURATION: 98 % | BODY MASS INDEX: 21.62 KG/M2 | DIASTOLIC BLOOD PRESSURE: 76 MMHG | HEART RATE: 57 BPM | SYSTOLIC BLOOD PRESSURE: 118 MMHG | HEIGHT: 62.8 IN

## 2018-10-29 DIAGNOSIS — M54.16 LUMBAR RADICULITIS: Primary | ICD-10-CM

## 2018-10-29 DIAGNOSIS — Z96.89 SPINAL CORD STIMULATOR STATUS: ICD-10-CM

## 2018-10-29 PROCEDURE — 99213 OFFICE O/P EST LOW 20 MIN: CPT | Performed by: NURSE PRACTITIONER

## 2018-10-29 RX ORDER — LIDOCAINE 50 MG/G
PATCH TOPICAL
Qty: 60 PATCH | Refills: 3 | Status: SHIPPED | OUTPATIENT
Start: 2018-10-29 | End: 2019-11-30

## 2018-10-29 NOTE — PATIENT INSTRUCTIONS
Refill policies:    • Allow 2-3 business days for refills; controlled substances may take longer.   • Contact your pharmacy at least 5 days prior to running out of medication and have them send an electronic request or submit request through the “request re entire amount billed. Precertification and Prior Authorizations: If your physician has recommended that you have a procedure or additional testing performed.   [facility]) will contact your insurance carrier to obtain pre-certi

## 2018-10-29 NOTE — PROGRESS NOTES
Name: Alex Alvarez   : 1940   DOS: 10/29/2018     Pain Clinic Follow Up Visit:   Alex Alvarez is a 70year old female who presents for recheck of her bilateral radiates into the right leg to the ankle and sometimes to the foot and toes.   She req Rfl: 2   PRAVASTATIN SODIUM 40 MG Oral Tab TAKE 1 TABLET BY MOUTH EVERY NIGHT AT BEDTIME Disp: 90 tablet Rfl: 1   DiphenhydrAMINE HCl 25 MG Oral Tab Take 25 mg by mouth every 6 (six) hours as needed for Itching.  Disp:  Rfl:    Cinacalcet HCl (SENSIPAR) 30 leaning and twisting motions. Pain to palpation over lumbar facet joints and sacroiliac joints bilaterally. Straight leg raises are negative bilaterally.    Extremities: Bilateral lower extremity strength is 5/5 and equal. Alexander's test is negative bilater

## 2018-10-29 NOTE — PROGRESS NOTES
Name: Dania Mercer   : 1940   DOS: 10/29/2018     Pain Clinic Follow Up Visit:   Dania Mercer is a 70year old female who presents for recheck of her bilateral radiates into the right leg to the ankle and sometimes to the foot and toes.   She req tablet (4 mg total) by mouth 3 (three) times daily as needed for Nausea.  Disp: 30 tablet Rfl: 2   PRAVASTATIN SODIUM 40 MG Oral Tab TAKE 1 TABLET BY MOUTH EVERY NIGHT AT BEDTIME Disp: 90 tablet Rfl: 1   Cinacalcet HCl (SENSIPAR) 30 MG Oral Tab Take 1 table two patches over painful areas daily, wear for 12 hours, remove for 12 hours. May cut to fit. Consider gabapentin. I have discussed with patient and she would like to defer this option. Patient would like to defer injection procedures at this time.

## 2018-10-30 RX ORDER — POTASSIUM CHLORIDE 20 MEQ/1
TABLET, EXTENDED RELEASE ORAL
Qty: 60 TABLET | Refills: 1 | Status: SHIPPED | OUTPATIENT
Start: 2018-10-30 | End: 2021-06-04

## 2018-11-08 ENCOUNTER — LAB ENCOUNTER (OUTPATIENT)
Dept: LAB | Age: 78
End: 2018-11-08
Attending: FAMILY MEDICINE
Payer: MEDICARE

## 2018-11-08 ENCOUNTER — OFFICE VISIT (OUTPATIENT)
Dept: FAMILY MEDICINE CLINIC | Facility: CLINIC | Age: 78
End: 2018-11-08
Payer: MEDICARE

## 2018-11-08 VITALS
BODY MASS INDEX: 22.32 KG/M2 | HEART RATE: 72 BPM | WEIGHT: 126 LBS | SYSTOLIC BLOOD PRESSURE: 90 MMHG | TEMPERATURE: 99 F | DIASTOLIC BLOOD PRESSURE: 60 MMHG | HEIGHT: 62.8 IN

## 2018-11-08 DIAGNOSIS — M54.9 CVA TENDERNESS: ICD-10-CM

## 2018-11-08 DIAGNOSIS — R10.12 ABDOMINAL PAIN, LEFT UPPER QUADRANT: Primary | ICD-10-CM

## 2018-11-08 DIAGNOSIS — Z87.442 HISTORY OF KIDNEY STONES: ICD-10-CM

## 2018-11-08 DIAGNOSIS — R82.90 ABNORMAL FINDING ON URINALYSIS: ICD-10-CM

## 2018-11-08 DIAGNOSIS — N18.30 CKD (CHRONIC KIDNEY DISEASE) STAGE 3, GFR 30-59 ML/MIN (HCC): ICD-10-CM

## 2018-11-08 DIAGNOSIS — R10.12 ABDOMINAL PAIN, LEFT UPPER QUADRANT: ICD-10-CM

## 2018-11-08 DIAGNOSIS — E21.3 HYPERPARATHYROIDISM (HCC): ICD-10-CM

## 2018-11-08 PROCEDURE — 36415 COLL VENOUS BLD VENIPUNCTURE: CPT

## 2018-11-08 PROCEDURE — 99214 OFFICE O/P EST MOD 30 MIN: CPT | Performed by: FAMILY MEDICINE

## 2018-11-08 PROCEDURE — 81003 URINALYSIS AUTO W/O SCOPE: CPT | Performed by: FAMILY MEDICINE

## 2018-11-08 PROCEDURE — 87086 URINE CULTURE/COLONY COUNT: CPT | Performed by: FAMILY MEDICINE

## 2018-11-08 PROCEDURE — 80053 COMPREHEN METABOLIC PANEL: CPT

## 2018-11-08 NOTE — PROGRESS NOTES
Hollie Sheppard is a 70year old female. HPI:   #1  Over 25 101-week earlier with left thumb superficial swelling lump over the vein on the distal palmar aspect has been uncomfortable for 3 days.       #2 back pain for 2 days left side   No fevers, chills o Outpatient Medications:  POTASSIUM CHLORIDE ER 20 MEQ Oral Tab CR TAKE 2 TABLETS BY MOUTH EVERY DAY (Patient taking differently: TAKE 1 TABLETS BY MOUTH EVERY DAY) Disp: 60 tablet Rfl: 1   lidocaine 5 % External Patch Place up to two patches daily over emmanuel (VITAMIN B COMPLEX) Oral Tab Take 1 tablet by mouth twice a week. Disp:  Rfl:    Pantoprazole Sodium 40 MG Oral Tab EC Take 1 tablet (40 mg total) by mouth 2 (two) times daily before meals.  (Patient taking differently: Take 40 mg by mouth daily as needed use: No      Alcohol/week: 0.0 oz      Frequency: Never    Drug use: Yes      Types: Cannabis      Comment: marijuana last used \"a few days ago\" as of 10/29/18       REVIEW OF SYSTEMS:   GENERAL HEALTH: feels well otherwise  SKIN: denies any unusual skin XR ABDOMEN (1 VIEW) (CPT=74018); Future  - URINALYSIS, AUTO, W/O SCOPE  - COMP METABOLIC PANEL (14); Future  - URINE CULTURE, ROUTINE; Future  - URINE CULTURE, ROUTINE  Increase water intake  2. Abnormal finding on urinalysis  - URINE CULTURE, ROUTINE;  Fut

## 2018-11-09 NOTE — PROGRESS NOTES
Creatinine has increased again awaiting KUB possible kidney stone. May need repeat CT abdomen and pelvis without contrast if KUB is nonspecific. Awaiting urine culture. Drink plenty of water throughout the day. Repeat BMP in 1 week.   Call patient to re

## 2018-11-10 ENCOUNTER — HOSPITAL ENCOUNTER (OUTPATIENT)
Dept: GENERAL RADIOLOGY | Age: 78
Discharge: HOME | End: 2018-11-10
Attending: FAMILY MEDICINE
Payer: MEDICARE

## 2018-11-10 ENCOUNTER — HOSPITAL ENCOUNTER (OUTPATIENT)
Dept: CT IMAGING | Age: 78
Discharge: HOME | End: 2018-11-10
Attending: OTOLARYNGOLOGY
Payer: MEDICARE

## 2018-11-10 DIAGNOSIS — J32.0 CHRONIC MAXILLARY SINUSITIS: ICD-10-CM

## 2018-11-10 DIAGNOSIS — M54.9 CVA TENDERNESS: ICD-10-CM

## 2018-11-10 DIAGNOSIS — Z87.442 HISTORY OF KIDNEY STONES: ICD-10-CM

## 2018-11-10 DIAGNOSIS — R10.12 ABDOMINAL PAIN, LEFT UPPER QUADRANT: ICD-10-CM

## 2018-11-10 PROCEDURE — 74018 RADEX ABDOMEN 1 VIEW: CPT | Performed by: FAMILY MEDICINE

## 2018-11-10 PROCEDURE — 70486 CT MAXILLOFACIAL W/O DYE: CPT | Performed by: OTOLARYNGOLOGY

## 2018-11-10 NOTE — PROGRESS NOTES
Urine culture is negative for infection still awaiting KUB please get update on patient's condition.   Call patient also sent to y chart

## 2018-11-10 NOTE — PROGRESS NOTES
X-ray does not show any specific change in the kidney stones. If the discomfort is still present will order a CT of abdomen and pelvis kidney stone profile without contrast on Monday.   If pain does become worse over the weekend go to immediate care center

## 2018-11-11 DIAGNOSIS — J01.00 ACUTE NON-RECURRENT MAXILLARY SINUSITIS: ICD-10-CM

## 2018-11-12 ENCOUNTER — TELEPHONE (OUTPATIENT)
Dept: FAMILY MEDICINE CLINIC | Facility: CLINIC | Age: 78
End: 2018-11-12

## 2018-11-12 DIAGNOSIS — R94.4 ABNORMAL KIDNEY FUNCTION STUDY: Primary | ICD-10-CM

## 2018-11-12 RX ORDER — MONTELUKAST SODIUM 10 MG/1
TABLET ORAL
Qty: 30 TABLET | Refills: 0 | Status: SHIPPED | OUTPATIENT
Start: 2018-11-12 | End: 2018-12-09

## 2018-11-12 NOTE — TELEPHONE ENCOUNTER
----- Message from Margaret Diallo PA-C sent at 11/10/2018  4:52 PM CST -----  X-ray does not show any specific change in the kidney stones.   If the discomfort is still present will order a CT of abdomen and pelvis kidney stone profile without contrast o

## 2018-11-12 NOTE — TELEPHONE ENCOUNTER
Spoke to patient with all the test results/instructions and pt verbalized understanding. Pt states that the pain has decreased considerably so she is going to wait and see how she does. Lab in system for one week.

## 2018-11-12 NOTE — TELEPHONE ENCOUNTER
----- Message from Pricila Stallings PA-C sent at 11/8/2018  6:36 PM CST -----  Creatinine has increased again awaiting KUB possible kidney stone. May need repeat CT abdomen and pelvis without contrast if KUB is nonspecific. Awaiting urine culture.   [place]

## 2018-11-19 ENCOUNTER — OFFICE VISIT (OUTPATIENT)
Dept: FAMILY MEDICINE CLINIC | Facility: CLINIC | Age: 78
End: 2018-11-19
Payer: MEDICARE

## 2018-11-19 VITALS
WEIGHT: 127 LBS | HEART RATE: 62 BPM | TEMPERATURE: 98 F | OXYGEN SATURATION: 96 % | DIASTOLIC BLOOD PRESSURE: 62 MMHG | HEIGHT: 62.8 IN | BODY MASS INDEX: 22.5 KG/M2 | SYSTOLIC BLOOD PRESSURE: 110 MMHG

## 2018-11-19 DIAGNOSIS — R30.0 DYSURIA: Primary | ICD-10-CM

## 2018-11-19 PROCEDURE — 99214 OFFICE O/P EST MOD 30 MIN: CPT | Performed by: FAMILY MEDICINE

## 2018-11-19 PROCEDURE — 81003 URINALYSIS AUTO W/O SCOPE: CPT | Performed by: FAMILY MEDICINE

## 2018-11-19 PROCEDURE — 87086 URINE CULTURE/COLONY COUNT: CPT | Performed by: FAMILY MEDICINE

## 2018-11-19 RX ORDER — NITROFURANTOIN 25; 75 MG/1; MG/1
100 CAPSULE ORAL 2 TIMES DAILY
Qty: 14 CAPSULE | Refills: 0 | Status: SHIPPED | OUTPATIENT
Start: 2018-11-19 | End: 2019-01-21 | Stop reason: ALTCHOICE

## 2018-11-19 RX ORDER — ACETAMINOPHEN 500 MG
1000 TABLET ORAL DAILY PRN
Status: ON HOLD | COMMUNITY
End: 2021-03-26

## 2018-11-19 NOTE — PROGRESS NOTES
Rita Salomon is a 70year old female here for Patient presents with:  Urinary Frequency: rm 1  Sore Throat  Cough      HPI:       1.  Dysuria  -started 2 days ago with increased frequency and dysuria  -tried pushing fluids which helps, but still with symp ONLY  1961    [place]   • ESOPHAGOGASTRODUODENOSCOPY (EGD) N/A 4/11/2018    Performed by Linus Florian MD at [facility] ENDOSCOPY   • ESOPHAGOGASTRODUODENOSCOPY (EGD) N/A 6/29/2017    Performed by Nguyen Morrell MD at [address] Hypertension Sister    • Lipids Brother    • Heart Disorder Brother         rheumatic heart disease   • Cancer Sister         liver cancer   • Bipolar Disorder Sister       Social History: Social History    Tobacco Use      Smoking status: Former Smoker PRAVASTATIN SODIUM 40 MG Oral Tab TAKE 1 TABLET BY MOUTH EVERY NIGHT AT BEDTIME Disp: 90 tablet Rfl: 1   DiphenhydrAMINE HCl 25 MG Oral Tab Take 25 mg by mouth every 6 (six) hours as needed for Itching.  Disp:  Rfl:    Cinacalcet HCl (SENSIPAR) 30 MG Oral 3  HEENT: NCAT, pupils equal and round  Pulm: CTAB, no wheezing  CV: RRR, no murmurs  Abd: soft, ND, NT, +BS, no cva tenderness  Ext: full ROM  Psych: normal affect     ASSESSMENT/PLAN:     1.  Dysuria  -start macrobid as prescribed  -push fluids  -nsaids p

## 2018-11-19 NOTE — PATIENT INSTRUCTIONS
-- push fluids  -- tylenol or ibuprofen as needed  -- start antibiotics  -- if pain not improving despite antibiotics, come back

## 2018-11-25 DIAGNOSIS — F51.04 PSYCHOPHYSIOLOGICAL INSOMNIA: ICD-10-CM

## 2018-11-26 DIAGNOSIS — I10 ESSENTIAL HYPERTENSION: ICD-10-CM

## 2018-11-26 RX ORDER — TRAZODONE HYDROCHLORIDE 50 MG/1
TABLET ORAL
Qty: 30 TABLET | Refills: 2 | Status: SHIPPED | OUTPATIENT
Start: 2018-11-26 | End: 2019-02-09

## 2018-11-26 RX ORDER — AMLODIPINE BESYLATE 10 MG/1
TABLET ORAL
Qty: 90 TABLET | Refills: 0 | Status: SHIPPED | OUTPATIENT
Start: 2018-11-26 | End: 2019-03-01

## 2018-11-29 DIAGNOSIS — F41.1 GAD (GENERALIZED ANXIETY DISORDER): ICD-10-CM

## 2018-11-29 DIAGNOSIS — R11.0 NAUSEA: ICD-10-CM

## 2018-11-30 RX ORDER — ONDANSETRON 4 MG/1
TABLET, FILM COATED ORAL
Qty: 30 TABLET | Refills: 0 | Status: SHIPPED | OUTPATIENT
Start: 2018-11-30 | End: 2018-12-17

## 2018-11-30 RX ORDER — CLONAZEPAM 0.5 MG/1
TABLET ORAL
Qty: 60 TABLET | Refills: 0 | Status: SHIPPED | OUTPATIENT
Start: 2018-11-30 | End: 2019-02-04

## 2018-12-06 ENCOUNTER — TELEPHONE (OUTPATIENT)
Dept: FAMILY MEDICINE CLINIC | Facility: CLINIC | Age: 78
End: 2018-12-06

## 2018-12-06 RX ORDER — PROCHLORPERAZINE MALEATE 10 MG
TABLET ORAL
Qty: 12 TABLET | Refills: 0 | Status: SHIPPED | OUTPATIENT
Start: 2018-12-06 | End: 2019-09-17

## 2018-12-06 NOTE — TELEPHONE ENCOUNTER
Pt called and said all this week she has had nausea and vomiting. She wants to know if BODMANDA can call something in to the pharmacy for her. [facility] uses [facility].

## 2018-12-06 NOTE — TELEPHONE ENCOUNTER
rx for compazine has been sent to [address]  Left message for the patient that new rx has been sent  Future Appointments   Date Time Provider Damaris Linares   12/10/2018 10:00 AM Syl Iqbal PA-C [facility] [place]   1/21/2019 11:00 AM

## 2018-12-09 DIAGNOSIS — J01.00 ACUTE NON-RECURRENT MAXILLARY SINUSITIS: ICD-10-CM

## 2018-12-10 ENCOUNTER — OFFICE VISIT (OUTPATIENT)
Dept: FAMILY MEDICINE CLINIC | Facility: CLINIC | Age: 78
End: 2018-12-10
Payer: MEDICARE

## 2018-12-10 ENCOUNTER — LAB ENCOUNTER (OUTPATIENT)
Dept: LAB | Age: 78
End: 2018-12-10
Attending: FAMILY MEDICINE
Payer: MEDICARE

## 2018-12-10 VITALS
SYSTOLIC BLOOD PRESSURE: 88 MMHG | WEIGHT: 127 LBS | TEMPERATURE: 99 F | BODY MASS INDEX: 22.5 KG/M2 | HEIGHT: 62.8 IN | DIASTOLIC BLOOD PRESSURE: 52 MMHG | HEART RATE: 64 BPM

## 2018-12-10 DIAGNOSIS — I25.10 CORONARY ARTERY DISEASE INVOLVING NATIVE HEART WITHOUT ANGINA PECTORIS, UNSPECIFIED VESSEL OR LESION TYPE: ICD-10-CM

## 2018-12-10 DIAGNOSIS — Z91.19 NONCOMPLIANCE: ICD-10-CM

## 2018-12-10 DIAGNOSIS — R09.81 CHRONIC NASAL CONGESTION: ICD-10-CM

## 2018-12-10 DIAGNOSIS — Z87.11 HISTORY OF PEPTIC ULCER: ICD-10-CM

## 2018-12-10 DIAGNOSIS — R53.82 CHRONIC FATIGUE: ICD-10-CM

## 2018-12-10 DIAGNOSIS — R10.13 EPIGASTRIC PAIN: ICD-10-CM

## 2018-12-10 DIAGNOSIS — R07.89 LEFT-SIDED CHEST WALL PAIN: ICD-10-CM

## 2018-12-10 DIAGNOSIS — Z78.9 POOR HISTORIAN: ICD-10-CM

## 2018-12-10 DIAGNOSIS — R94.4 ABNORMAL KIDNEY FUNCTION STUDY: ICD-10-CM

## 2018-12-10 DIAGNOSIS — R07.9 CHEST PAIN AT REST: Primary | ICD-10-CM

## 2018-12-10 DIAGNOSIS — Z95.5 HISTORY OF CORONARY ARTERY STENT PLACEMENT: ICD-10-CM

## 2018-12-10 DIAGNOSIS — R07.9 CHEST PAIN AT REST: ICD-10-CM

## 2018-12-10 DIAGNOSIS — R30.0 DYSURIA: ICD-10-CM

## 2018-12-10 DIAGNOSIS — R11.0 NAUSEA: ICD-10-CM

## 2018-12-10 PROBLEM — Z91.199 NONCOMPLIANCE: Status: ACTIVE | Noted: 2018-12-10

## 2018-12-10 PROCEDURE — 36415 COLL VENOUS BLD VENIPUNCTURE: CPT

## 2018-12-10 PROCEDURE — 93000 ELECTROCARDIOGRAM COMPLETE: CPT | Performed by: FAMILY MEDICINE

## 2018-12-10 PROCEDURE — 84443 ASSAY THYROID STIM HORMONE: CPT

## 2018-12-10 PROCEDURE — 82607 VITAMIN B-12: CPT

## 2018-12-10 PROCEDURE — 85025 COMPLETE CBC W/AUTO DIFF WBC: CPT

## 2018-12-10 PROCEDURE — 81003 URINALYSIS AUTO W/O SCOPE: CPT | Performed by: FAMILY MEDICINE

## 2018-12-10 PROCEDURE — 80053 COMPREHEN METABOLIC PANEL: CPT

## 2018-12-10 PROCEDURE — 87086 URINE CULTURE/COLONY COUNT: CPT | Performed by: FAMILY MEDICINE

## 2018-12-10 PROCEDURE — 84439 ASSAY OF FREE THYROXINE: CPT

## 2018-12-10 PROCEDURE — 99215 OFFICE O/P EST HI 40 MIN: CPT | Performed by: FAMILY MEDICINE

## 2018-12-10 RX ORDER — PANTOPRAZOLE SODIUM 40 MG/1
40 TABLET, DELAYED RELEASE ORAL DAILY PRN
Qty: 30 TABLET | Refills: 1 | Status: SHIPPED | OUTPATIENT
Start: 2018-12-10 | End: 2019-12-27

## 2018-12-10 RX ORDER — MONTELUKAST SODIUM 10 MG/1
TABLET ORAL
Qty: 90 TABLET | Refills: 0 | Status: SHIPPED | OUTPATIENT
Start: 2018-12-10 | End: 2019-04-01

## 2018-12-10 NOTE — PATIENT INSTRUCTIONS
Flonase nasal spray 2 srays once daily    Ipatropium Bromide 1-2 sprays twice daiy    Astelaline nasal spray 1 spray twice daily    Mucinex PLAIN 1,200 mg every 12 hours     Dr Vira Johnson cardiologist.follow up overdue    Overdue follow up with GI Dr Kimberly Monroy
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
## 2018-12-11 NOTE — PROGRESS NOTES
Labs are looking stable with the kidney function  Electrolytes and liver function tests are normal.  CBC white blood cell count is essentially normal with some elevated lymphocytes consistent with a viral infection.   Vitamin B12 and thyroid testing is norm

## 2018-12-12 NOTE — PROGRESS NOTES
Urine no growth follow-up with Dr. Reji Birmingham urologist  if symptoms are persistent.   Sent to my chart

## 2018-12-17 DIAGNOSIS — R11.0 NAUSEA: ICD-10-CM

## 2018-12-17 RX ORDER — ONDANSETRON 4 MG/1
TABLET, FILM COATED ORAL
Qty: 15 TABLET | Refills: 0 | Status: SHIPPED | OUTPATIENT
Start: 2018-12-17 | End: 2018-12-28

## 2018-12-17 NOTE — TELEPHONE ENCOUNTER
15 tablets sent and     Memory Kocher, PA-C   You 12 minutes ago (4:14 PM)      She needs to see GI tell her only #15 she needs the GI consult I have been telling her for months she needs endoscopy.        15 tablets sent and pt notified that she needs

## 2018-12-19 ENCOUNTER — TELEPHONE (OUTPATIENT)
Dept: SURGERY | Facility: CLINIC | Age: 78
End: 2018-12-19

## 2018-12-19 NOTE — TELEPHONE ENCOUNTER
Per last OV 10/29/18    ASSESSMENT AND PLAN:   Lumbar radiculitis  (primary encounter diagnosis)  Spinal cord stimulator status      I have recommended:  Continue lidocaine patches 5%, place up to two patches over painful areas daily, wear for 12 hours, re

## 2018-12-20 NOTE — TELEPHONE ENCOUNTER
Dr. Jese Nicholas has recommended that she meet again with [employer] representative for adjustment of her spinal cord stimulator again prior to additional injections. Would you ask her to call us back if this doesn't help.   Thanks

## 2018-12-28 DIAGNOSIS — R11.0 NAUSEA: ICD-10-CM

## 2018-12-28 RX ORDER — ONDANSETRON 4 MG/1
TABLET, FILM COATED ORAL
Qty: 15 TABLET | Refills: 0 | Status: SHIPPED | OUTPATIENT
Start: 2018-12-28 | End: 2019-02-06 | Stop reason: ALTCHOICE

## 2018-12-31 DIAGNOSIS — I10 ESSENTIAL HYPERTENSION: ICD-10-CM

## 2018-12-31 RX ORDER — LOSARTAN POTASSIUM 100 MG/1
TABLET ORAL
Qty: 90 TABLET | Refills: 0 | Status: SHIPPED | OUTPATIENT
Start: 2018-12-31 | End: 2019-04-23

## 2019-01-03 ENCOUNTER — APPOINTMENT (OUTPATIENT)
Dept: CT IMAGING | Facility: HOSPITAL | Age: 79
End: 2019-01-03
Attending: EMERGENCY MEDICINE
Payer: MEDICARE

## 2019-01-03 ENCOUNTER — HOSPITAL ENCOUNTER (EMERGENCY)
Facility: HOSPITAL | Age: 79
Discharge: HOME | End: 2019-01-03
Attending: EMERGENCY MEDICINE
Payer: MEDICARE

## 2019-01-03 VITALS
HEART RATE: 54 BPM | SYSTOLIC BLOOD PRESSURE: 153 MMHG | BODY MASS INDEX: 21.26 KG/M2 | OXYGEN SATURATION: 98 % | HEIGHT: 63 IN | WEIGHT: 120 LBS | DIASTOLIC BLOOD PRESSURE: 71 MMHG | TEMPERATURE: 98 F | RESPIRATION RATE: 18 BRPM

## 2019-01-03 DIAGNOSIS — K52.9 COLITIS: Primary | ICD-10-CM

## 2019-01-03 LAB
ALBUMIN SERPL-MCNC: 4.1 G/DL (ref 3.1–4.5)
ALBUMIN/GLOB SERPL: 1.1 {RATIO} (ref 1–2)
ALP LIVER SERPL-CCNC: 71 U/L (ref 55–142)
ALT SERPL-CCNC: 18 U/L (ref 14–54)
ANION GAP SERPL CALC-SCNC: 5 MMOL/L (ref 0–18)
AST SERPL-CCNC: 17 U/L (ref 15–41)
BASOPHILS # BLD AUTO: 0.08 X10(3) UL (ref 0–0.1)
BASOPHILS NFR BLD AUTO: 0.7 %
BILIRUB SERPL-MCNC: 0.5 MG/DL (ref 0.1–2)
BILIRUB UR QL STRIP.AUTO: NEGATIVE
BUN BLD-MCNC: 16 MG/DL (ref 8–20)
BUN/CREAT SERPL: 13.6 (ref 10–20)
CALCIUM BLD-MCNC: 10.3 MG/DL (ref 8.3–10.3)
CHLORIDE SERPL-SCNC: 110 MMOL/L (ref 101–111)
CLARITY UR REFRACT.AUTO: CLEAR
CO2 SERPL-SCNC: 26 MMOL/L (ref 22–32)
COLOR UR AUTO: YELLOW
CREAT BLD-MCNC: 1.18 MG/DL (ref 0.55–1.02)
EOSINOPHIL # BLD AUTO: 0.11 X10(3) UL (ref 0–0.3)
EOSINOPHIL NFR BLD AUTO: 1 %
ERYTHROCYTE [DISTWIDTH] IN BLOOD BY AUTOMATED COUNT: 13 % (ref 11.5–16)
GLOBULIN PLAS-MCNC: 3.8 G/DL (ref 2.8–4.4)
GLUCOSE BLD-MCNC: 93 MG/DL (ref 70–99)
GLUCOSE UR STRIP.AUTO-MCNC: NEGATIVE MG/DL
HCT VFR BLD AUTO: 39.5 % (ref 34–50)
HGB BLD-MCNC: 13.3 G/DL (ref 12–16)
IMMATURE GRANULOCYTE COUNT: 0.03 X10(3) UL (ref 0–1)
IMMATURE GRANULOCYTE RATIO %: 0.3 %
KETONES UR STRIP.AUTO-MCNC: NEGATIVE MG/DL
LEUKOCYTE ESTERASE UR QL STRIP.AUTO: NEGATIVE
LIPASE: 109 U/L (ref 73–393)
LYMPHOCYTES # BLD AUTO: 4.16 X10(3) UL (ref 0.9–4)
LYMPHOCYTES NFR BLD AUTO: 37.4 %
M PROTEIN MFR SERPL ELPH: 7.9 G/DL (ref 6.4–8.2)
MCH RBC QN AUTO: 31.4 PG (ref 27–33.2)
MCHC RBC AUTO-ENTMCNC: 33.7 G/DL (ref 31–37)
MCV RBC AUTO: 93.4 FL (ref 81–100)
MONOCYTES # BLD AUTO: 0.83 X10(3) UL (ref 0.1–1)
MONOCYTES NFR BLD AUTO: 7.5 %
NEUTROPHIL ABS PRELIM: 5.91 X10 (3) UL (ref 1.3–6.7)
NEUTROPHILS # BLD AUTO: 5.91 X10(3) UL (ref 1.3–6.7)
NEUTROPHILS NFR BLD AUTO: 53.1 %
NITRITE UR QL STRIP.AUTO: NEGATIVE
OSMOLALITY SERPL CALC.SUM OF ELEC: 293 MOSM/KG (ref 275–295)
PH UR STRIP.AUTO: 6 [PH] (ref 4.5–8)
PLATELET # BLD AUTO: 332 10(3)UL (ref 150–450)
POTASSIUM SERPL-SCNC: 3.7 MMOL/L (ref 3.6–5.1)
PROT UR STRIP.AUTO-MCNC: NEGATIVE MG/DL
RBC # BLD AUTO: 4.23 X10(6)UL (ref 3.8–5.1)
RED CELL DISTRIBUTION WIDTH-SD: 44.5 FL (ref 35.1–46.3)
SODIUM SERPL-SCNC: 141 MMOL/L (ref 136–144)
SP GR UR STRIP.AUTO: 1.01 (ref 1–1.03)
UROBILINOGEN UR STRIP.AUTO-MCNC: <2 MG/DL
WBC # BLD AUTO: 11.1 X10(3) UL (ref 4–13)

## 2019-01-03 PROCEDURE — 81001 URINALYSIS AUTO W/SCOPE: CPT | Performed by: EMERGENCY MEDICINE

## 2019-01-03 PROCEDURE — 85025 COMPLETE CBC W/AUTO DIFF WBC: CPT | Performed by: EMERGENCY MEDICINE

## 2019-01-03 PROCEDURE — 96361 HYDRATE IV INFUSION ADD-ON: CPT

## 2019-01-03 PROCEDURE — 80053 COMPREHEN METABOLIC PANEL: CPT | Performed by: EMERGENCY MEDICINE

## 2019-01-03 PROCEDURE — 83690 ASSAY OF LIPASE: CPT | Performed by: EMERGENCY MEDICINE

## 2019-01-03 PROCEDURE — 74177 CT ABD & PELVIS W/CONTRAST: CPT | Performed by: EMERGENCY MEDICINE

## 2019-01-03 PROCEDURE — 96360 HYDRATION IV INFUSION INIT: CPT

## 2019-01-03 PROCEDURE — 99284 EMERGENCY DEPT VISIT MOD MDM: CPT

## 2019-01-03 RX ORDER — ONDANSETRON 4 MG/1
4 TABLET, ORALLY DISINTEGRATING ORAL EVERY 4 HOURS PRN
Qty: 10 TABLET | Refills: 0 | Status: SHIPPED | OUTPATIENT
Start: 2019-01-03 | End: 2019-01-10

## 2019-01-03 NOTE — ED INITIAL ASSESSMENT (HPI)
Pt to ED for c/o frequent urination and pain in left flank since yesterday. Pt states history of bladder infections. Pt denies any pain or burning when urinating.  Pt denies n/v.

## 2019-01-07 NOTE — TELEPHONE ENCOUNTER
Spoke with patient and informed patient of message below. Pt verbalized understanding and states she has made adjustments and is getting relief. Encouraged pt to call back with any further issues. Pt verbalized understanding. . No further questions at [facility]

## 2019-01-14 ENCOUNTER — TELEPHONE (OUTPATIENT)
Dept: FAMILY MEDICINE CLINIC | Facility: CLINIC | Age: 79
End: 2019-01-14

## 2019-01-14 NOTE — TELEPHONE ENCOUNTER
Adrian Bunch about 4 days ago for a sinus infection, she is not feeling any better, does she need to come back in or can she get a different prescription sent to her [address], please call Cheyenne Oliver at [phone]

## 2019-01-14 NOTE — TELEPHONE ENCOUNTER
Referral was placed for pt to see Dr. Peterson Rolling back in Dec. 2018. Spoke to patient and asked if she ever made that appt and pt said no.   Then pt states she has the info for Dr. [place] Members and then discussed that pt needs to contact his office to make an appt

## 2019-01-21 ENCOUNTER — OFFICE VISIT (OUTPATIENT)
Dept: FAMILY MEDICINE CLINIC | Facility: CLINIC | Age: 79
End: 2019-01-21
Payer: MEDICARE

## 2019-01-21 VITALS
BODY MASS INDEX: 20.37 KG/M2 | DIASTOLIC BLOOD PRESSURE: 58 MMHG | WEIGHT: 115 LBS | HEIGHT: 62.8 IN | SYSTOLIC BLOOD PRESSURE: 106 MMHG | HEART RATE: 60 BPM

## 2019-01-21 DIAGNOSIS — E78.2 MIXED HYPERLIPIDEMIA: ICD-10-CM

## 2019-01-21 DIAGNOSIS — M54.16 LUMBAR RADICULOPATHY: ICD-10-CM

## 2019-01-21 DIAGNOSIS — Z00.00 ENCOUNTER FOR ANNUAL HEALTH EXAMINATION: Primary | ICD-10-CM

## 2019-01-21 DIAGNOSIS — I25.10 CORONARY ARTERY DISEASE INVOLVING NATIVE CORONARY ARTERY OF NATIVE HEART WITHOUT ANGINA PECTORIS: ICD-10-CM

## 2019-01-21 DIAGNOSIS — M81.0 AGE-RELATED OSTEOPOROSIS WITHOUT CURRENT PATHOLOGICAL FRACTURE: ICD-10-CM

## 2019-01-21 DIAGNOSIS — Z87.442 HISTORY OF NEPHROLITHIASIS: ICD-10-CM

## 2019-01-21 DIAGNOSIS — N18.30 CKD (CHRONIC KIDNEY DISEASE) STAGE 3, GFR 30-59 ML/MIN (HCC): ICD-10-CM

## 2019-01-21 DIAGNOSIS — F33.1 MODERATE EPISODE OF RECURRENT MAJOR DEPRESSIVE DISORDER (HCC): ICD-10-CM

## 2019-01-21 DIAGNOSIS — Z95.5 HISTORY OF CORONARY ARTERY STENT PLACEMENT: ICD-10-CM

## 2019-01-21 DIAGNOSIS — F41.1 GAD (GENERALIZED ANXIETY DISORDER): ICD-10-CM

## 2019-01-21 DIAGNOSIS — Z78.0 POSTMENOPAUSAL: ICD-10-CM

## 2019-01-21 DIAGNOSIS — Z12.31 SCREENING MAMMOGRAM, ENCOUNTER FOR: ICD-10-CM

## 2019-01-21 DIAGNOSIS — I10 ESSENTIAL HYPERTENSION: ICD-10-CM

## 2019-01-21 DIAGNOSIS — R25.1 TREMORS OF NERVOUS SYSTEM: ICD-10-CM

## 2019-01-21 DIAGNOSIS — E87.6 HYPOKALEMIA: ICD-10-CM

## 2019-01-21 DIAGNOSIS — E21.0 PRIMARY HYPERPARATHYROIDISM (HCC): ICD-10-CM

## 2019-01-21 DIAGNOSIS — F13.20 BENZODIAZEPINE DEPENDENCE (HCC): ICD-10-CM

## 2019-01-21 DIAGNOSIS — R09.81 CHRONIC NASAL CONGESTION: ICD-10-CM

## 2019-01-21 DIAGNOSIS — Z87.11 HISTORY OF PEPTIC ULCER: ICD-10-CM

## 2019-01-21 DIAGNOSIS — K29.80 DUODENITIS DETERMINED BY BIOPSY: ICD-10-CM

## 2019-01-21 DIAGNOSIS — K59.01 SLOW TRANSIT CONSTIPATION: ICD-10-CM

## 2019-01-21 PROBLEM — D64.9 ANEMIA: Status: RESOLVED | Noted: 2018-04-09 | Resolved: 2019-01-21

## 2019-01-21 PROBLEM — D50.9 MICROCYTIC ANEMIA: Status: RESOLVED | Noted: 2017-03-02 | Resolved: 2019-01-21

## 2019-01-21 PROBLEM — J32.0 CHRONIC MAXILLARY SINUSITIS: Status: RESOLVED | Noted: 2018-03-10 | Resolved: 2019-01-21

## 2019-01-21 PROBLEM — R10.13 EPIGASTRIC PAIN: Status: RESOLVED | Noted: 2018-12-10 | Resolved: 2019-01-21

## 2019-01-21 PROCEDURE — G0439 PPPS, SUBSEQ VISIT: HCPCS | Performed by: FAMILY MEDICINE

## 2019-01-21 RX ORDER — PRAVASTATIN SODIUM 40 MG
TABLET ORAL
Qty: 90 TABLET | Refills: 0 | Status: SHIPPED | OUTPATIENT
Start: 2019-01-21 | End: 2019-04-19

## 2019-01-21 RX ORDER — IPRATROPIUM BROMIDE 42 UG/1
SPRAY, METERED NASAL
Refills: 6 | COMMUNITY
Start: 2019-01-08 | End: 2021-05-13

## 2019-01-21 NOTE — PROGRESS NOTES
HPI:   Rudy Hayes is a 70year old female who presents for a Medicare Subsequent Annual Wellness visit (Pt already had Initial Annual Wellness).   Last bone density 1/30/15  Last mammogram 2012 patient has compliancy issues  Bilateral hearing aids  Re had been on fluoxetine 100 mg but has gone off of it feels the Klonopin works well enough for the anxiety and does not have extreme depression symptoms  Insomnia uses trazodone 50 mg  5.  Benzodiazepine dependence ([address]  Patient is on Klonopin twice a day w 1 year: Annual Physical due on 2018         Fall/Risk Assessment   She has been screened for Falls and is low risk: Fall/Risk Scorin    Cognitive Assessment   She had a completely normal cognitive assessment- see flowsheet entries    Functional A recurring gastric ulcers aspirin not indicated.   Anthony Palencia is unable to use daily aspirin therapy For the following reasons:   History of GI Bleeding        CAGE Alcohol screening   Anthony Palencia was screened for Alcohol abuse and had a score of 0 so 145 07/05/2018    HDL 51 07/05/2018    LDL 69 07/05/2018    TRIG 124 07/05/2018          Last Chemistry Labs:   Lab Results   Component Value Date    AST 17 01/03/2019    ALT 18 01/03/2019    CA 10.3 01/03/2019    ALB 4.1 01/03/2019    TSH 1.570 12/10/2018 MG Oral Tab Take 1 tablet twice daily. (Patient taking differently: Take 1 tablet daily. )   nystatin 487595 units Oral Tab Take 1 tablet (500,000 Units total) by mouth 4 (four) times daily as needed.  Daily      MEDICAL INFORMATION:   She  has a past medic EPIDURAL STEROID INJECTION MULTIPLE LEVEL (Bilateral, 8/2/2016); TRANSFORAMINAL LUMBAR EPIDURAL STEROID INJECTION SINGLE LEVEL (Bilateral, 7/14/2016); TRANSFORAMINAL LUMBAR EPIDURAL STEROID INJECTION MULTIPLE LEVEL (Bilateral, 7/7/2016); SACROILIAC JOINT I Hearing Assessment  (Required for AWV/SWV)    Finger Rub patient wears hearing aids with hearing aid she is able to hear without is unable to hear.        Visual Acuity                           General Appearance:  Alert, cooperative, no distress, appears Prsv Free 0.5 ml (99756) 09/25/2015        ASSESSMENT AND OTHER RELEVANT CHRONIC CONDITIONS:   Deja Dumont is a 70year old female who presents for a Medicare Assessment.      PLAN SUMMARY:   Diagnoses and all orders for this visit:    Encounter for nina drive, operate machinery or drink alcohol when taking this medication.   Slow transit constipation  Patient is going to add Metamucil or Benefiber every day along with increasing water intake avoid laxatives  Lumbar radiculopathy  Patient has intermittent p describe your daily physical activity?: Light  How would you describe your current health state?: Fair  How do you maintain positive mental well-being?: Visiting Family      This section provided for quick review of chart, separate sheet to patient  PREVEN are no preventive care reminders to display for this patient.  Update Health Maintenance if applicable     Immunizations (Update Immunization Activity if applicable)     Influenza  Covered Annually  Please get every year    Pneumococcal 13 (Prevnar)  Covere

## 2019-01-21 NOTE — PATIENT INSTRUCTIONS
SCHEDULING [facility] LAB APPOINTMENTS ONLINE    Lab appointments can now be scheduled online at [email or web address]    · Go to [email or web address]  · In Search type Lab  · Click \"Lab services\"  · Click \"Schedule Your Test Online\"  · Follow the prompts  · If you Cholesterol, Total (mg/dL)   Date Value   07/05/2018 145     TRIGLYCERIDES (mg/dL)   Date Value   12/03/2013 81     Triglycerides (mg/dL)   Date Value   07/05/2018 124        EKG - covered if needed at Welcome to Medicare, and non-screening if indicate found.     Recommended for 2 year anniversary or as ordered in After Visit Summary   Pap and Pelvic      Pap: Every 3 yrs age 21-65 or Pap+HPV every 5 yrs age 33-67, Covered every 2 yrs up to age 79 or Yearly if High Risk   There are no preventive care luis prescription benefits     Recommended Websites for Advanced Directives    [email or web address]/[email or web address] pdf  An information packet, including necessary form from the [employer] website. http://www. idph.stat

## 2019-02-04 DIAGNOSIS — F41.1 GAD (GENERALIZED ANXIETY DISORDER): ICD-10-CM

## 2019-02-04 RX ORDER — CLONAZEPAM 0.5 MG/1
TABLET ORAL
Qty: 60 TABLET | Refills: 0 | Status: SHIPPED | OUTPATIENT
Start: 2019-02-04 | End: 2019-03-28

## 2019-02-06 ENCOUNTER — OFFICE VISIT (OUTPATIENT)
Dept: FAMILY MEDICINE CLINIC | Facility: CLINIC | Age: 79
End: 2019-02-06
Payer: MEDICARE

## 2019-02-06 ENCOUNTER — HOSPITAL ENCOUNTER (OUTPATIENT)
Age: 79
Discharge: HOME | End: 2019-02-06
Attending: FAMILY MEDICINE
Payer: MEDICARE

## 2019-02-06 ENCOUNTER — APPOINTMENT (OUTPATIENT)
Dept: CT IMAGING | Age: 79
End: 2019-02-06
Attending: FAMILY MEDICINE
Payer: MEDICARE

## 2019-02-06 VITALS
OXYGEN SATURATION: 97 % | DIASTOLIC BLOOD PRESSURE: 80 MMHG | WEIGHT: 123.81 LBS | BODY MASS INDEX: 22 KG/M2 | HEART RATE: 67 BPM | SYSTOLIC BLOOD PRESSURE: 140 MMHG | RESPIRATION RATE: 18 BRPM | TEMPERATURE: 97 F

## 2019-02-06 VITALS
TEMPERATURE: 98 F | SYSTOLIC BLOOD PRESSURE: 120 MMHG | RESPIRATION RATE: 20 BRPM | OXYGEN SATURATION: 99 % | HEART RATE: 60 BPM | DIASTOLIC BLOOD PRESSURE: 70 MMHG

## 2019-02-06 DIAGNOSIS — R11.2 NAUSEA VOMITING AND DIARRHEA: Primary | ICD-10-CM

## 2019-02-06 DIAGNOSIS — R19.7 NAUSEA VOMITING AND DIARRHEA: Primary | ICD-10-CM

## 2019-02-06 DIAGNOSIS — Z02.9 ADMINISTRATIVE ENCOUNTER: Primary | ICD-10-CM

## 2019-02-06 LAB
#MXD IC: 1.2 X10ˆ3/UL (ref 0.1–1)
CREAT SERPL-MCNC: 1 MG/DL (ref 0.55–1.02)
GLUCOSE BLD-MCNC: 104 MG/DL (ref 70–99)
HCT VFR BLD AUTO: 38.5 % (ref 35–48)
HGB BLD-MCNC: 12.8 G/DL (ref 12–16)
ISTAT BUN: 20 MG/DL (ref 8–20)
ISTAT CHLORIDE: 108 MMOL/L (ref 101–111)
ISTAT HEMATOCRIT: 40 % (ref 34–50)
ISTAT IONIZED CALCIUM: 1.28 MMOL/L
ISTAT POTASSIUM: 3.9 MMOL/L (ref 3.6–5.1)
ISTAT SODIUM: 141 MMOL/L (ref 136–144)
LYMPHOCYTES # BLD AUTO: 3.9 X10ˆ3/UL (ref 1–4)
LYMPHOCYTES NFR BLD AUTO: 31.1 %
MCH RBC QN AUTO: 31.4 PG (ref 26–34)
MCHC RBC AUTO-ENTMCNC: 33.2 G/DL (ref 31–37)
MCV RBC AUTO: 94.6 FL (ref 80–100)
MIXED CELL %: 9.9 %
NEUTROPHILS # BLD AUTO: 7.5 X10ˆ3/UL (ref 1.5–7.7)
NEUTROPHILS NFR BLD AUTO: 59 %
PLATELET # BLD AUTO: 334 X10ˆ3/UL (ref 150–450)
POCT BILIRUBIN URINE: NEGATIVE
POCT BLOOD URINE: NEGATIVE
POCT GLUCOSE URINE: NEGATIVE MG/DL
POCT INFLUENZA A: NEGATIVE
POCT INFLUENZA B: NEGATIVE
POCT KETONE URINE: NEGATIVE MG/DL
POCT LEUKOCYTE ESTERASE URINE: NEGATIVE
POCT NITRITE URINE: NEGATIVE
POCT PH URINE: 6 (ref 5–8)
POCT PROTEIN URINE: 30 MG/DL
POCT SPECIFIC GRAVITY URINE: 1.01
POCT URINE CLARITY: CLEAR
POCT URINE COLOR: YELLOW
POCT UROBILINOGEN URINE: 0.2 MG/DL
RBC # BLD AUTO: 4.07 X10ˆ6/UL (ref 3.8–5.3)
WBC # BLD AUTO: 12.6 X10ˆ3/UL (ref 4–11)

## 2019-02-06 PROCEDURE — 81002 URINALYSIS NONAUTO W/O SCOPE: CPT | Performed by: FAMILY MEDICINE

## 2019-02-06 PROCEDURE — 96361 HYDRATE IV INFUSION ADD-ON: CPT

## 2019-02-06 PROCEDURE — 85025 COMPLETE CBC W/AUTO DIFF WBC: CPT | Performed by: FAMILY MEDICINE

## 2019-02-06 PROCEDURE — 99215 OFFICE O/P EST HI 40 MIN: CPT

## 2019-02-06 PROCEDURE — 80047 BASIC METABLC PNL IONIZED CA: CPT

## 2019-02-06 PROCEDURE — 74176 CT ABD & PELVIS W/O CONTRAST: CPT | Performed by: FAMILY MEDICINE

## 2019-02-06 PROCEDURE — 99214 OFFICE O/P EST MOD 30 MIN: CPT

## 2019-02-06 PROCEDURE — 87502 INFLUENZA DNA AMP PROBE: CPT | Performed by: FAMILY MEDICINE

## 2019-02-06 PROCEDURE — 96374 THER/PROPH/DIAG INJ IV PUSH: CPT

## 2019-02-06 RX ORDER — ACETAMINOPHEN 500 MG
1000 TABLET ORAL ONCE
Status: COMPLETED | OUTPATIENT
Start: 2019-02-06 | End: 2019-02-06

## 2019-02-06 RX ORDER — ONDANSETRON 2 MG/ML
4 INJECTION INTRAMUSCULAR; INTRAVENOUS ONCE
Status: COMPLETED | OUTPATIENT
Start: 2019-02-06 | End: 2019-02-06

## 2019-02-06 RX ORDER — SODIUM CHLORIDE 9 MG/ML
1000 INJECTION, SOLUTION INTRAVENOUS ONCE
Status: COMPLETED | OUTPATIENT
Start: 2019-02-06 | End: 2019-02-06

## 2019-02-06 RX ORDER — ONDANSETRON 4 MG/1
TABLET, ORALLY DISINTEGRATING ORAL
Qty: 12 TABLET | Refills: 0 | Status: SHIPPED | OUTPATIENT
Start: 2019-02-06 | End: 2019-07-08

## 2019-02-06 NOTE — PROGRESS NOTES
Pt presented with low abdominal cramping, general body aches, nausea and diarrhea X 2 days. Feeling shaky. Able to tolerate some PO. Denies blood in stool or black stool. Patient was seen in ED one month ago and diagnosed with colitis.   Reports symptom

## 2019-02-06 NOTE — ED INITIAL ASSESSMENT (HPI)
Pt presents visible shaking, states she is freezing and has been shaking since last night. Warm blankets provided, pt soon slowed her shaking. Onset diarrhea with nausea and chills during night. 4 episodes of diarrhea. No emesis. Afebrile.   States \

## 2019-02-06 NOTE — ED PROVIDER NOTES
Patient Seen in: THE [facility]    History   Patient presents with:  Nausea/Vomiting/Diarrhea (gastrointestinal)    Stated Complaint: nausea headache trembling with diar x since yester (sent from [facility])    HPI    This 70 status     Age of onset: 30years old   • Presence of other cardiac implants and grafts    • Rheumatic heart disease, unspecified    • Sleep disturbance    • Stented coronary artery    • Stress    • Tinnitus    • Uncomfortable fullness after meals    • Uns Mirza De Leon MD at [address] [place] [address]   • TRANSFORAMINAL LUMBAR EPIDURAL STEROID INJECTION MULTIPLE LEVEL Bilateral 7/7/2016    Performed by Mirza De Leon MD at [facility] MAIN OR   • TRANSFORAMINAL LUMBAR EPIDURAL STEROID INJECTION SINGLE LEVEL Bilateral 7/14/20 rebound, no masses or hepatosplenomegaly, normal bowel sounds in all four quadrants. EXTREMITIES: No edema noted. SKIN: Warm and dry.       ED Course     Labs Reviewed   POCT CBC - Abnormal; Notable for the following components:       Result Value    WBC kidney measuring up to 5.2 cm. Probable cyst in the mid right kidney measuring up to 2.6 cm. Probable cyst in the lower  pole right kidney measuring up to 3.5 cm. Subcentimeter hypodensities in the kidneys are too small to further characterize.   No obst better.         Disposition and Plan     Clinical Impression:  Nausea vomiting and diarrhea  (primary encounter diagnosis)    Disposition:  Discharge  2/6/2019  2:28 pm    Follow-up:  Ruthann Bird, [address]  [address]

## 2019-02-09 DIAGNOSIS — F51.04 PSYCHOPHYSIOLOGICAL INSOMNIA: ICD-10-CM

## 2019-02-11 RX ORDER — TRAZODONE HYDROCHLORIDE 50 MG/1
TABLET ORAL
Qty: 30 TABLET | Refills: 2 | Status: SHIPPED | OUTPATIENT
Start: 2019-02-11 | End: 2019-02-20

## 2019-02-19 ENCOUNTER — TELEPHONE (OUTPATIENT)
Dept: FAMILY MEDICINE CLINIC | Facility: CLINIC | Age: 79
End: 2019-02-19

## 2019-02-19 NOTE — TELEPHONE ENCOUNTER
Spoke to patient and she said that she has really bad diarrhea with bad abdominal cramps started yesterday. Probably having 4 loose stools daily.   appt made with Dr. Lizzie Tyler for tomorrow 10am.

## 2019-02-19 NOTE — TELEPHONE ENCOUNTER
Pt called stating she is not feeling well at all, states she is having horrible stomach pain along with diarrhea. Pt would like to know what she should do since there are no appointments available.

## 2019-02-20 ENCOUNTER — OFFICE VISIT (OUTPATIENT)
Dept: FAMILY MEDICINE CLINIC | Facility: CLINIC | Age: 79
End: 2019-02-20
Payer: MEDICARE

## 2019-02-20 VITALS
BODY MASS INDEX: 22.02 KG/M2 | SYSTOLIC BLOOD PRESSURE: 118 MMHG | HEART RATE: 64 BPM | WEIGHT: 124.25 LBS | DIASTOLIC BLOOD PRESSURE: 62 MMHG | TEMPERATURE: 98 F | HEIGHT: 62.8 IN

## 2019-02-20 DIAGNOSIS — J11.1 INFLUENZA-LIKE ILLNESS: Primary | ICD-10-CM

## 2019-02-20 DIAGNOSIS — G47.09 OTHER INSOMNIA: ICD-10-CM

## 2019-02-20 PROCEDURE — 99214 OFFICE O/P EST MOD 30 MIN: CPT | Performed by: FAMILY MEDICINE

## 2019-02-20 RX ORDER — TRAZODONE HYDROCHLORIDE 100 MG/1
100 TABLET ORAL NIGHTLY
Qty: 30 TABLET | Refills: 1 | Status: SHIPPED | OUTPATIENT
Start: 2019-02-20 | End: 2019-04-09

## 2019-02-20 NOTE — PATIENT INSTRUCTIONS
-- increase trazodone to 100mg nightly  -- start melatonin consisently    -- --rest, fluids, soups, humidifier, tea with lemon or honey, tylenol/advil as needed for discomfort  -- otc generic mucinex (or with DM if coughing)  can help loosen up congestio
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
## 2019-02-22 ENCOUNTER — TELEPHONE (OUTPATIENT)
Dept: FAMILY MEDICINE CLINIC | Facility: CLINIC | Age: 79
End: 2019-02-22

## 2019-02-22 NOTE — TELEPHONE ENCOUNTER
Patient has had diarrhea for the last 4 days.  It is constant throughout the day and she is also experiencing abdominal pain

## 2019-02-22 NOTE — TELEPHONE ENCOUNTER
Advised patient that per Faizan Mccall she should go to ER or [facility] for further evaluation due to her increased risk for C.dif from frequent rounds of abx.   Patient verbalized understanding and agreement

## 2019-02-22 NOTE — TELEPHONE ENCOUNTER
Jin Cates is calling to let Eleni Palumbo know she seen Dr Hailee Thomas a couple of days ago and he did not prescribe her nothing for her symptoms, today she has the shakes and diarrhea, she needs something to help her with her symptoms, she would like a call from the off

## 2019-02-27 ENCOUNTER — OFFICE VISIT (OUTPATIENT)
Dept: FAMILY MEDICINE CLINIC | Facility: CLINIC | Age: 79
End: 2019-02-27
Payer: MEDICARE

## 2019-02-27 VITALS
HEART RATE: 72 BPM | TEMPERATURE: 98 F | DIASTOLIC BLOOD PRESSURE: 62 MMHG | BODY MASS INDEX: 22.32 KG/M2 | SYSTOLIC BLOOD PRESSURE: 128 MMHG | HEIGHT: 62.8 IN | WEIGHT: 126 LBS

## 2019-02-27 DIAGNOSIS — J01.10 ACUTE FRONTAL SINUSITIS, RECURRENCE NOT SPECIFIED: Primary | ICD-10-CM

## 2019-02-27 PROCEDURE — 99214 OFFICE O/P EST MOD 30 MIN: CPT | Performed by: FAMILY MEDICINE

## 2019-02-27 RX ORDER — AZITHROMYCIN 250 MG/1
TABLET, FILM COATED ORAL
Qty: 11 TABLET | Refills: 0 | Status: SHIPPED | OUTPATIENT
Start: 2019-02-27 | End: 2019-03-12

## 2019-02-27 RX ORDER — ALPRAZOLAM 0.25 MG/1
TABLET ORAL
Refills: 1 | COMMUNITY
Start: 2019-02-21 | End: 2019-09-17 | Stop reason: ALTCHOICE

## 2019-02-27 NOTE — PROGRESS NOTES
CC:  Marco Brambila is a 70year old female here for Patient presents with:  Sore Throat: Bodyaches, headaches x 3 days. Patient denies fever.       HPI:     URI  -started 10 days ago  -seen last week - viral - tried otc meds  -feels like has worsening sinu 500 MG Oral Tab Take 1,000 mg by mouth daily as needed for Pain.  Disp:  Rfl:    POTASSIUM CHLORIDE ER 20 MEQ Oral Tab CR TAKE 2 TABLETS BY MOUTH EVERY DAY (Patient taking differently: TAKE 1 TABLETS BY MOUTH EVERY DAY) Disp: 60 tablet Rfl: 1   lidocaine 5 pressure    • High cholesterol    • History of blood transfusion    • History of cardiac murmur    • History of eating disorder    • History of rheumatic fever    • IBS (irritable bowel syndrome)     constipation   • Irregular bowel habits    • Kidney ston INJECTION BILATERAL Bilateral 6/13/2016    Performed by Zamzam Rhodes MD at [facility]   • SACROILIAC JOINT INJECTION RIGHT OR LEFT Left 10/25/2016    Performed by Zamzam Rhodes MD at [facility] MAIN OR   • SPINAL CORD STIMULATION TRIAL N/A 5/1/2017    Per unremarkable  -start zpack (per patient, has frequently needed 2 courses to help her recurrent infection, so given 10 day course)  -probiotics daily  - encouraged supportive care (rest, fluids, honey, humidifier, tylenol/ibuprofen prn)  - followup if not i

## 2019-02-27 NOTE — PATIENT INSTRUCTIONS
-- start azithromycin as prescribed  -- take probiotics 2x/day for next 2 wks    -- keep food - symptom diary for bad days  -- limit immodium  -- pepto bismol is ok    -- followup as needed

## 2019-02-28 ENCOUNTER — MED REC SCAN ONLY (OUTPATIENT)
Dept: FAMILY MEDICINE CLINIC | Facility: CLINIC | Age: 79
End: 2019-02-28

## 2019-02-28 VITALS — DIASTOLIC BLOOD PRESSURE: 80 MMHG | WEIGHT: 121 LBS | HEART RATE: 68 BPM | SYSTOLIC BLOOD PRESSURE: 136 MMHG

## 2019-03-01 VITALS
BODY MASS INDEX: 22.32 KG/M2 | WEIGHT: 126 LBS | SYSTOLIC BLOOD PRESSURE: 96 MMHG | DIASTOLIC BLOOD PRESSURE: 52 MMHG | HEIGHT: 63 IN | HEART RATE: 68 BPM

## 2019-03-01 DIAGNOSIS — I10 ESSENTIAL HYPERTENSION: ICD-10-CM

## 2019-03-01 RX ORDER — AMLODIPINE BESYLATE 10 MG/1
TABLET ORAL
Qty: 90 TABLET | Refills: 0 | Status: SHIPPED | OUTPATIENT
Start: 2019-03-01 | End: 2019-06-01

## 2019-03-12 ENCOUNTER — OFFICE VISIT (OUTPATIENT)
Dept: FAMILY MEDICINE CLINIC | Facility: CLINIC | Age: 79
End: 2019-03-12
Payer: MEDICARE

## 2019-03-12 VITALS
TEMPERATURE: 99 F | WEIGHT: 126 LBS | HEIGHT: 62.8 IN | RESPIRATION RATE: 18 BRPM | OXYGEN SATURATION: 96 % | DIASTOLIC BLOOD PRESSURE: 68 MMHG | HEART RATE: 75 BPM | BODY MASS INDEX: 22.32 KG/M2 | SYSTOLIC BLOOD PRESSURE: 130 MMHG

## 2019-03-12 DIAGNOSIS — J30.9 ALLERGIC RHINITIS, UNSPECIFIED SEASONALITY, UNSPECIFIED TRIGGER: ICD-10-CM

## 2019-03-12 DIAGNOSIS — J32.4 CHRONIC PANSINUSITIS: Primary | ICD-10-CM

## 2019-03-12 PROCEDURE — 99213 OFFICE O/P EST LOW 20 MIN: CPT | Performed by: NURSE PRACTITIONER

## 2019-03-12 RX ORDER — DOXYCYCLINE HYCLATE 100 MG
100 TABLET ORAL 2 TIMES DAILY
Qty: 20 TABLET | Refills: 0 | Status: SHIPPED | OUTPATIENT
Start: 2019-03-12 | End: 2019-03-22

## 2019-03-12 RX ORDER — MONTELUKAST SODIUM 10 MG/1
10 TABLET ORAL NIGHTLY
Qty: 7 TABLET | Refills: 0 | Status: SHIPPED | OUTPATIENT
Start: 2019-03-12 | End: 2019-07-08

## 2019-03-12 NOTE — PROGRESS NOTES
CHIEF COMPLAINT:   Patient presents with:  Sinus Problem: OTC mes taken, s/s for 1 week      HPI:   Marisel Ramirez is a 70year old female who presents for cold symptoms for  1  weeks.  Symptoms have progressed into sinus congestion and been worsening since MONTELUKAST SODIUM 10 MG Oral Tab TAKE 1 TABLET(10 MG) BY MOUTH EVERY NIGHT Disp: 90 tablet Rfl: 0   Pantoprazole Sodium 40 MG Oral Tab EC Take 1 tablet (40 mg total) by mouth daily as needed.  Disp: 30 tablet Rfl: 1   Prochlorperazine Maleate (COMPAZINE) 1 • Osteoarthrosis, unspecified whether generalized or localized, unspecified site    • Other and unspecified hyperlipidemia    • Parathyroid adenoma    • PONV (postoperative nausea and vomiting)     only with ether years ago   • Postmenopausal status     Ag Neck Fusion; Kris Prater [place]ela Danas   • TONSILLECTOMY     • TOTAL ABDOM HYSTERECTOMY     • TRANSFORAMINAL LUMBAR EPIDURAL STEROID INJECTION MULTIPLE LEVEL Bilateral 8/2/2016    Performed by SKIN: no rashes,no suspicious lesions  HEAD: atraumatic, normocephalic, + tenderness on palpation of frontal and maxillary sinuses  EYES: conjunctiva clear, EOM intact  EARS: TM's without erythema, no bulging, no retraction, no fluid, bony landmarks visibl Allergies can cause nasal passages to swell. This narrows the air passages. Allergies also cause increased mucus production in the nose. These changes result in nasal allergy symptoms. Common symptoms include itching, sneezing, stuffy nose, and runny nose. Sinuses are air-filled spaces in the skull behind the face. They are kept moist and clean by a lining of mucosa. Things such as pollen, smoke, and chemical fumes can irritate the mucosa.  Constant exposure to irritants can cause ongoing inflammation of this · Flushing your sinuses. Your doctor may suggest sinus irrigation. This is flushing your sinuses with saltwater or saline solution. This helps to clear out mucus. · Controlling allergies. If you have allergies, you should have a plan to help control them.

## 2019-03-12 NOTE — PATIENT INSTRUCTIONS
Follow up with PCP, allergist, and ENT    Nasal Allergies: Related Problems  Allergies can cause nasal passages to swell. This narrows the air passages.  Allergies also cause increased mucus production in the nose. These changes result in nasal allergy sy Sinuses are air-filled spaces in the skull behind the face. They are kept moist and clean by a lining of mucosa. Things such as pollen, smoke, and chemical fumes can irritate the mucosa.  Constant exposure to irritants can cause ongoing inflammation of this · Flushing your sinuses. Your doctor may suggest sinus irrigation. This is flushing your sinuses with saltwater or saline solution. This helps to clear out mucus. · Controlling allergies. If you have allergies, you should have a plan to help control them.

## 2019-03-23 ENCOUNTER — OFFICE VISIT (OUTPATIENT)
Dept: FAMILY MEDICINE CLINIC | Facility: CLINIC | Age: 79
End: 2019-03-23
Payer: MEDICARE

## 2019-03-23 VITALS
DIASTOLIC BLOOD PRESSURE: 72 MMHG | WEIGHT: 126 LBS | TEMPERATURE: 98 F | HEART RATE: 79 BPM | RESPIRATION RATE: 20 BRPM | BODY MASS INDEX: 22.32 KG/M2 | OXYGEN SATURATION: 97 % | SYSTOLIC BLOOD PRESSURE: 120 MMHG | HEIGHT: 62.8 IN

## 2019-03-23 DIAGNOSIS — N18.30 CKD (CHRONIC KIDNEY DISEASE) STAGE 3, GFR 30-59 ML/MIN (HCC): ICD-10-CM

## 2019-03-23 DIAGNOSIS — J32.4 CHRONIC PANSINUSITIS: Primary | ICD-10-CM

## 2019-03-23 PROCEDURE — 99213 OFFICE O/P EST LOW 20 MIN: CPT | Performed by: NURSE PRACTITIONER

## 2019-03-23 RX ORDER — FLUTICASONE PROPIONATE 50 MCG
2 SPRAY, SUSPENSION (ML) NASAL DAILY PRN
COMMUNITY
End: 2020-11-04

## 2019-03-23 RX ORDER — LEVOFLOXACIN 250 MG/1
TABLET ORAL
Qty: 11 TABLET | Refills: 0 | Status: SHIPPED | OUTPATIENT
Start: 2019-03-23 | End: 2019-04-25

## 2019-03-23 NOTE — PROGRESS NOTES
CHIEF COMPLAINT:   Patient presents with:  Sinus Problem: s/s for 1 week. OTC meds taken      HPI:   Yuval Lew is a 70year old female who presents for sinus congestion. Pt has hx of chronic sinusitis.   Was seen on 2/27 and treated with azithromycin MONTELUKAST SODIUM 10 MG Oral Tab TAKE 1 TABLET(10 MG) BY MOUTH EVERY NIGHT Disp: 90 tablet Rfl: 0   Pantoprazole Sodium 40 MG Oral Tab EC Take 1 tablet (40 mg total) by mouth daily as needed.  Disp: 30 tablet Rfl: 1   Prochlorperazine Maleate (COMPAZINE) 1 • Osteoarthrosis, unspecified whether generalized or localized, unspecified site    • Other and unspecified hyperlipidemia    • Parathyroid adenoma    • PONV (postoperative nausea and vomiting)     only with ether years ago   • Postmenopausal status     Ag Neck Fusion; Kris Prater, [place]   • TONSILLECTOMY     • TOTAL ABDOM HYSTERECTOMY     • TRANSFORAMINAL LUMBAR EPIDURAL STEROID INJECTION MULTIPLE LEVEL Bilateral 8/2/2016    Performed by HEAD: atraumatic, normocephalic,  tenderness on palpation of all sinuses; worse at maxillary  EYES: conjunctiva clear  EARS: TM's clear gray, no bulging, no retraction, no fluid  NOSE: nostrils patent, nasal mucosa reddened and swollen  THROAT: oral mucosa To f/u with PCP in 7-10 days for recheck. Has next available appt with ENT which is in approx 3 1/2 weeks. - levofloxacin 250 MG Oral Tab; Take 2 tablets on day 1, then 1 tablet daily  Dispense: 11 tablet; Refill: 0    2.  CKD (chronic kidney disease) s · Use acetaminophen to control pain, unless another pain medicine was prescribed to you. If you have chronic liver or kidney disease or ever had a stomach ulcer, talk with your healthcare provider before using these medicines.  (Aspirin should never be take

## 2019-03-23 NOTE — PATIENT INSTRUCTIONS
Follow up with Marcia Coto in 7-10 days. Sinusitis (Antibiotic Treatment)    The sinuses are air-filled spaces within the bones of the face. They connect to the inside of the nose. Sinusitis is an inflammation of the tissue that lines the sinuses.  Sinus Follow up with your healthcare provider or our staff if you are better in 1 week.   When to seek medical advice  Call your healthcare provider if any of these occur:  · Facial pain or headache that gets worse  · Stiff neck  · Unusual drowsiness or confusion

## 2019-03-28 DIAGNOSIS — F41.1 GAD (GENERALIZED ANXIETY DISORDER): ICD-10-CM

## 2019-03-29 RX ORDER — CLONAZEPAM 0.5 MG/1
TABLET ORAL
Qty: 60 TABLET | Refills: 0 | OUTPATIENT
Start: 2019-03-29 | End: 2019-04-30

## 2019-04-01 DIAGNOSIS — J01.00 ACUTE NON-RECURRENT MAXILLARY SINUSITIS: ICD-10-CM

## 2019-04-01 RX ORDER — MONTELUKAST SODIUM 10 MG/1
TABLET ORAL
Qty: 90 TABLET | Refills: 0 | Status: SHIPPED | OUTPATIENT
Start: 2019-04-01 | End: 2019-04-10

## 2019-04-09 RX ORDER — TRAZODONE HYDROCHLORIDE 100 MG/1
TABLET ORAL
Qty: 30 TABLET | Refills: 0 | Status: SHIPPED | OUTPATIENT
Start: 2019-04-09 | End: 2019-05-07

## 2019-04-10 ENCOUNTER — OFFICE VISIT (OUTPATIENT)
Dept: PAIN CLINIC | Facility: CLINIC | Age: 79
End: 2019-04-10
Payer: MEDICARE

## 2019-04-10 VITALS
OXYGEN SATURATION: 92 % | BODY MASS INDEX: 22 KG/M2 | HEART RATE: 62 BPM | DIASTOLIC BLOOD PRESSURE: 64 MMHG | HEIGHT: 63 IN | SYSTOLIC BLOOD PRESSURE: 118 MMHG

## 2019-04-10 DIAGNOSIS — M54.16 LUMBAR RADICULOPATHY: Primary | ICD-10-CM

## 2019-04-10 PROCEDURE — 99214 OFFICE O/P EST MOD 30 MIN: CPT | Performed by: ANESTHESIOLOGY

## 2019-04-10 RX ORDER — NAPROXEN 500 MG/1
500 TABLET ORAL 2 TIMES DAILY WITH MEALS
Qty: 60 TABLET | Refills: 0 | Status: SHIPPED | OUTPATIENT
Start: 2019-04-10 | End: 2019-05-24

## 2019-04-10 RX ORDER — METHYLPREDNISOLONE 4 MG/1
TABLET ORAL
Qty: 1 KIT | Refills: 0 | Status: SHIPPED | OUTPATIENT
Start: 2019-04-10 | End: 2019-04-25

## 2019-04-13 NOTE — PROGRESS NOTES
Name: Dania Mercer   : 1940   DOS: 4/10/2019     Pain Clinic Follow Up Visit:   Dania Mercer is a 70year old female who presents for recheck of her chronic low back pain.   The patient has a spinal cord stimulator which was helping her significa (COMPAZINE) 10 mg tablet Take 1/2 tab every 6-8 hours as needed Disp: 12 tablet Rfl: 0   acetaminophen 500 MG Oral Tab Take 1,000 mg by mouth daily as needed for Pain.  Disp:  Rfl:    POTASSIUM CHLORIDE ER 20 MEQ Oral Tab CR TAKE 2 TABLETS BY MOUTH EVERY DA file.    Yo Rivera MD, 4/10/2019, 11:05 PM

## 2019-04-19 RX ORDER — PRAVASTATIN SODIUM 40 MG
TABLET ORAL
Qty: 90 TABLET | Refills: 0 | Status: SHIPPED | OUTPATIENT
Start: 2019-04-19 | End: 2019-07-11

## 2019-04-23 DIAGNOSIS — I10 ESSENTIAL HYPERTENSION: ICD-10-CM

## 2019-04-23 RX ORDER — LOSARTAN POTASSIUM 100 MG/1
TABLET ORAL
Qty: 90 TABLET | Refills: 0 | Status: SHIPPED | OUTPATIENT
Start: 2019-04-23 | End: 2019-07-14

## 2019-04-25 ENCOUNTER — OFFICE VISIT (OUTPATIENT)
Dept: FAMILY MEDICINE CLINIC | Facility: CLINIC | Age: 79
End: 2019-04-25
Payer: MEDICARE

## 2019-04-25 VITALS
WEIGHT: 123.38 LBS | HEIGHT: 63 IN | BODY MASS INDEX: 21.86 KG/M2 | SYSTOLIC BLOOD PRESSURE: 138 MMHG | TEMPERATURE: 99 F | DIASTOLIC BLOOD PRESSURE: 60 MMHG | HEART RATE: 69 BPM

## 2019-04-25 DIAGNOSIS — R19.7 DIARRHEA OF PRESUMED INFECTIOUS ORIGIN: Primary | ICD-10-CM

## 2019-04-25 PROCEDURE — 99214 OFFICE O/P EST MOD 30 MIN: CPT | Performed by: FAMILY MEDICINE

## 2019-04-25 NOTE — PATIENT INSTRUCTIONS
Diarrhea with Uncertain Cause (Adult)    Diarrhea is when stools are loose and watery.  This can be caused by:  · Viral infections  · Bacterial infections  · Food poisoning  · Parasites  · Irritable bowel syndrome (IBS)  · Inflammatory bowel diseases such · You may use acetaminophen or NSAID medicines like ibuprofen or naproxen to reduce pain and fever. Don’t use these if you have chronic liver or kidney disease, or ever had a stomach ulcer or gastrointestinal bleeding.  Don't use NSAID medicines if you are · Don’t force yourself to eat, especially if you are having cramping, vomiting, or diarrhea. Don’t eat large amounts at a time, even if you are hungry. · If you eat, avoid fatty, greasy, spicy, or fried foods.   · Don’t eat dairy foods or drink milk if you · Abdominal pain that gets worse  · Constant lower right abdominal pain  · Continued vomiting and inability to keep liquids down  · Diarrhea more than 5 times a day  · Blood in vomit or stool  · Dark urine or no urine for 8 hours, dry mouth and tongue, tir During the next 24 hours (the second day) you may add the following to the above if you have improved  · Hot cereal, plain toast, bread, rolls, crackers  · Plain noodles, rice, mashed potatoes, chicken noodle or rice soup  · Unsweetened canned fruit like a Some people are more prone to risks from diarrhea, like dehydration. These people include the very elderly, those with weak immune systems (for cancer treatment, for example), or those who have inflammatory bowel disease (Crohn's or colitis).  If you are in · Return to your normal diet slowly. You may want to eat bland foods at first, such as rice and toast. Also, you may need to avoid certain foods for a while, such as dairy products. These can make symptoms worse.  Ask your healthcare provider if there are a

## 2019-04-25 NOTE — PROGRESS NOTES
Jimmie Correa is a 70year old female. HPI:   Patient has a complicated past medical history see chronic medications and health issues including generalized anxiety disorder. Patient is in for concerns of 2 weeks of diarrhea.   She states every time she Solution USE 1 TO 2 SPRAYS IEN BID Disp:  Rfl: 6   Pantoprazole Sodium 40 MG Oral Tab EC Take 1 tablet (40 mg total) by mouth daily as needed.  Disp: 30 tablet Rfl: 1   Prochlorperazine Maleate (COMPAZINE) 10 mg tablet Take 1/2 tab every 6-8 hours as needed blood transfusion    • History of cardiac murmur    • History of eating disorder    • History of rheumatic fever    • IBS (irritable bowel syndrome)     constipation   • Irregular bowel habits    • Kidney stones    • Muscle weakness     bilateral legs   • supple,no adenopathy, thyroid normal to palpation  LUNGS: clear to auscultation no rales, rhonchi or wheezes  CARDIO: RRR without murmur  GI: Hyperactive bowel sounds ×4 quadrant, no hepatosplenomegaly or masses, and no tenderness besides slight tenderness

## 2019-04-26 ENCOUNTER — LAB ENCOUNTER (OUTPATIENT)
Dept: LAB | Age: 79
End: 2019-04-26
Attending: FAMILY MEDICINE
Payer: MEDICARE

## 2019-04-26 DIAGNOSIS — R19.7 DIARRHEA OF PRESUMED INFECTIOUS ORIGIN: ICD-10-CM

## 2019-04-26 PROCEDURE — 87329 GIARDIA AG IA: CPT

## 2019-04-26 PROCEDURE — 87045 FECES CULTURE AEROBIC BACT: CPT

## 2019-04-26 PROCEDURE — 87427 SHIGA-LIKE TOXIN AG IA: CPT

## 2019-04-26 PROCEDURE — 87272 CRYPTOSPORIDIUM AG IF: CPT

## 2019-04-26 PROCEDURE — 87177 OVA AND PARASITES SMEARS: CPT

## 2019-04-26 PROCEDURE — 87046 STOOL CULTR AEROBIC BACT EA: CPT

## 2019-04-26 PROCEDURE — 87493 C DIFF AMPLIFIED PROBE: CPT

## 2019-04-26 PROCEDURE — 87209 SMEAR COMPLEX STAIN: CPT

## 2019-04-27 NOTE — PROGRESS NOTES
So far negative Giardia and Cryptosporidium still awaiting stool culture  C. difficile is negative continue with probiotic  Sent to my chart but also call patient to notify her of test results.

## 2019-04-29 NOTE — PROGRESS NOTES
Negative E coli and shigatoxin  Stool culture is negative  Still awaiting ova and parasite  Sent to my chart

## 2019-04-30 ENCOUNTER — TELEPHONE (OUTPATIENT)
Dept: FAMILY MEDICINE CLINIC | Facility: CLINIC | Age: 79
End: 2019-04-30

## 2019-04-30 DIAGNOSIS — F41.1 GAD (GENERALIZED ANXIETY DISORDER): ICD-10-CM

## 2019-04-30 RX ORDER — CLONAZEPAM 0.5 MG/1
TABLET ORAL
Qty: 60 TABLET | Refills: 0 | Status: SHIPPED | OUTPATIENT
Start: 2019-04-30 | End: 2019-05-01

## 2019-04-30 NOTE — TELEPHONE ENCOUNTER
Pt called and wants to know if something will be called in for her diarrhea/constipation. She said she is going back and forth with both of them.

## 2019-04-30 NOTE — PROGRESS NOTES
Negative Ova and parasites. Trial Bentyl 10 mg every 6 hours as needed #30 NR. Please call her and discuss side effects constipation and dry mouth.

## 2019-05-01 ENCOUNTER — TELEPHONE (OUTPATIENT)
Dept: FAMILY MEDICINE CLINIC | Facility: CLINIC | Age: 79
End: 2019-05-01

## 2019-05-01 DIAGNOSIS — F41.1 GAD (GENERALIZED ANXIETY DISORDER): ICD-10-CM

## 2019-05-01 RX ORDER — DICYCLOMINE HYDROCHLORIDE 10 MG/1
10 CAPSULE ORAL EVERY 6 HOURS PRN
Qty: 30 CAPSULE | Refills: 0 | Status: SHIPPED | OUTPATIENT
Start: 2019-05-01 | End: 2019-05-11

## 2019-05-01 NOTE — TELEPHONE ENCOUNTER
Per Elsy De La Fuente left detailed message with instructions and recommendations. Advised pt to call office with any questions or concerns. Medication was sent to [facility].

## 2019-05-01 NOTE — TELEPHONE ENCOUNTER
----- Message from Graham Hartman PA-C sent at 4/30/2019  4:57 PM CDT -----  Negative Ova and parasites. Trial Bentyl 10 mg every 6 hours as needed #30 NR. Please call her and discuss side effects constipation and dry mouth.

## 2019-05-01 NOTE — TELEPHONE ENCOUNTER
Med called to pharmacy/pt informed. Lucia Calvo was printed but for some reason still was unsigned so I pended it back to you. I already called it in to the pharmacy.

## 2019-05-02 RX ORDER — CLONAZEPAM 0.5 MG/1
TABLET ORAL
Qty: 60 TABLET | Refills: 0 | Status: SHIPPED | OUTPATIENT
Start: 2019-05-02 | End: 2019-05-29

## 2019-05-03 ENCOUNTER — TELEPHONE (OUTPATIENT)
Dept: PAIN CLINIC | Facility: CLINIC | Age: 79
End: 2019-05-03

## 2019-05-03 DIAGNOSIS — T85.192A MALFUNCTION OF SPINAL CORD STIMULATOR, INITIAL ENCOUNTER (HCC): Primary | ICD-10-CM

## 2019-05-03 NOTE — TELEPHONE ENCOUNTER
Pt seen by Dr. Rebecca Galeas and Rosalia Rivera with [employer] of new pain/shooting in recent weeks. She denies any recent fall or injury.  New imaging needed to assess placement of SCS

## 2019-05-07 RX ORDER — TRAZODONE HYDROCHLORIDE 100 MG/1
TABLET ORAL
Qty: 90 TABLET | Refills: 0 | Status: SHIPPED | OUTPATIENT
Start: 2019-05-07 | End: 2019-07-18

## 2019-05-13 ENCOUNTER — TELEPHONE (OUTPATIENT)
Dept: FAMILY MEDICINE CLINIC | Facility: CLINIC | Age: 79
End: 2019-05-13
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
## 2019-05-13 NOTE — TELEPHONE ENCOUNTER
Sam Fernández is calling to let BOD[facility] know that she is having a lot of pain in her legs and she can hardly walk that goes all the way up to her hips, she would like to know if [facility] can give her something for the pain she would like it sent to her [facility]

## 2019-05-14 ENCOUNTER — OFFICE VISIT (OUTPATIENT)
Dept: FAMILY MEDICINE CLINIC | Facility: CLINIC | Age: 79
End: 2019-05-14
Payer: MEDICARE

## 2019-05-14 VITALS
HEART RATE: 68 BPM | HEIGHT: 62.8 IN | BODY MASS INDEX: 22.15 KG/M2 | SYSTOLIC BLOOD PRESSURE: 110 MMHG | WEIGHT: 125 LBS | TEMPERATURE: 98 F | DIASTOLIC BLOOD PRESSURE: 70 MMHG

## 2019-05-14 DIAGNOSIS — R19.5 LOOSE STOOLS: ICD-10-CM

## 2019-05-14 DIAGNOSIS — M54.16 LUMBAR RADICULOPATHY: ICD-10-CM

## 2019-05-14 DIAGNOSIS — M51.36 DDD (DEGENERATIVE DISC DISEASE), LUMBAR: ICD-10-CM

## 2019-05-14 DIAGNOSIS — J34.89 SINUS PRESSURE: ICD-10-CM

## 2019-05-14 DIAGNOSIS — R10.9 ABDOMINAL CRAMPING: Primary | ICD-10-CM

## 2019-05-14 PROCEDURE — 99214 OFFICE O/P EST MOD 30 MIN: CPT | Performed by: FAMILY MEDICINE

## 2019-05-14 RX ORDER — METHYLPREDNISOLONE 4 MG/1
TABLET ORAL
Qty: 1 KIT | Refills: 0 | Status: SHIPPED | OUTPATIENT
Start: 2019-05-14 | End: 2019-07-08 | Stop reason: ALTCHOICE

## 2019-05-14 NOTE — PROGRESS NOTES
Auncarmen Reis is a 70year old female. HPI:   Patient presents with:  Low Back Pain  Leg Pain: b/l leg pain  Has been getting worse has been an issue   Pain doctor 1 month ago pain stimulator.   Ordered XR    Throat Problem/Headache  For last 24 hours meenakshi tablet Rfl: 0   CLONAZEPAM 0.5 MG Oral Tab TAKE 1 TABLET BY MOUTH TWICE DAILY Disp: 60 tablet Rfl: 0   LOSARTAN 100 MG Oral Tab TAKE 1 TABLET(100 MG) BY MOUTH DAILY Disp: 90 tablet Rfl: 0   PRAVASTATIN SODIUM 40 MG Oral Tab TAKE 1 TABLET BY MOUTH EVERY NIG Take 1 tablet (500,000 Units total) by mouth 4 (four) times daily as needed.  Daily Disp:  Rfl: 2      Past Medical History:   Diagnosis Date   • Abdominal pain    • Anxiety state, unspecified    • Arthritis    • Atherosclerosis of coronary artery    • Auto Frequency: Never    Drug use: Not Currently      Types: Cannabis      Comment: marijuana last used 10/29/18       REVIEW OF SYSTEMS:   GENERAL HEALTH: feels well otherwise  SKIN: denies any unusual skin lesions or rashes   HEENT see above  RESPIRATORY: d Visit:  Requested Prescriptions     Signed Prescriptions Disp Refills   • methylPREDNISolone (MEDROL) 4 MG Oral Tablet Therapy Pack 1 kit 0     Sig: As directed. Imaging & Consults:  None  1.  Abdominal cramping  Encouraged a bland diet for the next 2

## 2019-05-17 ENCOUNTER — HOSPITAL ENCOUNTER (OUTPATIENT)
Dept: GENERAL RADIOLOGY | Age: 79
Discharge: HOME | End: 2019-05-17
Attending: NURSE PRACTITIONER
Payer: MEDICARE

## 2019-05-17 DIAGNOSIS — T85.192A MALFUNCTION OF SPINAL CORD STIMULATOR, INITIAL ENCOUNTER (HCC): ICD-10-CM

## 2019-05-17 PROCEDURE — 72072 X-RAY EXAM THORAC SPINE 3VWS: CPT | Performed by: NURSE PRACTITIONER

## 2019-05-21 ENCOUNTER — TELEPHONE (OUTPATIENT)
Dept: FAMILY MEDICINE CLINIC | Facility: CLINIC | Age: 79
End: 2019-05-21

## 2019-05-21 NOTE — TELEPHONE ENCOUNTER
Faizan Mccall please advise? On 5/14/19 she saw you for this and she got a MDP.         5. Lumbar radiculopathy  As above  - methylPREDNISolone (MEDROL) 4 MG Oral Tablet Therapy Pack; As directed. Dispense: 1 kit;  Refill: 0  Time spent was 30 minutes more debbie

## 2019-05-21 NOTE — TELEPHONE ENCOUNTER
Kristina Araceli called requesting a renewal of her pain medication does not remember medication name. States her legs are in a lot of pain and needs something to help with the pain.

## 2019-05-22 PROBLEM — K25.9 GASTRIC ULCER WITHOUT HEMORRHAGE OR PERFORATION: Status: ACTIVE | Noted: 2019-05-22

## 2019-05-22 PROBLEM — K29.80 DUODENITIS WITHOUT BLEEDING: Status: ACTIVE | Noted: 2019-05-22

## 2019-05-22 PROBLEM — R19.4 CHANGE IN BOWEL HABITS: Status: ACTIVE | Noted: 2019-05-22

## 2019-05-22 PROBLEM — R10.84 ABDOMINAL PAIN, GENERALIZED: Status: ACTIVE | Noted: 2019-05-22

## 2019-05-22 NOTE — TELEPHONE ENCOUNTER
Please have her call the pain clinic she is being seen by Dr. Vee Sparrow they should be doing the medical management on her leg pain. I do not feel refilling her Medrol Dosepak at this point is the answer. Lincoln Culp

## 2019-05-23 ENCOUNTER — TELEPHONE (OUTPATIENT)
Dept: PAIN CLINIC | Facility: CLINIC | Age: 79
End: 2019-05-23

## 2019-05-23 NOTE — TELEPHONE ENCOUNTER
Xray thoracic spine reviewed with Adelaida Cardenas; and did not show that her spinal cord stimulator had moved. Please call pt and schedule her for an OV for evaluation with Dr. Adelaida Cardenas to further assess back pain. Thank you.

## 2019-05-23 NOTE — TELEPHONE ENCOUNTER
Medication: naproxen 500 MG Oral Tab    Date of last refill: 4/10/19  [place]    Last office visit: 4/10/19  Due back to clinic per last office note:  Not indicated  Date next office visit scheduled:  None scheduled

## 2019-05-24 RX ORDER — NAPROXEN 500 MG/1
500 TABLET ORAL 2 TIMES DAILY WITH MEALS
Qty: 60 TABLET | Refills: 0 | Status: SHIPPED | OUTPATIENT
Start: 2019-05-24 | End: 2019-10-15 | Stop reason: ALTCHOICE

## 2019-05-29 ENCOUNTER — OFFICE VISIT (OUTPATIENT)
Dept: PAIN CLINIC | Facility: CLINIC | Age: 79
End: 2019-05-29
Payer: MEDICARE

## 2019-05-29 VITALS
SYSTOLIC BLOOD PRESSURE: 122 MMHG | HEIGHT: 63 IN | OXYGEN SATURATION: 98 % | DIASTOLIC BLOOD PRESSURE: 62 MMHG | BODY MASS INDEX: 22.15 KG/M2 | WEIGHT: 125 LBS | HEART RATE: 56 BPM

## 2019-05-29 DIAGNOSIS — F41.1 GAD (GENERALIZED ANXIETY DISORDER): ICD-10-CM

## 2019-05-29 DIAGNOSIS — M96.1 FAILED BACK SYNDROME: Primary | ICD-10-CM

## 2019-05-29 PROCEDURE — 99214 OFFICE O/P EST MOD 30 MIN: CPT | Performed by: ANESTHESIOLOGY

## 2019-05-29 RX ORDER — GABAPENTIN 100 MG/1
100 CAPSULE ORAL 3 TIMES DAILY
Qty: 90 CAPSULE | Refills: 0 | Status: SHIPPED | OUTPATIENT
Start: 2019-05-29 | End: 2019-06-25 | Stop reason: DRUGHIGH

## 2019-05-29 RX ORDER — CLONAZEPAM 0.5 MG/1
TABLET ORAL
Qty: 60 TABLET | Refills: 0 | Status: SHIPPED | OUTPATIENT
Start: 2019-05-29 | End: 2019-07-01

## 2019-05-31 NOTE — PROGRESS NOTES
Name: Senait Vazquez   : 1940   DOS: 2019     Pain Clinic Follow Up Visit:   Senait Vazquez is a 70year old female who presents for recheck of her chronic low back pain.   The patient has a spinal cord stimulator which was helping her significa daily as needed. Disp: 30 tablet Rfl: 1   Prochlorperazine Maleate (COMPAZINE) 10 mg tablet Take 1/2 tab every 6-8 hours as needed Disp: 12 tablet Rfl: 0   acetaminophen 500 MG Oral Tab Take 1,000 mg by mouth daily as needed for Pain.  Disp:  Rfl:    LUCIEN understanding of these issues and agrees to the plan. No follow-ups on file.     Aislinn Arce MD, 5/29/2019, 11:05 PM

## 2019-06-01 DIAGNOSIS — I10 ESSENTIAL HYPERTENSION: ICD-10-CM

## 2019-06-03 RX ORDER — AMLODIPINE BESYLATE 10 MG/1
TABLET ORAL
Qty: 90 TABLET | Refills: 0 | Status: SHIPPED | OUTPATIENT
Start: 2019-06-03 | End: 2019-08-27

## 2019-06-17 ENCOUNTER — TELEPHONE (OUTPATIENT)
Dept: FAMILY MEDICINE CLINIC | Facility: CLINIC | Age: 79
End: 2019-06-17

## 2019-06-17 NOTE — TELEPHONE ENCOUNTER
BRAT diet avoid too much Imodium try to increase fiber and take probiotic. Check C dif if watery stools (has been on antibiotics on and off).

## 2019-06-17 NOTE — TELEPHONE ENCOUNTER
Sonal Grove is complaining of severe diarrhea and abd cramping. Since Saturday. No fever, No vomiting. No blood in stool. Took one imodium yesterday. Did settle down a little after taking. Isn't sure if she should continue taking today.

## 2019-06-17 NOTE — TELEPHONE ENCOUNTER
Phoned patient. Instructions given. She states she is feeling better this afternoon. In agreement with BRAT diet and probiotics. Will call if further stools.

## 2019-06-17 NOTE — TELEPHONE ENCOUNTER
Sonal Grove is calling to see if Miki Torres can call in something for her because she has had diarrhea all weekend, she uses the [facility] at [facility], Please call Sonal Grove with any questions or concers at [phone]

## 2019-06-19 ENCOUNTER — TELEPHONE (OUTPATIENT)
Dept: SURGERY | Facility: CLINIC | Age: 79
End: 2019-06-19

## 2019-06-19 NOTE — TELEPHONE ENCOUNTER
Discussed initial msg w/ APN Radha who states the next step would be to increase gabapentin to 200 mg TID. Spoke to pt who states she has not contacted [employer] to have SCS adjusted b/c she does not feel that will help \"because I shake. \" Carlos Nelson

## 2019-06-22 DIAGNOSIS — J01.00 ACUTE NON-RECURRENT MAXILLARY SINUSITIS: ICD-10-CM

## 2019-06-24 RX ORDER — MONTELUKAST SODIUM 10 MG/1
TABLET ORAL
Qty: 90 TABLET | Refills: 0 | Status: SHIPPED | OUTPATIENT
Start: 2019-06-24 | End: 2019-09-16

## 2019-06-24 NOTE — TELEPHONE ENCOUNTER
Refill request for montelukast. Last OV 1/19. Mychart reminder sent to patient for 6 month wellness visit.

## 2019-06-25 ENCOUNTER — TELEPHONE (OUTPATIENT)
Dept: SURGERY | Facility: CLINIC | Age: 79
End: 2019-06-25

## 2019-06-25 RX ORDER — GABAPENTIN 300 MG/1
300 CAPSULE ORAL 3 TIMES DAILY
Qty: 90 CAPSULE | Refills: 0 | Status: SHIPPED | OUTPATIENT
Start: 2019-06-25 | End: 2019-07-08

## 2019-06-25 NOTE — TELEPHONE ENCOUNTER
Pt wants a stronger medication. States Dr. Anita Santana said he would give her a stronger rx if she needed it.

## 2019-06-25 NOTE — TELEPHONE ENCOUNTER
Spoke with patient. She stated she would like to increase her Gabapentin. She is currently taking Gabapentin 100 mg TID and stated Dr Jese Nicholas informed her she could increase it further if needed.      Patient stated Gabapentin helps with pain relief but quin

## 2019-06-29 DIAGNOSIS — F41.1 GAD (GENERALIZED ANXIETY DISORDER): ICD-10-CM

## 2019-07-01 DIAGNOSIS — F41.1 GAD (GENERALIZED ANXIETY DISORDER): ICD-10-CM

## 2019-07-01 RX ORDER — CLONAZEPAM 0.5 MG/1
0.5 TABLET ORAL 2 TIMES DAILY
Qty: 60 TABLET | Refills: 0 | Status: SHIPPED | OUTPATIENT
Start: 2019-07-01 | End: 2019-08-05

## 2019-07-01 RX ORDER — CLONAZEPAM 0.5 MG/1
TABLET ORAL
Qty: 60 TABLET | Refills: 0 | OUTPATIENT
Start: 2019-07-01

## 2019-07-01 NOTE — TELEPHONE ENCOUNTER
Chelly De Leon is calling to get a refill on her CLONAZEPAM 0.5 MG Oral Tab sent to her [address] at [facility], she is completely out.  Please call Chelly De Leon with any questions or concerns at [phone]

## 2019-07-08 ENCOUNTER — LAB ENCOUNTER (OUTPATIENT)
Dept: LAB | Age: 79
End: 2019-07-08
Attending: FAMILY MEDICINE
Payer: MEDICARE

## 2019-07-08 ENCOUNTER — OFFICE VISIT (OUTPATIENT)
Dept: FAMILY MEDICINE CLINIC | Facility: CLINIC | Age: 79
End: 2019-07-08
Payer: MEDICARE

## 2019-07-08 VITALS
BODY MASS INDEX: 23.04 KG/M2 | HEIGHT: 62.8 IN | WEIGHT: 130 LBS | TEMPERATURE: 98 F | DIASTOLIC BLOOD PRESSURE: 58 MMHG | HEART RATE: 60 BPM | SYSTOLIC BLOOD PRESSURE: 90 MMHG

## 2019-07-08 DIAGNOSIS — K59.01 SLOW TRANSIT CONSTIPATION: ICD-10-CM

## 2019-07-08 DIAGNOSIS — M79.7 FIBROMYALGIA: ICD-10-CM

## 2019-07-08 DIAGNOSIS — J01.01 ACUTE RECURRENT MAXILLARY SINUSITIS: ICD-10-CM

## 2019-07-08 DIAGNOSIS — R11.0 NAUSEA: ICD-10-CM

## 2019-07-08 DIAGNOSIS — M25.551 PAIN OF BOTH HIP JOINTS: Primary | ICD-10-CM

## 2019-07-08 DIAGNOSIS — M79.10 MYALGIA: ICD-10-CM

## 2019-07-08 DIAGNOSIS — M25.552 PAIN OF BOTH HIP JOINTS: Primary | ICD-10-CM

## 2019-07-08 DIAGNOSIS — I10 ESSENTIAL HYPERTENSION: ICD-10-CM

## 2019-07-08 DIAGNOSIS — M54.16 LUMBAR RADICULOPATHY: ICD-10-CM

## 2019-07-08 PROBLEM — K29.80 DUODENITIS WITHOUT BLEEDING: Status: RESOLVED | Noted: 2019-05-22 | Resolved: 2019-07-08

## 2019-07-08 PROBLEM — R10.84 ABDOMINAL PAIN, GENERALIZED: Status: RESOLVED | Noted: 2019-05-22 | Resolved: 2019-07-08

## 2019-07-08 PROBLEM — R19.4 CHANGE IN BOWEL HABITS: Status: RESOLVED | Noted: 2019-05-22 | Resolved: 2019-07-08

## 2019-07-08 PROBLEM — R09.81 CHRONIC NASAL CONGESTION: Status: RESOLVED | Noted: 2018-12-10 | Resolved: 2019-07-08

## 2019-07-08 PROBLEM — R10.13 EPIGASTRIC PAIN: Status: RESOLVED | Noted: 2018-12-10 | Resolved: 2019-07-08

## 2019-07-08 PROBLEM — K25.9 GASTRIC ULCER WITHOUT HEMORRHAGE OR PERFORATION: Status: RESOLVED | Noted: 2019-05-22 | Resolved: 2019-07-08

## 2019-07-08 LAB
ALBUMIN SERPL-MCNC: 3.9 G/DL (ref 3.4–5)
ALBUMIN/GLOB SERPL: 1 {RATIO} (ref 1–2)
ALP LIVER SERPL-CCNC: 78 U/L (ref 55–142)
ALT SERPL-CCNC: 25 U/L (ref 13–56)
ANION GAP SERPL CALC-SCNC: 7 MMOL/L (ref 0–18)
AST SERPL-CCNC: 19 U/L (ref 15–37)
BASOPHILS # BLD AUTO: 0.12 X10(3) UL (ref 0–0.2)
BASOPHILS NFR BLD AUTO: 1.1 %
BILIRUB SERPL-MCNC: 0.3 MG/DL (ref 0.1–2)
BUN BLD-MCNC: 21 MG/DL (ref 7–18)
BUN/CREAT SERPL: 17.4 (ref 10–20)
CALCIUM BLD-MCNC: 10.1 MG/DL (ref 8.5–10.1)
CHLORIDE SERPL-SCNC: 106 MMOL/L (ref 98–112)
CK SERPL-CCNC: 106 U/L (ref 26–192)
CO2 SERPL-SCNC: 27 MMOL/L (ref 21–32)
CREAT BLD-MCNC: 1.21 MG/DL (ref 0.55–1.02)
DEPRECATED RDW RBC AUTO: 48.9 FL (ref 35.1–46.3)
EOSINOPHIL # BLD AUTO: 0.22 X10(3) UL (ref 0–0.7)
EOSINOPHIL NFR BLD AUTO: 2.1 %
ERYTHROCYTE [DISTWIDTH] IN BLOOD BY AUTOMATED COUNT: 13.4 % (ref 11–15)
GLOBULIN PLAS-MCNC: 3.9 G/DL (ref 2.8–4.4)
GLUCOSE BLD-MCNC: 70 MG/DL (ref 70–99)
HCT VFR BLD AUTO: 38.5 % (ref 35–48)
HGB BLD-MCNC: 12.4 G/DL (ref 12–16)
IMM GRANULOCYTES # BLD AUTO: 0.03 X10(3) UL (ref 0–1)
IMM GRANULOCYTES NFR BLD: 0.3 %
LYMPHOCYTES # BLD AUTO: 2.85 X10(3) UL (ref 1–4)
LYMPHOCYTES NFR BLD AUTO: 27 %
M PROTEIN MFR SERPL ELPH: 7.8 G/DL (ref 6.4–8.2)
MCH RBC QN AUTO: 31.6 PG (ref 26–34)
MCHC RBC AUTO-ENTMCNC: 32.2 G/DL (ref 31–37)
MCV RBC AUTO: 98 FL (ref 80–100)
MONOCYTES # BLD AUTO: 1.22 X10(3) UL (ref 0.1–1)
MONOCYTES NFR BLD AUTO: 11.6 %
NEUTROPHILS # BLD AUTO: 6.12 X10 (3) UL (ref 1.5–7.7)
NEUTROPHILS # BLD AUTO: 6.12 X10(3) UL (ref 1.5–7.7)
NEUTROPHILS NFR BLD AUTO: 57.9 %
OSMOLALITY SERPL CALC.SUM OF ELEC: 291 MOSM/KG (ref 275–295)
PLATELET # BLD AUTO: 364 10(3)UL (ref 150–450)
POTASSIUM SERPL-SCNC: 4.3 MMOL/L (ref 3.5–5.1)
RBC # BLD AUTO: 3.93 X10(6)UL (ref 3.8–5.3)
SODIUM SERPL-SCNC: 140 MMOL/L (ref 136–145)
WBC # BLD AUTO: 10.6 X10(3) UL (ref 4–11)

## 2019-07-08 PROCEDURE — 85025 COMPLETE CBC W/AUTO DIFF WBC: CPT

## 2019-07-08 PROCEDURE — 82550 ASSAY OF CK (CPK): CPT

## 2019-07-08 PROCEDURE — 80053 COMPREHEN METABOLIC PANEL: CPT

## 2019-07-08 PROCEDURE — 99214 OFFICE O/P EST MOD 30 MIN: CPT | Performed by: FAMILY MEDICINE

## 2019-07-08 PROCEDURE — 36415 COLL VENOUS BLD VENIPUNCTURE: CPT

## 2019-07-08 RX ORDER — DULOXETIN HYDROCHLORIDE 30 MG/1
30 CAPSULE, DELAYED RELEASE ORAL DAILY
Qty: 30 CAPSULE | Refills: 1 | Status: SHIPPED | OUTPATIENT
Start: 2019-07-08 | End: 2021-01-15

## 2019-07-08 RX ORDER — PROCHLORPERAZINE MALEATE 10 MG
TABLET ORAL
Qty: 12 TABLET | Refills: 0 | Status: CANCELLED | OUTPATIENT
Start: 2019-07-08

## 2019-07-08 RX ORDER — LEVOFLOXACIN 250 MG/1
250 TABLET ORAL DAILY
Qty: 10 TABLET | Refills: 0 | Status: SHIPPED | OUTPATIENT
Start: 2019-07-08 | End: 2019-07-17

## 2019-07-08 RX ORDER — GABAPENTIN 400 MG/1
400 CAPSULE ORAL 3 TIMES DAILY
Qty: 90 CAPSULE | Refills: 1 | Status: SHIPPED | OUTPATIENT
Start: 2019-07-08 | End: 2019-10-15 | Stop reason: ALTCHOICE

## 2019-07-08 RX ORDER — ONDANSETRON 4 MG/1
TABLET, ORALLY DISINTEGRATING ORAL
Qty: 12 TABLET | Refills: 0 | Status: SHIPPED | OUTPATIENT
Start: 2019-07-08 | End: 2019-09-17

## 2019-07-08 NOTE — PROGRESS NOTES
Stable GFR/kidney function. Normal electrolytes and normal liver function tests. Creatinine kinase is normal  CBC is normal.  Sent to my chart.

## 2019-07-08 NOTE — PROGRESS NOTES
Wilfred Gastelum is a 70year old female. HPI:   Patient presents with:  Leg Pain:   B/L radiating from the back  Dr. Mary Packer gave patient 300 mg Neurontin 3 times a day she is not feeling a difference with the radiating pain.     Leg pain is been going on for times daily. Disp: 90 capsule Rfl: 1   clonazePAM 0.5 MG Oral Tab Take 1 tablet (0.5 mg total) by mouth 2 (two) times daily.  Disp: 60 tablet Rfl: 0   MONTELUKAST SODIUM 10 MG Oral Tab TAKE 1 TABLET(10 MG) BY MOUTH EVERY NIGHT Disp: 90 tablet Rfl: 0   Cinac (four) times daily as needed.  Daily Disp:  Rfl: 2   Prochlorperazine Maleate (COMPAZINE) 10 mg tablet Take 1/2 tab every 6-8 hours as needed Disp: 12 tablet Rfl: 0      Past Medical History:   Diagnosis Date   • Abdominal pain    • Anxiety state, unspecifi tobacco: Never Used    Alcohol use: No      Alcohol/week: 0.0 oz      Frequency: Never    Drug use: Not Currently      Types: Cannabis      Comment: marijuana last used 10/29/18       REVIEW OF SYSTEMS:   GENERAL HEALTH: feels well otherwise  SKIN: denies Placed This Encounter      CK (Creatine Kinase) (Not Creatinine) (E)      mmm cmp      mmm cbc      Meds & Refills for this Visit:  Requested Prescriptions     Signed Prescriptions Disp Refills   • ondansetron 4 MG Oral Tablet Dispersible 12 tablet 0     S problems call office immediately especially if gets increased depression, anxiety or any homicidal or suicidal symptoms.   - DULoxetine HCl (CYMBALTA) 30 MG Oral Cap DR Particles; Take 1 capsule (30 mg total) by mouth daily. Dispense: 30 capsule;  Refill:

## 2019-07-12 ENCOUNTER — HOSPITAL ENCOUNTER (EMERGENCY)
Age: 79
Discharge: HOME | End: 2019-07-12
Attending: EMERGENCY MEDICINE
Payer: MEDICARE

## 2019-07-12 ENCOUNTER — TELEPHONE (OUTPATIENT)
Dept: SURGERY | Facility: CLINIC | Age: 79
End: 2019-07-12

## 2019-07-12 ENCOUNTER — APPOINTMENT (OUTPATIENT)
Dept: GENERAL RADIOLOGY | Age: 79
End: 2019-07-12
Attending: EMERGENCY MEDICINE
Payer: MEDICARE

## 2019-07-12 VITALS
DIASTOLIC BLOOD PRESSURE: 59 MMHG | HEIGHT: 63 IN | BODY MASS INDEX: 21.26 KG/M2 | SYSTOLIC BLOOD PRESSURE: 153 MMHG | WEIGHT: 120 LBS | OXYGEN SATURATION: 97 % | HEART RATE: 55 BPM | TEMPERATURE: 100 F | RESPIRATION RATE: 18 BRPM

## 2019-07-12 DIAGNOSIS — M47.27 OSTEOARTHRITIS OF SPINE WITH RADICULOPATHY, LUMBOSACRAL REGION: Primary | ICD-10-CM

## 2019-07-12 DIAGNOSIS — M16.12 OSTEOARTHRITIS OF LEFT HIP, UNSPECIFIED OSTEOARTHRITIS TYPE: ICD-10-CM

## 2019-07-12 LAB
ALBUMIN SERPL-MCNC: 4.2 G/DL (ref 3.4–5)
ALBUMIN/GLOB SERPL: 1.3 {RATIO} (ref 1–2)
ALP LIVER SERPL-CCNC: 75 U/L (ref 55–142)
ALT SERPL-CCNC: 18 U/L (ref 13–56)
ANION GAP SERPL CALC-SCNC: 9 MMOL/L (ref 0–18)
AST SERPL-CCNC: 12 U/L (ref 15–37)
BASOPHILS # BLD AUTO: 0.08 X10(3) UL (ref 0–0.2)
BASOPHILS NFR BLD AUTO: 0.8 %
BILIRUB SERPL-MCNC: 0.3 MG/DL (ref 0.1–2)
BUN BLD-MCNC: 23 MG/DL (ref 7–18)
BUN/CREAT SERPL: 17.7 (ref 10–20)
CALCIUM BLD-MCNC: 9.8 MG/DL (ref 8.5–10.1)
CHLORIDE SERPL-SCNC: 105 MMOL/L (ref 98–112)
CO2 SERPL-SCNC: 25 MMOL/L (ref 21–32)
CREAT BLD-MCNC: 1.3 MG/DL (ref 0.55–1.02)
DEPRECATED RDW RBC AUTO: 45.6 FL (ref 35.1–46.3)
EOSINOPHIL # BLD AUTO: 0.17 X10(3) UL (ref 0–0.7)
EOSINOPHIL NFR BLD AUTO: 1.7 %
ERYTHROCYTE [DISTWIDTH] IN BLOOD BY AUTOMATED COUNT: 13.1 % (ref 11–15)
GLOBULIN PLAS-MCNC: 3.2 G/DL (ref 2.8–4.4)
GLUCOSE BLD-MCNC: 105 MG/DL (ref 70–99)
HCT VFR BLD AUTO: 34.2 % (ref 35–48)
HGB BLD-MCNC: 11.2 G/DL (ref 12–16)
IMM GRANULOCYTES # BLD AUTO: 0.02 X10(3) UL (ref 0–1)
IMM GRANULOCYTES NFR BLD: 0.2 %
LYMPHOCYTES # BLD AUTO: 4.28 X10(3) UL (ref 1–4)
LYMPHOCYTES NFR BLD AUTO: 41.9 %
M PROTEIN MFR SERPL ELPH: 7.4 G/DL (ref 6.4–8.2)
MCH RBC QN AUTO: 31 PG (ref 26–34)
MCHC RBC AUTO-ENTMCNC: 32.7 G/DL (ref 31–37)
MCV RBC AUTO: 94.7 FL (ref 80–100)
MONOCYTES # BLD AUTO: 1.07 X10(3) UL (ref 0.1–1)
MONOCYTES NFR BLD AUTO: 10.5 %
NEUTROPHILS # BLD AUTO: 4.6 X10 (3) UL (ref 1.5–7.7)
NEUTROPHILS # BLD AUTO: 4.6 X10(3) UL (ref 1.5–7.7)
NEUTROPHILS NFR BLD AUTO: 44.9 %
OSMOLALITY SERPL CALC.SUM OF ELEC: 292 MOSM/KG (ref 275–295)
PLATELET # BLD AUTO: 334 10(3)UL (ref 150–450)
POTASSIUM SERPL-SCNC: 3.5 MMOL/L (ref 3.5–5.1)
RBC # BLD AUTO: 3.61 X10(6)UL (ref 3.8–5.3)
SODIUM SERPL-SCNC: 139 MMOL/L (ref 136–145)
WBC # BLD AUTO: 10.2 X10(3) UL (ref 4–11)

## 2019-07-12 PROCEDURE — 80053 COMPREHEN METABOLIC PANEL: CPT | Performed by: EMERGENCY MEDICINE

## 2019-07-12 PROCEDURE — 85025 COMPLETE CBC W/AUTO DIFF WBC: CPT | Performed by: EMERGENCY MEDICINE

## 2019-07-12 PROCEDURE — 73502 X-RAY EXAM HIP UNI 2-3 VIEWS: CPT | Performed by: EMERGENCY MEDICINE

## 2019-07-12 PROCEDURE — 99284 EMERGENCY DEPT VISIT MOD MDM: CPT | Performed by: EMERGENCY MEDICINE

## 2019-07-12 PROCEDURE — 96375 TX/PRO/DX INJ NEW DRUG ADDON: CPT | Performed by: EMERGENCY MEDICINE

## 2019-07-12 PROCEDURE — 96374 THER/PROPH/DIAG INJ IV PUSH: CPT | Performed by: EMERGENCY MEDICINE

## 2019-07-12 PROCEDURE — 72100 X-RAY EXAM L-S SPINE 2/3 VWS: CPT | Performed by: EMERGENCY MEDICINE

## 2019-07-12 RX ORDER — PRAVASTATIN SODIUM 40 MG
TABLET ORAL
Qty: 90 TABLET | Refills: 0 | Status: SHIPPED | OUTPATIENT
Start: 2019-07-12 | End: 2019-10-07

## 2019-07-12 RX ORDER — DEXAMETHASONE SODIUM PHOSPHATE 4 MG/ML
10 VIAL (ML) INJECTION ONCE
Status: COMPLETED | OUTPATIENT
Start: 2019-07-12 | End: 2019-07-12

## 2019-07-12 RX ORDER — KETOROLAC TROMETHAMINE 30 MG/ML
30 INJECTION, SOLUTION INTRAMUSCULAR; INTRAVENOUS ONCE
Status: COMPLETED | OUTPATIENT
Start: 2019-07-12 | End: 2019-07-12

## 2019-07-12 RX ORDER — HYDROCODONE BITARTRATE AND ACETAMINOPHEN 5; 325 MG/1; MG/1
1 TABLET ORAL EVERY 6 HOURS PRN
Qty: 12 TABLET | Refills: 0 | Status: SHIPPED | OUTPATIENT
Start: 2019-07-12 | End: 2019-09-17 | Stop reason: ALTCHOICE

## 2019-07-12 NOTE — ED PROVIDER NOTES
[facility] [place]    History   Patient presents with:  Pain (neurologic)    Stated Complaint: pain in left leg- chronic pain    HPI    The patient is a 28-year-old female who has a spinal cord stimulator due to ch blood pressure    • High cholesterol    • History of blood transfusion    • History of cardiac murmur    • History of eating disorder    • History of rheumatic fever    • IBS (irritable bowel syndrome)     constipation   • Irregular bowel habits    • Dalila • SACROILIAC JOINT INJECTION BILATERAL Bilateral 6/13/2016    Performed by Dacia Ryan MD at [address]   • SACROILIAC JOINT INJECTION RIGHT OR LEFT Left 10/25/2016    Performed by Dacia Ryan MD at [address] [facility] 285 kg/m²         Physical Exam    General: Well-developed, well-nourished elderly female who is wrapped up in several blankets, laying back in a recumbent position stretcher, appearing somewhat uncomfortable, trying to avoid applying pressure to her left butt asymmetry, tenderness or cords. Skin: No masses or nodules or abnormalities.   Psych: Normal interaction, cooperative with exam       ED Course     Labs Reviewed   COMP METABOLIC PANEL (14) - Abnormal; Notable for the following components:       Result Leti within the left iliac     bone. There is a battery pack projecting over the right iliac bone with     leads extending towards the spine. Numerous calcifications are seen in the lower abdomen and pelvis     consistent with phleboliths.   There is le No acute fracture identified. No     in dislocation. Nerve stimulator battery pack and lead identified. The leads extend into     the spinal canal at T9-10.   Threaded screws are seen in the left iliac     crest.  Extensive atherosclerotic vascul take with other Tylenol containing products. ).   Qty: 12 tablet Refills: 0

## 2019-07-12 NOTE — TELEPHONE ENCOUNTER
Spoke to pt to relay info below. Pt states Gavin Castro did call, however spoke to her . Encouraged pt to continue w/ Cymbalta.  Advised pt that she should seek tx in ED if pain becomes out of control, completely intolerable, or her functional status change

## 2019-07-12 NOTE — TELEPHONE ENCOUNTER
I would recommend that she get the hip xray done that was ordered by her PCP on Monday. It appears her PCP started her on Cymbalta so I wouldn't make additional medication adjustments at this time. . SCS adjustment has helped her pain in the past, I have re

## 2019-07-12 NOTE — ED INITIAL ASSESSMENT (HPI)
Pt states that she has a spinal stimulator for pain control. Pt states that she has pain that radiates down her LEFT hip. Pt denies any trauma.  Pt state that she turned off of the stimulator per Dr. Yesi Mcgill, and then turned it back on d/t the pain getting wor

## 2019-07-12 NOTE — TELEPHONE ENCOUNTER
Patient calling with pain to:  Left leg, can't walk d/t pain. Can move the leg, it just hurts. Rating pain: 10/10  States pain is: Aching  Currently taking: Naproxen 500 mg, gabapentin, and OTC Tylenol with no relief.      Pt states pain began as an ache

## 2019-07-14 DIAGNOSIS — I10 ESSENTIAL HYPERTENSION: ICD-10-CM

## 2019-07-15 RX ORDER — LOSARTAN POTASSIUM 100 MG/1
TABLET ORAL
Qty: 90 TABLET | Refills: 0 | Status: SHIPPED | OUTPATIENT
Start: 2019-07-15 | End: 2019-10-07

## 2019-07-17 ENCOUNTER — TELEPHONE (OUTPATIENT)
Dept: PAIN CLINIC | Facility: CLINIC | Age: 79
End: 2019-07-17

## 2019-07-17 ENCOUNTER — OFFICE VISIT (OUTPATIENT)
Dept: PAIN CLINIC | Facility: CLINIC | Age: 79
End: 2019-07-17
Payer: MEDICARE

## 2019-07-17 VITALS
DIASTOLIC BLOOD PRESSURE: 68 MMHG | WEIGHT: 130 LBS | HEIGHT: 62.8 IN | BODY MASS INDEX: 23.04 KG/M2 | HEART RATE: 65 BPM | SYSTOLIC BLOOD PRESSURE: 118 MMHG | OXYGEN SATURATION: 95 %

## 2019-07-17 DIAGNOSIS — M54.16 LUMBAR RADICULOPATHY: Primary | ICD-10-CM

## 2019-07-17 PROCEDURE — 99213 OFFICE O/P EST LOW 20 MIN: CPT | Performed by: ANESTHESIOLOGY

## 2019-07-17 NOTE — TELEPHONE ENCOUNTER
Please contact patient to schedule appointment with neurosurgery department for lumbar radiculopathy, I have placed a referral today. Thanks!

## 2019-07-17 NOTE — PROGRESS NOTES
Spoke with patient and scheduled injection(s). Reviewed pre-op instructions.  Patient verbalized understanding, and agreeable to holding naproxen medications as indicated in instructions listed in AVS.  Encouraged pt to contact office if changes in health s

## 2019-07-17 NOTE — PROGRESS NOTES
Patient presents in office today with reported pain in left hip and left leg above knee    Recent ED visit 7/12/19    Current pain level reported = 8/10    Last reported dose of HYDROCODONE/ACETAMINOPHEN 5-325 TB = yesterday    With Kenisha Sawant, spouse, in [de-identified]

## 2019-07-17 NOTE — PATIENT INSTRUCTIONS
Refill policies:    • Allow 2-3 business days for refills; controlled substances may take longer.   • Contact your pharmacy at least 5 days prior to running out of medication and have them send an electronic request or submit request through the “request re Depending on your insurance carrier, approval may take 3-10 days. It is highly recommended patients contact their insurance carrier directly to determine coverage.   If test is done without insurance authorization, patient may be responsible for the entire 8/6/19, 8/13/19   Check-In/Arrival Time:  11:30 am, 10:45 am, 10:30 am    ** TO AVOID CANCELLATION AND/OR RESCHEDULING: PLEASE CALL [facility] PRE-PROCEDURE LINE -[phone] FOR DETAILED INSTRUCTIONS FIVE TO SEVEN DAYS PRIOR TO PROCEDURE**        Prior to the off for the following medications:  • Aggrenox 10 days   • Agrylin (Anagrelide) 10 days   • Enbrel (Etanercept) 24 hours   • Fragmin (Dalteparin) 24 hours   • Pletal (Cilostazol) 7 days  • Effient (Prasugrel) 7 days  • Pradaxa 10 days  • Trental 7 days  • cause a temporary increase in your blood sugar. Contact your primary care physician if your blood sugar rises as you may require some medication adjustment.         It is normal to have increased pain at injection site for up to 3-5 days after procedure, y intravenous sedation that can make the procedure easier to tolerate. Patients may experience some amnesia from sedation and might not remember parts or all of the actual procedure. How is the transforaminal injection performed?   It is done either with th second injection may be recommended. Similarly, if the second transforaminal injection does not completely relieve your symptoms in 1 to 2 weeks a third injection may be recommended. Can I have more than three transforaminal injections?   In a six-month p

## 2019-07-18 DIAGNOSIS — G47.09 OTHER INSOMNIA: Primary | ICD-10-CM

## 2019-07-18 DIAGNOSIS — F51.04 PSYCHOPHYSIOLOGICAL INSOMNIA: ICD-10-CM

## 2019-07-18 RX ORDER — TRAZODONE HYDROCHLORIDE 100 MG/1
TABLET ORAL
Qty: 90 TABLET | Refills: 0 | Status: SHIPPED | OUTPATIENT
Start: 2019-07-18 | End: 2019-10-07

## 2019-07-18 NOTE — TELEPHONE ENCOUNTER
Pt requesting refill of trazodone     Last Time Medication was Filled: 5/7/2019 qty#90    Last Office Visit with PCP: 7/8/19

## 2019-07-23 ENCOUNTER — TELEPHONE (OUTPATIENT)
Dept: PAIN CLINIC | Facility: CLINIC | Age: 79
End: 2019-07-23

## 2019-07-23 DIAGNOSIS — M54.16 LUMBAR RADICULITIS: Primary | ICD-10-CM

## 2019-07-23 NOTE — TELEPHONE ENCOUNTER
Please place cases with sedation with Dr. Adelaida Cardenas:    Shlomo Gonzalez x3 left L5-S2    Scheduled for:    Appointment Date:  7/30/19  Procedure Time:  0145  Check-in Time:  7401    Appointment Date:  8/6/19  Procedure Time:  1564  Check-in Time:  [facility]    Appointment [place]

## 2019-07-23 NOTE — PROGRESS NOTES
Name: Yuval Lew   : 1940   DOS: 2019     Pain Clinic Follow Up Visit:   Yuval Lew is a 70year old female who presents for recheck of her chronic low back pain.   The patient has a spinal cord stimulator which was helping her significa daily. Disp: 30 capsule Rfl: 1   gabapentin 400 MG Oral Cap Take 1 capsule (400 mg total) by mouth 3 (three) times daily. Disp: 90 capsule Rfl: 1   clonazePAM 0.5 MG Oral Tab Take 1 tablet (0.5 mg total) by mouth 2 (two) times daily.  Disp: 60 tablet Rfl: 0 (500,000 Units total) by mouth 4 (four) times daily as needed.  Daily Disp:  Rfl: 2         EXAM:   /68 (BP Location: Right arm, Patient Position: Sitting, Cuff Size: adult)   Pulse 65   Ht 62.8\"   Wt 130 lb   SpO2 95%   BMI 23.18 kg/m²   Moderate te

## 2019-07-25 ENCOUNTER — TELEPHONE (OUTPATIENT)
Dept: SURGERY | Facility: CLINIC | Age: 79
End: 2019-07-25

## 2019-07-25 NOTE — TELEPHONE ENCOUNTER
Spoke to patient, confirmed procedure date of 7/30/19 with Dr Nadira Tolliver and to be checked in at outpatient registration at 11:30 am.Patient instructed to call pre-procedure line before procedure at [phone].  Patient instructed to call office if there are ad

## 2019-07-30 ENCOUNTER — APPOINTMENT (OUTPATIENT)
Dept: GENERAL RADIOLOGY | Facility: HOSPITAL | Age: 79
End: 2019-07-30
Attending: ANESTHESIOLOGY
Payer: MEDICARE

## 2019-07-30 ENCOUNTER — HOSPITAL ENCOUNTER (OUTPATIENT)
Facility: HOSPITAL | Age: 79
Setting detail: HOSPITAL OUTPATIENT SURGERY
Discharge: HOME | End: 2019-07-30
Attending: ANESTHESIOLOGY | Admitting: ANESTHESIOLOGY
Payer: MEDICARE

## 2019-07-30 VITALS
DIASTOLIC BLOOD PRESSURE: 79 MMHG | HEART RATE: 81 BPM | TEMPERATURE: 99 F | RESPIRATION RATE: 20 BRPM | OXYGEN SATURATION: 97 % | SYSTOLIC BLOOD PRESSURE: 183 MMHG

## 2019-07-30 DIAGNOSIS — M54.16 LUMBAR RADICULITIS: ICD-10-CM

## 2019-07-30 PROCEDURE — 99152 MOD SED SAME PHYS/QHP 5/>YRS: CPT | Performed by: ANESTHESIOLOGY

## 2019-07-30 PROCEDURE — 3E0R3BZ INTRODUCTION OF ANESTHETIC AGENT INTO SPINAL CANAL, PERCUTANEOUS APPROACH: ICD-10-PCS | Performed by: NEUROLOGICAL SURGERY

## 2019-07-30 PROCEDURE — 3E0R33Z INTRODUCTION OF ANTI-INFLAMMATORY INTO SPINAL CANAL, PERCUTANEOUS APPROACH: ICD-10-PCS | Performed by: NEUROLOGICAL SURGERY

## 2019-07-30 RX ORDER — DIPHENHYDRAMINE HYDROCHLORIDE 50 MG/ML
50 INJECTION INTRAMUSCULAR; INTRAVENOUS ONCE AS NEEDED
Status: DISCONTINUED | OUTPATIENT
Start: 2019-07-30 | End: 2019-07-30

## 2019-07-30 RX ORDER — MIDAZOLAM HYDROCHLORIDE 1 MG/ML
INJECTION INTRAMUSCULAR; INTRAVENOUS AS NEEDED
Status: DISCONTINUED | OUTPATIENT
Start: 2019-07-30 | End: 2019-07-30

## 2019-07-30 RX ORDER — DEXAMETHASONE SODIUM PHOSPHATE 10 MG/ML
INJECTION, SOLUTION INTRAMUSCULAR; INTRAVENOUS AS NEEDED
Status: DISCONTINUED | OUTPATIENT
Start: 2019-07-30 | End: 2019-07-30

## 2019-07-30 RX ORDER — SODIUM CHLORIDE, SODIUM LACTATE, POTASSIUM CHLORIDE, CALCIUM CHLORIDE 600; 310; 30; 20 MG/100ML; MG/100ML; MG/100ML; MG/100ML
100 INJECTION, SOLUTION INTRAVENOUS CONTINUOUS
Status: DISCONTINUED | OUTPATIENT
Start: 2019-07-30 | End: 2019-07-30

## 2019-07-30 RX ORDER — ONDANSETRON 2 MG/ML
4 INJECTION INTRAMUSCULAR; INTRAVENOUS ONCE AS NEEDED
Status: DISCONTINUED | OUTPATIENT
Start: 2019-07-30 | End: 2019-07-30

## 2019-07-30 RX ORDER — LIDOCAINE HYDROCHLORIDE 10 MG/ML
INJECTION, SOLUTION EPIDURAL; INFILTRATION; INTRACAUDAL; PERINEURAL AS NEEDED
Status: DISCONTINUED | OUTPATIENT
Start: 2019-07-30 | End: 2019-07-30

## 2019-07-30 NOTE — H&P
History & Physical Examination    Patient Name: Yuval Lew  MRN: [id number]  CSN: [id number]  YOB: 1940    Pre-Operative Diagnosis:  Lumbar radiculitis [M54.16]    Present Illness: A 70year old female with low back pain is here for left tr tablet Rfl: 0 More than a month at Unknown time   omeprazole 20 MG Oral Capsule Delayed Release Take 1 capsule (20 mg total) by mouth daily.  Disp: 30 capsule Rfl: 3 Taking   Fluticasone Propionate 50 MCG/ACT Nasal Suspension 2 sprays by Each Nare route mariann • Calculus of kidney    • Cataract    • Chronic back pain    • Constipation    • Dairy allergy    • Depression    • Diarrhea, unspecified    • Fatigue    • Fibromyalgia    • Flatulence/gas pain/belching    • Food intolerance    • Frequent use of laxative Pablito Izaguirre MD at [place] [address] Left 9/15/2016    Performed by Ruchi Jimenez MD at [facility] MAIN OR   • OTHER SURGICAL HISTORY  2009    Cath Stent Placement x 1   • OTHER SURGICAL HISTORY  2014    parathyroid Smokeless tobacco: Never Used    Alcohol use: No      Alcohol/week: 0.0 standard drinks      Frequency: Never      SYSTEM Check if Review is Normal Check if Physical Exam is Normal If not normal, please explain:   HEENT [x ] [x ]    NECK & BACK [ ] [ ] L

## 2019-07-30 NOTE — OPERATIVE REPORT
[facility]  Operative Report  2019     Virginia Gonsalez Patient Status:  Hospital Outpatient Surgery    1940 MRN [id number]   [facility] ENDOSCOPY Attending No att. providers found   Hosp Day # 0 PCP DO Nichole Short 5 inch Quincke spinal needle was introduced toward the inferior aspect of the junction between the transverse process and pedicle of the left L3-L4 level atraumatically under fluoroscopic guidance.   The needle was advanced into the anterior epidural space

## 2019-08-01 ENCOUNTER — TELEPHONE (OUTPATIENT)
Dept: PAIN CLINIC | Facility: CLINIC | Age: 79
End: 2019-08-01

## 2019-08-01 NOTE — TELEPHONE ENCOUNTER
Spoke to patient, confirmed procedure date of 8/6/19 and to be checked in at outpatient registration at 10:45am. Patient called pre-procedure line and understood instructions at [phone].  Patient instructed to call office if there are additional questi

## 2019-08-05 DIAGNOSIS — F41.1 GAD (GENERALIZED ANXIETY DISORDER): ICD-10-CM

## 2019-08-05 RX ORDER — CLONAZEPAM 0.5 MG/1
TABLET ORAL
Qty: 60 TABLET | Refills: 0 | Status: SHIPPED | OUTPATIENT
Start: 2019-08-05 | End: 2019-09-13

## 2019-08-05 NOTE — PATIENT INSTRUCTIONS
EXAMINATION note    Chief Complaint   Patient presents with   • Physical     yearly physical         History:    Hipolito Shearer is a 40 year old male who presents for a Yearly Physical. States overall doing well. His mood has been good and is sleeping fine at night. His weight has increased 2 lbs in the past 2 years, weight today 262 lbs. He is due for fasting lab work. Denies any dizziness or lightheadedness when going from a sitting to standing position. States taking medications as directed with no side effects. The patient denies chest pain, shortness of breath, palpitations, headaches or lower extremity edema. I have reviewed the past medical, family and social history sections including the medications and allergies listed in the above medical record as well as the nursing notes.     There are no active problems to display for this patient.      Current Outpatient Medications   Medication Sig   • venlafaxine (EFFEXOR) 75 MG tablet Take 1 tablet by mouth daily. Please follow up for additional refills   • atorvastatin (LIPITOR) 20 MG tablet Take 1 tablet by mouth daily.   • escitalopram (LEXAPRO) 10 MG tablet Take 1 tablet by mouth daily. Patient must follow up with MD prior to additional refills.   • carisoprodol (SOMA) 350 MG tablet Take 1 tablet by mouth nightly as needed for Muscle spasms.     No current facility-administered medications for this visit.        Vital Signs:    Vitals:    08/05/19 1052   BP: 146/80   Pulse: 82   Temp: 98.1 °F (36.7 °C)   TempSrc: Temporal   SpO2: 96%   Weight: 119.1 kg   Height: 6' 1\" (1.854 m)       Review of systems;    Constitutional:  Patient denies fever, chills, tiredness or malaise.    Eyes:  Denies change in visual acuity, pain, burning or itching.    HENT:  Denies sinus problems, ear infections, nasal congestion or sore throat.    Respiratory:  Denies cough, shortness of breath.    Cardiovascular:  Denies chest pain, edema.    Gastrointestinal:  Denies abdominal  Refill policies:    • Allow 2 business days for refills; controlled substances may take longer.   • Contact your pharmacy at least 5 days prior to running out of medication and have them send an electronic request or submit request through the “request re pain, nausea, vomiting, bloody stools or diarrhea.    Musculoskeletal:  Denies back pain, neck pain, joint pain or leg swelling.    Integument:  Denies rash, itching.    Neurologic:  Denies headache, focal weakness or sensory changes.      All other systems reviewed and negative      Physical ExamINATION:    Constitutional:  In no acute distress.  Appears stated age.  Cooperative throughout exam.   Integument:  Warm.  Dry.  No erythema.  No rash.    Eyes:  PERRL (Pupils equal, round, reactive to light), EOMI (extraocular movements intact).  Conjunctivae normal.  No discharge.   HENT:  Normocephalic.  Atraumatic.  Bilateral external ears normal.  Oropharynx moist. No oral exudates.  Nose normal.   Neck:  Normal range of motion.  No masses.  No tenderness.  Supple.  No stridor.      Respiratory:  Normal breath sounds.  No respiratory distress.  No wheezing.  No crackles.  No rhonchi.  No chest tenderness.    Cardiovascular:  Normal heart rate.  Normal rhythm.  No murmurs.  No rubs.  No gallops.    Gastrointestinal:  Bowel sounds normal.  Soft.  No tenderness.  No masses.  No pulsatile masses.  No hepatomegaly.  No splenomegaly.  Musculoskeletal:  Normal strength.  Normal reflexes.    Assessment AND Plan:      1. Lipid screening  Continue Lipitor 20 mg daily   He was sent for lab work, we'll call with the final results and recommendations   - CBC & AUTO DIFFERENTIAL; Future  - LIPID PANEL WITH REFLEX; Future  - COMPREHENSIVE METABOLIC PANEL; Future    2. Anxiety/Depression. Continue Lexapro 10 mg daily including Effexor 75 mg daily   3. Physical  Exam   4. Follow up in 1 year         On 8/5/2019, Latisha BULLOCK scribed the services personally performed by Jonathan Red MD.       The documentation recorded by the scribe accurately and completely reflects the service(s) I personally performed and the decisions made by me.                            your physician has recommended that you have a procedure or additional testing performed. [facility] will contact your insurance carrier to obtain pre-certification or prior authorization.     Unfortunately, SESAR has seen an increas

## 2019-08-05 NOTE — TELEPHONE ENCOUNTER
Pt requesting refill of CLONAZEPAM    Last Time Medication was Filled:  7/1/19 qty 60    Last Office Visit with PCP: 7/8/19       No future appointments.

## 2019-08-06 ENCOUNTER — APPOINTMENT (OUTPATIENT)
Dept: GENERAL RADIOLOGY | Facility: HOSPITAL | Age: 79
End: 2019-08-06
Attending: ANESTHESIOLOGY
Payer: MEDICARE

## 2019-08-06 ENCOUNTER — HOSPITAL ENCOUNTER (OUTPATIENT)
Facility: HOSPITAL | Age: 79
Setting detail: HOSPITAL OUTPATIENT SURGERY
Discharge: HOME | End: 2019-08-06
Attending: ANESTHESIOLOGY | Admitting: ANESTHESIOLOGY
Payer: MEDICARE

## 2019-08-06 VITALS
DIASTOLIC BLOOD PRESSURE: 56 MMHG | RESPIRATION RATE: 18 BRPM | OXYGEN SATURATION: 93 % | SYSTOLIC BLOOD PRESSURE: 139 MMHG | HEART RATE: 58 BPM | TEMPERATURE: 98 F

## 2019-08-06 DIAGNOSIS — M54.16 LUMBAR RADICULITIS: ICD-10-CM

## 2019-08-06 PROCEDURE — 3E0R3BZ INTRODUCTION OF ANESTHETIC AGENT INTO SPINAL CANAL, PERCUTANEOUS APPROACH: ICD-10-PCS | Performed by: ANESTHESIOLOGY

## 2019-08-06 PROCEDURE — 3E0R33Z INTRODUCTION OF ANTI-INFLAMMATORY INTO SPINAL CANAL, PERCUTANEOUS APPROACH: ICD-10-PCS | Performed by: ANESTHESIOLOGY

## 2019-08-06 PROCEDURE — 99152 MOD SED SAME PHYS/QHP 5/>YRS: CPT | Performed by: ANESTHESIOLOGY

## 2019-08-06 RX ORDER — ONDANSETRON 2 MG/ML
4 INJECTION INTRAMUSCULAR; INTRAVENOUS ONCE AS NEEDED
Status: COMPLETED | OUTPATIENT
Start: 2019-08-06 | End: 2019-08-06

## 2019-08-06 RX ORDER — DEXAMETHASONE SODIUM PHOSPHATE 10 MG/ML
INJECTION, SOLUTION INTRAMUSCULAR; INTRAVENOUS AS NEEDED
Status: DISCONTINUED | OUTPATIENT
Start: 2019-08-06 | End: 2019-08-06

## 2019-08-06 RX ORDER — LIDOCAINE HYDROCHLORIDE 10 MG/ML
INJECTION, SOLUTION EPIDURAL; INFILTRATION; INTRACAUDAL; PERINEURAL AS NEEDED
Status: DISCONTINUED | OUTPATIENT
Start: 2019-08-06 | End: 2019-08-06

## 2019-08-06 RX ORDER — MIDAZOLAM HYDROCHLORIDE 1 MG/ML
INJECTION INTRAMUSCULAR; INTRAVENOUS AS NEEDED
Status: DISCONTINUED | OUTPATIENT
Start: 2019-08-06 | End: 2019-08-06

## 2019-08-06 RX ORDER — SODIUM CHLORIDE, SODIUM LACTATE, POTASSIUM CHLORIDE, CALCIUM CHLORIDE 600; 310; 30; 20 MG/100ML; MG/100ML; MG/100ML; MG/100ML
100 INJECTION, SOLUTION INTRAVENOUS CONTINUOUS
Status: DISCONTINUED | OUTPATIENT
Start: 2019-08-06 | End: 2019-08-06

## 2019-08-06 RX ORDER — DIPHENHYDRAMINE HYDROCHLORIDE 50 MG/ML
50 INJECTION INTRAMUSCULAR; INTRAVENOUS ONCE AS NEEDED
Status: DISCONTINUED | OUTPATIENT
Start: 2019-08-06 | End: 2019-08-06

## 2019-08-06 RX ORDER — ONDANSETRON 2 MG/ML
INJECTION INTRAMUSCULAR; INTRAVENOUS
Status: COMPLETED
Start: 2019-08-06 | End: 2019-08-06

## 2019-08-06 RX ORDER — ONDANSETRON 2 MG/ML
4 INJECTION INTRAMUSCULAR; INTRAVENOUS ONCE AS NEEDED
Status: DISCONTINUED | OUTPATIENT
Start: 2019-08-06 | End: 2019-08-06

## 2019-08-06 NOTE — H&P (VIEW-ONLY)
History & Physical Examination    Patient Name: Chloé Dunn  MRN: [id number]  CSN: [id number]  YOB: 1940    Pre-Operative Diagnosis:  Lumbar radiculitis [M54.16]    Present Illness: A 70year old female with low back pain is here for left tr at Unknown time   omeprazole 20 MG Oral Capsule Delayed Release Take 1 capsule (20 mg total) by mouth daily. Disp: 30 capsule Rfl: 3 Taking   Fluticasone Propionate 50 MCG/ACT Nasal Suspension 2 sprays by Each Nare route daily as needed.    Disp:  Rfl:  Not [facility] disease [facility]     fibro   • Back pain    • Back problem    • Bad breath    • Calculus of kidney    • Cataract    • Chronic back pain    • Constipation    • Dairy allergy    • Depression    • Diarrhea, unspecified    • Fatigue    • Fibromyalgia at [address] N/A 2/26/2013    Performed by May Nunn MD at [address] 9/15/2016    Performed by Chele Foreman MD at [address] [place] [address] Social History    Tobacco Use      Smoking status: Former Smoker        Packs/day: 0.20        Years: 20.00        Pack years: 4        Types: Cigarettes        Quit date: 1997        Years since quittin.6      Smokeless tobacco: Never Used

## 2019-08-06 NOTE — H&P
History & Physical Examination    Patient Name: William Blanc  MRN: [id number]  CSN: [id number]  YOB: 1940    Pre-Operative Diagnosis:  Lumbar radiculitis [M54.16]    Present Illness: A 70year old female with low back pain is here for left tr at Unknown time   omeprazole 20 MG Oral Capsule Delayed Release Take 1 capsule (20 mg total) by mouth daily. Disp: 30 capsule Rfl: 3 Taking   Fluticasone Propionate 50 MCG/ACT Nasal Suspension 2 sprays by Each Nare route daily as needed.    Disp:  Rfl:  Not Autoimmune disease ([facility]     fibro   • Back pain    • Back problem    • Bad breath    • Calculus of kidney    • Cataract    • Chronic back pain    • Constipation    • Dairy allergy    • Depression    • Diarrhea, unspecified    • Fatigue    • Fibromyalgia at [address] [facility] N/A 2/26/2013    Performed by Charyl Hatchet, MD at [address] 9/15/2016    Performed by Sammie Osborne MD at [address] [place] [address] Social History    Tobacco Use      Smoking status: Former Smoker        Packs/day: 0.20        Years: 20.00        Pack years: 4        Types: Cigarettes        Quit date: 1997        Years since quittin.6      Smokeless tobacco: Never Used

## 2019-08-07 NOTE — OPERATIVE REPORT
[facility]  Operative Report  2019     Carlton Houston Patient Status:  Hospital Outpatient Surgery    1940 MRN [id number]   [facility] ENDOSCOPY Attending No att. providers found   Hosp Day # 0 PCP Tracee Wu DO     Indic skin and subcutaneous tissue was anesthetized via 25-gauge 1.5 inch needle with approximately 2 mL of 1% lidocaine.   A 22-gauge 5 inch Quincke spinal needle was introduced toward the inferior aspect of the junction between the transverse process and pedicl

## 2019-08-08 ENCOUNTER — TELEPHONE (OUTPATIENT)
Dept: SURGERY | Facility: CLINIC | Age: 79
End: 2019-08-08

## 2019-08-08 NOTE — TELEPHONE ENCOUNTER
Spoke to patient, confirmed procedure date of 8/13/19 with Dr Yesi Mcgill and to be checked in at outpatient registration at 10:30 am. Patient called pre-procedure line and understood instructions.  Patient instructed to call office if there are additional questio

## 2019-08-10 ENCOUNTER — OFFICE VISIT (OUTPATIENT)
Dept: FAMILY MEDICINE CLINIC | Facility: CLINIC | Age: 79
End: 2019-08-10
Payer: MEDICARE

## 2019-08-10 ENCOUNTER — APPOINTMENT (OUTPATIENT)
Dept: CT IMAGING | Age: 79
End: 2019-08-10
Attending: EMERGENCY MEDICINE
Payer: MEDICARE

## 2019-08-10 ENCOUNTER — HOSPITAL ENCOUNTER (EMERGENCY)
Age: 79
Discharge: HOME | End: 2019-08-10
Attending: EMERGENCY MEDICINE
Payer: MEDICARE

## 2019-08-10 VITALS
HEART RATE: 60 BPM | DIASTOLIC BLOOD PRESSURE: 60 MMHG | SYSTOLIC BLOOD PRESSURE: 148 MMHG | HEIGHT: 64 IN | RESPIRATION RATE: 18 BRPM | BODY MASS INDEX: 21.34 KG/M2 | WEIGHT: 125 LBS | TEMPERATURE: 98 F | OXYGEN SATURATION: 100 %

## 2019-08-10 VITALS
OXYGEN SATURATION: 98 % | BODY MASS INDEX: 22.97 KG/M2 | RESPIRATION RATE: 20 BRPM | TEMPERATURE: 98 F | HEIGHT: 62.5 IN | HEART RATE: 64 BPM | SYSTOLIC BLOOD PRESSURE: 110 MMHG | WEIGHT: 128 LBS | DIASTOLIC BLOOD PRESSURE: 64 MMHG

## 2019-08-10 DIAGNOSIS — Z02.9 ENCOUNTERS FOR ADMINISTRATIVE PURPOSE: Primary | ICD-10-CM

## 2019-08-10 DIAGNOSIS — R19.7 DIARRHEA, UNSPECIFIED TYPE: Primary | ICD-10-CM

## 2019-08-10 LAB
ALBUMIN SERPL-MCNC: 3.9 G/DL (ref 3.4–5)
ALBUMIN/GLOB SERPL: 1.2 {RATIO} (ref 1–2)
ALP LIVER SERPL-CCNC: 77 U/L (ref 55–142)
ALT SERPL-CCNC: 24 U/L (ref 13–56)
ANION GAP SERPL CALC-SCNC: 10 MMOL/L (ref 0–18)
AST SERPL-CCNC: 14 U/L (ref 15–37)
BASOPHILS # BLD AUTO: 0.07 X10(3) UL (ref 0–0.2)
BASOPHILS NFR BLD AUTO: 0.5 %
BILIRUB SERPL-MCNC: 0.5 MG/DL (ref 0.1–2)
BILIRUB UR QL STRIP.AUTO: NEGATIVE
BUN BLD-MCNC: 18 MG/DL (ref 7–18)
BUN/CREAT SERPL: 15.8 (ref 10–20)
CALCIUM BLD-MCNC: 10 MG/DL (ref 8.5–10.1)
CHLORIDE SERPL-SCNC: 107 MMOL/L (ref 98–112)
CLARITY UR REFRACT.AUTO: CLEAR
CO2 SERPL-SCNC: 23 MMOL/L (ref 21–32)
COLOR UR AUTO: YELLOW
CREAT BLD-MCNC: 1.14 MG/DL (ref 0.55–1.02)
DEPRECATED RDW RBC AUTO: 48.6 FL (ref 35.1–46.3)
EOSINOPHIL # BLD AUTO: 0.12 X10(3) UL (ref 0–0.7)
EOSINOPHIL NFR BLD AUTO: 0.9 %
ERYTHROCYTE [DISTWIDTH] IN BLOOD BY AUTOMATED COUNT: 14.1 % (ref 11–15)
GLOBULIN PLAS-MCNC: 3.3 G/DL (ref 2.8–4.4)
GLUCOSE BLD-MCNC: 98 MG/DL (ref 70–99)
GLUCOSE UR STRIP.AUTO-MCNC: NEGATIVE MG/DL
HCT VFR BLD AUTO: 37.1 % (ref 35–48)
HELMET CELLS BLD QL SMEAR: PRESENT
HGB BLD-MCNC: 12 G/DL (ref 12–16)
IMM GRANULOCYTES # BLD AUTO: 0.04 X10(3) UL (ref 0–1)
IMM GRANULOCYTES NFR BLD: 0.3 %
KETONES UR STRIP.AUTO-MCNC: NEGATIVE MG/DL
LEUKOCYTE ESTERASE UR QL STRIP.AUTO: NEGATIVE
LYMPHOCYTES # BLD AUTO: 5.07 X10(3) UL (ref 1–4)
LYMPHOCYTES NFR BLD AUTO: 37.3 %
M PROTEIN MFR SERPL ELPH: 7.2 G/DL (ref 6.4–8.2)
MCH RBC QN AUTO: 30.2 PG (ref 26–34)
MCHC RBC AUTO-ENTMCNC: 32.3 G/DL (ref 31–37)
MCV RBC AUTO: 93.5 FL (ref 80–100)
MONOCYTES # BLD AUTO: 1.58 X10(3) UL (ref 0.1–1)
MONOCYTES NFR BLD AUTO: 11.6 %
NEUTROPHILS # BLD AUTO: 6.72 X10 (3) UL (ref 1.5–7.7)
NEUTROPHILS # BLD AUTO: 6.72 X10(3) UL (ref 1.5–7.7)
NEUTROPHILS NFR BLD AUTO: 49.4 %
NITRITE UR QL STRIP.AUTO: NEGATIVE
OSMOLALITY SERPL CALC.SUM OF ELEC: 292 MOSM/KG (ref 275–295)
PH UR STRIP.AUTO: 7 [PH] (ref 4.5–8)
PLATELET # BLD AUTO: 377 10(3)UL (ref 150–450)
PLATELET MORPHOLOGY: NORMAL
POTASSIUM SERPL-SCNC: 3.6 MMOL/L (ref 3.5–5.1)
PROT UR STRIP.AUTO-MCNC: NEGATIVE MG/DL
RBC # BLD AUTO: 3.97 X10(6)UL (ref 3.8–5.3)
SODIUM SERPL-SCNC: 140 MMOL/L (ref 136–145)
SP GR UR STRIP.AUTO: 1.01 (ref 1–1.03)
UROBILINOGEN UR STRIP.AUTO-MCNC: 0.2 MG/DL
WBC # BLD AUTO: 13.6 X10(3) UL (ref 4–11)

## 2019-08-10 PROCEDURE — 99284 EMERGENCY DEPT VISIT MOD MDM: CPT

## 2019-08-10 PROCEDURE — 74177 CT ABD & PELVIS W/CONTRAST: CPT | Performed by: EMERGENCY MEDICINE

## 2019-08-10 PROCEDURE — 96374 THER/PROPH/DIAG INJ IV PUSH: CPT

## 2019-08-10 PROCEDURE — 96361 HYDRATE IV INFUSION ADD-ON: CPT

## 2019-08-10 PROCEDURE — 81003 URINALYSIS AUTO W/O SCOPE: CPT | Performed by: EMERGENCY MEDICINE

## 2019-08-10 PROCEDURE — 80053 COMPREHEN METABOLIC PANEL: CPT | Performed by: EMERGENCY MEDICINE

## 2019-08-10 PROCEDURE — 85025 COMPLETE CBC W/AUTO DIFF WBC: CPT | Performed by: EMERGENCY MEDICINE

## 2019-08-10 RX ORDER — DICYCLOMINE HCL 20 MG
20 TABLET ORAL 4 TIMES DAILY PRN
Qty: 30 TABLET | Refills: 0 | Status: SHIPPED | OUTPATIENT
Start: 2019-08-10 | End: 2019-09-09

## 2019-08-10 RX ORDER — MORPHINE SULFATE 4 MG/ML
4 INJECTION, SOLUTION INTRAMUSCULAR; INTRAVENOUS ONCE
Status: COMPLETED | OUTPATIENT
Start: 2019-08-10 | End: 2019-08-10

## 2019-08-10 NOTE — PROGRESS NOTES
Pt presented accompanied by spouse for c/o sinus congestion x 2 days and diarrhea and abd pain since last night. Reports sinus pressure at nasal bridge and cheeks; no nasal discharge. Reports chills. Denies fever, cough, or sore throat.   Took 2 tylenol

## 2019-08-10 NOTE — ED PROVIDER NOTES
Patient Seen in: THE [facility] Emergency Department In [place]    History   Patient presents with:  Abdomen/Flank Pain (GI/)  Nausea/Vomiting/Diarrhea (gastrointestinal)    Stated Complaint: sent from walk-in clinic for ABD pain, diarrhea, and sinus conges Parathyroid adenoma    • PONV (postoperative nausea and vomiting)     only with ether years ago   • Postmenopausal status     Age of onset: 30years old   • Presence of other cardiac implants and grafts    • Rheumatic heart disease, unspecified    • Sleep TONSILLECTOMY     • TOTAL ABDOM HYSTERECTOMY     • TRANSFORAMINAL LUMBAR EPIDURAL STEROID INJECTION MULTIPLE LEVEL Left 8/6/2019    Performed by Ruchi Jimenez MD at [facility] ENDOSCOPY   • TRANSFORAMINAL LUMBAR EPIDURAL STEROID INJECTION MULTIPLE LEVEL Left 7 discomfort or distress. Vital signs are stable she is afebrile    Head is normocephalic atraumatic conjunctiva is clear. Sclerae anicteric. Neck is supple. Lungs are clear to auscultation bilaterally.   Heart is regular rate and rhythm without murmu Please view results for these tests on the individual orders.    SCAN SLIDE   PATH COMMENT CBC   RAINBOW DRAW BLUE   RAINBOW DRAW LAVENDER   RAINBOW DRAW LIGHT GREEN   RAINBOW DRAW GOLD   C. DIFFICILE(TOXIGENIC)PCR   OCCULT BLOOD, STOOL   STOOL CULTURE maximum thickness 7 mm.  Coronary artery calcification noted. Rommie Ruff is cardiomegaly. OTHER:  Negative.        Impression     CONCLUSION:    1.  No acute intra-abdominal or pelvic process.  Uncomplicated diverticulosis of the sigmoid and left colon.  No ob [address]  [phone]    In 2 days          Medications Prescribed:  Discharge Medication List as of 8/10/2019  5:20 PM    START taking these medications    Dicyclomine HCl 20 MG Oral Tab  Take 1 tablet (20 mg total) by mouth 4 (four) times daily as

## 2019-08-10 NOTE — ED INITIAL ASSESSMENT (HPI)
Pt c/o mid ABD pain since last night after experiencing diarrhea x 2 episodes. Pt states she went to the walk-in clinic first and was recommended to come to ED. Pt states she has \"sinus congestion\" as well. Denies fever.

## 2019-08-13 ENCOUNTER — APPOINTMENT (OUTPATIENT)
Dept: GENERAL RADIOLOGY | Facility: HOSPITAL | Age: 79
End: 2019-08-13
Attending: ANESTHESIOLOGY
Payer: MEDICARE

## 2019-08-13 ENCOUNTER — HOSPITAL ENCOUNTER (OUTPATIENT)
Facility: HOSPITAL | Age: 79
Setting detail: HOSPITAL OUTPATIENT SURGERY
Discharge: HOME | End: 2019-08-13
Attending: ANESTHESIOLOGY | Admitting: ANESTHESIOLOGY
Payer: MEDICARE

## 2019-08-13 VITALS
SYSTOLIC BLOOD PRESSURE: 154 MMHG | DIASTOLIC BLOOD PRESSURE: 63 MMHG | RESPIRATION RATE: 16 BRPM | OXYGEN SATURATION: 99 % | TEMPERATURE: 99 F | HEART RATE: 65 BPM

## 2019-08-13 DIAGNOSIS — M54.16 LUMBAR RADICULITIS: ICD-10-CM

## 2019-08-13 PROCEDURE — 3E0R3BZ INTRODUCTION OF ANESTHETIC AGENT INTO SPINAL CANAL, PERCUTANEOUS APPROACH: ICD-10-PCS | Performed by: ANESTHESIOLOGY

## 2019-08-13 PROCEDURE — 3E0R33Z INTRODUCTION OF ANTI-INFLAMMATORY INTO SPINAL CANAL, PERCUTANEOUS APPROACH: ICD-10-PCS | Performed by: ANESTHESIOLOGY

## 2019-08-13 RX ORDER — LIDOCAINE HYDROCHLORIDE 10 MG/ML
INJECTION, SOLUTION EPIDURAL; INFILTRATION; INTRACAUDAL; PERINEURAL AS NEEDED
Status: DISCONTINUED | OUTPATIENT
Start: 2019-08-13 | End: 2019-08-13

## 2019-08-13 RX ORDER — ONDANSETRON 2 MG/ML
4 INJECTION INTRAMUSCULAR; INTRAVENOUS ONCE AS NEEDED
Status: DISCONTINUED | OUTPATIENT
Start: 2019-08-13 | End: 2019-08-13

## 2019-08-13 RX ORDER — DIPHENHYDRAMINE HYDROCHLORIDE 50 MG/ML
50 INJECTION INTRAMUSCULAR; INTRAVENOUS ONCE AS NEEDED
Status: DISCONTINUED | OUTPATIENT
Start: 2019-08-13 | End: 2019-08-13

## 2019-08-13 RX ORDER — DEXAMETHASONE SODIUM PHOSPHATE 10 MG/ML
INJECTION, SOLUTION INTRAMUSCULAR; INTRAVENOUS AS NEEDED
Status: DISCONTINUED | OUTPATIENT
Start: 2019-08-13 | End: 2019-08-13

## 2019-08-13 RX ORDER — SODIUM CHLORIDE, SODIUM LACTATE, POTASSIUM CHLORIDE, CALCIUM CHLORIDE 600; 310; 30; 20 MG/100ML; MG/100ML; MG/100ML; MG/100ML
100 INJECTION, SOLUTION INTRAVENOUS CONTINUOUS
Status: DISCONTINUED | OUTPATIENT
Start: 2019-08-13 | End: 2019-08-13

## 2019-08-13 NOTE — INTERVAL H&P NOTE
Pre-op Diagnosis: Lumbar radiculitis [M54.16]    The above referenced H&P was reviewed by JOVANNI Starks on 8/13/2019, the patient was examined and no significant changes have occurred in the patient's condition since the H&P was performed.   I discu

## 2019-08-13 NOTE — OPERATIVE REPORT
[facility]  Operative Report  2019     Senait Vazquez Patient Status:  Hospital Outpatient Surgery    1940 MRN [id number]   [facility] ENDOSCOPY Attending Anish Malik MD   Hosp Day # 0 PCP DO Chad Wolft was advanced into the anterior epidural space at this level. The needle position was confirmed under AP and lateral fluoroscopic view. Following negative aspiration for CSF and blood, approximately 1 cc of Omnipaque 240 was injected.   An excellent contras

## 2019-08-22 ENCOUNTER — OFFICE VISIT (OUTPATIENT)
Dept: FAMILY MEDICINE CLINIC | Facility: CLINIC | Age: 79
End: 2019-08-22
Payer: MEDICARE

## 2019-08-22 VITALS
HEIGHT: 62.8 IN | SYSTOLIC BLOOD PRESSURE: 88 MMHG | BODY MASS INDEX: 22.86 KG/M2 | TEMPERATURE: 99 F | WEIGHT: 129 LBS | HEART RATE: 64 BPM | DIASTOLIC BLOOD PRESSURE: 58 MMHG

## 2019-08-22 DIAGNOSIS — J32.0 CHRONIC MAXILLARY SINUSITIS: Primary | ICD-10-CM

## 2019-08-22 DIAGNOSIS — E21.0 PRIMARY HYPERPARATHYROIDISM (HCC): ICD-10-CM

## 2019-08-22 DIAGNOSIS — E83.52 HYPERCALCEMIA: ICD-10-CM

## 2019-08-22 PROCEDURE — 99214 OFFICE O/P EST MOD 30 MIN: CPT | Performed by: FAMILY MEDICINE

## 2019-08-22 RX ORDER — CINACALCET 30 MG/1
TABLET, FILM COATED ORAL
Qty: 60 TABLET | Refills: 0 | Status: SHIPPED | OUTPATIENT
Start: 2019-08-22 | End: 2019-09-17

## 2019-08-22 RX ORDER — LEVOFLOXACIN 250 MG/1
250 TABLET ORAL DAILY
Qty: 5 TABLET | Refills: 0 | Status: SHIPPED | OUTPATIENT
Start: 2019-08-22 | End: 2019-09-17 | Stop reason: ALTCHOICE
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
## 2019-08-27 DIAGNOSIS — I10 ESSENTIAL HYPERTENSION: ICD-10-CM

## 2019-08-27 RX ORDER — AMLODIPINE BESYLATE 10 MG/1
TABLET ORAL
Qty: 90 TABLET | Refills: 1 | Status: ON HOLD | OUTPATIENT
Start: 2019-08-27 | End: 2020-01-27

## 2019-08-29 PROBLEM — K20.80 OTHER SPECIFIED OESOPHAGITIS: Status: ACTIVE | Noted: 2019-08-29

## 2019-08-29 PROBLEM — K25.9 GASTRIC ULCER: Status: ACTIVE | Noted: 2019-08-29

## 2019-08-29 PROBLEM — K20.80 OTHER ESOPHAGITIS: Status: ACTIVE | Noted: 2019-08-29

## 2019-09-13 DIAGNOSIS — F41.1 GAD (GENERALIZED ANXIETY DISORDER): ICD-10-CM

## 2019-09-16 DIAGNOSIS — J01.00 ACUTE NON-RECURRENT MAXILLARY SINUSITIS: ICD-10-CM

## 2019-09-16 RX ORDER — MONTELUKAST SODIUM 10 MG/1
TABLET ORAL
Qty: 30 TABLET | Refills: 0 | Status: SHIPPED | OUTPATIENT
Start: 2019-09-16 | End: 2019-10-23

## 2019-09-16 RX ORDER — CLONAZEPAM 0.5 MG/1
0.5 TABLET ORAL 2 TIMES DAILY PRN
Qty: 60 TABLET | Refills: 0 | Status: SHIPPED
Start: 2019-09-16 | End: 2019-10-18

## 2019-09-16 NOTE — TELEPHONE ENCOUNTER
Pt requesting refill of MONTELUKAST SODIUM 10 MG Oral Tab.  Refill approved with message to schedule appointment     Last Time Medication was Filled:  6/24/2019    Last Office Visit with PCP: 8/22/19 (all recent visits have been acute.) due for follow up

## 2019-09-16 NOTE — TELEPHONE ENCOUNTER
Pt requesting refill of CLONAZEPAM 0.5 MG Oral Tab    Last Time Medication was Filled:  8/5/2019    Last Office Visit with PCP: 8/22/19 (all recent visits have been acute.) due for follow up       No future appointments.   Left message to call back for an

## 2019-09-17 ENCOUNTER — OFFICE VISIT (OUTPATIENT)
Dept: FAMILY MEDICINE CLINIC | Facility: CLINIC | Age: 79
End: 2019-09-17
Payer: MEDICARE

## 2019-09-17 VITALS
BODY MASS INDEX: 22.86 KG/M2 | SYSTOLIC BLOOD PRESSURE: 130 MMHG | TEMPERATURE: 99 F | HEIGHT: 62.8 IN | WEIGHT: 129 LBS | HEART RATE: 60 BPM | DIASTOLIC BLOOD PRESSURE: 70 MMHG

## 2019-09-17 DIAGNOSIS — J32.9 CHRONIC RECURRENT SINUSITIS: Primary | ICD-10-CM

## 2019-09-17 DIAGNOSIS — G89.29 CHRONIC MIDLINE LOW BACK PAIN WITHOUT SCIATICA: ICD-10-CM

## 2019-09-17 DIAGNOSIS — M54.50 CHRONIC MIDLINE LOW BACK PAIN WITHOUT SCIATICA: ICD-10-CM

## 2019-09-17 DIAGNOSIS — E83.52 HYPERCALCEMIA: ICD-10-CM

## 2019-09-17 DIAGNOSIS — R11.0 NAUSEA: ICD-10-CM

## 2019-09-17 DIAGNOSIS — E21.0 PRIMARY HYPERPARATHYROIDISM (HCC): ICD-10-CM

## 2019-09-17 PROCEDURE — 99214 OFFICE O/P EST MOD 30 MIN: CPT | Performed by: FAMILY MEDICINE

## 2019-09-17 RX ORDER — CINACALCET 30 MG/1
TABLET, FILM COATED ORAL
Qty: 60 TABLET | Refills: 0 | Status: SHIPPED | OUTPATIENT
Start: 2019-09-17 | End: 2019-12-23

## 2019-09-17 RX ORDER — ONDANSETRON 4 MG/1
TABLET, ORALLY DISINTEGRATING ORAL
Qty: 12 TABLET | Refills: 0 | Status: SHIPPED | OUTPATIENT
Start: 2019-09-17 | End: 2020-05-28

## 2019-09-17 RX ORDER — METHYLPREDNISOLONE 4 MG/1
TABLET ORAL
Qty: 1 KIT | Refills: 0 | Status: SHIPPED | OUTPATIENT
Start: 2019-09-17 | End: 2019-10-15 | Stop reason: ALTCHOICE

## 2019-09-17 RX ORDER — HYDROCODONE BITARTRATE AND ACETAMINOPHEN 5; 325 MG/1; MG/1
1 TABLET ORAL EVERY 6 HOURS PRN
Qty: 15 TABLET | Refills: 0 | Status: SHIPPED | OUTPATIENT
Start: 2019-09-17 | End: 2019-10-17
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
## 2019-09-17 NOTE — PROGRESS NOTES
Per Pricila Stallings PA-C, the following note was successfully faxed to the patient's ENT, Jayde Shannon. Char Homans, M.D.,:    Luisana Portillo PA-C saw your mutual patient today, 9/17/19, for recurrent sinus infections.   According to our records, the patien

## 2019-10-07 DIAGNOSIS — I10 ESSENTIAL HYPERTENSION: ICD-10-CM

## 2019-10-07 DIAGNOSIS — E78.2 MIXED HYPERLIPIDEMIA: Primary | ICD-10-CM

## 2019-10-07 DIAGNOSIS — F51.04 PSYCHOPHYSIOLOGICAL INSOMNIA: ICD-10-CM

## 2019-10-07 RX ORDER — LOSARTAN POTASSIUM 100 MG/1
TABLET ORAL
Qty: 90 TABLET | Refills: 0 | Status: SHIPPED | OUTPATIENT
Start: 2019-10-07 | End: 2020-01-06

## 2019-10-07 RX ORDER — TRAZODONE HYDROCHLORIDE 100 MG/1
TABLET ORAL
Qty: 90 TABLET | Refills: 0 | Status: SHIPPED | OUTPATIENT
Start: 2019-10-07 | End: 2019-12-29

## 2019-10-07 RX ORDER — PRAVASTATIN SODIUM 40 MG
TABLET ORAL
Qty: 90 TABLET | Refills: 0 | Status: SHIPPED | OUTPATIENT
Start: 2019-10-07 | End: 2019-12-16

## 2019-10-07 NOTE — TELEPHONE ENCOUNTER
Pt requesting refill of TRAZODONE, LOSARTAN AND PRAVASTATIN refill approved, sent to pharmacy:     Last Time Medication was Filled: 7/15/2019    Last Office Visit with PCP: 9/17/19    Has future appointment 10/17/19

## 2019-10-10 ENCOUNTER — TELEPHONE (OUTPATIENT)
Dept: SURGERY | Facility: CLINIC | Age: 79
End: 2019-10-10

## 2019-10-10 ENCOUNTER — OFFICE VISIT (OUTPATIENT)
Dept: SURGERY | Facility: CLINIC | Age: 79
End: 2019-10-10
Payer: MEDICARE

## 2019-10-10 NOTE — TELEPHONE ENCOUNTER
patient is wondering if she can have some hydrocodone pills until she is able to come in and see Dr. Bula Gottron.     She had made an appointment to see Dr. Abena Davis when she should of been follow up with Dr. Bula Gottron has been waiting for appointment on Dr. Theresa Barros

## 2019-10-10 NOTE — TELEPHONE ENCOUNTER
No contract on file. Most recent script for Norco 5-325 provided by [facility] on 9/17/19, qty #15. Has appt w/ Dr Danielle Hammer on 10/15/19.

## 2019-10-11 NOTE — TELEPHONE ENCOUNTER
We have not seen patient in office since 7/2019 and there has never been any discussion of narcotic medications. Unfortunately we don't do narcotic medications over the phone. At this time we are unable to prescribe for her.

## 2019-10-11 NOTE — TELEPHONE ENCOUNTER
Spoke to pt to relay information below. Encouraged pt to continue with naproxen and gabapentin as prescribed and discuss adding medications to her regimen w/ Dr Chicas Form on Tuesday during [address] Pt verbalized understanding.

## 2019-10-15 ENCOUNTER — OFFICE VISIT (OUTPATIENT)
Dept: SURGERY | Facility: CLINIC | Age: 79
End: 2019-10-15
Payer: MEDICARE

## 2019-10-15 VITALS
BODY MASS INDEX: 22.15 KG/M2 | DIASTOLIC BLOOD PRESSURE: 70 MMHG | WEIGHT: 125 LBS | SYSTOLIC BLOOD PRESSURE: 122 MMHG | HEART RATE: 78 BPM | HEIGHT: 63 IN

## 2019-10-15 DIAGNOSIS — M54.16 LUMBAR RADICULITIS: ICD-10-CM

## 2019-10-15 DIAGNOSIS — Z96.89 S/P INSERTION OF SPINAL CORD STIMULATOR: ICD-10-CM

## 2019-10-15 DIAGNOSIS — M96.1 FAILED BACK SURGICAL SYNDROME: Primary | ICD-10-CM

## 2019-10-15 PROCEDURE — 99214 OFFICE O/P EST MOD 30 MIN: CPT | Performed by: PHYSICIAN ASSISTANT

## 2019-10-15 RX ORDER — PREGABALIN 75 MG/1
75 CAPSULE ORAL 2 TIMES DAILY
Qty: 60 CAPSULE | Refills: 2 | Status: SHIPPED | OUTPATIENT
Start: 2019-10-15 | End: 2019-12-26

## 2019-10-15 NOTE — PROGRESS NOTES
Neurosurgery Clinic Visit  10/15/2019    Keerthi Jones PCP:  Radha Garcia DO    1940 MRN [id number]     HISTORY OF PRESENT ILLNESS:  Keerthi Jones is a(n) 70year old female who is here for evaluation of left leg pain.   She was last seen in No and extension. Nontender to palpation over cervical/lumbar area without spasms. Negative Spurling's. Negative L'Hermitte's. REVIEW OF STUDIES:  Imaging studies were personally reviewed. MRI lumbar spine 6/2016. ASSESSMENT and PLAN:  1.   L

## 2019-10-15 NOTE — PROGRESS NOTES
Pt is here for follow up. Pt was last seen in office 11/2/17. Dr Princess Bullock. Pt states that for the past couple of months she has been having more issues with left leg pain with numbness and tingling.  Pt states that she has no issues with B/B.

## 2019-10-17 ENCOUNTER — OFFICE VISIT (OUTPATIENT)
Dept: FAMILY MEDICINE CLINIC | Facility: CLINIC | Age: 79
End: 2019-10-17
Payer: MEDICARE

## 2019-10-17 VITALS
WEIGHT: 130 LBS | TEMPERATURE: 98 F | SYSTOLIC BLOOD PRESSURE: 102 MMHG | BODY MASS INDEX: 23 KG/M2 | HEART RATE: 52 BPM | OXYGEN SATURATION: 96 % | DIASTOLIC BLOOD PRESSURE: 56 MMHG

## 2019-10-17 DIAGNOSIS — J32.0 CHRONIC MAXILLARY SINUSITIS: ICD-10-CM

## 2019-10-17 DIAGNOSIS — G89.29 CHRONIC MIDLINE LOW BACK PAIN WITHOUT SCIATICA: Primary | ICD-10-CM

## 2019-10-17 DIAGNOSIS — M54.50 CHRONIC MIDLINE LOW BACK PAIN WITHOUT SCIATICA: Primary | ICD-10-CM

## 2019-10-17 PROBLEM — K25.9 GASTRIC ULCER: Status: RESOLVED | Noted: 2019-08-29 | Resolved: 2019-10-17

## 2019-10-17 PROCEDURE — 99214 OFFICE O/P EST MOD 30 MIN: CPT | Performed by: FAMILY MEDICINE

## 2019-10-17 RX ORDER — HYDROCODONE BITARTRATE AND ACETAMINOPHEN 5; 325 MG/1; MG/1
1 TABLET ORAL 2 TIMES DAILY PRN
Qty: 20 TABLET | Refills: 0 | Status: SHIPPED | OUTPATIENT
Start: 2019-10-17 | End: 2019-11-25
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
## 2019-10-17 NOTE — PATIENT INSTRUCTIONS
Self-Care for Sinusitis     Drinking plenty of water can help sinuses drain. Sinusitis can often be managed with self-care. Self-care can keep sinuses moist and make you feel more comfortable. Remember to follow your doctor's instructions closely.  This Antibiotics are medicines used to treat infections caused by bacteria. They don’t work for illnesses caused by viruses or an allergic reaction. In fact, taking antibiotics for reasons other than a bacterial infection can cause problems.  For example, you ma · Most sinus infections (sinusitis). This kind of infection causes sinus pain and swelling, and a runny nose. In most cases, sinusitis goes away on its own, and antibiotics don’t make recovery quicker. · Allergic rhinitis.  This is a set of symptoms caused · Use over-the-counter medicine such as acetaminophen to ease pain or fever, as directed by your healthcare provider.   To treat sinus pain or nasal congestion:   · Put a warm, moist washcloth on your face where you feel sinus pain or pressure.   · Use a na

## 2019-10-18 DIAGNOSIS — F41.1 GAD (GENERALIZED ANXIETY DISORDER): ICD-10-CM

## 2019-10-21 RX ORDER — CLONAZEPAM 0.5 MG/1
TABLET ORAL
Qty: 60 TABLET | Refills: 2 | Status: SHIPPED | OUTPATIENT
Start: 2019-10-21 | End: 2020-01-20

## 2019-10-21 NOTE — TELEPHONE ENCOUNTER
Pt requesting refill of CLONAZEPAM 0.5 MG Oral Tab,  refill request sent to provider    Last Time Medication was Filled: 9/16/19 qty#60    Last Office Visit with PCP: 10/17/2019   No future appointments.

## 2019-10-23 ENCOUNTER — TELEPHONE (OUTPATIENT)
Dept: FAMILY MEDICINE CLINIC | Facility: CLINIC | Age: 79
End: 2019-10-23

## 2019-10-23 DIAGNOSIS — J01.00 ACUTE NON-RECURRENT MAXILLARY SINUSITIS: ICD-10-CM

## 2019-10-23 RX ORDER — MONTELUKAST SODIUM 10 MG/1
TABLET ORAL
Qty: 90 TABLET | Refills: 0 | Status: SHIPPED | OUTPATIENT
Start: 2019-10-23 | End: 2020-01-20

## 2019-10-23 NOTE — TELEPHONE ENCOUNTER
Pt requesting refill of MONTELUKAST SODIUM 10 MG Oral Tab, passed protocol , refill approved, sent to pharmacy

## 2019-10-23 NOTE — TELEPHONE ENCOUNTER
Patient was seen last week and states her sinus is still bad, has a sore throat and now has diarrhea wants to know if she can get an antibiotic

## 2019-10-23 NOTE — TELEPHONE ENCOUNTER
Phoned patient and instructed her to call ENT Dr. Aurora Casas as advised by Tara Harvey. She stated she would call and make an appointment as she has not yet done that.

## 2019-10-31 ENCOUNTER — HOSPITAL ENCOUNTER (OUTPATIENT)
Dept: MRI IMAGING | Facility: HOSPITAL | Age: 79
Discharge: HOME | End: 2019-10-31
Attending: PHYSICIAN ASSISTANT
Payer: MEDICARE

## 2019-10-31 DIAGNOSIS — Z96.89 S/P INSERTION OF SPINAL CORD STIMULATOR: ICD-10-CM

## 2019-10-31 DIAGNOSIS — M54.16 LUMBAR RADICULITIS: ICD-10-CM

## 2019-10-31 DIAGNOSIS — M96.1 FAILED BACK SURGICAL SYNDROME: ICD-10-CM

## 2019-10-31 PROCEDURE — 72148 MRI LUMBAR SPINE W/O DYE: CPT | Performed by: PHYSICIAN ASSISTANT

## 2019-11-07 ENCOUNTER — OFFICE VISIT (OUTPATIENT)
Dept: SURGERY | Facility: CLINIC | Age: 79
End: 2019-11-07
Payer: MEDICARE

## 2019-11-07 ENCOUNTER — TELEPHONE (OUTPATIENT)
Dept: SURGERY | Facility: CLINIC | Age: 79
End: 2019-11-07

## 2019-11-07 VITALS
BODY MASS INDEX: 23.04 KG/M2 | OXYGEN SATURATION: 96 % | WEIGHT: 130 LBS | HEIGHT: 63 IN | SYSTOLIC BLOOD PRESSURE: 122 MMHG | HEART RATE: 68 BPM | DIASTOLIC BLOOD PRESSURE: 60 MMHG

## 2019-11-07 DIAGNOSIS — M96.1 FAILED BACK SURGICAL SYNDROME: Primary | ICD-10-CM

## 2019-11-07 DIAGNOSIS — M54.16 LUMBAR RADICULITIS: ICD-10-CM

## 2019-11-07 DIAGNOSIS — M54.16 LUMBAR RADICULITIS: Primary | ICD-10-CM

## 2019-11-07 DIAGNOSIS — Z96.89 S/P INSERTION OF SPINAL CORD STIMULATOR: ICD-10-CM

## 2019-11-07 PROCEDURE — 99213 OFFICE O/P EST LOW 20 MIN: CPT | Performed by: PHYSICIAN ASSISTANT

## 2019-11-07 NOTE — TELEPHONE ENCOUNTER
Since it appears they are requesting Left S1 nerve root block as a diagnostic we can go ahead and schedule her. Case request placed.      Dano Rodriguez MD             Can you please schedule a left S1 nerve root block for her?  Th

## 2019-11-07 NOTE — TELEPHONE ENCOUNTER
Patient evaluated by Dr. Karol Son  11/7, please advise if OV is required prior to scheduling recommended procedure. ASSESSMENT and PLAN:  1.  Left lumbar radiculopathy.   2.  S/p implantation of SCS at [facility] in October 2017.     Reviewed imaging with the pa

## 2019-11-07 NOTE — PROGRESS NOTES
Neurosurgery Clinic Visit  2019    Wes Lyman PCP:  Kristan Wheeler DO DOB 1940 MRN [id number]     HISTORY OF PRESENT ILLNESS:  Wes Lyman is a(n) 70year old female who is here for follow-up for lumbar radiculopathy.   She continues to

## 2019-11-07 NOTE — TELEPHONE ENCOUNTER
pt states dr Berenice Santiago referring her back to Dr Jerris Cranker for injection.  Please call cell# to schedule procedure or appt

## 2019-11-07 NOTE — TELEPHONE ENCOUNTER
[address]  PRE-PROCEDURE INSTRUCTIONS WITH IV SEDATION        Appointment Date: 11/14/19   Check-In Time: 12:45 PM     ** TO AVOID CANCELLATION AND/OR RESCHEDULING: PLEASE CALL [facility] PRE-PROCEDURE LINE [phone] FOR DETAILED INSTRUCTI ? If you have an implanted Spinal Cord or Peripheral Nerve Stimulator: Please remember to turn device off for procedure      Medication:   Number of days you need to be off for the following medications:  ? Aggrenox 10 days   ?  Agrylin (Anagrelide) 10 days Please call our office with any questions or concerns before or after your procedure at [phone].   If you are a diabetic, please increase the frequency of your glucose monitoring after the procedure as this may cause a temporary increase in your blood

## 2019-11-11 ENCOUNTER — TELEPHONE (OUTPATIENT)
Dept: PAIN CLINIC | Facility: CLINIC | Age: 79
End: 2019-11-11

## 2019-11-11 NOTE — TELEPHONE ENCOUNTER
Spoke to patient, confirmed procedure date of 11/14/2019 and to be checked in at outpatient registration at [phone] pm. Patient called pre-procedure line and understood instructions/Patient instructed to call pre-procedure line before procedure at [phone]

## 2019-11-14 ENCOUNTER — HOSPITAL ENCOUNTER (OUTPATIENT)
Facility: HOSPITAL | Age: 79
Setting detail: HOSPITAL OUTPATIENT SURGERY
Discharge: HOME | End: 2019-11-14
Attending: ANESTHESIOLOGY | Admitting: ANESTHESIOLOGY
Payer: MEDICARE

## 2019-11-14 ENCOUNTER — APPOINTMENT (OUTPATIENT)
Dept: GENERAL RADIOLOGY | Facility: HOSPITAL | Age: 79
End: 2019-11-14
Attending: ANESTHESIOLOGY
Payer: MEDICARE

## 2019-11-14 ENCOUNTER — TELEPHONE (OUTPATIENT)
Dept: PAIN CLINIC | Facility: CLINIC | Age: 79
End: 2019-11-14

## 2019-11-14 VITALS
TEMPERATURE: 99 F | HEART RATE: 93 BPM | RESPIRATION RATE: 17 BRPM | DIASTOLIC BLOOD PRESSURE: 83 MMHG | SYSTOLIC BLOOD PRESSURE: 183 MMHG | OXYGEN SATURATION: 100 %

## 2019-11-14 DIAGNOSIS — M54.16 LUMBAR RADICULITIS: ICD-10-CM

## 2019-11-14 PROCEDURE — 3E0R3BZ INTRODUCTION OF ANESTHETIC AGENT INTO SPINAL CANAL, PERCUTANEOUS APPROACH: ICD-10-PCS | Performed by: ANESTHESIOLOGY

## 2019-11-14 PROCEDURE — 99152 MOD SED SAME PHYS/QHP 5/>YRS: CPT | Performed by: ANESTHESIOLOGY

## 2019-11-14 PROCEDURE — 3E0R33Z INTRODUCTION OF ANTI-INFLAMMATORY INTO SPINAL CANAL, PERCUTANEOUS APPROACH: ICD-10-PCS | Performed by: ANESTHESIOLOGY

## 2019-11-14 RX ORDER — ONDANSETRON 2 MG/ML
4 INJECTION INTRAMUSCULAR; INTRAVENOUS ONCE AS NEEDED
Status: DISCONTINUED | OUTPATIENT
Start: 2019-11-14 | End: 2019-11-14

## 2019-11-14 RX ORDER — SODIUM CHLORIDE, SODIUM LACTATE, POTASSIUM CHLORIDE, CALCIUM CHLORIDE 600; 310; 30; 20 MG/100ML; MG/100ML; MG/100ML; MG/100ML
100 INJECTION, SOLUTION INTRAVENOUS CONTINUOUS
Status: DISCONTINUED | OUTPATIENT
Start: 2019-11-14 | End: 2019-11-14

## 2019-11-14 RX ORDER — LIDOCAINE HYDROCHLORIDE 10 MG/ML
INJECTION, SOLUTION EPIDURAL; INFILTRATION; INTRACAUDAL; PERINEURAL AS NEEDED
Status: DISCONTINUED | OUTPATIENT
Start: 2019-11-14 | End: 2019-11-14

## 2019-11-14 RX ORDER — DIPHENHYDRAMINE HYDROCHLORIDE 50 MG/ML
50 INJECTION INTRAMUSCULAR; INTRAVENOUS ONCE AS NEEDED
Status: DISCONTINUED | OUTPATIENT
Start: 2019-11-14 | End: 2019-11-14

## 2019-11-14 RX ORDER — DEXAMETHASONE SODIUM PHOSPHATE 10 MG/ML
INJECTION, SOLUTION INTRAMUSCULAR; INTRAVENOUS AS NEEDED
Status: DISCONTINUED | OUTPATIENT
Start: 2019-11-14 | End: 2019-11-14

## 2019-11-14 RX ORDER — MIDAZOLAM HYDROCHLORIDE 1 MG/ML
INJECTION INTRAMUSCULAR; INTRAVENOUS AS NEEDED
Status: DISCONTINUED | OUTPATIENT
Start: 2019-11-14 | End: 2019-11-14

## 2019-11-14 NOTE — H&P
History & Physical Examination    Patient Name: Senait Vazquez  MRN: [id number]  [facility]: [id number]  YOB: 1940    Pre-Operative Diagnosis:  Lumbar radiculitis [M54.16]    Present Illness: A 70year old female with back  pain is here for left S1 se Rfl: , Taking  Ipratropium Bromide 0.06 % Nasal Solution, USE 1 TO 2 SPRAYS IEN BID AS NEEDED, Disp: , Rfl: 6, Taking  Pantoprazole Sodium 40 MG Oral Tab EC, Take 1 tablet (40 mg total) by mouth daily as needed. , Disp: 30 tablet, Rfl: 1, Taking  acetaminop blood transfusion    • History of cardiac murmur    • History of eating disorder    • History of rheumatic fever    • IBS (irritable bowel syndrome)     constipation   • Irregular bowel habits    • Kidney stones    • Muscle weakness     bilateral legs   • 6/13/2016    Performed by Erasmo Hampton MD at [address] [place] [address]   • SACROILIAC JOINT INJECTION RIGHT OR LEFT Left 10/25/2016    Performed by Erasmo Hampton MD at [facility] MAIN OR   • Karen 1343 STIMULATION TRIAL N/A 5/1/2017    Performed by Erasmo Hampton MD 22.8      Smokeless tobacco: Never Used    Alcohol use: No      Alcohol/week: 0.0 standard drinks      Frequency: Never      SYSTEM Check if Review is Normal Check if Physical Exam is Normal If not normal, please explain:   HEENT [x ] [x ]    NECK & BACK [

## 2019-11-17 NOTE — OPERATIVE REPORT
[facility]  Operative Report  2019     Katerine Esteves Patient Status:  Hospital Outpatient Surgery    1940 MRN [id number]   [facility] ENDOSCOPY Attending No att. providers found   Hosp Day # 0 PCP Berenice Pallas,      Ind introduced toward the inferior aspect of the junction between the transverse process and pedicle of the left S1-S2 level atraumatically under fluoroscopic guidance. The needle was advanced into the anterior epidural space at this level.  The needle position

## 2019-11-18 NOTE — TELEPHONE ENCOUNTER
Appointment for 12/12/19 fine to keep. If nerve block is successful, there will be surgical discussion.   Thank you

## 2019-11-25 ENCOUNTER — OFFICE VISIT (OUTPATIENT)
Dept: FAMILY MEDICINE CLINIC | Facility: CLINIC | Age: 79
End: 2019-11-25
Payer: MEDICARE

## 2019-11-25 VITALS
HEIGHT: 62.8 IN | BODY MASS INDEX: 23.57 KG/M2 | TEMPERATURE: 99 F | SYSTOLIC BLOOD PRESSURE: 118 MMHG | HEART RATE: 56 BPM | DIASTOLIC BLOOD PRESSURE: 60 MMHG | WEIGHT: 133 LBS

## 2019-11-25 DIAGNOSIS — J02.9 SORE THROAT: ICD-10-CM

## 2019-11-25 DIAGNOSIS — J06.9 ACUTE URI: ICD-10-CM

## 2019-11-25 DIAGNOSIS — G89.29 CHRONIC MIDLINE LOW BACK PAIN WITHOUT SCIATICA: Primary | ICD-10-CM

## 2019-11-25 DIAGNOSIS — M54.50 CHRONIC MIDLINE LOW BACK PAIN WITHOUT SCIATICA: Primary | ICD-10-CM

## 2019-11-25 PROCEDURE — 99214 OFFICE O/P EST MOD 30 MIN: CPT | Performed by: FAMILY MEDICINE

## 2019-11-25 PROCEDURE — 87880 STREP A ASSAY W/OPTIC: CPT | Performed by: FAMILY MEDICINE

## 2019-11-25 PROCEDURE — 87081 CULTURE SCREEN ONLY: CPT | Performed by: FAMILY MEDICINE

## 2019-11-25 RX ORDER — HYDROCODONE BITARTRATE AND ACETAMINOPHEN 5; 325 MG/1; MG/1
1 TABLET ORAL 2 TIMES DAILY PRN
Qty: 20 TABLET | Refills: 0 | Status: SHIPPED | OUTPATIENT
Start: 2019-11-25 | End: 2020-01-16
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
## 2019-11-29 ENCOUNTER — TELEPHONE (OUTPATIENT)
Dept: FAMILY MEDICINE CLINIC | Facility: CLINIC | Age: 79
End: 2019-11-29

## 2019-11-29 NOTE — TELEPHONE ENCOUNTER
Spoke to patient. She is going to call ENT for recommendations and to find out when her follow up is.

## 2019-12-02 RX ORDER — LIDOCAINE 50 MG/G
PATCH TOPICAL
Qty: 60 PATCH | Refills: 2 | Status: SHIPPED | OUTPATIENT
Start: 2019-12-02 | End: 2020-08-07

## 2019-12-16 DIAGNOSIS — E78.2 MIXED HYPERLIPIDEMIA: ICD-10-CM

## 2019-12-16 RX ORDER — PRAVASTATIN SODIUM 40 MG
TABLET ORAL
Qty: 90 TABLET | Refills: 0 | Status: ON HOLD | OUTPATIENT
Start: 2019-12-16 | End: 2020-01-27

## 2019-12-17 ENCOUNTER — TELEPHONE (OUTPATIENT)
Dept: FAMILY MEDICINE CLINIC | Facility: CLINIC | Age: 79
End: 2019-12-17

## 2019-12-17 NOTE — TELEPHONE ENCOUNTER
Pt called stating she needs something for her sinus infection she can not come in for an appointment since she had a procedure done on her toe and can not wear shoes.

## 2019-12-17 NOTE — TELEPHONE ENCOUNTER
Spoke with patient and asked if she ever followed up with ENT. States Geovani Yusuf gave her an antibiotic about 2 weeks ago but her symptoms didn't resolve completely.      Advised patient to follow up with Geovani Yusuf with condition update and for further r

## 2019-12-23 DIAGNOSIS — E21.0 PRIMARY HYPERPARATHYROIDISM (HCC): ICD-10-CM

## 2019-12-23 DIAGNOSIS — E83.52 HYPERCALCEMIA: ICD-10-CM

## 2019-12-23 RX ORDER — CINACALCET 30 MG/1
TABLET, FILM COATED ORAL
Qty: 60 TABLET | Refills: 0 | Status: SHIPPED | OUTPATIENT
Start: 2019-12-23 | End: 2019-12-23

## 2019-12-23 NOTE — TELEPHONE ENCOUNTER
Spoke with patient and she states she has not seen Endo but will call to schedule soonest appointment available.      Refill sent

## 2019-12-23 NOTE — TELEPHONE ENCOUNTER
Pt requesting refill of CINACALCET HCL 30 MG Oral Tab     Last Time Medication was Filled:  9/17/19    Last Office Visit with PCP: 11/25/19      No future appointments.

## 2019-12-23 NOTE — TELEPHONE ENCOUNTER
In the past I temporary refill that medication until she can get in with endocrinology Dr. John Plan please confirm that she has appointment with Dr. Alvaro Locke.   Please tell her that that is a medication that needs endocrinology follow-up regularly we can give her e

## 2019-12-26 ENCOUNTER — TELEPHONE (OUTPATIENT)
Dept: SURGERY | Facility: CLINIC | Age: 79
End: 2019-12-26

## 2019-12-26 ENCOUNTER — OFFICE VISIT (OUTPATIENT)
Dept: SURGERY | Facility: CLINIC | Age: 79
End: 2019-12-26
Payer: MEDICARE

## 2019-12-26 VITALS — DIASTOLIC BLOOD PRESSURE: 60 MMHG | SYSTOLIC BLOOD PRESSURE: 110 MMHG | HEART RATE: 59 BPM

## 2019-12-26 DIAGNOSIS — Z96.89 S/P INSERTION OF SPINAL CORD STIMULATOR: Primary | ICD-10-CM

## 2019-12-26 DIAGNOSIS — M51.26 LUMBAR DISC HERNIATION: ICD-10-CM

## 2019-12-26 DIAGNOSIS — M51.16 LUMBAR DISC HERNIATION WITH RADICULOPATHY: Primary | ICD-10-CM

## 2019-12-26 DIAGNOSIS — M54.16 LUMBAR RADICULOPATHY: ICD-10-CM

## 2019-12-26 DIAGNOSIS — M96.1 FAILED BACK SURGICAL SYNDROME: ICD-10-CM

## 2019-12-26 PROCEDURE — 99214 OFFICE O/P EST MOD 30 MIN: CPT | Performed by: PHYSICIAN ASSISTANT

## 2019-12-26 RX ORDER — PREGABALIN 100 MG/1
100 CAPSULE ORAL 2 TIMES DAILY
Qty: 60 CAPSULE | Refills: 1 | Status: SHIPPED | OUTPATIENT
Start: 2019-12-26 | End: 2020-03-10

## 2019-12-26 NOTE — PATIENT INSTRUCTIONS
Refill policies:    • Allow 2-3 business days for refills; controlled substances may take longer.   • Contact your pharmacy at least 5 days prior to running out of medication and have them send an electronic request or submit request through the “request re Depending on your insurance carrier, approval may take 3-10 days. It is highly recommended patients contact their insurance carrier directly to determine coverage.   If test is done without insurance authorization, patient may be responsible for the entire to 100 mg BID  3. Consider left S1 AMADO  4. Fu POSTOP  5.  MEDICAL CLEARANCE    You are scheduled for LUMBAR MICRODISCECTOMY 1 LEVEL on 01/27/20 with      Pre-op instructions discussed with patient and surgical packet provided:     · You will need area  · Rinse off very thoroughly  · Dry yourself with a clean, dry towel  · Put on clean clothes  · Do not use any powders, creams, lotions or sprays on your body as these attract bacteria (germs)  · Deodorant and facial creams are acceptable.    Carolina Leroy in the [employer] for your escort to class on the Ortho Spine unit.     If unable to attend, class is available online at [email or web address].  Please call the Care Coordinator, Roslyn Dunbar, with any questions, at [phone].

## 2019-12-26 NOTE — TELEPHONE ENCOUNTER
You are scheduled for LUMBAR MICRODISCECTOMY 1 LEVEL on 01/27/20 with     Pre-op instructions discussed with patient and surgical packet provided:    · You will need to contact the Pre-admission department at [phone].  You will speak with a clothes  · Do not use any powders, creams, lotions or sprays on your body as these attract bacteria (germs)  · Deodorant and facial creams are acceptable.    (Laundering/cleaning: Chlorhexidine gluconate skin cleansers will cause stains if used with chlorin attend, class is available online at [email or web address]. Please call the Care Coordinator, Astrid Kennedy, with any questions, at [phone].

## 2019-12-26 NOTE — PROGRESS NOTES
Neurosurgery Clinic Visit      Tabitha Hays PCP:  DO CLARK East 1940 MRN [id number]     HISTORY OF PRESENT ILLNESS:  Tabitha Hays is a(n) 70year old female here in follow up after left S1 root block 19 / Korin Adams.   She had relief f

## 2019-12-26 NOTE — PROGRESS NOTES
Pt is here post nerve root block. 11/14/19    Pt states she was doing well post nerve root block for few days but did not last. Pt states she feels the same as she did before nerve root block. No new symptoms to be reported.

## 2019-12-27 DIAGNOSIS — F51.04 PSYCHOPHYSIOLOGICAL INSOMNIA: ICD-10-CM

## 2019-12-27 NOTE — TELEPHONE ENCOUNTER
Requesting refill of TRAZODONE. Last refill 10/7/19 90 tabs 0 refills. Last OV was 11/25/19.  Pended for your approval.

## 2019-12-29 RX ORDER — TRAZODONE HYDROCHLORIDE 100 MG/1
TABLET ORAL
Qty: 90 TABLET | Refills: 0 | Status: ON HOLD | OUTPATIENT
Start: 2019-12-29 | End: 2020-01-27

## 2020-01-05 DIAGNOSIS — I10 ESSENTIAL HYPERTENSION: ICD-10-CM

## 2020-01-06 RX ORDER — LOSARTAN POTASSIUM 100 MG/1
TABLET ORAL
Qty: 90 TABLET | Refills: 1 | Status: ON HOLD | OUTPATIENT
Start: 2020-01-06 | End: 2020-01-27

## 2020-01-06 NOTE — TELEPHONE ENCOUNTER
Pt requesting refill of LOSARTAN 100 MG Oral Tab    Passed protocol, refill approved, sent to pharmacy

## 2020-01-09 NOTE — TELEPHONE ENCOUNTER
Called patient to ask if she has obtained an appointment for PCP clearance.   Patient to see PCP on 01/16

## 2020-01-16 ENCOUNTER — TELEPHONE (OUTPATIENT)
Dept: CARDIOLOGY | Age: 80
End: 2020-01-16

## 2020-01-16 ENCOUNTER — HOSPITAL ENCOUNTER (OUTPATIENT)
Dept: GENERAL RADIOLOGY | Age: 80
Discharge: HOME | End: 2020-01-16
Attending: FAMILY MEDICINE
Payer: MEDICARE

## 2020-01-16 ENCOUNTER — OFFICE VISIT (OUTPATIENT)
Dept: FAMILY MEDICINE CLINIC | Facility: CLINIC | Age: 80
End: 2020-01-16
Payer: MEDICARE

## 2020-01-16 ENCOUNTER — TELEPHONE (OUTPATIENT)
Dept: FAMILY MEDICINE CLINIC | Facility: CLINIC | Age: 80
End: 2020-01-16

## 2020-01-16 VITALS
HEART RATE: 64 BPM | HEIGHT: 62.75 IN | BODY MASS INDEX: 24.4 KG/M2 | WEIGHT: 136 LBS | SYSTOLIC BLOOD PRESSURE: 100 MMHG | DIASTOLIC BLOOD PRESSURE: 70 MMHG

## 2020-01-16 DIAGNOSIS — Z01.818 PREOPERATIVE CLEARANCE: ICD-10-CM

## 2020-01-16 DIAGNOSIS — R06.02 SHORTNESS OF BREATH ON EXERTION: ICD-10-CM

## 2020-01-16 DIAGNOSIS — G89.29 CHRONIC MIDLINE LOW BACK PAIN WITHOUT SCIATICA: ICD-10-CM

## 2020-01-16 DIAGNOSIS — Z96.82 PRESENCE OF NEUROSTIMULATOR: ICD-10-CM

## 2020-01-16 DIAGNOSIS — E78.2 MIXED HYPERLIPIDEMIA: ICD-10-CM

## 2020-01-16 DIAGNOSIS — Z95.5 HISTORY OF CORONARY ARTERY STENT PLACEMENT: ICD-10-CM

## 2020-01-16 DIAGNOSIS — Z86.39 PERSONAL HISTORY OF OTHER ENDOCRINE, NUTRITIONAL AND METABOLIC DISEASE: ICD-10-CM

## 2020-01-16 DIAGNOSIS — N64.89 FULLNESS OF BREAST: ICD-10-CM

## 2020-01-16 DIAGNOSIS — Z01.818 PREOPERATIVE CLEARANCE: Primary | ICD-10-CM

## 2020-01-16 DIAGNOSIS — K59.01 SLOW TRANSIT CONSTIPATION: ICD-10-CM

## 2020-01-16 DIAGNOSIS — M54.50 CHRONIC MIDLINE LOW BACK PAIN WITHOUT SCIATICA: ICD-10-CM

## 2020-01-16 DIAGNOSIS — E21.0 PRIMARY HYPERPARATHYROIDISM (HCC): ICD-10-CM

## 2020-01-16 DIAGNOSIS — R53.83 FATIGUE, UNSPECIFIED TYPE: ICD-10-CM

## 2020-01-16 DIAGNOSIS — N18.30 CKD (CHRONIC KIDNEY DISEASE) STAGE 3, GFR 30-59 ML/MIN (HCC): ICD-10-CM

## 2020-01-16 DIAGNOSIS — E83.52 HYPERCALCEMIA: ICD-10-CM

## 2020-01-16 DIAGNOSIS — I10 ESSENTIAL HYPERTENSION: ICD-10-CM

## 2020-01-16 DIAGNOSIS — I25.10 CORONARY ARTERY DISEASE INVOLVING NATIVE CORONARY ARTERY OF NATIVE HEART WITHOUT ANGINA PECTORIS: ICD-10-CM

## 2020-01-16 DIAGNOSIS — M54.16 LUMBAR RADICULOPATHY: ICD-10-CM

## 2020-01-16 PROCEDURE — 71046 X-RAY EXAM CHEST 2 VIEWS: CPT | Performed by: FAMILY MEDICINE

## 2020-01-16 PROCEDURE — 99215 OFFICE O/P EST HI 40 MIN: CPT | Performed by: FAMILY MEDICINE

## 2020-01-16 PROCEDURE — 87081 CULTURE SCREEN ONLY: CPT | Performed by: FAMILY MEDICINE

## 2020-01-16 PROCEDURE — 93000 ELECTROCARDIOGRAM COMPLETE: CPT | Performed by: FAMILY MEDICINE

## 2020-01-16 RX ORDER — AZITHROMYCIN 250 MG/1
TABLET, FILM COATED ORAL
Status: ON HOLD | COMMUNITY
Start: 2020-01-10 | End: 2020-01-27

## 2020-01-16 RX ORDER — HYDROCODONE BITARTRATE AND ACETAMINOPHEN 5; 325 MG/1; MG/1
1 TABLET ORAL 2 TIMES DAILY PRN
Qty: 20 TABLET | Refills: 0 | Status: ON HOLD | OUTPATIENT
Start: 2020-01-16 | End: 2020-01-27

## 2020-01-16 NOTE — PROGRESS NOTES
Dr. Marysol Rodriguez office was contacted to request cardiac clearance due to the patient's history of stents and current intermittent shortness of breath over the past two days.   Per Dr. Marysol Rodriguez representative, their office will contact the patient to set up an appoin

## 2020-01-16 NOTE — H&P
Ade Serra is a 70year old female who presents for a pre-operative physical exam. Patient is to have Lumbar Microdiscectomy, to be done by Dr. Judson Sanchez at [facility] on 01/27/20. Prior anesthesia no prior issues with anesthesia.   PMH negative needed.  ) 90 tablet 0   • pregabalin 100 MG Oral Cap Take 1 capsule (100 mg total) by mouth 2 (two) times daily.  60 capsule 1   • Cinacalcet HCl 30 MG Oral Tab TAKE 1 TABLET BY MOUTH TWICE DAILY (Patient taking differently: Take 30 mg by mouth daily with NAUSEA AND VOMITING    Comment:Tabs  Seasonal                  [facility]*    NAUSEA AND VOMITING   Past Medical History:   Diagnosis Date   • Abdominal pain    • Anxiety state, unspecified    • Arthritis    • Atherosclerosis of corona ENDOSCOPY   • ESOPHAGOGASTRODUODENOSCOPY (EGD) N/A 6/29/2017    Performed by Sukumar Ramsey MD at [address]    removed ovaries   • LUMBAR EPIDURAL N/A 3/18/2013    Performed by Paul Arzola MD at [facility] Performed by Swati Gamble MD at [facility] MAIN OR   • TRANSFORAMINAL LUMBAR EPIDURAL STEROID INJECTION SINGLE LEVEL Bilateral 7/14/2016    Performed by Swati Gamble MD at [facility] MAIN OR   • TUBAL LIGATION        Family History   Problem Relation Age of Onse apparent distress  SKIN: no rashes,no suspicious lesions  HEENT: atraumatic, normocephalic,ears and throat are clear nose moderate swelling of turbinates bilaterally  EYES:PERRLA, EOMI, normal optic disk,conjunctiva are clear  NECK: supple,no adenopathy,no Sig: Take 1 tablet by mouth 2 (two) times daily as needed for Pain (Do not drive while on his this medication. It will make you sleepy. Do not take with other Tylenol containing products. ).        Imaging & Consults:  ELECTROCARDIOGRAM, COMPLETE  CARDIO - Fatigue, unspecified type  - TSH+FREE T4; Future  - VITAMIN B12; Future  - CARDIO - INTERNAL    7. Essential hypertension  - COMP METABOLIC PANEL (14); Future  - ELECTROCARDIOGRAM, COMPLETE  - CARDIO - INTERNAL    8.  Primary hyperparathyroidism ([facility] [address]  Che

## 2020-01-16 NOTE — PROGRESS NOTES
No acute process or infection in the chest x-ray. Evidence of some scarring.   Call patient also sent to my chart

## 2020-01-17 ENCOUNTER — OFFICE VISIT (OUTPATIENT)
Dept: CARDIOLOGY | Age: 80
End: 2020-01-17

## 2020-01-17 VITALS
HEIGHT: 63 IN | BODY MASS INDEX: 24.27 KG/M2 | WEIGHT: 137 LBS | DIASTOLIC BLOOD PRESSURE: 62 MMHG | HEART RATE: 57 BPM | SYSTOLIC BLOOD PRESSURE: 120 MMHG

## 2020-01-17 DIAGNOSIS — I10 ESSENTIAL HYPERTENSION: ICD-10-CM

## 2020-01-17 DIAGNOSIS — E78.2 MIXED HYPERLIPIDEMIA: ICD-10-CM

## 2020-01-17 DIAGNOSIS — Z01.818 PRE-OP EXAMINATION: Primary | ICD-10-CM

## 2020-01-17 DIAGNOSIS — Z95.5 HISTORY OF HEART ARTERY STENT: ICD-10-CM

## 2020-01-17 DIAGNOSIS — Z01.810 PREOPERATIVE CARDIOVASCULAR EXAMINATION: ICD-10-CM

## 2020-01-17 DIAGNOSIS — I25.10 CORONARY ARTERY DISEASE INVOLVING NATIVE CORONARY ARTERY OF NATIVE HEART WITHOUT ANGINA PECTORIS: ICD-10-CM

## 2020-01-17 PROCEDURE — 93000 ELECTROCARDIOGRAM COMPLETE: CPT | Performed by: INTERNAL MEDICINE

## 2020-01-17 PROCEDURE — 99215 OFFICE O/P EST HI 40 MIN: CPT | Performed by: INTERNAL MEDICINE

## 2020-01-17 RX ORDER — FLUVOXAMINE MALEATE 100 MG
TABLET ORAL
COMMUNITY
Start: 2016-11-23

## 2020-01-17 RX ORDER — CLONAZEPAM 0.5 MG/1
TABLET ORAL
Refills: 2 | COMMUNITY
Start: 2019-12-23

## 2020-01-17 RX ORDER — ACETAMINOPHEN 500 MG
1000 TABLET ORAL EVERY 6 HOURS PRN
COMMUNITY

## 2020-01-17 RX ORDER — OMEPRAZOLE 20 MG/1
20 CAPSULE, DELAYED RELEASE ORAL DAILY
COMMUNITY
Start: 2019-05-23

## 2020-01-17 RX ORDER — TRAZODONE HYDROCHLORIDE 100 MG/1
TABLET ORAL
COMMUNITY
Start: 2019-12-29

## 2020-01-17 RX ORDER — PRAVASTATIN SODIUM 40 MG
40 TABLET ORAL DAILY
COMMUNITY
Start: 2015-09-23

## 2020-01-17 RX ORDER — DIPHENHYDRAMINE HCL 25 MG
25 TABLET ORAL EVERY 6 HOURS PRN
COMMUNITY

## 2020-01-17 RX ORDER — IPRATROPIUM BROMIDE 42 UG/1
SPRAY, METERED NASAL
COMMUNITY
Start: 2019-01-08

## 2020-01-17 RX ORDER — CINACALCET 30 MG/1
30 TABLET, FILM COATED ORAL 2 TIMES DAILY
Refills: 0 | COMMUNITY
Start: 2019-12-23

## 2020-01-17 RX ORDER — NYSTATIN 500000 [USP'U]/1
500000 TABLET, COATED ORAL PRN
COMMUNITY
Start: 2017-07-11

## 2020-01-17 RX ORDER — ONDANSETRON 4 MG/1
TABLET, ORALLY DISINTEGRATING ORAL
COMMUNITY
Start: 2019-09-17

## 2020-01-17 RX ORDER — PREGABALIN 75 MG/1
CAPSULE ORAL
Refills: 2 | COMMUNITY
Start: 2019-10-15 | End: 2021-04-30

## 2020-01-17 RX ORDER — HYDROCODONE BITARTRATE AND ACETAMINOPHEN 5; 325 MG/1; MG/1
1 TABLET ORAL EVERY 8 HOURS PRN
COMMUNITY
Start: 2020-01-16

## 2020-01-17 RX ORDER — MONTELUKAST SODIUM 10 MG/1
TABLET ORAL
COMMUNITY
Start: 2016-04-13

## 2020-01-17 RX ORDER — FLUTICASONE PROPIONATE 50 MCG
2 SPRAY, SUSPENSION (ML) NASAL
COMMUNITY

## 2020-01-17 RX ORDER — AMLODIPINE BESYLATE 10 MG/1
10 TABLET ORAL DAILY
COMMUNITY
Start: 2016-11-11

## 2020-01-17 RX ORDER — POTASSIUM CHLORIDE 20 MEQ/1
TABLET, EXTENDED RELEASE ORAL
COMMUNITY
Start: 2018-10-30

## 2020-01-17 RX ORDER — DULOXETIN HYDROCHLORIDE 30 MG/1
30 CAPSULE, DELAYED RELEASE ORAL DAILY
COMMUNITY
Start: 2019-07-08

## 2020-01-17 RX ORDER — LIDOCAINE 50 MG/G
PATCH TOPICAL
COMMUNITY
Start: 2019-12-02

## 2020-01-17 RX ORDER — LOSARTAN POTASSIUM 100 MG/1
100 TABLET ORAL DAILY
COMMUNITY
Start: 2015-06-12

## 2020-01-17 SDOH — HEALTH STABILITY: MENTAL HEALTH: HOW OFTEN DO YOU HAVE A DRINK CONTAINING ALCOHOL?: NEVER

## 2020-01-17 SDOH — SOCIAL STABILITY: SOCIAL NETWORK: ARE YOU MARRIED, WIDOWED, DIVORCED, SEPARATED, NEVER MARRIED, OR LIVING WITH A PARTNER?: MARRIED

## 2020-01-17 SDOH — HEALTH STABILITY: PHYSICAL HEALTH: ON AVERAGE, HOW MANY DAYS PER WEEK DO YOU ENGAGE IN MODERATE TO STRENUOUS EXERCISE (LIKE A BRISK WALK)?: 0 DAYS

## 2020-01-17 SDOH — HEALTH STABILITY: PHYSICAL HEALTH: ON AVERAGE, HOW MANY MINUTES DO YOU ENGAGE IN EXERCISE AT THIS LEVEL?: 0 MIN

## 2020-01-17 ASSESSMENT — PATIENT HEALTH QUESTIONNAIRE - PHQ9
2. FEELING DOWN, DEPRESSED OR HOPELESS: NOT AT ALL
1. LITTLE INTEREST OR PLEASURE IN DOING THINGS: NOT AT ALL
SUM OF ALL RESPONSES TO PHQ9 QUESTIONS 1 AND 2: 0
SUM OF ALL RESPONSES TO PHQ9 QUESTIONS 1 AND 2: 0

## 2020-01-17 ASSESSMENT — ENCOUNTER SYMPTOMS
FEVER: 0
BACK PAIN: 1
HEMOPTYSIS: 0
COUGH: 0
SUSPICIOUS LESIONS: 0
BRUISES/BLEEDS EASILY: 0
CHILLS: 0
ALLERGIC/IMMUNOLOGIC COMMENTS: NO NEW FOOD ALLERGIES
WEIGHT GAIN: 0
WEIGHT LOSS: 0
HEMATOCHEZIA: 0

## 2020-01-17 NOTE — TELEPHONE ENCOUNTER
On 01/16/2020, a request for cardiac clearance, the pre-op information, and EKG were successfully faxed to Anthony Gallagher M.D. at [phone].

## 2020-01-19 DIAGNOSIS — J01.00 ACUTE NON-RECURRENT MAXILLARY SINUSITIS: ICD-10-CM

## 2020-01-19 DIAGNOSIS — F41.1 GAD (GENERALIZED ANXIETY DISORDER): ICD-10-CM

## 2020-01-20 RX ORDER — CLONAZEPAM 0.5 MG/1
TABLET ORAL
Qty: 60 TABLET | Refills: 0 | Status: ON HOLD | OUTPATIENT
Start: 2020-01-20 | End: 2020-01-27

## 2020-01-20 RX ORDER — MONTELUKAST SODIUM 10 MG/1
TABLET ORAL
Qty: 90 TABLET | Refills: 0 | Status: ON HOLD | OUTPATIENT
Start: 2020-01-20 | End: 2020-01-27

## 2020-01-20 NOTE — TELEPHONE ENCOUNTER
Refill request for MONTELUKAST. Last OV 1/16/20. Refill approved and sent to pharmacy. Refill request for CLONAZEPAM 0.5MG. Last fill 10/21/19 60 tabs 2 refills. Last OV 1/16/20.   Pended for your approval.

## 2020-01-21 ENCOUNTER — TELEPHONE (OUTPATIENT)
Dept: FAMILY MEDICINE CLINIC | Facility: CLINIC | Age: 80
End: 2020-01-21

## 2020-01-23 ENCOUNTER — LAB ENCOUNTER (OUTPATIENT)
Dept: LAB | Age: 80
End: 2020-01-23
Attending: FAMILY MEDICINE
Payer: MEDICARE

## 2020-01-23 DIAGNOSIS — Z96.82 PRESENCE OF NEUROSTIMULATOR: ICD-10-CM

## 2020-01-23 DIAGNOSIS — Z01.818 PREOPERATIVE CLEARANCE: ICD-10-CM

## 2020-01-23 DIAGNOSIS — R53.83 FATIGUE, UNSPECIFIED TYPE: ICD-10-CM

## 2020-01-23 DIAGNOSIS — I10 ESSENTIAL HYPERTENSION: ICD-10-CM

## 2020-01-23 DIAGNOSIS — Z86.39 PERSONAL HISTORY OF OTHER ENDOCRINE, NUTRITIONAL AND METABOLIC DISEASE: ICD-10-CM

## 2020-01-23 DIAGNOSIS — N18.30 CKD (CHRONIC KIDNEY DISEASE) STAGE 3, GFR 30-59 ML/MIN (HCC): ICD-10-CM

## 2020-01-23 DIAGNOSIS — E78.2 MIXED HYPERLIPIDEMIA: ICD-10-CM

## 2020-01-23 LAB
ALBUMIN SERPL-MCNC: 3.9 G/DL (ref 3.4–5)
ALBUMIN/GLOB SERPL: 1.1 {RATIO} (ref 1–2)
ALP LIVER SERPL-CCNC: 55 U/L (ref 55–142)
ALT SERPL-CCNC: 23 U/L (ref 13–56)
ANION GAP SERPL CALC-SCNC: 5 MMOL/L (ref 0–18)
ANTIBODY SCREEN: NEGATIVE
AST SERPL-CCNC: 15 U/L (ref 15–37)
BASOPHILS # BLD AUTO: 0.1 X10(3) UL (ref 0–0.2)
BASOPHILS NFR BLD AUTO: 0.9 %
BILIRUB SERPL-MCNC: 0.5 MG/DL (ref 0.1–2)
BUN BLD-MCNC: 27 MG/DL (ref 7–18)
BUN/CREAT SERPL: 16.9 (ref 10–20)
CALCIUM BLD-MCNC: 9.5 MG/DL (ref 8.5–10.1)
CHLORIDE SERPL-SCNC: 111 MMOL/L (ref 98–112)
CO2 SERPL-SCNC: 27 MMOL/L (ref 21–32)
CREAT BLD-MCNC: 1.6 MG/DL (ref 0.55–1.02)
DEPRECATED RDW RBC AUTO: 51.2 FL (ref 35.1–46.3)
EOSINOPHIL # BLD AUTO: 0.26 X10(3) UL (ref 0–0.7)
EOSINOPHIL NFR BLD AUTO: 2.3 %
ERYTHROCYTE [DISTWIDTH] IN BLOOD BY AUTOMATED COUNT: 13.9 % (ref 11–15)
GLOBULIN PLAS-MCNC: 3.5 G/DL (ref 2.8–4.4)
GLUCOSE BLD-MCNC: 82 MG/DL (ref 70–99)
HCT VFR BLD AUTO: 38.1 % (ref 35–48)
HGB BLD-MCNC: 11.9 G/DL (ref 12–16)
IMM GRANULOCYTES # BLD AUTO: 0.03 X10(3) UL (ref 0–1)
IMM GRANULOCYTES NFR BLD: 0.3 %
INR BLD: 0.95 (ref 0.9–1.1)
LYMPHOCYTES # BLD AUTO: 3.96 X10(3) UL (ref 1–4)
LYMPHOCYTES NFR BLD AUTO: 35.5 %
M PROTEIN MFR SERPL ELPH: 7.4 G/DL (ref 6.4–8.2)
MCH RBC QN AUTO: 31.2 PG (ref 26–34)
MCHC RBC AUTO-ENTMCNC: 31.2 G/DL (ref 31–37)
MCV RBC AUTO: 99.7 FL (ref 80–100)
MONOCYTES # BLD AUTO: 1.02 X10(3) UL (ref 0.1–1)
MONOCYTES NFR BLD AUTO: 9.1 %
NEUTROPHILS # BLD AUTO: 5.8 X10 (3) UL (ref 1.5–7.7)
NEUTROPHILS # BLD AUTO: 5.8 X10(3) UL (ref 1.5–7.7)
NEUTROPHILS NFR BLD AUTO: 51.9 %
OSMOLALITY SERPL CALC.SUM OF ELEC: 300 MOSM/KG (ref 275–295)
PATIENT FASTING Y/N/NP: YES
PLATELET # BLD AUTO: 328 10(3)UL (ref 150–450)
POTASSIUM SERPL-SCNC: 4 MMOL/L (ref 3.5–5.1)
PSA SERPL DL<=0.01 NG/ML-MCNC: 13 SECONDS (ref 12.5–14.7)
RBC # BLD AUTO: 3.82 X10(6)UL (ref 3.8–5.3)
RH BLOOD TYPE: POSITIVE
SODIUM SERPL-SCNC: 143 MMOL/L (ref 136–145)
T4 FREE SERPL-MCNC: 1 NG/DL (ref 0.8–1.7)
TSI SER-ACNC: 1.55 MIU/ML (ref 0.36–3.74)
VIT B12 SERPL-MCNC: 1212 PG/ML (ref 193–986)
WBC # BLD AUTO: 11.2 X10(3) UL (ref 4–11)

## 2020-01-23 PROCEDURE — 82607 VITAMIN B-12: CPT

## 2020-01-23 PROCEDURE — 84443 ASSAY THYROID STIM HORMONE: CPT

## 2020-01-23 PROCEDURE — 85730 THROMBOPLASTIN TIME PARTIAL: CPT

## 2020-01-23 PROCEDURE — 80053 COMPREHEN METABOLIC PANEL: CPT

## 2020-01-23 PROCEDURE — 36415 COLL VENOUS BLD VENIPUNCTURE: CPT

## 2020-01-23 PROCEDURE — 86850 RBC ANTIBODY SCREEN: CPT

## 2020-01-23 PROCEDURE — 85025 COMPLETE CBC W/AUTO DIFF WBC: CPT

## 2020-01-23 PROCEDURE — 84439 ASSAY OF FREE THYROXINE: CPT

## 2020-01-23 PROCEDURE — 86900 BLOOD TYPING SEROLOGIC ABO: CPT

## 2020-01-23 PROCEDURE — 85610 PROTHROMBIN TIME: CPT

## 2020-01-23 PROCEDURE — 86901 BLOOD TYPING SEROLOGIC RH(D): CPT

## 2020-01-24 ENCOUNTER — HOSPITAL ENCOUNTER (OUTPATIENT)
Dept: CV DIAGNOSTICS | Facility: HOSPITAL | Age: 80
Discharge: HOME | End: 2020-01-24
Attending: INTERNAL MEDICINE
Payer: MEDICARE

## 2020-01-24 ENCOUNTER — HOSPITAL ENCOUNTER (OUTPATIENT)
Dept: MAMMOGRAPHY | Facility: HOSPITAL | Age: 80
Discharge: HOME | End: 2020-01-24
Attending: FAMILY MEDICINE
Payer: MEDICARE

## 2020-01-24 ENCOUNTER — APPOINTMENT (OUTPATIENT)
Dept: CV DIAGNOSTICS | Facility: HOSPITAL | Age: 80
End: 2020-01-24
Attending: INTERNAL MEDICINE
Payer: MEDICARE

## 2020-01-24 ENCOUNTER — TELEPHONE (OUTPATIENT)
Dept: CARDIOLOGY | Age: 80
End: 2020-01-24

## 2020-01-24 DIAGNOSIS — Z95.5 HISTORY OF HEART ARTERY STENT: ICD-10-CM

## 2020-01-24 DIAGNOSIS — N64.89 FULLNESS OF BREAST: ICD-10-CM

## 2020-01-24 DIAGNOSIS — I25.10 CORONARY ARTERY DISEASE INVOLVING NATIVE CORONARY ARTERY OF NATIVE HEART WITHOUT ANGINA PECTORIS: ICD-10-CM

## 2020-01-24 DIAGNOSIS — Z01.810 PREOPERATIVE CARDIOVASCULAR EXAMINATION: ICD-10-CM

## 2020-01-24 LAB — APTT PPP: 30.4 SECONDS (ref 25.4–36.1)

## 2020-01-24 PROCEDURE — 78452 HT MUSCLE IMAGE SPECT MULT: CPT | Performed by: INTERNAL MEDICINE

## 2020-01-24 PROCEDURE — 93018 CV STRESS TEST I&R ONLY: CPT | Performed by: INTERNAL MEDICINE

## 2020-01-24 PROCEDURE — 77062 BREAST TOMOSYNTHESIS BI: CPT | Performed by: FAMILY MEDICINE

## 2020-01-24 PROCEDURE — 77066 DX MAMMO INCL CAD BI: CPT | Performed by: FAMILY MEDICINE

## 2020-01-24 PROCEDURE — 93017 CV STRESS TEST TRACING ONLY: CPT | Performed by: INTERNAL MEDICINE

## 2020-01-24 RX ORDER — AMINOPHYLLINE DIHYDRATE 25 MG/ML
INJECTION, SOLUTION INTRAVENOUS
Status: COMPLETED
Start: 2020-01-24 | End: 2020-01-24

## 2020-01-24 RX ADMIN — AMINOPHYLLINE DIHYDRATE 125 MG: 25 INJECTION, SOLUTION INTRAVENOUS at 12:30:00

## 2020-01-26 NOTE — PROGRESS NOTES
GFR has decreased have her follow-up with nephrology. Forward results to nephrologist Dr. Terry Bhakta and ferritin the hemoglobin is slightly low.   PT, PTT, thyroid, antibody screen all negative/normal  Call her with results schedule nephrologist appo

## 2020-01-27 ENCOUNTER — ANESTHESIA (OUTPATIENT)
Dept: SURGERY | Facility: HOSPITAL | Age: 80
End: 2020-01-27
Payer: MEDICARE

## 2020-01-27 ENCOUNTER — HOSPITAL ENCOUNTER (OUTPATIENT)
Facility: HOSPITAL | Age: 80
Discharge: HOME | End: 2020-01-27
Attending: NEUROLOGICAL SURGERY | Admitting: NEUROLOGICAL SURGERY
Payer: MEDICARE

## 2020-01-27 ENCOUNTER — ANESTHESIA EVENT (OUTPATIENT)
Dept: SURGERY | Facility: HOSPITAL | Age: 80
End: 2020-01-27
Payer: MEDICARE

## 2020-01-27 ENCOUNTER — APPOINTMENT (OUTPATIENT)
Dept: GENERAL RADIOLOGY | Facility: HOSPITAL | Age: 80
End: 2020-01-27
Attending: NEUROLOGICAL SURGERY
Payer: MEDICARE

## 2020-01-27 VITALS
BODY MASS INDEX: 24.73 KG/M2 | DIASTOLIC BLOOD PRESSURE: 69 MMHG | TEMPERATURE: 98 F | HEIGHT: 62.75 IN | RESPIRATION RATE: 18 BRPM | OXYGEN SATURATION: 96 % | SYSTOLIC BLOOD PRESSURE: 136 MMHG | HEART RATE: 90 BPM | WEIGHT: 137.81 LBS

## 2020-01-27 DIAGNOSIS — M51.16 LUMBAR DISC HERNIATION WITH RADICULOPATHY: ICD-10-CM

## 2020-01-27 PROCEDURE — 76000 FLUOROSCOPY <1 HR PHYS/QHP: CPT | Performed by: NEUROLOGICAL SURGERY

## 2020-01-27 PROCEDURE — 01NB0ZZ RELEASE LUMBAR NERVE, OPEN APPROACH: ICD-10-PCS | Performed by: NEUROLOGICAL SURGERY

## 2020-01-27 RX ORDER — DEXAMETHASONE SODIUM PHOSPHATE 4 MG/ML
VIAL (ML) INJECTION AS NEEDED
Status: DISCONTINUED | OUTPATIENT
Start: 2020-01-27 | End: 2020-01-27 | Stop reason: SURG

## 2020-01-27 RX ORDER — PRAVASTATIN SODIUM 40 MG
40 TABLET ORAL NIGHTLY
COMMUNITY
End: 2020-04-13

## 2020-01-27 RX ORDER — SODIUM CHLORIDE, SODIUM LACTATE, POTASSIUM CHLORIDE, CALCIUM CHLORIDE 600; 310; 30; 20 MG/100ML; MG/100ML; MG/100ML; MG/100ML
INJECTION, SOLUTION INTRAVENOUS CONTINUOUS
Status: DISCONTINUED | OUTPATIENT
Start: 2020-01-27 | End: 2020-01-27

## 2020-01-27 RX ORDER — METHYLPREDNISOLONE ACETATE 80 MG/ML
INJECTION, SUSPENSION INTRA-ARTICULAR; INTRALESIONAL; INTRAMUSCULAR; SOFT TISSUE AS NEEDED
Status: DISCONTINUED | OUTPATIENT
Start: 2020-01-27 | End: 2020-01-27 | Stop reason: HOSPADM

## 2020-01-27 RX ORDER — METOCLOPRAMIDE HYDROCHLORIDE 5 MG/ML
INJECTION INTRAMUSCULAR; INTRAVENOUS
Status: COMPLETED
Start: 2020-01-27 | End: 2020-01-27

## 2020-01-27 RX ORDER — LOSARTAN POTASSIUM 100 MG/1
100 TABLET ORAL DAILY
COMMUNITY
End: 2020-08-11

## 2020-01-27 RX ORDER — HYDROCODONE BITARTRATE AND ACETAMINOPHEN 5; 325 MG/1; MG/1
2 TABLET ORAL AS NEEDED
Status: DISCONTINUED | OUTPATIENT
Start: 2020-01-27 | End: 2020-01-27

## 2020-01-27 RX ORDER — HYDROMORPHONE HYDROCHLORIDE 1 MG/ML
INJECTION, SOLUTION INTRAMUSCULAR; INTRAVENOUS; SUBCUTANEOUS
Status: COMPLETED
Start: 2020-01-27 | End: 2020-01-27

## 2020-01-27 RX ORDER — NALOXONE HYDROCHLORIDE 0.4 MG/ML
80 INJECTION, SOLUTION INTRAMUSCULAR; INTRAVENOUS; SUBCUTANEOUS AS NEEDED
Status: DISCONTINUED | OUTPATIENT
Start: 2020-01-27 | End: 2020-01-27

## 2020-01-27 RX ORDER — MONTELUKAST SODIUM 10 MG/1
10 TABLET ORAL NIGHTLY
COMMUNITY
End: 2020-04-13

## 2020-01-27 RX ORDER — TRAZODONE HYDROCHLORIDE 100 MG/1
100 TABLET ORAL NIGHTLY
COMMUNITY
End: 2020-03-13

## 2020-01-27 RX ORDER — EPHEDRINE SULFATE 50 MG/ML
INJECTION, SOLUTION INTRAVENOUS AS NEEDED
Status: DISCONTINUED | OUTPATIENT
Start: 2020-01-27 | End: 2020-01-27 | Stop reason: SURG

## 2020-01-27 RX ORDER — HYDROMORPHONE HYDROCHLORIDE 1 MG/ML
0.4 INJECTION, SOLUTION INTRAMUSCULAR; INTRAVENOUS; SUBCUTANEOUS EVERY 5 MIN PRN
Status: DISCONTINUED | OUTPATIENT
Start: 2020-01-27 | End: 2020-01-27

## 2020-01-27 RX ORDER — ROCURONIUM BROMIDE 10 MG/ML
INJECTION, SOLUTION INTRAVENOUS AS NEEDED
Status: DISCONTINUED | OUTPATIENT
Start: 2020-01-27 | End: 2020-01-27 | Stop reason: SURG

## 2020-01-27 RX ORDER — CYCLOBENZAPRINE HCL 10 MG
10 TABLET ORAL 3 TIMES DAILY PRN
Qty: 20 TABLET | Refills: 0 | Status: SHIPPED | OUTPATIENT
Start: 2020-01-27 | End: 2020-02-11

## 2020-01-27 RX ORDER — ACETAMINOPHEN 500 MG
1000 TABLET ORAL ONCE
Status: DISCONTINUED | OUTPATIENT
Start: 2020-01-27 | End: 2020-01-27

## 2020-01-27 RX ORDER — CLONAZEPAM 0.5 MG/1
0.5 TABLET ORAL 2 TIMES DAILY PRN
COMMUNITY
End: 2020-03-30

## 2020-01-27 RX ORDER — HYDROCODONE BITARTRATE AND ACETAMINOPHEN 5; 325 MG/1; MG/1
1 TABLET ORAL AS NEEDED
Status: DISCONTINUED | OUTPATIENT
Start: 2020-01-27 | End: 2020-01-27

## 2020-01-27 RX ORDER — HYDROCODONE BITARTRATE AND ACETAMINOPHEN 10; 325 MG/1; MG/1
2 TABLET ORAL EVERY 6 HOURS PRN
Status: DISCONTINUED | OUTPATIENT
Start: 2020-01-27 | End: 2020-01-27

## 2020-01-27 RX ORDER — HYDROCODONE BITARTRATE AND ACETAMINOPHEN 10; 325 MG/1; MG/1
1 TABLET ORAL EVERY 6 HOURS PRN
Qty: 30 TABLET | Refills: 0 | Status: SHIPPED | OUTPATIENT
Start: 2020-01-27 | End: 2020-02-11

## 2020-01-27 RX ORDER — LIDOCAINE HYDROCHLORIDE 10 MG/ML
INJECTION, SOLUTION EPIDURAL; INFILTRATION; INTRACAUDAL; PERINEURAL AS NEEDED
Status: DISCONTINUED | OUTPATIENT
Start: 2020-01-27 | End: 2020-01-27 | Stop reason: SURG

## 2020-01-27 RX ORDER — CYCLOBENZAPRINE HCL 10 MG
10 TABLET ORAL 3 TIMES DAILY PRN
Status: DISCONTINUED | OUTPATIENT
Start: 2020-01-27 | End: 2020-01-27

## 2020-01-27 RX ORDER — MORPHINE SULFATE 0.5 MG/ML
INJECTION, SOLUTION EPIDURAL; INTRATHECAL; INTRAVENOUS AS NEEDED
Status: DISCONTINUED | OUTPATIENT
Start: 2020-01-27 | End: 2020-01-27 | Stop reason: HOSPADM

## 2020-01-27 RX ORDER — MIDAZOLAM HYDROCHLORIDE 1 MG/ML
INJECTION INTRAMUSCULAR; INTRAVENOUS AS NEEDED
Status: DISCONTINUED | OUTPATIENT
Start: 2020-01-27 | End: 2020-01-27 | Stop reason: SURG

## 2020-01-27 RX ORDER — CINACALCET 30 MG/1
30 TABLET, FILM COATED ORAL 2 TIMES DAILY WITH MEALS
COMMUNITY
End: 2020-07-14

## 2020-01-27 RX ORDER — LABETALOL HYDROCHLORIDE 5 MG/ML
5 INJECTION, SOLUTION INTRAVENOUS EVERY 5 MIN PRN
Status: DISCONTINUED | OUTPATIENT
Start: 2020-01-27 | End: 2020-01-27

## 2020-01-27 RX ORDER — BUPIVACAINE HYDROCHLORIDE AND EPINEPHRINE 2.5; 5 MG/ML; UG/ML
INJECTION, SOLUTION EPIDURAL; INFILTRATION; INTRACAUDAL; PERINEURAL AS NEEDED
Status: DISCONTINUED | OUTPATIENT
Start: 2020-01-27 | End: 2020-01-27 | Stop reason: HOSPADM

## 2020-01-27 RX ORDER — BACITRACIN 50000 [USP'U]/1
INJECTION, POWDER, LYOPHILIZED, FOR SOLUTION INTRAMUSCULAR AS NEEDED
Status: DISCONTINUED | OUTPATIENT
Start: 2020-01-27 | End: 2020-01-27 | Stop reason: HOSPADM

## 2020-01-27 RX ORDER — ONDANSETRON 2 MG/ML
INJECTION INTRAMUSCULAR; INTRAVENOUS AS NEEDED
Status: DISCONTINUED | OUTPATIENT
Start: 2020-01-27 | End: 2020-01-27 | Stop reason: SURG

## 2020-01-27 RX ORDER — ONDANSETRON 2 MG/ML
4 INJECTION INTRAMUSCULAR; INTRAVENOUS AS NEEDED
Status: DISCONTINUED | OUTPATIENT
Start: 2020-01-27 | End: 2020-01-27

## 2020-01-27 RX ORDER — HYDROCODONE BITARTRATE AND ACETAMINOPHEN 10; 325 MG/1; MG/1
1 TABLET ORAL EVERY 6 HOURS PRN
Status: DISCONTINUED | OUTPATIENT
Start: 2020-01-27 | End: 2020-01-27

## 2020-01-27 RX ORDER — IBUPROFEN 600 MG/1
600 TABLET ORAL ONCE AS NEEDED
Status: DISCONTINUED | OUTPATIENT
Start: 2020-01-27 | End: 2020-01-27

## 2020-01-27 RX ORDER — DEXAMETHASONE SODIUM PHOSPHATE 4 MG/ML
4 VIAL (ML) INJECTION AS NEEDED
Status: DISCONTINUED | OUTPATIENT
Start: 2020-01-27 | End: 2020-01-27

## 2020-01-27 RX ORDER — METOCLOPRAMIDE HYDROCHLORIDE 5 MG/ML
10 INJECTION INTRAMUSCULAR; INTRAVENOUS AS NEEDED
Status: DISCONTINUED | OUTPATIENT
Start: 2020-01-27 | End: 2020-01-27

## 2020-01-27 RX ORDER — AMLODIPINE BESYLATE 10 MG/1
10 TABLET ORAL DAILY
COMMUNITY
End: 2020-02-20

## 2020-01-27 RX ADMIN — DEXAMETHASONE SODIUM PHOSPHATE 4 MG: 4 MG/ML VIAL (ML) INJECTION at 10:56:00

## 2020-01-27 RX ADMIN — SODIUM CHLORIDE, SODIUM LACTATE, POTASSIUM CHLORIDE, CALCIUM CHLORIDE: 600; 310; 30; 20 INJECTION, SOLUTION INTRAVENOUS at 12:05:00

## 2020-01-27 RX ADMIN — MIDAZOLAM HYDROCHLORIDE 2 MG: 1 INJECTION INTRAMUSCULAR; INTRAVENOUS at 10:24:00

## 2020-01-27 RX ADMIN — ONDANSETRON 4 MG: 2 INJECTION INTRAMUSCULAR; INTRAVENOUS at 10:50:00

## 2020-01-27 RX ADMIN — ROCURONIUM BROMIDE 10 MG: 10 INJECTION, SOLUTION INTRAVENOUS at 10:35:00

## 2020-01-27 RX ADMIN — EPHEDRINE SULFATE 10 MG: 50 INJECTION, SOLUTION INTRAVENOUS at 11:07:00

## 2020-01-27 RX ADMIN — EPHEDRINE SULFATE 10 MG: 50 INJECTION, SOLUTION INTRAVENOUS at 11:01:00

## 2020-01-27 RX ADMIN — LIDOCAINE HYDROCHLORIDE 50 MG: 10 INJECTION, SOLUTION EPIDURAL; INFILTRATION; INTRACAUDAL; PERINEURAL at 10:35:00

## 2020-01-27 NOTE — ANESTHESIA POSTPROCEDURE EVALUATION
[address] Patient Status:  Outpatient in a Bed   Age/Gender 70year old female MRN [id number]   Location [facility] Attending Hector Goddard MD   Hosp Day # 0 PCP DO Marcella Adams Fee

## 2020-01-27 NOTE — ANESTHESIA PREPROCEDURE EVALUATION
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 , Disp: 30 capsule, Rfl: 1  omeprazole 20 MG Oral Capsule Delayed Release, Take 1 capsule (20 mg total) by mouth daily. , Disp: 30 capsule, Rf OTHER SURGICAL HISTORY  2009    Cath Stent Placement x 1   • OTHER SURGICAL HISTORY  2014    parathyroidectomy   • PARATHYROIDECTOMY N/A 2/3/2014    Performed by Felipe Mora MD at [facility] MAIN OR   • SACROILIAC JOINT INJECTION BILATERAL Bilateral 6/13/2 use: No      Alcohol/week: 0.0 standard drinks      Frequency: Never      Drug use:    Types: Cannabis   Comment: Uses marijuana once in a while for pain     Available pre-op labs reviewed.   Lab Results   Component Value Date    WBC 11.2 (H) 01/23/2020

## 2020-01-27 NOTE — BRIEF OP NOTE
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

## 2020-01-27 NOTE — OPERATIVE REPORT
Operative Note   Patient Name: Anthony Palencia  5/5/1940 1/27/2020  Preoperative Diagnosis: lumbar radiculopathy    Postoperative Diagnosis: same   Primary Surgeon: Shana Ramos M.D.    Assistant: Jesu odonnell pa-c, who was essential to the case, s and Duramorph was placed over the nerve. the wound was closed in layers. Dermabond for the skin. Patient was taken off the OR table and awakened by anesthesia.      Dictated but not proofread

## 2020-01-27 NOTE — ANESTHESIA PROCEDURE NOTES
Airway  Urgency: elective    Airway not difficult    General Information and Staff    Patient location during procedure: OR  Anesthesiologist: Dillon Riley MD  Performed: anesthesiologist     Indications and Patient Condition  Indications for airway manag

## 2020-01-27 NOTE — H&P
Neurosurgery Clinic Visit              Marisel Ramirez PCP:  DO CLARK Romeo 1940 MRN [id number]      HISTORY OF PRESENT ILLNESS:  Marisel Ramirez is a(n) 70year old female here in follow up after left S1 root block 19 / Vee Sparrow.   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[address]     fibro   • Back pain    • Back problem    • Bad breath    • Calculus of kidney    • HYSTERECTOMY  1979    removed ovaries   • LUMBAR EPIDURAL N/A 3/18/2013    Performed by Ken Fairbanks MD at [facility] 2/26/2013    Performed by Ken Fairbanks MD at [facility] Duong Fenton MD at [facility] MAIN OR   • TRANSFORAMINAL LUMBAR EPIDURAL STEROID INJECTION SINGLE LEVEL Bilateral 7/14/2016    Performed by Aloysius Klinefelter, MD at [facility] MAIN OR   • TUBAL LIGATION         The above referenced H&P was reviewed by Susan Nolen PA-C on

## 2020-01-28 DIAGNOSIS — R79.89 ABNORMAL CBC: Primary | ICD-10-CM

## 2020-01-28 DIAGNOSIS — D64.9 LOW HEMOGLOBIN: ICD-10-CM

## 2020-02-11 ENCOUNTER — OFFICE VISIT (OUTPATIENT)
Dept: SURGERY | Facility: CLINIC | Age: 80
End: 2020-02-11
Payer: MEDICARE

## 2020-02-11 VITALS — DIASTOLIC BLOOD PRESSURE: 60 MMHG | SYSTOLIC BLOOD PRESSURE: 110 MMHG | HEART RATE: 53 BPM

## 2020-02-11 DIAGNOSIS — M51.16 LUMBAR DISC HERNIATION WITH RADICULOPATHY: Primary | ICD-10-CM

## 2020-02-11 PROCEDURE — 99024 POSTOP FOLLOW-UP VISIT: CPT | Performed by: NEUROLOGICAL SURGERY

## 2020-02-11 RX ORDER — HYDROCODONE BITARTRATE AND ACETAMINOPHEN 10; 325 MG/1; MG/1
1 TABLET ORAL EVERY 6 HOURS PRN
Qty: 30 TABLET | Refills: 0 | Status: SHIPPED | OUTPATIENT
Start: 2020-02-11 | End: 2020-02-28

## 2020-02-11 RX ORDER — CYCLOBENZAPRINE HCL 10 MG
10 TABLET ORAL 3 TIMES DAILY PRN
Qty: 60 TABLET | Refills: 1 | Status: SHIPPED | OUTPATIENT
Start: 2020-02-11 | End: 2020-04-29

## 2020-02-11 NOTE — PROGRESS NOTES
Neurosurgery Clinic Visit  2020    Senait Vazquez PCP:  DO CLARK Wolf 1940 MRN [id number]     HISTORY OF PRESENT ILLNESS:  Senait Vazquez is a(n) 70year old female who is here 2 weeks s/p left L5-S1 microdiscectomy.   She is complainin

## 2020-02-11 NOTE — PROGRESS NOTES
Pt is here for two week post op. LEFT LUMBAR 5-SACRAL 1 MICRODISCECTOMY 1/27/2020     Pt states she is fine. Pt states left leg weakness after surgery.

## 2020-02-20 DIAGNOSIS — I10 ESSENTIAL HYPERTENSION: Primary | ICD-10-CM

## 2020-02-20 RX ORDER — AMLODIPINE BESYLATE 10 MG/1
TABLET ORAL
Qty: 90 TABLET | Refills: 0 | Status: SHIPPED | OUTPATIENT
Start: 2020-02-20 | End: 2020-05-12

## 2020-02-20 NOTE — TELEPHONE ENCOUNTER
Pt requesting refill of AMLODIPINE BESYLATE 10 MG Oral Tab    Passed protocol, refill approved, sent to pharmacy

## 2020-02-28 DIAGNOSIS — M51.16 LUMBAR DISC HERNIATION WITH RADICULOPATHY: ICD-10-CM

## 2020-02-28 RX ORDER — HYDROCODONE BITARTRATE AND ACETAMINOPHEN 10; 325 MG/1; MG/1
1 TABLET ORAL EVERY 6 HOURS PRN
Qty: 30 TABLET | Refills: 0 | Status: SHIPPED | OUTPATIENT
Start: 2020-02-28 | End: 2020-03-19

## 2020-02-28 NOTE — TELEPHONE ENCOUNTER
Medication: Norco  Mg     Date of last refill: 2/11/2020 30 Tabs     [place] Dispensed 6/16/2736 30 Tabs     Last office visit: 2/11/2020     Due back to clinic per last office note: 4 Weeks     Date next office vi

## 2020-03-10 ENCOUNTER — OFFICE VISIT (OUTPATIENT)
Dept: SURGERY | Facility: CLINIC | Age: 80
End: 2020-03-10
Payer: MEDICARE

## 2020-03-10 VITALS — SYSTOLIC BLOOD PRESSURE: 110 MMHG | HEART RATE: 59 BPM | DIASTOLIC BLOOD PRESSURE: 56 MMHG

## 2020-03-10 DIAGNOSIS — M54.16 LUMBAR RADICULOPATHY: Primary | ICD-10-CM

## 2020-03-10 PROCEDURE — 99024 POSTOP FOLLOW-UP VISIT: CPT | Performed by: PHYSICIAN ASSISTANT

## 2020-03-10 RX ORDER — PREGABALIN 150 MG/1
150 CAPSULE ORAL 2 TIMES DAILY
Qty: 60 CAPSULE | Refills: 2 | Status: SHIPPED | OUTPATIENT
Start: 2020-03-10 | End: 2021-01-15

## 2020-03-10 RX ORDER — METHYLPREDNISOLONE 4 MG/1
TABLET ORAL
Qty: 1 PACKAGE | Refills: 0 | Status: SHIPPED | OUTPATIENT
Start: 2020-03-10 | End: 2020-04-23 | Stop reason: ALTCHOICE

## 2020-03-10 RX ORDER — ONDANSETRON 4 MG/1
1 TABLET, FILM COATED ORAL AS NEEDED
COMMUNITY
Start: 2020-02-25 | End: 2020-05-28

## 2020-03-10 NOTE — PROGRESS NOTES
Neurosurgery Clinic Visit  3/10/2020    Katerine Esteves PCP:  Berenice Pallas, DO    1940 MRN [id number]     HISTORY OF PRESENT ILLNESS:  Katerine Esteves is a(n) 70year old female who is here 6 weeks s/p microdiscectomy.   She c/o pain down the back o

## 2020-03-10 NOTE — PROGRESS NOTES
Pt is here for 6 week post op. LEFT LUMBAR 5-SACRAL 1 MICRODISCECTOMY 1/27/20     Pt states she feels better. Pt states her left leg was bother her. Pt states her left leg is more painful than last visit. Pt states she feels sharp pain rating it 6/10.

## 2020-03-13 DIAGNOSIS — G47.09 OTHER INSOMNIA: Primary | ICD-10-CM

## 2020-03-13 RX ORDER — TRAZODONE HYDROCHLORIDE 100 MG/1
TABLET ORAL
Qty: 90 TABLET | Refills: 0 | Status: SHIPPED | OUTPATIENT
Start: 2020-03-13 | End: 2020-06-11

## 2020-03-13 NOTE — TELEPHONE ENCOUNTER
Pt requesting refill of TRAZODONE  MG Oral Tab    Last Office Visit with Provider: 1/16/20  No future appointments.

## 2020-03-19 DIAGNOSIS — M51.16 LUMBAR DISC HERNIATION WITH RADICULOPATHY: ICD-10-CM

## 2020-03-19 RX ORDER — HYDROCODONE BITARTRATE AND ACETAMINOPHEN 10; 325 MG/1; MG/1
1 TABLET ORAL EVERY 6 HOURS PRN
Qty: 60 TABLET | Refills: 0 | Status: SHIPPED | OUTPATIENT
Start: 2020-03-19 | End: 2020-04-06

## 2020-03-19 NOTE — TELEPHONE ENCOUNTER
Medication: Norco  (30 tablets)    Date of last refill: 02/28/20    [place] 53/91/89    Last office visit: 03/10/20    Due back to clinic per last office note:  3 months    Date next office visit scheduled:  04/09/

## 2020-03-30 RX ORDER — CLONAZEPAM 0.5 MG/1
TABLET ORAL
Qty: 60 TABLET | Refills: 0 | Status: SHIPPED | OUTPATIENT
Start: 2020-03-30 | End: 2020-06-06

## 2020-03-30 NOTE — TELEPHONE ENCOUNTER
Pt requesting refill of CLONAZEPAM 0.5 MG Oral Tab    Last Time Medication was Filled:  1/20/20 qty# 61    Last Office Visit with Provider: 1/16/20   No future appointments.

## 2020-04-06 ENCOUNTER — TELEPHONE (OUTPATIENT)
Dept: SURGERY | Facility: CLINIC | Age: 80
End: 2020-04-06

## 2020-04-06 DIAGNOSIS — M51.16 LUMBAR DISC HERNIATION WITH RADICULOPATHY: ICD-10-CM

## 2020-04-06 RX ORDER — HYDROCODONE BITARTRATE AND ACETAMINOPHEN 10; 325 MG/1; MG/1
1 TABLET ORAL EVERY 6 HOURS PRN
Qty: 60 TABLET | Refills: 0 | Status: SHIPPED | OUTPATIENT
Start: 2020-04-06 | End: 2020-04-29

## 2020-04-06 NOTE — TELEPHONE ENCOUNTER
Refill hydrocodone      Patient informed of 48 hour refill policy excluding weekends and holidays. Informed patient only patient can  the prescription effective April 1, 2017.   Further explained patient will not receive a call back once prescription

## 2020-04-06 NOTE — TELEPHONE ENCOUNTER
Medication: Norco  mg     Date of last refill: 3/19/2020  [place] 1/43/1335 #34    Last office visit: 3/10/2020  Due back to clinic per last office note:  3 months  Date next office visit scheduled:  5/14/2020    L

## 2020-04-12 DIAGNOSIS — R73.9 HYPERGLYCEMIA: ICD-10-CM

## 2020-04-12 DIAGNOSIS — E78.2 MIXED HYPERLIPIDEMIA: Primary | ICD-10-CM

## 2020-04-13 RX ORDER — PRAVASTATIN SODIUM 40 MG
TABLET ORAL
Qty: 90 TABLET | Refills: 0 | Status: SHIPPED | OUTPATIENT
Start: 2020-04-13 | End: 2020-08-07

## 2020-04-13 RX ORDER — MONTELUKAST SODIUM 10 MG/1
TABLET ORAL
Qty: 90 TABLET | Refills: 0 | Status: SHIPPED | OUTPATIENT
Start: 2020-04-13 | End: 2020-08-24

## 2020-04-13 NOTE — TELEPHONE ENCOUNTER
Pt requesting refill of Montelukast 10 mg    Last Time Medication was Filled: 03/17/2020    Last Office Visit with Provider: 01/16/2020    Appt scheduled on: No future appointments.          Pt requesting refill of Pravastatin 40 mg    Last Time Medication

## 2020-04-23 ENCOUNTER — TELEMEDICINE (OUTPATIENT)
Dept: FAMILY MEDICINE CLINIC | Facility: CLINIC | Age: 80
End: 2020-04-23

## 2020-04-23 DIAGNOSIS — Z87.11 HISTORY OF PEPTIC ULCER: ICD-10-CM

## 2020-04-23 DIAGNOSIS — K59.09 OTHER CONSTIPATION: ICD-10-CM

## 2020-04-23 DIAGNOSIS — R10.33 ABDOMINAL CRAMPING, PERIUMBILICAL: Primary | ICD-10-CM

## 2020-04-23 PROCEDURE — 99213 OFFICE O/P EST LOW 20 MIN: CPT | Performed by: FAMILY MEDICINE

## 2020-04-23 RX ORDER — AZITHROMYCIN 250 MG/1
TABLET, FILM COATED ORAL
COMMUNITY
Start: 2020-04-21 | End: 2020-05-05 | Stop reason: ALTCHOICE

## 2020-04-23 NOTE — PROGRESS NOTES
Abran Ortiz is a 70year old female. HPI:   This visit is conducted using tele-medicine with live interactive video and audio. When he minutes was spent with patient on the video discussing symptoms.   Telecommunication audio video visit was done Susan Dean by mouth 3 (three) times daily as needed for Muscle spasms. 60 tablet 1   • losartan 100 MG Oral Tab Take 100 mg by mouth daily. • Cinacalcet HCl 30 MG Oral Tab Take 30 mg by mouth 2 (two) times daily with meals.      • lidocaine 5 % External Patch APPL murmur    • Hemorrhoids    • High blood pressure    • High cholesterol    • History of blood transfusion    • History of cardiac murmur    • History of eating disorder    • History of rheumatic fever    • IBS (irritable bowel syndrome)     constipation   • medications, problem list and allergies. GENERAL: Patient is speaking in full sentences no increased work in breathing patient is alert and orientated x3.     Psych patient's mood is normal and communication skills are good  Abdominal exam patient is able

## 2020-04-28 DIAGNOSIS — M51.16 LUMBAR DISC HERNIATION WITH RADICULOPATHY: ICD-10-CM

## 2020-04-28 NOTE — TELEPHONE ENCOUNTER
Medication: Norco                     Cyclobenzaprine 10 mg    Date of last refill: Norco 4/6/2020 #60                           Cyclobenzaprine 2/11/2020 # 60 R-1  [place] Norco 1/9/2555 # 61    Last office visit:

## 2020-04-28 NOTE — TELEPHONE ENCOUNTER
2 SCRIPTS: cyclobenzaprine & hydrocodone    Pharmacy verified. Patient informed of 48 hour refill policy excluding weekends and holidays. Informed patient only patient can  the prescription effective April 1, 2017.   Further explained patient

## 2020-04-29 RX ORDER — CYCLOBENZAPRINE HCL 10 MG
10 TABLET ORAL 3 TIMES DAILY PRN
Qty: 60 TABLET | Refills: 1 | Status: SHIPPED | OUTPATIENT
Start: 2020-04-29 | End: 2021-02-26 | Stop reason: ALTCHOICE

## 2020-04-29 RX ORDER — HYDROCODONE BITARTRATE AND ACETAMINOPHEN 10; 325 MG/1; MG/1
1 TABLET ORAL EVERY 6 HOURS PRN
Qty: 60 TABLET | Refills: 0 | Status: SHIPPED | OUTPATIENT
Start: 2020-04-29 | End: 2020-06-18

## 2020-05-04 ENCOUNTER — TELEPHONE (OUTPATIENT)
Dept: FAMILY MEDICINE CLINIC | Facility: CLINIC | Age: 80
End: 2020-05-04

## 2020-05-04 NOTE — TELEPHONE ENCOUNTER
Patient called states she has diarrhea, bad cramping and nausea. States no fever no vomiting. Was going to schedule patient for video visit but schedule for today does not allow enough time for video visit. Patient states she can not wait until tomorrow.

## 2020-05-04 NOTE — TELEPHONE ENCOUNTER
Spoke with patient. She has appointment for tomorrow morning. States he symptoms have improved and she is feeling better. Advised she still keep appointment for tomorrow. continue hydration and bland diet to help nausea and diarrhea symptoms.  She kim

## 2020-05-05 ENCOUNTER — TELEMEDICINE (OUTPATIENT)
Dept: FAMILY MEDICINE CLINIC | Facility: CLINIC | Age: 80
End: 2020-05-05

## 2020-05-05 DIAGNOSIS — K59.09 INTERMITTENT CONSTIPATION: ICD-10-CM

## 2020-05-05 DIAGNOSIS — R19.7 INTERMITTENT DIARRHEA: Primary | ICD-10-CM

## 2020-05-05 DIAGNOSIS — N18.30 CKD (CHRONIC KIDNEY DISEASE) STAGE 3, GFR 30-59 ML/MIN (HCC): ICD-10-CM

## 2020-05-05 DIAGNOSIS — Z91.81 AT RISK FOR INJURY RELATED TO FALL: ICD-10-CM

## 2020-05-05 PROCEDURE — 99213 OFFICE O/P EST LOW 20 MIN: CPT | Performed by: FAMILY MEDICINE

## 2020-05-06 ENCOUNTER — PATIENT MESSAGE (OUTPATIENT)
Dept: SURGERY | Facility: CLINIC | Age: 80
End: 2020-05-06

## 2020-05-06 PROBLEM — Z91.81 AT RISK FOR INJURY RELATED TO FALL: Status: ACTIVE | Noted: 2020-05-06

## 2020-05-06 PROBLEM — K59.09 INTERMITTENT CONSTIPATION: Status: ACTIVE | Noted: 2020-05-06

## 2020-05-06 PROBLEM — R19.7 INTERMITTENT DIARRHEA: Status: ACTIVE | Noted: 2020-05-06

## 2020-05-06 NOTE — PROGRESS NOTES
Ольга Madison is a 68year old female. HPI:   This visit is conducted using tele-medicine with live interactive video and audio.   15 minutes in time spent talking to patient about chronic health issues including the intermittent diarrhea and constipation needed. 60 tablet 0   • cyclobenzaprine 10 MG Oral Tab Take 1 tablet (10 mg total) by mouth 3 (three) times daily as needed for Muscle spasms.  60 tablet 1   • PRAVASTATIN SODIUM 40 MG Oral Tab TAKE 1 TABLET BY MOUTH EVERY NIGHT AT BEDTIME 90 tablet 0   • M hours as needed for Itching. • nystatin [id number] units Oral Tab Take 1 tablet (500,000 Units total) by mouth 4 (four) times daily as needed.  Daily  2      Past Medical History:   Diagnosis Date   • Abdominal pain    • Anxiety state, unspecified    • Arth Alcohol/week: 0.0 standard drinks      Frequency: Never    Drug use: Yes      Types: Cannabis      Comment: Uses marijuana once in a while for pain       REVIEW OF SYSTEMS:   GENERAL HEALTH: feels well otherwise  SKIN: denies any unusual skin lesions or ra days.  Stool softener such as Dulcolax if needed  2. Fall risk  Secondary to chronic back pain does get weak occasionally in the legs encouraged her to continue to use the 4-prong walker that her  purchased for her recently.   Also discussed no thro

## 2020-05-12 DIAGNOSIS — I10 ESSENTIAL HYPERTENSION: ICD-10-CM

## 2020-05-12 RX ORDER — AMLODIPINE BESYLATE 10 MG/1
TABLET ORAL
Qty: 90 TABLET | Refills: 0 | Status: SHIPPED | OUTPATIENT
Start: 2020-05-12 | End: 2020-08-10

## 2020-05-14 ENCOUNTER — TELEMEDICINE (OUTPATIENT)
Dept: SURGERY | Facility: CLINIC | Age: 80
End: 2020-05-14
Payer: MEDICARE

## 2020-05-14 DIAGNOSIS — M51.16 LUMBAR DISC HERNIATION WITH RADICULOPATHY: Primary | ICD-10-CM

## 2020-05-14 PROCEDURE — 99441 PHONE E/M BY PHYS 5-10 MIN: CPT | Performed by: NEUROLOGICAL SURGERY

## 2020-05-14 NOTE — PROGRESS NOTES
Neurosurgery Clinic Visit  2020    Shelagh Memory PCP:  Antelmo Guardado DO    1940 MRN [id number]     HISTORY OF PRESENT ILLNESS:  Jackelyn Mann is a(n) 68year old female s.p left l5/s1 microdiscectomy in duong  Back of thigh pain resolved  No

## 2020-05-27 ENCOUNTER — TELEPHONE (OUTPATIENT)
Dept: FAMILY MEDICINE CLINIC | Facility: CLINIC | Age: 80
End: 2020-05-27

## 2020-05-27 NOTE — TELEPHONE ENCOUNTER
Spoke to patient. States started vomiting around 11pm last night and last emesis was 10:00a.m. Thinks it is her \"sinuses\". States she is stuffed up. No fever. No abdominal or sinus pain.  + nausea. States has 10 tabs of zofran left over.  Has taken mon

## 2020-05-27 NOTE — TELEPHONE ENCOUNTER
Pt called and said she was up most of the night vomiting, she wants to know if something can be called in to her local pharmacy.

## 2020-05-28 ENCOUNTER — VIRTUAL PHONE E/M (OUTPATIENT)
Dept: FAMILY MEDICINE CLINIC | Facility: CLINIC | Age: 80
End: 2020-05-28
Payer: MEDICARE

## 2020-05-28 DIAGNOSIS — R11.2 NON-INTRACTABLE VOMITING WITH NAUSEA, UNSPECIFIED VOMITING TYPE: Primary | ICD-10-CM

## 2020-05-28 PROCEDURE — 99442 PHONE E/M BY PHYS 11-20 MIN: CPT | Performed by: FAMILY MEDICINE

## 2020-05-28 RX ORDER — ONDANSETRON 4 MG/1
TABLET, FILM COATED ORAL EVERY 12 HOURS PRN
Qty: 30 TABLET | Refills: 1 | Status: SHIPPED | OUTPATIENT
Start: 2020-05-28 | End: 2020-08-07 | Stop reason: ALTCHOICE

## 2020-05-28 RX ORDER — DOXYCYCLINE HYCLATE 100 MG/1
100 CAPSULE ORAL 2 TIMES DAILY
COMMUNITY
Start: 2020-05-14 | End: 2020-05-26

## 2020-05-28 NOTE — PROGRESS NOTES
Virtual Telephone Check-In    Katerinemichael Esteves verbally consents to a Virtual/Telephone Check-In visit on 05/28/20. Patient has been referred to the [facility] website at [email or web address] to review the yearly Consent to Treat document.     Patient Mehreen Cassidy state nausea is constant feels it could be from the drainage. Current Outpatient Medications   Medication Sig Dispense Refill   • Ondansetron HCl (ZOFRAN) 4 mg tablet Take 1-2 tablets (4-8 mg total) by mouth every 12 (twelve) hours as needed for Nausea.  3 DAY (Patient taking differently: Take 20 mEq by mouth 3 (three) times a week.  ) 60 tablet 1   • DiphenhydrAMINE HCl 25 MG Oral Tab Take 25 mg by mouth every 6 (six) hours as needed for Itching.      • nystatin 630862 units Oral Tab Take 1 tablet (500,000 U Pack years: 4        Types: Cigarettes        Quit date: 1997        Years since quittin.4      Smokeless tobacco: Never Used    Alcohol use: No      Alcohol/week: 0.0 standard drinks      Frequency: Never    Drug use: Yes      Types: Cannabis (4-8 mg total) by mouth every 12 (twelve) hours as needed for Nausea. Dispense: 30 tablet; Refill: 1  Add Mucinex for expectorant. Told her that the nausea and vomiting could be secondary to the doxycycline. Patient has now been off of it for 2 days.   Sakina Morrison

## 2020-06-05 NOTE — TELEPHONE ENCOUNTER
Pt requesting refill of CLONAZEPAM 0.5 MG Oral Tab    Last Time Medication was Filled:  3/30/20    Last Office Visit with Provider: tung olson 5/28/20  No future appointments.

## 2020-06-06 RX ORDER — CLONAZEPAM 0.5 MG/1
TABLET ORAL
Qty: 60 TABLET | Refills: 0 | Status: SHIPPED | OUTPATIENT
Start: 2020-06-06 | End: 2020-08-07

## 2020-06-11 DIAGNOSIS — G47.09 OTHER INSOMNIA: ICD-10-CM

## 2020-06-11 RX ORDER — TRAZODONE HYDROCHLORIDE 100 MG/1
TABLET ORAL
Qty: 90 TABLET | Refills: 0 | Status: SHIPPED | OUTPATIENT
Start: 2020-06-11 | End: 2020-09-04

## 2020-06-11 NOTE — TELEPHONE ENCOUNTER
Pt requesting refill of TRAZODONE  MG Oral Tab    Last Time Medication was Filled:  3/13/20    Last Office Visit with Provider: tung olson 5/28/20    No future appointments.

## 2020-06-12 ENCOUNTER — TELEPHONE (OUTPATIENT)
Dept: FAMILY MEDICINE CLINIC | Facility: CLINIC | Age: 80
End: 2020-06-12

## 2020-06-12 NOTE — TELEPHONE ENCOUNTER
Pt called and said she has a sore throat and she would like Eleni Palumbo to call something in for her to [place] on [facility].

## 2020-06-12 NOTE — TELEPHONE ENCOUNTER
Spoke to patient. States she voided a few minutes ago and had a burning sensation. No fever. No chills. No visible blood. No flank pain. One episode.   Advised to increase water intake, add cranberry juice as tolerated and call on Monday with condition

## 2020-06-12 NOTE — TELEPHONE ENCOUNTER
Spoke to patient. Sore throat started today in the middle of the night. No fever. No runny nose. States she feels congested. Not taking any OTC's. Not taking her fluticasone nasal spray. Please advise.

## 2020-06-18 DIAGNOSIS — M51.16 LUMBAR DISC HERNIATION WITH RADICULOPATHY: ICD-10-CM

## 2020-06-18 RX ORDER — HYDROCODONE BITARTRATE AND ACETAMINOPHEN 10; 325 MG/1; MG/1
1 TABLET ORAL EVERY 6 HOURS PRN
Qty: 60 TABLET | Refills: 0 | Status: SHIPPED | OUTPATIENT
Start: 2020-06-18 | End: 2020-07-20

## 2020-06-18 NOTE — TELEPHONE ENCOUNTER
Pt req HYDROcodone-acetaminophen  MG Oral Tab    Pharmacy verified  Patient informed of 48 hour refill policy excluding weekends and holidays. Informed patient only patient can  the prescription effective April 1, 2017.   Further explained tremayne

## 2020-06-18 NOTE — TELEPHONE ENCOUNTER
Medication: Norco  (60 tablets)    Date of last refill: 04/29/20    [place] 62/21/22    Last office visit: Televisit 05/14/20    Due back to clinic per last office note:  Patient to call when ready , new pain     D

## 2020-06-25 ENCOUNTER — TELEPHONE (OUTPATIENT)
Dept: FAMILY MEDICINE CLINIC | Facility: CLINIC | Age: 80
End: 2020-06-25

## 2020-06-25 NOTE — TELEPHONE ENCOUNTER
Patient reports waking up this morning with sinus congestio and pressure and headache, diarrhea every 2-3 bowel movements and nausea. Denies fever, cough, SOB or chest tight and vomiting. States she is able to keep food and liquids down.      States she k

## 2020-06-25 NOTE — TELEPHONE ENCOUNTER
She can call ENT and se if he will RX antibiotic CHELLY Peraza. She is on antibiotics too frequently.  If she cant get a hold of him I will have to do a virtual visit with her

## 2020-06-25 NOTE — TELEPHONE ENCOUNTER
Patient called states she woke up with a horrible sinus infection and vomiting would like to speak to a nurse.

## 2020-06-25 NOTE — TELEPHONE ENCOUNTER
Pt called and said she is really sick. She said she has a sinus infection and she can't keep anything down. She wants to know if [facility] can call something into the drugstore for her.

## 2020-06-27 ENCOUNTER — TELEPHONE (OUTPATIENT)
Dept: FAMILY MEDICINE CLINIC | Facility: CLINIC | Age: 80
End: 2020-06-27

## 2020-06-27 RX ORDER — ONDANSETRON 8 MG/1
8 TABLET, ORALLY DISINTEGRATING ORAL EVERY 12 HOURS PRN
Qty: 6 TABLET | Refills: 0 | Status: SHIPPED | OUTPATIENT
Start: 2020-06-27 | End: 2020-07-23

## 2020-06-27 NOTE — TELEPHONE ENCOUNTER
Incoming call from patient on 6/27/2020. Patient complains of nausea vomiting starting last night. Associated with lower abdominal pain, patient states below the \"belly button\" with loose stool (water) x1.   Main symptom this morning is lower abdominal

## 2020-07-13 RX ORDER — PRAVASTATIN SODIUM 40 MG
TABLET ORAL
Qty: 90 TABLET | Refills: 0 | OUTPATIENT
Start: 2020-07-13

## 2020-07-13 RX ORDER — MONTELUKAST SODIUM 10 MG/1
TABLET ORAL
Qty: 90 TABLET | Refills: 0 | OUTPATIENT
Start: 2020-07-13

## 2020-07-13 RX ORDER — LOSARTAN POTASSIUM 100 MG/1
TABLET ORAL
Qty: 90 TABLET | Refills: 0 | OUTPATIENT
Start: 2020-07-13

## 2020-07-13 NOTE — TELEPHONE ENCOUNTER
Refill request for provastatin and montelukast.  Needs to complete labs. Losartan not prescribed by Leann Aguirre. Denied with message to call office.

## 2020-07-14 ENCOUNTER — TELEPHONE (OUTPATIENT)
Dept: FAMILY MEDICINE CLINIC | Facility: CLINIC | Age: 80
End: 2020-07-14

## 2020-07-14 NOTE — TELEPHONE ENCOUNTER
Jovana Lassiter is calling to see if Famloulou Taylor can call something in to her [address] on [facility], she woke up with a headache around her eyes and nose, her nose is stuffy, she think she is coming down with a sinus infection, she does not want to come int

## 2020-07-20 DIAGNOSIS — M51.16 LUMBAR DISC HERNIATION WITH RADICULOPATHY: ICD-10-CM

## 2020-07-20 RX ORDER — HYDROCODONE BITARTRATE AND ACETAMINOPHEN 10; 325 MG/1; MG/1
1 TABLET ORAL EVERY 6 HOURS PRN
Qty: 60 TABLET | Refills: 0 | Status: SHIPPED | OUTPATIENT
Start: 2020-07-20 | End: 2021-01-15

## 2020-07-20 NOTE — TELEPHONE ENCOUNTER
Medication: Norco      Date of last refill: 6/18/2020  [place] 0/60/9933 #41    Last office visit: 5/14/2020-televisit  Due back to clinic per last office note:   Will have office calll her to make in person visit

## 2020-07-20 NOTE — TELEPHONE ENCOUNTER
Pt calling to refill Hydrocodone. Patient informed of 48 hour refill policy excluding weekends and holidays. Informed patient only patient can  the prescription effective April 1, 2017.   Further explained patient will not receive a call back once p

## 2020-07-23 ENCOUNTER — TELEPHONE (OUTPATIENT)
Dept: FAMILY MEDICINE CLINIC | Facility: CLINIC | Age: 80
End: 2020-07-23

## 2020-07-23 RX ORDER — ONDANSETRON 8 MG/1
8 TABLET, ORALLY DISINTEGRATING ORAL EVERY 12 HOURS PRN
Qty: 6 TABLET | Refills: 0 | Status: SHIPPED | OUTPATIENT
Start: 2020-07-23 | End: 2020-09-21

## 2020-07-23 RX ORDER — ONDANSETRON 8 MG/1
TABLET, ORALLY DISINTEGRATING ORAL
Qty: 6 TABLET | Refills: 0 | OUTPATIENT
Start: 2020-07-23

## 2020-07-23 NOTE — TELEPHONE ENCOUNTER
Patient called sick with nausea symptoms 6/27/20 and spoke with Dr. Tanesha Lock:    Shira Menchaca,      6/27/20 10:42 AM   Note      Incoming call from patient on 6/27/2020. Patient complains of nausea vomiting starting last night.   Associated with lower

## 2020-07-23 NOTE — TELEPHONE ENCOUNTER
Please call patient:  Prescription for Zofran refilled. Please instruct patient to make appointment to see Jaqueline Kennedy for follow-up on these issues.

## 2020-07-23 NOTE — TELEPHONE ENCOUNTER
Spoke with patient, states she was feeling better but started with nausea and vomiting again last night. States the PND and sinuses cause nausea and then she throws up mucus. Denies fever, cough, SOB. Patient states she also takes Norco for leg pain.

## 2020-07-23 NOTE — TELEPHONE ENCOUNTER
Vicki Wilkinson is calling to get her Ondansetron HCl (ZOFRAN) 4 mg tablet refilled she has had nausea and vomiting all last night.  Please call Vicki Wilkinson at [phone]

## 2020-07-27 ENCOUNTER — HOSPITAL ENCOUNTER (EMERGENCY)
Age: 80
Discharge: HOME | End: 2020-07-27
Attending: EMERGENCY MEDICINE
Payer: MEDICARE

## 2020-07-27 ENCOUNTER — APPOINTMENT (OUTPATIENT)
Dept: CT IMAGING | Age: 80
End: 2020-07-27
Attending: EMERGENCY MEDICINE
Payer: MEDICARE

## 2020-07-27 ENCOUNTER — TELEPHONE (OUTPATIENT)
Dept: FAMILY MEDICINE CLINIC | Facility: CLINIC | Age: 80
End: 2020-07-27

## 2020-07-27 VITALS
SYSTOLIC BLOOD PRESSURE: 139 MMHG | HEART RATE: 78 BPM | TEMPERATURE: 98 F | OXYGEN SATURATION: 98 % | RESPIRATION RATE: 16 BRPM | BODY MASS INDEX: 21 KG/M2 | WEIGHT: 120 LBS | DIASTOLIC BLOOD PRESSURE: 6 MMHG

## 2020-07-27 DIAGNOSIS — R10.9 ABDOMINAL PAIN OF UNKNOWN ETIOLOGY: Primary | ICD-10-CM

## 2020-07-27 LAB
ALBUMIN SERPL-MCNC: 4.1 G/DL (ref 3.4–5)
ALBUMIN/GLOB SERPL: 1.1 {RATIO} (ref 1–2)
ALP LIVER SERPL-CCNC: 75 U/L (ref 55–142)
ALT SERPL-CCNC: 16 U/L (ref 13–56)
ANION GAP SERPL CALC-SCNC: 5 MMOL/L (ref 0–18)
APTT PPP: 26.2 SECONDS (ref 25.4–36.1)
AST SERPL-CCNC: 15 U/L (ref 15–37)
BASOPHILS # BLD AUTO: 0.06 X10(3) UL (ref 0–0.2)
BASOPHILS NFR BLD AUTO: 0.6 %
BILIRUB SERPL-MCNC: 0.5 MG/DL (ref 0.1–2)
BUN BLD-MCNC: 20 MG/DL (ref 7–18)
BUN/CREAT SERPL: 14.3 (ref 10–20)
CALCIUM BLD-MCNC: 10.6 MG/DL (ref 8.5–10.1)
CHLORIDE SERPL-SCNC: 103 MMOL/L (ref 98–112)
CO2 SERPL-SCNC: 28 MMOL/L (ref 21–32)
CREAT BLD-MCNC: 1.4 MG/DL (ref 0.55–1.02)
DEPRECATED RDW RBC AUTO: 46.9 FL (ref 35.1–46.3)
EOSINOPHIL # BLD AUTO: 0.03 X10(3) UL (ref 0–0.7)
EOSINOPHIL NFR BLD AUTO: 0.3 %
ERYTHROCYTE [DISTWIDTH] IN BLOOD BY AUTOMATED COUNT: 14 % (ref 11–15)
GLOBULIN PLAS-MCNC: 3.7 G/DL (ref 2.8–4.4)
GLUCOSE BLD-MCNC: 100 MG/DL (ref 70–99)
HCT VFR BLD AUTO: 39.3 % (ref 35–48)
HGB BLD-MCNC: 13.2 G/DL (ref 12–16)
IMM GRANULOCYTES # BLD AUTO: 0.02 X10(3) UL (ref 0–1)
IMM GRANULOCYTES NFR BLD: 0.2 %
INR BLD: 0.94 (ref 0.88–1.11)
LIPASE SERPL-CCNC: 73 U/L (ref 73–393)
LYMPHOCYTES # BLD AUTO: 4.01 X10(3) UL (ref 1–4)
LYMPHOCYTES NFR BLD AUTO: 39.5 %
M PROTEIN MFR SERPL ELPH: 7.8 G/DL (ref 6.4–8.2)
MCH RBC QN AUTO: 31 PG (ref 26–34)
MCHC RBC AUTO-ENTMCNC: 33.6 G/DL (ref 31–37)
MCV RBC AUTO: 92.3 FL (ref 80–100)
MONOCYTES # BLD AUTO: 1.05 X10(3) UL (ref 0.1–1)
MONOCYTES NFR BLD AUTO: 10.4 %
NEUTROPHILS # BLD AUTO: 4.97 X10 (3) UL (ref 1.5–7.7)
NEUTROPHILS # BLD AUTO: 4.97 X10(3) UL (ref 1.5–7.7)
NEUTROPHILS NFR BLD AUTO: 49 %
OSMOLALITY SERPL CALC.SUM OF ELEC: 285 MOSM/KG (ref 275–295)
PLATELET # BLD AUTO: 383 10(3)UL (ref 150–450)
POTASSIUM SERPL-SCNC: 3.3 MMOL/L (ref 3.5–5.1)
PSA SERPL DL<=0.01 NG/ML-MCNC: 12.5 SECONDS (ref 12–14.3)
RBC # BLD AUTO: 4.26 X10(6)UL (ref 3.8–5.3)
SODIUM SERPL-SCNC: 136 MMOL/L (ref 136–145)
WBC # BLD AUTO: 10.1 X10(3) UL (ref 4–11)

## 2020-07-27 PROCEDURE — 80053 COMPREHEN METABOLIC PANEL: CPT | Performed by: EMERGENCY MEDICINE

## 2020-07-27 PROCEDURE — 85610 PROTHROMBIN TIME: CPT | Performed by: EMERGENCY MEDICINE

## 2020-07-27 PROCEDURE — 83690 ASSAY OF LIPASE: CPT | Performed by: EMERGENCY MEDICINE

## 2020-07-27 PROCEDURE — 99284 EMERGENCY DEPT VISIT MOD MDM: CPT

## 2020-07-27 PROCEDURE — 85025 COMPLETE CBC W/AUTO DIFF WBC: CPT | Performed by: EMERGENCY MEDICINE

## 2020-07-27 PROCEDURE — 74177 CT ABD & PELVIS W/CONTRAST: CPT | Performed by: EMERGENCY MEDICINE

## 2020-07-27 PROCEDURE — 85730 THROMBOPLASTIN TIME PARTIAL: CPT | Performed by: EMERGENCY MEDICINE

## 2020-07-27 PROCEDURE — 96360 HYDRATION IV INFUSION INIT: CPT

## 2020-07-27 RX ORDER — LANSOPRAZOLE 30 MG/1
1 TABLET, ORALLY DISINTEGRATING, DELAYED RELEASE ORAL DAILY
Qty: 14 TABLET | Refills: 0 | Status: SHIPPED | OUTPATIENT
Start: 2020-07-27 | End: 2020-08-26

## 2020-07-27 RX ORDER — SODIUM CHLORIDE 9 MG/ML
125 INJECTION, SOLUTION INTRAVENOUS CONTINUOUS
Status: DISCONTINUED | OUTPATIENT
Start: 2020-07-27 | End: 2020-07-27

## 2020-07-27 RX ORDER — TRAMADOL HYDROCHLORIDE 50 MG/1
TABLET ORAL EVERY 6 HOURS PRN
Qty: 10 TABLET | Refills: 0 | Status: SHIPPED | OUTPATIENT
Start: 2020-07-27 | End: 2020-08-03

## 2020-07-27 RX ORDER — ONDANSETRON 2 MG/ML
4 INJECTION INTRAMUSCULAR; INTRAVENOUS ONCE
Status: DISCONTINUED | OUTPATIENT
Start: 2020-07-27 | End: 2020-07-27

## 2020-07-27 NOTE — ED PROVIDER NOTES
Patient Seen in: THE [facility] Emergency Department In [place]      History   Patient presents with:  Abdomen/Flank Pain    Stated Complaint: Abd pain for a week.  denies N/V     HPI    [facility] is a pleasant 81-year-old female presenting to the emergency departm implants and grafts    • Rheumatic heart disease, unspecified    • Sleep disturbance    • Stented coronary artery    • Stool incontinence    • Stress    • Tinnitus    • Unspecified essential hypertension    • Visual impairment     glasses   • Wears glasses MD ERIC at [facility] MAIN OR   • SPINAL FUSION  2001    Neck Fusion; [facility], [place], [place]   • SP[facility] PROCED[facility] UN[facility]ED     [facility] [address] [facility]   • TONSILLECTOMY     • TOTAL ABDOM HYSTERECTOMY     • [address] [facility] Current:/66   Pulse 82   Temp 98 °F (36.7 °C) (Temporal)   Resp 16   Wt 54.4 kg   SpO2 98%   BMI 21.43 kg/m²         Physical Exam  Alert and oriented patient appears uncomfortable HEENT exam is normal lungs were clear cardiovascular exam shows past.  She will be placed on medication to address this issue but is strongly recommended close follow-up with primary medical doctor or return to the emergency department for worsening symptoms or other complaints              Disposition and Plan     Cli

## 2020-07-27 NOTE — TELEPHONE ENCOUNTER
Patient calling states she been up with really bad stomach pain the past two nights and wants to know if [facility] will give her something to help ease the pain.

## 2020-07-27 NOTE — ED INITIAL ASSESSMENT (HPI)
C/O MID ABD PAIN FOR OVER A WEEK. NO n/v/d. states SHE THINKS IT MAY BE ULCER PAIN.  STATES TAKING OMEPRAZOLE FOR A WEEK WITH OUT RELIEF

## 2020-07-27 NOTE — TELEPHONE ENCOUNTER
Abd pain started Saturday. Pain is across whole abdomen. Comes and goes but rates it a 9 out of 10. States bowel movements have been normal.  No vomiting, constipation, or diarrhea. States had nausea on and off but not now. No fever, chills.   Please ad

## 2020-07-30 ENCOUNTER — TELEPHONE (OUTPATIENT)
Dept: FAMILY MEDICINE CLINIC | Facility: CLINIC | Age: 80
End: 2020-07-30

## 2020-07-30 NOTE — TELEPHONE ENCOUNTER
Patient reports diarrhea x 4 days. States she has about 3 episodes a day, also c/o abdominal cramping. Denies abdominal pain/tenderness, blood in stool, fever, nausea, vomiting. Has been drinking tea and water.     Patient advised to follow bland diet

## 2020-07-30 NOTE — TELEPHONE ENCOUNTER
Pt called again and said her stomach is raw and she is nauseous and she needs something and she would like us to hurry up. I asked her if her symptoms are getting worse and she said no.

## 2020-07-30 NOTE — TELEPHONE ENCOUNTER
Review whether she has been on antibiotics recently if the diarrhea is watery would suggest doing a C. difficile.   If is just loose stools can try the Imodium AD

## 2020-07-30 NOTE — TELEPHONE ENCOUNTER
Called patient back as recommendations were already given to her and she had verbalized understanding. She states she forgot the recommendations. Relayed message again and she verbalized understanding.    Advised if symptoms severely worsen, she shoul

## 2020-07-30 NOTE — TELEPHONE ENCOUNTER
Patient states she has not been on antibiotics lately and describes diarrhea as loose stools rather than watery. Recommended imodium AD and follow up if symptoms worsening or persistent.      She agreed with plan

## 2020-08-03 ENCOUNTER — TELEPHONE (OUTPATIENT)
Dept: FAMILY MEDICINE CLINIC | Facility: CLINIC | Age: 80
End: 2020-08-03

## 2020-08-03 NOTE — TELEPHONE ENCOUNTER
Spoke to patient. C/o constipation. Has been taking stool softeners with little relief. Advised milk of magnesia for immediate relief and miralax for longer term relief. Follow up appointment made.

## 2020-08-04 ENCOUNTER — APPOINTMENT (OUTPATIENT)
Dept: CT IMAGING | Age: 80
End: 2020-08-04
Attending: EMERGENCY MEDICINE
Payer: MEDICARE

## 2020-08-04 ENCOUNTER — HOSPITAL ENCOUNTER (EMERGENCY)
Age: 80
Discharge: HOME | End: 2020-08-04
Attending: EMERGENCY MEDICINE
Payer: MEDICARE

## 2020-08-04 VITALS
RESPIRATION RATE: 16 BRPM | HEART RATE: 91 BPM | TEMPERATURE: 99 F | BODY MASS INDEX: 21.34 KG/M2 | WEIGHT: 125 LBS | HEIGHT: 64 IN | DIASTOLIC BLOOD PRESSURE: 85 MMHG | OXYGEN SATURATION: 97 % | SYSTOLIC BLOOD PRESSURE: 169 MMHG

## 2020-08-04 DIAGNOSIS — E87.6 HYPOKALEMIA: ICD-10-CM

## 2020-08-04 DIAGNOSIS — R10.9 ABDOMINAL PAIN OF UNKNOWN ETIOLOGY: Primary | ICD-10-CM

## 2020-08-04 DIAGNOSIS — R19.7 DIARRHEA, UNSPECIFIED TYPE: ICD-10-CM

## 2020-08-04 LAB
ALBUMIN SERPL-MCNC: 4.3 G/DL (ref 3.4–5)
ALBUMIN/GLOB SERPL: 1.2 {RATIO} (ref 1–2)
ALP LIVER SERPL-CCNC: 74 U/L (ref 55–142)
ALT SERPL-CCNC: 16 U/L (ref 13–56)
ANION GAP SERPL CALC-SCNC: 7 MMOL/L (ref 0–18)
AST SERPL-CCNC: 18 U/L (ref 15–37)
BASOPHILS # BLD AUTO: 0.09 X10(3) UL (ref 0–0.2)
BASOPHILS NFR BLD AUTO: 0.7 %
BILIRUB SERPL-MCNC: 0.5 MG/DL (ref 0.1–2)
BILIRUB UR QL STRIP.AUTO: NEGATIVE
BUN BLD-MCNC: 16 MG/DL (ref 7–18)
BUN/CREAT SERPL: 12.7 (ref 10–20)
CALCIUM BLD-MCNC: 10.6 MG/DL (ref 8.5–10.1)
CHLORIDE SERPL-SCNC: 104 MMOL/L (ref 98–112)
CLARITY UR REFRACT.AUTO: CLEAR
CO2 SERPL-SCNC: 28 MMOL/L (ref 21–32)
COLOR UR AUTO: YELLOW
CREAT BLD-MCNC: 1.26 MG/DL (ref 0.55–1.02)
DEPRECATED RDW RBC AUTO: 45.8 FL (ref 35.1–46.3)
EOSINOPHIL # BLD AUTO: 0.11 X10(3) UL (ref 0–0.7)
EOSINOPHIL NFR BLD AUTO: 0.9 %
ERYTHROCYTE [DISTWIDTH] IN BLOOD BY AUTOMATED COUNT: 13.4 % (ref 11–15)
GLOBULIN PLAS-MCNC: 3.6 G/DL (ref 2.8–4.4)
GLUCOSE BLD-MCNC: 112 MG/DL (ref 70–99)
GLUCOSE UR STRIP.AUTO-MCNC: NEGATIVE MG/DL
HAV IGM SER QL: 2.8 MG/DL (ref 1.6–2.6)
HCT VFR BLD AUTO: 40.2 % (ref 35–48)
HGB BLD-MCNC: 13.4 G/DL (ref 12–16)
IMM GRANULOCYTES # BLD AUTO: 0.03 X10(3) UL (ref 0–1)
IMM GRANULOCYTES NFR BLD: 0.2 %
KETONES UR STRIP.AUTO-MCNC: NEGATIVE MG/DL
LEUKOCYTE ESTERASE UR QL STRIP.AUTO: NEGATIVE
LIPASE SERPL-CCNC: 111 U/L (ref 73–393)
LYMPHOCYTES # BLD AUTO: 4.86 X10(3) UL (ref 1–4)
LYMPHOCYTES NFR BLD AUTO: 39.1 %
M PROTEIN MFR SERPL ELPH: 7.9 G/DL (ref 6.4–8.2)
MCH RBC QN AUTO: 30.6 PG (ref 26–34)
MCHC RBC AUTO-ENTMCNC: 33.3 G/DL (ref 31–37)
MCV RBC AUTO: 91.8 FL (ref 80–100)
MONOCYTES # BLD AUTO: 1.12 X10(3) UL (ref 0.1–1)
MONOCYTES NFR BLD AUTO: 9 %
NEUTROPHILS # BLD AUTO: 6.21 X10 (3) UL (ref 1.5–7.7)
NEUTROPHILS # BLD AUTO: 6.21 X10(3) UL (ref 1.5–7.7)
NEUTROPHILS NFR BLD AUTO: 50.1 %
NITRITE UR QL STRIP.AUTO: NEGATIVE
OSMOLALITY SERPL CALC.SUM OF ELEC: 290 MOSM/KG (ref 275–295)
PH UR STRIP.AUTO: 8.5 [PH] (ref 4.5–8)
PLATELET # BLD AUTO: 408 10(3)UL (ref 150–450)
POTASSIUM SERPL-SCNC: 3.1 MMOL/L (ref 3.5–5.1)
PROT UR STRIP.AUTO-MCNC: NEGATIVE MG/DL
RBC # BLD AUTO: 4.38 X10(6)UL (ref 3.8–5.3)
SODIUM SERPL-SCNC: 139 MMOL/L (ref 136–145)
SP GR UR STRIP.AUTO: 1.02 (ref 1–1.03)
UROBILINOGEN UR STRIP.AUTO-MCNC: 0.2 MG/DL
WBC # BLD AUTO: 12.4 X10(3) UL (ref 4–11)

## 2020-08-04 PROCEDURE — 96375 TX/PRO/DX INJ NEW DRUG ADDON: CPT

## 2020-08-04 PROCEDURE — 83735 ASSAY OF MAGNESIUM: CPT | Performed by: EMERGENCY MEDICINE

## 2020-08-04 PROCEDURE — 74177 CT ABD & PELVIS W/CONTRAST: CPT | Performed by: EMERGENCY MEDICINE

## 2020-08-04 PROCEDURE — 80053 COMPREHEN METABOLIC PANEL: CPT | Performed by: EMERGENCY MEDICINE

## 2020-08-04 PROCEDURE — 81003 URINALYSIS AUTO W/O SCOPE: CPT | Performed by: EMERGENCY MEDICINE

## 2020-08-04 PROCEDURE — 85025 COMPLETE CBC W/AUTO DIFF WBC: CPT | Performed by: EMERGENCY MEDICINE

## 2020-08-04 PROCEDURE — 99284 EMERGENCY DEPT VISIT MOD MDM: CPT

## 2020-08-04 PROCEDURE — 96374 THER/PROPH/DIAG INJ IV PUSH: CPT

## 2020-08-04 PROCEDURE — 83690 ASSAY OF LIPASE: CPT | Performed by: EMERGENCY MEDICINE

## 2020-08-04 PROCEDURE — 96361 HYDRATE IV INFUSION ADD-ON: CPT

## 2020-08-04 RX ORDER — ONDANSETRON 2 MG/ML
4 INJECTION INTRAMUSCULAR; INTRAVENOUS ONCE
Status: COMPLETED | OUTPATIENT
Start: 2020-08-04 | End: 2020-08-04

## 2020-08-04 RX ORDER — KETOROLAC TROMETHAMINE 30 MG/ML
15 INJECTION, SOLUTION INTRAMUSCULAR; INTRAVENOUS ONCE
Status: COMPLETED | OUTPATIENT
Start: 2020-08-04 | End: 2020-08-04

## 2020-08-04 RX ORDER — SODIUM CHLORIDE 9 MG/ML
125 INJECTION, SOLUTION INTRAVENOUS CONTINUOUS
Status: DISCONTINUED | OUTPATIENT
Start: 2020-08-04 | End: 2020-08-04

## 2020-08-04 RX ORDER — HYDROCODONE BITARTRATE AND ACETAMINOPHEN 5; 325 MG/1; MG/1
1-2 TABLET ORAL EVERY 6 HOURS PRN
Qty: 10 TABLET | Refills: 0 | Status: SHIPPED | OUTPATIENT
Start: 2020-08-04 | End: 2020-08-11

## 2020-08-04 RX ORDER — ONDANSETRON 4 MG/1
4 TABLET, ORALLY DISINTEGRATING ORAL EVERY 4 HOURS PRN
Qty: 10 TABLET | Refills: 0 | Status: SHIPPED | OUTPATIENT
Start: 2020-08-04 | End: 2020-08-07 | Stop reason: ALTCHOICE

## 2020-08-04 RX ORDER — POTASSIUM CHLORIDE 20 MEQ/1
40 TABLET, EXTENDED RELEASE ORAL ONCE
Status: COMPLETED | OUTPATIENT
Start: 2020-08-04 | End: 2020-08-04

## 2020-08-04 RX ORDER — HYDROMORPHONE HYDROCHLORIDE 1 MG/ML
0.5 INJECTION, SOLUTION INTRAMUSCULAR; INTRAVENOUS; SUBCUTANEOUS EVERY 30 MIN PRN
Status: DISCONTINUED | OUTPATIENT
Start: 2020-08-04 | End: 2020-08-04

## 2020-08-04 RX ORDER — POTASSIUM CHLORIDE 20 MEQ/1
20 TABLET, EXTENDED RELEASE ORAL 2 TIMES DAILY
Qty: 6 TABLET | Refills: 0 | Status: SHIPPED | OUTPATIENT
Start: 2020-08-04 | End: 2020-08-07 | Stop reason: ALTCHOICE

## 2020-08-04 NOTE — ED PROVIDER NOTES
Patient Seen in: THE [facility] Emergency Department In [place]      History   Patient presents with:  Abdomen/Flank Pain  Nausea/Vomiting/Diarrhea    Stated Complaint: CONTINUED ABD PAIN FROM LAST VISIT LAST WEEK    HPI    Patient is a 68-year-old female who unspecified whether generalized or localized, unspecified site    • Other and unspecified hyperlipidemia    • Pain in joints    • Parathyroid adenoma    • Postmenopausal status     Age of onset: 30years old   • Presence of other cardiac implants and graft 10/25/2016    Performed by Margaret Gonzalez MD at [address] Left 11/14/2019    Performed by Margaret Gonzalez MD at [address] [facility] TRIAL N/A 5/1/2017    Performed by Margaret Gonzalez MD at [address] [facility] All other systems reviewed and negative except as noted above.     Physical Exam     ED Triage Vitals [08/04/20 0724]   /83   Pulse 99   Resp 16   Temp 98.6 °F (37 °C)   Temp src Temporal   SpO2 96 %   O2 Device None (Room air)       Current:BP Prabha Danes Absolute 1.12 (*)     All other components within normal limits   LIPASE - Normal   CBC WITH DIFFERENTIAL WITH PLATELET    Narrative: The following orders were created for panel order CBC WITH DIFFERENTIAL WITH PLATELET.   Procedure type  Hypokalemia    Disposition:  Discharge  8/4/2020 10:15 am    Follow-up:  Anitra Liu, [address]  [phone]    In 2 days            Medications Prescribed:  Current Discharge Medication List    START Destiny Nesbitt

## 2020-08-06 ENCOUNTER — TELEPHONE (OUTPATIENT)
Dept: FAMILY MEDICINE CLINIC | Facility: CLINIC | Age: 80
End: 2020-08-06

## 2020-08-06 NOTE — TELEPHONE ENCOUNTER
Condition has been ongoing and patient needs to follow up. Ok to change visit to virtual appointment for 8/7/20.

## 2020-08-07 ENCOUNTER — TELEMEDICINE (OUTPATIENT)
Dept: FAMILY MEDICINE CLINIC | Facility: CLINIC | Age: 80
End: 2020-08-07
Payer: MEDICARE

## 2020-08-07 DIAGNOSIS — R41.3 POOR SHORT TERM MEMORY: ICD-10-CM

## 2020-08-07 DIAGNOSIS — F43.0 ACUTE STRESS REACTION: ICD-10-CM

## 2020-08-07 DIAGNOSIS — N18.30 CHRONIC RENAL INSUFFICIENCY, STAGE 3 (MODERATE) (HCC): ICD-10-CM

## 2020-08-07 DIAGNOSIS — E87.6 HYPOKALEMIA: ICD-10-CM

## 2020-08-07 DIAGNOSIS — K58.2 IRRITABLE BOWEL SYNDROME WITH BOTH CONSTIPATION AND DIARRHEA: Primary | ICD-10-CM

## 2020-08-07 DIAGNOSIS — E78.2 MIXED HYPERLIPIDEMIA: ICD-10-CM

## 2020-08-07 DIAGNOSIS — E83.52 HYPERCALCEMIA: ICD-10-CM

## 2020-08-07 PROCEDURE — 99214 OFFICE O/P EST MOD 30 MIN: CPT | Performed by: FAMILY MEDICINE

## 2020-08-07 RX ORDER — DICYCLOMINE HYDROCHLORIDE 10 MG/1
10 CAPSULE ORAL 2 TIMES DAILY PRN
Qty: 30 CAPSULE | Refills: 0 | Status: SHIPPED | OUTPATIENT
Start: 2020-08-07 | End: 2020-09-22 | Stop reason: DRUGHIGH

## 2020-08-07 RX ORDER — CLONAZEPAM 0.5 MG/1
0.5 TABLET ORAL 2 TIMES DAILY PRN
Qty: 60 TABLET | Refills: 1 | Status: SHIPPED | OUTPATIENT
Start: 2020-08-07 | End: 2020-10-19

## 2020-08-07 RX ORDER — PRAVASTATIN SODIUM 40 MG
40 TABLET ORAL NIGHTLY
Qty: 90 TABLET | Refills: 1 | Status: SHIPPED | OUTPATIENT
Start: 2020-08-07 | End: 2021-01-18

## 2020-08-07 NOTE — PROGRESS NOTES
Rudy Hayes is a 68year old female. HPI:   Please note that the following visit was completed using two-way, real-time interactive audio and video communication.   This has been done in good mychal to provide continuity of care in the best interest of t he will talk to her. She struggles with her memory at times states that the short-term memory is deficient but she can remember long-term things. States that she does not lose her keys and generally has not lost anything that she can think of.   She does ovaries are not visualized and have presumably been removed. BONES:  There is advanced degenerative change in the lumbar spine. There is postoperative change with surgical screws noted near the left anterior superior iliac spine.   There is a stimulator d Carbon Dioxide, Total      21.0 - 32.0 mmol/L 28.0 28.0   ANION GAP      0 - 18 mmol/L 7 5   BUN      7 - 18 mg/dL 16 20 (H)   CREATININE      0.55 - 1.02 mg/dL 1.26 (H) 1.40 (H)   BUN/CREAT Ratio      10.0 - 20.0 12.7 14.3   CALCIUM      8.5 - 10.1 mg/d HYDROcodone-acetaminophen 5-325 MG Oral Tab Take 1-2 tablets by mouth every 6 (six) hours as needed for Pain. 10 tablet 0   • ondansetron 8 MG Oral Tablet Dispersible Take 1 tablet (8 mg total) by mouth every 12 (twelve) hours as needed for Nausea.  6 table times daily.  60 capsule 2   • POTASSIUM CHLORIDE ER 20 MEQ Oral Tab CR TAKE 2 TABLETS BY MOUTH EVERY DAY (Patient not taking:  ) 60 tablet 1      Past Medical History:   Diagnosis Date   • Abdominal pain    • Anxiety state, unspecified    • Arthritis    • standard drinks      Frequency: Never    Drug use: Yes      Types: Cannabis      Comment: Uses marijuana once in a while for pain       REVIEW OF SYSTEMS:   GENERAL HEALTH: feels well otherwise  SKIN: denies any unusual skin lesions or rashes  RESPIRATORY: 1 tablet (0.5 mg total) by mouth 2 (two) times daily as needed for Anxiety. Imaging & Consults:  NEURO - INTERNAL  ENDOCRINOLOGY - INTERNAL  1.  Irritable bowel syndrome with both constipation and diarrhea  If cramping and diarrhea can use Bentyl incr issues and agrees to the plan. The patient is asked to return 1 month. Or as needed.

## 2020-08-08 PROBLEM — K58.2 IRRITABLE BOWEL SYNDROME WITH BOTH CONSTIPATION AND DIARRHEA: Status: ACTIVE | Noted: 2020-08-08

## 2020-08-08 PROBLEM — R19.7 INTERMITTENT DIARRHEA: Status: RESOLVED | Noted: 2020-05-06 | Resolved: 2020-08-08

## 2020-08-08 PROBLEM — F43.0 ACUTE STRESS REACTION: Status: ACTIVE | Noted: 2020-08-08

## 2020-08-08 PROBLEM — R41.3 POOR SHORT TERM MEMORY: Status: ACTIVE | Noted: 2020-08-08

## 2020-08-08 PROBLEM — K59.01 SLOW TRANSIT CONSTIPATION: Status: RESOLVED | Noted: 2017-11-20 | Resolved: 2020-08-08

## 2020-08-08 PROBLEM — K59.09 INTERMITTENT CONSTIPATION: Status: RESOLVED | Noted: 2020-05-06 | Resolved: 2020-08-08

## 2020-08-09 DIAGNOSIS — I10 ESSENTIAL HYPERTENSION: ICD-10-CM

## 2020-08-10 RX ORDER — AMLODIPINE BESYLATE 10 MG/1
TABLET ORAL
Qty: 90 TABLET | Refills: 1 | Status: SHIPPED | OUTPATIENT
Start: 2020-08-10 | End: 2021-01-18

## 2020-08-11 DIAGNOSIS — I10 ESSENTIAL HYPERTENSION: Primary | ICD-10-CM

## 2020-08-11 RX ORDER — LOSARTAN POTASSIUM 100 MG/1
100 TABLET ORAL DAILY
Qty: 90 TABLET | Refills: 0 | Status: SHIPPED | OUTPATIENT
Start: 2020-08-11 | End: 2020-10-29

## 2020-08-11 NOTE — TELEPHONE ENCOUNTER
Requested Prescriptions     Pending Prescriptions Disp Refills   • losartan 100 MG Oral Tab 90 tablet 0     Sig: Take 1 tablet (100 mg total) by mouth daily. Passed protocol.

## 2020-08-14 ENCOUNTER — VIRTUAL PHONE E/M (OUTPATIENT)
Dept: FAMILY MEDICINE CLINIC | Facility: CLINIC | Age: 80
End: 2020-08-14
Payer: MEDICARE

## 2020-08-14 DIAGNOSIS — R10.9 ABDOMINAL CRAMPING: ICD-10-CM

## 2020-08-14 DIAGNOSIS — K58.2 IRRITABLE BOWEL SYNDROME WITH BOTH CONSTIPATION AND DIARRHEA: Primary | ICD-10-CM

## 2020-08-14 PROCEDURE — 99442 PHONE E/M BY PHYS 11-20 MIN: CPT | Performed by: FAMILY MEDICINE

## 2020-08-15 NOTE — PROGRESS NOTES
Virtual Telephone Check-In    Anai Reis verbally consents to a Virtual/Telephone Check-In visit on 08/15/20. Patient has been referred to the [facility] website at [email or web address] to review the yearly Consent to Treat document.     Patient Enrique Scott right ear and has not had a bowel movement since. She was able to take some Jabari soup. Denies any symptoms in her , no recent travel, no risk for COVID-19 and states that her abdomen is feeling slightly better since using the Imodium AD.   As dis times daily. 60 capsule 2   • DULoxetine HCl (CYMBALTA) 30 MG Oral Cap DR Particles Take 1 capsule (30 mg total) by mouth daily. 30 capsule 1   • omeprazole 20 MG Oral Capsule Delayed Release Take 1 capsule (20 mg total) by mouth daily.  30 capsule 3   • Fl Pain in joints    • Parathyroid adenoma    • Postmenopausal status     Age of onset: 30years old   • Presence of other cardiac implants and grafts    • Rheumatic heart disease, unspecified    • Sleep disturbance    • Stented coronary artery    • Stool inc placed in this encounter. Meds & Refills for this Visit:  Requested Prescriptions      No prescriptions requested or ordered in this encounter       Imaging & Consults:  None  1.  Irritable bowel syndrome with both constipation and diarrhea   Abdominal

## 2020-08-18 ENCOUNTER — TELEPHONE (OUTPATIENT)
Dept: FAMILY MEDICINE CLINIC | Facility: CLINIC | Age: 80
End: 2020-08-18

## 2020-08-18 DIAGNOSIS — R19.7 DIARRHEA, UNSPECIFIED TYPE: Primary | ICD-10-CM

## 2020-08-18 DIAGNOSIS — K58.2 IRRITABLE BOWEL SYNDROME WITH BOTH CONSTIPATION AND DIARRHEA: ICD-10-CM

## 2020-08-18 DIAGNOSIS — R10.9 ABDOMINAL CRAMPING: ICD-10-CM

## 2020-08-18 NOTE — TELEPHONE ENCOUNTER
Patient confirms taking Bentyl once daily. States today she only had one BM and it was not watery but it was very soft. States she will see how tomorrow goes and if it's watery she will  stool kit to complete labs.      Stool orders placed

## 2020-08-18 NOTE — TELEPHONE ENCOUNTER
She should be trying the Bentyl I prescribed, is she? Please order a stool culture, O& P and C dif toxin study if it is now watery stools.  Thanks

## 2020-08-18 NOTE — TELEPHONE ENCOUNTER
Elisha Chatman is calling because she has had Diarrhea for 4 days now and she is feeling weak, she would like to know if [facility] can call something in to her [address] on [facility], Please call Elisha Chatman at [phone] with any questions or concerns

## 2020-08-18 NOTE — TELEPHONE ENCOUNTER
LOV- Virtual Visit 08/14/20:    1. Irritable bowel syndrome with both constipation and diarrhea   Abdominal cramping  Patient was told to be consistent with the therapy and not to go back and forth between medications unless necessary.   Encouraged her to s

## 2020-08-21 ENCOUNTER — TELEPHONE (OUTPATIENT)
Dept: FAMILY MEDICINE CLINIC | Facility: CLINIC | Age: 80
End: 2020-08-21

## 2020-08-21 NOTE — TELEPHONE ENCOUNTER
Spoke to patient. All recommendations from last visit with Jasson Feeling given to patient:    1.  Irritable bowel syndrome with both constipation and diarrhea   Abdominal cramping  Patient was told to be consistent with the therapy and not to go back and forth

## 2020-08-21 NOTE — TELEPHONE ENCOUNTER
Nikita Trejo is calling because she is having pains in her stomach, she is wondering if Jaqueline Kennedy can call her in something to her [facility] on [facility], Please call Nikita Trejo with any questions or concerns at [phone]

## 2020-08-24 RX ORDER — MONTELUKAST SODIUM 10 MG/1
TABLET ORAL
Qty: 90 TABLET | Refills: 0 | Status: SHIPPED | OUTPATIENT
Start: 2020-08-24 | End: 2020-11-30

## 2020-08-24 NOTE — TELEPHONE ENCOUNTER
Pt requesting refill of   Requested Prescriptions     Pending Prescriptions Disp Refills   • MONTELUKAST SODIUM 10 MG Oral Tab [Pharmacy Med Name: MONTELUKAST 10MG TABLETS] 90 tablet 0     Sig: TAKE 1 TABLET BY MOUTH EVERY NIGHT AT BEDTIME         Passed

## 2020-08-25 ENCOUNTER — TELEPHONE (OUTPATIENT)
Dept: FAMILY MEDICINE CLINIC | Facility: CLINIC | Age: 80
End: 2020-08-25

## 2020-08-25 NOTE — TELEPHONE ENCOUNTER
Spoke to patient. States has thick mucus and it makes her a little sick to stomach. Advised musinex and call back if no relief.

## 2020-08-25 NOTE — TELEPHONE ENCOUNTER
Nikita Trejo is calling because she has a upset stomach because she is coughing up mucus, she would like to see if Jaqueline Kennedy can prescribe her something for the stomach upset, please call Nikita Roche at [phone]

## 2020-08-27 ENCOUNTER — LAB ENCOUNTER (OUTPATIENT)
Dept: LAB | Age: 80
End: 2020-08-27
Attending: FAMILY MEDICINE
Payer: MEDICARE

## 2020-08-27 DIAGNOSIS — R10.9 ABDOMINAL CRAMPING: ICD-10-CM

## 2020-08-27 DIAGNOSIS — R19.7 DIARRHEA, UNSPECIFIED TYPE: ICD-10-CM

## 2020-08-27 DIAGNOSIS — K58.2 IRRITABLE BOWEL SYNDROME WITH BOTH CONSTIPATION AND DIARRHEA: ICD-10-CM

## 2020-08-27 LAB
CRYPTOSP AG STL QL IA: NEGATIVE
G LAMBLIA AG STL QL IA: NEGATIVE

## 2020-08-27 PROCEDURE — 87329 GIARDIA AG IA: CPT

## 2020-08-27 PROCEDURE — 87046 STOOL CULTR AEROBIC BACT EA: CPT | Performed by: FAMILY MEDICINE

## 2020-08-27 PROCEDURE — 87493 C DIFF AMPLIFIED PROBE: CPT

## 2020-08-27 PROCEDURE — 87045 FECES CULTURE AEROBIC BACT: CPT | Performed by: FAMILY MEDICINE

## 2020-08-27 PROCEDURE — 87209 SMEAR COMPLEX STAIN: CPT

## 2020-08-27 PROCEDURE — 87272 CRYPTOSPORIDIUM AG IF: CPT

## 2020-08-27 PROCEDURE — 87177 OVA AND PARASITES SMEARS: CPT

## 2020-08-28 ENCOUNTER — TELEPHONE (OUTPATIENT)
Dept: FAMILY MEDICINE CLINIC | Facility: CLINIC | Age: 80
End: 2020-08-28

## 2020-08-28 LAB — C DIFF TOX B STL QL: POSITIVE

## 2020-08-28 RX ORDER — VANCOMYCIN HYDROCHLORIDE 125 MG/1
125 CAPSULE ORAL 4 TIMES DAILY
Qty: 40 CAPSULE | Refills: 0 | Status: SHIPPED | OUTPATIENT
Start: 2020-08-28 | End: 2020-09-11

## 2020-08-28 NOTE — TELEPHONE ENCOUNTER
Lab phoned with positive C-Diff result. Patient does have abdominal discomfort and states one diarrhea stool daily.

## 2020-08-28 NOTE — TELEPHONE ENCOUNTER
Please call patient and inform her that one of the stool tests is positive for C. difficile. A prescription for vancomycin has been sent to the patient's pharmacy, recommend patient start this medication today.   Please go over hygiene precautions for tremayne

## 2020-08-28 NOTE — TELEPHONE ENCOUNTER
Pt called and said her stomach isn't feeling good at all. She has been having diarrhea, her body is trembling and she would like a tranquilizer or something called in to [facility] on [facility].

## 2020-08-28 NOTE — TELEPHONE ENCOUNTER
Spoke to patient. States she is arguing with her son and she is upset. States she has one diarrhea stool daily and states she has upset stomach. Relaxation tech promoted. Vu and is in agreement. Stated she will call if anything else is required.

## 2020-08-30 NOTE — TELEPHONE ENCOUNTER
Stool culture is negative Salmonella, Shigella, Campylobacter, Yersinia, Edwardsiella, Aeromonas or Plesiomonas   Sent to my chart

## 2020-09-01 ENCOUNTER — TELEPHONE (OUTPATIENT)
Dept: FAMILY MEDICINE CLINIC | Facility: CLINIC | Age: 80
End: 2020-09-01

## 2020-09-01 LAB — OVA AND PARASITE, FECAL INTERPRETATION: NEGATIVE

## 2020-09-01 NOTE — TELEPHONE ENCOUNTER
Have her start probiotics have her take 2/day. She is to avoid taking any kind of medication that would slow the bowel movements down due to having C. difficile.   Make sure she is consistent with taking the vancomycin and doing the bleaching of her bathro

## 2020-09-01 NOTE — TELEPHONE ENCOUNTER
LOV- Virtual 8/14/20:   1. Irritable bowel syndrome with both constipation and diarrhea   Abdominal cramping  Patient was told to be consistent with the therapy and not to go back and forth between medications unless necessary.   Encouraged her to start Met

## 2020-09-01 NOTE — TELEPHONE ENCOUNTER
Carli Maharaj is calling because she has had bad cramps all night long, she going to the bathroom regularly,but the cramps are still there, please call Carli Maharaj at [phone]

## 2020-09-04 ENCOUNTER — TELEPHONE (OUTPATIENT)
Dept: FAMILY MEDICINE CLINIC | Facility: CLINIC | Age: 80
End: 2020-09-04

## 2020-09-04 DIAGNOSIS — G47.09 OTHER INSOMNIA: ICD-10-CM

## 2020-09-04 RX ORDER — TRAZODONE HYDROCHLORIDE 100 MG/1
TABLET ORAL
Qty: 90 TABLET | Refills: 0 | Status: SHIPPED | OUTPATIENT
Start: 2020-09-04 | End: 2020-11-30

## 2020-09-04 NOTE — TELEPHONE ENCOUNTER
Spoke to patient. States was feeling ok for a couple days but yesterday started having frequent watery stools and now constant abdominal cramping. Does not think she has a fever. Continues on the oral vancomycin QID. Please advise.

## 2020-09-04 NOTE — TELEPHONE ENCOUNTER
Pt called and said she is having diarrhea again and stomach cramps. She said Reta Bumpers gave her something in the past that worked for her but she can't remember what it is but it worked. Rick jamil [facility].

## 2020-09-04 NOTE — TELEPHONE ENCOUNTER
No other recommended treatment needs to have the stools to get toxin out update after finishing Vancomycin may need vancomycin taper if persistant. Avoid antibiotics they cause C. difficile.   Drink adequate water and make sure that you have electrolyte ri

## 2020-09-04 NOTE — TELEPHONE ENCOUNTER
Requested Prescriptions     Pending Prescriptions Disp Refills   • TRAZODONE  MG Oral Tab [Pharmacy Med Name: TRAZODONE 100MG TABLETS] 90 tablet 0     Sig: TAKE 1 TABLET(100 MG) BY MOUTH EVERY NIGHT     Last fill was 6/11/20 90 tabs 0 refill  Last O

## 2020-09-10 ENCOUNTER — MED REC SCAN ONLY (OUTPATIENT)
Dept: FAMILY MEDICINE CLINIC | Facility: CLINIC | Age: 80
End: 2020-09-10

## 2020-09-11 ENCOUNTER — TELEPHONE (OUTPATIENT)
Dept: FAMILY MEDICINE CLINIC | Facility: CLINIC | Age: 80
End: 2020-09-11

## 2020-09-11 RX ORDER — VANCOMYCIN HYDROCHLORIDE 125 MG/1
125 CAPSULE ORAL 4 TIMES DAILY
Qty: 28 CAPSULE | Refills: 0 | Status: SHIPPED | OUTPATIENT
Start: 2020-09-11 | End: 2020-09-18

## 2020-09-11 NOTE — TELEPHONE ENCOUNTER
Patient wants refill to the medication that she was given for her stomach issue states she does not know the name of the medication because she finished it and got rid of the box. States the medication helped.  Now that she is done with the medication the s

## 2020-09-11 NOTE — TELEPHONE ENCOUNTER
If she feels like she is getting worse over the weekend and is getting dehydrated or having increased abdominal pain then she is to go to the emergency room.   Start the vancomycin today

## 2020-09-11 NOTE — TELEPHONE ENCOUNTER
Spoke to patient. Says she was feeling good but yesterday had 2 diarrheal stools. 4 watery stools today. Cramping and constant abd pain. Continues on probiotic. Please advise.

## 2020-09-21 ENCOUNTER — OFFICE VISIT (OUTPATIENT)
Dept: FAMILY MEDICINE CLINIC | Facility: CLINIC | Age: 80
End: 2020-09-21
Payer: MEDICARE

## 2020-09-21 VITALS
HEIGHT: 64 IN | TEMPERATURE: 97 F | WEIGHT: 140 LBS | HEART RATE: 80 BPM | BODY MASS INDEX: 23.9 KG/M2 | DIASTOLIC BLOOD PRESSURE: 66 MMHG | SYSTOLIC BLOOD PRESSURE: 90 MMHG

## 2020-09-21 DIAGNOSIS — A49.8 CLOSTRIDIUM DIFFICILE INFECTION: ICD-10-CM

## 2020-09-21 DIAGNOSIS — R41.3 POOR SHORT TERM MEMORY: ICD-10-CM

## 2020-09-21 DIAGNOSIS — N18.30 CHRONIC RENAL INSUFFICIENCY, STAGE 3 (MODERATE) (HCC): Primary | ICD-10-CM

## 2020-09-21 DIAGNOSIS — M25.551 HIP PAIN, BILATERAL: ICD-10-CM

## 2020-09-21 DIAGNOSIS — M25.552 HIP PAIN, BILATERAL: ICD-10-CM

## 2020-09-21 DIAGNOSIS — R10.13 EPIGASTRIC PAIN: ICD-10-CM

## 2020-09-21 DIAGNOSIS — Z87.11 HISTORY OF PEPTIC ULCER: ICD-10-CM

## 2020-09-21 DIAGNOSIS — E87.6 HYPOKALEMIA: ICD-10-CM

## 2020-09-21 DIAGNOSIS — R25.1 TREMORS OF NERVOUS SYSTEM: ICD-10-CM

## 2020-09-21 PROCEDURE — 99214 OFFICE O/P EST MOD 30 MIN: CPT | Performed by: FAMILY MEDICINE

## 2020-09-21 RX ORDER — ONDANSETRON 8 MG/1
8 TABLET, ORALLY DISINTEGRATING ORAL EVERY 12 HOURS PRN
Qty: 10 TABLET | Refills: 1 | Status: SHIPPED | OUTPATIENT
Start: 2020-09-21 | End: 2020-11-30

## 2020-09-21 NOTE — PROGRESS NOTES
Carlton Houston is a 68year old female. HPI:     Reason with her  was originally in for a Medicare annual wellness but secondary to recent C. difficile and abdominal pain was changed to an acute visit.   Patient had finished the second round of vanc Order is in the system for 8/14/2020 to follow-up on low potassium patient has not gotten it done.     Also at last visit discussed her memory issues referral is placed for neurology she has not scheduled due to the C. difficile and priorities of getting en 0.1 - 2.0 mg/dL 0.5   TOTAL PROTEIN      6.4 - 8.2 g/dL 7.9   Albumin      3.4 - 5.0 g/dL 4.3   Globulin      2.8 - 4.4 g/dL 3.6   A/G Ratio      1.0 - 2.0 1.2         Current Outpatient Medications   Medication Sig Dispense Refill   • ondansetron 8 MG Or SPRAYS IEN BID AS NEEDED  6   • acetaminophen 500 MG Oral Tab Take 1,000 mg by mouth daily as needed for Pain. • DiphenhydrAMINE HCl 25 MG Oral Tab Take 25 mg by mouth every 6 (six) hours as needed for Itching.      • nystatin 302533 units Oral Tab Take Stented coronary artery    • Stool incontinence    • Stress    • Tinnitus    • Unspecified essential hypertension    • Visual impairment     glasses   • Wears glasses       Social History:  Social History    Tobacco Use      Smoking status: Former Smoker nerves II through XII grossly intact no sensorimotor deficit consistent memory deficits of medications and discussions on conditions. Referral placed in August for neurology evaluation.   Fine tremors of the hands bilateral  Psychological: Mood very anxiou dementia    6. Epigastric pain  History of peptic ulcer    Has follow-up with GI tomorrow history of peptic ulcers. Can try Gaviscon tonight. Reviewed the risks of long-term PPI increasing risk for C. difficile they can discuss with GI tomorrow.   - jeanie

## 2020-09-22 PROBLEM — M25.551 HIP PAIN, BILATERAL: Status: ACTIVE | Noted: 2020-09-22

## 2020-09-22 PROBLEM — A49.8 CLOSTRIDIUM DIFFICILE INFECTION: Status: ACTIVE | Noted: 2020-09-22

## 2020-09-22 PROBLEM — M25.552 HIP PAIN, BILATERAL: Status: ACTIVE | Noted: 2020-09-22

## 2020-10-05 ENCOUNTER — TELEPHONE (OUTPATIENT)
Dept: FAMILY MEDICINE CLINIC | Facility: CLINIC | Age: 80
End: 2020-10-05

## 2020-10-05 NOTE — TELEPHONE ENCOUNTER
Patient had recent C. Difficile. Does she have any blood in her stool, is the stool watery or loose? Where is abdominal pain? Any fevers? If she has UTI symptoms she needs a urinalysis and a urine culture.   We are to avoid antibiotics if at all possib

## 2020-10-05 NOTE — TELEPHONE ENCOUNTER
EARL    9/22/20 visit with GI notes:  1) Restart Vancomycin tapering course   2) Dicyclomine as needed to managed cramping - I have asked that she not take imodium during this time  3) Follow-up with me in 6 weeks to reassess her state of health, and see

## 2020-10-05 NOTE — TELEPHONE ENCOUNTER
Patient called states she has bad diarrhea and also thinks she has a UTI and wants to know if Massimo Reveles will just call in something for the UTI to the pharmacy.

## 2020-10-05 NOTE — TELEPHONE ENCOUNTER
Spoke to Patient. C/o diarrhea and abdominal  cramping started in the night. Now has burning with urination. Has taken OTC AZO with no relief. Fluids encouraged. Please advise further if needed.

## 2020-10-06 NOTE — TELEPHONE ENCOUNTER
Spoke to patient. States no loose stool today so far. Says it is loose stool not watery. No blood. States urine symptoms are gone today. Advised to call office if urine symptoms return. Advised to call office with any problems.

## 2020-10-09 ENCOUNTER — TELEPHONE (OUTPATIENT)
Dept: FAMILY MEDICINE CLINIC | Facility: CLINIC | Age: 80
End: 2020-10-09

## 2020-10-09 DIAGNOSIS — R30.0 DYSURIA: Primary | ICD-10-CM

## 2020-10-09 NOTE — TELEPHONE ENCOUNTER
Patient called c/o pain with urination and frequency. She is still taking the vancomycin. She took an AZO this am.  Fluids encouraged. Please advise further if needed.

## 2020-10-09 NOTE — TELEPHONE ENCOUNTER
Pt said she woke up with a terrible bladder infection and she would like Eleni Palumbo to prescribe an antibiotic for her and send it to [facility] on [facility].     Pt called back and said she is already on an antobiotic it's Vancomycn 125mg for a sinus infect

## 2020-10-10 ENCOUNTER — APPOINTMENT (OUTPATIENT)
Dept: CT IMAGING | Age: 80
End: 2020-10-10
Attending: EMERGENCY MEDICINE
Payer: MEDICARE

## 2020-10-10 ENCOUNTER — HOSPITAL ENCOUNTER (EMERGENCY)
Age: 80
Discharge: HOME | End: 2020-10-10
Attending: EMERGENCY MEDICINE
Payer: MEDICARE

## 2020-10-10 VITALS
WEIGHT: 135 LBS | TEMPERATURE: 98 F | OXYGEN SATURATION: 97 % | SYSTOLIC BLOOD PRESSURE: 161 MMHG | HEART RATE: 101 BPM | BODY MASS INDEX: 23.92 KG/M2 | DIASTOLIC BLOOD PRESSURE: 75 MMHG | RESPIRATION RATE: 16 BRPM | HEIGHT: 63 IN

## 2020-10-10 DIAGNOSIS — R10.9 ABDOMINAL PAIN OF UNKNOWN ETIOLOGY: Primary | ICD-10-CM

## 2020-10-10 PROCEDURE — 96374 THER/PROPH/DIAG INJ IV PUSH: CPT

## 2020-10-10 PROCEDURE — 81001 URINALYSIS AUTO W/SCOPE: CPT | Performed by: EMERGENCY MEDICINE

## 2020-10-10 PROCEDURE — 81015 MICROSCOPIC EXAM OF URINE: CPT | Performed by: EMERGENCY MEDICINE

## 2020-10-10 PROCEDURE — 96361 HYDRATE IV INFUSION ADD-ON: CPT

## 2020-10-10 PROCEDURE — 80053 COMPREHEN METABOLIC PANEL: CPT | Performed by: EMERGENCY MEDICINE

## 2020-10-10 PROCEDURE — 99284 EMERGENCY DEPT VISIT MOD MDM: CPT

## 2020-10-10 PROCEDURE — 85025 COMPLETE CBC W/AUTO DIFF WBC: CPT | Performed by: EMERGENCY MEDICINE

## 2020-10-10 PROCEDURE — 74177 CT ABD & PELVIS W/CONTRAST: CPT | Performed by: EMERGENCY MEDICINE

## 2020-10-10 RX ORDER — MORPHINE SULFATE 4 MG/ML
2 INJECTION, SOLUTION INTRAMUSCULAR; INTRAVENOUS ONCE
Status: COMPLETED | OUTPATIENT
Start: 2020-10-10 | End: 2020-10-10

## 2020-10-10 NOTE — ED PROVIDER NOTES
Patient Seen in: THE [facility] In [place]      History   Patient presents with:  Urinary Symptoms    Stated Complaint: Pt c/o suprapubic pain and dysuria x 2 days. +Nausea.     HPI    Patient is a 70-year-old woman coming in with left low • Stented coronary artery    • Stool incontinence    • Stress    • Tinnitus    • Unspecified essential hypertension    • Visual impairment     glasses   • Wears glasses               Past Surgical History:   Procedure Laterality Date   • ANGIOPLASTY (COR JOHN PAUL Ponce   • SPINE SURGERY PROCEDURE UNLISTED     • [address] [place] [address], [place], [place]   • TONSILLECTOMY     • TOTAL ABDOM HYSTERECTOMY     • TRANSFORAMINAL LUMBAR EPIDURAL STEROID INJECTION MULTIPLE LEVEL Left 8/13/2019    Performe Normocephalic and atraumatic. Eyes:      General: No scleral icterus. Conjunctiva/sclera: Conjunctivae normal.   Neck:      Musculoskeletal: Normal range of motion and neck supple. Cardiovascular:      Rate and Rhythm: Normal rate.    Pulmonary: PLATELET.   Procedure                               Abnormality         Status                     ---------                               -----------         ------                     CBC W/ DIFFERENTIAL[[id number]]          Abnormal            Final resul obstruction. PELVIS:  Calcified phleboliths in the pelvis. AORTA/VASCULAR:  Aorta is normal in caliber. Atheromatous calcifications     of the aorta. Circum aortic left renal vein. BONES:  Multilevel degenerative disc disease.   Old left-si

## 2020-10-10 NOTE — ED INITIAL ASSESSMENT (HPI)
Pt c/o suprapubic pain and dysuria x 2 days. She's tender to palpation of LLQ by Dr Allen Odonnell. +Nausea.

## 2020-10-18 DIAGNOSIS — F43.0 ACUTE STRESS REACTION: ICD-10-CM

## 2020-10-19 RX ORDER — CLONAZEPAM 0.5 MG/1
TABLET ORAL
Qty: 60 TABLET | Refills: 0 | Status: SHIPPED | OUTPATIENT
Start: 2020-10-19 | End: 2020-11-30

## 2020-10-19 NOTE — TELEPHONE ENCOUNTER
Pt requesting refill of CLONAZEPAM 0.5 MG Oral Tab    Last Time Medication was Filled:  9/9/20 qty 60    Last Office Visit with Provider: 9/21/20. The patient is asked to return in for Medicare annual wellness end of the day 1 hour needed. Claudio Dhillon sched

## 2020-10-29 DIAGNOSIS — I10 ESSENTIAL HYPERTENSION: ICD-10-CM

## 2020-10-29 RX ORDER — LOSARTAN POTASSIUM 100 MG/1
TABLET ORAL
Qty: 90 TABLET | Refills: 0 | Status: SHIPPED | OUTPATIENT
Start: 2020-10-29 | End: 2021-03-19

## 2020-10-29 NOTE — TELEPHONE ENCOUNTER
Requested Prescriptions     Pending Prescriptions Disp Refills   • LOSARTAN 100 MG Oral Tab [Pharmacy Med Name: LOSARTAN 100MG TABLETS] 90 tablet 0     Sig: TAKE 1 TABLET(100 MG) BY MOUTH DAILY     Met protocol. Sent to pharmacy.

## 2020-11-04 ENCOUNTER — TELEPHONE (OUTPATIENT)
Dept: FAMILY MEDICINE CLINIC | Facility: CLINIC | Age: 80
End: 2020-11-04

## 2020-11-04 ENCOUNTER — TELEPHONE (OUTPATIENT)
Dept: NEPHROLOGY | Facility: CLINIC | Age: 80
End: 2020-11-04

## 2020-11-04 RX ORDER — FLUTICASONE PROPIONATE 50 MCG
2 SPRAY, SUSPENSION (ML) NASAL DAILY PRN
Qty: 16 G | Refills: 2 | Status: SHIPPED | OUTPATIENT
Start: 2020-11-04 | End: 2021-10-09

## 2020-11-04 NOTE — TELEPHONE ENCOUNTER
Agree with plan no antibiotics history of C Dif.   Can RX Flonase nasal spray 2 sprays to each nostril daily

## 2020-11-04 NOTE — TELEPHONE ENCOUNTER
Patient c/o nasal congestion, sinus pressure, eye pressure and some ear discomfort. Symptoms for 1 day  Has tried Tylenol and Benadryl    Denies fever, cough, shortness of breath, sore throat, body aches, nausea, vomiting or diarrhea, loss of taste/smell.

## 2020-11-04 NOTE — TELEPHONE ENCOUNTER
Pt called and said she developed a sinus infection last night and she would like something sent to [place] on [facility].

## 2020-11-04 NOTE — TELEPHONE ENCOUNTER
Pt cancelled appt with Dr. Chago Fontenot. I called and l-m for her to call office to schedule another one.

## 2020-11-10 DIAGNOSIS — M51.16 LUMBAR DISC HERNIATION WITH RADICULOPATHY: ICD-10-CM

## 2020-11-10 RX ORDER — HYDROCODONE BITARTRATE AND ACETAMINOPHEN 10; 325 MG/1; MG/1
1 TABLET ORAL EVERY 6 HOURS PRN
Refills: 0 | OUTPATIENT
Start: 2020-11-10

## 2020-11-10 NOTE — TELEPHONE ENCOUNTER
Refill hydrocodone      Pharmacy verified      Patient informed of 48 hour refill policy excluding weekends and holidays. Informed patient only patient can  the prescription effective April 1, 2017.   Further explained patient will not receive a call

## 2020-11-10 NOTE — TELEPHONE ENCOUNTER
Called patient to inform her no refill given. Pt agreed to come in for an appointment.   Huong for 11-16 with Dearl ALLEN Mortensen.

## 2020-11-10 NOTE — TELEPHONE ENCOUNTER
Medication: Norco  (10 tablets)    Date of last refill: 08/04/20    [place] 42/85/42    Last office visit: 03/12/20    Due back to clinic per last office note:  RTC in 3 months     Date next office visit scheduled:  No u

## 2020-11-16 ENCOUNTER — TELEPHONE (OUTPATIENT)
Dept: FAMILY MEDICINE CLINIC | Facility: CLINIC | Age: 80
End: 2020-11-16

## 2020-11-16 ENCOUNTER — TELEMEDICINE (OUTPATIENT)
Dept: SURGERY | Facility: CLINIC | Age: 80
End: 2020-11-16
Payer: MEDICARE

## 2020-11-16 DIAGNOSIS — M96.1 FAILED BACK SURGICAL SYNDROME: ICD-10-CM

## 2020-11-16 DIAGNOSIS — M51.16 LUMBAR DISC HERNIATION WITH RADICULOPATHY: ICD-10-CM

## 2020-11-16 DIAGNOSIS — M54.16 LUMBAR RADICULOPATHY: Primary | ICD-10-CM

## 2020-11-16 PROCEDURE — 99213 OFFICE O/P EST LOW 20 MIN: CPT | Performed by: PHYSICIAN ASSISTANT

## 2020-11-16 RX ORDER — HYDROCODONE BITARTRATE AND ACETAMINOPHEN 10; 325 MG/1; MG/1
1 TABLET ORAL EVERY 6 HOURS PRN
Qty: 60 TABLET | Refills: 0 | OUTPATIENT
Start: 2020-11-16

## 2020-11-16 NOTE — PROGRESS NOTES
Neurosurgery Clinic Video Visit  2020    Alex Alvarez PCP:  DO CLARK Heaton 1940 MRN [id number]     This visit is conducted using Telemedicine with live, interactive video and audio.     HISTORY OF PRESENT ILLNESS:  Alex Alvarez is a discuss the results once it is obtained. I advised her to call her PCP to discuss her current symptoms (N/V/D) and told her that these could be related to Zairna. I also called the PCP office to notify them about the patient's condition.   I did not refill

## 2020-11-16 NOTE — TELEPHONE ENCOUNTER
Pt states she has been vomiting all night and it it due to her sinuses, she states her head is draining and she is vomiting pure mucus and is wondering if there is anything Eleni Palumbo can give her.

## 2020-11-16 NOTE — TELEPHONE ENCOUNTER
Was this with pain clinic? She sees pain clinic for the [address] I do not plan on filling Norco for any patients besides the ones I have right now on it. I have been sending people to pain clinic if [address] [place] [address],[address] is needed.

## 2020-11-16 NOTE — TELEPHONE ENCOUNTER
Nieves Jay PA-C  Physician Assistant  Lumbar radiculopathy        Neurosurgery Clinic Video Visit    Melida Chanel M.S., ALEK  [facility] [place] [address]  [address] [facility], [address] [facility], [address]  [phone]  11/16/2020

## 2020-11-16 NOTE — TELEPHONE ENCOUNTER
Patient denies fever. Patient c/o post nasal drip that makes her nauseous and then causes her to throw up, symptoms for a few days. Denies any other symptoms.    Denies exposure to COVID-19    Patient admits to not being consistent with Flonase and [facility]

## 2020-11-16 NOTE — TELEPHONE ENCOUNTER
Patient had a virtual visit with Aly Garcia PA-C today. Narcotics refill was denied. See her note.     Narcotic refills not appropriate

## 2020-11-16 NOTE — TELEPHONE ENCOUNTER
Melida VILLA from Neuro called and said she did a video visit with Carli Maharaj. Carli Maharaj was asking for a refill on Kathleen and called Melida a \"B\" on the video visit.   Melida advised her to call her PCP and she did not refill the Kathleen.

## 2020-11-16 NOTE — TELEPHONE ENCOUNTER
Medication: hydrocodone-acetaminophen 10-325mg    Date of last refill: 07/20/2020  [place] 42/53/8395 10 tablets    Last office visit: 05/14/2020 (televisit)  Due back to clinic per last office note: N/A  Date next offic

## 2020-11-23 ENCOUNTER — TELEPHONE (OUTPATIENT)
Dept: FAMILY MEDICINE CLINIC | Facility: CLINIC | Age: 80
End: 2020-11-23

## 2020-11-23 NOTE — TELEPHONE ENCOUNTER
Spoke to patient. C/o back pain. Requesting refill on norco.  Advised patient a Angela Simpson ordered it and to please call that office. Nel Ocampo said that office has ordered a MRI. Advised to call that office for refill. She VU and is in agreement.

## 2020-11-23 NOTE — TELEPHONE ENCOUNTER
Pt would like a recommendation of a Rheumatologist because she states she is in a lot of pain ad Dianne Toribio can not help her.

## 2020-11-28 DIAGNOSIS — R10.13 EPIGASTRIC PAIN: ICD-10-CM

## 2020-11-28 DIAGNOSIS — F43.0 ACUTE STRESS REACTION: ICD-10-CM

## 2020-11-29 DIAGNOSIS — G47.09 OTHER INSOMNIA: ICD-10-CM

## 2020-11-30 RX ORDER — CLONAZEPAM 0.5 MG/1
TABLET ORAL
Qty: 60 TABLET | Refills: 0 | Status: SHIPPED | OUTPATIENT
Start: 2020-11-30 | End: 2020-12-28

## 2020-11-30 RX ORDER — TRAZODONE HYDROCHLORIDE 100 MG/1
TABLET ORAL
Qty: 90 TABLET | Refills: 0 | Status: SHIPPED | OUTPATIENT
Start: 2020-11-30 | End: 2021-02-17

## 2020-11-30 RX ORDER — MONTELUKAST SODIUM 10 MG/1
TABLET ORAL
Qty: 90 TABLET | Refills: 0 | Status: SHIPPED | OUTPATIENT
Start: 2020-11-30 | End: 2021-02-25

## 2020-11-30 RX ORDER — ONDANSETRON 8 MG/1
TABLET, ORALLY DISINTEGRATING ORAL
Qty: 10 TABLET | Refills: 1 | Status: SHIPPED | OUTPATIENT
Start: 2020-11-30 | End: 2020-12-31

## 2020-11-30 NOTE — TELEPHONE ENCOUNTER
CLONAZEPAM 09/09/2020 08/07/2020  60 tablet  30     ONDANSETRON ODT 8 MG TABLET 10/01/2020   10 Undefined  5     MONTELUKAST 10MG TABLETS 10/26/2020 08/24/2020  30 each  30     LOV 9/21/20    Future appointment 12/14/20

## 2020-11-30 NOTE — TELEPHONE ENCOUNTER
TRAZODONE 100 MG TABLET 10/29/2020   30 Undefined  30   LOV 9/21/20    Has future appointment 12/14/20

## 2020-12-01 ENCOUNTER — TELEPHONE (OUTPATIENT)
Dept: FAMILY MEDICINE CLINIC | Facility: CLINIC | Age: 80
End: 2020-12-01

## 2020-12-01 DIAGNOSIS — Z87.11 HISTORY OF PEPTIC ULCER: ICD-10-CM

## 2020-12-01 DIAGNOSIS — K58.2 IRRITABLE BOWEL SYNDROME WITH BOTH CONSTIPATION AND DIARRHEA: ICD-10-CM

## 2020-12-01 DIAGNOSIS — R10.13 EPIGASTRIC PAIN: Primary | ICD-10-CM

## 2020-12-01 NOTE — TELEPHONE ENCOUNTER
Spoke to patient. She is wondering if a dietician could help her with her stomach issues. States she becomes constipated and intermittent upset and thinks a dietician can help. Pended a referral.  Please advise.

## 2020-12-03 ENCOUNTER — ORDER TRANSCRIPTION (OUTPATIENT)
Dept: ADMINISTRATIVE | Facility: HOSPITAL | Age: 80
End: 2020-12-03

## 2020-12-03 DIAGNOSIS — R10.13 EPIGASTRIC PAIN: Primary | ICD-10-CM

## 2020-12-03 DIAGNOSIS — K58.2 IRRITABLE BOWEL SYNDROME WITH BOTH CONSTIPATION AND DIARRHEA: ICD-10-CM

## 2020-12-03 DIAGNOSIS — Z87.11 HISTORY OF PEPTIC ULCER: ICD-10-CM

## 2020-12-08 ENCOUNTER — TELEPHONE (OUTPATIENT)
Dept: FAMILY MEDICINE CLINIC | Facility: CLINIC | Age: 80
End: 2020-12-08

## 2020-12-08 NOTE — TELEPHONE ENCOUNTER
Spoke to patient. C/o diarrhea through the night and into today. No abd pain. No fever. Advised to follow BRAT diet and be sure she is drinking enough. She said she is taking her probiotic. Advised to call back if continues.

## 2020-12-08 NOTE — TELEPHONE ENCOUNTER
Pt called states she was up with diarrhea all night and body aches. States she has a low grade temp. Pt wants an antibiotic. Pt aware Teresa Nickel is not in the office today.

## 2020-12-09 ENCOUNTER — TELEPHONE (OUTPATIENT)
Dept: FAMILY MEDICINE CLINIC | Facility: CLINIC | Age: 80
End: 2020-12-09

## 2020-12-09 NOTE — TELEPHONE ENCOUNTER
Justina Tellez reports sore throat and weakness since this morning, she has tried Vitamin C, Vitamin D and took  2 aspirins. States she can feel her glands swelling and it hurts to swallow. Denies fever, cough, bodyaches, chills.    Reports the diarrhea has subsid
No antibiotics history of C dif would need to get strep culture to determine if needs antibiotics.   Do salt water gargle
Pt called and said she has a sore throat and is achy all over. She would like something called in to [facility] on [facility].
Relayed recommendations. She agreed with plan. Advise she can proceed to walk in if sore throat persists. She agreed and will try jayne water gargle.
Yesterday patient called also:  Anitra Hudson RN    12/8/20 12:49 PM     Spoke to patient. C/o diarrhea through the night and into today. No abd pain. No fever. Advised to follow BRAT diet and be sure she is drinking enough.   She said she is taking her
I have reviewed and confirmed nurses' notes for patient's medications, allergies, medical history, and surgical history.

## 2020-12-14 ENCOUNTER — TELEMEDICINE (OUTPATIENT)
Dept: NUTRITION | Age: 80
End: 2020-12-14
Payer: MEDICARE

## 2020-12-14 PROCEDURE — 97802 MEDICAL NUTRITION INDIV IN: CPT

## 2020-12-14 NOTE — PROGRESS NOTES
ADULT INITIAL OUTPATIENT NUTRITION CONSULTATION    *consultation completed via virtual video visit d/t COVID pandemic and per pt's request*    Nutrition Assessment    Medical Diagnosis: IBS    PMH: noted    Client Hx: pleasant 68year old female    Meds: avoiding fried foods. Discussed label reading and keeping a food/symptom journal. All questions answered at this time. Will send information packet with handouts, recipes, web sites, to pt.  Pt has set goals to follow recommendations set today, and to conta

## 2020-12-21 ENCOUNTER — TELEPHONE (OUTPATIENT)
Dept: FAMILY MEDICINE CLINIC | Facility: CLINIC | Age: 80
End: 2020-12-21

## 2020-12-21 NOTE — TELEPHONE ENCOUNTER
Spoke to patient. C/o phlegm in sinuses and post nasal drip. Advised musinex and warm steams with vicks vapor. Patient VU and is in agreement.

## 2020-12-21 NOTE — TELEPHONE ENCOUNTER
Pt called requesting an antibiotic she has a horrible headache, diarrhea and has episodes of feeling really cold then really hot.

## 2020-12-23 ENCOUNTER — TELEPHONE (OUTPATIENT)
Dept: FAMILY MEDICINE CLINIC | Facility: CLINIC | Age: 80
End: 2020-12-23

## 2020-12-23 NOTE — TELEPHONE ENCOUNTER
Patient c/o constipation x3 days. States she has tried Oxypowder and it is not working. Reports she has stopped fiber supplements. Denies abdominal cramping, fever, blood in stool, diarrhea.      Previous recommendations for IBS with constipation and di

## 2020-12-28 ENCOUNTER — TELEPHONE (OUTPATIENT)
Dept: FAMILY MEDICINE CLINIC | Facility: CLINIC | Age: 80
End: 2020-12-28

## 2020-12-28 NOTE — TELEPHONE ENCOUNTER
Patient c/o abdominal discomfort. Advised for her to take her dicyclomine 10mg. Advised she can take 4 times daily if needed and to call if no relief. She VU and is in agreement.

## 2020-12-28 NOTE — TELEPHONE ENCOUNTER
Requested Prescriptions     Pending Prescriptions Disp Refills   • clonazePAM 0.5 MG Oral Tab 60 tablet 0     Sig: Take 1 tablet (0.5 mg total) by mouth 2 (two) times daily as needed.      Last fill was 11/30/20 60 tabs 0 refill  Last OV 9/21/20  Future OV

## 2020-12-28 NOTE — TELEPHONE ENCOUNTER
Katherine Mata is calling to let Davie Aceves know that she is having pains in her stomach for the past couple of days, she does not have diarrhea but she has been having a couple of bowel movements a day.  Please call Katherine Mata at [phone]

## 2020-12-29 ENCOUNTER — TELEPHONE (OUTPATIENT)
Dept: FAMILY MEDICINE CLINIC | Facility: CLINIC | Age: 80
End: 2020-12-29

## 2020-12-29 NOTE — TELEPHONE ENCOUNTER
Spoke to patient. Says last night woke with sinus headache and congested sinus with sore throat. Advised she do warm steams with vicks vapo, tylenol for HA, gargle with salt water for sore throat, and musinex for congestion. She VU and is in agreement.

## 2020-12-29 NOTE — TELEPHONE ENCOUNTER
Pt requesting antibiotic for sinus infection, states she has a horrible headache that started today. Pt aware Adelene Feeling is off today.

## 2020-12-31 DIAGNOSIS — R10.13 EPIGASTRIC PAIN: ICD-10-CM

## 2021-01-02 RX ORDER — ONDANSETRON 8 MG/1
TABLET, ORALLY DISINTEGRATING ORAL
Qty: 10 TABLET | Refills: 1 | Status: SHIPPED | OUTPATIENT
Start: 2021-01-02 | End: 2021-01-15

## 2021-01-04 ENCOUNTER — HOSPITAL ENCOUNTER (OUTPATIENT)
Dept: MRI IMAGING | Facility: HOSPITAL | Age: 81
Discharge: HOME | End: 2021-01-04
Attending: PHYSICIAN ASSISTANT
Payer: MEDICARE

## 2021-01-04 DIAGNOSIS — M54.16 LUMBAR RADICULOPATHY: ICD-10-CM

## 2021-01-04 DIAGNOSIS — M96.1 FAILED BACK SURGICAL SYNDROME: ICD-10-CM

## 2021-01-04 PROCEDURE — A9575 INJ GADOTERATE MEGLUMI 0.1ML: HCPCS | Performed by: PHYSICIAN ASSISTANT

## 2021-01-04 PROCEDURE — 72158 MRI LUMBAR SPINE W/O & W/DYE: CPT | Performed by: PHYSICIAN ASSISTANT

## 2021-01-05 NOTE — TELEPHONE ENCOUNTER
I called pt today and offered appt with Dr. Mehdi Collins. She said she is having too much trouble with her stomach to see Dr. Mehdi Collins now. I offered to call her in about 2 wks. She said that would be fine-she would make an appt then.

## 2021-01-06 ENCOUNTER — TELEPHONE (OUTPATIENT)
Dept: FAMILY MEDICINE CLINIC | Facility: CLINIC | Age: 81
End: 2021-01-06

## 2021-01-06 NOTE — TELEPHONE ENCOUNTER
Patient c/o constant abdominal cramping, states it seems like it has been going on for months. Reports she is having normal bowel movements. Denies diarrhea, constipation, fever, blood in the stool. Will forward message to provider.  In the mean esmer

## 2021-01-06 NOTE — TELEPHONE ENCOUNTER
Pt called states she has severe stomach cramps and feels like she has to go to the bathroom but has no diarrhea just regular bowel movements wants to speak to a nurse.

## 2021-01-07 NOTE — TELEPHONE ENCOUNTER
Called patient and relayed recommendations again. She agreed with plan. States she has an appointment for February but will call them today to move it up.

## 2021-01-08 ENCOUNTER — APPOINTMENT (OUTPATIENT)
Dept: CARDIOLOGY | Age: 81
End: 2021-01-08

## 2021-01-08 ENCOUNTER — TELEPHONE (OUTPATIENT)
Dept: FAMILY MEDICINE CLINIC | Facility: CLINIC | Age: 81
End: 2021-01-08

## 2021-01-08 NOTE — TELEPHONE ENCOUNTER
Advised ok to use tylenol, throat lozenges, honey on its own or in tea, cold fluids, salt water gargle. Patient VU and in agreement.

## 2021-01-08 NOTE — TELEPHONE ENCOUNTER
Cheyenne Oliver is calling to see if BODMANDA can call something in for her sore throat, or is there something over the counter she can take.   She has not been around anybody and has not left her house in a couple of weeks so she knows she does not have covid, she w

## 2021-01-09 ENCOUNTER — HOSPITAL ENCOUNTER (EMERGENCY)
Age: 81
Discharge: HOME | End: 2021-01-09
Attending: EMERGENCY MEDICINE
Payer: MEDICARE

## 2021-01-09 ENCOUNTER — APPOINTMENT (OUTPATIENT)
Dept: CT IMAGING | Age: 81
End: 2021-01-09
Attending: EMERGENCY MEDICINE
Payer: MEDICARE

## 2021-01-09 VITALS
TEMPERATURE: 97 F | WEIGHT: 130 LBS | OXYGEN SATURATION: 98 % | HEART RATE: 75 BPM | DIASTOLIC BLOOD PRESSURE: 78 MMHG | SYSTOLIC BLOOD PRESSURE: 151 MMHG | HEIGHT: 63 IN | RESPIRATION RATE: 16 BRPM | BODY MASS INDEX: 23.04 KG/M2

## 2021-01-09 DIAGNOSIS — D72.829 LEUKOCYTOSIS, UNSPECIFIED TYPE: ICD-10-CM

## 2021-01-09 DIAGNOSIS — N28.9 RENAL INSUFFICIENCY: ICD-10-CM

## 2021-01-09 DIAGNOSIS — R10.9 ABDOMINAL PAIN OF UNKNOWN ETIOLOGY: Primary | ICD-10-CM

## 2021-01-09 LAB
ALBUMIN SERPL-MCNC: 3.9 G/DL (ref 3.4–5)
ALBUMIN/GLOB SERPL: 1.1 {RATIO} (ref 1–2)
ALP LIVER SERPL-CCNC: 79 U/L
ALT SERPL-CCNC: 22 U/L
ANION GAP SERPL CALC-SCNC: 6 MMOL/L (ref 0–18)
AST SERPL-CCNC: 16 U/L (ref 15–37)
BASOPHILS # BLD AUTO: 0.14 X10(3) UL (ref 0–0.2)
BASOPHILS NFR BLD AUTO: 0.7 %
BILIRUB SERPL-MCNC: 0.4 MG/DL (ref 0.1–2)
BILIRUB UR QL STRIP.AUTO: NEGATIVE
BUN BLD-MCNC: 28 MG/DL (ref 7–18)
BUN/CREAT SERPL: 17.9 (ref 10–20)
CALCIUM BLD-MCNC: 10.3 MG/DL (ref 8.5–10.1)
CHLORIDE SERPL-SCNC: 106 MMOL/L (ref 98–112)
CLARITY UR REFRACT.AUTO: CLEAR
CO2 SERPL-SCNC: 27 MMOL/L (ref 21–32)
COLOR UR AUTO: YELLOW
CREAT BLD-MCNC: 1.56 MG/DL
DEPRECATED RDW RBC AUTO: 48.6 FL (ref 35.1–46.3)
EOSINOPHIL # BLD AUTO: 0.2 X10(3) UL (ref 0–0.7)
EOSINOPHIL NFR BLD AUTO: 1 %
ERYTHROCYTE [DISTWIDTH] IN BLOOD BY AUTOMATED COUNT: 14.2 % (ref 11–15)
GLOBULIN PLAS-MCNC: 3.7 G/DL (ref 2.8–4.4)
GLUCOSE BLD-MCNC: 89 MG/DL (ref 70–99)
GLUCOSE UR STRIP.AUTO-MCNC: NEGATIVE MG/DL
HCT VFR BLD AUTO: 40 %
HGB BLD-MCNC: 13.3 G/DL
IMM GRANULOCYTES # BLD AUTO: 0.08 X10(3) UL (ref 0–1)
IMM GRANULOCYTES NFR BLD: 0.4 %
KETONES UR STRIP.AUTO-MCNC: NEGATIVE MG/DL
LACTATE SERPL-SCNC: 1.3 MMOL/L (ref 0.4–2)
LIPASE SERPL-CCNC: 207 U/L (ref 73–393)
LYMPHOCYTES # BLD AUTO: 5.29 X10(3) UL (ref 1–4)
LYMPHOCYTES NFR BLD AUTO: 26 %
M PROTEIN MFR SERPL ELPH: 7.6 G/DL (ref 6.4–8.2)
MCH RBC QN AUTO: 31.3 PG (ref 26–34)
MCHC RBC AUTO-ENTMCNC: 33.3 G/DL (ref 31–37)
MCV RBC AUTO: 94.1 FL
MONOCYTES # BLD AUTO: 1.11 X10(3) UL (ref 0.1–1)
MONOCYTES NFR BLD AUTO: 5.5 %
NEUTROPHILS # BLD AUTO: 13.53 X10 (3) UL (ref 1.5–7.7)
NEUTROPHILS # BLD AUTO: 13.53 X10(3) UL (ref 1.5–7.7)
NEUTROPHILS NFR BLD AUTO: 66.4 %
NITRITE UR QL STRIP.AUTO: NEGATIVE
OSMOLALITY SERPL CALC.SUM OF ELEC: 293 MOSM/KG (ref 275–295)
PH UR STRIP.AUTO: 7.5 [PH] (ref 4.5–8)
PLATELET # BLD AUTO: 380 10(3)UL (ref 150–450)
POTASSIUM SERPL-SCNC: 3.8 MMOL/L (ref 3.5–5.1)
PROT UR STRIP.AUTO-MCNC: NEGATIVE MG/DL
RBC # BLD AUTO: 4.25 X10(6)UL
RBC UR QL AUTO: NEGATIVE
SODIUM SERPL-SCNC: 139 MMOL/L (ref 136–145)
SP GR UR STRIP.AUTO: 1.02 (ref 1–1.03)
UROBILINOGEN UR STRIP.AUTO-MCNC: 0.2 MG/DL
WBC # BLD AUTO: 20.4 X10(3) UL (ref 4–11)

## 2021-01-09 PROCEDURE — 87186 SC STD MICRODIL/AGAR DIL: CPT | Performed by: EMERGENCY MEDICINE

## 2021-01-09 PROCEDURE — 96372 THER/PROPH/DIAG INJ SC/IM: CPT

## 2021-01-09 PROCEDURE — 74176 CT ABD & PELVIS W/O CONTRAST: CPT | Performed by: EMERGENCY MEDICINE

## 2021-01-09 PROCEDURE — 87077 CULTURE AEROBIC IDENTIFY: CPT | Performed by: EMERGENCY MEDICINE

## 2021-01-09 PROCEDURE — 99285 EMERGENCY DEPT VISIT HI MDM: CPT

## 2021-01-09 PROCEDURE — 85025 COMPLETE CBC W/AUTO DIFF WBC: CPT | Performed by: EMERGENCY MEDICINE

## 2021-01-09 PROCEDURE — 87086 URINE CULTURE/COLONY COUNT: CPT | Performed by: EMERGENCY MEDICINE

## 2021-01-09 PROCEDURE — 96361 HYDRATE IV INFUSION ADD-ON: CPT

## 2021-01-09 PROCEDURE — 96375 TX/PRO/DX INJ NEW DRUG ADDON: CPT

## 2021-01-09 PROCEDURE — 83605 ASSAY OF LACTIC ACID: CPT | Performed by: EMERGENCY MEDICINE

## 2021-01-09 PROCEDURE — 81001 URINALYSIS AUTO W/SCOPE: CPT | Performed by: EMERGENCY MEDICINE

## 2021-01-09 PROCEDURE — 80053 COMPREHEN METABOLIC PANEL: CPT | Performed by: EMERGENCY MEDICINE

## 2021-01-09 PROCEDURE — 83690 ASSAY OF LIPASE: CPT | Performed by: EMERGENCY MEDICINE

## 2021-01-09 PROCEDURE — 96365 THER/PROPH/DIAG IV INF INIT: CPT

## 2021-01-09 RX ORDER — LORAZEPAM 2 MG/ML
1 INJECTION INTRAMUSCULAR ONCE
Status: COMPLETED | OUTPATIENT
Start: 2021-01-09 | End: 2021-01-09

## 2021-01-09 RX ORDER — DICYCLOMINE HYDROCHLORIDE 10 MG/ML
10 INJECTION INTRAMUSCULAR ONCE
Status: COMPLETED | OUTPATIENT
Start: 2021-01-09 | End: 2021-01-09

## 2021-01-09 RX ORDER — SODIUM CHLORIDE 9 MG/ML
INJECTION, SOLUTION INTRAVENOUS CONTINUOUS
Status: DISCONTINUED | OUTPATIENT
Start: 2021-01-09 | End: 2021-01-09

## 2021-01-09 RX ORDER — ONDANSETRON 8 MG/1
8 TABLET, ORALLY DISINTEGRATING ORAL EVERY 6 HOURS PRN
Qty: 10 TABLET | Refills: 0 | Status: SHIPPED | OUTPATIENT
Start: 2021-01-09 | End: 2021-06-04

## 2021-01-09 RX ORDER — CEPHALEXIN 500 MG/1
500 CAPSULE ORAL 4 TIMES DAILY
Qty: 40 CAPSULE | Refills: 0 | Status: ON HOLD | OUTPATIENT
Start: 2021-01-09 | End: 2021-01-12

## 2021-01-09 RX ORDER — DIPHENHYDRAMINE HYDROCHLORIDE 50 MG/ML
25 INJECTION INTRAMUSCULAR; INTRAVENOUS ONCE
Status: COMPLETED | OUTPATIENT
Start: 2021-01-09 | End: 2021-01-09

## 2021-01-09 RX ORDER — DICYCLOMINE HCL 20 MG
20 TABLET ORAL 3 TIMES DAILY PRN
Qty: 30 TABLET | Refills: 0 | Status: SHIPPED | OUTPATIENT
Start: 2021-01-09 | End: 2021-01-15 | Stop reason: ALTCHOICE

## 2021-01-09 RX ORDER — KETOROLAC TROMETHAMINE 15 MG/ML
15 INJECTION, SOLUTION INTRAMUSCULAR; INTRAVENOUS ONCE
Status: COMPLETED | OUTPATIENT
Start: 2021-01-09 | End: 2021-01-09

## 2021-01-09 RX ORDER — ONDANSETRON 2 MG/ML
4 INJECTION INTRAMUSCULAR; INTRAVENOUS
Status: DISCONTINUED | OUTPATIENT
Start: 2021-01-09 | End: 2021-01-09

## 2021-01-09 NOTE — RESPIRATORY THERAPY NOTE
Pt on call light requesting crackers, Asked Dr Yamileth Salcido and pt given crackers and apple juice. No further needs at this time.

## 2021-01-09 NOTE — ED PROVIDER NOTES
Patient Seen in: [facility] Emergency Department In [place]      History   Patient presents with:  Abdominal Pain    Stated Complaint: abd pain x3 days     HPI/Subjective:   HPI    I reviewed a telephone encounter from January 6.   Patient was complaining cholesterol    • History of blood transfusion    • History of cardiac murmur    • History of eating disorder    • History of rheumatic fever    • IBS (irritable bowel syndrome)     constipation   • Irregular bowel habits    • Kidney stones    • Muscle weak SURGICAL HISTORY  2009    Cath Stent Placement x 1   • OTHER SURGICAL HISTORY  2014    parathyroidectomy   • PARATHYROIDECTOMY N/A 2/3/2014    Performed by Cinthya Mccarthy MD at [facility] MAIN OR   • SACROILIAC JOINT INJECTION BILATERAL Bilateral 6/13/2016 above.    Physical Exam     ED Triage Vitals [01/09/21 1012]   /78   Pulse 77   Resp 15   Temp 97.4 °F (36.3 °C)   Temp src Temporal   SpO2 98 %   O2 Device None (Room air)       Current:/78   Pulse 75   Temp 97.4 °F (36.3 °C) (Temporal)   Resp DIFFERENTIAL - Abnormal; Notable for the following components:    WBC 20.4 (*)     RDW-SD 48.6 (*)     Neutrophil Absolute Prelim 13.53 (*)     Neutrophil Absolute 13.53 (*)     Lymphocyte Absolute 5.29 (*)     Monocyte Absolute 1.11 (*)     All other comp erosions, diverticula, or varices. Lower endoscopy from May 2019 shows large external hemorrhoids with small internal hemorrhoids. Mucosa of the colon was normal from the rectum to the cecum. No masses, polyps, ulcers, erosions, or diverticula.   Termi electrolytes. Creatinine 1.5. LFTs normal.  Lipase normal  Urinalysis shows negative blood, negative nitrites, and trace leukocyte esterase. Microscopic shows 20-50 WBCs with 3+ bacteria in a specimen with only small epithelial cells.   Lactic acid negat

## 2021-01-11 ENCOUNTER — HOSPITAL ENCOUNTER (INPATIENT)
Facility: HOSPITAL | Age: 81
LOS: 1 days | Discharge: HOME | DRG: 690 | End: 2021-01-12
Attending: EMERGENCY MEDICINE | Admitting: HOSPITALIST
Payer: MEDICARE

## 2021-01-11 ENCOUNTER — APPOINTMENT (OUTPATIENT)
Dept: CT IMAGING | Age: 81
DRG: 690 | End: 2021-01-11
Attending: EMERGENCY MEDICINE
Payer: MEDICARE

## 2021-01-11 DIAGNOSIS — A41.9 SEPSIS DUE TO URINARY TRACT INFECTION (HCC): Primary | ICD-10-CM

## 2021-01-11 DIAGNOSIS — R10.9 ABDOMINAL PAIN OF UNKNOWN ETIOLOGY: ICD-10-CM

## 2021-01-11 DIAGNOSIS — K52.9 ACUTE COLITIS: ICD-10-CM

## 2021-01-11 DIAGNOSIS — N39.0 SEPSIS DUE TO URINARY TRACT INFECTION (HCC): Primary | ICD-10-CM

## 2021-01-11 PROBLEM — N30.00 ACUTE CYSTITIS WITHOUT HEMATURIA: Status: ACTIVE | Noted: 2021-01-11

## 2021-01-11 LAB
ALBUMIN SERPL-MCNC: 4 G/DL (ref 3.4–5)
ALBUMIN/GLOB SERPL: 1 {RATIO} (ref 1–2)
ALP LIVER SERPL-CCNC: 80 U/L
ALT SERPL-CCNC: 23 U/L
ANION GAP SERPL CALC-SCNC: 10 MMOL/L (ref 0–18)
APTT PPP: 26.6 SECONDS (ref 25.4–36.1)
AST SERPL-CCNC: 16 U/L (ref 15–37)
BASOPHILS # BLD AUTO: 0.09 X10(3) UL (ref 0–0.2)
BASOPHILS NFR BLD AUTO: 0.7 %
BILIRUB SERPL-MCNC: 0.4 MG/DL (ref 0.1–2)
BILIRUB UR QL STRIP.AUTO: NEGATIVE
BUN BLD-MCNC: 19 MG/DL (ref 7–18)
BUN/CREAT SERPL: 14.4 (ref 10–20)
CALCIUM BLD-MCNC: 10.5 MG/DL (ref 8.5–10.1)
CHLORIDE SERPL-SCNC: 107 MMOL/L (ref 98–112)
CO2 SERPL-SCNC: 23 MMOL/L (ref 21–32)
COLOR UR AUTO: YELLOW
CREAT BLD-MCNC: 1.32 MG/DL
DEPRECATED RDW RBC AUTO: 48.4 FL (ref 35.1–46.3)
EOSINOPHIL # BLD AUTO: 0.06 X10(3) UL (ref 0–0.7)
EOSINOPHIL NFR BLD AUTO: 0.5 %
ERYTHROCYTE [DISTWIDTH] IN BLOOD BY AUTOMATED COUNT: 14.1 % (ref 11–15)
GLOBULIN PLAS-MCNC: 4 G/DL (ref 2.8–4.4)
GLUCOSE BLD-MCNC: 101 MG/DL (ref 70–99)
GLUCOSE UR STRIP.AUTO-MCNC: NEGATIVE MG/DL
HCT VFR BLD AUTO: 42.4 %
HGB BLD-MCNC: 14.1 G/DL
IMM GRANULOCYTES # BLD AUTO: 0.03 X10(3) UL (ref 0–1)
IMM GRANULOCYTES NFR BLD: 0.2 %
INR BLD: 0.93 (ref 0.88–1.11)
KETONES UR STRIP.AUTO-MCNC: NEGATIVE MG/DL
LACTATE SERPL-SCNC: 1 MMOL/L (ref 0.4–2)
LACTATE SERPL-SCNC: 1.3 MMOL/L (ref 0.4–2)
LACTATE SERPL-SCNC: 2.4 MMOL/L (ref 0.4–2)
LEUKOCYTE ESTERASE UR QL STRIP.AUTO: NEGATIVE
LIPASE SERPL-CCNC: 110 U/L (ref 73–393)
LYMPHOCYTES # BLD AUTO: 3.85 X10(3) UL (ref 1–4)
LYMPHOCYTES NFR BLD AUTO: 29.2 %
M PROTEIN MFR SERPL ELPH: 8 G/DL (ref 6.4–8.2)
MCH RBC QN AUTO: 30.9 PG (ref 26–34)
MCHC RBC AUTO-ENTMCNC: 33.3 G/DL (ref 31–37)
MCV RBC AUTO: 93 FL
MONOCYTES # BLD AUTO: 0.83 X10(3) UL (ref 0.1–1)
MONOCYTES NFR BLD AUTO: 6.3 %
NEUTROPHILS # BLD AUTO: 8.32 X10 (3) UL (ref 1.5–7.7)
NEUTROPHILS # BLD AUTO: 8.32 X10(3) UL (ref 1.5–7.7)
NEUTROPHILS NFR BLD AUTO: 63.1 %
NITRITE UR QL STRIP.AUTO: NEGATIVE
OSMOLALITY SERPL CALC.SUM OF ELEC: 292 MOSM/KG (ref 275–295)
PH UR STRIP.AUTO: 8.5 [PH] (ref 4.5–8)
PLATELET # BLD AUTO: 401 10(3)UL (ref 150–450)
POTASSIUM SERPL-SCNC: 3.5 MMOL/L (ref 3.5–5.1)
PROT UR STRIP.AUTO-MCNC: NEGATIVE MG/DL
PSA SERPL DL<=0.01 NG/ML-MCNC: 12.4 SECONDS (ref 12–14.3)
RBC # BLD AUTO: 4.56 X10(6)UL
RBC UR QL AUTO: NEGATIVE
SARS-COV-2 RNA RESP QL NAA+PROBE: NOT DETECTED
SODIUM SERPL-SCNC: 140 MMOL/L (ref 136–145)
SP GR UR STRIP.AUTO: 1.02 (ref 1–1.03)
UROBILINOGEN UR STRIP.AUTO-MCNC: 0.2 MG/DL
WBC # BLD AUTO: 13.2 X10(3) UL (ref 4–11)

## 2021-01-11 PROCEDURE — 74178 CT ABD&PLV WO CNTR FLWD CNTR: CPT | Performed by: EMERGENCY MEDICINE

## 2021-01-11 PROCEDURE — 99222 1ST HOSP IP/OBS MODERATE 55: CPT | Performed by: HOSPITALIST

## 2021-01-11 RX ORDER — LORAZEPAM 2 MG/ML
0.5 INJECTION INTRAMUSCULAR EVERY 4 HOURS PRN
Status: DISCONTINUED | OUTPATIENT
Start: 2021-01-11 | End: 2021-01-12

## 2021-01-11 RX ORDER — AMLODIPINE BESYLATE 5 MG/1
10 TABLET ORAL DAILY
Status: DISCONTINUED | OUTPATIENT
Start: 2021-01-12 | End: 2021-01-12

## 2021-01-11 RX ORDER — DULOXETIN HYDROCHLORIDE 30 MG/1
30 CAPSULE, DELAYED RELEASE ORAL DAILY
Status: DISCONTINUED | OUTPATIENT
Start: 2021-01-11 | End: 2021-01-12

## 2021-01-11 RX ORDER — KETOROLAC TROMETHAMINE 15 MG/ML
15 INJECTION, SOLUTION INTRAMUSCULAR; INTRAVENOUS ONCE
Status: COMPLETED | OUTPATIENT
Start: 2021-01-11 | End: 2021-01-11

## 2021-01-11 RX ORDER — ATORVASTATIN CALCIUM 10 MG/1
10 TABLET, FILM COATED ORAL NIGHTLY
Refills: 1 | Status: DISCONTINUED | OUTPATIENT
Start: 2021-01-11 | End: 2021-01-12

## 2021-01-11 RX ORDER — SIMETHICONE 80 MG
80 TABLET,CHEWABLE ORAL 4 TIMES DAILY PRN
Status: DISCONTINUED | OUTPATIENT
Start: 2021-01-11 | End: 2021-01-12

## 2021-01-11 RX ORDER — HYDROMORPHONE HYDROCHLORIDE 1 MG/ML
0.5 INJECTION, SOLUTION INTRAMUSCULAR; INTRAVENOUS; SUBCUTANEOUS EVERY 30 MIN PRN
Status: DISCONTINUED | OUTPATIENT
Start: 2021-01-11 | End: 2021-01-11

## 2021-01-11 RX ORDER — ACETAMINOPHEN 325 MG/1
650 TABLET ORAL EVERY 6 HOURS PRN
Status: DISCONTINUED | OUTPATIENT
Start: 2021-01-11 | End: 2021-01-12

## 2021-01-11 RX ORDER — SENNOSIDES 8.6 MG
8.6 TABLET ORAL 2 TIMES DAILY PRN
Status: DISCONTINUED | OUTPATIENT
Start: 2021-01-11 | End: 2021-01-12

## 2021-01-11 RX ORDER — ONDANSETRON 2 MG/ML
8 INJECTION INTRAMUSCULAR; INTRAVENOUS EVERY 6 HOURS PRN
Status: DISCONTINUED | OUTPATIENT
Start: 2021-01-11 | End: 2021-01-12

## 2021-01-11 RX ORDER — HYDROCODONE BITARTRATE AND ACETAMINOPHEN 10; 325 MG/1; MG/1
1 TABLET ORAL EVERY 6 HOURS PRN
Status: DISCONTINUED | OUTPATIENT
Start: 2021-01-11 | End: 2021-01-12

## 2021-01-11 RX ORDER — CLONAZEPAM 0.5 MG/1
0.5 TABLET ORAL 2 TIMES DAILY PRN
Status: DISCONTINUED | OUTPATIENT
Start: 2021-01-11 | End: 2021-01-12

## 2021-01-11 RX ORDER — ACETAMINOPHEN 500 MG
500 TABLET ORAL EVERY 6 HOURS PRN
Status: DISCONTINUED | OUTPATIENT
Start: 2021-01-11 | End: 2021-01-12

## 2021-01-11 RX ORDER — CINACALCET 30 MG/1
30 TABLET, FILM COATED ORAL
Status: DISCONTINUED | OUTPATIENT
Start: 2021-01-12 | End: 2021-01-12

## 2021-01-11 RX ORDER — BENZONATATE 100 MG/1
100 CAPSULE ORAL 3 TIMES DAILY PRN
Status: DISCONTINUED | OUTPATIENT
Start: 2021-01-11 | End: 2021-01-12

## 2021-01-11 RX ORDER — PREGABALIN 75 MG/1
150 CAPSULE ORAL DAILY
Status: DISCONTINUED | OUTPATIENT
Start: 2021-01-12 | End: 2021-01-12

## 2021-01-11 RX ORDER — HYDROMORPHONE HYDROCHLORIDE 1 MG/ML
0.5 INJECTION, SOLUTION INTRAMUSCULAR; INTRAVENOUS; SUBCUTANEOUS EVERY 30 MIN PRN
Status: ACTIVE | OUTPATIENT
Start: 2021-01-11 | End: 2021-01-11

## 2021-01-11 RX ORDER — PANTOPRAZOLE SODIUM 20 MG/1
20 TABLET, DELAYED RELEASE ORAL
Refills: 3 | Status: DISCONTINUED | OUTPATIENT
Start: 2021-01-12 | End: 2021-01-12

## 2021-01-11 RX ORDER — BISACODYL 10 MG
10 SUPPOSITORY, RECTAL RECTAL
Status: DISCONTINUED | OUTPATIENT
Start: 2021-01-11 | End: 2021-01-12

## 2021-01-11 RX ORDER — LOSARTAN POTASSIUM 100 MG/1
100 TABLET ORAL DAILY
Status: DISCONTINUED | OUTPATIENT
Start: 2021-01-12 | End: 2021-01-12

## 2021-01-11 RX ORDER — CALCIUM CARBONATE 200(500)MG
1000 TABLET,CHEWABLE ORAL 3 TIMES DAILY PRN
Status: DISCONTINUED | OUTPATIENT
Start: 2021-01-11 | End: 2021-01-12

## 2021-01-11 RX ORDER — SODIUM CHLORIDE 9 MG/ML
INJECTION, SOLUTION INTRAVENOUS CONTINUOUS
Status: DISCONTINUED | OUTPATIENT
Start: 2021-01-11 | End: 2021-01-12

## 2021-01-11 RX ORDER — ENOXAPARIN SODIUM 100 MG/ML
30 INJECTION SUBCUTANEOUS DAILY
Status: DISCONTINUED | OUTPATIENT
Start: 2021-01-11 | End: 2021-01-12

## 2021-01-11 RX ORDER — METOCLOPRAMIDE HYDROCHLORIDE 5 MG/ML
5 INJECTION INTRAMUSCULAR; INTRAVENOUS EVERY 6 HOURS PRN
Status: DISCONTINUED | OUTPATIENT
Start: 2021-01-11 | End: 2021-01-12

## 2021-01-11 RX ORDER — CYCLOBENZAPRINE HCL 10 MG
10 TABLET ORAL 3 TIMES DAILY PRN
Status: DISCONTINUED | OUTPATIENT
Start: 2021-01-11 | End: 2021-01-12

## 2021-01-11 RX ORDER — HYOSCYAMINE SULFATE 0.125 MG
0.12 TABLET,DISINTEGRATING ORAL EVERY 4 HOURS PRN
Status: DISCONTINUED | OUTPATIENT
Start: 2021-01-11 | End: 2021-01-12

## 2021-01-11 RX ORDER — TRAZODONE HYDROCHLORIDE 100 MG/1
100 TABLET ORAL NIGHTLY
Status: DISCONTINUED | OUTPATIENT
Start: 2021-01-11 | End: 2021-01-12

## 2021-01-11 RX ORDER — ONDANSETRON 2 MG/ML
4 INJECTION INTRAMUSCULAR; INTRAVENOUS EVERY 4 HOURS PRN
Status: DISCONTINUED | OUTPATIENT
Start: 2021-01-11 | End: 2021-01-11

## 2021-01-11 RX ORDER — MELATONIN
3 NIGHTLY PRN
Status: DISCONTINUED | OUTPATIENT
Start: 2021-01-11 | End: 2021-01-12

## 2021-01-11 RX ORDER — MAGNESIUM HYDROXIDE/ALUMINUM HYDROXICE/SIMETHICONE 120; 1200; 1200 MG/30ML; MG/30ML; MG/30ML
30 SUSPENSION ORAL 4 TIMES DAILY PRN
Status: DISCONTINUED | OUTPATIENT
Start: 2021-01-11 | End: 2021-01-12

## 2021-01-11 RX ORDER — ECHINACEA PURPUREA EXTRACT 125 MG
1 TABLET ORAL
Status: DISCONTINUED | OUTPATIENT
Start: 2021-01-11 | End: 2021-01-12

## 2021-01-11 RX ORDER — POLYETHYLENE GLYCOL 3350 17 G/17G
17 POWDER, FOR SOLUTION ORAL DAILY PRN
Status: DISCONTINUED | OUTPATIENT
Start: 2021-01-11 | End: 2021-01-12

## 2021-01-11 RX ORDER — MONTELUKAST SODIUM 10 MG/1
10 TABLET ORAL NIGHTLY
Status: DISCONTINUED | OUTPATIENT
Start: 2021-01-11 | End: 2021-01-12

## 2021-01-11 RX ORDER — GUAIFENESIN 600 MG
600 TABLET, EXTENDED RELEASE 12 HR ORAL 2 TIMES DAILY PRN
Status: DISCONTINUED | OUTPATIENT
Start: 2021-01-11 | End: 2021-01-12

## 2021-01-11 RX ORDER — METRONIDAZOLE 500 MG/100ML
500 INJECTION, SOLUTION INTRAVENOUS EVERY 8 HOURS
Status: DISCONTINUED | OUTPATIENT
Start: 2021-01-11 | End: 2021-01-12

## 2021-01-11 RX ORDER — SODIUM CHLORIDE 9 MG/ML
INJECTION, SOLUTION INTRAVENOUS CONTINUOUS
Status: ACTIVE | OUTPATIENT
Start: 2021-01-11 | End: 2021-01-11

## 2021-01-11 NOTE — PROGRESS NOTES
[facility] Pharmacy Note:  Renal Dose Adjustment for Metoclopramide (REGLAN)    Marianne Spencer has been prescribed Metoclopramide (REGLAN) 10 mg every 6 hours as needed. Estimated Creatinine Clearance: 29.1 mL/min (A) (based on SCr of 1.32 mg/dL (H)).     Calcu

## 2021-01-11 NOTE — ED INITIAL ASSESSMENT (HPI)
Was awoked at 0400 this am with abd pain-- had a bowel movement and has abd cramping ever since-- denies vomiting

## 2021-01-11 NOTE — CONSULTS
[facility] Dosing Service    Initial Pharmacokinetic Consult for Vancomycin Dosing     Wilfred Gastelum is a 68year old patient who is being treated for sepsis.   Pharmacy has been asked to dose Vancomycin by Dr. Anca Luna    Allergies:  Penicillins, Bactrim,

## 2021-01-11 NOTE — ED PROVIDER NOTES
Patient Seen in: [address] [facility] Department In [place]      History   Patient presents with:  Abdomen/Flank Pain    Stated Complaint: abd pain - reports here for same a few days ago    HPI/Subjective:   HPI    Patient is a 53-year-old female who is h sweats    • Osteoarthrosis, unspecified whether generalized or localized, unspecified site    • Other and unspecified hyperlipidemia    • Pain in joints    • Parathyroid adenoma    • Postmenopausal status     Age of onset: 30years old   • Presence of othe INJECTION RIGHT OR LEFT Left 10/25/2016    Performed by Delores Almanza MD at [address] Left 11/14/2019    Performed by Delores Almanza MD at [address] TRIAL N/A 5/1/2017    Performed by Ariel Noble HPI.  Constitutional and vital signs reviewed. All other systems reviewed and negative except as noted above.     Physical Exam     ED Triage Vitals [01/11/21 0819]   BP (!) 167/96   Pulse 102   Resp 16   Temp 98.7 °F (37.1 °C)   Temp src Temporal   Sp (*)     Neutrophil Absolute 8.32 (*)     All other components within normal limits   LIPASE - Normal   PROTHROMBIN TIME (PT) - Normal   PTT, ACTIVATED - Normal   RAPID SARS-COV-2 BY PCR - Normal   CBC WITH DIFFERENTIAL WITH PLATELET    Narrative:      The f requesting more pain medicines.   I did speak with the [facility] hospitalist.  A total of 40 minutes of critical care time (exclusive of billable procedures) was administered to manage the patient's critical lab values due to her elevated lactic acid elevated

## 2021-01-11 NOTE — H&P
[facility]PITAL[facility]  History and Physical     Anai Reis Patient Status:  Inpatient    1940 MRN [id number]   [facility] 4NW-A Attending Susannah Galicia MD   Hosp Day # 0 PCP Valentina Terrell DO     Chief Complaint: abd pain recent localized, unspecified site    • Other and unspecified hyperlipidemia    • Pain in joints    • Parathyroid adenoma    • Postmenopausal status     Age of onset: 30years old   • Presence of other cardiac implants and grafts    • Rheumatic heart disease, uns Performed by Carol Jimenes MD at [address] Left 11/14/2019    Performed by Carol Jimenes MD at [address] [facility] TRIAL N/A 5/1/2017    Performed by Carol Jimenes MD at [address] Sister         liver cancer   • Bipolar Disorder Sister    No history of CAD    Allergies:   Penicillins             HIVES    Comment:Throat swells, difficulty breathing  Bactrim                 NAUSEA AND VOMITING    Comment:Tabs  S[facility] Cross R* Oral Tab, Take 1,000 mg by mouth daily as needed for Pain., Disp: , Rfl:     •  ondansetron 8 MG Oral Tablet Dispersible, Take 1 tablet (8 mg total) by mouth every 6 (six) hours as needed for Nausea., Disp: 10 tablet, Rfl: 0    •  ondansetron 8 MG Oral Tab pulses. Chest and Back: No tenderness or deformity. Abdomen: Soft, nontender, nondistended. Positive bowel sounds. No rebound, guarding or organomegaly. Neurologic: No focal neurological deficits. CNII-XII grossly intact.   Musculoskeletal: Moves all e GENOVEVA.

## 2021-01-11 NOTE — PROGRESS NOTES
[facility] Pharmacy Note:  Renal Dose Adjustment for Enoxaparin (LOVENOX)    Hollie Sheppard has been prescribed Enoxaparin (LOVENOX) 40 mg subcutaneously every 24 hours. Estimated Creatinine Clearance: 29.1 mL/min (A) (based on SCr of 1.32 mg/dL (H)).     Calcu

## 2021-01-12 VITALS
HEART RATE: 103 BPM | SYSTOLIC BLOOD PRESSURE: 165 MMHG | RESPIRATION RATE: 18 BRPM | HEIGHT: 63 IN | TEMPERATURE: 99 F | DIASTOLIC BLOOD PRESSURE: 82 MMHG | OXYGEN SATURATION: 99 % | BODY MASS INDEX: 23.04 KG/M2 | WEIGHT: 130 LBS

## 2021-01-12 LAB
ANION GAP SERPL CALC-SCNC: 9 MMOL/L (ref 0–18)
BASOPHILS # BLD AUTO: 0.11 X10(3) UL (ref 0–0.2)
BASOPHILS NFR BLD AUTO: 1 %
BUN BLD-MCNC: 9 MG/DL (ref 7–18)
BUN/CREAT SERPL: 9.7 (ref 10–20)
CALCIUM BLD-MCNC: 10 MG/DL (ref 8.5–10.1)
CHLORIDE SERPL-SCNC: 111 MMOL/L (ref 98–112)
CO2 SERPL-SCNC: 20 MMOL/L (ref 21–32)
CREAT BLD-MCNC: 0.93 MG/DL
DEPRECATED RDW RBC AUTO: 47.1 FL (ref 35.1–46.3)
EOSINOPHIL # BLD AUTO: 0.04 X10(3) UL (ref 0–0.7)
EOSINOPHIL NFR BLD AUTO: 0.4 %
ERYTHROCYTE [DISTWIDTH] IN BLOOD BY AUTOMATED COUNT: 13.9 % (ref 11–15)
GLUCOSE BLD-MCNC: 97 MG/DL (ref 70–99)
HAV IGM SER QL: 1.9 MG/DL (ref 1.6–2.6)
HCT VFR BLD AUTO: 36.3 %
HGB BLD-MCNC: 12.3 G/DL
IMM GRANULOCYTES # BLD AUTO: 0.02 X10(3) UL (ref 0–1)
IMM GRANULOCYTES NFR BLD: 0.2 %
LYMPHOCYTES # BLD AUTO: 3.17 X10(3) UL (ref 1–4)
LYMPHOCYTES NFR BLD AUTO: 29.7 %
MCH RBC QN AUTO: 31.3 PG (ref 26–34)
MCHC RBC AUTO-ENTMCNC: 33.9 G/DL (ref 31–37)
MCV RBC AUTO: 92.4 FL
MONOCYTES # BLD AUTO: 1.09 X10(3) UL (ref 0.1–1)
MONOCYTES NFR BLD AUTO: 10.2 %
NEUTROPHILS # BLD AUTO: 6.26 X10 (3) UL (ref 1.5–7.7)
NEUTROPHILS # BLD AUTO: 6.26 X10(3) UL (ref 1.5–7.7)
NEUTROPHILS NFR BLD AUTO: 58.5 %
OSMOLALITY SERPL CALC.SUM OF ELEC: 289 MOSM/KG (ref 275–295)
PLATELET # BLD AUTO: 343 10(3)UL (ref 150–450)
POTASSIUM SERPL-SCNC: 3.3 MMOL/L (ref 3.5–5.1)
RBC # BLD AUTO: 3.93 X10(6)UL
SODIUM SERPL-SCNC: 140 MMOL/L (ref 136–145)
WBC # BLD AUTO: 10.7 X10(3) UL (ref 4–11)

## 2021-01-12 PROCEDURE — 99239 HOSP IP/OBS DSCHRG MGMT >30: CPT | Performed by: HOSPITALIST

## 2021-01-12 RX ORDER — DICYCLOMINE HYDROCHLORIDE 10 MG/1
10 CAPSULE ORAL ONCE
Status: COMPLETED | OUTPATIENT
Start: 2021-01-12 | End: 2021-01-12

## 2021-01-12 RX ORDER — LEVOFLOXACIN 5 MG/ML
750 INJECTION, SOLUTION INTRAVENOUS
Status: DISCONTINUED | OUTPATIENT
Start: 2021-01-12 | End: 2021-01-12

## 2021-01-12 RX ORDER — LEVOFLOXACIN 750 MG/1
750 TABLET ORAL DAILY
Qty: 2 TABLET | Refills: 0 | Status: SHIPPED | OUTPATIENT
Start: 2021-01-12 | End: 2021-01-14

## 2021-01-12 RX ORDER — ACETAMINOPHEN 500 MG
1000 TABLET ORAL ONCE
Status: COMPLETED | OUTPATIENT
Start: 2021-01-12 | End: 2021-01-12

## 2021-01-12 RX ORDER — ENOXAPARIN SODIUM 100 MG/ML
40 INJECTION SUBCUTANEOUS DAILY
Status: DISCONTINUED | OUTPATIENT
Start: 2021-01-13 | End: 2021-01-12

## 2021-01-12 RX ORDER — POTASSIUM CHLORIDE 20 MEQ/1
40 TABLET, EXTENDED RELEASE ORAL EVERY 4 HOURS
Status: COMPLETED | OUTPATIENT
Start: 2021-01-12 | End: 2021-01-12

## 2021-01-12 RX ORDER — DICYCLOMINE HYDROCHLORIDE 10 MG/1
10 CAPSULE ORAL 3 TIMES DAILY PRN
Status: DISCONTINUED | OUTPATIENT
Start: 2021-01-12 | End: 2021-01-12

## 2021-01-12 RX ORDER — LEVOFLOXACIN 5 MG/ML
750 INJECTION, SOLUTION INTRAVENOUS EVERY 24 HOURS
Status: DISCONTINUED | OUTPATIENT
Start: 2021-01-12 | End: 2021-01-12 | Stop reason: DRUGHIGH

## 2021-01-12 NOTE — PROGRESS NOTES
Patient given pain med at [phone] for abdominal pain of a number 7, prn dose of dilaudid, first time during shift that patient was complaining of abdominal pain, straight across abdomen, and denies radation of pain, bowel sounds presant, patient took very lit

## 2021-01-12 NOTE — CONSULTS
[facility] Pharmacy Note:  Renal Adjustment for levofloxacin (LEVAQUIN)    Alex Alvarez is a 68year old patient who has been prescribed levofloxacin (LEVAQUIN) 750 mg every 24 hrs.   CrCl is estimated creatinine clearance is 41.2 mL/min (based on SCr of 0.93 mg

## 2021-01-12 NOTE — PROGRESS NOTES
[facility]  Progress Note     [facility] Patient Status:  Inpatient    1940 MRN [id number]   [facility] 3NE-A Attending [facility] Day [facility] 1 PCP Antelmo Guardado DO     Chief Complaint: abdominal pain cefTRIAXone  1 g Intravenous Q24H   • vancomycin HCl  125 mg Oral Daily   • metRONIDAZOLE  500 mg Intravenous Q8H   • enoxaparin  30 mg Subcutaneous Daily   • amLODIPine Besylate  10 mg Oral Daily   • Cinacalcet HCl  30 mg Oral Daily with breakfast   • DUL

## 2021-01-12 NOTE — PHYSICAL THERAPY NOTE
PT attempted to see the pt for an eval, however per ERI Schwab, pt is up ad eulogio and indep. Pt is DC home, no further needs at this time.  DC from PT.

## 2021-01-12 NOTE — PLAN OF CARE
Patient alert and oriented x4, VS stable (elevated BP noted), RA, , NSR/ST on tele  Complaint of intermittent abdominal pain and constipation noted  Elevated BP, Dr Kath Branch notified, ordered to continue monitor  Severe short term memory, generalized a

## 2021-01-12 NOTE — PROGRESS NOTES
Patient admitted to room 405 for sepsis due to uti and acute colitis. Oriented to room and surroundings, call light in reach. Patient placed on fallen star protocol, due to anxiety.  Patient denies any abdominal pain, for obtained pain meds in er and now rat

## 2021-01-12 NOTE — PLAN OF CARE
Patient to be discharged home this afternoon.      Problem: Patient/Family Goals  Goal: Patient/Family Long Term Goal  Description: Patient's Long Term Goal: to go home    Interventions:  -   - See additional Care Plan goals for specific interventions  Outc

## 2021-01-12 NOTE — PLAN OF CARE
A&Ox4, forgetful at times. Can be very anxious- Klonopin PRN given with positive result. On room air, lungs clear. Abdomen soft and nontender, BS hyperactive. Denies N/V/D. C/o abdominal cramping- PRN medications given, see MAR. Clear liquid diet.  IV fluid

## 2021-01-12 NOTE — CONSULTS
formerly [facility] Pharmacy Note:  Renal Dose Adjustment for Enoxaparin (LOVENOX)    Marianne Spencer has been on Enoxaparin (LOVENOX) 30 mg subcutaneously every 24 hours. Estimated Creatinine Clearance: 41.2 mL/min (based on SCr of 0.93 mg/dL).      Now that the renal f

## 2021-01-13 ENCOUNTER — TELEPHONE (OUTPATIENT)
Dept: FAMILY MEDICINE CLINIC | Facility: CLINIC | Age: 81
End: 2021-01-13

## 2021-01-13 ENCOUNTER — PATIENT OUTREACH (OUTPATIENT)
Dept: CASE MANAGEMENT | Age: 81
End: 2021-01-13

## 2021-01-13 DIAGNOSIS — Z02.9 ENCOUNTERS FOR UNSPECIFIED ADMINISTRATIVE PURPOSE: ICD-10-CM

## 2021-01-13 DIAGNOSIS — N30.00 ACUTE CYSTITIS WITHOUT HEMATURIA: ICD-10-CM

## 2021-01-13 PROCEDURE — 1111F DSCHRG MED/CURRENT MED MERGE: CPT

## 2021-01-13 NOTE — TELEPHONE ENCOUNTER
Spoke to pt for TCM today. Pt does not have her HFU appt scheduled at this time. Pt has a Adriana Visit on 1/15/21 with Tatyana Chacon however TCM/HFU appt recommended by 1/19/21 as pt is a high risk for readmission.  Attempted to schedu

## 2021-01-13 NOTE — TELEPHONE ENCOUNTER
Medication: Pregabalin 150mg    Date of last refill: 3/10/20 Qty 60 w/2 rf-taking 1 tab daily instead of 2  [place]      Last office visit: 11/16/20  Due back to clinic per last office note:  Pt to have MRI but has not s

## 2021-01-13 NOTE — PROGRESS NOTES
Initial Post Discharge Follow Up   Discharge Date: 1/12/21  Contact Date: 1/13/2021    Consent Verification:  Assessment Completed With: Patient  HIPAA Verified?   Yes    Discharge Dx:   UTI    Was TCC ordered: no    General:   • How have you been since yes  • (NCM) Was patient given a different diet per AVS? no      Medications: Reviewed medication list with the patient. Medications are up to date.    Current Outpatient Medications   Medication Sig Dispense Refill   • vancomycin HCl 50 MG/ML Oral Recon So 2 (two) times daily. (Patient taking differently: Take 150 mg by mouth daily.  ) 60 capsule 2   • DULoxetine HCl (CYMBALTA) 30 MG Oral Cap DR Particles Take 1 capsule (30 mg total) by mouth daily.  30 capsule 1   • omeprazole 20 MG Oral Capsule Delayed Rele Etc): No     Follow up appointments:  Guidrytorsten Chaaspen will be sent to PCP office to see about an HFU appt. Unable to schedule a sooner appt due to schedule limitations.      Your appointments     Date & Time Appointment Department [facility])    Darnell 15, 2021  1:00 PM CST M discussed. Any changes or updates to medications and or orders sent to PCP.

## 2021-01-14 NOTE — TELEPHONE ENCOUNTER
Change to his TCM. We can always do a Medicare annual wellness in the next couple of weeks. Please call and get an update on how she is feeling and tell her were going to change her appointment to a follow-up hospitalization.

## 2021-01-14 NOTE — TELEPHONE ENCOUNTER
Patient cancelled appointment for 1/15/21 via automated service. Left message for patient to call back to schedule TCM and reschedule MAW.

## 2021-01-14 NOTE — TELEPHONE ENCOUNTER
Patient states she feels ok but would like to see Fam Spotted as soon as possible. States her  must have cancelled the appointment by mistake. No openings available to reschedule. Last TCM day is 1/19/21.    Next available office appointment is 1/20

## 2021-01-15 ENCOUNTER — OFFICE VISIT (OUTPATIENT)
Dept: FAMILY MEDICINE CLINIC | Facility: CLINIC | Age: 81
End: 2021-01-15
Payer: MEDICARE

## 2021-01-15 VITALS
HEIGHT: 63 IN | TEMPERATURE: 97 F | BODY MASS INDEX: 23.57 KG/M2 | DIASTOLIC BLOOD PRESSURE: 62 MMHG | SYSTOLIC BLOOD PRESSURE: 88 MMHG | HEART RATE: 60 BPM | WEIGHT: 133 LBS

## 2021-01-15 DIAGNOSIS — M51.16 LUMBAR DISC HERNIATION WITH RADICULOPATHY: ICD-10-CM

## 2021-01-15 DIAGNOSIS — N39.46 MIXED STRESS AND URGE URINARY INCONTINENCE: ICD-10-CM

## 2021-01-15 DIAGNOSIS — R19.7 DIARRHEA, UNSPECIFIED TYPE: Primary | ICD-10-CM

## 2021-01-15 DIAGNOSIS — R41.3 MEMORY DIFFICULTIES: ICD-10-CM

## 2021-01-15 DIAGNOSIS — Z86.19 HISTORY OF CLOSTRIDIOIDES DIFFICILE INFECTION: ICD-10-CM

## 2021-01-15 DIAGNOSIS — N39.0 UTI DUE TO KLEBSIELLA SPECIES: ICD-10-CM

## 2021-01-15 DIAGNOSIS — R10.30 LOWER ABDOMINAL PAIN: ICD-10-CM

## 2021-01-15 DIAGNOSIS — B96.89 UTI DUE TO KLEBSIELLA SPECIES: ICD-10-CM

## 2021-01-15 LAB
BILIRUBIN: NEGATIVE
GLUCOSE (URINE DIPSTICK): NEGATIVE MG/DL
KETONES (URINE DIPSTICK): NEGATIVE MG/DL
LEUKOCYTES: NEGATIVE
MULTISTIX LOT#: 1044 NUMERIC
NITRITE, URINE: NEGATIVE
OCCULT BLOOD: NEGATIVE
PH, URINE: 6 (ref 4.5–8)
PROTEIN (URINE DIPSTICK): 30 MG/DL
SPECIFIC GRAVITY: 1.02 (ref 1–1.03)
URINE-COLOR: YELLOW
UROBILINOGEN,SEMI-QN: 0.2 MG/DL (ref 0–1.9)

## 2021-01-15 PROCEDURE — 87086 URINE CULTURE/COLONY COUNT: CPT | Performed by: FAMILY MEDICINE

## 2021-01-15 PROCEDURE — 99496 TRANSJ CARE MGMT HIGH F2F 7D: CPT | Performed by: FAMILY MEDICINE

## 2021-01-15 PROCEDURE — 1111F DSCHRG MED/CURRENT MED MERGE: CPT | Performed by: FAMILY MEDICINE

## 2021-01-15 PROCEDURE — 81003 URINALYSIS AUTO W/O SCOPE: CPT | Performed by: FAMILY MEDICINE

## 2021-01-15 RX ORDER — VANCOMYCIN HYDROCHLORIDE 125 MG/1
125 CAPSULE ORAL DAILY
COMMUNITY
Start: 2021-01-12 | End: 2021-03-18 | Stop reason: ALTCHOICE

## 2021-01-15 RX ORDER — HYDROCODONE BITARTRATE AND ACETAMINOPHEN 10; 325 MG/1; MG/1
TABLET ORAL EVERY 8 HOURS PRN
Qty: 10 TABLET | Refills: 0 | Status: SHIPPED | OUTPATIENT
Start: 2021-01-15 | End: 2021-02-16

## 2021-01-15 RX ORDER — PREGABALIN 150 MG/1
CAPSULE ORAL
Qty: 60 CAPSULE | Refills: 0 | Status: ON HOLD | OUTPATIENT
Start: 2021-01-15 | End: 2021-03-25

## 2021-01-15 NOTE — PROGRESS NOTES
HPI:    Monserrat Walters is a 68year old female here today for hospital follow up.    She was discharged from Inpatient hospital, [facility] to Home   Admission Date: 1/11/21   Discharge Date: 1/12/21  Hospital Discharge Diagnoses (since 12/16/2020) body aches. Finished Levaquin 750 mg yesterday and is taking Bentyl without relief of the cramping. Denies any blood or mucus in the stools. Vancomycin 125 mg 3 times a day last dose today. Last prescription for Standard Tompkins was 8/4/2020 and was given #10.   P Hemoglobin      12.0 - 16.0 g/dL 12.3    Hematocrit      35.0 - 48.0 % 36.3    Platelet Count      979.7 - 450.0 10(3)uL 343.0    MCV      80.0 - 100.0 fL 92.4    MCH      26.0 - 34.0 pg 31.3    MCHC      31.0 - 37.0 g/dL 33.9    RDW      11.0 - 15.0 % 1 2 (two) times daily as needed.     •  MONTELUKAST SODIUM 10 MG Oral Tab, TAKE 1 TABLET BY MOUTH EVERY NIGHT AT BEDTIME    •  TRAZODONE  MG Oral Tab, TAKE 1 TABLET(100 MG) BY MOUTH EVERY NIGHT    •  Fluticasone Propionate 50 MCG/ACT Nasal Suspension, Fibromyalgia, Flatulence/gas pain/belching, Food intolerance, Frequent use of laxatives, Hearing impairment, Heart murmur, Hemorrhoids, High blood pressure, High cholesterol, History of blood transfusion, History of cardiac murmur, History of eating disord surgery; spine surgery procedure unlisted; sly localization wire 1 site left (cpt=19281); sly localization wire 1 site right (cpt=19281); and other (01/27/2020).     She family history includes Bipolar Disorder in her sister; Cancer in her sister and sister developed, well nourished, in no apparent distress  SKIN: no rashes, no suspicious lesions  HEENT: atraumatic, normocephalic, ears and throat are clear  EYES: PERRLA, EOMI, conjunctiva are clear  NECK: supple, no adenopathy, no bruits  CHEST: no chest tend the tongue every 4 (four) hours as needed for Cramping. • HYDROcodone-acetaminophen  MG Oral Tab 10 tablet 0     Sig: Take 0.5-1 tablets by mouth every 8 (eight) hours as needed.        Imaging & Consults:  None    Diarrhea, unspecified type  (prima drive, operate machinery or drink alcohol when taking this medication. Advised of risk of addiction to hydrocodone. [facility] Data Reviewed.   Patient again instructed to be very conservative with use of hydrocodone #10 every 3 to 4 months  - HYDROco

## 2021-01-16 ENCOUNTER — LAB ENCOUNTER (OUTPATIENT)
Dept: LAB | Age: 81
End: 2021-01-16
Attending: FAMILY MEDICINE
Payer: MEDICARE

## 2021-01-16 DIAGNOSIS — R19.7 DIARRHEA, UNSPECIFIED TYPE: ICD-10-CM

## 2021-01-16 DIAGNOSIS — Z86.19 HISTORY OF CLOSTRIDIOIDES DIFFICILE INFECTION: ICD-10-CM

## 2021-01-17 PROBLEM — N39.0 SEPSIS DUE TO URINARY TRACT INFECTION: Status: RESOLVED | Noted: 2021-01-11 | Resolved: 2021-01-17

## 2021-01-17 PROBLEM — N30.00 ACUTE CYSTITIS WITHOUT HEMATURIA: Status: RESOLVED | Noted: 2021-01-11 | Resolved: 2021-01-17

## 2021-01-17 PROBLEM — K52.9 ACUTE COLITIS: Status: RESOLVED | Noted: 2021-01-11 | Resolved: 2021-01-17

## 2021-01-17 PROBLEM — Z86.19 HISTORY OF CLOSTRIDIOIDES DIFFICILE INFECTION: Status: ACTIVE | Noted: 2021-01-17

## 2021-01-17 PROBLEM — A41.9 SEPSIS DUE TO URINARY TRACT INFECTION  (HCC): Status: RESOLVED | Noted: 2021-01-11 | Resolved: 2021-01-17

## 2021-01-17 PROBLEM — R53.82 CHRONIC FATIGUE: Status: RESOLVED | Noted: 2018-12-10 | Resolved: 2021-01-17

## 2021-01-17 PROBLEM — A41.9 SEPSIS DUE TO URINARY TRACT INFECTION (HCC): Status: RESOLVED | Noted: 2021-01-11 | Resolved: 2021-01-17

## 2021-01-17 PROBLEM — N39.0 SEPSIS DUE TO URINARY TRACT INFECTION (HCC): Status: RESOLVED | Noted: 2021-01-11 | Resolved: 2021-01-17

## 2021-01-17 PROBLEM — B96.89 UTI DUE TO KLEBSIELLA SPECIES: Status: ACTIVE | Noted: 2021-01-17

## 2021-01-17 PROBLEM — R10.9 ABDOMINAL PAIN OF UNKNOWN ETIOLOGY: Status: RESOLVED | Noted: 2021-01-11 | Resolved: 2021-01-17

## 2021-01-17 PROBLEM — R19.7 DIARRHEA: Status: RESOLVED | Noted: 2020-05-06 | Resolved: 2021-01-17

## 2021-01-17 PROBLEM — N39.0 UTI DUE TO KLEBSIELLA SPECIES: Status: ACTIVE | Noted: 2021-01-17

## 2021-01-17 PROBLEM — A41.9 SEPSIS DUE TO URINARY TRACT INFECTION: Status: RESOLVED | Noted: 2021-01-11 | Resolved: 2021-01-17

## 2021-01-17 PROBLEM — R19.7 DIARRHEA: Status: ACTIVE | Noted: 2020-05-06

## 2021-01-17 PROBLEM — N39.0 SEPSIS DUE TO URINARY TRACT INFECTION  (HCC): Status: RESOLVED | Noted: 2021-01-11 | Resolved: 2021-01-17

## 2021-01-17 PROBLEM — N39.46 MIXED STRESS AND URGE URINARY INCONTINENCE: Status: ACTIVE | Noted: 2021-01-17

## 2021-01-17 PROCEDURE — 87427 SHIGA-LIKE TOXIN AG IA: CPT

## 2021-01-17 PROCEDURE — 87046 STOOL CULTR AEROBIC BACT EA: CPT

## 2021-01-17 PROCEDURE — 87045 FECES CULTURE AEROBIC BACT: CPT

## 2021-01-17 PROCEDURE — 87493 C DIFF AMPLIFIED PROBE: CPT

## 2021-01-18 DIAGNOSIS — I10 ESSENTIAL HYPERTENSION: ICD-10-CM

## 2021-01-18 DIAGNOSIS — E78.2 MIXED HYPERLIPIDEMIA: ICD-10-CM

## 2021-01-18 LAB — C DIFF TOX B STL QL: NEGATIVE

## 2021-01-18 RX ORDER — AMLODIPINE BESYLATE 10 MG/1
10 TABLET ORAL DAILY
Qty: 90 TABLET | Refills: 1 | Status: SHIPPED | OUTPATIENT
Start: 2021-01-18 | End: 2021-07-16

## 2021-01-18 RX ORDER — PRAVASTATIN SODIUM 40 MG
40 TABLET ORAL NIGHTLY
Qty: 90 TABLET | Refills: 1 | Status: SHIPPED | OUTPATIENT
Start: 2021-01-18 | End: 2021-03-18

## 2021-01-20 ENCOUNTER — OFFICE VISIT (OUTPATIENT)
Dept: FAMILY MEDICINE CLINIC | Facility: CLINIC | Age: 81
End: 2021-01-20
Payer: MEDICARE

## 2021-01-20 VITALS
TEMPERATURE: 97 F | HEART RATE: 52 BPM | DIASTOLIC BLOOD PRESSURE: 62 MMHG | BODY MASS INDEX: 24.1 KG/M2 | HEIGHT: 63 IN | WEIGHT: 136 LBS | SYSTOLIC BLOOD PRESSURE: 112 MMHG

## 2021-01-20 DIAGNOSIS — M25.551 HIP PAIN, BILATERAL: ICD-10-CM

## 2021-01-20 DIAGNOSIS — E83.52 HYPERCALCEMIA: ICD-10-CM

## 2021-01-20 DIAGNOSIS — E87.6 HYPOKALEMIA: ICD-10-CM

## 2021-01-20 DIAGNOSIS — N18.31 STAGE 3A CHRONIC KIDNEY DISEASE (HCC): ICD-10-CM

## 2021-01-20 DIAGNOSIS — E78.2 MIXED HYPERLIPIDEMIA: ICD-10-CM

## 2021-01-20 DIAGNOSIS — J30.1 SEASONAL ALLERGIC RHINITIS DUE TO POLLEN: ICD-10-CM

## 2021-01-20 DIAGNOSIS — I10 ESSENTIAL HYPERTENSION: ICD-10-CM

## 2021-01-20 DIAGNOSIS — Z78.0 POSTMENOPAUSAL: ICD-10-CM

## 2021-01-20 DIAGNOSIS — N39.46 MIXED STRESS AND URGE URINARY INCONTINENCE: ICD-10-CM

## 2021-01-20 DIAGNOSIS — M51.16 LUMBAR DISC HERNIATION WITH RADICULOPATHY: ICD-10-CM

## 2021-01-20 DIAGNOSIS — F33.1 MODERATE EPISODE OF RECURRENT MAJOR DEPRESSIVE DISORDER (HCC): ICD-10-CM

## 2021-01-20 DIAGNOSIS — M51.36 DDD (DEGENERATIVE DISC DISEASE), LUMBAR: ICD-10-CM

## 2021-01-20 DIAGNOSIS — R73.9 HYPERGLYCEMIA: ICD-10-CM

## 2021-01-20 DIAGNOSIS — Z12.31 VISIT FOR SCREENING MAMMOGRAM: ICD-10-CM

## 2021-01-20 DIAGNOSIS — K58.2 IRRITABLE BOWEL SYNDROME WITH BOTH CONSTIPATION AND DIARRHEA: ICD-10-CM

## 2021-01-20 DIAGNOSIS — F41.1 GAD (GENERALIZED ANXIETY DISORDER): ICD-10-CM

## 2021-01-20 DIAGNOSIS — Z00.00 ENCOUNTER FOR ANNUAL HEALTH EXAMINATION: Primary | ICD-10-CM

## 2021-01-20 DIAGNOSIS — I25.10 CORONARY ARTERY DISEASE INVOLVING NATIVE CORONARY ARTERY OF NATIVE HEART WITHOUT ANGINA PECTORIS: ICD-10-CM

## 2021-01-20 DIAGNOSIS — M79.7 FIBROMYALGIA: ICD-10-CM

## 2021-01-20 DIAGNOSIS — R41.3 POOR SHORT TERM MEMORY: ICD-10-CM

## 2021-01-20 DIAGNOSIS — F13.20 BENZODIAZEPINE DEPENDENCE (HCC): ICD-10-CM

## 2021-01-20 DIAGNOSIS — M81.8 OTHER OSTEOPOROSIS WITHOUT CURRENT PATHOLOGICAL FRACTURE: ICD-10-CM

## 2021-01-20 DIAGNOSIS — Z95.5 HISTORY OF CORONARY ARTERY STENT PLACEMENT: ICD-10-CM

## 2021-01-20 DIAGNOSIS — I25.118 CORONARY ARTERY DISEASE INVOLVING NATIVE CORONARY ARTERY OF NATIVE HEART WITH OTHER FORM OF ANGINA PECTORIS (HCC): ICD-10-CM

## 2021-01-20 DIAGNOSIS — Z78.9 POOR HISTORIAN: ICD-10-CM

## 2021-01-20 DIAGNOSIS — Z13.6 SCREENING FOR CARDIOVASCULAR CONDITION: ICD-10-CM

## 2021-01-20 DIAGNOSIS — N18.30 CHRONIC RENAL INSUFFICIENCY, STAGE 3 (MODERATE) (HCC): ICD-10-CM

## 2021-01-20 DIAGNOSIS — Z86.19 HISTORY OF CLOSTRIDIOIDES DIFFICILE INFECTION: ICD-10-CM

## 2021-01-20 DIAGNOSIS — Z91.81 AT RISK FOR INJURY RELATED TO FALL: ICD-10-CM

## 2021-01-20 DIAGNOSIS — Z87.442 HISTORY OF NEPHROLITHIASIS: ICD-10-CM

## 2021-01-20 DIAGNOSIS — M25.552 HIP PAIN, BILATERAL: ICD-10-CM

## 2021-01-20 DIAGNOSIS — Z91.19 NONCOMPLIANCE: ICD-10-CM

## 2021-01-20 DIAGNOSIS — Z87.11 HISTORY OF PEPTIC ULCER: ICD-10-CM

## 2021-01-20 DIAGNOSIS — E21.0 PRIMARY HYPERPARATHYROIDISM (HCC): ICD-10-CM

## 2021-01-20 PROCEDURE — 99214 OFFICE O/P EST MOD 30 MIN: CPT | Performed by: FAMILY MEDICINE

## 2021-01-20 PROCEDURE — G0439 PPPS, SUBSEQ VISIT: HCPCS | Performed by: FAMILY MEDICINE
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
## 2021-01-20 NOTE — PATIENT INSTRUCTIONS
Routine Healthcare for Women   Routine checkups can find treatable problems early. For many medical problems, early treatment can help prevent more serious complications. The value of checkups and how often you have them depend mainly on your age.  Your per family history of high cholesterol. • Colorectal cancer test: if you are 48 or older.  Recommended tests include a yearly test for blood in the stool, called the fecal occult blood test (FOBT) or fecal immunochemical test (FIT), and one of the following t personal and family medical history. Many other tests are often done at routine checkups, but there is no current evidence that they are helpful as routine screening tests for healthy women.  Examples of such tests are a CBC (complete blood count), thyroi a younger age if you have a high-risk medical condition, such as diabetes. • Varicella (chickenpox) if you have never had chickenpox. • Zoster (shingles) vaccine: if you are 50 or older. The vaccine can help prevent shingles.  It can also reduce the emmanuel recommended by your physician but may not be covered, or covered at this frequency, by your insurer. Please check with your insurance carrier before scheduling to verify coverage.    PREVENTATIVE SERVICES  INDICATIONS AND SCHEDULE Internal Lab or Procedure encounters were searched. Please check Results Review for a complete set of results.  Limited to patients who meet one of the following criteria:   • Men who are 73-68 years old and have smoked more than 100 cigarettes in their lifetime   • Anyone with a fa results found for: CHLAMYDIA No flowsheet data found. Screening Mammogram      Mammogram    Recommend Annually to at least age 76, and as needed after 76 There are no preventive care reminders to display for this patient.  Please get this Mammogram regul cut with metal- TD and TDaP Not covered by Medicare Part B) No results found. However, due to the size of the patient record, not all encounters were searched. Please check Results Review for a complete set of results.  This may be covered with your prescri

## 2021-01-21 PROBLEM — N39.0 UTI DUE TO KLEBSIELLA SPECIES: Status: RESOLVED | Noted: 2021-01-17 | Resolved: 2021-01-21

## 2021-01-21 PROBLEM — K29.80 DUODENITIS DETERMINED BY BIOPSY: Status: RESOLVED | Noted: 2018-04-18 | Resolved: 2021-01-21

## 2021-01-21 PROBLEM — A49.8 CLOSTRIDIUM DIFFICILE INFECTION: Status: RESOLVED | Noted: 2020-09-22 | Resolved: 2021-01-21

## 2021-01-21 PROBLEM — F43.0 ACUTE STRESS REACTION: Status: RESOLVED | Noted: 2020-08-08 | Resolved: 2021-01-21

## 2021-01-21 PROBLEM — B96.89 UTI DUE TO KLEBSIELLA SPECIES: Status: RESOLVED | Noted: 2021-01-17 | Resolved: 2021-01-21

## 2021-01-21 PROBLEM — K20.80 OTHER SPECIFIED OESOPHAGITIS: Status: RESOLVED | Noted: 2019-08-29 | Resolved: 2021-01-21

## 2021-01-21 PROBLEM — K20.80 OTHER ESOPHAGITIS: Status: RESOLVED | Noted: 2019-08-29 | Resolved: 2021-01-21

## 2021-01-21 NOTE — PROGRESS NOTES
HPI:   Dania Mercer is a 68year old female who presents for a Medicare Subsequent Annual Wellness visit (Pt already had Initial Annual Wellness).   Last bone density 1/30/15  Last mammogram 1/24/20  Bilateral hearing aids  Refuses flu shot, Shingles va calcium, depression and anxiety, chronic pain in back and joint pain  On Klonopin for  psychiatric issues. MRI 2014 demonstrated 7 mm focus on the left lobe multiple parathyroid adenomas.   Apparently did have a parathyroidectomy in November 2013 for the h hyperparathyroidism  Due for bone density.     Chronic nasal congestion  Patient experiences intermittent sinus congestion and sinus infections chronically presently today does not have one has seen both Dr. Imelda North and Dr. Josh Gutierrez allergy testing recommen 136 - 145 mmol/L 140   Potassium      3.5 - 5.1 mmol/L 3.3 (L)   Chloride      98 - 112 mmol/L 111   Carbon Dioxide, Total      21.0 - 32.0 mmol/L 20.0 (L)   ANION GAP      0 - 18 mmol/L 9   BUN      7 - 18 mg/dL 9   CREATININE      0.55 - 1.02 mg/dL 0.93 screening of functional status. Shop for groceries: Need some help   She has Hearing problems based on screening of functional status. Hearing Problems?: Yes  She has Walking problems based on screening of functional status.    Difficulty walking?: Yes Team:  Huan Carvalho DO as PCP - General (Family Practice)  Jass Granados PA-C as PCP - JOVANNI Dasilva (Nurse Practitioner)  Alphonso Rodriguez MD as Consulting Physician (Anesthesiology)  Cody Bamberger, PA-C (Physician Assistant)  Romeo Johnson (60.3 kg)  01/11/21 : 130 lb (59 kg)     Last Cholesterol Labs:   Lab Results   Component Value Date    CHOLEST 145 07/05/2018    HDL 51 07/05/2018    LDL 69 07/05/2018    TRIG 124 07/05/2018          Last Chemistry Labs:   Lab Results   Component Value Da by mouth daily with breakfast.  )    •  cyclobenzaprine 10 MG Oral Tab, Take 1 tablet (10 mg total) by mouth 3 (three) times daily as needed for Muscle spasms.     •  omeprazole 20 MG Oral Capsule Delayed Release, Take 1 capsule (20 mg total) by mouth daily disturbance, Stented coronary artery, Stool incontinence, Stress, Tinnitus, Unspecified essential hypertension, Visual impairment, and Wears glasses.     She  has a past surgical history that includes total abdom hysterectomy; injection, w/wo contrast, dx/t alcohol.      REVIEW OF SYSTEMS:   GENERAL: feels well otherwise  SKIN: denies any unusual skin lesions  EYES: denies blurred vision or double vision  HEENT: denies nasal congestion, sinus pain or ST  LUNGS: denies shortness of breath with exertion  CARDIOV Extremities: Extremities normal, atraumatic, no cyanosis or edema   Pulses: 2+ and symmetric   Skin: Skin color, texture, turgor normal, no rashes or lesions   Lymph nodes: Cervical, supraclavicular, and axillary nodes normal   Neurologic:  Tremor noted kidney function    Irritable bowel syndrome with both constipation and diarrhea  Follow-up with GI as planned in February  Chronic renal insufficiency, stage 3 (moderate)  -     MICROALB/CREAT RATIO, RANDOM URINE;  Future  Dr. Carrillo Waldron if kidney function drops discussed  Poor historian    Time spent was 60  minutes more than 50% was spent on counseling regarding medical conditions and treatment.             Diet assessment: fair     PLAN:  The patient indicates understanding of these issues and agrees to the plan flowsheet data found. Fecal Occult Blood Annually No results found for: FOB No flowsheet data found. Glaucoma Screening      Ophthalmology Visit Annually: Diabetics, FHx Glaucoma, AA>50, > 65 No flowsheet data found.     Bone Density Screenin or Procedure External Lab or Procedure      Annual Monitoring of Persistent     Medications (ACE/ARB, digoxin diuretics, anticonvulsants.)    Potassium  Annually Potassium (mmol/L)   Date Value   01/12/2021 3.3 (L)     POTASSIUM (mmol/L)   Date Value   08/

## 2021-01-23 NOTE — DISCHARGE SUMMARY
[facility] [place] HOSPITALIST  DISCHARGE SUMMARY     Alex Alvarez Patient Status:  Inpatient    1940 MRN [id number]   [facility] 3NE-A Attending No att. providers found   Hosp Day # 1 PCP Rina Freeman DO     Date of Admission: 2021  [place] Patient's Sepsis due to urinary tract infection ([address] was initially serious enough to expect a more lengthy hospitalization but patient improved faster than expected.                Procedures during hospitalization:   • None    Incidental or significant fi TABLET(100 MG) BY MOUTH DAILY   Quantity: 90 tablet  Refills: 0     Montelukast Sodium 10 MG Tabs  Commonly known as: SINGULAIR      TAKE 1 TABLET BY MOUTH EVERY NIGHT AT BEDTIME   Quantity: 90 tablet  Refills: 0     nystatin [id number] units Tabs  Commonly kn signs:       Physical Exam:    General: No acute distress. Respiratory: Clear to auscultation bilaterally. No wheezes. No rhonchi. Cardiovascular: S1, S2. Regular rate and rhythm. No murmurs, rubs or gallops. Abdomen: Soft, nontender, nondistended.   P

## 2021-02-01 ENCOUNTER — TELEPHONE (OUTPATIENT)
Dept: FAMILY MEDICINE CLINIC | Facility: CLINIC | Age: 81
End: 2021-02-01

## 2021-02-01 NOTE — TELEPHONE ENCOUNTER
Called patient. She complained of being up all night with aching legs. Denies redness or swelling. States \"both legs just ache\". Said she took a tylenol for during the night. Advised to try 1/2 norco as ordered. She said she had not taken that.   I a

## 2021-02-01 NOTE — TELEPHONE ENCOUNTER
Pt called states she has painful stomach cramping and feels like she has to take a bowel movement but even after she does she still has the pain, and pt would like to know if she can get something for the pain.

## 2021-02-04 NOTE — TELEPHONE ENCOUNTER
Hyoscyamine Sulfate 0.125 MG Sublingual SL Tab 20 tablet 0 1/15/2021    Sig:   Place 1-2 tablets (125-250 mcg total) under the tongue       LOV 1/20/21 for wellness   No future appointment

## 2021-02-05 NOTE — TELEPHONE ENCOUNTER
Joanne check out and roomed pt      came in with pt answered no to all covid questions and temp was 97.6    [facility], [address] [facility]  5/5/1940  [id number]  02/05

## 2021-02-08 ENCOUNTER — APPOINTMENT (OUTPATIENT)
Dept: CT IMAGING | Age: 81
End: 2021-02-08
Attending: EMERGENCY MEDICINE
Payer: MEDICARE

## 2021-02-08 ENCOUNTER — HOSPITAL ENCOUNTER (EMERGENCY)
Age: 81
Discharge: HOME | End: 2021-02-08
Attending: EMERGENCY MEDICINE
Payer: MEDICARE

## 2021-02-08 VITALS
TEMPERATURE: 98 F | HEIGHT: 63 IN | DIASTOLIC BLOOD PRESSURE: 76 MMHG | HEART RATE: 88 BPM | WEIGHT: 130 LBS | RESPIRATION RATE: 16 BRPM | BODY MASS INDEX: 23.04 KG/M2 | OXYGEN SATURATION: 98 % | SYSTOLIC BLOOD PRESSURE: 146 MMHG

## 2021-02-08 DIAGNOSIS — R19.7 DIARRHEA, UNSPECIFIED TYPE: Primary | ICD-10-CM

## 2021-02-08 LAB
ALBUMIN SERPL-MCNC: 4.2 G/DL (ref 3.4–5)
ALBUMIN/GLOB SERPL: 1.1 {RATIO} (ref 1–2)
ALP LIVER SERPL-CCNC: 79 U/L
ALT SERPL-CCNC: 26 U/L
ANION GAP SERPL CALC-SCNC: 7 MMOL/L (ref 0–18)
AST SERPL-CCNC: 19 U/L (ref 15–37)
BASOPHILS # BLD AUTO: 0.05 X10(3) UL (ref 0–0.2)
BASOPHILS NFR BLD AUTO: 0.5 %
BILIRUB SERPL-MCNC: 0.5 MG/DL (ref 0.1–2)
BILIRUB UR QL STRIP.AUTO: NEGATIVE
BUN BLD-MCNC: 18 MG/DL (ref 7–18)
BUN/CREAT SERPL: 12.4 (ref 10–20)
CALCIUM BLD-MCNC: 10.8 MG/DL (ref 8.5–10.1)
CHLORIDE SERPL-SCNC: 107 MMOL/L (ref 98–112)
CO2 SERPL-SCNC: 24 MMOL/L (ref 21–32)
COLOR UR AUTO: YELLOW
CREAT BLD-MCNC: 1.45 MG/DL
DEPRECATED RDW RBC AUTO: 47.8 FL (ref 35.1–46.3)
EOSINOPHIL # BLD AUTO: 0.08 X10(3) UL (ref 0–0.7)
EOSINOPHIL NFR BLD AUTO: 0.7 %
ERYTHROCYTE [DISTWIDTH] IN BLOOD BY AUTOMATED COUNT: 14 % (ref 11–15)
GLOBULIN PLAS-MCNC: 3.7 G/DL (ref 2.8–4.4)
GLUCOSE BLD-MCNC: 103 MG/DL (ref 70–99)
GLUCOSE UR STRIP.AUTO-MCNC: NEGATIVE MG/DL
HCT VFR BLD AUTO: 42.1 %
HGB BLD-MCNC: 14 G/DL
IMM GRANULOCYTES # BLD AUTO: 0.03 X10(3) UL (ref 0–1)
IMM GRANULOCYTES NFR BLD: 0.3 %
KETONES UR STRIP.AUTO-MCNC: NEGATIVE MG/DL
LIPASE SERPL-CCNC: 73 U/L (ref 73–393)
LYMPHOCYTES # BLD AUTO: 3.39 X10(3) UL (ref 1–4)
LYMPHOCYTES NFR BLD AUTO: 30.8 %
M PROTEIN MFR SERPL ELPH: 7.9 G/DL (ref 6.4–8.2)
MCH RBC QN AUTO: 31.1 PG (ref 26–34)
MCHC RBC AUTO-ENTMCNC: 33.3 G/DL (ref 31–37)
MCV RBC AUTO: 93.6 FL
MONOCYTES # BLD AUTO: 0.9 X10(3) UL (ref 0.1–1)
MONOCYTES NFR BLD AUTO: 8.2 %
NEUTROPHILS # BLD AUTO: 6.57 X10 (3) UL (ref 1.5–7.7)
NEUTROPHILS # BLD AUTO: 6.57 X10(3) UL (ref 1.5–7.7)
NEUTROPHILS NFR BLD AUTO: 59.5 %
NITRITE UR QL STRIP.AUTO: POSITIVE
OSMOLALITY SERPL CALC.SUM OF ELEC: 288 MOSM/KG (ref 275–295)
PH UR STRIP.AUTO: 6.5 [PH] (ref 4.5–8)
PLATELET # BLD AUTO: 373 10(3)UL (ref 150–450)
POTASSIUM SERPL-SCNC: 3.7 MMOL/L (ref 3.5–5.1)
PROT UR STRIP.AUTO-MCNC: NEGATIVE MG/DL
RBC # BLD AUTO: 4.5 X10(6)UL
RBC UR QL AUTO: NEGATIVE
SARS-COV-2 RNA RESP QL NAA+PROBE: NOT DETECTED
SODIUM SERPL-SCNC: 138 MMOL/L (ref 136–145)
SP GR UR STRIP.AUTO: 1.02 (ref 1–1.03)
UROBILINOGEN UR STRIP.AUTO-MCNC: 0.2 MG/DL
WBC # BLD AUTO: 11 X10(3) UL (ref 4–11)

## 2021-02-08 PROCEDURE — 81001 URINALYSIS AUTO W/SCOPE: CPT | Performed by: EMERGENCY MEDICINE

## 2021-02-08 PROCEDURE — 87186 SC STD MICRODIL/AGAR DIL: CPT | Performed by: EMERGENCY MEDICINE

## 2021-02-08 PROCEDURE — 87088 URINE BACTERIA CULTURE: CPT | Performed by: EMERGENCY MEDICINE

## 2021-02-08 PROCEDURE — 99284 EMERGENCY DEPT VISIT MOD MDM: CPT

## 2021-02-08 PROCEDURE — 83690 ASSAY OF LIPASE: CPT | Performed by: EMERGENCY MEDICINE

## 2021-02-08 PROCEDURE — 74177 CT ABD & PELVIS W/CONTRAST: CPT | Performed by: EMERGENCY MEDICINE

## 2021-02-08 PROCEDURE — 85025 COMPLETE CBC W/AUTO DIFF WBC: CPT | Performed by: EMERGENCY MEDICINE

## 2021-02-08 PROCEDURE — 96374 THER/PROPH/DIAG INJ IV PUSH: CPT

## 2021-02-08 PROCEDURE — 96361 HYDRATE IV INFUSION ADD-ON: CPT

## 2021-02-08 PROCEDURE — 87086 URINE CULTURE/COLONY COUNT: CPT | Performed by: EMERGENCY MEDICINE

## 2021-02-08 PROCEDURE — 99285 EMERGENCY DEPT VISIT HI MDM: CPT

## 2021-02-08 PROCEDURE — 80053 COMPREHEN METABOLIC PANEL: CPT | Performed by: EMERGENCY MEDICINE

## 2021-02-08 RX ORDER — MORPHINE SULFATE 4 MG/ML
4 INJECTION, SOLUTION INTRAMUSCULAR; INTRAVENOUS ONCE
Status: COMPLETED | OUTPATIENT
Start: 2021-02-08 | End: 2021-02-08
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
## 2021-02-10 RX ORDER — CIPROFLOXACIN 500 MG/1
500 TABLET, FILM COATED ORAL 2 TIMES DAILY
Qty: 6 TABLET | Refills: 0 | Status: SHIPPED | OUTPATIENT
Start: 2021-02-10 | End: 2021-02-13

## 2021-02-11 DIAGNOSIS — Z23 NEED FOR VACCINATION: ICD-10-CM

## 2021-02-15 ENCOUNTER — LAB ENCOUNTER (OUTPATIENT)
Dept: LAB | Age: 81
End: 2021-02-15
Attending: EMERGENCY MEDICINE
Payer: MEDICARE

## 2021-02-15 LAB — C DIFF TOX B STL QL: POSITIVE

## 2021-02-15 PROCEDURE — 87493 C DIFF AMPLIFIED PROBE: CPT | Performed by: EMERGENCY MEDICINE

## 2021-02-15 RX ORDER — VANCOMYCIN HYDROCHLORIDE 125 MG/1
CAPSULE ORAL
Qty: 84 CAPSULE | Refills: 0 | Status: SHIPPED | OUTPATIENT
Start: 2021-02-15 | End: 2021-02-26 | Stop reason: ALTCHOICE

## 2021-02-16 ENCOUNTER — TELEPHONE (OUTPATIENT)
Dept: FAMILY MEDICINE CLINIC | Facility: CLINIC | Age: 81
End: 2021-02-16

## 2021-02-16 ENCOUNTER — TELEPHONE (OUTPATIENT)
Dept: SURGERY | Facility: CLINIC | Age: 81
End: 2021-02-16

## 2021-02-16 ENCOUNTER — VIRTUAL PHONE E/M (OUTPATIENT)
Dept: SURGERY | Facility: CLINIC | Age: 81
End: 2021-02-16
Payer: MEDICARE

## 2021-02-16 DIAGNOSIS — M51.16 LUMBAR DISC HERNIATION WITH RADICULOPATHY: Primary | ICD-10-CM

## 2021-02-16 DIAGNOSIS — Z96.89 S/P INSERTION OF SPINAL CORD STIMULATOR: ICD-10-CM

## 2021-02-16 DIAGNOSIS — M51.16 LUMBAR DISC HERNIATION WITH RADICULOPATHY: ICD-10-CM

## 2021-02-16 PROCEDURE — 99441 PHONE E/M BY PHYS 5-10 MIN: CPT | Performed by: NEUROLOGICAL SURGERY

## 2021-02-16 RX ORDER — HYDROCODONE BITARTRATE AND ACETAMINOPHEN 10; 325 MG/1; MG/1
TABLET ORAL EVERY 8 HOURS PRN
Qty: 30 TABLET | Refills: 0 | Status: ON HOLD | OUTPATIENT
Start: 2021-02-16 | End: 2021-03-26

## 2021-02-16 NOTE — TELEPHONE ENCOUNTER
Spoke to patient. States Rizwana Leavitt been up all night with diarrhea and cramps\". States she has had 3 loose BM's today. Cramping pain on and off. Has taken 2 hyoscyamine tabs this am with minimal relief. Did advise provider is out of office today.   Also ad

## 2021-02-16 NOTE — TELEPHONE ENCOUNTER
Just noticed had CDIFF resulted yesterday as positive. Order for [employer] was placed yesterday. Asked patient if she has started and said there was a problem at pharmacy and she has not picked up yet. Encouraged her to  [employer] and begin.

## 2021-02-16 NOTE — TELEPHONE ENCOUNTER
Called Donna at providers request and informed her he is running a little behind but will contact her for her televisit soon. Pt acknowledged and was appreciative of call.     Nothing further

## 2021-02-16 NOTE — TELEPHONE ENCOUNTER
Agree with her calling GI since she has already done 2 of the Levsin. she has a tendency to want to take medications constantly to help with the constipation then she gets the diarrhea.   She has been encouraged to just stay with high-fiber diet with water

## 2021-02-16 NOTE — TELEPHONE ENCOUNTER
Pt's  requesting refill of Hydrocodone. Verified pharmacy. Patient informed of 48 hour refill policy excluding weekends and holidays. Informed patient only patient can  the prescription effective April 1, 2017.   Further explained patient w

## 2021-02-16 NOTE — PROGRESS NOTES
Virtual Telephone Check-In    Lilian David verbally consents to a Virtual/Telephone Check-In visit on 02/16/21. Patient has been referred to the [facility] website at [email or web address] to review the yearly Consent to Treat document.     Patient Steven Quarles

## 2021-02-16 NOTE — TELEPHONE ENCOUNTER
Pt calling states she was up all night with diarrhea and cramping. Pt would like to know if something can be called into her pharmacy.

## 2021-02-16 NOTE — TELEPHONE ENCOUNTER
Medication: Hydrocodone    Date of last refill: 1/15/21  [place]      Last office visit: Today phone visit  Due back to clinic per last office note:  She will follow-up with me after injections  Date next office visit sc

## 2021-02-17 ENCOUNTER — TELEPHONE (OUTPATIENT)
Dept: FAMILY MEDICINE CLINIC | Facility: CLINIC | Age: 81
End: 2021-02-17

## 2021-02-17 DIAGNOSIS — G47.09 OTHER INSOMNIA: ICD-10-CM

## 2021-02-17 RX ORDER — TRAZODONE HYDROCHLORIDE 100 MG/1
100 TABLET ORAL NIGHTLY
Qty: 90 TABLET | Refills: 0 | Status: SHIPPED | OUTPATIENT
Start: 2021-02-17 | End: 2021-05-14

## 2021-02-17 NOTE — TELEPHONE ENCOUNTER
Rodrigue Howell MD   2/15/2021 11:15 PM      Chart reviewed.      Recurrent c diff.      Needs long tapered course of vanc.      Script sent.    Needs to f/u with GI (has appointment next week per note)     Brian Gray RN    2/16/21 10:04 AM  Note     Just

## 2021-02-17 NOTE — TELEPHONE ENCOUNTER
Has to wait for vanco to work,  explain to her again she has c dif and to clean with bleach take RX and follow up with GI.   Do not take RX to slow down the BM just take  the vancomycin as prescribed

## 2021-02-17 NOTE — TELEPHONE ENCOUNTER
Pt called and said she has diarrhea and nothing she has is working. She would like Dylon Wallis to call something in for her to [place] on [address].

## 2021-02-18 ENCOUNTER — TELEPHONE (OUTPATIENT)
Dept: FAMILY MEDICINE CLINIC | Facility: CLINIC | Age: 81
End: 2021-02-18

## 2021-02-18 NOTE — TELEPHONE ENCOUNTER
Vicki Wilkinson is calling to see if Massimo Reveles can call in something for her, she is constipated and needs something to help her, Please call Vicki Wilkinson at [phone]

## 2021-02-18 NOTE — TELEPHONE ENCOUNTER
February 17, 2021  Anastasiia Madison PA-C  to [facility] Clinical Staff      5:38 PM  Note     Has to wait for vanco to work,  explain to her again she has c dif and to clean with bleach take RX and follow up with GI.   Do not take RX to slow down the BM just

## 2021-02-21 ENCOUNTER — APPOINTMENT (OUTPATIENT)
Dept: CT IMAGING | Age: 81
End: 2021-02-21
Attending: EMERGENCY MEDICINE
Payer: MEDICARE

## 2021-02-21 ENCOUNTER — HOSPITAL ENCOUNTER (EMERGENCY)
Age: 81
Discharge: HOME | End: 2021-02-21
Attending: EMERGENCY MEDICINE
Payer: MEDICARE

## 2021-02-21 VITALS
TEMPERATURE: 98 F | HEART RATE: 74 BPM | DIASTOLIC BLOOD PRESSURE: 78 MMHG | RESPIRATION RATE: 18 BRPM | BODY MASS INDEX: 21 KG/M2 | OXYGEN SATURATION: 98 % | WEIGHT: 121 LBS | SYSTOLIC BLOOD PRESSURE: 156 MMHG

## 2021-02-21 DIAGNOSIS — R10.30 LOWER ABDOMINAL PAIN: Primary | ICD-10-CM

## 2021-02-21 LAB
ALBUMIN SERPL-MCNC: 4.2 G/DL (ref 3.4–5)
ALBUMIN/GLOB SERPL: 1.2 {RATIO} (ref 1–2)
ALP LIVER SERPL-CCNC: 74 U/L
ALT SERPL-CCNC: 21 U/L
ANION GAP SERPL CALC-SCNC: 8 MMOL/L (ref 0–18)
AST SERPL-CCNC: 17 U/L (ref 15–37)
BASOPHILS # BLD AUTO: 0.11 X10(3) UL (ref 0–0.2)
BASOPHILS NFR BLD AUTO: 0.9 %
BILIRUB SERPL-MCNC: 0.4 MG/DL (ref 0.1–2)
BILIRUB UR QL STRIP.AUTO: NEGATIVE
BUN BLD-MCNC: 14 MG/DL (ref 7–18)
BUN/CREAT SERPL: 10.1 (ref 10–20)
CALCIUM BLD-MCNC: 11.4 MG/DL (ref 8.5–10.1)
CHLORIDE SERPL-SCNC: 106 MMOL/L (ref 98–112)
CLARITY UR REFRACT.AUTO: CLEAR
CO2 SERPL-SCNC: 23 MMOL/L (ref 21–32)
COLOR UR AUTO: YELLOW
CREAT BLD-MCNC: 1.38 MG/DL
DEPRECATED RDW RBC AUTO: 46 FL (ref 35.1–46.3)
EOSINOPHIL # BLD AUTO: 0.05 X10(3) UL (ref 0–0.7)
EOSINOPHIL NFR BLD AUTO: 0.4 %
ERYTHROCYTE [DISTWIDTH] IN BLOOD BY AUTOMATED COUNT: 13.5 % (ref 11–15)
GLOBULIN PLAS-MCNC: 3.6 G/DL (ref 2.8–4.4)
GLUCOSE BLD-MCNC: 103 MG/DL (ref 70–99)
GLUCOSE UR STRIP.AUTO-MCNC: NEGATIVE MG/DL
HAV IGM SER QL: 2.3 MG/DL (ref 1.6–2.6)
HCT VFR BLD AUTO: 41.4 %
HGB BLD-MCNC: 13.9 G/DL
IMM GRANULOCYTES # BLD AUTO: 0.03 X10(3) UL (ref 0–1)
IMM GRANULOCYTES NFR BLD: 0.3 %
KETONES UR STRIP.AUTO-MCNC: NEGATIVE MG/DL
LEUKOCYTE ESTERASE UR QL STRIP.AUTO: NEGATIVE
LYMPHOCYTES # BLD AUTO: 4.4 X10(3) UL (ref 1–4)
LYMPHOCYTES NFR BLD AUTO: 37.8 %
M PROTEIN MFR SERPL ELPH: 7.8 G/DL (ref 6.4–8.2)
MCH RBC QN AUTO: 30.8 PG (ref 26–34)
MCHC RBC AUTO-ENTMCNC: 33.6 G/DL (ref 31–37)
MCV RBC AUTO: 91.8 FL
MONOCYTES # BLD AUTO: 0.97 X10(3) UL (ref 0.1–1)
MONOCYTES NFR BLD AUTO: 8.3 %
NEUTROPHILS # BLD AUTO: 6.07 X10 (3) UL (ref 1.5–7.7)
NEUTROPHILS # BLD AUTO: 6.07 X10(3) UL (ref 1.5–7.7)
NEUTROPHILS NFR BLD AUTO: 52.3 %
NITRITE UR QL STRIP.AUTO: NEGATIVE
OSMOLALITY SERPL CALC.SUM OF ELEC: 285 MOSM/KG (ref 275–295)
PH UR STRIP.AUTO: 7 [PH] (ref 4.5–8)
PLATELET # BLD AUTO: 368 10(3)UL (ref 150–450)
POTASSIUM SERPL-SCNC: 3.5 MMOL/L (ref 3.5–5.1)
PROT UR STRIP.AUTO-MCNC: NEGATIVE MG/DL
RBC # BLD AUTO: 4.51 X10(6)UL
RBC UR QL AUTO: NEGATIVE
SODIUM SERPL-SCNC: 137 MMOL/L (ref 136–145)
SP GR UR STRIP.AUTO: 1.01 (ref 1–1.03)
UROBILINOGEN UR STRIP.AUTO-MCNC: 0.2 MG/DL
WBC # BLD AUTO: 11.6 X10(3) UL (ref 4–11)

## 2021-02-21 PROCEDURE — 96375 TX/PRO/DX INJ NEW DRUG ADDON: CPT

## 2021-02-21 PROCEDURE — 85025 COMPLETE CBC W/AUTO DIFF WBC: CPT | Performed by: EMERGENCY MEDICINE

## 2021-02-21 PROCEDURE — 74177 CT ABD & PELVIS W/CONTRAST: CPT | Performed by: EMERGENCY MEDICINE

## 2021-02-21 PROCEDURE — 99284 EMERGENCY DEPT VISIT MOD MDM: CPT

## 2021-02-21 PROCEDURE — 81003 URINALYSIS AUTO W/O SCOPE: CPT | Performed by: EMERGENCY MEDICINE

## 2021-02-21 PROCEDURE — 83735 ASSAY OF MAGNESIUM: CPT | Performed by: EMERGENCY MEDICINE

## 2021-02-21 PROCEDURE — 96374 THER/PROPH/DIAG INJ IV PUSH: CPT

## 2021-02-21 PROCEDURE — 96361 HYDRATE IV INFUSION ADD-ON: CPT

## 2021-02-21 PROCEDURE — 80053 COMPREHEN METABOLIC PANEL: CPT | Performed by: EMERGENCY MEDICINE

## 2021-02-21 RX ORDER — MORPHINE SULFATE 4 MG/ML
4 INJECTION, SOLUTION INTRAMUSCULAR; INTRAVENOUS ONCE
Status: COMPLETED | OUTPATIENT
Start: 2021-02-21 | End: 2021-02-21

## 2021-02-21 RX ORDER — ONDANSETRON 2 MG/ML
4 INJECTION INTRAMUSCULAR; INTRAVENOUS ONCE
Status: COMPLETED | OUTPATIENT
Start: 2021-02-21 | End: 2021-02-21

## 2021-02-21 RX ORDER — LORAZEPAM 2 MG/ML
0.5 INJECTION INTRAMUSCULAR ONCE
Status: COMPLETED | OUTPATIENT
Start: 2021-02-21 | End: 2021-02-21

## 2021-02-21 RX ORDER — LORAZEPAM 0.5 MG/1
0.5 TABLET ORAL 2 TIMES DAILY PRN
Qty: 10 TABLET | Refills: 0 | Status: SHIPPED | OUTPATIENT
Start: 2021-02-21 | End: 2021-02-26

## 2021-02-21 NOTE — ED INITIAL ASSESSMENT (HPI)
Pt states she was here last week and dx with c-diff and started on vanco for the past 5 days. Pt states last noc she began having bad lower abd cramping. Pt reports 3 episodes diarrhea per day since.
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
## 2021-02-22 ENCOUNTER — TELEPHONE (OUTPATIENT)
Dept: FAMILY MEDICINE CLINIC | Facility: CLINIC | Age: 81
End: 2021-02-22

## 2021-02-22 NOTE — TELEPHONE ENCOUNTER
Pt called states she is constipated and has very bad abdominal pain and would like something to help with the constipation.

## 2021-02-22 NOTE — TELEPHONE ENCOUNTER
No medications for constipation she just came out of C. difficile and we want to allow the intestines to heal she can try a heating pad and gentle abdominal massage. It is okay to not have a bowel movement for 2 or 3 days.   We want the gut to respond to t

## 2021-02-24 ENCOUNTER — TELEPHONE (OUTPATIENT)
Dept: FAMILY MEDICINE CLINIC | Facility: CLINIC | Age: 81
End: 2021-02-24

## 2021-02-24 NOTE — TELEPHONE ENCOUNTER
Patient states she has been constipated for 1 day and is c/o abd cramping. She is passing gas. Relayed Kristan's  recommendations from Monday (2/22/21) and also advised she call GI. She verbalized understanding and agreed with plan.    Denies fever,

## 2021-02-24 NOTE — TELEPHONE ENCOUNTER
February 22, 2021  Rosanne Tran PA-C  to [facility] Clinical Staff    4:49 PM  Note     No medications for constipation she just came out of C. difficile and we want to allow the intestines to heal she can try a heating pad and gentle abdominal massage.

## 2021-02-25 RX ORDER — MONTELUKAST SODIUM 10 MG/1
10 TABLET ORAL NIGHTLY
Qty: 90 TABLET | Refills: 0 | Status: SHIPPED | OUTPATIENT
Start: 2021-02-25 | End: 2021-06-14

## 2021-02-25 NOTE — TELEPHONE ENCOUNTER
Requested Prescriptions     Pending Prescriptions Disp Refills   • Montelukast Sodium 10 MG Oral Tab 90 tablet 0     Sig: Take 1 tablet (10 mg total) by mouth nightly.      Last fill was 11/30/20  Last OV 1/20/21  No future OV

## 2021-02-27 ENCOUNTER — LAB ENCOUNTER (OUTPATIENT)
Dept: LAB | Age: 81
End: 2021-02-27
Attending: INTERNAL MEDICINE
Payer: MEDICARE

## 2021-02-27 ENCOUNTER — HOSPITAL ENCOUNTER (EMERGENCY)
Age: 81
Discharge: HOME | End: 2021-02-27
Attending: EMERGENCY MEDICINE
Payer: MEDICARE

## 2021-02-27 VITALS
BODY MASS INDEX: 23 KG/M2 | TEMPERATURE: 99 F | DIASTOLIC BLOOD PRESSURE: 78 MMHG | SYSTOLIC BLOOD PRESSURE: 139 MMHG | HEART RATE: 69 BPM | RESPIRATION RATE: 16 BRPM | WEIGHT: 130 LBS | OXYGEN SATURATION: 96 %

## 2021-02-27 DIAGNOSIS — K59.00 CONSTIPATION: ICD-10-CM

## 2021-02-27 DIAGNOSIS — R35.0 URINARY FREQUENCY: Primary | ICD-10-CM

## 2021-02-27 LAB
BILIRUB UR QL STRIP.AUTO: NEGATIVE
CHLORIDE SERPL-SCNC: 106 MMOL/L (ref 98–112)
CLARITY UR REFRACT.AUTO: CLEAR
CO2 SERPL-SCNC: 27 MMOL/L (ref 21–32)
COLOR UR AUTO: YELLOW
GLUCOSE UR STRIP.AUTO-MCNC: NEGATIVE MG/DL
KETONES UR STRIP.AUTO-MCNC: NEGATIVE MG/DL
LEUKOCYTE ESTERASE UR QL STRIP.AUTO: NEGATIVE
NITRITE UR QL STRIP.AUTO: NEGATIVE
PH UR STRIP.AUTO: 6 [PH] (ref 5–8)
POTASSIUM SERPL-SCNC: 3.9 MMOL/L (ref 3.5–5.1)
PROT UR STRIP.AUTO-MCNC: NEGATIVE MG/DL
RBC UR QL AUTO: NEGATIVE
SODIUM SERPL-SCNC: 139 MMOL/L (ref 136–145)
SP GR UR STRIP.AUTO: 1.01 (ref 1–1.03)
UROBILINOGEN UR STRIP.AUTO-MCNC: 0.2 MG/DL

## 2021-02-27 PROCEDURE — 99283 EMERGENCY DEPT VISIT LOW MDM: CPT

## 2021-02-27 PROCEDURE — 81003 URINALYSIS AUTO W/O SCOPE: CPT | Performed by: NURSE PRACTITIONER

## 2021-02-27 PROCEDURE — 36415 COLL VENOUS BLD VENIPUNCTURE: CPT

## 2021-02-27 PROCEDURE — 80051 ELECTROLYTE PANEL: CPT

## 2021-02-27 RX ORDER — CEPHALEXIN 500 MG/1
500 CAPSULE ORAL 3 TIMES DAILY
Qty: 21 CAPSULE | Refills: 0 | Status: SHIPPED | OUTPATIENT
Start: 2021-02-27 | End: 2021-03-06

## 2021-02-27 RX ORDER — PHENAZOPYRIDINE HYDROCHLORIDE 200 MG/1
200 TABLET, FILM COATED ORAL 3 TIMES DAILY PRN
Qty: 6 TABLET | Refills: 0 | Status: SHIPPED | OUTPATIENT
Start: 2021-02-27 | End: 2021-03-06

## 2021-02-27 RX ORDER — PHENAZOPYRIDINE HYDROCHLORIDE 200 MG/1
200 TABLET, FILM COATED ORAL ONCE
Status: COMPLETED | OUTPATIENT
Start: 2021-02-27 | End: 2021-02-27

## 2021-02-27 NOTE — ED PROVIDER NOTES
Patient Seen in: THE [facility] Emergency Department In [facility]      History   Patient presents with:  Urinary Symptoms    Stated Complaint: urinary urgency    HPI/Subjective:   HPI    Patient with a history of anxiety, fibromyalgia, functional abdominal pain • Night sweats    • Osteoarthrosis, unspecified whether generalized or localized, unspecified site    • Other and unspecified hyperlipidemia    • Pain in joints    • Parathyroid adenoma    • Postmenopausal status     Age of onset: 30years old   • Margoth JOINT INJECTION RIGHT OR LEFT Left 10/25/2016    Performed by Nazanin Ho MD at . [address] Left 11/14/2019    Performed by Nazanin Ho MD at [address] [place] [address] N/A 5/1/2017    Performe reviewed. All other systems reviewed and negative except as noted above.     Physical Exam     ED Triage Vitals [02/27/21 1551]   /78   Pulse 69   Resp 16   Temp 99.3 °F (37.4 °C)   Temp src Temporal   SpO2 96 %   O2 Device None (Room air) Medication List as of 2/27/2021  4:25 PM    START taking these medications    cephALEXin 500 MG Oral Cap  Take 1 capsule (500 mg total) by mouth 3 (three) times daily for 7 days. , Normal, Disp-21 capsule, R-0    Phenazopyridine HCl 200 MG Oral Tab  Take 1

## 2021-02-28 LAB — SARS-COV-2 RNA RESP QL NAA+PROBE: NOT DETECTED

## 2021-03-01 ENCOUNTER — TELEPHONE (OUTPATIENT)
Dept: FAMILY MEDICINE CLINIC | Facility: CLINIC | Age: 81
End: 2021-03-01

## 2021-03-01 ENCOUNTER — ANESTHESIA EVENT (OUTPATIENT)
Dept: ENDOSCOPY | Facility: HOSPITAL | Age: 81
End: 2021-03-01
Payer: MEDICARE

## 2021-03-01 DIAGNOSIS — F41.1 GAD (GENERALIZED ANXIETY DISORDER): ICD-10-CM

## 2021-03-01 DIAGNOSIS — N18.31 STAGE 3A CHRONIC KIDNEY DISEASE (HCC): ICD-10-CM

## 2021-03-01 DIAGNOSIS — E87.6 HYPOKALEMIA: ICD-10-CM

## 2021-03-01 DIAGNOSIS — E21.0 PRIMARY HYPERPARATHYROIDISM (HCC): ICD-10-CM

## 2021-03-01 DIAGNOSIS — R30.0 DYSURIA: Primary | ICD-10-CM

## 2021-03-01 DIAGNOSIS — F43.0 ACUTE STRESS REACTION: ICD-10-CM

## 2021-03-01 NOTE — TELEPHONE ENCOUNTER
Pt states she was in ER on Saturday for bladder infection and wants to know if she can get more medication because she is still not feeling better.

## 2021-03-01 NOTE — TELEPHONE ENCOUNTER
ED 2/27/21:  Patient complains of symptoms that she is experienced with cystitis in the past.  She was treated with Pyridium  Urinalysis shows negative blood, nitrites, leukocytes.   There was low specific gravity  Urine culture ordered  I will treat with a

## 2021-03-01 NOTE — TELEPHONE ENCOUNTER
Patient reports persistent lower abd cramping and pain, increased urgency and frequency with urination. Denies burning, blood in urine and fever. Requesting refill of Pyridium. She is still on antibiotic as prescribed.      Also requesting refill of Ceci

## 2021-03-01 NOTE — TELEPHONE ENCOUNTER
Needs repeat urine culture before any other antibiotic at high risk for recurrent C dif. Needs endocrinologist for the Cinacalcet give her 1 month and refer to endocrinologist again. .     1 month Clonazepam with 2 refills.     Order Vit d, PTH, Ionized c

## 2021-03-02 ENCOUNTER — ANESTHESIA (OUTPATIENT)
Dept: ENDOSCOPY | Facility: HOSPITAL | Age: 81
End: 2021-03-02
Payer: MEDICARE

## 2021-03-02 ENCOUNTER — HOSPITAL ENCOUNTER (OUTPATIENT)
Facility: HOSPITAL | Age: 81
Setting detail: HOSPITAL OUTPATIENT SURGERY
Discharge: HOME | End: 2021-03-02
Attending: INTERNAL MEDICINE | Admitting: INTERNAL MEDICINE
Payer: MEDICARE

## 2021-03-02 VITALS
WEIGHT: 130 LBS | BODY MASS INDEX: 23.04 KG/M2 | TEMPERATURE: 98 F | HEIGHT: 63 IN | HEART RATE: 79 BPM | RESPIRATION RATE: 20 BRPM | OXYGEN SATURATION: 97 % | SYSTOLIC BLOOD PRESSURE: 151 MMHG | DIASTOLIC BLOOD PRESSURE: 70 MMHG

## 2021-03-02 DIAGNOSIS — R10.32 LEFT LOWER QUADRANT ABDOMINAL PAIN: ICD-10-CM

## 2021-03-02 DIAGNOSIS — K59.00 CONSTIPATION: Primary | ICD-10-CM

## 2021-03-02 DIAGNOSIS — R19.7 DIARRHEA, UNSPECIFIED TYPE: ICD-10-CM

## 2021-03-02 DIAGNOSIS — K59.00 CONSTIPATION, UNSPECIFIED CONSTIPATION TYPE: ICD-10-CM

## 2021-03-02 PROCEDURE — 0DBG8ZX EXCISION OF LEFT LARGE INTESTINE, VIA NATURAL OR ARTIFICIAL OPENING ENDOSCOPIC, DIAGNOSTIC: ICD-10-PCS | Performed by: INTERNAL MEDICINE

## 2021-03-02 PROCEDURE — 0DBK8ZX EXCISION OF ASCENDING COLON, VIA NATURAL OR ARTIFICIAL OPENING ENDOSCOPIC, DIAGNOSTIC: ICD-10-PCS | Performed by: INTERNAL MEDICINE

## 2021-03-02 PROCEDURE — 0DBP8ZX EXCISION OF RECTUM, VIA NATURAL OR ARTIFICIAL OPENING ENDOSCOPIC, DIAGNOSTIC: ICD-10-PCS | Performed by: INTERNAL MEDICINE

## 2021-03-02 PROCEDURE — 0DBB8ZX EXCISION OF ILEUM, VIA NATURAL OR ARTIFICIAL OPENING ENDOSCOPIC, DIAGNOSTIC: ICD-10-PCS | Performed by: INTERNAL MEDICINE

## 2021-03-02 PROCEDURE — 88305 TISSUE EXAM BY PATHOLOGIST: CPT | Performed by: INTERNAL MEDICINE

## 2021-03-02 PROCEDURE — 0DBF8ZX EXCISION OF RIGHT LARGE INTESTINE, VIA NATURAL OR ARTIFICIAL OPENING ENDOSCOPIC, DIAGNOSTIC: ICD-10-PCS | Performed by: INTERNAL MEDICINE

## 2021-03-02 RX ORDER — SODIUM CHLORIDE, SODIUM LACTATE, POTASSIUM CHLORIDE, CALCIUM CHLORIDE 600; 310; 30; 20 MG/100ML; MG/100ML; MG/100ML; MG/100ML
INJECTION, SOLUTION INTRAVENOUS CONTINUOUS
Status: CANCELLED | OUTPATIENT
Start: 2021-03-02

## 2021-03-02 RX ORDER — PHENAZOPYRIDINE HYDROCHLORIDE 200 MG/1
200 TABLET, FILM COATED ORAL 3 TIMES DAILY PRN
Qty: 6 TABLET | Refills: 0 | OUTPATIENT
Start: 2021-03-02 | End: 2021-03-09

## 2021-03-02 RX ORDER — SODIUM CHLORIDE, SODIUM LACTATE, POTASSIUM CHLORIDE, CALCIUM CHLORIDE 600; 310; 30; 20 MG/100ML; MG/100ML; MG/100ML; MG/100ML
INJECTION, SOLUTION INTRAVENOUS CONTINUOUS
Status: DISCONTINUED | OUTPATIENT
Start: 2021-03-02 | End: 2021-03-02

## 2021-03-02 RX ORDER — CINACALCET 30 MG/1
30 TABLET, FILM COATED ORAL
Qty: 30 TABLET | Refills: 0 | Status: SHIPPED | OUTPATIENT
Start: 2021-03-02 | End: 2021-04-02

## 2021-03-02 RX ORDER — LIDOCAINE HYDROCHLORIDE 10 MG/ML
INJECTION, SOLUTION EPIDURAL; INFILTRATION; INTRACAUDAL; PERINEURAL AS NEEDED
Status: DISCONTINUED | OUTPATIENT
Start: 2021-03-02 | End: 2021-03-02 | Stop reason: SURG

## 2021-03-02 RX ORDER — ONDANSETRON 2 MG/ML
4 INJECTION INTRAMUSCULAR; INTRAVENOUS AS NEEDED
Status: CANCELLED | OUTPATIENT
Start: 2021-03-02 | End: 2021-03-02

## 2021-03-02 RX ORDER — NALOXONE HYDROCHLORIDE 0.4 MG/ML
80 INJECTION, SOLUTION INTRAMUSCULAR; INTRAVENOUS; SUBCUTANEOUS AS NEEDED
Status: CANCELLED | OUTPATIENT
Start: 2021-03-02 | End: 2021-03-02

## 2021-03-02 RX ORDER — CLONAZEPAM 0.5 MG/1
0.5 TABLET ORAL 2 TIMES DAILY PRN
Qty: 60 TABLET | Refills: 2 | Status: SHIPPED | OUTPATIENT
Start: 2021-03-02 | End: 2021-05-26

## 2021-03-02 RX ADMIN — LIDOCAINE HYDROCHLORIDE 50 MG: 10 INJECTION, SOLUTION EPIDURAL; INFILTRATION; INTRACAUDAL; PERINEURAL at 07:18:00

## 2021-03-02 NOTE — OPERATIVE REPORT
Colon operative report  Patient Name: Yuval Lew  Procedure: Colonoscopy with snare polypectomy and cold forceps biopsies  Date of procedure: 3/2/2021    Indication: RLQ abdominal pain; diarrhea; constipation  Date of last colonoscopy: N/A - the quin rectum, it was placed in a retroflexed position, and the rectal bulb was thus visualized. The endoscope was righted, and air was suctioned from the colon to the best of my ability, as it was during withdrawn from the colon.   The endoscope was then removed

## 2021-03-02 NOTE — ANESTHESIA PREPROCEDURE EVALUATION
PRE-OP EVALUATION    Patient Name: Andrea Gomez    Pre-op Diagnosis: Constipation, unspecified constipation type [K59.00]  Diarrhea, unspecified type [R19.7]  Left lower quadrant abdominal pain [R10.32]    Procedure(s):  COLONOSCOPY    Surgeon(s) and Rol •  vancomycin HCl 125 MG Oral Cap, Take 125 mg by mouth daily. , Disp: , Rfl: , 3/1/2021 at Unknown time    •  ondansetron 8 MG Oral Tablet Dispersible, Take 1 tablet (8 mg total) by mouth every 6 (six) hours as needed for Nausea., Disp: 10 tablet, Rfl: 0, (+) chronic renal disease and CRI              (+) irritable bowel syndrome     Cardiovascular                  (+) hypertension   (+) hyperlipidemia  (+) CAD    (+) CABG/stent (stent)                            Endo/Other  Comment: Hyperparathyroidism • SACROILIAC JOINT INJECTION RIGHT OR LEFT Left 10/25/2016    Performed by Daphney Jefferson MD at . [address] Left 11/14/2019    Performed by Daphney Jefferson MD at [address] [place] [address] N/A 5/1/201 HCT 41.4 02/21/2021    MCV 91.8 02/21/2021    MCH 30.8 02/21/2021    MCHC 33.6 02/21/2021    RDW 13.5 02/21/2021    .0 02/21/2021     Lab Results   Component Value Date     02/27/2021    K 3.9 02/27/2021     02/27/2021    CO2 27.0 02/27

## 2021-03-02 NOTE — ANESTHESIA POSTPROCEDURE EVALUATION
[address] Patient Status:  Hospital Outpatient Surgery   Age/Gender 68year old female MRN [id number]   Location [facility]. Attending Mabeline Spurling, MD   Hosp Day # 0 PCP Radha Garcia, DO       Anesthesia Post-op

## 2021-03-02 NOTE — H&P
Rose Ling Patient Status:  [facility] Outpatient Surgery    1940 MRN [id number]   Location [facility] Attending Blanca Galarza MD   Hosp Day # 0 PCP Claudia Rico,      History o heart disease, unspecified    • Sleep disturbance    • Stented coronary artery    • Stool incontinence    • Stress    • Tinnitus    • Visual impairment     glasses     Past Surgical History:   Procedure Laterality Date   • ANGIOPLASTY (CORONARY)  2006 s SURGERY PROCEDURE UNLISTED     • SPLENECTOMY  1980    [facility], [address]   • TONSILLECTOMY     • TOTAL ABDOM HYSTERECTOMY     • TRANSFORAMINAL LUMBAR EPIDURAL STEROID INJECTION MULTIPLE LEVEL Left 8/13/2019    Performed by Christopher Nguyen MD Continuous      No current outpatient medications on file.     Review of Systems:  Gastrointestinal: Denies positive test for blood stool, heartburn/indigestion/reflux, belching, difficulty swallowing, irregular bowel habits, painful swallowing, diarrhea, a pain, osteoporosis. Heme/Lymphatic: Denies easy bruising, anemia, excessive bleeding, enlarging or painful lymph nodes. Allergy: Denies medication allergy, latex/rubber allergy, anaphylactic or other reaction to anesthesia, food allergy.    Eyes: Denies b

## 2021-03-04 ENCOUNTER — LAB ENCOUNTER (OUTPATIENT)
Dept: LAB | Age: 81
End: 2021-03-04
Attending: FAMILY MEDICINE
Payer: MEDICARE

## 2021-03-04 DIAGNOSIS — I10 ESSENTIAL HYPERTENSION: ICD-10-CM

## 2021-03-04 DIAGNOSIS — E78.2 MIXED HYPERLIPIDEMIA: ICD-10-CM

## 2021-03-04 DIAGNOSIS — R30.0 DYSURIA: ICD-10-CM

## 2021-03-04 DIAGNOSIS — N18.30 CHRONIC RENAL INSUFFICIENCY, STAGE 3 (MODERATE) (HCC): ICD-10-CM

## 2021-03-04 DIAGNOSIS — Z13.6 SCREENING FOR CARDIOVASCULAR CONDITION: ICD-10-CM

## 2021-03-04 DIAGNOSIS — N18.31 STAGE 3A CHRONIC KIDNEY DISEASE (HCC): ICD-10-CM

## 2021-03-04 DIAGNOSIS — E21.0 PRIMARY HYPERPARATHYROIDISM (HCC): ICD-10-CM

## 2021-03-04 LAB
ALBUMIN SERPL-MCNC: 4.2 G/DL (ref 3.4–5)
ALBUMIN/GLOB SERPL: 1.2 {RATIO} (ref 1–2)
ALP LIVER SERPL-CCNC: 66 U/L
ALT SERPL-CCNC: 22 U/L
ANION GAP SERPL CALC-SCNC: 10 MMOL/L (ref 0–18)
AST SERPL-CCNC: 19 U/L (ref 15–37)
BILIRUB SERPL-MCNC: 0.6 MG/DL (ref 0.1–2)
BUN BLD-MCNC: 14 MG/DL (ref 7–18)
BUN/CREAT SERPL: 10 (ref 10–20)
CALCIUM BLD-MCNC: 10.9 MG/DL (ref 8.5–10.1)
CHLORIDE SERPL-SCNC: 101 MMOL/L (ref 98–112)
CHOLEST SMN-MCNC: 167 MG/DL (ref ?–200)
CO2 SERPL-SCNC: 26 MMOL/L (ref 21–32)
CREAT BLD-MCNC: 1.4 MG/DL
CREAT UR-SCNC: 111 MG/DL
GLOBULIN PLAS-MCNC: 3.4 G/DL (ref 2.8–4.4)
GLUCOSE BLD-MCNC: 93 MG/DL (ref 70–99)
HDLC SERPL-MCNC: 67 MG/DL (ref 40–59)
LDLC SERPL CALC-MCNC: 80 MG/DL (ref ?–100)
M PROTEIN MFR SERPL ELPH: 7.6 G/DL (ref 6.4–8.2)
MICROALBUMIN UR-MCNC: 6.05 MG/DL
MICROALBUMIN/CREAT 24H UR-RTO: 54.5 UG/MG (ref ?–30)
NONHDLC SERPL-MCNC: 100 MG/DL (ref ?–130)
OSMOLALITY SERPL CALC.SUM OF ELEC: 284 MOSM/KG (ref 275–295)
PATIENT FASTING Y/N/NP: YES
PATIENT FASTING Y/N/NP: YES
POTASSIUM SERPL-SCNC: 3.3 MMOL/L (ref 3.5–5.1)
PTH-INTACT SERPL-MCNC: 125.8 PG/ML (ref 18.5–88)
SODIUM SERPL-SCNC: 137 MMOL/L (ref 136–145)
TRIGL SERPL-MCNC: 99 MG/DL (ref 30–149)
TSI SER-ACNC: 1.61 MIU/ML (ref 0.36–3.74)
VIT D+METAB SERPL-MCNC: 20.1 NG/ML (ref 30–100)
VLDLC SERPL CALC-MCNC: 20 MG/DL (ref 0–30)

## 2021-03-04 PROCEDURE — 84443 ASSAY THYROID STIM HORMONE: CPT

## 2021-03-04 PROCEDURE — 36415 COLL VENOUS BLD VENIPUNCTURE: CPT

## 2021-03-04 PROCEDURE — 87086 URINE CULTURE/COLONY COUNT: CPT

## 2021-03-04 PROCEDURE — 80061 LIPID PANEL: CPT

## 2021-03-04 PROCEDURE — 82330 ASSAY OF CALCIUM: CPT

## 2021-03-04 PROCEDURE — 83970 ASSAY OF PARATHORMONE: CPT

## 2021-03-04 PROCEDURE — 80053 COMPREHEN METABOLIC PANEL: CPT

## 2021-03-04 PROCEDURE — 82570 ASSAY OF URINE CREATININE: CPT

## 2021-03-04 PROCEDURE — 82306 VITAMIN D 25 HYDROXY: CPT

## 2021-03-04 PROCEDURE — 82043 UR ALBUMIN QUANTITATIVE: CPT

## 2021-03-04 NOTE — TELEPHONE ENCOUNTER
Patient states she is feeling better after colonoscopy on 3/2/21. Still has UTI symptoms, mostly pain and burning with urination and low abd pressure and discomfort. Advised she needs labs and urine culture.  She agreed with plan and will try to get u

## 2021-03-04 NOTE — TELEPHONE ENCOUNTER
Dean Lo is calling to let Kim Mann know that she has been throwing up mucus for the past couple of days, she is just wondering what Kim Mann would like her to do.
Spoke to patient and she said for 2 weeks she is throwing up mucus, clear has been taking an OTC decongestant.   appt made for tomorrow with Mary Landaverde
Alert-The patient is alert, awake and responds to voice. The patient is oriented to time, place, and person. The triage nurse is able to obtain subjective information.

## 2021-03-05 NOTE — PROGRESS NOTES
Urine culture no growth after 18 hours.   Vitamin D is low,  elevated PTH, elevated urine microalbumin creatinine ratio, elevated calcium, stable renal insufficiency stage III  Needs appointment with endocrinologist and nephrologist secondary to abnormal la

## 2021-03-06 LAB
CALCIUM IONIZED PH 7.4: 1.48 MMOL/L
CALCIUM IONIZED, SERUM: 1.49 MMOL/L

## 2021-03-08 ENCOUNTER — TELEPHONE (OUTPATIENT)
Dept: FAMILY MEDICINE CLINIC | Facility: CLINIC | Age: 81
End: 2021-03-08

## 2021-03-12 ENCOUNTER — TELEPHONE (OUTPATIENT)
Dept: FAMILY MEDICINE CLINIC | Facility: CLINIC | Age: 81
End: 2021-03-12

## 2021-03-12 NOTE — TELEPHONE ENCOUNTER
Spoke to patient. States she has not had BM in 2-3 days. States is constipated. States having cramps. Advised to reach out to GI group. Please advise further if needed.

## 2021-03-12 NOTE — TELEPHONE ENCOUNTER
Pt states she has had a belly ache for 3 days and she can't move her bowels and the cramps are bad. She would like LONI to call something in to her [address] on [facility].

## 2021-03-15 ENCOUNTER — TELEPHONE (OUTPATIENT)
Dept: FAMILY MEDICINE CLINIC | Facility: CLINIC | Age: 81
End: 2021-03-15

## 2021-03-15 ENCOUNTER — APPOINTMENT (OUTPATIENT)
Dept: CT IMAGING | Facility: HOSPITAL | Age: 81
End: 2021-03-15
Attending: EMERGENCY MEDICINE
Payer: MEDICARE

## 2021-03-15 ENCOUNTER — HOSPITAL ENCOUNTER (EMERGENCY)
Facility: HOSPITAL | Age: 81
Discharge: HOME | End: 2021-03-15
Attending: EMERGENCY MEDICINE
Payer: MEDICARE

## 2021-03-15 VITALS
WEIGHT: 130 LBS | HEIGHT: 63 IN | TEMPERATURE: 98 F | DIASTOLIC BLOOD PRESSURE: 77 MMHG | RESPIRATION RATE: 20 BRPM | OXYGEN SATURATION: 96 % | BODY MASS INDEX: 23.04 KG/M2 | SYSTOLIC BLOOD PRESSURE: 132 MMHG | HEART RATE: 93 BPM

## 2021-03-15 DIAGNOSIS — R10.9 ABDOMINAL PAIN OF UNKNOWN ETIOLOGY: Primary | ICD-10-CM

## 2021-03-15 DIAGNOSIS — E87.6 HYPOKALEMIA: ICD-10-CM

## 2021-03-15 LAB
ALBUMIN SERPL-MCNC: 4.3 G/DL (ref 3.4–5)
ALBUMIN/GLOB SERPL: 1.2 {RATIO} (ref 1–2)
ALP LIVER SERPL-CCNC: 76 U/L
ALT SERPL-CCNC: 21 U/L
ANION GAP SERPL CALC-SCNC: 7 MMOL/L (ref 0–18)
AST SERPL-CCNC: 17 U/L (ref 15–37)
BASOPHILS # BLD AUTO: 0.12 X10(3) UL (ref 0–0.2)
BASOPHILS NFR BLD AUTO: 1 %
BILIRUB SERPL-MCNC: 0.4 MG/DL (ref 0.1–2)
BILIRUB UR QL STRIP.AUTO: NEGATIVE
BUN BLD-MCNC: 15 MG/DL (ref 7–18)
BUN/CREAT SERPL: 11 (ref 10–20)
CALCIUM BLD-MCNC: 10.8 MG/DL (ref 8.5–10.1)
CHLORIDE SERPL-SCNC: 103 MMOL/L (ref 98–112)
CLARITY UR REFRACT.AUTO: CLEAR
CO2 SERPL-SCNC: 29 MMOL/L (ref 21–32)
CREAT BLD-MCNC: 1.36 MG/DL
DEPRECATED RDW RBC AUTO: 44.3 FL (ref 35.1–46.3)
EOSINOPHIL # BLD AUTO: 0.17 X10(3) UL (ref 0–0.7)
EOSINOPHIL NFR BLD AUTO: 1.4 %
ERYTHROCYTE [DISTWIDTH] IN BLOOD BY AUTOMATED COUNT: 13.1 % (ref 11–15)
GLOBULIN PLAS-MCNC: 3.7 G/DL (ref 2.8–4.4)
GLUCOSE BLD-MCNC: 91 MG/DL (ref 70–99)
GLUCOSE UR STRIP.AUTO-MCNC: NEGATIVE MG/DL
HCT VFR BLD AUTO: 40.6 %
HGB BLD-MCNC: 13.6 G/DL
IMM GRANULOCYTES # BLD AUTO: 0.02 X10(3) UL (ref 0–1)
IMM GRANULOCYTES NFR BLD: 0.2 %
KETONES UR STRIP.AUTO-MCNC: NEGATIVE MG/DL
LEUKOCYTE ESTERASE UR QL STRIP.AUTO: NEGATIVE
LIPASE SERPL-CCNC: 78 U/L (ref 73–393)
LYMPHOCYTES # BLD AUTO: 5.05 X10(3) UL (ref 1–4)
LYMPHOCYTES NFR BLD AUTO: 41.3 %
M PROTEIN MFR SERPL ELPH: 8 G/DL (ref 6.4–8.2)
MCH RBC QN AUTO: 31.1 PG (ref 26–34)
MCHC RBC AUTO-ENTMCNC: 33.5 G/DL (ref 31–37)
MCV RBC AUTO: 92.9 FL
MONOCYTES # BLD AUTO: 1.46 X10(3) UL (ref 0.1–1)
MONOCYTES NFR BLD AUTO: 11.9 %
NEUTROPHILS # BLD AUTO: 5.41 X10 (3) UL (ref 1.5–7.7)
NEUTROPHILS # BLD AUTO: 5.41 X10(3) UL (ref 1.5–7.7)
NEUTROPHILS NFR BLD AUTO: 44.2 %
NITRITE UR QL STRIP.AUTO: NEGATIVE
OSMOLALITY SERPL CALC.SUM OF ELEC: 288 MOSM/KG (ref 275–295)
PH UR STRIP.AUTO: 7 [PH] (ref 5–8)
PLATELET # BLD AUTO: 376 10(3)UL (ref 150–450)
POTASSIUM SERPL-SCNC: 3.1 MMOL/L (ref 3.5–5.1)
PROT UR STRIP.AUTO-MCNC: NEGATIVE MG/DL
RBC # BLD AUTO: 4.37 X10(6)UL
SARS-COV-2 RNA RESP QL NAA+PROBE: NOT DETECTED
SODIUM SERPL-SCNC: 139 MMOL/L (ref 136–145)
SP GR UR STRIP.AUTO: 1 (ref 1–1.03)
UROBILINOGEN UR STRIP.AUTO-MCNC: <2 MG/DL
WBC # BLD AUTO: 12.2 X10(3) UL (ref 4–11)

## 2021-03-15 PROCEDURE — 99285 EMERGENCY DEPT VISIT HI MDM: CPT

## 2021-03-15 PROCEDURE — 81001 URINALYSIS AUTO W/SCOPE: CPT | Performed by: EMERGENCY MEDICINE

## 2021-03-15 PROCEDURE — 85025 COMPLETE CBC W/AUTO DIFF WBC: CPT | Performed by: EMERGENCY MEDICINE

## 2021-03-15 PROCEDURE — 74177 CT ABD & PELVIS W/CONTRAST: CPT | Performed by: EMERGENCY MEDICINE

## 2021-03-15 PROCEDURE — 96374 THER/PROPH/DIAG INJ IV PUSH: CPT

## 2021-03-15 PROCEDURE — 83690 ASSAY OF LIPASE: CPT | Performed by: EMERGENCY MEDICINE

## 2021-03-15 PROCEDURE — 96361 HYDRATE IV INFUSION ADD-ON: CPT

## 2021-03-15 PROCEDURE — 80053 COMPREHEN METABOLIC PANEL: CPT | Performed by: EMERGENCY MEDICINE

## 2021-03-15 PROCEDURE — 96375 TX/PRO/DX INJ NEW DRUG ADDON: CPT

## 2021-03-15 RX ORDER — POTASSIUM CHLORIDE 20 MEQ/1
40 TABLET, EXTENDED RELEASE ORAL ONCE
Status: COMPLETED | OUTPATIENT
Start: 2021-03-15 | End: 2021-03-15

## 2021-03-15 RX ORDER — POTASSIUM CHLORIDE 20 MEQ/1
20 TABLET, EXTENDED RELEASE ORAL 2 TIMES DAILY
Qty: 6 TABLET | Refills: 0 | Status: SHIPPED | OUTPATIENT
Start: 2021-03-15 | End: 2021-03-18

## 2021-03-15 RX ORDER — SODIUM CHLORIDE 9 MG/ML
125 INJECTION, SOLUTION INTRAVENOUS CONTINUOUS
Status: DISCONTINUED | OUTPATIENT
Start: 2021-03-15 | End: 2021-03-15

## 2021-03-15 RX ORDER — KETOROLAC TROMETHAMINE 30 MG/ML
15 INJECTION, SOLUTION INTRAMUSCULAR; INTRAVENOUS ONCE
Status: COMPLETED | OUTPATIENT
Start: 2021-03-15 | End: 2021-03-15

## 2021-03-15 RX ORDER — ONDANSETRON 2 MG/ML
4 INJECTION INTRAMUSCULAR; INTRAVENOUS ONCE
Status: COMPLETED | OUTPATIENT
Start: 2021-03-15 | End: 2021-03-15

## 2021-03-15 RX ORDER — DICYCLOMINE HCL 20 MG
20 TABLET ORAL 4 TIMES DAILY PRN
Qty: 30 TABLET | Refills: 0 | Status: SHIPPED | OUTPATIENT
Start: 2021-03-15 | End: 2021-04-14
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
## 2021-03-15 NOTE — TELEPHONE ENCOUNTER
Read GI note from 3/12/21. They advised miralax for her constipation. Advised patient to decrease amount of miralax. Advised her against the use of imodium. She VU and is in agreement.

## 2021-03-15 NOTE — TELEPHONE ENCOUNTER
Patient called back c/o abd cramping and pain. States she is constipated. Advised she called this am with diarrhea. She stated she has taken the hyoscyamine every 4 hrs today. I did advise to check with GI for any recommendations.   Please advise furthe

## 2021-03-16 ENCOUNTER — TELEPHONE (OUTPATIENT)
Dept: FAMILY MEDICINE CLINIC | Facility: CLINIC | Age: 81
End: 2021-03-16

## 2021-03-16 NOTE — TELEPHONE ENCOUNTER
Spoke to patient. States symptoms are as below. She had urinalysis at the hospital yesterday when she went to ER for abdominal pain. Please advise.

## 2021-03-16 NOTE — TELEPHONE ENCOUNTER
Dean Lo is calling to see if Kim DanielsJohnathan can call something in to her pharmacy, she thinks she has a UTI, she has tingling when she urinates and it comes out slow, she would like a call at [phone]

## 2021-03-17 NOTE — TELEPHONE ENCOUNTER
I do see she has appointments for the nephrology and endocrine services just remind her I do want her to make sure she goes to those appointments.

## 2021-03-17 NOTE — TELEPHONE ENCOUNTER
GI manages Donna's GI issues. It is OK to not have a BM for 1-2 days. Take probiotic and water.     I would like her to see a neuropsychologist for the memory changes I have seen in the office can we refer to neuropsych to evaluate memory issues or to ophelia

## 2021-03-17 NOTE — TELEPHONE ENCOUNTER
Spoke with patient and relayed recommendations. She states all urinary symptoms have resolved. Her concern today is abdominal cramping and constipation x1 day. She did take Bentyl this morning.      States she has Endo appointment this Friday and appointm

## 2021-03-17 NOTE — TELEPHONE ENCOUNTER
Reviewed patient's ER visit potassium is low make sure she is taking her potassium consistently every day.       Do not combine anticholinergics Bentyl (Dicyclomine) with Hyoscyamine (Levsin) they are the same medicine choose one or the other (ER RX the [place]

## 2021-03-18 ENCOUNTER — OFFICE VISIT (OUTPATIENT)
Dept: PAIN CLINIC | Facility: CLINIC | Age: 81
End: 2021-03-18
Payer: MEDICARE

## 2021-03-18 ENCOUNTER — HOSPITAL ENCOUNTER (OUTPATIENT)
Facility: HOSPITAL | Age: 81
Setting detail: HOSPITAL OUTPATIENT SURGERY
Discharge: HOME | End: 2021-03-18
Attending: ANESTHESIOLOGY | Admitting: ANESTHESIOLOGY
Payer: MEDICARE

## 2021-03-18 ENCOUNTER — TELEPHONE (OUTPATIENT)
Dept: PAIN CLINIC | Facility: CLINIC | Age: 81
End: 2021-03-18

## 2021-03-18 VITALS — SYSTOLIC BLOOD PRESSURE: 126 MMHG | DIASTOLIC BLOOD PRESSURE: 80 MMHG | OXYGEN SATURATION: 95 % | HEART RATE: 90 BPM

## 2021-03-18 DIAGNOSIS — M54.16 LUMBAR RADICULITIS: Primary | ICD-10-CM

## 2021-03-18 PROCEDURE — 99215 OFFICE O/P EST HI 40 MIN: CPT | Performed by: ANESTHESIOLOGY

## 2021-03-18 RX ORDER — PRAVASTATIN SODIUM 40 MG
40 TABLET ORAL NIGHTLY
COMMUNITY
End: 2021-07-16

## 2021-03-18 NOTE — TELEPHONE ENCOUNTER
Patient has Medicare Part B as primary Insurance. Medical clearance not required. COVID test order placed. Patient instructed to call CS and schedule COVID test on 3/26/2021 (48-72 hrs) prior to procedure.       Patient also instructed to start hold [facility] ([phone]. Vilma Garcia., Kris [place]) outpatient registration in the [employer]. • Please note-No prescriptions will be written by Pain Clinic in OR on the day of procedure.  If you require a refill of medications, please contact the office 48 Please contact your insurance carrier to determine what your financial  responsibility will be for the procedure(s).     Cancellation/Rescheduling Appointment:   In the event you need to cancel or reschedule your appointment, you must notify the office 24 h

## 2021-03-18 NOTE — PROGRESS NOTES
Patient presents in office today with reported pain in low back and left leg    Current pain level reported = 7/10    Last reported dose of half tab of Norco today

## 2021-03-18 NOTE — TELEPHONE ENCOUNTER
Relayed recommendations to patient. She states she will follow up with GI as advised and call back for other doctor's information. States she has too many appointment to go to at this time.

## 2021-03-19 DIAGNOSIS — I10 ESSENTIAL HYPERTENSION: ICD-10-CM

## 2021-03-19 RX ORDER — LOSARTAN POTASSIUM 100 MG/1
TABLET ORAL
Qty: 90 TABLET | Refills: 0 | Status: SHIPPED | OUTPATIENT
Start: 2021-03-19 | End: 2021-06-16

## 2021-03-19 NOTE — TELEPHONE ENCOUNTER
Requested Prescriptions     Signed Prescriptions Disp Refills   • LOSARTAN 100 MG Oral Tab 90 tablet 0     Sig: TAKE 1 TABLET(100 MG) BY MOUTH DAILY     Authorizing Provider: Ethel Elder     Ordering User: Latrice Mayberry     Met protocol. Refill sent.

## 2021-03-20 ENCOUNTER — APPOINTMENT (OUTPATIENT)
Dept: CT IMAGING | Age: 81
End: 2021-03-20
Attending: EMERGENCY MEDICINE
Payer: MEDICARE

## 2021-03-20 ENCOUNTER — HOSPITAL ENCOUNTER (EMERGENCY)
Age: 81
Discharge: HOME | End: 2021-03-20
Attending: EMERGENCY MEDICINE
Payer: MEDICARE

## 2021-03-20 VITALS
HEART RATE: 60 BPM | TEMPERATURE: 98 F | SYSTOLIC BLOOD PRESSURE: 141 MMHG | DIASTOLIC BLOOD PRESSURE: 64 MMHG | OXYGEN SATURATION: 97 % | RESPIRATION RATE: 16 BRPM

## 2021-03-20 DIAGNOSIS — R10.84 ABDOMINAL PAIN, GENERALIZED: ICD-10-CM

## 2021-03-20 DIAGNOSIS — N20.1 LEFT URETERAL STONE: Primary | ICD-10-CM

## 2021-03-20 LAB
ALBUMIN SERPL-MCNC: 4.3 G/DL (ref 3.4–5)
ALBUMIN/GLOB SERPL: 1.1 {RATIO} (ref 1–2)
ALP LIVER SERPL-CCNC: 77 U/L
ALT SERPL-CCNC: 21 U/L
ANION GAP SERPL CALC-SCNC: 7 MMOL/L (ref 0–18)
AST SERPL-CCNC: 29 U/L (ref 15–37)
BASOPHILS # BLD AUTO: 0.1 X10(3) UL (ref 0–0.2)
BASOPHILS NFR BLD AUTO: 0.6 %
BILIRUB SERPL-MCNC: 0.4 MG/DL (ref 0.1–2)
BILIRUB UR QL STRIP.AUTO: NEGATIVE
BUN BLD-MCNC: 12 MG/DL (ref 7–18)
BUN/CREAT SERPL: 9.1 (ref 10–20)
CALCIUM BLD-MCNC: 10.9 MG/DL (ref 8.5–10.1)
CHLORIDE SERPL-SCNC: 110 MMOL/L (ref 98–112)
CLARITY UR REFRACT.AUTO: CLEAR
CO2 SERPL-SCNC: 23 MMOL/L (ref 21–32)
COLOR UR AUTO: YELLOW
CREAT BLD-MCNC: 1.32 MG/DL
DEPRECATED RDW RBC AUTO: 47.8 FL (ref 35.1–46.3)
EOSINOPHIL # BLD AUTO: 0.06 X10(3) UL (ref 0–0.7)
EOSINOPHIL NFR BLD AUTO: 0.4 %
ERYTHROCYTE [DISTWIDTH] IN BLOOD BY AUTOMATED COUNT: 13.6 % (ref 11–15)
GLOBULIN PLAS-MCNC: 3.8 G/DL (ref 2.8–4.4)
GLUCOSE BLD-MCNC: 96 MG/DL (ref 70–99)
GLUCOSE UR STRIP.AUTO-MCNC: NEGATIVE MG/DL
HCT VFR BLD AUTO: 40.5 %
HGB BLD-MCNC: 13.4 G/DL
IMM GRANULOCYTES # BLD AUTO: 0.06 X10(3) UL (ref 0–1)
IMM GRANULOCYTES NFR BLD: 0.4 %
KETONES UR STRIP.AUTO-MCNC: NEGATIVE MG/DL
LACTATE SERPL-SCNC: 1.1 MMOL/L (ref 0.4–2)
LEUKOCYTE ESTERASE UR QL STRIP.AUTO: NEGATIVE
LYMPHOCYTES # BLD AUTO: 3.5 X10(3) UL (ref 1–4)
LYMPHOCYTES NFR BLD AUTO: 21.2 %
M PROTEIN MFR SERPL ELPH: 8.1 G/DL (ref 6.4–8.2)
MCH RBC QN AUTO: 31.5 PG (ref 26–34)
MCHC RBC AUTO-ENTMCNC: 33.1 G/DL (ref 31–37)
MCV RBC AUTO: 95.1 FL
MONOCYTES # BLD AUTO: 0.99 X10(3) UL (ref 0.1–1)
MONOCYTES NFR BLD AUTO: 6 %
NEUTROPHILS # BLD AUTO: 11.8 X10 (3) UL (ref 1.5–7.7)
NEUTROPHILS # BLD AUTO: 11.8 X10(3) UL (ref 1.5–7.7)
NEUTROPHILS NFR BLD AUTO: 71.4 %
NITRITE UR QL STRIP.AUTO: NEGATIVE
OSMOLALITY SERPL CALC.SUM OF ELEC: 290 MOSM/KG (ref 275–295)
PH UR STRIP.AUTO: 7 [PH] (ref 5–8)
PLATELET # BLD AUTO: 376 10(3)UL (ref 150–450)
POTASSIUM SERPL-SCNC: 3.8 MMOL/L (ref 3.5–5.1)
PROT UR STRIP.AUTO-MCNC: NEGATIVE MG/DL
RBC # BLD AUTO: 4.26 X10(6)UL
SODIUM SERPL-SCNC: 140 MMOL/L (ref 136–145)
SP GR UR STRIP.AUTO: 1.01 (ref 1–1.03)
UROBILINOGEN UR STRIP.AUTO-MCNC: 0.2 MG/DL
WBC # BLD AUTO: 16.5 X10(3) UL (ref 4–11)

## 2021-03-20 PROCEDURE — 96374 THER/PROPH/DIAG INJ IV PUSH: CPT

## 2021-03-20 PROCEDURE — 85025 COMPLETE CBC W/AUTO DIFF WBC: CPT

## 2021-03-20 PROCEDURE — 96376 TX/PRO/DX INJ SAME DRUG ADON: CPT

## 2021-03-20 PROCEDURE — 81001 URINALYSIS AUTO W/SCOPE: CPT | Performed by: EMERGENCY MEDICINE

## 2021-03-20 PROCEDURE — 80053 COMPREHEN METABOLIC PANEL: CPT

## 2021-03-20 PROCEDURE — 99285 EMERGENCY DEPT VISIT HI MDM: CPT

## 2021-03-20 PROCEDURE — 81015 MICROSCOPIC EXAM OF URINE: CPT | Performed by: EMERGENCY MEDICINE

## 2021-03-20 PROCEDURE — 96361 HYDRATE IV INFUSION ADD-ON: CPT

## 2021-03-20 PROCEDURE — 96375 TX/PRO/DX INJ NEW DRUG ADDON: CPT

## 2021-03-20 PROCEDURE — 85025 COMPLETE CBC W/AUTO DIFF WBC: CPT | Performed by: EMERGENCY MEDICINE

## 2021-03-20 PROCEDURE — 74177 CT ABD & PELVIS W/CONTRAST: CPT | Performed by: EMERGENCY MEDICINE

## 2021-03-20 PROCEDURE — 83605 ASSAY OF LACTIC ACID: CPT | Performed by: EMERGENCY MEDICINE

## 2021-03-20 PROCEDURE — 96360 HYDRATION IV INFUSION INIT: CPT

## 2021-03-20 PROCEDURE — 99284 EMERGENCY DEPT VISIT MOD MDM: CPT

## 2021-03-20 PROCEDURE — 80053 COMPREHEN METABOLIC PANEL: CPT | Performed by: EMERGENCY MEDICINE

## 2021-03-20 RX ORDER — LORAZEPAM 2 MG/ML
0.5 INJECTION INTRAMUSCULAR ONCE
Status: COMPLETED | OUTPATIENT
Start: 2021-03-20 | End: 2021-03-20

## 2021-03-20 RX ORDER — HYDROMORPHONE HYDROCHLORIDE 1 MG/ML
0.5 INJECTION, SOLUTION INTRAMUSCULAR; INTRAVENOUS; SUBCUTANEOUS ONCE
Status: COMPLETED | OUTPATIENT
Start: 2021-03-20 | End: 2021-03-20

## 2021-03-20 RX ORDER — KETOROLAC TROMETHAMINE 15 MG/ML
15 INJECTION, SOLUTION INTRAMUSCULAR; INTRAVENOUS ONCE
Status: COMPLETED | OUTPATIENT
Start: 2021-03-20 | End: 2021-03-20

## 2021-03-20 RX ORDER — KETOROLAC TROMETHAMINE 15 MG/ML
15 INJECTION, SOLUTION INTRAMUSCULAR; INTRAVENOUS ONCE
Status: DISCONTINUED | OUTPATIENT
Start: 2021-03-20 | End: 2021-03-20

## 2021-03-20 RX ORDER — ONDANSETRON 2 MG/ML
4 INJECTION INTRAMUSCULAR; INTRAVENOUS ONCE
Status: COMPLETED | OUTPATIENT
Start: 2021-03-20 | End: 2021-03-20

## 2021-03-20 RX ORDER — TAMSULOSIN HYDROCHLORIDE 0.4 MG/1
0.4 CAPSULE ORAL DAILY
Qty: 7 CAPSULE | Refills: 0 | Status: SHIPPED | OUTPATIENT
Start: 2021-03-20 | End: 2021-03-27

## 2021-03-20 RX ORDER — LORAZEPAM 2 MG/ML
1 INJECTION INTRAMUSCULAR ONCE
Status: COMPLETED | OUTPATIENT
Start: 2021-03-20 | End: 2021-03-20

## 2021-03-20 RX ORDER — HYDROMORPHONE HYDROCHLORIDE 1 MG/ML
0.5 INJECTION, SOLUTION INTRAMUSCULAR; INTRAVENOUS; SUBCUTANEOUS EVERY 30 MIN PRN
Status: DISCONTINUED | OUTPATIENT
Start: 2021-03-20 | End: 2021-03-20

## 2021-03-20 RX ORDER — KETOROLAC TROMETHAMINE 10 MG/1
10 TABLET, FILM COATED ORAL EVERY 6 HOURS PRN
Qty: 30 TABLET | Refills: 0 | Status: SHIPPED | OUTPATIENT
Start: 2021-03-20 | End: 2021-03-27
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
## 2021-03-21 ENCOUNTER — HOSPITAL ENCOUNTER (OUTPATIENT)
Facility: HOSPITAL | Age: 81
Setting detail: OBSERVATION
Discharge: HOME | End: 2021-03-22
Attending: EMERGENCY MEDICINE | Admitting: STUDENT IN AN ORGANIZED HEALTH CARE EDUCATION/TRAINING PROGRAM
Payer: MEDICARE

## 2021-03-21 DIAGNOSIS — R10.9 INTRACTABLE ABDOMINAL PAIN: Primary | ICD-10-CM

## 2021-03-21 DIAGNOSIS — D50.9 MICROCYTIC ANEMIA: ICD-10-CM

## 2021-03-21 LAB
ALBUMIN SERPL-MCNC: 3.8 G/DL (ref 3.4–5)
ALP LIVER SERPL-CCNC: 65 U/L
ALT SERPL-CCNC: 19 U/L
ANION GAP SERPL CALC-SCNC: 6 MMOL/L (ref 0–18)
AST SERPL-CCNC: 16 U/L (ref 15–37)
BASOPHILS # BLD AUTO: 0.1 X10(3) UL (ref 0–0.2)
BASOPHILS NFR BLD AUTO: 0.7 %
BILIRUB DIRECT SERPL-MCNC: 0.1 MG/DL (ref 0–0.2)
BILIRUB SERPL-MCNC: 0.3 MG/DL (ref 0.1–2)
BILIRUB UR QL STRIP.AUTO: NEGATIVE
BUN BLD-MCNC: 12 MG/DL (ref 7–18)
BUN/CREAT SERPL: 9 (ref 10–20)
CALCIUM BLD-MCNC: 10.2 MG/DL (ref 8.5–10.1)
CHLORIDE SERPL-SCNC: 109 MMOL/L (ref 98–112)
CLARITY UR REFRACT.AUTO: CLEAR
CO2 SERPL-SCNC: 26 MMOL/L (ref 21–32)
COLOR UR AUTO: YELLOW
CREAT BLD-MCNC: 1.34 MG/DL
DEPRECATED RDW RBC AUTO: 45.9 FL (ref 35.1–46.3)
EOSINOPHIL # BLD AUTO: 0.21 X10(3) UL (ref 0–0.7)
EOSINOPHIL NFR BLD AUTO: 1.5 %
ERYTHROCYTE [DISTWIDTH] IN BLOOD BY AUTOMATED COUNT: 13.4 % (ref 11–15)
GLUCOSE BLD-MCNC: 91 MG/DL (ref 70–99)
GLUCOSE UR STRIP.AUTO-MCNC: NEGATIVE MG/DL
HCT VFR BLD AUTO: 38 %
HELMET CELLS BLD QL SMEAR: PRESENT
HGB BLD-MCNC: 12.7 G/DL
IMM GRANULOCYTES # BLD AUTO: 0.03 X10(3) UL (ref 0–1)
IMM GRANULOCYTES NFR BLD: 0.2 %
KETONES UR STRIP.AUTO-MCNC: NEGATIVE MG/DL
LACTATE SERPL-SCNC: 1 MMOL/L (ref 0.4–2)
LEUKOCYTE ESTERASE UR QL STRIP.AUTO: NEGATIVE
LIPASE SERPL-CCNC: 72 U/L (ref 73–393)
LYMPHOCYTES # BLD AUTO: 6.23 X10(3) UL (ref 1–4)
LYMPHOCYTES NFR BLD AUTO: 45.4 %
M PROTEIN MFR SERPL ELPH: 6.9 G/DL (ref 6.4–8.2)
MCH RBC QN AUTO: 31.3 PG (ref 26–34)
MCHC RBC AUTO-ENTMCNC: 33.4 G/DL (ref 31–37)
MCV RBC AUTO: 93.6 FL
MONOCYTES # BLD AUTO: 1.22 X10(3) UL (ref 0.1–1)
MONOCYTES NFR BLD AUTO: 8.9 %
NEUTROPHILS # BLD AUTO: 5.94 X10 (3) UL (ref 1.5–7.7)
NEUTROPHILS # BLD AUTO: 5.94 X10(3) UL (ref 1.5–7.7)
NEUTROPHILS NFR BLD AUTO: 43.3 %
NITRITE UR QL STRIP.AUTO: NEGATIVE
OSMOLALITY SERPL CALC.SUM OF ELEC: 291 MOSM/KG (ref 275–295)
PH UR STRIP.AUTO: 7 [PH] (ref 5–8)
PLATELET # BLD AUTO: 340 10(3)UL (ref 150–450)
PLATELET MORPHOLOGY: NORMAL
POTASSIUM SERPL-SCNC: 3.5 MMOL/L (ref 3.5–5.1)
PROT UR STRIP.AUTO-MCNC: NEGATIVE MG/DL
RBC # BLD AUTO: 4.06 X10(6)UL
RBC #/AREA URNS AUTO: >10 /HPF
SARS-COV-2 RNA RESP QL NAA+PROBE: NOT DETECTED
SODIUM SERPL-SCNC: 141 MMOL/L (ref 136–145)
SP GR UR STRIP.AUTO: 1 (ref 1–1.03)
UROBILINOGEN UR STRIP.AUTO-MCNC: <2 MG/DL
WBC # BLD AUTO: 13.7 X10(3) UL (ref 4–11)

## 2021-03-21 PROCEDURE — 99220 INITIAL OBSERVATION CARE,LEVL III: CPT | Performed by: STUDENT IN AN ORGANIZED HEALTH CARE EDUCATION/TRAINING PROGRAM

## 2021-03-21 RX ORDER — MORPHINE SULFATE 2 MG/ML
0.5 INJECTION, SOLUTION INTRAMUSCULAR; INTRAVENOUS EVERY 4 HOURS PRN
Status: DISCONTINUED | OUTPATIENT
Start: 2021-03-21 | End: 2021-03-22

## 2021-03-21 RX ORDER — DICYCLOMINE HCL 20 MG
20 TABLET ORAL 3 TIMES DAILY PRN
Status: DISCONTINUED | OUTPATIENT
Start: 2021-03-21 | End: 2021-03-22

## 2021-03-21 RX ORDER — METOCLOPRAMIDE HYDROCHLORIDE 5 MG/ML
5 INJECTION INTRAMUSCULAR; INTRAVENOUS EVERY 8 HOURS PRN
Status: DISCONTINUED | OUTPATIENT
Start: 2021-03-21 | End: 2021-03-22

## 2021-03-21 RX ORDER — AMLODIPINE BESYLATE 5 MG/1
10 TABLET ORAL DAILY
Status: DISCONTINUED | OUTPATIENT
Start: 2021-03-21 | End: 2021-03-22

## 2021-03-21 RX ORDER — ONDANSETRON 2 MG/ML
4 INJECTION INTRAMUSCULAR; INTRAVENOUS EVERY 6 HOURS PRN
Status: DISCONTINUED | OUTPATIENT
Start: 2021-03-21 | End: 2021-03-22

## 2021-03-21 RX ORDER — KETOROLAC TROMETHAMINE 30 MG/ML
15 INJECTION, SOLUTION INTRAMUSCULAR; INTRAVENOUS ONCE
Status: COMPLETED | OUTPATIENT
Start: 2021-03-21 | End: 2021-03-21

## 2021-03-21 RX ORDER — KETOROLAC TROMETHAMINE 15 MG/ML
15 INJECTION, SOLUTION INTRAMUSCULAR; INTRAVENOUS EVERY 6 HOURS PRN
Status: DISCONTINUED | OUTPATIENT
Start: 2021-03-21 | End: 2021-03-22

## 2021-03-21 RX ORDER — CINACALCET 30 MG/1
30 TABLET, FILM COATED ORAL
Status: DISCONTINUED | OUTPATIENT
Start: 2021-03-22 | End: 2021-03-22

## 2021-03-21 RX ORDER — HYDROMORPHONE HYDROCHLORIDE 1 MG/ML
0.5 INJECTION, SOLUTION INTRAMUSCULAR; INTRAVENOUS; SUBCUTANEOUS ONCE
Status: COMPLETED | OUTPATIENT
Start: 2021-03-21 | End: 2021-03-21

## 2021-03-21 RX ORDER — ATORVASTATIN CALCIUM 10 MG/1
10 TABLET, FILM COATED ORAL NIGHTLY
Status: DISCONTINUED | OUTPATIENT
Start: 2021-03-21 | End: 2021-03-22

## 2021-03-21 RX ORDER — TRAZODONE HYDROCHLORIDE 100 MG/1
100 TABLET ORAL NIGHTLY
Status: DISCONTINUED | OUTPATIENT
Start: 2021-03-21 | End: 2021-03-22

## 2021-03-21 RX ORDER — TRAZODONE HYDROCHLORIDE 100 MG/1
100 TABLET ORAL NIGHTLY
Status: DISCONTINUED | OUTPATIENT
Start: 2021-03-21 | End: 2021-03-21

## 2021-03-21 RX ORDER — SODIUM CHLORIDE 9 MG/ML
INJECTION, SOLUTION INTRAVENOUS CONTINUOUS
Status: DISCONTINUED | OUTPATIENT
Start: 2021-03-21 | End: 2021-03-22

## 2021-03-21 RX ORDER — LOSARTAN POTASSIUM 100 MG/1
100 TABLET ORAL DAILY
Status: DISCONTINUED | OUTPATIENT
Start: 2021-03-21 | End: 2021-03-22

## 2021-03-21 RX ORDER — MORPHINE SULFATE 2 MG/ML
2 INJECTION, SOLUTION INTRAMUSCULAR; INTRAVENOUS EVERY 2 HOUR PRN
Status: DISCONTINUED | OUTPATIENT
Start: 2021-03-21 | End: 2021-03-21

## 2021-03-21 RX ORDER — CLONAZEPAM 0.5 MG/1
0.5 TABLET ORAL ONCE
Status: COMPLETED | OUTPATIENT
Start: 2021-03-21 | End: 2021-03-21

## 2021-03-21 RX ORDER — MORPHINE SULFATE 4 MG/ML
4 INJECTION, SOLUTION INTRAMUSCULAR; INTRAVENOUS EVERY 30 MIN PRN
Status: DISCONTINUED | OUTPATIENT
Start: 2021-03-21 | End: 2021-03-21

## 2021-03-21 RX ORDER — FLUTICASONE PROPIONATE 50 MCG
2 SPRAY, SUSPENSION (ML) NASAL DAILY PRN
Status: DISCONTINUED | OUTPATIENT
Start: 2021-03-21 | End: 2021-03-22

## 2021-03-21 RX ORDER — MORPHINE SULFATE 2 MG/ML
1 INJECTION, SOLUTION INTRAMUSCULAR; INTRAVENOUS EVERY 2 HOUR PRN
Status: DISCONTINUED | OUTPATIENT
Start: 2021-03-21 | End: 2021-03-21

## 2021-03-21 RX ORDER — PREGABALIN 75 MG/1
150 CAPSULE ORAL 2 TIMES DAILY
Status: DISCONTINUED | OUTPATIENT
Start: 2021-03-21 | End: 2021-03-22

## 2021-03-21 RX ORDER — CLONAZEPAM 0.5 MG/1
0.5 TABLET ORAL 2 TIMES DAILY PRN
Status: DISCONTINUED | OUTPATIENT
Start: 2021-03-21 | End: 2021-03-22

## 2021-03-21 RX ORDER — ACETAMINOPHEN 325 MG/1
650 TABLET ORAL EVERY 6 HOURS PRN
Status: DISCONTINUED | OUTPATIENT
Start: 2021-03-21 | End: 2021-03-22

## 2021-03-21 RX ORDER — TAMSULOSIN HYDROCHLORIDE 0.4 MG/1
0.4 CAPSULE ORAL DAILY
Status: DISCONTINUED | OUTPATIENT
Start: 2021-03-21 | End: 2021-03-22

## 2021-03-21 RX ORDER — HYOSCYAMINE SULFATE 0.125 MG
0.12 TABLET,DISINTEGRATING ORAL EVERY 4 HOURS PRN
Status: DISCONTINUED | OUTPATIENT
Start: 2021-03-21 | End: 2021-03-22

## 2021-03-21 RX ORDER — GABAPENTIN 100 MG/1
100 CAPSULE ORAL 2 TIMES DAILY
Status: DISCONTINUED | OUTPATIENT
Start: 2021-03-21 | End: 2021-03-22

## 2021-03-21 RX ORDER — ENOXAPARIN SODIUM 100 MG/ML
30 INJECTION SUBCUTANEOUS DAILY
Status: DISCONTINUED | OUTPATIENT
Start: 2021-03-21 | End: 2021-03-22

## 2021-03-22 VITALS
OXYGEN SATURATION: 94 % | TEMPERATURE: 98 F | SYSTOLIC BLOOD PRESSURE: 133 MMHG | DIASTOLIC BLOOD PRESSURE: 61 MMHG | BODY MASS INDEX: 23.04 KG/M2 | RESPIRATION RATE: 18 BRPM | WEIGHT: 130 LBS | HEART RATE: 77 BPM | HEIGHT: 63 IN

## 2021-03-22 LAB
ALBUMIN SERPL-MCNC: 4 G/DL (ref 3.4–5)
ALBUMIN/GLOB SERPL: 1.2 {RATIO} (ref 1–2)
ALP LIVER SERPL-CCNC: 70 U/L
ALT SERPL-CCNC: 20 U/L
ANION GAP SERPL CALC-SCNC: 7 MMOL/L (ref 0–18)
AST SERPL-CCNC: 19 U/L (ref 15–37)
ATRIAL RATE: 59 BPM
BASOPHILS # BLD AUTO: 0.12 X10(3) UL (ref 0–0.2)
BASOPHILS NFR BLD AUTO: 1.1 %
BILIRUB SERPL-MCNC: 0.5 MG/DL (ref 0.1–2)
BUN BLD-MCNC: 8 MG/DL (ref 7–18)
BUN/CREAT SERPL: 7.2 (ref 10–20)
CALCIUM BLD-MCNC: 9.8 MG/DL (ref 8.5–10.1)
CHLORIDE SERPL-SCNC: 111 MMOL/L (ref 98–112)
CO2 SERPL-SCNC: 23 MMOL/L (ref 21–32)
CREAT BLD-MCNC: 1.11 MG/DL
DEPRECATED RDW RBC AUTO: 46.3 FL (ref 35.1–46.3)
EOSINOPHIL # BLD AUTO: 0.27 X10(3) UL (ref 0–0.7)
EOSINOPHIL NFR BLD AUTO: 2.5 %
ERYTHROCYTE [DISTWIDTH] IN BLOOD BY AUTOMATED COUNT: 13.7 % (ref 11–15)
GLOBULIN PLAS-MCNC: 3.4 G/DL (ref 2.8–4.4)
GLUCOSE BLD-MCNC: 88 MG/DL (ref 70–99)
GLUCOSE BLD-MCNC: 96 MG/DL (ref 70–99)
HCT VFR BLD AUTO: 36.4 %
HGB BLD-MCNC: 12.3 G/DL
IMM GRANULOCYTES # BLD AUTO: 0.03 X10(3) UL (ref 0–1)
IMM GRANULOCYTES NFR BLD: 0.3 %
LYMPHOCYTES # BLD AUTO: 4.13 X10(3) UL (ref 1–4)
LYMPHOCYTES NFR BLD AUTO: 38.5 %
M PROTEIN MFR SERPL ELPH: 7.4 G/DL (ref 6.4–8.2)
MCH RBC QN AUTO: 31.6 PG (ref 26–34)
MCHC RBC AUTO-ENTMCNC: 33.8 G/DL (ref 31–37)
MCV RBC AUTO: 93.6 FL
MONOCYTES # BLD AUTO: 1.22 X10(3) UL (ref 0.1–1)
MONOCYTES NFR BLD AUTO: 11.4 %
NEUTROPHILS # BLD AUTO: 4.97 X10 (3) UL (ref 1.5–7.7)
NEUTROPHILS # BLD AUTO: 4.97 X10(3) UL (ref 1.5–7.7)
NEUTROPHILS NFR BLD AUTO: 46.2 %
OSMOLALITY SERPL CALC.SUM OF ELEC: 290 MOSM/KG (ref 275–295)
P AXIS: 48 DEGREES
P-R INTERVAL: 176 MS
PLATELET # BLD AUTO: 333 10(3)UL (ref 150–450)
POTASSIUM SERPL-SCNC: 3 MMOL/L (ref 3.5–5.1)
Q-T INTERVAL: 406 MS
QRS DURATION: 76 MS
QTC CALCULATION (BEZET): 401 MS
R AXIS: -28 DEGREES
RBC # BLD AUTO: 3.89 X10(6)UL
SODIUM SERPL-SCNC: 141 MMOL/L (ref 136–145)
T AXIS: 47 DEGREES
TROPONIN I SERPL-MCNC: 0.04 NG/ML (ref ?–0.04)
TROPONIN I SERPL-MCNC: 0.05 NG/ML (ref ?–0.04)
TROPONIN I SERPL-MCNC: 0.05 NG/ML (ref ?–0.04)
VENTRICULAR RATE: 59 BPM
WBC # BLD AUTO: 10.7 X10(3) UL (ref 4–11)

## 2021-03-22 PROCEDURE — 99217 OBSERVATION CARE DISCHARGE: CPT | Performed by: HOSPITALIST

## 2021-03-22 RX ORDER — PHENAZOPYRIDINE HYDROCHLORIDE 200 MG/1
200 TABLET, FILM COATED ORAL 2 TIMES DAILY PRN
Qty: 6 TABLET | Refills: 0 | Status: SHIPPED | OUTPATIENT
Start: 2021-03-22 | End: 2021-06-10

## 2021-03-22 RX ORDER — LORAZEPAM 2 MG/ML
1 INJECTION INTRAMUSCULAR ONCE
Status: COMPLETED | OUTPATIENT
Start: 2021-03-22 | End: 2021-03-22

## 2021-03-22 RX ORDER — LORAZEPAM 2 MG/ML
INJECTION INTRAMUSCULAR
Status: DISPENSED
Start: 2021-03-22 | End: 2021-03-22

## 2021-03-22 RX ORDER — POLYETHYLENE GLYCOL 3350 17 G/17G
17 POWDER, FOR SOLUTION ORAL ONCE
Status: COMPLETED | OUTPATIENT
Start: 2021-03-22 | End: 2021-03-22

## 2021-03-22 RX ORDER — POTASSIUM CHLORIDE 20 MEQ/1
40 TABLET, EXTENDED RELEASE ORAL EVERY 4 HOURS
Status: DISCONTINUED | OUTPATIENT
Start: 2021-03-22 | End: 2021-03-22

## 2021-03-22 NOTE — PROGRESS NOTES
Pt. Continued to be very anxious, more agitated, states her meds (Klonopin and Trazodone) are not working, \"wide awake! \" Patient wanted to call , pt. spoke with Yonis Princess- , RN also spoke with , made him aware of above, states she's marta

## 2021-03-22 NOTE — PLAN OF CARE
Problem: Patient/Family Goals  Goal: Patient/Family Long Term Goal  Description: Patient's Long Term Goal: DISCHARGE    Interventions:  - RETURN TO REGULAR ADL'S  -COMMUNICATE ANY NEEDS  -TOLERATE ADVANCED DIET  - See additional Care Plan goals for speci fall precautions as indicated by assessment.  - Educate pt/family on patient safety including physical limitations  - Instruct pt to call for assistance with activity based on assessment  - Modify environment to reduce risk of injury  - Provide assistive d

## 2021-03-22 NOTE — PROGRESS NOTES
Pharmacy Note: Renal dose adjustment for Metoclopramide (Reglan)  Monserrat Walters has been prescribed Metoclopramide (Reglan) 10 mg every 8 hours as needed. Estimated Creatinine Clearance: 28.6 mL/min (A) (based on SCr of 1.34 mg/dL (H)).     Her calculat

## 2021-03-22 NOTE — PROGRESS NOTES
Name: Senait Vazquez   : 1940   DOS:3/18/2021      Pain Clinic Follow Up Visit:   Senait Vazquez is a 68year old female who presents for recheck of her chronic low back pain.   The patient has a spinal cord stimulator which was helping her significa • traZODone HCl 100 MG Oral Tab Take 1 tablet (100 mg total) by mouth nightly. 90 tablet 0   • HYDROcodone-acetaminophen  MG Oral Tab Take 0.5-1 tablets by mouth every 8 (eight) hours as needed.  30 tablet 0   • amLODIPine Besylate 10 MG Oral Tab Ta radiculitis, sacroiliac and facet joint arthropathy. As the patient had had excellent pain relief from spinal cord stimulation, I recommended her to be seen by [employer] representative to adjust the stimulation which could help her again.   For her new rad

## 2021-03-22 NOTE — PROGRESS NOTES
Patient seen and examined. Medically clear to discharge today. She has lower abd pain which is a chronic issue. Has seen GI in the past and recently had c -scope.   Also sees pain service and recently started on PO norco for radicular pain with plans for

## 2021-03-22 NOTE — PROGRESS NOTES
Pt. Waking up, urinary frequency and now c/o \"belly ache, feel like I have to poop. \" Pt. Impulsive, getting out of bed, trying to climb over bedrail, continuing to set off bed alarm. Administered Toradol IV PRN, Miralax PO PRN and Tylenol PO PRN.  Pt. Luis Felipe Rebollar

## 2021-03-22 NOTE — CM/SW NOTE
03/22/21 1400   CM/SW Screening   Referral Source Social Work (self-referral)   [address] staff; Chart review   Patient's Mental Status Confused   Patient's [address]   Patient lives with Spouse     Pt is a 67 y/o admitted for a

## 2021-03-22 NOTE — PROGRESS NOTES
NURSING DISCHARGE NOTE    Discharged Home via Wheelchair. Accompanied by Support staff  Belongings Taken by patient/family. PT DC VIA WHEELCHAIR IN STABLE CONDITION. REMOVED IV.  REVIEWED DC INSTRUCTION WITH  AND PT. LET PT KNOW THAT RX WERE

## 2021-03-22 NOTE — ED PROVIDER NOTES
Patient Seen in: [facility] Emergency Department      History   Patient presents with:  Abdomen/Flank Pain    Stated Complaint: abd pain    HPI/Subjective:   HPI       Patient is a 42-year-old woman presenting with abdominal pain.   This is her third Pain in joints    • Parathyroid adenoma    • Postmenopausal status     Age of onset: 30years old   • Presence of other cardiac implants and grafts    • Rheumatic heart disease, unspecified    • Sleep disturbance    • Stented coronary artery    • Stool inc Ruchi Jimenez MD at [address] [facility]   • SELECTIVE NERVE ROOT BLOCKS Left 11/14/2019    Performed by Ruchi Jimenez MD at [address] [facility] TRIAL N/A 5/1/2017    Performed by Ruchi Jimenez MD at [address] [facility]  200 except as noted above.     Physical Exam     ED Triage Vitals   BP 03/21/21 1928 (!) 167/77   Pulse 03/21/21 1928 75   Resp 03/21/21 1928 22   Temp 03/21/21 1928 98.1 °F (36.7 °C)   Temp src 03/21/21 2244 Oral   SpO2 03/21/21 1928 97 %   O2 Device 03/21/21 following components:    Blood Urine Moderate (*)     RBC Urine >10 (*)     Squamous Epi.  Cells Few (*)     All other components within normal limits   LIPASE - Abnormal; Notable for the following components:    Lipase 72 (*)     All other components withi CBC W/ DIFFERENTIAL[[id number]]          Abnormal            Final result                 Please view results for these tests on the individual orders. 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

## 2021-03-22 NOTE — PROGRESS NOTES
Pharmacy Note: Renal dose adjustment Ketorolac (Toradol)  Senait Vazquez has been prescribed Ketorolac (Toradol) 30 mg every 6 hours as needed for pain. Estimated Creatinine Clearance: 28.6 mL/min (A) (based on SCr of 1.34 mg/dL (H)).     Her calculated

## 2021-03-22 NOTE — PROGRESS NOTES
[facility] Pharmacy Note: Renal dose adjustment for Enoxaparin (Lovenox)  Ade Serra has been prescribed Enoxaparin (Lovenox)  40 mg subcutaneously every 24 hours. Estimated Creatinine Clearance: 28.6 mL/min (A) (based on SCr of 1.34 mg/dL (H)).     Her ca

## 2021-03-22 NOTE — PLAN OF CARE
Problem: PAIN - ADULT  Goal: Verbalizes/displays adequate comfort level or patient's stated pain goal  Description: INTERVENTIONS:  - Encourage pt to monitor pain and request assistance  - Assess pain using appropriate pain scale  - Administer analgesics bladder  - Monitor intake/output and perform bladder scan as needed  - Follow urinary retention protocol/standard of care  - Consider collaborating with pharmacy to review patient's medication profile  - Implement strategies to promote bladder emptying  Ravi Vaughn

## 2021-03-22 NOTE — H&P
[facility] HOSPITAL[facility]  History and Physical     Katerine Esteves Patient Status:  Emergency    1940 MRN [id number]   Location [address] Attending Suman Martinez MD   Hosp Day # 0 PCP Berenice Pallas, DO     Chief Complaint: A Other and unspecified hyperlipidemia    • Pain in joints    • Parathyroid adenoma    • Postmenopausal status     Age of onset: 30years old   • Presence of other cardiac implants and grafts    • Rheumatic heart disease, unspecified    • Sleep disturbance INJECTION RIGHT OR LEFT Left 10/25/2016    Performed by Jose Wynn MD at . [address] Left 11/14/2019    Performed by Jose Wynn MD at [address] [place] [address] TRIAL N/A 5/1/2017    Performed by Charito Delacruz Brother         rheumatic heart disease   • Cancer Sister         liver cancer   • Bipolar Disorder Sister     reviewed    Allergies:   Penicillins             HIVES    Comment:Throat swells, difficulty breathing  Bactrim                 NAUSEA AND VOMITIN mouth daily. , Disp: 90 tablet, Rfl: 1  PREGABALIN 150 MG Oral Cap, TAKE 1 CAPSULE(150 MG) BY MOUTH TWICE DAILY, Disp: 60 capsule, Rfl: 0  ondansetron 8 MG Oral Tablet Dispersible, Take 1 tablet (8 mg total) by mouth every 6 (six) hours as needed for Nausea 03/20/21  1317 03/21/21 1951   WBC 12.2* 16.5* 13.7*   HGB 13.6 13.4 12.7   MCV 92.9 95.1 93.6   .0 376.0 340.0       Recent Labs   Lab 03/15/21  1713 03/20/21  1317 03/21/21 1951   GLU 91 96 91   BUN 15 12 12   CREATSERUM 1.36* 1.32* 1.34*   GFRA

## 2021-03-22 NOTE — PROGRESS NOTES
Received order for \"psych liason/consult psychiatrist,\" called [facility] spoke with Irlanda, RN, RN states if it's entered in [address], they will see it, no need to call in consult. Will endorse.

## 2021-03-23 ENCOUNTER — APPOINTMENT (OUTPATIENT)
Dept: GENERAL RADIOLOGY | Facility: HOSPITAL | Age: 81
End: 2021-03-23
Attending: EMERGENCY MEDICINE
Payer: MEDICARE

## 2021-03-23 ENCOUNTER — HOSPITAL ENCOUNTER (OUTPATIENT)
Facility: HOSPITAL | Age: 81
Setting detail: OBSERVATION
Discharge: HOME | End: 2021-03-26
Attending: EMERGENCY MEDICINE | Admitting: INTERNAL MEDICINE
Payer: MEDICARE

## 2021-03-23 ENCOUNTER — APPOINTMENT (OUTPATIENT)
Dept: CT IMAGING | Facility: HOSPITAL | Age: 81
End: 2021-03-23
Attending: EMERGENCY MEDICINE
Payer: MEDICARE

## 2021-03-23 DIAGNOSIS — N20.1 CALCULI, URETER: ICD-10-CM

## 2021-03-23 DIAGNOSIS — M51.16 LUMBAR DISC HERNIATION WITH RADICULOPATHY: ICD-10-CM

## 2021-03-23 DIAGNOSIS — N23 RENAL COLIC: Primary | ICD-10-CM

## 2021-03-23 PROCEDURE — 74018 RADEX ABDOMEN 1 VIEW: CPT | Performed by: EMERGENCY MEDICINE

## 2021-03-23 PROCEDURE — 99226 SUBSEQUENT OBSERVATION CARE: CPT | Performed by: INTERNAL MEDICINE

## 2021-03-23 PROCEDURE — 74177 CT ABD & PELVIS W/CONTRAST: CPT | Performed by: EMERGENCY MEDICINE

## 2021-03-23 RX ORDER — TRAZODONE HYDROCHLORIDE 100 MG/1
100 TABLET ORAL NIGHTLY
Status: DISCONTINUED | OUTPATIENT
Start: 2021-03-23 | End: 2021-03-26

## 2021-03-23 RX ORDER — DIPHENHYDRAMINE HCL 25 MG
25 CAPSULE ORAL EVERY 6 HOURS PRN
Status: DISCONTINUED | OUTPATIENT
Start: 2021-03-23 | End: 2021-03-26

## 2021-03-23 RX ORDER — HYDROMORPHONE HYDROCHLORIDE 1 MG/ML
1 INJECTION, SOLUTION INTRAMUSCULAR; INTRAVENOUS; SUBCUTANEOUS ONCE
Status: COMPLETED | OUTPATIENT
Start: 2021-03-23 | End: 2021-03-23

## 2021-03-23 RX ORDER — DEXTROSE AND SODIUM CHLORIDE 5; .45 G/100ML; G/100ML
INJECTION, SOLUTION INTRAVENOUS CONTINUOUS
Status: ACTIVE | OUTPATIENT
Start: 2021-03-23 | End: 2021-03-23

## 2021-03-23 RX ORDER — MELATONIN
3 NIGHTLY PRN
Status: DISCONTINUED | OUTPATIENT
Start: 2021-03-23 | End: 2021-03-26

## 2021-03-23 RX ORDER — HYDROMORPHONE HYDROCHLORIDE 1 MG/ML
0.4 INJECTION, SOLUTION INTRAMUSCULAR; INTRAVENOUS; SUBCUTANEOUS EVERY 2 HOUR PRN
Status: DISCONTINUED | OUTPATIENT
Start: 2021-03-23 | End: 2021-03-26

## 2021-03-23 RX ORDER — CINACALCET 30 MG/1
30 TABLET, FILM COATED ORAL
Status: DISCONTINUED | OUTPATIENT
Start: 2021-03-24 | End: 2021-03-26

## 2021-03-23 RX ORDER — METOCLOPRAMIDE HYDROCHLORIDE 5 MG/ML
10 INJECTION INTRAMUSCULAR; INTRAVENOUS EVERY 8 HOURS PRN
Status: DISCONTINUED | OUTPATIENT
Start: 2021-03-23 | End: 2021-03-24

## 2021-03-23 RX ORDER — POLYETHYLENE GLYCOL 3350 17 G/17G
17 POWDER, FOR SOLUTION ORAL DAILY PRN
Status: DISCONTINUED | OUTPATIENT
Start: 2021-03-23 | End: 2021-03-26

## 2021-03-23 RX ORDER — DICYCLOMINE HCL 20 MG
20 TABLET ORAL 4 TIMES DAILY PRN
Status: DISCONTINUED | OUTPATIENT
Start: 2021-03-23 | End: 2021-03-26

## 2021-03-23 RX ORDER — AMLODIPINE BESYLATE 5 MG/1
10 TABLET ORAL DAILY
Status: DISCONTINUED | OUTPATIENT
Start: 2021-03-24 | End: 2021-03-26

## 2021-03-23 RX ORDER — HYDROMORPHONE HYDROCHLORIDE 1 MG/ML
INJECTION, SOLUTION INTRAMUSCULAR; INTRAVENOUS; SUBCUTANEOUS
Status: DISPENSED
Start: 2021-03-23 | End: 2021-03-24

## 2021-03-23 RX ORDER — MONTELUKAST SODIUM 10 MG/1
10 TABLET ORAL NIGHTLY
Status: DISCONTINUED | OUTPATIENT
Start: 2021-03-23 | End: 2021-03-26

## 2021-03-23 RX ORDER — GABAPENTIN 100 MG/1
100 CAPSULE ORAL 2 TIMES DAILY
Status: DISCONTINUED | OUTPATIENT
Start: 2021-03-23 | End: 2021-03-26

## 2021-03-23 RX ORDER — SODIUM CHLORIDE 9 MG/ML
INJECTION, SOLUTION INTRAVENOUS CONTINUOUS
Status: DISCONTINUED | OUTPATIENT
Start: 2021-03-23 | End: 2021-03-24

## 2021-03-23 RX ORDER — ACETAMINOPHEN 325 MG/1
650 TABLET ORAL EVERY 6 HOURS PRN
Status: DISCONTINUED | OUTPATIENT
Start: 2021-03-23 | End: 2021-03-26

## 2021-03-23 RX ORDER — LABETALOL HYDROCHLORIDE 5 MG/ML
10 INJECTION, SOLUTION INTRAVENOUS EVERY 4 HOURS PRN
Status: DISCONTINUED | OUTPATIENT
Start: 2021-03-23 | End: 2021-03-26

## 2021-03-23 RX ORDER — TAMSULOSIN HYDROCHLORIDE 0.4 MG/1
0.4 CAPSULE ORAL DAILY
Status: DISCONTINUED | OUTPATIENT
Start: 2021-03-24 | End: 2021-03-26

## 2021-03-23 RX ORDER — ENOXAPARIN SODIUM 100 MG/ML
30 INJECTION SUBCUTANEOUS NIGHTLY
Status: DISCONTINUED | OUTPATIENT
Start: 2021-03-23 | End: 2021-03-25

## 2021-03-23 RX ORDER — PREGABALIN 75 MG/1
150 CAPSULE ORAL DAILY
Status: DISCONTINUED | OUTPATIENT
Start: 2021-03-24 | End: 2021-03-25

## 2021-03-23 RX ORDER — ONDANSETRON 2 MG/ML
4 INJECTION INTRAMUSCULAR; INTRAVENOUS ONCE
Status: COMPLETED | OUTPATIENT
Start: 2021-03-23 | End: 2021-03-23

## 2021-03-23 RX ORDER — ATORVASTATIN CALCIUM 10 MG/1
10 TABLET, FILM COATED ORAL NIGHTLY
Status: DISCONTINUED | OUTPATIENT
Start: 2021-03-23 | End: 2021-03-26

## 2021-03-23 RX ORDER — HYDROCODONE BITARTRATE AND ACETAMINOPHEN 10; 325 MG/1; MG/1
TABLET ORAL EVERY 8 HOURS PRN
Status: DISCONTINUED | OUTPATIENT
Start: 2021-03-23 | End: 2021-03-26

## 2021-03-23 RX ORDER — CLONAZEPAM 0.5 MG/1
0.5 TABLET ORAL 2 TIMES DAILY PRN
Status: DISCONTINUED | OUTPATIENT
Start: 2021-03-23 | End: 2021-03-26

## 2021-03-23 RX ORDER — HYDROMORPHONE HYDROCHLORIDE 1 MG/ML
0.8 INJECTION, SOLUTION INTRAMUSCULAR; INTRAVENOUS; SUBCUTANEOUS EVERY 2 HOUR PRN
Status: DISCONTINUED | OUTPATIENT
Start: 2021-03-23 | End: 2021-03-26

## 2021-03-23 RX ORDER — LORAZEPAM 2 MG/ML
0.5 INJECTION INTRAMUSCULAR ONCE
Status: COMPLETED | OUTPATIENT
Start: 2021-03-23 | End: 2021-03-23

## 2021-03-23 RX ORDER — DOCUSATE SODIUM 100 MG/1
100 CAPSULE, LIQUID FILLED ORAL 2 TIMES DAILY
Status: DISCONTINUED | OUTPATIENT
Start: 2021-03-23 | End: 2021-03-26

## 2021-03-23 RX ORDER — MAGNESIUM HYDROXIDE/ALUMINUM HYDROXICE/SIMETHICONE 120; 1200; 1200 MG/30ML; MG/30ML; MG/30ML
30 SUSPENSION ORAL ONCE
Status: COMPLETED | OUTPATIENT
Start: 2021-03-23 | End: 2021-03-23

## 2021-03-23 RX ORDER — HYDROMORPHONE HYDROCHLORIDE 1 MG/ML
0.5 INJECTION, SOLUTION INTRAMUSCULAR; INTRAVENOUS; SUBCUTANEOUS ONCE
Status: COMPLETED | OUTPATIENT
Start: 2021-03-23 | End: 2021-03-23

## 2021-03-23 RX ORDER — ACETAMINOPHEN 500 MG
1000 TABLET ORAL DAILY PRN
Status: DISCONTINUED | OUTPATIENT
Start: 2021-03-23 | End: 2021-03-26

## 2021-03-23 RX ORDER — HYDROMORPHONE HYDROCHLORIDE 1 MG/ML
0.2 INJECTION, SOLUTION INTRAMUSCULAR; INTRAVENOUS; SUBCUTANEOUS EVERY 2 HOUR PRN
Status: DISCONTINUED | OUTPATIENT
Start: 2021-03-23 | End: 2021-03-26

## 2021-03-23 RX ORDER — ONDANSETRON 2 MG/ML
4 INJECTION INTRAMUSCULAR; INTRAVENOUS EVERY 6 HOURS PRN
Status: DISCONTINUED | OUTPATIENT
Start: 2021-03-23 | End: 2021-03-26

## 2021-03-23 RX ORDER — BISACODYL 10 MG
10 SUPPOSITORY, RECTAL RECTAL
Status: DISCONTINUED | OUTPATIENT
Start: 2021-03-23 | End: 2021-03-26

## 2021-03-23 RX ORDER — VANCOMYCIN HYDROCHLORIDE 125 MG/1
125 CAPSULE ORAL NIGHTLY
Status: DISCONTINUED | OUTPATIENT
Start: 2021-03-23 | End: 2021-03-26

## 2021-03-23 NOTE — ED INITIAL ASSESSMENT (HPI)
Pt to ed with known kidney stone. Took a toradol 10mg 2x today without relief. Last time was approx 1 hour ago. Pt here bc unable to manage pain at home. Pain is 10/10. Has diarrhea. Denies f/v/n.  Pt is tearful during this triage

## 2021-03-23 NOTE — H&P
[facility] HOSPITAL[facility]  History and Physical     Marianne Spencer Patient Status:  Emergency    1940 MRN [id number]   Location [facility] Attending Tho Magallon, [address] [facility] Day # 0 PCP Elvi Schroeder DO     Chief Complaint: site    • Other and unspecified hyperlipidemia    • Pain in joints    • Parathyroid adenoma    • Postmenopausal status     Age of onset: 30years old   • Presence of other cardiac implants and grafts    • Rheumatic heart disease, unspecified    • Sleep dis SACROILIAC JOINT INJECTION RIGHT OR LEFT Left 10/25/2016    Performed by Margaret Gonzalez MD at . [address] Left 11/14/2019    Performed by Margaret Gonzalez MD at [address] [place] [address] N/A 5/1/2017 Heart Disorder Brother         rheumatic heart disease   • Cancer Sister         liver cancer   • Bipolar Disorder Sister         Allergies:   Penicillins             HIVES    Comment:Throat swells, difficulty breathing  Bactrim                 NAUSEA AND Oral Tab, Take 1 tablet (100 mg total) by mouth nightly., Disp: 90 tablet, Rfl: 0  HYDROcodone-acetaminophen  MG Oral Tab, Take 0.5-1 tablets by mouth every 8 (eight) hours as needed. , Disp: 30 tablet, Rfl: 0  amLODIPine Besylate 10 MG Oral Tab, Take nondistended. Positive bowel sounds. No rebound, guarding or organomegaly. Neurologic: No focal neurological deficits. CNII-XII grossly intact. Musculoskeletal: Moves all extremities. Extremities: No edema or cyanosis.   Integument: No rashes or lesions MD  3/23/2021

## 2021-03-23 NOTE — DISCHARGE SUMMARY
[facility] [place] HOSPITALIST  DISCHARGE SUMMARY     Anai Reis Patient Status:  Observation    1940 MRN [id number]   [facility] 3NW-A Attending No att. providers found   Hosp Day # 0 PCP Valentina Terrell DO     Date of Admission: 3/21/2021  D tablet  Refills: 0        CONTINUE taking these medications      Instructions Prescription details   acetaminophen 500 MG Tabs  Commonly known as: TYLENOL EXTRA STRENGTH      Take 1,000 mg by mouth daily as needed for Pain.    Refills: 0     amLODIPine Besy (six) hours as needed for Pain.    Stop taking on: March 27, 2021  Quantity: 30 tablet  Refills: 0     losartan 100 MG Tabs  Commonly known as: COZAAR      TAKE 1 TABLET(100 MG) BY MOUTH DAILY   Quantity: 90 tablet  Refills: 0     Montelukast Sodium 10 MG T visit      Valerie Merritt MD  [address]  [address] [facility]  [phone]    Schedule an appointment as soon as possible for a visit      Appointments for Next 30 Days 3/23/2021 - 4/22/2021 Comment      Date Arrival Time Visit Type Length Departm
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
## 2021-03-23 NOTE — CM/SW NOTE
03/23/21 1200   Discharge disposition   Expected discharge disposition Home or Self   Discharge transportation Private car   Pt d/c'd home via private vehicle 3/22/21.

## 2021-03-24 ENCOUNTER — ANESTHESIA EVENT (OUTPATIENT)
Dept: SURGERY | Facility: HOSPITAL | Age: 81
End: 2021-03-24
Payer: MEDICARE

## 2021-03-24 ENCOUNTER — ANESTHESIA (OUTPATIENT)
Dept: SURGERY | Facility: HOSPITAL | Age: 81
End: 2021-03-24
Payer: MEDICARE

## 2021-03-24 ENCOUNTER — APPOINTMENT (OUTPATIENT)
Dept: GENERAL RADIOLOGY | Facility: HOSPITAL | Age: 81
End: 2021-03-24
Attending: UROLOGY
Payer: MEDICARE

## 2021-03-24 PROCEDURE — 0TC78ZZ EXTIRPATION OF MATTER FROM LEFT URETER, VIA NATURAL OR ARTIFICIAL OPENING ENDOSCOPIC: ICD-10-PCS | Performed by: UROLOGY

## 2021-03-24 PROCEDURE — 99225 SUBSEQUENT OBSERVATION CARE: CPT | Performed by: INTERNAL MEDICINE

## 2021-03-24 PROCEDURE — BT1F1ZZ FLUOROSCOPY OF LEFT KIDNEY, URETER AND BLADDER USING LOW OSMOLAR CONTRAST: ICD-10-PCS | Performed by: UROLOGY

## 2021-03-24 PROCEDURE — 0TC48ZZ EXTIRPATION OF MATTER FROM LEFT KIDNEY PELVIS, VIA NATURAL OR ARTIFICIAL OPENING ENDOSCOPIC: ICD-10-PCS | Performed by: UROLOGY

## 2021-03-24 PROCEDURE — 0T778DZ DILATION OF LEFT URETER WITH INTRALUMINAL DEVICE, VIA NATURAL OR ARTIFICIAL OPENING ENDOSCOPIC: ICD-10-PCS | Performed by: UROLOGY

## 2021-03-24 DEVICE — URETERAL STENT
Type: IMPLANTABLE DEVICE | Site: URETER | Status: FUNCTIONAL
Brand: ASCERTA™

## 2021-03-24 RX ORDER — ONDANSETRON 2 MG/ML
4 INJECTION INTRAMUSCULAR; INTRAVENOUS AS NEEDED
Status: DISCONTINUED | OUTPATIENT
Start: 2021-03-24 | End: 2021-03-24 | Stop reason: HOSPADM

## 2021-03-24 RX ORDER — HYDROCODONE BITARTRATE AND ACETAMINOPHEN 5; 325 MG/1; MG/1
1 TABLET ORAL AS NEEDED
Status: DISCONTINUED | OUTPATIENT
Start: 2021-03-24 | End: 2021-03-24 | Stop reason: HOSPADM

## 2021-03-24 RX ORDER — METOCLOPRAMIDE HYDROCHLORIDE 5 MG/ML
5 INJECTION INTRAMUSCULAR; INTRAVENOUS EVERY 8 HOURS PRN
Status: DISCONTINUED | OUTPATIENT
Start: 2021-03-24 | End: 2021-03-26

## 2021-03-24 RX ORDER — DIPHENHYDRAMINE HYDROCHLORIDE 50 MG/ML
12.5 INJECTION INTRAMUSCULAR; INTRAVENOUS AS NEEDED
Status: DISCONTINUED | OUTPATIENT
Start: 2021-03-24 | End: 2021-03-24 | Stop reason: HOSPADM

## 2021-03-24 RX ORDER — MEPERIDINE HYDROCHLORIDE 25 MG/ML
12.5 INJECTION INTRAMUSCULAR; INTRAVENOUS; SUBCUTANEOUS AS NEEDED
Status: DISCONTINUED | OUTPATIENT
Start: 2021-03-24 | End: 2021-03-24 | Stop reason: HOSPADM

## 2021-03-24 RX ORDER — ONDANSETRON 2 MG/ML
INJECTION INTRAMUSCULAR; INTRAVENOUS AS NEEDED
Status: DISCONTINUED | OUTPATIENT
Start: 2021-03-24 | End: 2021-03-24 | Stop reason: SURG

## 2021-03-24 RX ORDER — LIDOCAINE HYDROCHLORIDE 10 MG/ML
INJECTION, SOLUTION EPIDURAL; INFILTRATION; INTRACAUDAL; PERINEURAL AS NEEDED
Status: DISCONTINUED | OUTPATIENT
Start: 2021-03-24 | End: 2021-03-24 | Stop reason: SURG

## 2021-03-24 RX ORDER — HYDROMORPHONE HYDROCHLORIDE 1 MG/ML
0.4 INJECTION, SOLUTION INTRAMUSCULAR; INTRAVENOUS; SUBCUTANEOUS EVERY 5 MIN PRN
Status: DISCONTINUED | OUTPATIENT
Start: 2021-03-24 | End: 2021-03-24 | Stop reason: HOSPADM

## 2021-03-24 RX ORDER — SODIUM CHLORIDE, SODIUM LACTATE, POTASSIUM CHLORIDE, CALCIUM CHLORIDE 600; 310; 30; 20 MG/100ML; MG/100ML; MG/100ML; MG/100ML
INJECTION, SOLUTION INTRAVENOUS CONTINUOUS
Status: DISCONTINUED | OUTPATIENT
Start: 2021-03-24 | End: 2021-03-24 | Stop reason: HOSPADM

## 2021-03-24 RX ORDER — MIDAZOLAM HYDROCHLORIDE 1 MG/ML
1 INJECTION INTRAMUSCULAR; INTRAVENOUS EVERY 5 MIN PRN
Status: DISCONTINUED | OUTPATIENT
Start: 2021-03-24 | End: 2021-03-24 | Stop reason: HOSPADM

## 2021-03-24 RX ORDER — HYDROMORPHONE HYDROCHLORIDE 1 MG/ML
INJECTION, SOLUTION INTRAMUSCULAR; INTRAVENOUS; SUBCUTANEOUS
Status: COMPLETED
Start: 2021-03-24 | End: 2021-03-24

## 2021-03-24 RX ORDER — METOCLOPRAMIDE HYDROCHLORIDE 5 MG/ML
10 INJECTION INTRAMUSCULAR; INTRAVENOUS AS NEEDED
Status: DISCONTINUED | OUTPATIENT
Start: 2021-03-24 | End: 2021-03-24 | Stop reason: HOSPADM

## 2021-03-24 RX ORDER — SODIUM CHLORIDE 9 MG/ML
INJECTION, SOLUTION INTRAVENOUS CONTINUOUS PRN
Status: DISCONTINUED | OUTPATIENT
Start: 2021-03-24 | End: 2021-03-24 | Stop reason: SURG

## 2021-03-24 RX ORDER — HYDROCODONE BITARTRATE AND ACETAMINOPHEN 5; 325 MG/1; MG/1
2 TABLET ORAL AS NEEDED
Status: DISCONTINUED | OUTPATIENT
Start: 2021-03-24 | End: 2021-03-24 | Stop reason: HOSPADM

## 2021-03-24 RX ORDER — NALOXONE HYDROCHLORIDE 0.4 MG/ML
80 INJECTION, SOLUTION INTRAMUSCULAR; INTRAVENOUS; SUBCUTANEOUS AS NEEDED
Status: DISCONTINUED | OUTPATIENT
Start: 2021-03-24 | End: 2021-03-24 | Stop reason: HOSPADM

## 2021-03-24 RX ORDER — EPHEDRINE SULFATE 50 MG/ML
INJECTION INTRAVENOUS AS NEEDED
Status: DISCONTINUED | OUTPATIENT
Start: 2021-03-24 | End: 2021-03-24 | Stop reason: SURG

## 2021-03-24 RX ORDER — DEXAMETHASONE SODIUM PHOSPHATE 4 MG/ML
VIAL (ML) INJECTION AS NEEDED
Status: DISCONTINUED | OUTPATIENT
Start: 2021-03-24 | End: 2021-03-24 | Stop reason: SURG

## 2021-03-24 RX ORDER — SODIUM CHLORIDE 9 MG/ML
INJECTION, SOLUTION INTRAVENOUS CONTINUOUS
Status: DISCONTINUED | OUTPATIENT
Start: 2021-03-24 | End: 2021-03-25

## 2021-03-24 RX ORDER — CEFAZOLIN SODIUM 1 G/3ML
INJECTION, POWDER, FOR SOLUTION INTRAMUSCULAR; INTRAVENOUS AS NEEDED
Status: DISCONTINUED | OUTPATIENT
Start: 2021-03-24 | End: 2021-03-24 | Stop reason: SURG

## 2021-03-24 RX ADMIN — EPHEDRINE SULFATE 10 MG: 50 INJECTION INTRAVENOUS at 17:49:00

## 2021-03-24 RX ADMIN — CEFAZOLIN SODIUM 2 G: 1 INJECTION, POWDER, FOR SOLUTION INTRAMUSCULAR; INTRAVENOUS at 17:51:00

## 2021-03-24 RX ADMIN — SODIUM CHLORIDE: 9 INJECTION, SOLUTION INTRAVENOUS at 17:41:00

## 2021-03-24 RX ADMIN — ONDANSETRON 4 MG: 2 INJECTION INTRAMUSCULAR; INTRAVENOUS at 17:49:00

## 2021-03-24 RX ADMIN — DEXAMETHASONE SODIUM PHOSPHATE 4 MG: 4 MG/ML VIAL (ML) INJECTION at 17:49:00

## 2021-03-24 RX ADMIN — LIDOCAINE HYDROCHLORIDE 50 MG: 10 INJECTION, SOLUTION EPIDURAL; INFILTRATION; INTRACAUDAL; PERINEURAL at 17:45:00

## 2021-03-24 NOTE — PROGRESS NOTES
Pt picked up and transferred to OR for cystoscopy. Family at bedside. Consent signed and on chart. Patient voided prior to depart from unit.

## 2021-03-24 NOTE — CONSULTS
[facility] Note:  Renal Dose Adjustment for Metoclopramide (REGLAN)    Wilfred Gastelum has been prescribed Metoclopramide (REGLAN) 10 mg every 8 hours as needed for N/V. Estimated Creatinine Clearance: 27.4 mL/min (A) (based on SCr of 1.4 mg/dL (H)).

## 2021-03-24 NOTE — PROGRESS NOTES
[facility] Pharmacy Note:  Renal Dose Adjustment for enoxaparin (LOVENOX)    Alma Thomason has been prescribed enoxaparin 40 mg subcutaneously every 24 hours. Estimated Creatinine Clearance: 27 mL/min (A) (based on SCr of 1.42 mg/dL (H)).     Calculated CrCl 2

## 2021-03-24 NOTE — ANESTHESIA PREPROCEDURE EVALUATION
PRE-OP EVALUATION    Patient Name: Nate Heath    Admit Diagnosis: Renal colic [G86]    Pre-op Diagnosis: Calculi, ureter [N20.1]    CYSTOSCOPY, LEFT RETROGRADE PYELOGRAM, LEFT URETEROSCOPIC STONE EXTRCTION, LASER LITHOTRIPSY, LEFT URETERAL STENT INSERT PRN  bisacodyl (DULCOLAX) rectal suppository 10 mg, 10 mg, Rectal, Daily PRN  melatonin tab 3 mg, 3 mg, Oral, Nightly PRN  HYDROmorphone HCl (DILAUDID) 1 MG/ML injection 0.2 mg, 0.2 mg, Intravenous, Q2H PRN   Or  HYDROmorphone HCl (DILAUDID) 1 MG/ML inject Rfl: 0, 3/23/2021  Pravastatin Sodium 40 MG Oral Tab, Take 40 mg by mouth nightly., Disp: , Rfl:   Dicyclomine HCl 20 MG Oral Tab, Take 1 tablet (20 mg total) by mouth 4 (four) times daily as needed. , Disp: 30 tablet, Rfl: 0  HYOSCYAMINE SULFATE 0.125 MG S capsule (0.4 mg total) by mouth daily for 7 days. , Disp: 7 capsule, Rfl: 0  ondansetron 8 MG Oral Tablet Dispersible, Take 1 tablet (8 mg total) by mouth every 6 (six) hours as needed for Nausea., Disp: 10 tablet, Rfl: 0  DiphenhydrAMINE HCl 25 MG Oral Tab MICRODISCECTOMY 1 LEVEL N/A 1/27/2020    Performed by David Conde MD at [address]   • TUNG LOCALIZATION WIRE 1 SITE LEFT (CPT=19281)     • TUNG LOCALIZATION WIRE 1 SITE RIGHT (MAE=[id number])     • OTHER  01/27/2020    LEFT LUMBAR 5-SACRAL 1 MICRODISCEC Social History    Tobacco Use      Smoking status: Former Smoker        Packs/day: 0.00        Years: 20.00        Pack years: 0        Types: Cigarettes        Quit date: 1997        Years since quittin.2      Smokeless tobacco: Never Used

## 2021-03-24 NOTE — ANESTHESIA PROCEDURE NOTES
Airway  Date/Time: 3/24/2021 5:47 PM  Urgency: elective    Airway not difficult    General Information and Staff    Patient location during procedure: OR  Anesthesiologist: Jerod Alexander MD  Performed: anesthesiologist     Indications and Patient

## 2021-03-24 NOTE — PROGRESS NOTES
Patient rounding done. Found to have 2 visitors at bedside. Updated family members to hospital visiting policy, only 1 visitor allowed in room at a time. Asked that one visitor step out of the room.    Family inquiring whether 12year old great-granddaberta

## 2021-03-24 NOTE — CONSULTS
[facility] Urology   Consultation Note    Genarolillian Reis Patient Status:  Observation    1940 MRN [id number]   [facility] 3SW-A Attending Delicia Cabello,    Hosp Day # 0 PCP Valentina Terrell DO     Reason for Consultation:  L aids   • Hearing loss    • Heart murmur    • Heart palpitations    • Hemorrhoids    • High blood pressure    • High cholesterol    • History of blood transfusion    • History of cardiac murmur    • History of depression    • History of eating disorder    • EH MAIN OR   • LUMBAR MICRODISCECTOMY 1 LEVEL N/A 1/27/2020    Performed by Jeannine Sarabia MD at [address]   • TUNG LOCALIZATION WIRE 1 SITE LEFT (CPT=19281)     • TUNG LOCALIZATION WIRE 1 SITE RIGHT (JSS=63237)     • OTHER  01/27/2020    LEFT LUMBAR • TUBAL LIGATION       Family History   Problem Relation Age of Onset   • Hypertension Father    • Heart Attack Father    • Depression Mother    • Hypertension Daughter    • Cancer Sister         lung with mets to the lymph nodes   • Lipids Sister    • H Labetalol HCl (TRANDATE) injection 10 mg, 10 mg, Intravenous, Q4H PRN  •  cefTRIAXone Sodium (ROCEPHIN) 1 g in sodium chloride 0.9% 100 mL IVPB-ADDV, 1 g, Intravenous, Q24H  •  vancomycin HCl (VANCOCIN) cap 125 mg, 125 mg, Oral, Nightly  •  acetaminophen ( 03/24/2021    CO2 24.0 03/24/2021    GLU 90 03/24/2021    CA 9.9 03/24/2021    ALB 4.2 03/23/2021    ALKPHO 71 03/23/2021    BILT 0.5 03/23/2021    TP 7.9 03/23/2021    AST 26 03/23/2021    ALT 22 03/23/2021    LIP 92 03/23/2021     Serum calcium level 10. the left anterior superior and inferior iliac spines.    LUNG BASES:  No visible pulmonary or pleural disease.     OTHER:  Negative.                          Impression   CONCLUSION:     1. 6 mm distal left ureteral stone is not changed significantly since calculus (6 mm stone), left urolithiasis  Afebrile, VSS  UA does not appear infectious  Serum creat 1.42->1. 4  Serum calcium level 10.9-->9.9    Hyperparathyroidism  PTH 3/4/21: 125.8    Recommendations:  Dr. Dionicio Ramírez reviewed CT scans    We discussed ureteros bladder/ureteral injury, use of nephrostomy tube, bleeding, infection, and ureteral stent related symptoms with the patient who understands and wishes to proceed.     NPO  Consent to be signed  On Rocephin    In the interim, continue with IV fluids, straini

## 2021-03-24 NOTE — PROGRESS NOTES
[facility] H[facility]PITAL[facility]  Progress Note     Rita Salomon Patient Status:  Observation    1940 MRN [id number]   [facility] 3SW-A Attending Hima Benson DO   Hosp Day # 0 PCP Lloyd Lloyd DO     Chief Complaint: abdominal pain    S: Rachel Obrien --  16 19 26  --    ALT  --  19 20 22  --    BILT  --  0.3 0.5 0.5  --    TP  --  6.9 7.4 7.9  --     < > = values in this interval not displayed. Estimated Creatinine Clearance: 27.4 mL/min (A) (based on SCr of 1.4 mg/dL (H)).     No results for inpu

## 2021-03-24 NOTE — PLAN OF CARE
Pt a/ox4, anxious. Forgetful when anxious, does not retain information well. Klonopin ordered BID, attempted to get an extra dose x1, MD declined order, will need to try again for a bedtime dose. Up with minimal assist, impulsive when anxious.  Voids in B

## 2021-03-24 NOTE — PLAN OF CARE
Patient A&O X4 on RA. VSS, /telemetry- NSR. Patient admitted for severe flank pain- recently discharged on 3/22 with 6mm kidney stone. Pain controlled with IV medication. Urology on consult- patient NPO after midnight for possible procedure.  Straining u

## 2021-03-25 ENCOUNTER — APPOINTMENT (OUTPATIENT)
Dept: GENERAL RADIOLOGY | Facility: HOSPITAL | Age: 81
End: 2021-03-25
Attending: PHYSICIAN ASSISTANT
Payer: MEDICARE

## 2021-03-25 PROBLEM — K59.00 CONSTIPATION: Status: ACTIVE | Noted: 2020-05-06

## 2021-03-25 PROCEDURE — 99225 SUBSEQUENT OBSERVATION CARE: CPT | Performed by: INTERNAL MEDICINE

## 2021-03-25 PROCEDURE — 74018 RADEX ABDOMEN 1 VIEW: CPT | Performed by: PHYSICIAN ASSISTANT

## 2021-03-25 RX ORDER — ENOXAPARIN SODIUM 100 MG/ML
40 INJECTION SUBCUTANEOUS NIGHTLY
Status: DISCONTINUED | OUTPATIENT
Start: 2021-03-25 | End: 2021-03-26

## 2021-03-25 NOTE — ANESTHESIA POSTPROCEDURE EVALUATION
[address] Patient Status:  Observation   Age/Gender 68year old female MRN [id number]   [facility] SURGERY Attending Linda Gonzales, [address] Day # 0 PCP Marko Mathias DO       Anesthesia Post-op Note    CYSTOSCOPY,

## 2021-03-25 NOTE — PROGRESS NOTES
Rn paged urology to notify them of abd xray results from today. Rita from Urology called back and was updated on xray and patient having continued stomach pain and cramping. Patient is able to have some soft bowel movements, constipation is resolving.  Pa

## 2021-03-25 NOTE — PROGRESS NOTES
[facility] Pharmacy Note:  Renal Dose Adjustment for enoxaparin (LOVENOX)    Monserrat Walters has been prescribed enoxaparin 30 mg subcutaneously every 24 hours. Estimated Creatinine Clearance: 33.9 mL/min (A) (based on SCr of 1.13 mg/dL (H)).     Calculated CrCl

## 2021-03-25 NOTE — TELEPHONE ENCOUNTER
Pt's  wants to cx inj 3/29, pt is in ED for kidney stone, pt and  will call back once she is home to r/s. Pls advise.

## 2021-03-25 NOTE — PLAN OF CARE
Plan of care discussed with patient this am. Dr Charyl Hatchet in to see patient. Patient constipated. Miralax/colace given earlier this am, and dulc suppository given as well. She is eating well, bowel sounds heard and is passing some flatus.  Patient to have xra

## 2021-03-25 NOTE — PROGRESS NOTES
[facility]  Urology Progress Note    Senait Vazquez Patient Status:  Observation    1940 MRN [id number]   [facility] 3SW-A Attending Tee Juarez DO   Hosp Day # 0 PCP Jovani Mcbride DO     Subjective:  Senait Vaqzuez is a(n) 7

## 2021-03-25 NOTE — OPERATIVE REPORT
OPERATIVE REPORT    [address] Patient Status:  Observation    1940 MRN [id number]   [facility] SURGERY Attending Fabian Hidalgo DO   Hosp Day # 0 PCP Ariane Irizarry DO       DATE OF OPERATION: 3/24/2021     [place] ureteral dilator was then passed over a guidewire up into the  Ureter, and then the dilator with the obturator through it was passed over the guidewire. At this point I only used the ureteral sheath to dilate the ureter. It was removed.   The urete

## 2021-03-25 NOTE — PROGRESS NOTES
[facility]  Progress Note     Letta Hides Patient Status:  Observation    1940 MRN [id number]   [facility] 3SW-A Attending Bita Lewis DO   Hosp Day # 0 PCP Courtney Gilmore DO     Chief Complaint: abdominal pain    S: Jean Lang 141 140   K   < >  --  3.0*   < > 3.9 3.3* 3.5   CL   < >  --  111   < > 109 112 109   CO2   < >  --  23.0   < > 19.0* 24.0 24.0   ALKPHO  --  65 70  --  71  --   --    AST  --  16 19  --  26  --   --    ALT  --  19 20  --  22  --   --    BILT  --  0.3 0.5 patient/RN    Rosalba Mccann, DO

## 2021-03-25 NOTE — PLAN OF CARE
Patient A&O X4 on 2L post-op, RA at baseline. Patient anxious/forgetful at times. VSS, /telemetry- NSR. LBM 3/22- will offer laxative. S/p cystoscopy, lithotripsy and stent placement without dangler POD #0. Voiding freely- straining all urine.  Denies bu

## 2021-03-26 VITALS
OXYGEN SATURATION: 95 % | RESPIRATION RATE: 18 BRPM | BODY MASS INDEX: 23 KG/M2 | HEART RATE: 94 BPM | WEIGHT: 130.06 LBS | TEMPERATURE: 99 F | SYSTOLIC BLOOD PRESSURE: 159 MMHG | DIASTOLIC BLOOD PRESSURE: 56 MMHG

## 2021-03-26 PROCEDURE — 99217 OBSERVATION CARE DISCHARGE: CPT | Performed by: INTERNAL MEDICINE

## 2021-03-26 RX ORDER — PSEUDOEPHEDRINE HCL 30 MG
100 TABLET ORAL 2 TIMES DAILY
Qty: 60 CAPSULE | Refills: 0 | Status: SHIPPED | OUTPATIENT
Start: 2021-03-26 | End: 2021-06-10

## 2021-03-26 RX ORDER — HYDROCODONE BITARTRATE AND ACETAMINOPHEN 10; 325 MG/1; MG/1
1-2 TABLET ORAL EVERY 12 HOURS PRN
Qty: 12 TABLET | Refills: 0 | Status: ON HOLD | OUTPATIENT
Start: 2021-03-26 | End: 2021-03-31

## 2021-03-26 RX ORDER — ACETAMINOPHEN 500 MG
1000 TABLET ORAL EVERY 6 HOURS PRN
Refills: 0 | Status: SHIPPED | COMMUNITY
Start: 2021-03-26

## 2021-03-26 RX ORDER — HYDROCODONE BITARTRATE AND ACETAMINOPHEN 10; 325 MG/1; MG/1
2 TABLET ORAL EVERY 8 HOURS PRN
Status: DISCONTINUED | OUTPATIENT
Start: 2021-03-26 | End: 2021-03-26

## 2021-03-26 RX ORDER — HYDROCODONE BITARTRATE AND ACETAMINOPHEN 10; 325 MG/1; MG/1
1 TABLET ORAL EVERY 8 HOURS PRN
Status: DISCONTINUED | OUTPATIENT
Start: 2021-03-26 | End: 2021-03-26

## 2021-03-26 RX ORDER — HYDROCODONE BITARTRATE AND ACETAMINOPHEN 10; 325 MG/1; MG/1
1-2 TABLET ORAL EVERY 8 HOURS PRN
Status: DISCONTINUED | OUTPATIENT
Start: 2021-03-26 | End: 2021-03-26 | Stop reason: SDUPTHER

## 2021-03-26 NOTE — PLAN OF CARE
Patient A&O X4 on RA, anxious at times. VSS, /telemetry- NSR. Voiding freely- straining all urine, LBM 3/25. C/o pain/cramping to lower abdomen- prn medication given with relief. Min assist with walker. Reminded to use call light.

## 2021-03-26 NOTE — DISCHARGE SUMMARY
[facility] HOSPITAL[facility]  DISCHARGE SUMMARY     Marco Brambila Patient Status:  Observation    1940 MRN [id number]   [facility] 3SW-A Attending Ignacio Jay DO   Hosp Day # 0 PCP Rohith Dejesus DO     Date of Admission: 3/23/2021  Date of stable condition.     Procedures during hospitalization:   • N/a     Incidental or significant findings and recommendations (brief descriptions):  • n/a    Lab/Test results pending at Discharge:   · n/a    Consultants:  • Urology     Discharge Medication Myra Kraft Quantity: 60 tablet  Refills: 0     Ipratropium Bromide 0.06 % Soln  Commonly known as: ATROVENT      USE 1 TO 2 SPRAYS IEN BID AS NEEDED   Refills: 6     Ketorolac Tromethamine 10 MG Tabs  Commonly known as: TORADOL      Take 1 tablet (10 mg total) by ruel STOP taking these medications    pregabalin 150 MG Caps  Commonly known as: PING               [place]: [place]    Follow-up appointment:   Dasha Oliver, [address] [place] [address]  [phone]    On 3/31/2021  Ureteroscopy

## 2021-03-26 NOTE — PROGRESS NOTES
[facility]  Urology Progress Note    Ольга Bravoz Patient Status:  Observation    1940 MRN [id number]   [facility] 3SW-A Attending Donna Simms DO   Hosp Day # 0 PCP Claudia Rico DO     Subjective:  Ольга Madison is a(n) 7 stent  Afebrile, VSS    Hyperparathyroidism  PTH 3/4/21: 125.8    Plan:    KUB reviewed by Dr. Ramirez Expose - still several stones to treat. Patient informed that repeat URS scheduled for 3/31/21 - info included in DC instructions.     CPM with tamsulosin  Pain ma

## 2021-03-26 NOTE — PROGRESS NOTES
Pt cleared by all MD's for discharge. Discharge education completed at bedside with , all questions answered. PIV removed, belongings packed. Scripts for [employer] and Colace sent to pt pharmacy. Pt discharging home via wheelchair.

## 2021-03-26 NOTE — PLAN OF CARE
A&O x4. Can be anxious at times r/t pain. VSS on RA. Tele monitoring - NSR. SCD's on bilaterally. Ambulating SBA with walker. Voiding frequently, pink/clear urine being strained. LBM 3/26. C/o moderate to severe pain to abdomen.  Tylenol and Norco given PRN

## 2021-03-29 ENCOUNTER — LAB ENCOUNTER (OUTPATIENT)
Dept: LAB | Age: 81
End: 2021-03-29
Attending: UROLOGY
Payer: MEDICARE

## 2021-03-29 DIAGNOSIS — N17.9 AKI (ACUTE KIDNEY INJURY) (HCC): ICD-10-CM

## 2021-03-29 NOTE — PROGRESS NOTES
Your stone was calcium, as is most common. I recommend you drink enough water and other fluids to produce over 2L of urine a day. Moderate salt intake.

## 2021-03-30 ENCOUNTER — ANESTHESIA EVENT (OUTPATIENT)
Dept: SURGERY | Facility: HOSPITAL | Age: 81
End: 2021-03-30
Payer: MEDICARE

## 2021-03-30 ENCOUNTER — TELEPHONE (OUTPATIENT)
Dept: FAMILY MEDICINE CLINIC | Facility: CLINIC | Age: 81
End: 2021-03-30

## 2021-03-30 NOTE — TELEPHONE ENCOUNTER
Pt spouse called states pt has a procedure tomorrow he is not sure what it is for but she needs la and we do not have la paperwork can we please clarify so we can schedule with Dr. Lai Arciniega this afternoon.

## 2021-03-30 NOTE — TELEPHONE ENCOUNTER
Advised no pre-op is necessary. They requested appointment for chronic conditions and meds. Appointment made.

## 2021-03-31 ENCOUNTER — HOSPITAL ENCOUNTER (OUTPATIENT)
Facility: HOSPITAL | Age: 81
Setting detail: HOSPITAL OUTPATIENT SURGERY
Discharge: HOME | End: 2021-03-31
Attending: UROLOGY | Admitting: UROLOGY
Payer: MEDICARE

## 2021-03-31 ENCOUNTER — APPOINTMENT (OUTPATIENT)
Dept: GENERAL RADIOLOGY | Facility: HOSPITAL | Age: 81
End: 2021-03-31
Attending: UROLOGY
Payer: MEDICARE

## 2021-03-31 ENCOUNTER — ANESTHESIA (OUTPATIENT)
Dept: SURGERY | Facility: HOSPITAL | Age: 81
End: 2021-03-31
Payer: MEDICARE

## 2021-03-31 VITALS
HEIGHT: 63 IN | WEIGHT: 126.75 LBS | DIASTOLIC BLOOD PRESSURE: 81 MMHG | BODY MASS INDEX: 22.46 KG/M2 | TEMPERATURE: 98 F | SYSTOLIC BLOOD PRESSURE: 103 MMHG | OXYGEN SATURATION: 98 % | RESPIRATION RATE: 18 BRPM | HEART RATE: 76 BPM

## 2021-03-31 DIAGNOSIS — N17.9 AKI (ACUTE KIDNEY INJURY) (HCC): Primary | ICD-10-CM

## 2021-03-31 DIAGNOSIS — N20.0 RENAL CALCULI: ICD-10-CM

## 2021-03-31 DIAGNOSIS — M51.16 LUMBAR DISC HERNIATION WITH RADICULOPATHY: ICD-10-CM

## 2021-03-31 PROCEDURE — 0T778DZ DILATION OF LEFT URETER WITH INTRALUMINAL DEVICE, VIA NATURAL OR ARTIFICIAL OPENING ENDOSCOPIC: ICD-10-PCS | Performed by: UROLOGY

## 2021-03-31 PROCEDURE — 0TF48ZZ FRAGMENTATION IN LEFT KIDNEY PELVIS, VIA NATURAL OR ARTIFICIAL OPENING ENDOSCOPIC: ICD-10-PCS | Performed by: UROLOGY

## 2021-03-31 PROCEDURE — 0TC48ZZ EXTIRPATION OF MATTER FROM LEFT KIDNEY PELVIS, VIA NATURAL OR ARTIFICIAL OPENING ENDOSCOPIC: ICD-10-PCS | Performed by: UROLOGY

## 2021-03-31 DEVICE — URETERAL STENT
Type: IMPLANTABLE DEVICE | Site: URETER | Status: FUNCTIONAL
Brand: PERCUFLEX™ PLUS

## 2021-03-31 RX ORDER — CLINDAMYCIN PHOSPHATE 900 MG/50ML
900 INJECTION INTRAVENOUS ONCE
Status: COMPLETED | OUTPATIENT
Start: 2021-03-31 | End: 2021-03-31

## 2021-03-31 RX ORDER — EPHEDRINE SULFATE 50 MG/ML
INJECTION INTRAVENOUS AS NEEDED
Status: DISCONTINUED | OUTPATIENT
Start: 2021-03-31 | End: 2021-03-31 | Stop reason: SURG

## 2021-03-31 RX ORDER — MIDAZOLAM HYDROCHLORIDE 1 MG/ML
INJECTION INTRAMUSCULAR; INTRAVENOUS
Status: COMPLETED
Start: 2021-03-31 | End: 2021-03-31

## 2021-03-31 RX ORDER — LABETALOL HYDROCHLORIDE 5 MG/ML
5 INJECTION, SOLUTION INTRAVENOUS EVERY 5 MIN PRN
Status: DISCONTINUED | OUTPATIENT
Start: 2021-03-31 | End: 2021-03-31

## 2021-03-31 RX ORDER — MEPERIDINE HYDROCHLORIDE 25 MG/ML
12.5 INJECTION INTRAMUSCULAR; INTRAVENOUS; SUBCUTANEOUS AS NEEDED
Status: DISCONTINUED | OUTPATIENT
Start: 2021-03-31 | End: 2021-03-31

## 2021-03-31 RX ORDER — LIDOCAINE HYDROCHLORIDE 10 MG/ML
INJECTION, SOLUTION EPIDURAL; INFILTRATION; INTRACAUDAL; PERINEURAL AS NEEDED
Status: DISCONTINUED | OUTPATIENT
Start: 2021-03-31 | End: 2021-03-31 | Stop reason: SURG

## 2021-03-31 RX ORDER — GLYCOPYRROLATE 0.2 MG/ML
INJECTION, SOLUTION INTRAMUSCULAR; INTRAVENOUS AS NEEDED
Status: DISCONTINUED | OUTPATIENT
Start: 2021-03-31 | End: 2021-03-31 | Stop reason: SURG

## 2021-03-31 RX ORDER — METOCLOPRAMIDE HYDROCHLORIDE 5 MG/ML
INJECTION INTRAMUSCULAR; INTRAVENOUS AS NEEDED
Status: DISCONTINUED | OUTPATIENT
Start: 2021-03-31 | End: 2021-03-31 | Stop reason: SURG

## 2021-03-31 RX ORDER — NITROFURANTOIN 25; 75 MG/1; MG/1
100 CAPSULE ORAL 2 TIMES DAILY
Qty: 6 CAPSULE | Refills: 0 | Status: SHIPPED | OUTPATIENT
Start: 2021-03-31 | End: 2021-04-03

## 2021-03-31 RX ORDER — HYDROMORPHONE HYDROCHLORIDE 1 MG/ML
INJECTION, SOLUTION INTRAMUSCULAR; INTRAVENOUS; SUBCUTANEOUS
Status: COMPLETED
Start: 2021-03-31 | End: 2021-03-31

## 2021-03-31 RX ORDER — CLINDAMYCIN PHOSPHATE 900 MG/50ML
INJECTION INTRAVENOUS
Status: DISCONTINUED
Start: 2021-03-31 | End: 2021-03-31

## 2021-03-31 RX ORDER — ACETAMINOPHEN 500 MG
1000 TABLET ORAL ONCE AS NEEDED
Status: DISCONTINUED | OUTPATIENT
Start: 2021-03-31 | End: 2021-03-31

## 2021-03-31 RX ORDER — SODIUM CHLORIDE, SODIUM LACTATE, POTASSIUM CHLORIDE, CALCIUM CHLORIDE 600; 310; 30; 20 MG/100ML; MG/100ML; MG/100ML; MG/100ML
INJECTION, SOLUTION INTRAVENOUS CONTINUOUS
Status: DISCONTINUED | OUTPATIENT
Start: 2021-03-31 | End: 2021-03-31

## 2021-03-31 RX ORDER — METOCLOPRAMIDE HYDROCHLORIDE 5 MG/ML
10 INJECTION INTRAMUSCULAR; INTRAVENOUS AS NEEDED
Status: DISCONTINUED | OUTPATIENT
Start: 2021-03-31 | End: 2021-03-31

## 2021-03-31 RX ORDER — ONDANSETRON 2 MG/ML
4 INJECTION INTRAMUSCULAR; INTRAVENOUS AS NEEDED
Status: DISCONTINUED | OUTPATIENT
Start: 2021-03-31 | End: 2021-03-31

## 2021-03-31 RX ORDER — NALOXONE HYDROCHLORIDE 0.4 MG/ML
80 INJECTION, SOLUTION INTRAMUSCULAR; INTRAVENOUS; SUBCUTANEOUS AS NEEDED
Status: DISCONTINUED | OUTPATIENT
Start: 2021-03-31 | End: 2021-03-31

## 2021-03-31 RX ORDER — DIPHENHYDRAMINE HYDROCHLORIDE 50 MG/ML
12.5 INJECTION INTRAMUSCULAR; INTRAVENOUS AS NEEDED
Status: DISCONTINUED | OUTPATIENT
Start: 2021-03-31 | End: 2021-03-31

## 2021-03-31 RX ORDER — HYDROCODONE BITARTRATE AND ACETAMINOPHEN 5; 325 MG/1; MG/1
1 TABLET ORAL AS NEEDED
Status: COMPLETED | OUTPATIENT
Start: 2021-03-31 | End: 2021-03-31

## 2021-03-31 RX ORDER — ONDANSETRON 2 MG/ML
INJECTION INTRAMUSCULAR; INTRAVENOUS AS NEEDED
Status: DISCONTINUED | OUTPATIENT
Start: 2021-03-31 | End: 2021-03-31 | Stop reason: SURG

## 2021-03-31 RX ORDER — HYDROMORPHONE HYDROCHLORIDE 1 MG/ML
0.4 INJECTION, SOLUTION INTRAMUSCULAR; INTRAVENOUS; SUBCUTANEOUS EVERY 5 MIN PRN
Status: DISCONTINUED | OUTPATIENT
Start: 2021-03-31 | End: 2021-03-31

## 2021-03-31 RX ORDER — HYDROCODONE BITARTRATE AND ACETAMINOPHEN 5; 325 MG/1; MG/1
2 TABLET ORAL AS NEEDED
Status: COMPLETED | OUTPATIENT
Start: 2021-03-31 | End: 2021-03-31

## 2021-03-31 RX ORDER — ACETAMINOPHEN 500 MG
1000 TABLET ORAL ONCE
Status: DISCONTINUED | OUTPATIENT
Start: 2021-03-31 | End: 2021-03-31 | Stop reason: HOSPADM

## 2021-03-31 RX ORDER — MIDAZOLAM HYDROCHLORIDE 1 MG/ML
1 INJECTION INTRAMUSCULAR; INTRAVENOUS EVERY 5 MIN PRN
Status: COMPLETED | OUTPATIENT
Start: 2021-03-31 | End: 2021-03-31

## 2021-03-31 RX ORDER — HYDROCODONE BITARTRATE AND ACETAMINOPHEN 10; 325 MG/1; MG/1
1 TABLET ORAL EVERY 8 HOURS PRN
Qty: 12 TABLET | Refills: 0 | Status: SHIPPED | OUTPATIENT
Start: 2021-03-31 | End: 2021-04-23 | Stop reason: ALTCHOICE

## 2021-03-31 RX ADMIN — EPHEDRINE SULFATE 5 MG: 50 INJECTION INTRAVENOUS at 16:49:00

## 2021-03-31 RX ADMIN — LIDOCAINE HYDROCHLORIDE 50 MG: 10 INJECTION, SOLUTION EPIDURAL; INFILTRATION; INTRACAUDAL; PERINEURAL at 15:06:00

## 2021-03-31 RX ADMIN — METOCLOPRAMIDE HYDROCHLORIDE 10 MG: 5 INJECTION INTRAMUSCULAR; INTRAVENOUS at 15:00:00

## 2021-03-31 RX ADMIN — SODIUM CHLORIDE, SODIUM LACTATE, POTASSIUM CHLORIDE, CALCIUM CHLORIDE: 600; 310; 30; 20 INJECTION, SOLUTION INTRAVENOUS at 17:05:00

## 2021-03-31 RX ADMIN — CLINDAMYCIN PHOSPHATE 900 MG: 900 INJECTION INTRAVENOUS at 15:03:00

## 2021-03-31 RX ADMIN — SODIUM CHLORIDE, SODIUM LACTATE, POTASSIUM CHLORIDE, CALCIUM CHLORIDE: 600; 310; 30; 20 INJECTION, SOLUTION INTRAVENOUS at 15:00:00

## 2021-03-31 RX ADMIN — GLYCOPYRROLATE 0.4 MG: 0.2 INJECTION, SOLUTION INTRAMUSCULAR; INTRAVENOUS at 15:08:00

## 2021-03-31 RX ADMIN — ONDANSETRON 4 MG: 2 INJECTION INTRAMUSCULAR; INTRAVENOUS at 17:07:00

## 2021-03-31 RX ADMIN — SODIUM CHLORIDE, SODIUM LACTATE, POTASSIUM CHLORIDE, CALCIUM CHLORIDE: 600; 310; 30; 20 INJECTION, SOLUTION INTRAVENOUS at 17:04:00

## 2021-03-31 NOTE — PROGRESS NOTES
Pt crying in preop states has low abd pain #8 for past few months, states feels like she has to pass bowels but hasn't had much food yesterday and nothing to eat today. Sts passed normal BM yesterday.

## 2021-03-31 NOTE — ANESTHESIA PREPROCEDURE EVALUATION
PRE-OP EVALUATION    Patient Name: Carlton Houston    Admit Diagnosis: Renal calculi [N20.0]    Pre-op Diagnosis: Renal calculi [N20.0]    CYSTOSCOPY, LEFT URETEROSCOPY WITH LASER LITHOTRIPSY, LEFT RETROGRADE PYELOGRAM, LEFT URETERAL STENT EXCHANGE    Anest MCG) UNDER THE TONGUE EVERY 4 HOURS AS NEEDED FOR CRAMPING, Disp: 60 tablet, Rfl: 0, 3/30/2021 at Unknown time  gabapentin 100 MG Oral Cap, Take 1 capsule (100 mg total) by mouth 2 (two) times daily. , Disp: 60 capsule, Rfl: 0, 3/31/2021 at 0900  Cinacalcet 3/28/2021  nystatin [id number] units Oral Tab, Take 1 tablet (500,000 Units total) by mouth 4 (four) times daily as needed.  Daily, Disp: , Rfl: 2, More than a month at Unknown time        Allergies: Penicillins, Bactrim, and Sulfa Drugs Cross Reactors      Ane Julianne Ball MD at [facility] MAIN OR   • TUNG LOCALIZATION WIRE 1 SITE LEFT (CPT=19281)     • TUNG LOCALIZATION WIRE 1 SITE RIGHT (WYC=36297)     • OTHER  01/27/2020    LEFT LUMBAR 5-SACRAL 1 MICRODISCECTOMY   • OTHER SURGICAL HISTORY  2009    Cath Stent Placement x Types: Cigarettes        Quit date: 1997        Years since quittin.2      Smokeless tobacco: Never Used    Alcohol use: No      Alcohol/week: 0.0 standard drinks      Drug use:    Types: Cannabis   Comment: Uses marijuana once in a while for p

## 2021-03-31 NOTE — H&P
[facility]  H&P    Alllink Garcia Patient Status:  Hospital Outpatient Surgery    1940 MRN [id number]   [facility] PRE OP HOLDING Attending Tresa Burr MD   Hosp Day # 0 PCP Siri Pastrana DO     Impression and Plan:  Left re TABLET(125 MCG) UNDER THE TONGUE EVERY 4 HOURS AS NEEDED FOR CRAMPING, Disp: 60 tablet, Rfl: 0  gabapentin 100 MG Oral Cap, Take 1 capsule (100 mg total) by mouth 2 (two) times daily. , Disp: 60 capsule, Rfl: 0  Cinacalcet HCl 30 MG Oral Tab, Take 1 tablet • Hearing impairment     bilateral hearing aids   • Hearing loss    • Heart murmur    • Heart palpitations    • Hemorrhoids    • High blood pressure    • High cholesterol    • History of blood transfusion    • History of eating disorder    • History of r 9/15/2016    Performed by Minoo Louis MD at [facility] MAIN OR   • LUMBAR MICRODISCECTOMY 1 LEVEL N/A 1/27/2020    Performed by Jay Call MD at [facility] MAIN OR   • TUNG LOCALIZATION WIRE 1 SITE LEFT (CPT=19281)     • TUNG LOCALIZATION WIRE 1 SITE RIGHT ( Family History   Problem Relation Age of Onset   • Hypertension Father    • Heart Attack Father    • Depression Mother    • Hypertension Daughter    • Cancer Sister         lung with mets to the lymph nodes   • Lipids Sister    • Hypertension Sister guarding. No peritoneal signs. No ascites. Liver is within normal limits. Spleen is not palpable. Genitourinary: No flank tenderness. Extremities:  No lower extremity edema noted. Without clubbing or cyanosis. Skin: Normal texture and turgor.

## 2021-03-31 NOTE — OPERATIVE REPORT
[address] Patient Status:  Hospital Outpatient Surgery    1940 MRN [id number]   Location [facility] Attending Cookie Mcclain MD   Hosp Day # 0 PCP DO CHELLY Amaya sheath and one of the larger stones and then removed a couple of other stones without using the sheath. There were stones in the lower pole calyces and these were hard to access. Some of the stones were simply dusted with the 200 µm holmium laser fiber.

## 2021-03-31 NOTE — PROGRESS NOTES
Pt given warm blanket and pillow under knees for c/o abd cramping. Gaurav Cardozo states surgery might be delayed. Pt and  updated. States feels better with warm blanket. Crying on and off since arrived in preop, states wants to go home.

## 2021-03-31 NOTE — ANESTHESIA POSTPROCEDURE EVALUATION
[address] Patient Status:  Hospital Outpatient Surgery   Age/Gender 68year old female MRN [id number]   Location [address] [facility] Attending Gabe Esparza MD   [address] Day # 0 PCP Estefani Ramirez DO       Anesth

## 2021-04-13 ENCOUNTER — OFFICE VISIT (OUTPATIENT)
Dept: NEPHROLOGY | Facility: CLINIC | Age: 81
End: 2021-04-13
Payer: MEDICARE

## 2021-04-13 VITALS — SYSTOLIC BLOOD PRESSURE: 132 MMHG | WEIGHT: 126 LBS | DIASTOLIC BLOOD PRESSURE: 74 MMHG | BODY MASS INDEX: 22 KG/M2

## 2021-04-13 DIAGNOSIS — E21.3 HYPERPARATHYROIDISM (HCC): ICD-10-CM

## 2021-04-13 DIAGNOSIS — N20.0 NEPHROLITHIASIS: ICD-10-CM

## 2021-04-13 DIAGNOSIS — N18.30 STAGE 3 CHRONIC KIDNEY DISEASE, UNSPECIFIED WHETHER STAGE 3A OR 3B CKD (HCC): Primary | ICD-10-CM

## 2021-04-13 DIAGNOSIS — R10.9 ABDOMINAL PAIN, UNSPECIFIED ABDOMINAL LOCATION: ICD-10-CM

## 2021-04-13 PROCEDURE — 99215 OFFICE O/P EST HI 40 MIN: CPT | Performed by: INTERNAL MEDICINE

## 2021-04-13 PROCEDURE — 1111F DSCHRG MED/CURRENT MED MERGE: CPT | Performed by: INTERNAL MEDICINE

## 2021-04-13 NOTE — PROGRESS NOTES
Nephrology Progress Note      ASSESSMENT/PLAN:      1) CKD 3- baseline Cr < 1.5 mg/dl x yrs likely reflects a combination of hypertensive and age-related nephrosclerosis combined with significant stone burden as result of primary hyperparathyroidism; previ Clostridium difficile diarrhea    • Constipation    • Dairy allergy    • Depression    • Diarrhea, unspecified    • Duodenitis determined by biopsy 4/18/2018   • Esophageal reflux    • Fatigue    • Fibromyalgia    • Flatulence/gas pain/belching    • Food i OR   • ESOPHAGOGASTRODUODENOSCOPY (EGD) N/A 4/11/2018    Performed by Raina Way MD at [address] [facility] ENDOSCOPY   • ESOPHAGOGASTRODUODENOSCOPY (EGD) N/A 6/29/2017    Performed by Zoie Burch MD at [address]    removed ovaries Left 7/30/2019    Performed by Rao Casey MD at [address] [facility] ENDOSCOPY   • TRANSFORAMINAL LUMBAR EPIDURAL STEROID INJECTION MULTIPLE LEVEL Bilateral 8/2/2016    Performed by Rao Casey MD at [facility] MAIN OR   • TRANSFORAMINAL LUMBAR EPIDURAL STEROID INJECTI Besylate 10 MG Oral Tab Take 1 tablet (10 mg total) by mouth daily. 90 tablet 1   • HYDROcodone-acetaminophen  MG Oral Tab Take 1 tablet by mouth every 8 (eight) hours as needed (Severe Pain).  12 tablet 0   • acetaminophen 500 MG Oral Tab Take 1 tabl loss/gain  Denies HA or visual changes  Denies CP or palpitations  Denies SOB/cough/hemoptysis  Denies abd or flank pain  Denies N/V/D  Denies change in urinary habits or gross hematuria  Denies LE edema  Denies skin rashes/myalgias/arthralgias      PHYSIC

## 2021-04-15 ENCOUNTER — TELEPHONE (OUTPATIENT)
Dept: FAMILY MEDICINE CLINIC | Facility: CLINIC | Age: 81
End: 2021-04-15

## 2021-04-15 NOTE — TELEPHONE ENCOUNTER
Patient c/o severe abd pain. C/o a little diarrhea. Advised if pain is very bad to proceed to ER. Also advised to call GI to see if they have recommendations. Soco CHEN

## 2021-04-15 NOTE — TELEPHONE ENCOUNTER
Dayron Murcia is calling because she has had Diarrhea and cramps for the past 3 days she is wondering if Kip Sifuentes can call something into her pharmacy for her, Please call Dayron Murcia with and questions at [phone]

## 2021-04-23 NOTE — PATIENT INSTRUCTIONS
Start Sertraline 25 mg for anxiety  Take only MIRALax AND METAMUCIL FOR BM  Stop Clindamycin  Start good probiotic daily    OK to use Mucinex for congestion and benadryl for allergies    C dif sample only if WATERY stool     Ask GI about Linzess if PROBLEM

## 2021-04-23 NOTE — PROGRESS NOTES
A message was left with the patient's dentist, Dr. Tanja Bosworth, to notify Pete Mullins PA-C, if he ever needs to put her on antibiotics again in the future due to the patient's history of c. [place]

## 2021-04-24 ENCOUNTER — PATIENT MESSAGE (OUTPATIENT)
Dept: FAMILY MEDICINE CLINIC | Facility: CLINIC | Age: 81
End: 2021-04-24

## 2021-04-24 ENCOUNTER — LAB ENCOUNTER (OUTPATIENT)
Dept: LAB | Age: 81
End: 2021-04-24
Attending: PHYSICIAN ASSISTANT
Payer: MEDICARE

## 2021-04-24 DIAGNOSIS — K59.00 CONSTIPATION, UNSPECIFIED CONSTIPATION TYPE: ICD-10-CM

## 2021-04-24 DIAGNOSIS — R10.9 ABDOMINAL CRAMPING: ICD-10-CM

## 2021-04-24 DIAGNOSIS — R19.8 IRREGULAR BOWEL HABITS: ICD-10-CM

## 2021-04-24 DIAGNOSIS — R19.7 DIARRHEA, UNSPECIFIED TYPE: ICD-10-CM

## 2021-04-24 PROBLEM — N18.30 CKD (CHRONIC KIDNEY DISEASE) STAGE 3, GFR 30-59 ML/MIN (HCC): Status: RESOLVED | Noted: 2017-05-22 | Resolved: 2021-04-24

## 2021-04-24 PROBLEM — J31.0 CHRONIC RHINITIS: Status: RESOLVED | Noted: 2018-10-13 | Resolved: 2021-04-24

## 2021-04-24 PROCEDURE — 87493 C DIFF AMPLIFIED PROBE: CPT | Performed by: EMERGENCY MEDICINE

## 2021-04-24 PROCEDURE — 82272 OCCULT BLD FECES 1-3 TESTS: CPT | Performed by: EMERGENCY MEDICINE

## 2021-04-24 NOTE — PROGRESS NOTES
Tabitha Hays is a 68year old female.   HPI:   In for follow-up on chronic health conditions as well has some present symptoms of sore throat, nausea and body aches for 1 day history of recurrent sinusitis had been treated multiple times in the past with Getting ARM (Anorectal manometry) in May and then pelvic floor dysfunction treatment. Has the kit to do the SIBO breath test.  Instructed by GI to take gabapentin twice a day and to limit narcotics.        Patient does get severe lower back pain episodes w 4 HOURS AS NEEDED FOR CRAMPING 60 tablet 0   • clonazePAM 0.5 MG Oral Tab Take 1 tablet (0.5 mg total) by mouth 2 (two) times daily as needed. 60 tablet 2   • Montelukast Sodium 10 MG Oral Tab Take 1 tablet (10 mg total) by mouth nightly.  90 tablet 0   • t pain/belching    • Food intolerance    • Frequent use of laxatives    • Headache disorder    • Hearing impairment     bilateral hearing aids   • Hearing loss    • Heart murmur    • Heart palpitations    • Hemorrhoids    • High blood pressure    • High chol (BP Location: Right arm, Patient Position: Sitting, Cuff Size: adult)   Pulse 60   Temp 98.1 °F (36.7 °C) (Skin)   Ht 5' 3\" (1.6 m)   Wt 125 lb (56.7 kg)   BMI 22.14 kg/m²   GENERAL: well developed, well nourished,in no apparent distress poor communicatio total) by mouth 4 (four) times daily for 14 days, THEN 1 capsule (125 mg total) 2 (two) times a day for 7 days, THEN 1 capsule (125 mg total) daily for 7 days, THEN 1 capsule (125 mg total) every other day for 21 days. Imaging & Consults:  None  1.  A stool   - CDIFFICILE TOXIGENIC PCR (OPT); Future  Time spent was 50 minutes more than 50% was spent on counseling regarding medical conditions and treatment. The patient indicates understanding of these issues and agrees to the plan.   The patient is as

## 2021-04-26 NOTE — TELEPHONE ENCOUNTER
From: Keerthi Jones  To: Radha Garcia DO  Sent: 4/24/2021 5:07 PM CDT  Subject: Test Results Question    I have a question about OCCULT BLOOD, STOOL resulted on 4/24/21, 4:56 PM.    Is there c diff?

## 2021-04-29 ENCOUNTER — TELEPHONE (OUTPATIENT)
Dept: FAMILY MEDICINE CLINIC | Facility: CLINIC | Age: 81
End: 2021-04-29

## 2021-04-29 NOTE — TELEPHONE ENCOUNTER
Pt called and said she has C-Diff again and she is in a lot of pain. She wants to know if Elijah Martinez can call something in to [facility]uli[facility] Mendez on [facility] for her for the pain?

## 2021-04-29 NOTE — TELEPHONE ENCOUNTER
Spoke to patient. States she is having cramping abdominal pain d/t CDIFF. She has not taken the hyoscyamine yet. Advised to try that and call back if no relief.

## 2021-04-30 ENCOUNTER — V-VISIT (OUTPATIENT)
Dept: CARDIOLOGY | Age: 81
End: 2021-04-30

## 2021-04-30 VITALS
DIASTOLIC BLOOD PRESSURE: 68 MMHG | SYSTOLIC BLOOD PRESSURE: 98 MMHG | HEART RATE: 60 BPM | BODY MASS INDEX: 22.15 KG/M2 | HEIGHT: 63 IN | WEIGHT: 125 LBS

## 2021-04-30 DIAGNOSIS — Z95.5 HISTORY OF HEART ARTERY STENT: ICD-10-CM

## 2021-04-30 DIAGNOSIS — I25.10 CORONARY ARTERY DISEASE INVOLVING NATIVE CORONARY ARTERY OF NATIVE HEART WITHOUT ANGINA PECTORIS: Primary | ICD-10-CM

## 2021-04-30 DIAGNOSIS — I10 ESSENTIAL HYPERTENSION: ICD-10-CM

## 2021-04-30 DIAGNOSIS — E78.2 MIXED HYPERLIPIDEMIA: ICD-10-CM

## 2021-04-30 PROBLEM — Z01.810 PREOPERATIVE CARDIOVASCULAR EXAMINATION: Status: RESOLVED | Noted: 2017-02-10 | Resolved: 2021-04-30

## 2021-04-30 PROCEDURE — 99214 OFFICE O/P EST MOD 30 MIN: CPT | Performed by: INTERNAL MEDICINE

## 2021-04-30 ASSESSMENT — ENCOUNTER SYMPTOMS
SUSPICIOUS LESIONS: 0
WEIGHT LOSS: 0
COUGH: 0
CHILLS: 0
HEMATOCHEZIA: 0
WEIGHT GAIN: 0
DIARRHEA: 1
BRUISES/BLEEDS EASILY: 0
HEMOPTYSIS: 0
FEVER: 0
ALLERGIC/IMMUNOLOGIC COMMENTS: NO NEW FOOD ALLERGIES

## 2021-05-03 ENCOUNTER — HOSPITAL ENCOUNTER (OUTPATIENT)
Dept: GENERAL RADIOLOGY | Facility: HOSPITAL | Age: 81
Discharge: HOME | End: 2021-05-03
Attending: INTERNAL MEDICINE
Payer: MEDICARE

## 2021-05-03 DIAGNOSIS — K59.01 CONSTIPATION, SLOW TRANSIT: ICD-10-CM

## 2021-05-03 DIAGNOSIS — R10.31 ABDOMINAL PAIN, RIGHT LOWER QUADRANT: ICD-10-CM

## 2021-05-03 DIAGNOSIS — R14.0 ABDOMINAL DISTENTION: ICD-10-CM

## 2021-05-03 PROCEDURE — 74019 RADEX ABDOMEN 2 VIEWS: CPT | Performed by: INTERNAL MEDICINE

## 2021-05-04 ENCOUNTER — TELEPHONE (OUTPATIENT)
Dept: FAMILY MEDICINE CLINIC | Facility: CLINIC | Age: 81
End: 2021-05-04

## 2021-05-04 NOTE — TELEPHONE ENCOUNTER
Advised patient to keep with Dr. Chelly Roldan with her abdominal complaints. States pain is no worse than it was yesterday at office visit with him. Advised to continue with the vancomycin. She VU and is in agreement.

## 2021-05-04 NOTE — TELEPHONE ENCOUNTER
Pt states she seen Dr. Akil Sánchez and he suggested he follow up with Massimo jett for her abd pain and pt wants a sooner appt than 5/17 for her abd pain.

## 2021-05-07 ENCOUNTER — TELEPHONE (OUTPATIENT)
Dept: FAMILY MEDICINE CLINIC | Facility: CLINIC | Age: 81
End: 2021-05-07

## 2021-05-07 NOTE — TELEPHONE ENCOUNTER
Pt called and said she has horrible cramps in her stomach. She said she has gone to the bathroom but ut still hurts. She would like Leann Aguirre to call something in to [place] for her. Rick on [facility].

## 2021-05-12 ENCOUNTER — TELEPHONE (OUTPATIENT)
Dept: FAMILY MEDICINE CLINIC | Facility: CLINIC | Age: 81
End: 2021-05-12

## 2021-05-12 NOTE — TELEPHONE ENCOUNTER
Pt called states she has a UTI that started yesterday and it is worse and would like Austyn Ch to call something in.

## 2021-05-12 NOTE — TELEPHONE ENCOUNTER
LOV with Uro 5/4/21:  Dr. Jay Corona    Patient c/o burning and pain with urination, low abd pain, low back pain,increased urgency x2 days. Has tried AZO without relief. Reports symptoms are worsening. Denies fever, blood in urine. Has not contacted Uro.

## 2021-05-13 ENCOUNTER — OFFICE VISIT (OUTPATIENT)
Dept: FAMILY MEDICINE CLINIC | Facility: CLINIC | Age: 81
End: 2021-05-13
Payer: MEDICARE

## 2021-05-13 VITALS
WEIGHT: 124 LBS | HEIGHT: 63 IN | DIASTOLIC BLOOD PRESSURE: 60 MMHG | SYSTOLIC BLOOD PRESSURE: 114 MMHG | TEMPERATURE: 98 F | HEART RATE: 92 BPM | BODY MASS INDEX: 21.97 KG/M2

## 2021-05-13 DIAGNOSIS — R30.0 DYSURIA: ICD-10-CM

## 2021-05-13 DIAGNOSIS — G47.09 OTHER INSOMNIA: ICD-10-CM

## 2021-05-13 DIAGNOSIS — N30.00 ACUTE CYSTITIS WITHOUT HEMATURIA: Primary | ICD-10-CM

## 2021-05-13 PROCEDURE — 87086 URINE CULTURE/COLONY COUNT: CPT | Performed by: FAMILY MEDICINE

## 2021-05-13 PROCEDURE — 81003 URINALYSIS AUTO W/O SCOPE: CPT | Performed by: FAMILY MEDICINE

## 2021-05-13 PROCEDURE — 99214 OFFICE O/P EST MOD 30 MIN: CPT | Performed by: FAMILY MEDICINE

## 2021-05-13 RX ORDER — NITROFURANTOIN 25; 75 MG/1; MG/1
100 CAPSULE ORAL 2 TIMES DAILY
Qty: 14 CAPSULE | Refills: 0 | Status: SHIPPED | OUTPATIENT
Start: 2021-05-13 | End: 2021-06-10 | Stop reason: ALTCHOICE

## 2021-05-13 NOTE — PROGRESS NOTES
Dania Mercer is a 80year old female. HPI:   Patient presents with:  Urinary: burning and pain with urination, low abd pain, low back pain,increased urgency x 2 days.    GI Problem: diarrhea and stomach pain      This 57-year-old female presents to the distention    • Abdominal pain    • [facility]) ([facility]    • Anxiety state, unspecified    • Arthritis    • Atherosclerosis of coronary artery    • Autoimmune disease ([facility]     fibro   • Back pain    • Back problem    • Bloating    • Calculus o [place]   • COLONOSCOPY N/A 3/2/2021    Procedure: COLONOSCOPY;  Surgeon: Karly Lynn MD;  Location: [facility] ENDOSCOPY   • FLUOR GID & Luiz Anna NDL/CATH SPI DX/THER Winter Evnas  2/26/2013    Procedure: LUMBAR EPIDURAL;  Surgeon: Gillian Fuchs MD;  [facility] ORDER FOR IV ANTIBIOTIC SURGICAL SITE INFECTION PROPHYLAXIS.   3/18/2013    Procedure: LUMBAR EPIDURAL;  Surgeon: Marcella Rose MD;  Location: [address] [place] [address] FOR PAIN MANAGEMENT   • SPINAL FUSION  2001    Neck Fusion; Tray Yeung daily 30 tablet 1   • gabapentin 100 MG Oral Cap Take 1 capsule (100 mg total) by mouth 2 (two) times daily.  (Patient taking differently: Take 100 mg by mouth 2 (two) times daily as needed.  ) 60 capsule 2   • Cinacalcet HCl 30 MG Oral Tab Take 1 tablet ([facility]*    NAUSEA AND VOMITING    ROS:     Pertinent positives are dysuria, urinary frequency, diarrhea, abdominal cramping, low back pain, chills, bilateral hearing aids.   Pertinent negatives are no fever, nausea, vomiting, hematuria, blood in the Stable calcifications within the lower abdomen may represent calcified phleboliths. No free intraperitoneal air. Osteoarthritic changes within bilateral hips. CONCLUSION:  No acute findings.     Dictated by (CST): Yulia Mccullough MD on 5/03/20 • Nitrofurantoin Monohyd Macro 100 MG Oral Cap 14 capsule 0     Sig: Take 1 capsule (100 mg total) by mouth 2 (two) times daily. Imaging & Referrals:  None     Patient understands plan and follow-up. FU in 5 days if not better.     5/13/2021  Petra Betts

## 2021-05-13 NOTE — PATIENT INSTRUCTIONS
Drink at least 64 ounces of water daily. Limit caffeine to one serving daily. Take the Macrobid 100 mg one capsule twice daily for one week. We will contact you with the results of your urine culture. Contact Leann Aguirre if you are not better by Monday. (bacteria) in it. But bacteria can get into the urinary tract from the skin around the rectum. Or they can travel in the blood from other parts of the body.  Once they are in your urinary tract, they can cause infection in these areas:  · The urethra April Single having a catheter put in  · Older age  · Not emptying your bladder. This can give bacteria a chance to grow in your urine.   · Fluid loss (dehydration)  · Constipation  · Having sex  · Using a diaphragm for birth control   Treatment  Bladder infections are caffeine, alcohol, and spicy foods. These can irritate your bladder. · Urinate right after you have sex to flush out your bladder. · If you use birth control pills and have frequent bladder infections, discuss it with your healthcare provider.   Follow-up

## 2021-05-14 ENCOUNTER — TELEPHONE (OUTPATIENT)
Dept: FAMILY MEDICINE CLINIC | Facility: CLINIC | Age: 81
End: 2021-05-14

## 2021-05-14 RX ORDER — TRAZODONE HYDROCHLORIDE 100 MG/1
TABLET ORAL
Qty: 90 TABLET | Refills: 0 | Status: SHIPPED | OUTPATIENT
Start: 2021-05-14 | End: 2021-08-09

## 2021-05-14 NOTE — TELEPHONE ENCOUNTER
Aggie Villar is calling back to let the nurses know she will not be able to sleep tonight if her Trazadone is not refilled, Please call Aggie Villar at [phone]

## 2021-05-14 NOTE — TELEPHONE ENCOUNTER
Requested Prescriptions     Pending Prescriptions Disp Refills   • TRAZODONE  MG Oral Tab [Pharmacy Med Name: TRAZODONE 100MG TABLETS] 90 tablet 0     Sig: TAKE 1 TABLET(100 MG) BY MOUTH EVERY NIGHT     Last fill was 2/17/21 90 tabs  Last /21

## 2021-05-24 ENCOUNTER — TELEPHONE (OUTPATIENT)
Dept: FAMILY MEDICINE CLINIC | Facility: CLINIC | Age: 81
End: 2021-05-24

## 2021-05-24 NOTE — TELEPHONE ENCOUNTER
Spoke to patient. States she is finished with vancomycin. Advised to make appointment with Dr. May Gimenez for f/u. Denied urinary c/os. Patient c/o headache. Started today. States she took tylenol. Please advise on headache.

## 2021-05-24 NOTE — TELEPHONE ENCOUNTER
Kristina Charles is calling the office to see if Patricia Bassett will call something in for her, she thinks she may have a sinus infection, and a UTI, she has had Diarrhea,chills,and a headache, Please call Kristina Charles at [phone]

## 2021-05-26 DIAGNOSIS — F43.0 ACUTE STRESS REACTION: ICD-10-CM

## 2021-05-26 DIAGNOSIS — F41.1 GAD (GENERALIZED ANXIETY DISORDER): ICD-10-CM

## 2021-05-26 RX ORDER — CLONAZEPAM 0.5 MG/1
TABLET ORAL
Qty: 60 TABLET | Refills: 0 | Status: SHIPPED | OUTPATIENT
Start: 2021-05-26 | End: 2021-06-23

## 2021-05-26 NOTE — TELEPHONE ENCOUNTER
Requested Prescriptions     Pending Prescriptions Disp Refills   • CLONAZEPAM 0.5 MG Oral Tab [Pharmacy Med Name: CLONAZEPAM 0.5MG TABLETS] 60 tablet 0     Sig: TAKE 1 TABLET(0.5 MG) BY MOUTH TWICE DAILY AS NEEDED     Last fill was 3/2/21 60 tabs 2 refills

## 2021-05-28 ENCOUNTER — TELEPHONE (OUTPATIENT)
Dept: FAMILY MEDICINE CLINIC | Facility: CLINIC | Age: 81
End: 2021-05-28

## 2021-05-28 NOTE — TELEPHONE ENCOUNTER
Spoke to patient. C/o swollen glands on left side of neck with pain. No fever. Advised to call Dr. Milagro Teixeira office regarding the diarrhea. OK to add to your schedule? Please advise.

## 2021-05-28 NOTE — TELEPHONE ENCOUNTER
Dayron Murcia called and said she has a swollen gland on the left side. She said she had her tonsils out 2 times but the left side is giving her problems again. She also has diarrhea and stomach cramps.   She said if Kip Sifuentes needs to send anything to the [employer]

## 2021-05-28 NOTE — TELEPHONE ENCOUNTER
Spoke to patients . All recommendations given. VU and in agreement. Stated is f/u with GI next week also. Advised to call us next week if glands remain swollen.

## 2021-05-28 NOTE — TELEPHONE ENCOUNTER
Patient may take Tylenol as needed for discomfort. Gargle if any throat pain. As long as no fever, watchful waiting is safe. No need for antibiotics at this time.  If symptoms worsen over the weekend, she can be seen in the IC or make any appointment next w

## 2021-06-03 ENCOUNTER — TELEPHONE (OUTPATIENT)
Dept: FAMILY MEDICINE CLINIC | Facility: CLINIC | Age: 81
End: 2021-06-03

## 2021-06-03 NOTE — TELEPHONE ENCOUNTER
Spoke with patient and advised we are recommending the COVID vaccine to our patients. Per CDC:  The risk of severe illness from COVID-19 increases with age.  This is why CDC recommends that adults 72 years and older are one of the first groups to [place]

## 2021-06-03 NOTE — TELEPHONE ENCOUNTER
Patient reports UTI symptoms started again yesterday, reports pain and burning with urination, increased urgency and frequency. Denies hematuria, low back/flank pain, fever. States she has not followed up with Dr. Jenaro Wells (urologist).   She will emily

## 2021-06-03 NOTE — TELEPHONE ENCOUNTER
Nikita Roche is calling to see if Jaqueline Kennedy can call something in for her, she has a UTI and she is in pain, Please call [employer] For at [phone]

## 2021-06-03 NOTE — TELEPHONE ENCOUNTER
Edwin Cutler is calling to see if she should have the Covid vaccination, her  thinks she should, but she is not because she does not have a good immune system, please call Edwin Cutler at [phone] she would like to discuss this matter.

## 2021-06-04 ENCOUNTER — OFFICE VISIT (OUTPATIENT)
Dept: FAMILY MEDICINE CLINIC | Facility: CLINIC | Age: 81
End: 2021-06-04
Payer: MEDICARE

## 2021-06-04 ENCOUNTER — HOSPITAL ENCOUNTER (OUTPATIENT)
Age: 81
Discharge: HOME | End: 2021-06-04
Attending: EMERGENCY MEDICINE
Payer: MEDICARE

## 2021-06-04 ENCOUNTER — APPOINTMENT (OUTPATIENT)
Dept: CT IMAGING | Age: 81
End: 2021-06-04
Attending: PHYSICIAN ASSISTANT
Payer: MEDICARE

## 2021-06-04 VITALS
SYSTOLIC BLOOD PRESSURE: 110 MMHG | HEART RATE: 92 BPM | WEIGHT: 125 LBS | TEMPERATURE: 97 F | BODY MASS INDEX: 21.34 KG/M2 | DIASTOLIC BLOOD PRESSURE: 60 MMHG | OXYGEN SATURATION: 98 % | HEIGHT: 64 IN

## 2021-06-04 VITALS
OXYGEN SATURATION: 94 % | TEMPERATURE: 98 F | SYSTOLIC BLOOD PRESSURE: 139 MMHG | DIASTOLIC BLOOD PRESSURE: 62 MMHG | HEART RATE: 61 BPM | RESPIRATION RATE: 18 BRPM

## 2021-06-04 DIAGNOSIS — R10.2 PELVIC PAIN: Primary | ICD-10-CM

## 2021-06-04 DIAGNOSIS — N39.0 URINARY TRACT INFECTION WITHOUT HEMATURIA, SITE UNSPECIFIED: Primary | ICD-10-CM

## 2021-06-04 PROCEDURE — 87186 SC STD MICRODIL/AGAR DIL: CPT | Performed by: PHYSICIAN ASSISTANT

## 2021-06-04 PROCEDURE — 74177 CT ABD & PELVIS W/CONTRAST: CPT | Performed by: PHYSICIAN ASSISTANT

## 2021-06-04 PROCEDURE — 87088 URINE BACTERIA CULTURE: CPT | Performed by: PHYSICIAN ASSISTANT

## 2021-06-04 PROCEDURE — 85025 COMPLETE CBC W/AUTO DIFF WBC: CPT | Performed by: PHYSICIAN ASSISTANT

## 2021-06-04 PROCEDURE — 99214 OFFICE O/P EST MOD 30 MIN: CPT | Performed by: FAMILY MEDICINE

## 2021-06-04 PROCEDURE — 96360 HYDRATION IV INFUSION INIT: CPT

## 2021-06-04 PROCEDURE — 99215 OFFICE O/P EST HI 40 MIN: CPT

## 2021-06-04 PROCEDURE — 87086 URINE CULTURE/COLONY COUNT: CPT | Performed by: PHYSICIAN ASSISTANT

## 2021-06-04 PROCEDURE — 80047 BASIC METABLC PNL IONIZED CA: CPT

## 2021-06-04 PROCEDURE — 81002 URINALYSIS NONAUTO W/O SCOPE: CPT | Performed by: PHYSICIAN ASSISTANT

## 2021-06-04 PROCEDURE — 99214 OFFICE O/P EST MOD 30 MIN: CPT

## 2021-06-04 RX ORDER — CIPROFLOXACIN 500 MG/1
500 TABLET, FILM COATED ORAL 2 TIMES DAILY
Qty: 14 TABLET | Refills: 0 | Status: SHIPPED | OUTPATIENT
Start: 2021-06-04 | End: 2021-06-04

## 2021-06-04 RX ORDER — POTASSIUM CHLORIDE 20 MEQ/1
40 TABLET, EXTENDED RELEASE ORAL DAILY
Qty: 60 TABLET | Refills: 1 | Status: SHIPPED | OUTPATIENT
Start: 2021-06-04

## 2021-06-04 RX ORDER — ONDANSETRON 8 MG/1
8 TABLET, ORALLY DISINTEGRATING ORAL EVERY 6 HOURS PRN
Qty: 10 TABLET | Refills: 0 | Status: SHIPPED | OUTPATIENT
Start: 2021-06-04 | End: 2021-07-27

## 2021-06-04 RX ORDER — NITROFURANTOIN 25; 75 MG/1; MG/1
100 CAPSULE ORAL 2 TIMES DAILY
Qty: 10 CAPSULE | Refills: 0 | Status: SHIPPED | OUTPATIENT
Start: 2021-06-04 | End: 2021-06-09

## 2021-06-04 RX ORDER — SODIUM CHLORIDE 9 MG/ML
1000 INJECTION, SOLUTION INTRAVENOUS ONCE
Status: COMPLETED | OUTPATIENT
Start: 2021-06-04 | End: 2021-06-04

## 2021-06-04 NOTE — PATIENT INSTRUCTIONS
Go directly to the [facility] care at [address] in [place]. You may drink water, but do not eat anything until you have been assessed at the Immediate care.     Your Zofran and Potassium prescriptions have been refilled and sent to your phar
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
## 2021-06-04 NOTE — ED PROVIDER NOTES
Patient Seen in: [facility] [place]      History   Patient presents with:  Abdominal Pain    Stated Complaint: uti    HPI/Subjective:   HPI    Kaitlin Sotomayor is a 68-year-old female who presents today for evaluation of lower abdominal pain consistent with transfusion    • History of eating disorder    • History of rheumatic fever    • IBS (irritable bowel syndrome)     constipation   • Irregular bowel habits    • Kidney stones    • Leaking of urine    • Loss of appetite    • Migraines    • Muscle weakness [id number] .[facility]  [address] [facility] 5+ YR  2/26/2013    Procedure: LUMBAR EPIDURAL;  Surgeon: Pablito Izaguirre MD;  Location: [facility] FOR PAIN MANAGEMENT   • TUNG LOCALIZATION WIRE 1 SITE LEFT (CPT=19281)     • TUNG LOCALIZATION WIRE 1 SITE RIGHT (EVZ=05389)     • OTHER  01/27/ HPI.  Constitutional and vital signs reviewed. All other systems reviewed and negative except as noted above.     Physical Exam     ED Triage Vitals [06/04/21 1105]   /62   Pulse 68   Resp 16   Temp 97.7 °F (36.5 °C)   Temp src    SpO2 93 %   O2 (*)     GFR, Non- 28 (*)     GFR, -American 33 (*)     All other components within normal limits   URINE CULTURE, ROUTINE     Patient is brought back to the exam room. Nursing staff obtains medical history vital signs.   Patient is e Macrobid for UTI and will follow up with the PCPs office. Urine culture is pending. She is actively eating gosia grams and drinking juice prior to discharge. She urinated upon returning from CT imaging.   She is warned of red flag symptoms that should wa

## 2021-06-04 NOTE — ED INITIAL ASSESSMENT (HPI)
C/o suprapubic abdominal pain for 2 days with back pain since this morning. Similar symptoms when having UTI. No fever. Took AZO with no relief.

## 2021-06-08 ENCOUNTER — TELEPHONE (OUTPATIENT)
Dept: FAMILY MEDICINE CLINIC | Facility: CLINIC | Age: 81
End: 2021-06-08

## 2021-06-08 NOTE — TELEPHONE ENCOUNTER
Pt states she has terrible diarrhea and horrible cramps and would like Elijah Martinez to send something to her pharmacy.

## 2021-06-08 NOTE — TELEPHONE ENCOUNTER
Spoke to patient. Symptoms are as described below. Started during the night. Advised to call Dr. Dania Esparza office as last OV with them 6/2/21 recommendations were given to patient. She VU and will call.

## 2021-06-09 ENCOUNTER — LAB ENCOUNTER (OUTPATIENT)
Dept: LAB | Age: 81
End: 2021-06-09
Attending: INTERNAL MEDICINE
Payer: MEDICARE

## 2021-06-09 ENCOUNTER — HOSPITAL ENCOUNTER (EMERGENCY)
Age: 81
Discharge: HOME | End: 2021-06-09
Attending: EMERGENCY MEDICINE
Payer: MEDICARE

## 2021-06-09 ENCOUNTER — APPOINTMENT (OUTPATIENT)
Dept: CT IMAGING | Age: 81
End: 2021-06-09
Attending: EMERGENCY MEDICINE
Payer: MEDICARE

## 2021-06-09 VITALS
SYSTOLIC BLOOD PRESSURE: 144 MMHG | HEIGHT: 64 IN | WEIGHT: 125 LBS | BODY MASS INDEX: 21.34 KG/M2 | OXYGEN SATURATION: 99 % | DIASTOLIC BLOOD PRESSURE: 66 MMHG | RESPIRATION RATE: 18 BRPM | HEART RATE: 66 BPM | TEMPERATURE: 98 F

## 2021-06-09 DIAGNOSIS — R19.7 DIARRHEA OF PRESUMED INFECTIOUS ORIGIN: ICD-10-CM

## 2021-06-09 DIAGNOSIS — R10.9 ABDOMINAL PAIN OF UNKNOWN ETIOLOGY: Primary | ICD-10-CM

## 2021-06-09 PROCEDURE — 96375 TX/PRO/DX INJ NEW DRUG ADDON: CPT

## 2021-06-09 PROCEDURE — 85025 COMPLETE CBC W/AUTO DIFF WBC: CPT | Performed by: EMERGENCY MEDICINE

## 2021-06-09 PROCEDURE — 96374 THER/PROPH/DIAG INJ IV PUSH: CPT

## 2021-06-09 PROCEDURE — 81001 URINALYSIS AUTO W/SCOPE: CPT | Performed by: EMERGENCY MEDICINE

## 2021-06-09 PROCEDURE — 99284 EMERGENCY DEPT VISIT MOD MDM: CPT

## 2021-06-09 PROCEDURE — 74177 CT ABD & PELVIS W/CONTRAST: CPT | Performed by: EMERGENCY MEDICINE

## 2021-06-09 PROCEDURE — 87493 C DIFF AMPLIFIED PROBE: CPT

## 2021-06-09 PROCEDURE — 96361 HYDRATE IV INFUSION ADD-ON: CPT

## 2021-06-09 PROCEDURE — 80053 COMPREHEN METABOLIC PANEL: CPT | Performed by: EMERGENCY MEDICINE

## 2021-06-09 RX ORDER — LORAZEPAM 2 MG/ML
1 INJECTION INTRAMUSCULAR ONCE
Status: COMPLETED | OUTPATIENT
Start: 2021-06-09 | End: 2021-06-09

## 2021-06-09 RX ORDER — PHENAZOPYRIDINE HYDROCHLORIDE 200 MG/1
200 TABLET, FILM COATED ORAL 3 TIMES DAILY PRN
Qty: 6 TABLET | Refills: 0 | Status: SHIPPED | OUTPATIENT
Start: 2021-06-09 | End: 2021-06-16

## 2021-06-09 RX ORDER — HYDROMORPHONE HYDROCHLORIDE 1 MG/ML
1 INJECTION, SOLUTION INTRAMUSCULAR; INTRAVENOUS; SUBCUTANEOUS ONCE
Status: COMPLETED | OUTPATIENT
Start: 2021-06-09 | End: 2021-06-09

## 2021-06-10 ENCOUNTER — OFFICE VISIT (OUTPATIENT)
Dept: FAMILY MEDICINE CLINIC | Facility: CLINIC | Age: 81
End: 2021-06-10
Payer: MEDICARE

## 2021-06-10 VITALS
HEART RATE: 72 BPM | WEIGHT: 125 LBS | HEIGHT: 64 IN | BODY MASS INDEX: 21.34 KG/M2 | SYSTOLIC BLOOD PRESSURE: 114 MMHG | TEMPERATURE: 98 F | DIASTOLIC BLOOD PRESSURE: 60 MMHG

## 2021-06-10 DIAGNOSIS — N13.30 HYDRONEPHROSIS, UNSPECIFIED HYDRONEPHROSIS TYPE: ICD-10-CM

## 2021-06-10 DIAGNOSIS — A04.72 CLOSTRIDIUM DIFFICILE DIARRHEA: ICD-10-CM

## 2021-06-10 DIAGNOSIS — N30.00 ACUTE CYSTITIS WITHOUT HEMATURIA: Primary | ICD-10-CM

## 2021-06-10 PROCEDURE — 99215 OFFICE O/P EST HI 40 MIN: CPT | Performed by: FAMILY MEDICINE

## 2021-06-10 PROCEDURE — 81003 URINALYSIS AUTO W/O SCOPE: CPT | Performed by: FAMILY MEDICINE

## 2021-06-10 RX ORDER — CIPROFLOXACIN 500 MG/1
1 TABLET, FILM COATED ORAL 2 TIMES DAILY
COMMUNITY
Start: 2021-06-04 | End: 2021-06-23 | Stop reason: ALTCHOICE

## 2021-06-10 NOTE — PATIENT INSTRUCTIONS
Continue with the Ciprofloxacin as prescribed from the ER. Eat yogurt with live active cultures or take the Floristor probiotic once daily. Drink 64 ounces of water daily. No more than one serving of caffeine daily.     Per Dr. Karli Bah:    Stop all la

## 2021-06-10 NOTE — ED NOTES
Patient called stating she is having severe low abdominal pain that started about 5:30pm this evening. She and her  are on speaker phone stating they just want something else called in for her pain.  She was seen on 6/4/21 and was prescribed nitrofur

## 2021-06-10 NOTE — PROGRESS NOTES
Kar Chapman is a 80year old female. HPI:   Patient presents with:  ER F/U      This 29-year-old female presents to the office in follow-up of her ER visit on 6/9/2021. I had seen the patient on 6/4/2021 for severe pelvic pain.   At that time I had s • Autoimmune disease ([facility]     fibro   • Back pain    • Back problem    • Bloating    • Calculus of kidney 03/24/2021   • Cataract    • Chronic back pain    • Chronic rhinitis 10/13/2018   • CKD (chronic kidney disease) stage 3, GFR 30-59 ml/min ([facility]) 5/ DX/THER Zara Brad  2/26/2013    Procedure: LUMBAR EPIDURAL;  Surgeon: Fadia Mccormick MD;  Location: [address] [place] [address] & Quirino Franks NDL/CATH SPI DX/THER Zara Brad  3/18/2013    Procedure: LUMBAR EPIDURAL;  Surgeon: Fadia Mccormick MD;  Locat Elo Benedict MD;  Location: [place] [address] MANAGEMENT   • SPINAL FUSION  2001    Neck Fusion; [facility], [place], [address] [place], [place]   • TONSILLECTOMY     • TUBAL LIGATION        Family History   Problem Rela total) by mouth 2 (two) times daily.  (Patient taking differently: Take 100 mg by mouth daily as needed.  ) 60 capsule 3   • CLONAZEPAM 0.5 MG Oral Tab TAKE 1 TABLET(0.5 MG) BY MOUTH TWICE DAILY AS NEEDED 60 tablet 0   • TRAZODONE  MG Oral Tab TAKE 1 abdominal pain, cramping, diarrhea, feeling weak, bilateral hearing aids, glasses  Pertinent negatives are no fever, chills, nausea, vomiting, blood in stool, blood in urine  All other review of symptoms were reviewed and negative.     PHYSICAL EXAM:   BP 1 and/or encounters for the requested time period, some results have not been displayed. A complete set of results can be found in Results Review. 1. Acute cystitis without hematuria    2.  Hydronephrosis, unspecified hydronephrosis type    Today's uri
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
## 2021-06-13 DIAGNOSIS — R09.81 NASAL CONGESTION: Primary | ICD-10-CM

## 2021-06-14 RX ORDER — MONTELUKAST SODIUM 10 MG/1
TABLET ORAL
Qty: 90 TABLET | Refills: 3 | Status: SHIPPED | OUTPATIENT
Start: 2021-06-14 | End: 2021-10-28 | Stop reason: CLARIF

## 2021-06-14 NOTE — TELEPHONE ENCOUNTER
Montelukast Sodium 10 MG Oral Tab 90 tablet 0 2/25/2021     Sig - Route: Take 1 tablet (10 mg total) by mouth nightly. - Oral      LOV 6/10/21    Has future OV 6/23/21    Refills approved.

## 2021-06-16 ENCOUNTER — HOSPITAL ENCOUNTER (OUTPATIENT)
Age: 81
Discharge: HOME | End: 2021-06-16
Attending: EMERGENCY MEDICINE
Payer: MEDICARE

## 2021-06-16 ENCOUNTER — TELEPHONE (OUTPATIENT)
Dept: FAMILY MEDICINE CLINIC | Facility: CLINIC | Age: 81
End: 2021-06-16

## 2021-06-16 VITALS
TEMPERATURE: 98 F | OXYGEN SATURATION: 100 % | RESPIRATION RATE: 16 BRPM | DIASTOLIC BLOOD PRESSURE: 84 MMHG | SYSTOLIC BLOOD PRESSURE: 124 MMHG | HEART RATE: 60 BPM

## 2021-06-16 DIAGNOSIS — I10 ESSENTIAL HYPERTENSION: ICD-10-CM

## 2021-06-16 DIAGNOSIS — R10.2 SUPRAPUBIC PAIN: Primary | ICD-10-CM

## 2021-06-16 PROCEDURE — 99213 OFFICE O/P EST LOW 20 MIN: CPT

## 2021-06-16 PROCEDURE — 96372 THER/PROPH/DIAG INJ SC/IM: CPT

## 2021-06-16 PROCEDURE — 99214 OFFICE O/P EST MOD 30 MIN: CPT

## 2021-06-16 PROCEDURE — 81002 URINALYSIS NONAUTO W/O SCOPE: CPT | Performed by: EMERGENCY MEDICINE

## 2021-06-16 PROCEDURE — 87086 URINE CULTURE/COLONY COUNT: CPT | Performed by: EMERGENCY MEDICINE

## 2021-06-16 RX ORDER — LOSARTAN POTASSIUM 100 MG/1
TABLET ORAL
Qty: 90 TABLET | Refills: 0 | Status: SHIPPED | OUTPATIENT
Start: 2021-06-16 | End: 2021-09-13

## 2021-06-16 RX ORDER — NITROFURANTOIN 25; 75 MG/1; MG/1
100 CAPSULE ORAL 2 TIMES DAILY
Qty: 10 CAPSULE | Refills: 0 | Status: SHIPPED | OUTPATIENT
Start: 2021-06-16 | End: 2021-06-21

## 2021-06-16 RX ORDER — KETOROLAC TROMETHAMINE 30 MG/ML
30 INJECTION, SOLUTION INTRAMUSCULAR; INTRAVENOUS ONCE
Status: COMPLETED | OUTPATIENT
Start: 2021-06-16 | End: 2021-06-16

## 2021-06-16 RX ORDER — NITROFURANTOIN 25; 75 MG/1; MG/1
100 CAPSULE ORAL 2 TIMES DAILY
Qty: 10 CAPSULE | Refills: 0 | Status: SHIPPED | OUTPATIENT
Start: 2021-06-16 | End: 2021-06-16

## 2021-06-16 NOTE — ED PROVIDER NOTES
Patient Seen in: [facility]      History   Patient presents with:  Urinary Symptoms    Stated Complaint: severe uti    HPI/Subjective:   HPI    Patient is a 65-year-old female comes emergency room for evaluation of potential urinary tract Duodenitis determined by biopsy 4/18/2018   • Esophageal reflux    • Fatigue    • Fibromyalgia    • Flatulence/gas pain/belching    • Food intolerance    • Frequent use of laxatives    • Headache disorder    • Hearing impairment     bilateral hearing aids SUBSTANCE, EPIDURAL/SUBARACHNOID; LUMBAR/SACRAL  2/26/2013    Procedure: LUMBAR EPIDURAL;  Surgeon: Amarilis John MD;  Location: [facility] FOR PAIN MANAGEMENT   • INJECTION, W/WO CONTRAST, DX/THERAPEUTIC SUBSTANCE, EPIDURAL/SUBARACHNOID; LUMBAR/SACRAL Cigarettes        Quit date: 1997        Years since quittin.4      Smokeless tobacco: Never Used    Vaping Use      Vaping Use: Never used    Alcohol use: No      Alcohol/week: 0.0 standard drinks    Drug use: Yes      Types: Cannabis      Commen cloudy (*)     Glucose, Urine 100  (*)     Blood, Urine Trace-Intact (*)     Nitrite Urine Positive (*)     Leukocyte esterase urine Trace (*)     All other components within normal limits   URINE CULTURE, ROUTINE                   MDM      Patient is a 80 Cap  Take 1 capsule (100 mg total) by mouth 2 (two) times daily for 5 days.   Qty: 10 capsule Refills: 0

## 2021-06-16 NOTE — TELEPHONE ENCOUNTER
Requested Prescriptions     Signed Prescriptions Disp Refills   • LOSARTAN 100 MG Oral Tab 90 tablet 0     Sig: TAKE 1 TABLET(100 MG) BY MOUTH DAILY     Authorizing Provider: Anamaria Lamas     Ordering User: Marcy Bae     Met protocol. Refill sent.

## 2021-06-16 NOTE — TELEPHONE ENCOUNTER
LOV with Dr. Frank Claire 6/10/21 for UTI:   Return in about 1 week (around 6/17/2021) for with Davie Aceves for recheck on UTI and abdominal pain. Continue with the Ciprofloxacin as prescribed from the ER.   Eat yogurt with live active cultures or take the Floristo

## 2021-06-16 NOTE — TELEPHONE ENCOUNTER
Pt called states she has a bladder infection along with really bad pain. Pt would like to know if she can get medication for the bladder infection and also to help with the pain.

## 2021-06-19 ENCOUNTER — HOSPITAL ENCOUNTER (EMERGENCY)
Age: 81
Discharge: HOME | End: 2021-06-19
Attending: EMERGENCY MEDICINE
Payer: MEDICARE

## 2021-06-19 ENCOUNTER — APPOINTMENT (OUTPATIENT)
Dept: CT IMAGING | Age: 81
End: 2021-06-19
Attending: EMERGENCY MEDICINE
Payer: MEDICARE

## 2021-06-19 VITALS
WEIGHT: 120 LBS | SYSTOLIC BLOOD PRESSURE: 156 MMHG | OXYGEN SATURATION: 98 % | DIASTOLIC BLOOD PRESSURE: 88 MMHG | TEMPERATURE: 99 F | HEIGHT: 63 IN | BODY MASS INDEX: 21.26 KG/M2 | RESPIRATION RATE: 16 BRPM | HEART RATE: 77 BPM

## 2021-06-19 DIAGNOSIS — N39.0 URINARY TRACT INFECTION WITHOUT HEMATURIA, SITE UNSPECIFIED: ICD-10-CM

## 2021-06-19 DIAGNOSIS — R10.30 LOWER ABDOMINAL PAIN: Primary | ICD-10-CM

## 2021-06-19 DIAGNOSIS — N13.30 HYDRONEPHROSIS, UNSPECIFIED HYDRONEPHROSIS TYPE: ICD-10-CM

## 2021-06-19 PROCEDURE — 99284 EMERGENCY DEPT VISIT MOD MDM: CPT

## 2021-06-19 PROCEDURE — 81001 URINALYSIS AUTO W/SCOPE: CPT | Performed by: EMERGENCY MEDICINE

## 2021-06-19 PROCEDURE — 74177 CT ABD & PELVIS W/CONTRAST: CPT | Performed by: EMERGENCY MEDICINE

## 2021-06-19 PROCEDURE — 85025 COMPLETE CBC W/AUTO DIFF WBC: CPT | Performed by: EMERGENCY MEDICINE

## 2021-06-19 PROCEDURE — 96376 TX/PRO/DX INJ SAME DRUG ADON: CPT

## 2021-06-19 PROCEDURE — 96374 THER/PROPH/DIAG INJ IV PUSH: CPT

## 2021-06-19 PROCEDURE — 83605 ASSAY OF LACTIC ACID: CPT | Performed by: EMERGENCY MEDICINE

## 2021-06-19 PROCEDURE — 96375 TX/PRO/DX INJ NEW DRUG ADDON: CPT

## 2021-06-19 PROCEDURE — 80053 COMPREHEN METABOLIC PANEL: CPT | Performed by: EMERGENCY MEDICINE

## 2021-06-19 RX ORDER — HYDROMORPHONE HYDROCHLORIDE 1 MG/ML
0.5 INJECTION, SOLUTION INTRAMUSCULAR; INTRAVENOUS; SUBCUTANEOUS ONCE
Status: DISCONTINUED | OUTPATIENT
Start: 2021-06-19 | End: 2021-06-19

## 2021-06-19 RX ORDER — ONDANSETRON 2 MG/ML
4 INJECTION INTRAMUSCULAR; INTRAVENOUS ONCE
Status: COMPLETED | OUTPATIENT
Start: 2021-06-19 | End: 2021-06-19

## 2021-06-19 RX ORDER — HYDROMORPHONE HYDROCHLORIDE 1 MG/ML
0.5 INJECTION, SOLUTION INTRAMUSCULAR; INTRAVENOUS; SUBCUTANEOUS ONCE
Status: COMPLETED | OUTPATIENT
Start: 2021-06-19 | End: 2021-06-19

## 2021-06-19 RX ORDER — DICYCLOMINE HCL 20 MG
20 TABLET ORAL 4 TIMES DAILY PRN
Qty: 30 TABLET | Refills: 0 | Status: SHIPPED | OUTPATIENT
Start: 2021-06-19 | End: 2021-07-13

## 2021-06-19 RX ORDER — HYDROMORPHONE HYDROCHLORIDE 1 MG/ML
INJECTION, SOLUTION INTRAMUSCULAR; INTRAVENOUS; SUBCUTANEOUS
Status: COMPLETED
Start: 2021-06-19 | End: 2021-06-19

## 2021-06-19 NOTE — ED PROVIDER NOTES
Patient Seen in: [facility] Emergency Department In [place]      History   Patient presents with:  Abdomen/Flank Pain    Stated Complaint: reports lower abd cramping since am    HPI/Subjective:   HPI    27-year-old female presents to the emergency departm Heart palpitations    • Hemorrhoids    • High blood pressure    • High cholesterol    • History of blood transfusion    • History of eating disorder    • History of rheumatic fever    • IBS (irritable bowel syndrome)     constipation   • Irregular bowel ha EPIDURAL;  Surgeon: May Nunn MD;  Location: [address] [place] [address]   • M-SEDAJ BY  PHYS [id number] .[facility]  [address] [place] 5+ YR  2/26/2013    Procedure: LUMBAR EPIDURAL;  Surgeon: May Nunn MD;  Location: [address] [place] [address] PAIN MANAGEMENT   • TUNG [place] for pain             Review of Systems    Positive for stated complaint: reports lower abd cramping since am  Other systems are as noted in HPI. Constitutional and vital signs reviewed. All other systems reviewed and negative except as noted above. (*)     All other components within normal limits   LACTIC ACID, PLASMA - Normal   CBC WITH DIFFERENTIAL WITH PLATELET    Narrative: The following orders were created for panel order CBC With Differential With Platelet.   Procedure hydronephrosis type     Disposition:  Discharge  6/19/2021  4:13 pm    Follow-up:  Lam Plasencia, [address] [facility]  [phone]    In 2 days      Shari Ramirez, [address] [place], [place] [address]r. [phone]

## 2021-06-19 NOTE — ED INITIAL ASSESSMENT (HPI)
Pt states since this am having lower abd cramping, normal bm yest, denies fever or n/v , on ABX for UTI

## 2021-06-19 NOTE — ED QUICK NOTES
Pt returned from CT and assisted to restroom via wheelchair; urine sample obtained. Pt states pain is better.

## 2021-06-23 ENCOUNTER — OFFICE VISIT (OUTPATIENT)
Dept: FAMILY MEDICINE CLINIC | Facility: CLINIC | Age: 81
End: 2021-06-23
Payer: MEDICARE

## 2021-06-23 VITALS
BODY MASS INDEX: 21.62 KG/M2 | HEIGHT: 63 IN | SYSTOLIC BLOOD PRESSURE: 128 MMHG | DIASTOLIC BLOOD PRESSURE: 62 MMHG | HEART RATE: 84 BPM | TEMPERATURE: 98 F | WEIGHT: 122 LBS

## 2021-06-23 DIAGNOSIS — K58.1 IRRITABLE BOWEL SYNDROME WITH CONSTIPATION: ICD-10-CM

## 2021-06-23 DIAGNOSIS — R10.84 GENERALIZED ABDOMINAL PAIN: Primary | ICD-10-CM

## 2021-06-23 DIAGNOSIS — F06.4 ANXIETY DISORDER DUE TO GENERAL MEDICAL CONDITION WITH PANIC ATTACK: ICD-10-CM

## 2021-06-23 DIAGNOSIS — F41.1 GAD (GENERALIZED ANXIETY DISORDER): ICD-10-CM

## 2021-06-23 DIAGNOSIS — F43.0 ACUTE STRESS REACTION: ICD-10-CM

## 2021-06-23 DIAGNOSIS — R41.3 POOR SHORT TERM MEMORY: ICD-10-CM

## 2021-06-23 DIAGNOSIS — Z87.442 HISTORY OF NEPHROLITHIASIS: ICD-10-CM

## 2021-06-23 DIAGNOSIS — N39.0 RECURRENT UTI: ICD-10-CM

## 2021-06-23 DIAGNOSIS — F13.20 BENZODIAZEPINE DEPENDENCE (HCC): ICD-10-CM

## 2021-06-23 DIAGNOSIS — F41.0 ANXIETY DISORDER DUE TO GENERAL MEDICAL CONDITION WITH PANIC ATTACK: ICD-10-CM

## 2021-06-23 DIAGNOSIS — N39.46 MIXED STRESS AND URGE URINARY INCONTINENCE: ICD-10-CM

## 2021-06-23 DIAGNOSIS — N18.30 CHRONIC RENAL INSUFFICIENCY, STAGE 3 (MODERATE) (HCC): ICD-10-CM

## 2021-06-23 DIAGNOSIS — M51.16 LUMBAR DISC HERNIATION WITH RADICULOPATHY: ICD-10-CM

## 2021-06-23 DIAGNOSIS — Z86.19 HISTORY OF CLOSTRIDIOIDES DIFFICILE INFECTION: ICD-10-CM

## 2021-06-23 PROCEDURE — 87086 URINE CULTURE/COLONY COUNT: CPT | Performed by: FAMILY MEDICINE

## 2021-06-23 PROCEDURE — 99215 OFFICE O/P EST HI 40 MIN: CPT | Performed by: FAMILY MEDICINE

## 2021-06-23 PROCEDURE — 96372 THER/PROPH/DIAG INJ SC/IM: CPT | Performed by: FAMILY MEDICINE

## 2021-06-23 PROCEDURE — G2212 PROLONG OUTPT/OFFICE VIS: HCPCS | Performed by: FAMILY MEDICINE

## 2021-06-23 RX ORDER — HYDROCODONE BITARTRATE AND ACETAMINOPHEN 5; 325 MG/1; MG/1
TABLET ORAL EVERY 8 HOURS PRN
Qty: 15 TABLET | Refills: 0 | Status: SHIPPED | OUTPATIENT
Start: 2021-06-23 | End: 2021-11-05

## 2021-06-23 RX ORDER — CLONAZEPAM 0.5 MG/1
0.5 TABLET ORAL 2 TIMES DAILY PRN
Qty: 60 TABLET | Refills: 2 | Status: SHIPPED | OUTPATIENT
Start: 2021-06-23 | End: 2021-09-20

## 2021-06-23 RX ORDER — KETOROLAC TROMETHAMINE 30 MG/ML
30 INJECTION, SOLUTION INTRAMUSCULAR; INTRAVENOUS ONCE
Status: COMPLETED | OUTPATIENT
Start: 2021-06-23 | End: 2021-06-23

## 2021-06-23 RX ADMIN — KETOROLAC TROMETHAMINE 30 MG: 30 INJECTION, SOLUTION INTRAMUSCULAR; INTRAVENOUS at 16:55:00

## 2021-06-23 NOTE — ED INITIAL ASSESSMENT (HPI)
abd pain and cramping for \"a while now\", about 1 month. Hx cdiff and was on abx from dr Sandhu Prior but did not follow up.  Has appt Monday with primary care also but states she just cant take the pain any more
Detail Level: Detailed
Quality 130: Documentation Of Current Medications In The Medical Record: Current Medications Documented
Quality 431: Preventive Care And Screening: Unhealthy Alcohol Use - Screening: Patient screened for unhealthy alcohol use using a single question and scores less than 2 times per year
Quality 47: Advance Care Plan: Advance Care Planning discussed and documented in the medical record; patient did not wish or was not able to name a surrogate decision maker or provide an advance care plan.
Quality 226: Preventive Care And Screening: Tobacco Use: Screening And Cessation Intervention: Patient screened for tobacco use and is an ex/non-smoker

## 2021-06-23 NOTE — PROGRESS NOTES
Jimmie Correa is a 80year old female.   HPI:         Sertraline only 2 times a week if    4 bottles and metamucil goal  Results for orders placed or performed during the hospital encounter of 19/90/10   Comp Metabolic Panel (14)   Result Value Ref Range DRAW LAVENDER   Result Value Ref Range    Hold Lavender Auto Resulted    RAINBOW DRAW LIGHT GREEN   Result Value Ref Range    Hold Lt Green Auto Resulted    CBC W/ DIFFERENTIAL   Result Value Ref Range    WBC 10.7 4.0 - 11.0 x10(3) uL    RBC 3.79 (L) 3.80 TABLET(0.5 MG) BY MOUTH TWICE DAILY AS NEEDED 60 tablet 0   • TRAZODONE  MG Oral Tab TAKE 1 TABLET(100 MG) BY MOUTH EVERY NIGHT 90 tablet 0   • Sertraline HCl 25 MG Oral Tab Take 1 tablet (25 mg total) by mouth daily.  For anxiety to be taken daily 3 impairment     bilateral hearing aids   • Hearing loss    • Heart murmur    • Heart palpitations    • Hemorrhoids    • High blood pressure    • High cholesterol    • History of blood transfusion    • History of eating disorder    • History of rheumatic fev developed, well nourished,in no apparent distress  SKIN: no rashes,no suspicious lesions  HEENT: TM clear bilaterally, nose no congestion, throat clear no erythema without mass.    EYES: PERRLA, EOM intact, sclera clear without injection  NECK: supple,no ad

## 2021-06-24 PROBLEM — N39.0 RECURRENT UTI: Status: ACTIVE | Noted: 2021-01-17

## 2021-06-24 PROBLEM — R10.84 GENERALIZED ABDOMINAL PAIN: Status: ACTIVE | Noted: 2019-05-22

## 2021-06-24 NOTE — PROGRESS NOTES
Per Arlene Vasquez PA-C, the following appointment was made for the patient to see Urology:    Future Appointments   Date Time Provider Damaris Milagros   6/28/2021  3:00 PM Renetta Salinas MD [facility] 44     The patient was given options an

## 2021-06-24 NOTE — PROGRESS NOTES
Chloé Dunn is a 80year old female.   HPI:   Patient presents today with severe abdominal pain has a history of severe anxiety, short-term memory issues, chronic back pain with sciatica, renal insufficiency, nephrolithiasis, stress incontinence, recurre burning with urination. No fevers, chills or body aches. No nausea or vomiting. No complaints of diarrhea though  states that yesterday she complained of loose stools x1.   During office visit today obsessively complains of constipation and states taking it 2 times a week versus the daily dosage as prior discussed. Encouraged him to make sure she takes it daily and then in 1 week increase to 50 mg to help with anxiety. Did state this may help with her pain that she experiences from IBS as well.   I [address] with Dr. Alex Bell for anemia, nausea/vomiting, early satiety, and chronic constipation. Colonoscopy recommended, patient did not complete.  -02/2018: [address] with Tamika Munoz for hemorrhoids, recent ER visit for partially thrombosed hemorrhoids.  Referral to [facility] previous contrast-enhanced CT examination. 3. Bilateral calcifications some of which appear to be renal in origin as per history.  Please refer to recent CT of the abdomen and pelvis performed 3/20/2021 for further details.      CT A/P (03-):  1. - 48.0 % 36.2   Platelet Count      372.4 - 450.0 10(3)uL 343.0   MCV      80.0 - 100.0 fL 95.5   MCH      26.0 - 34.0 pg 31.4   MCHC      31.0 - 37.0 g/dL 32.9   RDW      11.0 - 15.0 % 13.4   RDW-SD      35.1 - 46.3 fL 47.2 (H)   Prelim Neutrophil Abs needed. 60 tablet 2   • HYDROcodone-acetaminophen (NORCO) 5-325 MG Oral Tab Take 0.5-1 tablets by mouth every 8 (eight) hours as needed for Pain.  15 tablet 0   • Dicyclomine HCl 20 MG Oral Tab Take 1 tablet (20 mg total) by mouth 4 (four) times daily as ne coronary artery    • Autoimmune disease (HCC)     fibro   • Back pain    • Back problem    • Bloating    • Calculus of kidney 03/24/2021   • Cataract    • Chronic back pain    • Chronic rhinitis 10/13/2018   • CKD (chronic kidney disease) stage 3, GFR 30-5 use: Yes      Types: Cannabis      Comment: Uses marijuana once in a while for pain       REVIEW OF SYSTEMS:   GENERAL HEALTH: Patient is accompanied with her  main complaint is abdominal sharp pain in the lower abdomen points to across the abdomen PERRLA, EOM intact, sclera clear without injection  NECK: supple,no adenopathy, thyroid normal to palpation  LUNGS: clear to auscultation no rales, rhonchi or wheezes  CARDIO: RRR without murmur  GI: Normal bowel sounds ×4 quadrant, no hepatosplenomegaly o as needed. • HYDROcodone-acetaminophen (NORCO) 5-325 MG Oral Tab 15 tablet 0     Sig: Take 0.5-1 tablets by mouth every 8 (eight) hours as needed for Pain. Imaging & Consults:  PSYCHOLOGY - INTERNAL  1.  Generalized abdominal pain  Etiology possibly Take 25 mg for 10 days then increase to 50 mg  Dispense: 30 tablet; Refill: 1    5. Benzodiazepine dependence (HCC)  - PSYCHOLOGY - INTERNAL    6. Acute stress reaction  - PSYCHOLOGY - INTERNAL  - clonazePAM 0.5 MG Oral Tab;  Take 1 tablet (0.5 mg total) by the  and the trial of conservative treatment for her abdominal pain and constipation. .  The patient indicates understanding of these issues and agrees to the plan. The patient is asked to return in 6 to 8 weeks follow-up on the sertraline. Marisa Webster

## 2021-06-30 ENCOUNTER — TELEPHONE (OUTPATIENT)
Dept: FAMILY MEDICINE CLINIC | Facility: CLINIC | Age: 81
End: 2021-06-30

## 2021-06-30 NOTE — TELEPHONE ENCOUNTER
I have a lunch at 12:00 on Friday that I can see her, but I think she has a urology appointment at 11:00.   The only other thing I can do is have somebody who is earlier on in the day be seen at 12:00 and then I can see her earlier in the day or if she want

## 2021-06-30 NOTE — TELEPHONE ENCOUNTER
LOV 6/23/21  Generalized abdominal pain  Etiology possibly some psychological from chronic constipation and relationship with her wanting to have full evacuation and always feeling like she has to have a bowel movement.   Toradol was given patient continued

## 2021-07-01 ENCOUNTER — TELEPHONE (OUTPATIENT)
Dept: FAMILY MEDICINE CLINIC | Facility: CLINIC | Age: 81
End: 2021-07-01

## 2021-07-01 NOTE — TELEPHONE ENCOUNTER
Vania Burris from [facility] care called and wants to know if Dr. Kvng Lo will sign home care orders for [facility]. She was recently in [facility] for a GI bleed.

## 2021-07-07 ENCOUNTER — TELEPHONE (OUTPATIENT)
Dept: FAMILY MEDICINE CLINIC | Facility: CLINIC | Age: 81
End: 2021-07-07

## 2021-07-07 NOTE — TELEPHONE ENCOUNTER
Call GI and have them call her they are medically managing the GI issues tell them she was hospitalized at [facility] for GI bleed due to ulcers. Make sure she is NOT taking the Excedrin or any NSAID. Saida Lizama     She can try Gaviscon liquid OTC also

## 2021-07-07 NOTE — TELEPHONE ENCOUNTER
Patient c/o stomach pain, below belly button x2 days and reports pain is getting worse. States she was in the hospital recently and was told she has an ulcer. She is taking Omeprazole 30mg BID with no relief.    Denies diarrhea, nausea, vomiting, fever, blo

## 2021-07-07 NOTE — TELEPHONE ENCOUNTER
Left detailed VM for 's medical assistant with message to call patient as soon as possible re: ulcer concerns. Left detailed message to also call us back if there is anything they want us to do for patient.      Called patient and relayed recomme

## 2021-07-14 ENCOUNTER — TELEPHONE (OUTPATIENT)
Dept: FAMILY MEDICINE CLINIC | Facility: CLINIC | Age: 81
End: 2021-07-14

## 2021-07-15 NOTE — TELEPHONE ENCOUNTER
On 7/14/2021, the patient's [address] and Plan of Care,\" which was signed by Pricila Stallings PA-C, was successfully faxed to [facility] at [phone].

## 2021-07-16 DIAGNOSIS — I10 ESSENTIAL HYPERTENSION: ICD-10-CM

## 2021-07-16 RX ORDER — AMLODIPINE BESYLATE 10 MG/1
TABLET ORAL
Qty: 90 TABLET | Refills: 1 | Status: SHIPPED | OUTPATIENT
Start: 2021-07-16 | End: 2021-10-28 | Stop reason: CLARIF

## 2021-07-16 RX ORDER — PRAVASTATIN SODIUM 40 MG
TABLET ORAL
Qty: 90 TABLET | Refills: 0 | Status: SHIPPED | OUTPATIENT
Start: 2021-07-16 | End: 2021-10-13

## 2021-07-16 NOTE — TELEPHONE ENCOUNTER
Pt requesting refill of   Requested Prescriptions     Pending Prescriptions Disp Refills   • AMLODIPINE BESYLATE 10 MG Oral Tab [Pharmacy Med Name: AMLODIPINE BESYLATE 10MG TABLETS] 90 tablet 1     Sig: TAKE 1 TABLET(10 MG) BY MOUTH DAILY   • PRAVASTATIN

## 2021-07-20 ENCOUNTER — TELEPHONE (OUTPATIENT)
Dept: FAMILY MEDICINE CLINIC | Facility: CLINIC | Age: 81
End: 2021-07-20

## 2021-07-20 ENCOUNTER — APPOINTMENT (OUTPATIENT)
Dept: CT IMAGING | Age: 81
End: 2021-07-20
Attending: PHYSICIAN ASSISTANT
Payer: MEDICARE

## 2021-07-20 ENCOUNTER — HOSPITAL ENCOUNTER (EMERGENCY)
Age: 81
Discharge: HOME | End: 2021-07-20
Attending: EMERGENCY MEDICINE
Payer: MEDICARE

## 2021-07-20 ENCOUNTER — APPOINTMENT (OUTPATIENT)
Dept: GENERAL RADIOLOGY | Age: 81
End: 2021-07-20
Attending: PHYSICIAN ASSISTANT
Payer: MEDICARE

## 2021-07-20 VITALS
TEMPERATURE: 98 F | WEIGHT: 125 LBS | HEART RATE: 82 BPM | RESPIRATION RATE: 16 BRPM | DIASTOLIC BLOOD PRESSURE: 70 MMHG | BODY MASS INDEX: 21.34 KG/M2 | SYSTOLIC BLOOD PRESSURE: 175 MMHG | HEIGHT: 64 IN | OXYGEN SATURATION: 97 %

## 2021-07-20 DIAGNOSIS — G89.29 CHRONIC ABDOMINAL PAIN: Primary | ICD-10-CM

## 2021-07-20 DIAGNOSIS — R10.9 CHRONIC ABDOMINAL PAIN: Primary | ICD-10-CM

## 2021-07-20 LAB
ALBUMIN SERPL-MCNC: 4 G/DL (ref 3.4–5)
ALBUMIN/GLOB SERPL: 1.1 {RATIO} (ref 1–2)
ALP LIVER SERPL-CCNC: 86 U/L
ALT SERPL-CCNC: 22 U/L
ANION GAP SERPL CALC-SCNC: 7 MMOL/L (ref 0–18)
AST SERPL-CCNC: 17 U/L (ref 15–37)
BASOPHILS # BLD AUTO: 0.09 X10(3) UL (ref 0–0.2)
BASOPHILS NFR BLD AUTO: 0.8 %
BILIRUB SERPL-MCNC: 0.2 MG/DL (ref 0.1–2)
BILIRUB UR QL STRIP.AUTO: NEGATIVE
BUN BLD-MCNC: 16 MG/DL (ref 7–18)
BUN/CREAT SERPL: 10.5 (ref 10–20)
CALCIUM BLD-MCNC: 10.1 MG/DL (ref 8.5–10.1)
CHLORIDE SERPL-SCNC: 104 MMOL/L (ref 98–112)
CO2 SERPL-SCNC: 25 MMOL/L (ref 21–32)
COLOR UR AUTO: YELLOW
CREAT BLD-MCNC: 1.52 MG/DL
DEPRECATED RDW RBC AUTO: 51.8 FL (ref 35.1–46.3)
EOSINOPHIL # BLD AUTO: 0.11 X10(3) UL (ref 0–0.7)
EOSINOPHIL NFR BLD AUTO: 0.9 %
ERYTHROCYTE [DISTWIDTH] IN BLOOD BY AUTOMATED COUNT: 14.8 % (ref 11–15)
GLOBULIN PLAS-MCNC: 3.7 G/DL (ref 2.8–4.4)
GLUCOSE BLD-MCNC: 94 MG/DL (ref 70–99)
GLUCOSE UR STRIP.AUTO-MCNC: NEGATIVE MG/DL
HCT VFR BLD AUTO: 34.5 %
HGB BLD-MCNC: 10.9 G/DL
IMM GRANULOCYTES # BLD AUTO: 0.03 X10(3) UL (ref 0–1)
IMM GRANULOCYTES NFR BLD: 0.3 %
KETONES UR STRIP.AUTO-MCNC: NEGATIVE MG/DL
LIPASE SERPL-CCNC: 110 U/L (ref 73–393)
LYMPHOCYTES # BLD AUTO: 3.14 X10(3) UL (ref 1–4)
LYMPHOCYTES NFR BLD AUTO: 26.8 %
M PROTEIN MFR SERPL ELPH: 7.7 G/DL (ref 6.4–8.2)
MCH RBC QN AUTO: 30.2 PG (ref 26–34)
MCHC RBC AUTO-ENTMCNC: 31.6 G/DL (ref 31–37)
MCV RBC AUTO: 95.6 FL
MONOCYTES # BLD AUTO: 1.05 X10(3) UL (ref 0.1–1)
MONOCYTES NFR BLD AUTO: 9 %
NEUTROPHILS # BLD AUTO: 7.29 X10 (3) UL (ref 1.5–7.7)
NEUTROPHILS # BLD AUTO: 7.29 X10(3) UL (ref 1.5–7.7)
NEUTROPHILS NFR BLD AUTO: 62.2 %
NITRITE UR QL STRIP.AUTO: NEGATIVE
OSMOLALITY SERPL CALC.SUM OF ELEC: 283 MOSM/KG (ref 275–295)
PH UR STRIP.AUTO: 7 [PH] (ref 5–8)
PLATELET # BLD AUTO: 388 10(3)UL (ref 150–450)
POTASSIUM SERPL-SCNC: 3.3 MMOL/L (ref 3.5–5.1)
PROT UR STRIP.AUTO-MCNC: NEGATIVE MG/DL
RBC # BLD AUTO: 3.61 X10(6)UL
RBC UR QL AUTO: NEGATIVE
SODIUM SERPL-SCNC: 136 MMOL/L (ref 136–145)
SP GR UR STRIP.AUTO: 1.02 (ref 1–1.03)
UROBILINOGEN UR STRIP.AUTO-MCNC: 0.2 MG/DL
WBC # BLD AUTO: 11.7 X10(3) UL (ref 4–11)

## 2021-07-20 PROCEDURE — 99284 EMERGENCY DEPT VISIT MOD MDM: CPT | Performed by: EMERGENCY MEDICINE

## 2021-07-20 PROCEDURE — 87086 URINE CULTURE/COLONY COUNT: CPT

## 2021-07-20 PROCEDURE — 74177 CT ABD & PELVIS W/CONTRAST: CPT | Performed by: PHYSICIAN ASSISTANT

## 2021-07-20 PROCEDURE — 81001 URINALYSIS AUTO W/SCOPE: CPT | Performed by: EMERGENCY MEDICINE

## 2021-07-20 PROCEDURE — 87086 URINE CULTURE/COLONY COUNT: CPT | Performed by: EMERGENCY MEDICINE

## 2021-07-20 PROCEDURE — 74018 RADEX ABDOMEN 1 VIEW: CPT | Performed by: PHYSICIAN ASSISTANT

## 2021-07-20 PROCEDURE — 81001 URINALYSIS AUTO W/SCOPE: CPT

## 2021-07-20 PROCEDURE — 85025 COMPLETE CBC W/AUTO DIFF WBC: CPT | Performed by: PHYSICIAN ASSISTANT

## 2021-07-20 PROCEDURE — 96374 THER/PROPH/DIAG INJ IV PUSH: CPT | Performed by: EMERGENCY MEDICINE

## 2021-07-20 PROCEDURE — 80053 COMPREHEN METABOLIC PANEL: CPT | Performed by: PHYSICIAN ASSISTANT

## 2021-07-20 PROCEDURE — 96376 TX/PRO/DX INJ SAME DRUG ADON: CPT | Performed by: EMERGENCY MEDICINE

## 2021-07-20 PROCEDURE — 83690 ASSAY OF LIPASE: CPT | Performed by: PHYSICIAN ASSISTANT

## 2021-07-20 RX ORDER — HYDROCODONE BITARTRATE AND ACETAMINOPHEN 5; 325 MG/1; MG/1
1 TABLET ORAL EVERY 6 HOURS PRN
Qty: 10 TABLET | Refills: 0 | Status: SHIPPED | OUTPATIENT
Start: 2021-07-20 | End: 2021-07-27

## 2021-07-20 RX ORDER — MORPHINE SULFATE 4 MG/ML
2 INJECTION, SOLUTION INTRAMUSCULAR; INTRAVENOUS ONCE
Status: COMPLETED | OUTPATIENT
Start: 2021-07-20 | End: 2021-07-20

## 2021-07-20 RX ORDER — MORPHINE SULFATE 4 MG/ML
4 INJECTION, SOLUTION INTRAMUSCULAR; INTRAVENOUS ONCE
Status: COMPLETED | OUTPATIENT
Start: 2021-07-20 | End: 2021-07-20

## 2021-07-20 NOTE — ED PROVIDER NOTES
Patient Seen in: THE [facility] Emergency Department In [place]      History   Patient presents with:  Abdomen/Flank Pain    Stated Complaint: lower abd pain onset this morning, no nausea    HPI/Subjective:   HPI    Nikita Roche is a 28-year-old female who presents difficile diarrhea    • Constipation    • Dairy allergy    • Depression    • Diarrhea, unspecified    • Duodenitis determined by biopsy 4/18/2018   • Esophageal reflux    • Fatigue    • Fibromyalgia    • Flatulence/gas pain/belching    • Food intolerance Location: [address]    removed ovaries   • INJECTION, W/WO CONTRAST, DX/THERAPEUTIC SUBSTANCE, EPIDURAL/SUBARACHNOID; LUMBAR/SACRAL  2/26/2013    Procedure: LUMBAR EPIDURAL;  Surgeon: May Kamara MD;  Locat Tobacco Use      Smoking status: Former Smoker        Packs/day: 0.00        Years: 20.00        Pack years: 0        Types: Cigarettes        Quit date: 1997        Years since quittin.5      Smokeless tobacco: Never Used    Vaping Use      Vapin Abnormal; Notable for the following components:       Result Value    Clarity Urine Cloudy (*)     Leukocyte Esterase Urine Trace (*)     All other components within normal limits   URINE MICROSCOPIC W REFLEX CULTURE - Abnormal; Notable for the following c addition to the initial 2 mg. Patient's pain is reduced from an 8/10 to a 4/10. XR ABDOMEN (1 VIEW) (CPT=74018)           CONCLUSION:    No excessive gas or stool in the GI tract. No features indicating bowel obstruction.   Numerous chronic calcifica verbal discharge instructions are given and discussed. Discharge medications are discussed. The patient is in good condition throughout the visit today and remains so upon discharge.   I discuss the plan of care with the patient, who expresses understandin

## 2021-07-20 NOTE — TELEPHONE ENCOUNTER
Dean Lo is calling because she thinks she has a bladder infection, she is in a lot of pain and she was up all night with the pain, she would like to come in or have something sent to her pharmacy, Please call Dean Lo at [phone]

## 2021-07-22 NOTE — PROGRESS NOTES
Make sure she is taking the omeprazole twice a day and avoiding any nonsteroidals including no Excedrin. Renal Consult  Full Note Dictated [id number]  A/P  1. Acute on Chronic Hyponatremia- now at goal.  Previous Uosm elevated, pointing towards underlying increase ADH effect. Stop NSS. Continue Lasix 20mg po bid. Restart NaCl tabs at 1g TID. Discussed risks of alcohol use although she mentioned that she has stopped at this time. 2.  Renal function WNL  3. Relative Hypotension-better with IVF  4. H/O Crohn's Disease s/p colectomy and colostomy  5. Multiple Skin Blisters and Lesions-agree with transfer to facility with Derm support. She is also on Balsalazide. Thank you.

## 2021-07-27 RX ORDER — ONDANSETRON 8 MG/1
TABLET, ORALLY DISINTEGRATING ORAL
Qty: 10 TABLET | Refills: 0 | Status: SHIPPED | OUTPATIENT
Start: 2021-07-27 | End: 2021-10-28 | Stop reason: CLARIF

## 2021-07-27 NOTE — TELEPHONE ENCOUNTER
ondansetron 8 MG Oral Tablet Dispersible 10 tablet 0 6/4/2021    Sig:   Take 1 tablet (8 mg total) by mouth every 6 (six) hours as needed for Nausea.        LOV 6/23/21    Has future OV 8/18/21

## 2021-08-05 ENCOUNTER — TELEPHONE (OUTPATIENT)
Dept: FAMILY MEDICINE CLINIC | Facility: CLINIC | Age: 81
End: 2021-08-05

## 2021-08-05 ENCOUNTER — LAB ENCOUNTER (OUTPATIENT)
Dept: LAB | Facility: HOSPITAL | Age: 81
End: 2021-08-05
Attending: FAMILY MEDICINE
Payer: MEDICARE

## 2021-08-05 ENCOUNTER — HOSPITAL ENCOUNTER (OUTPATIENT)
Dept: ULTRASOUND IMAGING | Facility: HOSPITAL | Age: 81
Discharge: HOME | End: 2021-08-05
Attending: FAMILY MEDICINE
Payer: MEDICARE

## 2021-08-05 ENCOUNTER — HOSPITAL ENCOUNTER (OUTPATIENT)
Dept: ULTRASOUND IMAGING | Facility: HOSPITAL | Age: 81
Discharge: HOME | End: 2021-08-05
Attending: UROLOGY
Payer: MEDICARE

## 2021-08-05 ENCOUNTER — OFFICE VISIT (OUTPATIENT)
Dept: FAMILY MEDICINE CLINIC | Facility: CLINIC | Age: 81
End: 2021-08-05
Payer: MEDICARE

## 2021-08-05 VITALS
DIASTOLIC BLOOD PRESSURE: 70 MMHG | RESPIRATION RATE: 16 BRPM | TEMPERATURE: 98 F | SYSTOLIC BLOOD PRESSURE: 132 MMHG | WEIGHT: 121 LBS | BODY MASS INDEX: 21 KG/M2 | HEART RATE: 78 BPM | OXYGEN SATURATION: 99 %

## 2021-08-05 DIAGNOSIS — N20.0 RENAL CALCULI: ICD-10-CM

## 2021-08-05 DIAGNOSIS — N13.30 HYDRONEPHROSIS OF RIGHT KIDNEY: ICD-10-CM

## 2021-08-05 DIAGNOSIS — I10 ESSENTIAL HYPERTENSION: ICD-10-CM

## 2021-08-05 DIAGNOSIS — R10.31 RIGHT LOWER QUADRANT ABDOMINAL PAIN: ICD-10-CM

## 2021-08-05 DIAGNOSIS — R10.31 RIGHT LOWER QUADRANT ABDOMINAL PAIN: Primary | ICD-10-CM

## 2021-08-05 LAB
APPEARANCE: CLEAR
BASOPHILS # BLD AUTO: 0.09 X10(3) UL (ref 0–0.2)
BASOPHILS NFR BLD AUTO: 0.8 %
BILIRUBIN: NEGATIVE
EOSINOPHIL # BLD AUTO: 0.12 X10(3) UL (ref 0–0.7)
EOSINOPHIL NFR BLD AUTO: 1.1 %
ERYTHROCYTE [DISTWIDTH] IN BLOOD BY AUTOMATED COUNT: 13.9 %
GLUCOSE (URINE DIPSTICK): NEGATIVE MG/DL
HCT VFR BLD AUTO: 32.9 %
HELMET CELLS BLD QL SMEAR: PRESENT
HGB BLD-MCNC: 10.7 G/DL
IMM GRANULOCYTES # BLD AUTO: 0.03 X10(3) UL (ref 0–1)
IMM GRANULOCYTES NFR BLD: 0.3 %
KETONES (URINE DIPSTICK): NEGATIVE MG/DL
LEUKOCYTES: NEGATIVE
LYMPHOCYTES # BLD AUTO: 4.3 X10(3) UL (ref 1–4)
LYMPHOCYTES NFR BLD AUTO: 39.3 %
MCH RBC QN AUTO: 29.8 PG (ref 26–34)
MCHC RBC AUTO-ENTMCNC: 32.5 G/DL (ref 31–37)
MCV RBC AUTO: 91.6 FL
MONOCYTES # BLD AUTO: 1.31 X10(3) UL (ref 0.1–1)
MONOCYTES NFR BLD AUTO: 12 %
MULTISTIX LOT#: NORMAL NUMERIC
NEUTROPHILS # BLD AUTO: 5.09 X10 (3) UL (ref 1.5–7.7)
NEUTROPHILS # BLD AUTO: 5.09 X10(3) UL (ref 1.5–7.7)
NEUTROPHILS NFR BLD AUTO: 46.5 %
NITRITE, URINE: NEGATIVE
OCCULT BLOOD: NEGATIVE
PH, URINE: 7 (ref 4.5–8)
PLATELET # BLD AUTO: 357 10(3)UL (ref 150–450)
PLATELET MORPHOLOGY: NORMAL
PROTEIN (URINE DIPSTICK): NEGATIVE MG/DL
RBC # BLD AUTO: 3.59 X10(6)UL
SPECIFIC GRAVITY: 1.01 (ref 1–1.03)
URINE-COLOR: YELLOW
UROBILINOGEN,SEMI-QN: 0.2 MG/DL (ref 0–1.9)
WBC # BLD AUTO: 10.9 X10(3) UL (ref 4–11)

## 2021-08-05 PROCEDURE — 76775 US EXAM ABDO BACK WALL LIM: CPT | Performed by: UROLOGY

## 2021-08-05 PROCEDURE — 87086 URINE CULTURE/COLONY COUNT: CPT | Performed by: FAMILY MEDICINE

## 2021-08-05 PROCEDURE — 76857 US EXAM PELVIC LIMITED: CPT | Performed by: FAMILY MEDICINE

## 2021-08-05 PROCEDURE — 85025 COMPLETE CBC W/AUTO DIFF WBC: CPT

## 2021-08-05 PROCEDURE — 81003 URINALYSIS AUTO W/O SCOPE: CPT | Performed by: FAMILY MEDICINE

## 2021-08-05 PROCEDURE — 36415 COLL VENOUS BLD VENIPUNCTURE: CPT

## 2021-08-05 PROCEDURE — 99215 OFFICE O/P EST HI 40 MIN: CPT | Performed by: FAMILY MEDICINE

## 2021-08-05 RX ORDER — HYDROCODONE BITARTRATE AND ACETAMINOPHEN 5; 325 MG/1; MG/1
1 TABLET ORAL EVERY 6 HOURS PRN
Qty: 10 TABLET | Refills: 0 | Status: SHIPPED | OUTPATIENT
Start: 2021-08-05 | End: 2021-08-20

## 2021-08-05 NOTE — PATIENT INSTRUCTIONS
You have an appointment for the ultrasound of the appendix at 3 PM today at [facility] 60 must drink 24 ounces of water at least 30 minutes before the test. You also need to have a blood test drawn.  Stay at the hospital until you are notified of the

## 2021-08-07 DIAGNOSIS — G47.09 OTHER INSOMNIA: ICD-10-CM

## 2021-08-09 ENCOUNTER — TELEPHONE (OUTPATIENT)
Dept: FAMILY MEDICINE CLINIC | Facility: CLINIC | Age: 81
End: 2021-08-09

## 2021-08-09 RX ORDER — TRAZODONE HYDROCHLORIDE 100 MG/1
50 TABLET ORAL NIGHTLY PRN
Qty: 30 TABLET | Refills: 0 | Status: SHIPPED | OUTPATIENT
Start: 2021-08-09 | End: 2021-08-18

## 2021-08-09 NOTE — TELEPHONE ENCOUNTER
Agree she needs to contact GI. It is ok to have a BM every 2-3 days, use heating pad. Hyoscyamine can help with cramping but can slow BM down and increase risk of constipation.     Did  make her an appointment with Neuropsychologist yet for chronic

## 2021-08-09 NOTE — TELEPHONE ENCOUNTER
Spoke to patient. She thinks she is constipated. States she is having abdominal cramps because she is constipated although she says she had a good BM yesterday but has not gone today. Pain is directly under umbilicus.   Denies using extra norco.  States s

## 2021-08-09 NOTE — TELEPHONE ENCOUNTER
TRAZODONE  MG Oral Tab 90 tablet 0 5/14/2021     Sig: TAKE 1 TABLET(100 MG) BY MOUTH EVERY NIGHT      LOV with Dr. Eduardo Pedroza  8/5/21  Has future OV 8/18/21

## 2021-08-15 ENCOUNTER — APPOINTMENT (OUTPATIENT)
Dept: CT IMAGING | Age: 81
End: 2021-08-15
Attending: EMERGENCY MEDICINE
Payer: MEDICARE

## 2021-08-15 ENCOUNTER — HOSPITAL ENCOUNTER (EMERGENCY)
Age: 81
Discharge: HOME | End: 2021-08-15
Attending: EMERGENCY MEDICINE
Payer: MEDICARE

## 2021-08-15 VITALS
HEIGHT: 64 IN | WEIGHT: 121 LBS | OXYGEN SATURATION: 99 % | RESPIRATION RATE: 16 BRPM | HEART RATE: 60 BPM | TEMPERATURE: 98 F | SYSTOLIC BLOOD PRESSURE: 156 MMHG | DIASTOLIC BLOOD PRESSURE: 66 MMHG | BODY MASS INDEX: 20.66 KG/M2

## 2021-08-15 DIAGNOSIS — N30.00 ACUTE CYSTITIS WITHOUT HEMATURIA: Primary | ICD-10-CM

## 2021-08-15 LAB
ALBUMIN SERPL-MCNC: 3.9 G/DL (ref 3.4–5)
ALBUMIN/GLOB SERPL: 1.1 {RATIO} (ref 1–2)
ALP LIVER SERPL-CCNC: 87 U/L
ALT SERPL-CCNC: 22 U/L
ANION GAP SERPL CALC-SCNC: 7 MMOL/L (ref 0–18)
AST SERPL-CCNC: 15 U/L (ref 15–37)
BASOPHILS # BLD AUTO: 0.09 X10(3) UL (ref 0–0.2)
BASOPHILS NFR BLD AUTO: 0.7 %
BILIRUB SERPL-MCNC: 0.3 MG/DL (ref 0.1–2)
BILIRUB UR QL STRIP.AUTO: NEGATIVE
BUN BLD-MCNC: 18 MG/DL (ref 7–18)
CALCIUM BLD-MCNC: 9.5 MG/DL (ref 8.5–10.1)
CHLORIDE SERPL-SCNC: 102 MMOL/L (ref 98–112)
CO2 SERPL-SCNC: 26 MMOL/L (ref 21–32)
CREAT BLD-MCNC: 1.21 MG/DL
EOSINOPHIL # BLD AUTO: 0.18 X10(3) UL (ref 0–0.7)
EOSINOPHIL NFR BLD AUTO: 1.5 %
ERYTHROCYTE [DISTWIDTH] IN BLOOD BY AUTOMATED COUNT: 13.9 %
GLOBULIN PLAS-MCNC: 3.5 G/DL (ref 2.8–4.4)
GLUCOSE BLD-MCNC: 98 MG/DL (ref 70–99)
GLUCOSE UR STRIP.AUTO-MCNC: 100 MG/DL
HCT VFR BLD AUTO: 32.6 %
HGB BLD-MCNC: 10.6 G/DL
IMM GRANULOCYTES # BLD AUTO: 0.04 X10(3) UL (ref 0–1)
IMM GRANULOCYTES NFR BLD: 0.3 %
KETONES UR STRIP.AUTO-MCNC: NEGATIVE MG/DL
LEUKOCYTE ESTERASE UR QL STRIP.AUTO: NEGATIVE
LIPASE SERPL-CCNC: 112 U/L (ref 73–393)
LYMPHOCYTES # BLD AUTO: 3.22 X10(3) UL (ref 1–4)
LYMPHOCYTES NFR BLD AUTO: 26 %
M PROTEIN MFR SERPL ELPH: 7.4 G/DL (ref 6.4–8.2)
MCH RBC QN AUTO: 29.6 PG (ref 26–34)
MCHC RBC AUTO-ENTMCNC: 32.5 G/DL (ref 31–37)
MCV RBC AUTO: 91.1 FL
MONOCYTES # BLD AUTO: 1.25 X10(3) UL (ref 0.1–1)
MONOCYTES NFR BLD AUTO: 10.1 %
NEUTROPHILS # BLD AUTO: 7.59 X10 (3) UL (ref 1.5–7.7)
NEUTROPHILS # BLD AUTO: 7.59 X10(3) UL (ref 1.5–7.7)
NEUTROPHILS NFR BLD AUTO: 61.4 %
NITRITE UR QL STRIP.AUTO: POSITIVE
OSMOLALITY SERPL CALC.SUM OF ELEC: 282 MOSM/KG (ref 275–295)
PH UR STRIP.AUTO: 6.5 [PH] (ref 5–8)
PLATELET # BLD AUTO: 357 10(3)UL (ref 150–450)
POTASSIUM SERPL-SCNC: 3.2 MMOL/L (ref 3.5–5.1)
PROT UR STRIP.AUTO-MCNC: NEGATIVE MG/DL
RBC # BLD AUTO: 3.58 X10(6)UL
RBC UR QL AUTO: NEGATIVE
SODIUM SERPL-SCNC: 135 MMOL/L (ref 136–145)
SP GR UR STRIP.AUTO: 1.01 (ref 1–1.03)
UROBILINOGEN UR STRIP.AUTO-MCNC: 0.2 MG/DL
WBC # BLD AUTO: 12.4 X10(3) UL (ref 4–11)

## 2021-08-15 PROCEDURE — 96361 HYDRATE IV INFUSION ADD-ON: CPT

## 2021-08-15 PROCEDURE — 96375 TX/PRO/DX INJ NEW DRUG ADDON: CPT

## 2021-08-15 PROCEDURE — 99284 EMERGENCY DEPT VISIT MOD MDM: CPT

## 2021-08-15 PROCEDURE — 80053 COMPREHEN METABOLIC PANEL: CPT | Performed by: EMERGENCY MEDICINE

## 2021-08-15 PROCEDURE — 85025 COMPLETE CBC W/AUTO DIFF WBC: CPT | Performed by: EMERGENCY MEDICINE

## 2021-08-15 PROCEDURE — 81001 URINALYSIS AUTO W/SCOPE: CPT | Performed by: EMERGENCY MEDICINE

## 2021-08-15 PROCEDURE — 74177 CT ABD & PELVIS W/CONTRAST: CPT | Performed by: EMERGENCY MEDICINE

## 2021-08-15 PROCEDURE — 81015 MICROSCOPIC EXAM OF URINE: CPT | Performed by: EMERGENCY MEDICINE

## 2021-08-15 PROCEDURE — 83690 ASSAY OF LIPASE: CPT | Performed by: EMERGENCY MEDICINE

## 2021-08-15 PROCEDURE — 96374 THER/PROPH/DIAG INJ IV PUSH: CPT

## 2021-08-15 RX ORDER — LEVOFLOXACIN 250 MG/1
250 TABLET ORAL DAILY
Qty: 4 TABLET | Refills: 0 | Status: SHIPPED | OUTPATIENT
Start: 2021-08-16 | End: 2021-08-19

## 2021-08-15 RX ORDER — MORPHINE SULFATE 4 MG/ML
2 INJECTION, SOLUTION INTRAMUSCULAR; INTRAVENOUS ONCE
Status: COMPLETED | OUTPATIENT
Start: 2021-08-15 | End: 2021-08-15

## 2021-08-15 RX ORDER — LEVOFLOXACIN 250 MG/1
250 TABLET ORAL ONCE
Status: COMPLETED | OUTPATIENT
Start: 2021-08-15 | End: 2021-08-15

## 2021-08-15 RX ORDER — VANCOMYCIN HYDROCHLORIDE 125 MG/1
125 CAPSULE ORAL DAILY
Status: DISCONTINUED | OUTPATIENT
Start: 2021-08-15 | End: 2021-08-15

## 2021-08-15 RX ORDER — IBUPROFEN 600 MG/1
600 TABLET ORAL ONCE
Status: DISCONTINUED | OUTPATIENT
Start: 2021-08-15 | End: 2021-08-15

## 2021-08-15 RX ORDER — PHENAZOPYRIDINE HYDROCHLORIDE 200 MG/1
200 TABLET, FILM COATED ORAL 3 TIMES DAILY PRN
Qty: 6 TABLET | Refills: 0 | Status: SHIPPED | OUTPATIENT
Start: 2021-08-15 | End: 2021-08-22

## 2021-08-15 RX ORDER — IBUPROFEN 400 MG/1
400 TABLET ORAL ONCE
Status: COMPLETED | OUTPATIENT
Start: 2021-08-15 | End: 2021-08-15

## 2021-08-15 RX ORDER — VANCOMYCIN HYDROCHLORIDE 125 MG/1
125 CAPSULE ORAL DAILY
Qty: 4 CAPSULE | Refills: 0 | Status: SHIPPED | OUTPATIENT
Start: 2021-08-16 | End: 2021-08-19

## 2021-08-15 RX ORDER — ONDANSETRON 2 MG/ML
4 INJECTION INTRAMUSCULAR; INTRAVENOUS ONCE
Status: COMPLETED | OUTPATIENT
Start: 2021-08-15 | End: 2021-08-15

## 2021-08-15 NOTE — ED INITIAL ASSESSMENT (HPI)
C/o low abd pain started last night. C/o burning on urination. Denies of vomiting and diarrhea. No cough or fever.

## 2021-08-15 NOTE — ED PROVIDER NOTES
Patient Seen in: THE [facility] Department In [place]      History   Patient presents with:  Abdominal Pain    Stated Complaint: lower abd pain since last night. no vomiting/diarrhea.     HPI/Subjective:   HPI    45-year-old female presents to the [facility]   • M-SEDAJ BY HOMERO CROWELL [facility]+ YR  2/26/2013    Procedure: LUMBAR EPIDURAL;  Surgeon: Chirag Paredes MD;  Location: [facility]   • TUNG LOCALIZATION WIRE 1 SITE LEFT (CPT=19281)     • TUNG LOCALIZATION WIRE 1 SITE RIG lower abd pain since last night. no vomiting/diarrhea. Other systems are as noted in HPI. Constitutional and vital signs reviewed. All other systems reviewed and negative except as noted above.     Physical Exam     ED Triage Vitals [08/15/21 1113] WBC 12.4 (*)     RBC 3.58 (*)     HGB 10.6 (*)     HCT 32.6 (*)     Monocyte Absolute 1.25 (*)     All other components within normal limits   LIPASE - Normal   CBC WITH DIFFERENTIAL WITH PLATELET    Narrative:      The following orders were created for pan BILIARY:  Stable. Nondistended gallbladder. No biliary dilatation. PANCREAS:  Stable. Uniform parenchyma. No ductal dilatation. SPLEEN:  Stable. Surgically absent. KIDNEYS:  Stable. Normal anatomic positions. Bilateral nonobstructing renal calculi. by (CST): Qamar Welch MD on 8/15/2021 at 1:50 PM     Finalized by (CST): Qamar Welch MD on 8/15/2021 at 2:03 PM       I personally reviewed the images myself and went over results with patient.     Because of the patient's complaint of urinary symptoms, ant

## 2021-08-18 ENCOUNTER — OFFICE VISIT (OUTPATIENT)
Dept: FAMILY MEDICINE CLINIC | Facility: CLINIC | Age: 81
End: 2021-08-18
Payer: MEDICARE

## 2021-08-18 VITALS
WEIGHT: 121 LBS | HEIGHT: 63 IN | BODY MASS INDEX: 21.44 KG/M2 | SYSTOLIC BLOOD PRESSURE: 128 MMHG | OXYGEN SATURATION: 97 % | HEART RATE: 56 BPM | TEMPERATURE: 99 F | DIASTOLIC BLOOD PRESSURE: 60 MMHG

## 2021-08-18 DIAGNOSIS — I10 ESSENTIAL HYPERTENSION: ICD-10-CM

## 2021-08-18 DIAGNOSIS — Z91.89 AT RISK FOR CONSTIPATION: ICD-10-CM

## 2021-08-18 DIAGNOSIS — E83.52 HYPERCALCEMIA: ICD-10-CM

## 2021-08-18 DIAGNOSIS — F51.04 PSYCHOPHYSIOLOGICAL INSOMNIA: ICD-10-CM

## 2021-08-18 DIAGNOSIS — R41.3 MEMORY DIFFICULTIES: ICD-10-CM

## 2021-08-18 DIAGNOSIS — F41.1 GAD (GENERALIZED ANXIETY DISORDER): ICD-10-CM

## 2021-08-18 DIAGNOSIS — E87.6 HYPOKALEMIA: ICD-10-CM

## 2021-08-18 DIAGNOSIS — Z79.899 MEDICATION MANAGEMENT: ICD-10-CM

## 2021-08-18 DIAGNOSIS — F13.20 BENZODIAZEPINE DEPENDENCE (HCC): ICD-10-CM

## 2021-08-18 DIAGNOSIS — N18.30 CHRONIC RENAL INSUFFICIENCY, STAGE 3 (MODERATE) (HCC): ICD-10-CM

## 2021-08-18 DIAGNOSIS — R30.0 DYSURIA: Primary | ICD-10-CM

## 2021-08-18 DIAGNOSIS — Z87.442 HISTORY OF NEPHROLITHIASIS: ICD-10-CM

## 2021-08-18 DIAGNOSIS — F06.4 ANXIETY DISORDER DUE TO GENERAL MEDICAL CONDITION WITH PANIC ATTACK: ICD-10-CM

## 2021-08-18 DIAGNOSIS — E21.0 PRIMARY HYPERPARATHYROIDISM (HCC): ICD-10-CM

## 2021-08-18 DIAGNOSIS — F41.0 ANXIETY DISORDER DUE TO GENERAL MEDICAL CONDITION WITH PANIC ATTACK: ICD-10-CM

## 2021-08-18 DIAGNOSIS — Z87.11 HISTORY OF PEPTIC ULCER: ICD-10-CM

## 2021-08-18 DIAGNOSIS — R10.2 SUPRAPUBIC PAIN, ACUTE: ICD-10-CM

## 2021-08-18 DIAGNOSIS — Z78.9 POOR HISTORIAN: ICD-10-CM

## 2021-08-18 LAB
BILIRUBIN: NEGATIVE
GLUCOSE (URINE DIPSTICK): NEGATIVE MG/DL
KETONES (URINE DIPSTICK): NEGATIVE MG/DL
LEUKOCYTES: NEGATIVE
MULTISTIX LOT#: NORMAL NUMERIC
NITRITE, URINE: NEGATIVE
OCCULT BLOOD: NEGATIVE
PH, URINE: 7 (ref 4.5–8)
PROTEIN (URINE DIPSTICK): NEGATIVE MG/DL
SPECIFIC GRAVITY: 1.02 (ref 1–1.03)
URINE-COLOR: YELLOW
UROBILINOGEN,SEMI-QN: 0.2 MG/DL (ref 0–1.9)

## 2021-08-18 PROCEDURE — 87086 URINE CULTURE/COLONY COUNT: CPT | Performed by: FAMILY MEDICINE

## 2021-08-18 PROCEDURE — G2212 PROLONG OUTPT/OFFICE VIS: HCPCS | Performed by: FAMILY MEDICINE

## 2021-08-18 PROCEDURE — 99215 OFFICE O/P EST HI 40 MIN: CPT | Performed by: FAMILY MEDICINE

## 2021-08-18 PROCEDURE — 81003 URINALYSIS AUTO W/O SCOPE: CPT | Performed by: FAMILY MEDICINE

## 2021-08-18 RX ORDER — TRAZODONE HYDROCHLORIDE 50 MG/1
50 TABLET ORAL NIGHTLY
Qty: 90 TABLET | Refills: 1 | Status: SHIPPED | OUTPATIENT
Start: 2021-08-18

## 2021-08-18 RX ORDER — FAMOTIDINE 40 MG/1
40 TABLET, FILM COATED ORAL DAILY
Qty: 90 TABLET | Refills: 0 | Status: SHIPPED | OUTPATIENT
Start: 2021-08-18 | End: 2021-10-09

## 2021-08-18 NOTE — PROGRESS NOTES
Stoney  is a 80year old female.   HPI:   Complicated patient due to multiple factors with history of hypertension, hyperlipidemia, hypokalemia, primary hyperparathyroidism, lumbar disc herniation with radiculopathy, insomnia, AMY, benzodiazepine dep to squamous epithelial cells no culture was done. Here today to get the culture done to determine if antibiotics are needed. Reviewed labs and CT abdomen pelvis as below.     AMY severe with panic   has been looking at neuropsych evaluation as my r then leads into vaginal irritation and misconception of urinary tract infection. Patient is only had 1 documented urinary tract infection.   Did see urologist Dr. Yumi Lim had kidney and bladder ultrasound, was started on estrogen topically though she is inco 10.6 (L)   Hematocrit      35.0 - 48.0 % 32.6 (L)   Platelet Count      029.4 - 450.0 10(3)uL 357.0   MCV      80.0 - 100.0 fL 91.1   MCH      26.0 - 34.0 pg 29.6   MCHC      31.0 - 37.0 g/dL 32.5   RDW      % 13.9   Prelim Neutrophil Abs      1.50 - 7.70 Medication Sig Dispense Refill   • sertraline 50 MG Oral Tab Take 1 tablet (50 mg total) by mouth daily. 90 tablet 0   • traZODone 50 MG Oral Tab Take 1 tablet (50 mg total) by mouth nightly.  90 tablet 1   • famoTIDine (PEPCID) 40 MG Oral Tab Take 1 tabl DiphenhydrAMINE HCl 25 MG Oral Tab Take 25 mg by mouth every 6 (six) hours as needed for Itching. • nystatin 757225 units Oral Tab Take 1 tablet by mouth 4 (four) times daily as needed.     2      Past Medical History:   Diagnosis Date   • Abdominal d • Visual impairment     glasses   • Vomiting blood    • Wears glasses       Social History:  Social History    Tobacco Use      Smoking status: Former Smoker        Packs/day: 0.00        Years: 20.00        Pack years: 0        Types: Cigarettes suprapubic area without guarding, rigidity or rebound.    EXTREMITIES: no cyanosis, clubbing or edema  Musculoskeletal: No gross deficit  Neurological: nerves II through XII grossly intact no sensorimotor deficit  Psychological: Mood and affect are normal. (PEPCID) 40 MG Oral Tab 90 tablet 0     Sig: Take 1 tablet (40 mg total) by mouth daily. After the Omeprazole is finished   • sertraline 50 MG Oral Tab 90 tablet 0     Sig: Take 1 tablet (50 mg total) by mouth daily. Imaging & Consults:  None  1.  Dys has been on it for years gets very anxious when she is running low takes 2 daily. 6. Memory difficulties  Neuropsych evaluation  Neurologist evaluation was given prior referral    7. Poor historian  As above    8.  Psychophysiological insomnia  Reduce tr indicates understanding of these issues and agrees to the plan.   The patient is asked to return in 1 month

## 2021-08-19 PROBLEM — R10.2 SUPRAPUBIC PAIN, ACUTE: Status: ACTIVE | Noted: 2021-08-19

## 2021-08-19 PROBLEM — R41.3 MEMORY DIFFICULTIES: Status: ACTIVE | Noted: 2021-08-19

## 2021-08-19 NOTE — PROGRESS NOTES
Ms. Pasha Valerieas -     Your renal ultrasound shows you have kidney stones on each side. There was no reported swelling of the kidneys on the ultrasound.       I saw you went to the ER with lower abdominal pain you had a CT which showed stones in both kidneys and

## 2021-08-20 ENCOUNTER — OFFICE VISIT (OUTPATIENT)
Dept: FAMILY MEDICINE CLINIC | Facility: CLINIC | Age: 81
End: 2021-08-20
Payer: MEDICARE

## 2021-08-20 VITALS
DIASTOLIC BLOOD PRESSURE: 58 MMHG | TEMPERATURE: 99 F | HEIGHT: 63 IN | HEART RATE: 64 BPM | SYSTOLIC BLOOD PRESSURE: 110 MMHG | WEIGHT: 121 LBS | BODY MASS INDEX: 21.44 KG/M2

## 2021-08-20 DIAGNOSIS — R41.3 POOR SHORT TERM MEMORY: ICD-10-CM

## 2021-08-20 DIAGNOSIS — N76.0 VAGINITIS AND VULVOVAGINITIS: Primary | ICD-10-CM

## 2021-08-20 DIAGNOSIS — M51.16 LUMBAR DISC HERNIATION WITH RADICULOPATHY: ICD-10-CM

## 2021-08-20 DIAGNOSIS — R30.0 DYSURIA: ICD-10-CM

## 2021-08-20 DIAGNOSIS — R10.31 RIGHT LOWER QUADRANT ABDOMINAL PAIN: ICD-10-CM

## 2021-08-20 DIAGNOSIS — F45.41 PAIN AGGRAVATED BY ANXIETY: ICD-10-CM

## 2021-08-20 DIAGNOSIS — F41.9 PAIN AGGRAVATED BY ANXIETY: ICD-10-CM

## 2021-08-20 DIAGNOSIS — R10.2 SUPRAPUBIC PAIN, ACUTE: ICD-10-CM

## 2021-08-20 LAB
APPEARANCE: CLEAR
BILIRUBIN: NEGATIVE
GLUCOSE (URINE DIPSTICK): 100 MG/DL
KETONES (URINE DIPSTICK): NEGATIVE MG/DL
LEUKOCYTES: NEGATIVE
MULTISTIX LOT#: ABNORMAL NUMERIC
NITRITE, URINE: POSITIVE
OCCULT BLOOD: NEGATIVE
PH, URINE: 7 (ref 4.5–8)
PROTEIN (URINE DIPSTICK): 30 MG/DL
SPECIFIC GRAVITY: 1.02 (ref 1–1.03)
UROBILINOGEN,SEMI-QN: 1 MG/DL (ref 0–1.9)

## 2021-08-20 PROCEDURE — 87480 CANDIDA DNA DIR PROBE: CPT | Performed by: FAMILY MEDICINE

## 2021-08-20 PROCEDURE — 87660 TRICHOMONAS VAGIN DIR PROBE: CPT | Performed by: FAMILY MEDICINE

## 2021-08-20 PROCEDURE — 81003 URINALYSIS AUTO W/O SCOPE: CPT | Performed by: FAMILY MEDICINE

## 2021-08-20 PROCEDURE — 87086 URINE CULTURE/COLONY COUNT: CPT | Performed by: FAMILY MEDICINE

## 2021-08-20 PROCEDURE — 99214 OFFICE O/P EST MOD 30 MIN: CPT | Performed by: FAMILY MEDICINE

## 2021-08-20 PROCEDURE — 87510 GARDNER VAG DNA DIR PROBE: CPT | Performed by: FAMILY MEDICINE

## 2021-08-20 RX ORDER — NYSTATIN 100000 U/G
CREAM TOPICAL
Qty: 30 G | Refills: 0 | Status: SHIPPED | OUTPATIENT
Start: 2021-08-20 | End: 2021-10-09

## 2021-08-20 RX ORDER — HYDROCODONE BITARTRATE AND ACETAMINOPHEN 5; 325 MG/1; MG/1
1 TABLET ORAL EVERY 6 HOURS PRN
Qty: 10 TABLET | Refills: 0 | Status: SHIPPED | OUTPATIENT
Start: 2021-08-20 | End: 2021-10-09

## 2021-08-20 NOTE — PROGRESS NOTES
Lolis Pablo is a 80year old female. HPI:   Patient is following up was seen on Wednesday had negative urine culture was told no antibiotics unless urine culture is positive.   She has been taking over-the-counter Azo and has been also using vaginal cre 5.0 - 8.0    Protein Urine 30  (A) Negative - Trace mg/dL    Urobilinogen Urine 1.0 0.2 - 1.0 mg/dL    Nitrite Urine Positive (A) Negative    Leukocyte Esterase Urine Negative Negative    APPEARANCE Clear Clear    Color Urine Orange Yellow    Multistix Lot MOUTH EVERY NIGHT 90 tablet 0   • clonazePAM 0.5 MG Oral Tab Take 1 tablet (0.5 mg total) by mouth 2 (two) times daily as needed.  60 tablet 2   • HYDROcodone-acetaminophen (NORCO) 5-325 MG Oral Tab Take 0.5-1 tablets by mouth every 8 (eight) hours as neede laxatives    • Headache disorder    • Hearing impairment     bilateral hearing aids   • Hearing loss    • Heart murmur    • Heart palpitations    • Hemorrhoids    • High blood pressure    • High cholesterol    • History of blood transfusion    • History of Temp 98.8 °F (37.1 °C) (Oral)   Ht 5' 3\" (1.6 m)   Wt 121 lb (54.9 kg)   BMI 21.43 kg/m²   GENERAL: well developed, well nourished, is accompanied with her  who does most of the answering of questions due to her poor memory and severe anxiety she c acute  Lumbar disc herniation with radiculopathy  Poor short term memory    Orders Placed This Encounter      Urine Dip, auto without Micro      Urine Culture, Routine [E]      Vaginitis/Vaginosis, Dna Probe      Meds & Refills for this Visit:  Requested P Suprapubic pain, acute  Follow-up with urology she is communicating via [address] would recommend a follow-up office visit as soon as possible unknown etiology for continued suprapubic pain.   CT scan done 8/15/2021 in the ER showed chronic nephrolithiasis no

## 2021-08-21 PROBLEM — F45.41 PAIN AGGRAVATED BY ANXIETY: Status: ACTIVE | Noted: 2021-08-21

## 2021-08-21 PROBLEM — N76.0 VAGINITIS AND VULVOVAGINITIS: Status: ACTIVE | Noted: 2021-08-21

## 2021-08-21 PROBLEM — F41.9 PAIN AGGRAVATED BY ANXIETY: Status: ACTIVE | Noted: 2021-08-21

## 2021-08-22 NOTE — PROGRESS NOTES
No bacterial infection no urinary tract infection. Follow-up with urology to discuss ongoing symptoms.

## 2021-08-26 RX ORDER — LIDOCAINE 50 MG/G
PATCH TOPICAL
Qty: 60 PATCH | Refills: 0 | Status: SHIPPED | OUTPATIENT
Start: 2021-08-26 | End: 2021-10-28 | Stop reason: CLARIF

## 2021-08-26 NOTE — TELEPHONE ENCOUNTER
Shagufta Shukla is calling to see if she can get some pain patches sent to her pharmacy, she woke up with leg and back pain, please call Shagufta Shukla at [phone]

## 2021-09-13 DIAGNOSIS — I10 ESSENTIAL HYPERTENSION: ICD-10-CM

## 2021-09-13 RX ORDER — LOSARTAN POTASSIUM 100 MG/1
TABLET ORAL
Qty: 90 TABLET | Refills: 0 | Status: SHIPPED | OUTPATIENT
Start: 2021-09-13 | End: 2021-10-28 | Stop reason: CLARIF

## 2021-09-13 NOTE — TELEPHONE ENCOUNTER
Pt requesting refill of LOSARTAN     Passed protocol, refill approved, sent to pharmacy. Last Office Visit with Provider: 8/20/21  No future appointments.

## 2021-09-17 DIAGNOSIS — F41.1 GAD (GENERALIZED ANXIETY DISORDER): ICD-10-CM

## 2021-09-17 DIAGNOSIS — F43.0 ACUTE STRESS REACTION: ICD-10-CM

## 2021-09-20 ENCOUNTER — TELEPHONE (OUTPATIENT)
Dept: FAMILY MEDICINE CLINIC | Facility: CLINIC | Age: 81
End: 2021-09-20

## 2021-09-20 RX ORDER — CLONAZEPAM 0.5 MG/1
TABLET ORAL
Qty: 60 TABLET | Refills: 0 | Status: SHIPPED | OUTPATIENT
Start: 2021-09-20 | End: 2021-10-15

## 2021-09-20 NOTE — TELEPHONE ENCOUNTER
Dino Smith is calling back to see if Jasson Feeling called in her prescription she is out and she said she is going through withdrawls.  Please call hui at [phone]

## 2021-09-20 NOTE — TELEPHONE ENCOUNTER
Sonal Grove is calling to get a refill on her clonazePAM 0.5 MG Oral Tab sent to her [address] on [facility], Please call Sonal Grove at [phone] with any questions.

## 2021-09-20 NOTE — TELEPHONE ENCOUNTER
clonazePAM 0.5 MG Oral Tab 60 tablet 2 6/23/2021    Sig:   Take 1 tablet (0.5 mg total) by mouth 2 (two) times daily as needed.        LOV 8/20/21    No future OV

## 2021-10-07 ENCOUNTER — TELEPHONE (OUTPATIENT)
Dept: FAMILY MEDICINE CLINIC | Facility: CLINIC | Age: 81
End: 2021-10-07

## 2021-10-07 ENCOUNTER — LAB ENCOUNTER (OUTPATIENT)
Dept: LAB | Age: 81
End: 2021-10-07
Attending: INTERNAL MEDICINE
Payer: MEDICARE

## 2021-10-07 DIAGNOSIS — D50.9 IRON DEFICIENCY ANEMIA, UNSPECIFIED IRON DEFICIENCY ANEMIA TYPE: ICD-10-CM

## 2021-10-07 PROCEDURE — 85025 COMPLETE CBC W/AUTO DIFF WBC: CPT

## 2021-10-07 PROCEDURE — 36415 COLL VENOUS BLD VENIPUNCTURE: CPT

## 2021-10-09 ENCOUNTER — LAB ENCOUNTER (OUTPATIENT)
Dept: LAB | Age: 81
End: 2021-10-09
Attending: INTERNAL MEDICINE
Payer: MEDICARE

## 2021-10-09 ENCOUNTER — OFFICE VISIT (OUTPATIENT)
Dept: FAMILY MEDICINE CLINIC | Facility: CLINIC | Age: 81
End: 2021-10-09
Payer: MEDICARE

## 2021-10-09 VITALS
OXYGEN SATURATION: 98 % | HEART RATE: 78 BPM | DIASTOLIC BLOOD PRESSURE: 60 MMHG | WEIGHT: 116 LBS | SYSTOLIC BLOOD PRESSURE: 130 MMHG | HEIGHT: 64 IN | BODY MASS INDEX: 19.81 KG/M2

## 2021-10-09 DIAGNOSIS — I10 ESSENTIAL HYPERTENSION: ICD-10-CM

## 2021-10-09 DIAGNOSIS — D64.9 CHRONIC ANEMIA: ICD-10-CM

## 2021-10-09 DIAGNOSIS — R41.3 MEMORY DIFFICULTIES: ICD-10-CM

## 2021-10-09 DIAGNOSIS — N18.30 CHRONIC RENAL INSUFFICIENCY, STAGE 3 (MODERATE) (HCC): ICD-10-CM

## 2021-10-09 DIAGNOSIS — K25.9 GASTRIC ULCER, UNSPECIFIED CHRONICITY, UNSPECIFIED WHETHER GASTRIC ULCER HEMORRHAGE OR PERFORATION PRESENT: ICD-10-CM

## 2021-10-09 DIAGNOSIS — Z01.818 PRE-OP TESTING: ICD-10-CM

## 2021-10-09 DIAGNOSIS — Z01.818 PREOP EXAMINATION: Primary | ICD-10-CM

## 2021-10-09 DIAGNOSIS — I25.10 CORONARY ARTERY DISEASE INVOLVING NATIVE CORONARY ARTERY OF NATIVE HEART WITHOUT ANGINA PECTORIS: ICD-10-CM

## 2021-10-09 PROBLEM — K59.04 CHRONIC IDIOPATHIC CONSTIPATION: Status: ACTIVE | Noted: 2021-10-09

## 2021-10-09 PROBLEM — K25.0 ACUTE GASTRIC ULCER WITH HEMORRHAGE: Status: ACTIVE | Noted: 2021-06-01

## 2021-10-09 PROCEDURE — 99215 OFFICE O/P EST HI 40 MIN: CPT | Performed by: FAMILY MEDICINE

## 2021-10-09 NOTE — PATIENT INSTRUCTIONS
Go for your COVID test today. Try Enteric coated Peppermint oil capsules to see if it helps with the abdominal discomfort. (You can purchase on [address]). Talk with the GI doctor about the [address].     Go to the ER if you develop chest pain, increased d

## 2021-10-09 NOTE — PROGRESS NOTES
Marco Brambila is a 80year old female who presents for a pre-operative physical exam. Patient is to have EGD, to be done by Dr. Dolores Cedillo at [facility] on 10/12/2021.     Prior anesthesia: yes-no complications  PMH negative for angina, arrhythmia, CHF, l medications. Currently the patient states her last bowel movement was this morning and was normal color. There was no blood. She is not having any fever, chills, nausea or vomiting.   She had been given a prescription for Linzess which the  states traZODone 50 MG Oral Tab Take 1 tablet (50 mg total) by mouth nightly.  90 tablet 1   • ONDANSETRON 8 MG Oral Tablet Dispersible DISSOLVE 1 TABLET(8 MG) ON THE TONGUE EVERY 6 HOURS AS NEEDED FOR NAUSEA 10 tablet 0   • AMLODIPINE BESYLATE 10 MG Oral Tab TAKE unspecified    • Duodenitis determined by biopsy 4/18/2018   • Esophageal reflux    • Fatigue    • Fibromyalgia    • Flatulence/gas pain/belching    • Food intolerance    • Frequent use of laxatives    • Headache disorder    • Hearing impairment     bilate W/WO CONTRAST, DX/THERAPEUTIC SUBSTANCE, EPIDURAL/SUBARACHNOID; LUMBAR/SACRAL  2/26/2013    Procedure: LUMBAR EPIDURAL;  Surgeon: Rico Navarro MD;  Location: [facility]   • INJECTION, W/WO CONTRAST, DX/THERAPEUTIC SUBSTANCE, EPIDUR Hypertension Daughter    • Cancer Sister         lung with mets to the lymph nodes   • Lipids Sister    • Hypertension Sister    • Lipids Brother    • Heart Disorder Brother         rheumatic heart disease   • Cancer Sister         liver cancer   • Bipolar noted  LUNGS: clear to auscultation  CARDIO: RRR without murmur, without S3 or S4  GI: good BS's,no masses, HSM or tenderness, no guarding, rigidity or rebound  : deferred  MUSCULOSKELETAL: back is not tender  EXTREMITIES: no cyanosis, clubbing or edema 2021.    7. Memory difficulties    Patient's memory difficulties are essentially unchanged.     Other orders  - INFLUENZA REFUSED Cookie Mendez is a 80year old female who presents for a pre-operative physical exam. Patient is to have EGD, to be d

## 2021-10-12 PROBLEM — K29.01 ACUTE GASTRITIS WITH BLEEDING: Status: ACTIVE | Noted: 2021-10-12

## 2021-10-12 PROBLEM — Z87.11 HISTORY OF PEPTIC ULCER DISEASE: Status: ACTIVE | Noted: 2021-10-12

## 2021-10-13 DIAGNOSIS — G47.09 OTHER INSOMNIA: ICD-10-CM

## 2021-10-13 RX ORDER — TRAZODONE HYDROCHLORIDE 100 MG/1
TABLET ORAL
Qty: 30 TABLET | Refills: 0 | OUTPATIENT
Start: 2021-10-13

## 2021-10-13 RX ORDER — PRAVASTATIN SODIUM 40 MG
TABLET ORAL
Qty: 90 TABLET | Refills: 0 | Status: SHIPPED | OUTPATIENT
Start: 2021-10-13 | End: 2021-10-28 | Stop reason: CLARIF

## 2021-10-13 NOTE — TELEPHONE ENCOUNTER
Requested Prescriptions     Signed Prescriptions Disp Refills   • PRAVASTATIN 40 MG Oral Tab 90 tablet 0     Sig: TAKE 1 TABLET(40 MG) BY MOUTH EVERY NIGHT     Authorizing Provider: Stephan Ortiz     Ordering User: Karime Sanchez     Refused Prescriptio

## 2021-10-14 DIAGNOSIS — F41.1 GAD (GENERALIZED ANXIETY DISORDER): ICD-10-CM

## 2021-10-14 DIAGNOSIS — F43.0 ACUTE STRESS REACTION: ICD-10-CM

## 2021-10-15 ENCOUNTER — TELEPHONE (OUTPATIENT)
Dept: FAMILY MEDICINE CLINIC | Facility: CLINIC | Age: 81
End: 2021-10-15

## 2021-10-15 RX ORDER — CLONAZEPAM 0.5 MG/1
TABLET ORAL
Qty: 60 TABLET | Refills: 0 | Status: SHIPPED | OUTPATIENT
Start: 2021-10-15 | End: 2021-10-28 | Stop reason: CLARIF

## 2021-10-15 NOTE — TELEPHONE ENCOUNTER
Patient requesting for medication refill(s) for Clonazepam to be sent to [address] [place] [address]. I advised patient to allow up to 24 to 48 hours for a call back from a nurse.

## 2021-10-15 NOTE — TELEPHONE ENCOUNTER
Requested Prescriptions     Pending Prescriptions Disp Refills   • CLONAZEPAM 0.5 MG Oral Tab [Pharmacy Med Name: CLONAZEPAM 0.5MG TABLETS] 60 tablet 0     Sig: TAKE 1 TABLET(0.5 MG) BY MOUTH TWICE DAILY AS NEEDED     Last fill was 9/20/21 60 tabs no refil

## 2021-10-21 ENCOUNTER — TELEPHONE (OUTPATIENT)
Dept: FAMILY MEDICINE CLINIC | Facility: CLINIC | Age: 81
End: 2021-10-21

## 2021-10-21 NOTE — TELEPHONE ENCOUNTER
Pt states she has horrible stomach pains and diarrhea. She needs something called into her pharmacy which is [facility].

## 2021-10-21 NOTE — TELEPHONE ENCOUNTER
Patient states she has diarrhea and \"horrible\" abd cramps. Began today. Denies fever. Denies vomiting. Advised to notify GI. She VU and is in agreement.

## 2021-10-22 ENCOUNTER — TELEPHONE (OUTPATIENT)
Dept: FAMILY MEDICINE CLINIC | Facility: CLINIC | Age: 81
End: 2021-10-22

## 2021-10-22 NOTE — TELEPHONE ENCOUNTER
Justina Tellez is calling because she woke up this morning and she is trembling and she does not know why, Please call Justina Tellez at [phone]

## 2021-10-22 NOTE — TELEPHONE ENCOUNTER
Spoke to patient. States she woke trembling today. States she does not have a fever. States she is not nervous about anything. Has no other symptoms right now. Feels better from yesterday. States she might just be cold.  Advised to lay down and try to g

## 2021-10-26 ENCOUNTER — TELEPHONE (OUTPATIENT)
Dept: FAMILY MEDICINE CLINIC | Facility: CLINIC | Age: 81
End: 2021-10-26

## 2021-10-26 NOTE — TELEPHONE ENCOUNTER
Advised to call Dr. Russell Rico office regarding the cramping. States she is having BM's. Says she switched to Miralax. She has taken one hyoscymamine today.

## 2021-10-26 NOTE — TELEPHONE ENCOUNTER
Pt states she is constipated and has horrible stomach pains and would like something called into her pharmacy. [facility].

## 2021-10-27 ENCOUNTER — HOSPITAL ENCOUNTER (OUTPATIENT)
Age: 81
Discharge: ANOTHER HOSPITAL | DRG: 372 | End: 2021-10-27
Attending: EMERGENCY MEDICINE
Payer: MEDICARE

## 2021-10-27 ENCOUNTER — HOSPITAL ENCOUNTER (EMERGENCY)
Facility: HOSPITAL | Age: 81
Discharge: LEFT BEFORE DISCHARGE | End: 2021-10-27
Payer: COMMERCIAL

## 2021-10-27 ENCOUNTER — APPOINTMENT (OUTPATIENT)
Dept: CT IMAGING | Age: 81
DRG: 372 | End: 2021-10-27
Attending: NURSE PRACTITIONER
Payer: MEDICARE

## 2021-10-27 VITALS
TEMPERATURE: 98 F | BODY MASS INDEX: 19.97 KG/M2 | HEIGHT: 64 IN | HEART RATE: 56 BPM | RESPIRATION RATE: 16 BRPM | OXYGEN SATURATION: 98 % | DIASTOLIC BLOOD PRESSURE: 69 MMHG | SYSTOLIC BLOOD PRESSURE: 148 MMHG | WEIGHT: 117 LBS

## 2021-10-27 DIAGNOSIS — R10.9 INTRACTABLE ABDOMINAL PAIN: Primary | ICD-10-CM

## 2021-10-27 PROCEDURE — 74177 CT ABD & PELVIS W/CONTRAST: CPT | Performed by: NURSE PRACTITIONER

## 2021-10-27 PROCEDURE — 81002 URINALYSIS NONAUTO W/O SCOPE: CPT | Performed by: NURSE PRACTITIONER

## 2021-10-27 PROCEDURE — 96360 HYDRATION IV INFUSION INIT: CPT

## 2021-10-27 PROCEDURE — 80047 BASIC METABLC PNL IONIZED CA: CPT

## 2021-10-27 PROCEDURE — 85025 COMPLETE CBC W/AUTO DIFF WBC: CPT | Performed by: NURSE PRACTITIONER

## 2021-10-27 PROCEDURE — 99214 OFFICE O/P EST MOD 30 MIN: CPT

## 2021-10-27 RX ORDER — SODIUM CHLORIDE 9 MG/ML
1000 INJECTION, SOLUTION INTRAVENOUS ONCE
Status: COMPLETED | OUTPATIENT
Start: 2021-10-27 | End: 2021-10-27

## 2021-10-27 RX ORDER — SODIUM CHLORIDE 9 MG/ML
1000 INJECTION, SOLUTION INTRAVENOUS ONCE
Status: DISCONTINUED | OUTPATIENT
Start: 2021-10-27 | End: 2021-10-27

## 2021-10-27 NOTE — ED NOTES
I reviewed that chart and discussed the case. I have examined the patient and noted patient is a 79-year-old female presents to the immediate care with history of lower abdominal pain and groin pain which has been ongoing for the last 3 or 4 days.   The pa descending and proximal sigmoid colon may be due to infectious or inflammatory etiology. Ischemia cannot be excluded. No pneumatosis or portal venous gas is noted. 2. Chronic mild dilatation the right renal collecting system in ureter is stable.   Multip
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
## 2021-10-27 NOTE — ED INITIAL ASSESSMENT (HPI)
Pt with lower abd and groin pain x 3-4 days; denies fever/vomiting/diarrhea/dysuria/hematuria/injury/nausea    Last BM this morning large/soft

## 2021-10-27 NOTE — ED QUICK NOTES
Patient states she needs to go home and take her clonazepam , will come back to ED tomorrow to be seen.

## 2021-10-27 NOTE — ED QUICK NOTES
Pt instructed to go directly to [facility] ed and stay npo - pt verbalized understanding.  will drive pt.

## 2021-10-28 ENCOUNTER — APPOINTMENT (OUTPATIENT)
Dept: CT IMAGING | Facility: HOSPITAL | Age: 81
DRG: 372 | End: 2021-10-28
Attending: EMERGENCY MEDICINE
Payer: MEDICARE

## 2021-10-28 ENCOUNTER — HOSPITAL ENCOUNTER (INPATIENT)
Facility: HOSPITAL | Age: 81
LOS: 1 days | Discharge: HOME | DRG: 372 | End: 2021-10-29
Attending: EMERGENCY MEDICINE | Admitting: HOSPITALIST
Payer: MEDICARE

## 2021-10-28 DIAGNOSIS — N28.9 RENAL INSUFFICIENCY: Primary | ICD-10-CM

## 2021-10-28 DIAGNOSIS — R93.3 IMAGING OF GASTROINTESTINAL TRACT ABNORMAL: ICD-10-CM

## 2021-10-28 DIAGNOSIS — A04.72 CLOSTRIDIUM DIFFICILE COLITIS: ICD-10-CM

## 2021-10-28 PROBLEM — E87.20 METABOLIC ACIDOSIS: Status: ACTIVE | Noted: 2021-10-28

## 2021-10-28 PROBLEM — N17.9 ACUTE KIDNEY INJURY (HCC): Status: ACTIVE | Noted: 2021-10-28

## 2021-10-28 PROBLEM — K55.9 ISCHEMIC COLITIS (HCC): Status: ACTIVE | Noted: 2021-10-28

## 2021-10-28 PROBLEM — N17.9 ACUTE KIDNEY INJURY: Status: ACTIVE | Noted: 2021-10-28

## 2021-10-28 PROBLEM — E87.2 METABOLIC ACIDOSIS: Status: ACTIVE | Noted: 2021-10-28

## 2021-10-28 PROCEDURE — 74174 CTA ABD&PLVS W/CONTRAST: CPT | Performed by: EMERGENCY MEDICINE

## 2021-10-28 PROCEDURE — 99223 1ST HOSP IP/OBS HIGH 75: CPT | Performed by: HOSPITALIST

## 2021-10-28 RX ORDER — POLYETHYLENE GLYCOL 3350 17 G/17G
17 POWDER, FOR SOLUTION ORAL DAILY PRN
Status: DISCONTINUED | OUTPATIENT
Start: 2021-10-28 | End: 2021-10-29

## 2021-10-28 RX ORDER — VANCOMYCIN HYDROCHLORIDE 125 MG/1
125 CAPSULE ORAL EVERY 6 HOURS
Status: DISCONTINUED | OUTPATIENT
Start: 2021-10-29 | End: 2021-10-29

## 2021-10-28 RX ORDER — MORPHINE SULFATE 4 MG/ML
4 INJECTION, SOLUTION INTRAMUSCULAR; INTRAVENOUS ONCE
Status: COMPLETED | OUTPATIENT
Start: 2021-10-28 | End: 2021-10-28

## 2021-10-28 RX ORDER — MELATONIN
3 NIGHTLY PRN
Status: DISCONTINUED | OUTPATIENT
Start: 2021-10-28 | End: 2021-10-29

## 2021-10-28 RX ORDER — ACETAMINOPHEN 325 MG/1
650 TABLET ORAL EVERY 4 HOURS PRN
Status: DISCONTINUED | OUTPATIENT
Start: 2021-10-28 | End: 2021-10-29

## 2021-10-28 RX ORDER — ACETAMINOPHEN 325 MG/1
650 TABLET ORAL EVERY 6 HOURS PRN
Status: DISCONTINUED | OUTPATIENT
Start: 2021-10-28 | End: 2021-10-29

## 2021-10-28 RX ORDER — LOSARTAN POTASSIUM 100 MG/1
100 TABLET ORAL DAILY
COMMUNITY
End: 2021-12-13

## 2021-10-28 RX ORDER — BISACODYL 10 MG
10 SUPPOSITORY, RECTAL RECTAL
Status: DISCONTINUED | OUTPATIENT
Start: 2021-10-28 | End: 2021-10-29

## 2021-10-28 RX ORDER — PRAVASTATIN SODIUM 40 MG
40 TABLET ORAL NIGHTLY
COMMUNITY
End: 2022-01-11

## 2021-10-28 RX ORDER — MONTELUKAST SODIUM 10 MG/1
10 TABLET ORAL NIGHTLY
COMMUNITY

## 2021-10-28 RX ORDER — ONDANSETRON 2 MG/ML
8 INJECTION INTRAMUSCULAR; INTRAVENOUS EVERY 6 HOURS PRN
Status: DISCONTINUED | OUTPATIENT
Start: 2021-10-28 | End: 2021-10-29

## 2021-10-28 RX ORDER — HYDROMORPHONE HYDROCHLORIDE 1 MG/ML
0.8 INJECTION, SOLUTION INTRAMUSCULAR; INTRAVENOUS; SUBCUTANEOUS EVERY 2 HOUR PRN
Status: DISCONTINUED | OUTPATIENT
Start: 2021-10-28 | End: 2021-10-29

## 2021-10-28 RX ORDER — HYOSCYAMINE SULFATE 0.125 MG
0.12 TABLET,DISINTEGRATING ORAL EVERY 6 HOURS
Status: DISCONTINUED | OUTPATIENT
Start: 2021-10-28 | End: 2021-10-28

## 2021-10-28 RX ORDER — SODIUM CHLORIDE 9 MG/ML
1000 INJECTION, SOLUTION INTRAVENOUS ONCE
Status: COMPLETED | OUTPATIENT
Start: 2021-10-28 | End: 2021-10-28

## 2021-10-28 RX ORDER — LINACLOTIDE 72 UG/1
72 CAPSULE, GELATIN COATED ORAL DAILY
COMMUNITY
Start: 2021-10-06 | End: 2021-10-28 | Stop reason: CLARIF

## 2021-10-28 RX ORDER — SODIUM CHLORIDE 9 MG/ML
INJECTION, SOLUTION INTRAVENOUS CONTINUOUS
Status: ACTIVE | OUTPATIENT
Start: 2021-10-28 | End: 2021-10-28

## 2021-10-28 RX ORDER — ONDANSETRON 2 MG/ML
4 INJECTION INTRAMUSCULAR; INTRAVENOUS ONCE
Status: COMPLETED | OUTPATIENT
Start: 2021-10-28 | End: 2021-10-28

## 2021-10-28 RX ORDER — HYDROMORPHONE HYDROCHLORIDE 1 MG/ML
0.4 INJECTION, SOLUTION INTRAMUSCULAR; INTRAVENOUS; SUBCUTANEOUS EVERY 2 HOUR PRN
Status: DISCONTINUED | OUTPATIENT
Start: 2021-10-28 | End: 2021-10-29

## 2021-10-28 RX ORDER — LOSARTAN POTASSIUM 100 MG/1
100 TABLET ORAL DAILY
Status: DISCONTINUED | OUTPATIENT
Start: 2021-10-29 | End: 2021-10-29

## 2021-10-28 RX ORDER — LIDOCAINE 50 MG/G
2 PATCH TOPICAL DAILY PRN
COMMUNITY
Start: 2021-08-26

## 2021-10-28 RX ORDER — SODIUM CHLORIDE, SODIUM LACTATE, POTASSIUM CHLORIDE, CALCIUM CHLORIDE 600; 310; 30; 20 MG/100ML; MG/100ML; MG/100ML; MG/100ML
INJECTION, SOLUTION INTRAVENOUS CONTINUOUS
Status: DISCONTINUED | OUTPATIENT
Start: 2021-10-28 | End: 2021-10-29

## 2021-10-28 RX ORDER — HYDROCODONE BITARTRATE AND ACETAMINOPHEN 5; 325 MG/1; MG/1
1 TABLET ORAL EVERY 4 HOURS PRN
Status: DISCONTINUED | OUTPATIENT
Start: 2021-10-28 | End: 2021-10-29

## 2021-10-28 RX ORDER — HYDROMORPHONE HYDROCHLORIDE 1 MG/ML
0.5 INJECTION, SOLUTION INTRAMUSCULAR; INTRAVENOUS; SUBCUTANEOUS ONCE
Status: COMPLETED | OUTPATIENT
Start: 2021-10-28 | End: 2021-10-28

## 2021-10-28 RX ORDER — ONDANSETRON 8 MG/1
8 TABLET, ORALLY DISINTEGRATING ORAL EVERY 6 HOURS PRN
COMMUNITY
Start: 2021-10-15 | End: 2022-01-24

## 2021-10-28 RX ORDER — MORPHINE SULFATE 4 MG/ML
4 INJECTION, SOLUTION INTRAMUSCULAR; INTRAVENOUS EVERY 30 MIN PRN
Status: ACTIVE | OUTPATIENT
Start: 2021-10-28 | End: 2021-10-28

## 2021-10-28 RX ORDER — MONTELUKAST SODIUM 10 MG/1
10 TABLET ORAL NIGHTLY
Status: DISCONTINUED | OUTPATIENT
Start: 2021-10-28 | End: 2021-10-29

## 2021-10-28 RX ORDER — AMLODIPINE BESYLATE 10 MG/1
10 TABLET ORAL DAILY
COMMUNITY
End: 2022-01-11

## 2021-10-28 RX ORDER — CLONAZEPAM 0.5 MG/1
0.5 TABLET ORAL 2 TIMES DAILY PRN
COMMUNITY
Start: 2021-10-15 | End: 2021-11-05

## 2021-10-28 RX ORDER — VANCOMYCIN HYDROCHLORIDE 125 MG/1
125 CAPSULE ORAL EVERY 6 HOURS
Status: DISCONTINUED | OUTPATIENT
Start: 2021-10-28 | End: 2021-10-28

## 2021-10-28 RX ORDER — PRAVASTATIN SODIUM 40 MG
40 TABLET ORAL NIGHTLY
COMMUNITY
Start: 2021-10-13 | End: 2021-10-28 | Stop reason: CLARIF

## 2021-10-28 RX ORDER — HEPARIN SODIUM 5000 [USP'U]/ML
5000 INJECTION, SOLUTION INTRAVENOUS; SUBCUTANEOUS EVERY 12 HOURS SCHEDULED
Status: DISCONTINUED | OUTPATIENT
Start: 2021-10-28 | End: 2021-10-29

## 2021-10-28 RX ORDER — CLONAZEPAM 0.5 MG/1
0.5 TABLET ORAL 2 TIMES DAILY PRN
Status: DISCONTINUED | OUTPATIENT
Start: 2021-10-28 | End: 2021-10-29

## 2021-10-28 RX ORDER — TRAZODONE HYDROCHLORIDE 50 MG/1
50 TABLET ORAL NIGHTLY
Status: DISCONTINUED | OUTPATIENT
Start: 2021-10-28 | End: 2021-10-29

## 2021-10-28 RX ORDER — HYDROCODONE BITARTRATE AND ACETAMINOPHEN 5; 325 MG/1; MG/1
2 TABLET ORAL EVERY 4 HOURS PRN
Status: DISCONTINUED | OUTPATIENT
Start: 2021-10-28 | End: 2021-10-29

## 2021-10-28 RX ORDER — ESTRADIOL 0.1 MG/G
1 CREAM VAGINAL DAILY
COMMUNITY
Start: 2021-10-04 | End: 2021-11-01

## 2021-10-28 RX ORDER — CINACALCET 30 MG/1
30 TABLET, FILM COATED ORAL
Status: DISCONTINUED | OUTPATIENT
Start: 2021-10-29 | End: 2021-10-29

## 2021-10-28 RX ORDER — METRONIDAZOLE 500 MG/100ML
500 INJECTION, SOLUTION INTRAVENOUS EVERY 8 HOURS
Status: CANCELLED | OUTPATIENT
Start: 2021-10-28

## 2021-10-28 RX ORDER — HYOSCYAMINE SULFATE 0.125 MG
0.12 TABLET,DISINTEGRATING ORAL EVERY 6 HOURS
Status: DISCONTINUED | OUTPATIENT
Start: 2021-10-29 | End: 2021-10-29

## 2021-10-28 RX ORDER — MONTELUKAST SODIUM 10 MG/1
10 TABLET ORAL NIGHTLY
COMMUNITY
Start: 2021-10-05 | End: 2021-10-28 | Stop reason: CLARIF

## 2021-10-28 RX ORDER — ONDANSETRON 2 MG/ML
4 INJECTION INTRAMUSCULAR; INTRAVENOUS EVERY 4 HOURS PRN
Status: DISCONTINUED | OUTPATIENT
Start: 2021-10-28 | End: 2021-10-28

## 2021-10-28 RX ORDER — HYDROCODONE BITARTRATE AND ACETAMINOPHEN 5; 325 MG/1; MG/1
0.5 TABLET ORAL EVERY 8 HOURS PRN
Status: DISCONTINUED | OUTPATIENT
Start: 2021-10-28 | End: 2021-10-28

## 2021-10-28 RX ORDER — HYDROMORPHONE HYDROCHLORIDE 1 MG/ML
0.2 INJECTION, SOLUTION INTRAMUSCULAR; INTRAVENOUS; SUBCUTANEOUS EVERY 2 HOUR PRN
Status: DISCONTINUED | OUTPATIENT
Start: 2021-10-28 | End: 2021-10-29

## 2021-10-28 RX ORDER — FAMOTIDINE 20 MG/1
20 TABLET ORAL
Status: DISCONTINUED | OUTPATIENT
Start: 2021-10-29 | End: 2021-10-29

## 2021-10-28 RX ORDER — AMLODIPINE BESYLATE 5 MG/1
10 TABLET ORAL DAILY
Status: DISCONTINUED | OUTPATIENT
Start: 2021-10-29 | End: 2021-10-29

## 2021-10-28 NOTE — ED INITIAL ASSESSMENT (HPI)
PT PRESENTS TO ED WITH ABD CRAMPING THAT STARTED YESTERDAY, WAS SEEN AT URGENT CARE YESTERDAY AND WAS SENT HERE. PT STATES HER LAST BM WAS THIS MORNING AND WAS NORMAL FOR PT, PT STATES PAIN HAS NOT SUBSIDED. PT DENIES NAUSEA, DENIES FEVERS.   PER FAMILY M
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
## 2021-10-29 VITALS
BODY MASS INDEX: 20.14 KG/M2 | RESPIRATION RATE: 17 BRPM | HEART RATE: 86 BPM | DIASTOLIC BLOOD PRESSURE: 85 MMHG | HEIGHT: 64 IN | OXYGEN SATURATION: 97 % | SYSTOLIC BLOOD PRESSURE: 143 MMHG | TEMPERATURE: 99 F | WEIGHT: 118 LBS

## 2021-10-29 PROCEDURE — 99239 HOSP IP/OBS DSCHRG MGMT >30: CPT | Performed by: HOSPITALIST

## 2021-10-29 RX ORDER — POTASSIUM CHLORIDE 20 MEQ/1
40 TABLET, EXTENDED RELEASE ORAL EVERY 4 HOURS
Status: DISCONTINUED | OUTPATIENT
Start: 2021-10-29 | End: 2021-10-29

## 2021-10-29 RX ORDER — FAMOTIDINE 20 MG/1
20 TABLET ORAL
Qty: 30 TABLET | Refills: 0 | Status: SHIPPED | OUTPATIENT
Start: 2021-10-30 | End: 2021-11-17

## 2021-10-29 RX ORDER — POTASSIUM CHLORIDE 20 MEQ/1
40 TABLET, EXTENDED RELEASE ORAL EVERY 4 HOURS
Status: COMPLETED | OUTPATIENT
Start: 2021-10-29 | End: 2021-10-29

## 2021-10-29 RX ORDER — VANCOMYCIN HYDROCHLORIDE 125 MG/1
125 CAPSULE ORAL 4 TIMES DAILY
Qty: 90 CAPSULE | Refills: 0 | Status: SHIPPED | OUTPATIENT
Start: 2021-10-29 | End: 2021-11-12

## 2021-10-29 NOTE — PROGRESS NOTES
Dr. Shoshana Noel came to see patient. Per Dr. Shoshana Noel okay to discharge later today and do MRCP procedure as outpatient procedure. Low fiber diet ordered for the patient.  Per Dr. Shoshana Noel- patient will go home with ADRIANNE weston.    1890: Spoke with Darnell, [address] for uro

## 2021-10-29 NOTE — DISCHARGE SUMMARY
[facility] [place] HOSPITALIST  DISCHARGE SUMMARY     Carlton Houston Patient Status:  Inpatient    1940 MRN [id number]   [facility] 3NW-A Attending Sebastian Kowalski MD   [address] Day # 1 PCP Mariano Chapin DO     Date of Admission: 10/28/2021  Date o 68  59-90 High Risk  29-58 Medium Risk  0-28   Low Risk  Patient was referred to the [facility]. TCM Follow-Up Recommendation:  LACE > 58:  High Risk of readmission after discharge from the hospital.  **Certification    Admission d Crea  Commonly known as: ESTRACE      Place 1 g vaginally daily.  For 2 weeks then three times week for 2 weeks   Refills: 0     HYDROcodone-acetaminophen 5-325 MG Tabs  Commonly known as: Norco      Take 0.5-1 tablets by mouth every 8 (eight) hours as need Please  your prescriptions at the location directed by your doctor or nurse    Bring a paper prescription for each of these medications  · famotidine 20 MG Tabs         [place]    Follow-up appointment:   Jim Dobson MD  [address]

## 2021-10-29 NOTE — PLAN OF CARE
Pt is alert and oriented x2. Forgetful and requires considerable redirection. Hands with fine tremors noted. She states that she shakes when she does not have her sleeping pills. Sleeping pills administered. BA on. Lungs are clear bilaterally on room air. safety including physical limitations  - Instruct pt to call for assistance with activity based on assessment  - Modify environment to reduce risk of injury  - Provide assistive devices as appropriate  - Consider OT/PT consult to assist with strengthening/

## 2021-10-29 NOTE — PLAN OF CARE
Patient is alert and oriented. On room air. Abdomen is soft/ non-distended. Patient had a bowel movement this morning. On contact iso for c-diff. Patient voiding frequently. IVF infusing. NPO with ice chips and sips with meds. Urology consulted.  Ricardo gavin INTERVENTIONS:  - Assess pt frequently for physical needs  - Identify cognitive and physical deficits and behaviors that affect risk of falls.   - [place] fall precautions as indicated by assessment.  - Educate pt/family on patient safety including physic

## 2021-10-29 NOTE — PROGRESS NOTES
[facility] Pharmacy Note:  Renal Dose Adjustment    Rudy Hayes has been prescribed famotidine (PEPCID) 20 mg orally every 12 hours. Estimated Creatinine Clearance: 25.5 mL/min (A) (based on SCr of 1.51 mg/dL (H)).     Calculated creatinine clearance is < 50

## 2021-10-29 NOTE — CONSULTS
[facility]Y Urology  Consultation Note    Tabitha Hays Patient Status:  Inpatient    1940 MRN [id number]   [facility] 3NW-A Attending Brady Lane MD   Hosp Day # 1 PCP Alfredo Cowart DO     Reason for Consultation:  A Chronic back pain    • Chronic rhinitis 10/13/2018   • CKD (chronic kidney disease) stage 3, GFR 30-59 ml/min ([facility]) 5/22/2017   • Clostridium difficile diarrhea    • Constipation    • Dairy allergy    • Depression    • Diarrhea, unspecified    • Duodenitis MANAGEMENT   • [address] [place] NDL/CATH SPI DX/THER NJX  3/18/2013    Procedure: LUMBAR EPIDURAL;  Surgeon: Helane Closs, MD;  Location: [address]    removed ovaries   • INJECTION, W/WO CONTRAST, DX/THERAP [place]   • [address], [place], Zannie Cogan   • TONSILLECTOMY     • TUBAL LIGATION       Family History   Problem Relation Age of Onset   • Hypertension Father    • Heart Attack Father    • Depression Mother    • Hypertension Daughter    • HYDROcodone-acetaminophen (NORCO) 5-325 MG per tab 1 tablet, 1 tablet, Oral, Q4H PRN **OR** HYDROcodone-acetaminophen (NORCO) 5-325 MG per tab 2 tablet, 2 tablet, Oral, Q4H PRN  •  amLODIPine (NORVASC) tab 10 mg, 10 mg, Oral, Daily  •  cinacalcet (SENSIPAR cells    Imaging:  Ultrasound and CT Scan  Abdominal/pelvic CT scan with contrast 7/20/21:  FINDINGS:     LIVER:  No enlargement, atrophy, abnormal density, or significant focal lesion.     BILIARY:  No visible dilatation or calcification.     PANCREAS:  N kidney stones. Renal US 8/5/21:  FINDINGS:        RIGHT KIDNEY   MEASUREMENTS:  9.9 x 4.4 x 5.0 cm   ECHOGENICITY:  Normal.   HYDRONEPHROSIS:  None.    CYSTS/STONES/MASSES: Bruna Robertson is a superior pole renal cyst noted measuring 2.8 x 2.4 x 2.8 cm, appear artery, SMA and AGA. RETROPERITONEUM:  Stable.  No adenopathy.    BOWEL/MESENTERY: Lyndia Hane bowel caliber.  Moderate scattered stool in the colon.  No new colonic inflammation.  The appendix is poorly visualized and may be diminutive.  No appendic RETROPERITONEUM:  Multiple calcifications adjacent to the right ureter are stable.  These may represent calcifications that are phleboliths within the ovarian vein.    BOWEL/MESENTERY: Dorette Nidia is diffuse mild thickening of the transverse of the descending neoplasm cannot be excluded.     BOWEL/MESENTERY:  Bowel is normal in caliber.  No evidence of obstruction.  Stable mildly prominent retroperitoneal lymph nodes, largest measuring 1.0 x 0.7 cm.     PELVIS:  Bladder is unremarkable in appearance.  Calcified stage 3 (moderate) (HCC)     Poor historian     Noncompliance     History of peptic ulcer     History of coronary artery stent placement     Generalized abdominal pain     At risk for injury related to fall     Constipation     Irritable bowel syndrome wit

## 2021-10-29 NOTE — PROGRESS NOTES
[facility]     Hospitalist Progress Note     [place] Patient Status:  Inpatient    1940 MRN [id number]   [facility] 3NW-A Attending Henrique Newman MD   [facility] Day # 1 PCP Abby Meehan DO     Chief Complaint: diarrhea/abd 03/29/2021       Pro-Calcitonin  No results for input(s): PCT in the last 168 hours. Cardiac  No results for input(s): TROP, PBNP in the last 168 hours. Creatinine Kinase  No results for input(s): CK in the last 168 hours.     Inflammatory Markers  No
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
## 2021-11-01 ENCOUNTER — PATIENT OUTREACH (OUTPATIENT)
Dept: CASE MANAGEMENT | Age: 81
End: 2021-11-01

## 2021-11-01 DIAGNOSIS — Z02.9 ENCOUNTERS FOR ADMINISTRATIVE PURPOSE: ICD-10-CM

## 2021-11-01 PROCEDURE — 1111F DSCHRG MED/CURRENT MED MERGE: CPT

## 2021-11-01 NOTE — PROGRESS NOTES
Initial Post Discharge Follow Up   Discharge Date: 10/29/21  Contact Date: 11/1/2021    Consent Verification:  Assessment Completed With: Patient  HIPAA Verified?   Yes    Discharge Dx:   Renal insufficiency      Was TCC ordered: yes      General:   • Ho 0   • amLODIPine 10 MG Oral Tab Take 10 mg by mouth daily. • clonazePAM 0.5 MG Oral Tab Take 0.5 mg by mouth 2 (two) times daily as needed. • estradiol 0.1 MG/GM Vaginal Cream Place 1 g vaginally daily.  For 2 weeks then three times week for 2 weeks medications with you to make sure we are not missing anything? yes  • Are there any reasons that keep you from taking your medication as prescribed? No  Are you having any concerns with constipation?  No    Referrals/orders at D/C:  Home Health/Services or[place]   [address], [place] [address] [place] [id number]  [address]  [address] [place] [id number] [phone]          PCP TCM/HFU appointment: scheduled at D/C within 7-14 days  yes

## 2021-11-02 ENCOUNTER — VIRTUAL PHONE E/M (OUTPATIENT)
Dept: INTERNAL MEDICINE CLINIC | Facility: CLINIC | Age: 81
End: 2021-11-02
Payer: MEDICARE

## 2021-11-02 DIAGNOSIS — A04.72 CLOSTRIDIUM DIFFICILE COLITIS: ICD-10-CM

## 2021-11-02 DIAGNOSIS — K29.01 ACUTE GASTRITIS WITH HEMORRHAGE, UNSPECIFIED GASTRITIS TYPE: ICD-10-CM

## 2021-11-02 DIAGNOSIS — R93.2: ICD-10-CM

## 2021-11-02 DIAGNOSIS — N28.9 KIDNEY LESION: Primary | ICD-10-CM

## 2021-11-02 DIAGNOSIS — B37.9 YEAST INFECTION: ICD-10-CM

## 2021-11-02 DIAGNOSIS — R10.9 ABDOMINAL CRAMPING: ICD-10-CM

## 2021-11-02 DIAGNOSIS — R19.8 RECTAL PRESSURE: ICD-10-CM

## 2021-11-02 PROCEDURE — 99443 PHONE E/M BY PHYS 21-30 MIN: CPT | Performed by: NURSE PRACTITIONER

## 2021-11-02 RX ORDER — MULTIVITAMIN WITH IRON
250 TABLET ORAL AS NEEDED
COMMUNITY

## 2021-11-02 RX ORDER — FLUCONAZOLE 150 MG/1
150 TABLET ORAL EVERY OTHER DAY
Qty: 3 TABLET | Refills: 0 | Status: SHIPPED | OUTPATIENT
Start: 2021-11-02 | End: 2021-11-07

## 2021-11-02 RX ORDER — MULTIVIT WITH MINERALS/LUTEIN
1000 TABLET ORAL DAILY
COMMUNITY

## 2021-11-02 NOTE — PROGRESS NOTES
[facility] PHARMACIST MEDICATION RECONCILIATION        Wilfred Gastelum MRN [id number]    1940 PCP Lucho Iglesias DO       Comments: Medication history completed by the [address] [place] [address] Pharmacist with the patient, using two-w Take 1 tablet (0.125 mg total) by mouth every 6 (six) hours. • cinacalcet 30 MG Oral Tab Take 1 tablet (30 mg total) by mouth daily with breakfast.   • sertraline 50 MG Oral Tab Take 1 tablet (50 mg total) by mouth daily.    • traZODone 50 MG Oral Tab [facility] restart at [facility] tomorrow morning. Discussed schedule with the , 6am, 12pm, 6pm, and she can take the last capsule at 10pm if they do not stay awake until 12am.  The  agreed.   Patient says she has been taking the Florajen probiotic for the pas

## 2021-11-05 ENCOUNTER — OFFICE VISIT (OUTPATIENT)
Dept: FAMILY MEDICINE CLINIC | Facility: CLINIC | Age: 81
End: 2021-11-05
Payer: MEDICARE

## 2021-11-05 VITALS
TEMPERATURE: 98 F | HEART RATE: 56 BPM | WEIGHT: 115 LBS | DIASTOLIC BLOOD PRESSURE: 64 MMHG | OXYGEN SATURATION: 98 % | BODY MASS INDEX: 20 KG/M2 | SYSTOLIC BLOOD PRESSURE: 130 MMHG

## 2021-11-05 DIAGNOSIS — R41.3 MEMORY LOSS: ICD-10-CM

## 2021-11-05 DIAGNOSIS — N13.30 BILATERAL HYDRONEPHROSIS: ICD-10-CM

## 2021-11-05 DIAGNOSIS — R10.9 CHRONIC ABDOMINAL PAIN: ICD-10-CM

## 2021-11-05 DIAGNOSIS — G89.29 CHRONIC ABDOMINAL PAIN: ICD-10-CM

## 2021-11-05 DIAGNOSIS — F06.4 ANXIETY DISORDER DUE TO GENERAL MEDICAL CONDITION WITH PANIC ATTACK: ICD-10-CM

## 2021-11-05 DIAGNOSIS — F41.0 ANXIETY DISORDER DUE TO GENERAL MEDICAL CONDITION WITH PANIC ATTACK: ICD-10-CM

## 2021-11-05 DIAGNOSIS — M51.16 LUMBAR DISC HERNIATION WITH RADICULOPATHY: ICD-10-CM

## 2021-11-05 DIAGNOSIS — K83.8 COMMON BILE DUCT DILATATION: ICD-10-CM

## 2021-11-05 DIAGNOSIS — A04.72 CLOSTRIDIUM DIFFICILE COLITIS: Primary | ICD-10-CM

## 2021-11-05 DIAGNOSIS — N28.1 BILATERAL RENAL CYSTS: ICD-10-CM

## 2021-11-05 PROCEDURE — 99496 TRANSJ CARE MGMT HIGH F2F 7D: CPT | Performed by: FAMILY MEDICINE

## 2021-11-05 RX ORDER — CLONAZEPAM 0.5 MG/1
0.5 TABLET ORAL 2 TIMES DAILY PRN
Qty: 60 TABLET | Refills: 0 | Status: SHIPPED | OUTPATIENT
Start: 2021-11-05 | End: 2021-12-10

## 2021-11-05 RX ORDER — HYDROCODONE BITARTRATE AND ACETAMINOPHEN 5; 325 MG/1; MG/1
TABLET ORAL EVERY 8 HOURS PRN
Qty: 15 TABLET | Refills: 0 | Status: SHIPPED | OUTPATIENT
Start: 2021-11-05 | End: 2021-12-13

## 2021-11-05 NOTE — PATIENT INSTRUCTIONS
Take your Vancomycin as prescribed. Keep your appointment for your MRCP as scheduled on 10/29/21. Follow up with gastroenterology after the test.    Make your appointment with Dr. Wyman Cushing of urology for follow up.     Take all your other medications as pre
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
## 2021-11-07 PROBLEM — N28.9 KIDNEY LESION: Status: ACTIVE | Noted: 2021-11-07

## 2021-11-07 PROBLEM — R93.2: Status: ACTIVE | Noted: 2021-11-07

## 2021-11-07 PROBLEM — B37.9 YEAST INFECTION: Status: ACTIVE | Noted: 2021-11-07

## 2021-11-07 PROBLEM — R10.9 ABDOMINAL CRAMPING: Status: ACTIVE | Noted: 2021-11-07

## 2021-11-07 PROBLEM — R19.8 RECTAL PRESSURE: Status: ACTIVE | Noted: 2021-11-07

## 2021-11-07 PROBLEM — K25.3 ACUTE GASTRIC EROSION: Status: ACTIVE | Noted: 2019-08-29

## 2021-11-07 NOTE — PROGRESS NOTES
Video Visit  [address]      Please note that the following visit was completed using two-way, real-time interactive audio and video communication.   This has been done in good mychal to provide continuity of care in the best for continued follow up. Today, patient is being seen via telephone visit without visual for hospital follow-up.  Her  Steven Andre is present throughout exam. Patient reports she is \"feeling better\" over the weekend, but is not feeling \"well\" today 11/1/2021      Allergies:    Penicillins             HIVES    Comment:Throat swells, difficulty breathing  Bactrim                 NAUSEA AND VOMITING    Comment:Tabs  Sulfa Drugs Cross R*    NAUSEA AND VOMITING  Yeast-Related           ITCHING    Comment: tablet (30 mg total) by mouth daily with breakfast., Disp: 90 tablet, Rfl: 0  sertraline 50 MG Oral Tab, Take 1 tablet (50 mg total) by mouth daily. , Disp: 90 tablet, Rfl: 0  traZODone 50 MG Oral Tab, Take 1 tablet (50 mg total) by mouth nightly., Disp: 90 • Hearing loss    • Heart murmur    • Heart palpitations    • Hemorrhoids    • High blood pressure    • High cholesterol    • History of blood transfusion    • History of eating disorder    • History of rheumatic fever    • IBS (irritable bowel syndrome) LUMBAR/SACRAL  3/18/2013    Procedure: LUMBAR EPIDURAL;  Surgeon: Garcia Lomas MD;  Location: [address] [place] [address] BY [facility] [address] [address] 5+ YR  2/26/2013    Procedure: LUMBAR EPIDURAL;  Surgeon: Garcia Lomas MD;  Location: Social History    Tobacco Use      Smoking status: Former Smoker        Packs/day: 0.00        Years: 20.00        Pack years: 0        Types: Cigarettes        Quit date: 1997        Years since quittin.8      Smokeless tobacco: Never Used 33.2 (L) 10/29/2021 06:12 AM    .0 10/29/2021 06:12 AM    GLU 87 10/29/2021 06:12 AM     10/29/2021 06:12 AM    K 4.0 10/29/2021 02:56 PM     10/29/2021 06:12 AM    CO2 23.0 10/29/2021 06:12 AM    BUN 10 10/29/2021 06:12 AM    CREATSERUM further doses of PO Vanco today  · Next dose 6am tomorrow  · New schedule:   · 6am  · 12pm  · 6pm  · 10-12pm  · Follow-up with GI Dr. Fidel Pappas in 6 weeks- to make appointment  · Follow-up with PCP Dr. Homero Kunz on 11/5 at 1:30pm-patient made appointment mg total) by mouth every other day for 3 doses. Take 1 tablet on Tuesday, Thursday and Saturday. (Patient not taking: Reported on 11/5/2021), Disp: 3 tablet, Rfl: 0  estradiol 0.1 MG/GM Vaginal Cream, Place 1 g vaginally 3 (three) times a week., Disp: 42. 5 Oral Tab 3 tablet 0     Sig: Take 1 tablet (150 mg total) by mouth every other day for 3 doses. Take 1 tablet on Tuesday, Thursday and Saturday.      Patient not taking: Reported on 11/5/2021         Health Maintenance:  Meningococcal B Vaccine([address] [place] [address] Significant Complications, Morbidity, and/or Mortality: moderate    Overall Risk:   moderate    Patient seen within 7 days from date of discharge.     Discharge Disposition/Plan:    Future Appointments   Date Time Provider Damaris Linares   11/16/2021  9:

## 2021-11-12 ENCOUNTER — TELEPHONE (OUTPATIENT)
Dept: FAMILY MEDICINE CLINIC | Facility: CLINIC | Age: 81
End: 2021-11-12

## 2021-11-12 DIAGNOSIS — F06.4 ANXIETY DISORDER DUE TO GENERAL MEDICAL CONDITION WITH PANIC ATTACK: ICD-10-CM

## 2021-11-12 DIAGNOSIS — F41.0 ANXIETY DISORDER DUE TO GENERAL MEDICAL CONDITION WITH PANIC ATTACK: ICD-10-CM

## 2021-11-12 RX ORDER — CLONAZEPAM 0.5 MG/1
TABLET ORAL
Qty: 60 TABLET | Refills: 0 | OUTPATIENT
Start: 2021-11-12

## 2021-11-12 NOTE — TELEPHONE ENCOUNTER
Requested Prescriptions     Refused Prescriptions Disp Refills   • CLONAZEPAM 0.5 MG Oral Tab [Pharmacy Med Name: CLONAZEPAM 0.5MG TABLETS] 60 tablet 0     Sig: TAKE 1 TABLET(0.5 MG) BY MOUTH TWICE DAILY AS NEEDED     Refused By: Caryle Petties     Reason f

## 2021-11-12 NOTE — TELEPHONE ENCOUNTER
Aggie Villar would like a call regarding her clonazePAM 0.5 MG Oral Tab she only has 2 pills left they never refilled it on 11/5 they told her it was too soon Please call hui at [phone]

## 2021-11-16 ENCOUNTER — HOSPITAL ENCOUNTER (OUTPATIENT)
Dept: MRI IMAGING | Age: 81
Discharge: HOME | End: 2021-11-16
Attending: INTERNAL MEDICINE
Payer: MEDICARE

## 2021-11-16 DIAGNOSIS — R93.3 IMAGING OF GASTROINTESTINAL TRACT ABNORMAL: ICD-10-CM

## 2021-11-16 PROCEDURE — 82565 ASSAY OF CREATININE: CPT

## 2021-11-16 PROCEDURE — 74181 MRI ABDOMEN W/O CONTRAST: CPT | Performed by: INTERNAL MEDICINE

## 2021-11-16 PROCEDURE — 76376 3D RENDER W/INTRP POSTPROCES: CPT | Performed by: INTERNAL MEDICINE

## 2021-11-17 ENCOUNTER — TELEPHONE (OUTPATIENT)
Dept: FAMILY MEDICINE CLINIC | Facility: CLINIC | Age: 81
End: 2021-11-17

## 2021-11-17 RX ORDER — FAMOTIDINE 20 MG/1
40 TABLET ORAL
Qty: 30 TABLET | Refills: 0 | COMMUNITY
Start: 2021-11-17

## 2021-11-17 NOTE — TELEPHONE ENCOUNTER
Kinsey Rose is calling to see if Arlyn Torres can call something in for her she thinks she has a vaginal infection.  [phone]

## 2021-11-17 NOTE — TELEPHONE ENCOUNTER
LOV 11/5/21 TCM Visit  Clostridium difficile colitis  Chronic abdominal pain  Common bile duct dilatation  Bilateral hydronephrosis  Bilateral Renal cysts  Memory Loss  Lumbar disc herniation with radiculopathy  Anxiety disorder due to general medical cond

## 2021-11-21 DIAGNOSIS — F41.0 ANXIETY DISORDER DUE TO GENERAL MEDICAL CONDITION WITH PANIC ATTACK: ICD-10-CM

## 2021-11-21 DIAGNOSIS — F41.1 GAD (GENERALIZED ANXIETY DISORDER): ICD-10-CM

## 2021-11-21 DIAGNOSIS — F06.4 ANXIETY DISORDER DUE TO GENERAL MEDICAL CONDITION WITH PANIC ATTACK: ICD-10-CM

## 2021-11-22 NOTE — TELEPHONE ENCOUNTER
sertraline 50 MG Oral Tab 90 tablet 0 8/19/2021    Sig:   Take 1 tablet (50 mg total) by mouth daily.        LOV 11/5/21  FOV 1/24/22    Refill sent until appointment

## 2021-12-02 ENCOUNTER — TELEPHONE (OUTPATIENT)
Dept: FAMILY MEDICINE CLINIC | Facility: CLINIC | Age: 81
End: 2021-12-02

## 2021-12-02 NOTE — TELEPHONE ENCOUNTER
Has GI appt Monday 12/6/21. Second attempt to reach patient. Left message for patient to contact GI provider due to GI history and upcoming appointment.    Advised to call us back if she cannot speak with GI.

## 2021-12-06 ENCOUNTER — TELEPHONE (OUTPATIENT)
Dept: FAMILY MEDICINE CLINIC | Facility: CLINIC | Age: 81
End: 2021-12-06

## 2021-12-09 ENCOUNTER — TELEPHONE (OUTPATIENT)
Dept: FAMILY MEDICINE CLINIC | Facility: CLINIC | Age: 81
End: 2021-12-09

## 2021-12-09 DIAGNOSIS — F06.4 ANXIETY DISORDER DUE TO GENERAL MEDICAL CONDITION WITH PANIC ATTACK: ICD-10-CM

## 2021-12-09 DIAGNOSIS — F41.0 ANXIETY DISORDER DUE TO GENERAL MEDICAL CONDITION WITH PANIC ATTACK: ICD-10-CM

## 2021-12-09 NOTE — TELEPHONE ENCOUNTER
Pt called and said she can't go to the bathroom and she is having stomach cramping. What should she do? She would like a call back.

## 2021-12-09 NOTE — TELEPHONE ENCOUNTER
Patient states she had a BM yesterday and it was normal.  States she feels like she has to have a BM and can not. Advised to call Dr. Benjamin Santana office for advice.

## 2021-12-10 RX ORDER — CLONAZEPAM 0.5 MG/1
TABLET ORAL
Qty: 60 TABLET | Refills: 0 | Status: SHIPPED | OUTPATIENT
Start: 2021-12-10 | End: 2022-01-05

## 2021-12-10 NOTE — TELEPHONE ENCOUNTER
Requested Prescriptions     Pending Prescriptions Disp Refills   • CLONAZEPAM 0.5 MG Oral Tab [Pharmacy Med Name: CLONAZEPAM 0.5MG TABLETS] 60 tablet 0     Sig: TAKE 1 TABLET(0.5 MG) BY MOUTH TWICE DAILY AS NEEDED     Last fill was 11/5/21 60 tabs  Last OV

## 2021-12-12 DIAGNOSIS — I10 ESSENTIAL HYPERTENSION: Primary | ICD-10-CM

## 2021-12-13 ENCOUNTER — OFFICE VISIT (OUTPATIENT)
Dept: FAMILY MEDICINE CLINIC | Facility: CLINIC | Age: 81
End: 2021-12-13
Payer: MEDICARE

## 2021-12-13 ENCOUNTER — TELEPHONE (OUTPATIENT)
Dept: FAMILY MEDICINE CLINIC | Facility: CLINIC | Age: 81
End: 2021-12-13

## 2021-12-13 VITALS
SYSTOLIC BLOOD PRESSURE: 132 MMHG | RESPIRATION RATE: 16 BRPM | HEIGHT: 64 IN | TEMPERATURE: 98 F | OXYGEN SATURATION: 99 % | HEART RATE: 79 BPM | WEIGHT: 117 LBS | DIASTOLIC BLOOD PRESSURE: 78 MMHG | BODY MASS INDEX: 19.97 KG/M2

## 2021-12-13 DIAGNOSIS — F41.1 GAD (GENERALIZED ANXIETY DISORDER): ICD-10-CM

## 2021-12-13 DIAGNOSIS — R10.9 CHRONIC ABDOMINAL PAIN: Primary | ICD-10-CM

## 2021-12-13 DIAGNOSIS — M51.16 LUMBAR DISC HERNIATION WITH RADICULOPATHY: ICD-10-CM

## 2021-12-13 DIAGNOSIS — G89.29 CHRONIC ABDOMINAL PAIN: Primary | ICD-10-CM

## 2021-12-13 PROCEDURE — 99214 OFFICE O/P EST MOD 30 MIN: CPT | Performed by: FAMILY MEDICINE

## 2021-12-13 RX ORDER — LOSARTAN POTASSIUM 100 MG/1
TABLET ORAL
Qty: 90 TABLET | Refills: 0 | Status: SHIPPED | OUTPATIENT
Start: 2021-12-13

## 2021-12-13 RX ORDER — HYDROCODONE BITARTRATE AND ACETAMINOPHEN 5; 325 MG/1; MG/1
TABLET ORAL EVERY 8 HOURS PRN
Qty: 15 TABLET | Refills: 0 | Status: SHIPPED | OUTPATIENT
Start: 2021-12-13 | End: 2022-01-24

## 2021-12-13 NOTE — TELEPHONE ENCOUNTER
Refill Passed Protocol:     Pt requesting refill of LOSARTAN   Refill was approved and sent to pharmacy:     Last Office Visit with Provider: 11/5/21    Appt. scheduled on 1/24/22

## 2021-12-13 NOTE — TELEPHONE ENCOUNTER
Pt calling to speak to BODØ regarding her abdominal pain and not being able to have a BM. Pt is very concerned and would like a call back ASAP. Please advise.

## 2021-12-13 NOTE — PATIENT INSTRUCTIONS
Increase the sertraline 50mg to 1 and 1/2 tablet once daily for one week and then increase to 2 tablets daily (100 mg). Continue all your other medication as prescribed. Another prescription for [address] [place] [address],[address] has been sent to your pharmacy.     Follow up with

## 2021-12-13 NOTE — TELEPHONE ENCOUNTER
Patient called on 12/9/21 c/o abd pain and inability to have BM despite her having a normal BM the day prior. Advised her to call GI for this concern. Per Dr. Dalila Arteaga notes:    Aditya Tejeda,     I think that Ms. Umaña needs to be seen by her primary MD with re

## 2021-12-13 NOTE — TELEPHONE ENCOUNTER
Discussed with Dr Georgina Jones and she was increased on the Zoloft after being evaluated she will follow up with PCP in 2-4 weeks. Has neuropsych evaluation testing scheduled for early next year. Has possible dementia with the severe anxiety.

## 2021-12-13 NOTE — PROGRESS NOTES
Jimmie Correa is a 80year old female. HPI:   Patient presents with:  Constipation    This 28-year-old female presents to the office with her  for complaint of worsening abdominal pain and cramping.   The patient has a longstanding history for ch of kidney 03/24/2021   • Cataract    • Chronic back pain    • Chronic rhinitis 10/13/2018   • CKD (chronic kidney disease) stage 3, GFR 30-59 ml/min ([facility]) 5/22/2017   • Clostridium difficile diarrhea    • Constipation    • Dairy allergy    • Depression Procedure: LUMBAR EPIDURAL;  Surgeon: Patricia Lopez MD;  Location: [address] [place] [address] NDL/CATH [facility],[address] DX/THER Irineo Grigsby 86  3/18/2013    Procedure: LUMBAR EPIDURAL;  Surgeon: Patricia Lopez MD;  Location: [address] [place] [address] [facility]   • SPINAL FUSION  2001    Neck Fusion; Josi, [address] [place], [place]   • TONSILLECTOMY     • TUBAL LIGATION        Family History   Problem Relation Age of Onset   • Hyp 1000 MG Oral Tab Take 1,000 mg by mouth daily. • magnesium 250 MG Oral Tab Take 250 mg by mouth as needed (constipation). • amLODIPine 10 MG Oral Tab Take 10 mg by mouth daily.      • lidocaine 5 % External Patch Place 2 patches onto the skin daily Well-nourished, well hydrated. In distress complaining of abdominal pain and cramping. She is tearful and crying. Lucila Rocky Face No pallor. HEENT: Normocephalic, atraumatic. GRAHAM, EOMI, Sclera clear and non icteric bilaterally.  TM's normal, nose without congestion of hydronephrosis or perinephric fluid/inflammatory changes. There is a normal appearance to the gallbladder.   The common bile duct is prominent size without sandra dilatation, measuring approximately 7 mm in diameter, which is felt to be within normal hernandez prescription was given. I tried to explain to the patient how her symptoms of anxiety can worsen her abdominal pain and cramping. The intent is to lessen her anxiety and hopefully, decrease her abdominal pain and cramping.   She is to make a follow-up yue

## 2021-12-13 NOTE — TELEPHONE ENCOUNTER
I was not aware staff scheduled her with Dr. Quirino Gurrola about an hour prior to this phone message.

## 2021-12-16 ENCOUNTER — TELEPHONE (OUTPATIENT)
Dept: FAMILY MEDICINE CLINIC | Facility: CLINIC | Age: 81
End: 2021-12-16

## 2021-12-16 NOTE — TELEPHONE ENCOUNTER
LOV 12/13/21  1. Chronic abdominal pain  I reviewed the most recent note from Dr. Waqas Maxwell on the results of her MRCP. I discussed the case with the patient's PCP.   She is in agreement that the patient's anxiety is a strong component to the patient's abdomi

## 2021-12-16 NOTE — TELEPHONE ENCOUNTER
Pt called and said she has terrible stomach pains and she wants to know if she can use a fleets enema with the stomach pains. She said she is in terrible terrible pain.

## 2021-12-20 ENCOUNTER — TELEPHONE (OUTPATIENT)
Dept: FAMILY MEDICINE CLINIC | Facility: CLINIC | Age: 81
End: 2021-12-20

## 2021-12-20 NOTE — TELEPHONE ENCOUNTER
Spoke to patient. States she had normal BM yesterday. Advised this might be her normal since when she calls she always seems to have had a normal BM the day before. Advised it might be her anxiety.   Advised it is now time to increase to the 100mg sertrali

## 2021-12-20 NOTE — TELEPHONE ENCOUNTER
Pt called and said she is having a lot of pain in her stomach. She can't go to the bathroom it won't come out.

## 2021-12-28 ENCOUNTER — TELEPHONE (OUTPATIENT)
Dept: FAMILY MEDICINE CLINIC | Facility: CLINIC | Age: 81
End: 2021-12-28

## 2021-12-28 NOTE — TELEPHONE ENCOUNTER
Spoke to patient. C/o diarrhea today 3-4 stools and some abdominal cramping. Denies fever. No nausea. No vomiting. Instructed her on relaxation techniques. Reassured her that everything is ok and to call us back if loose stools continue.   She VU and is in

## 2021-12-30 ENCOUNTER — TELEPHONE (OUTPATIENT)
Dept: FAMILY MEDICINE CLINIC | Facility: CLINIC | Age: 81
End: 2021-12-30

## 2021-12-30 NOTE — TELEPHONE ENCOUNTER
Brijesh Reasoner returned your call. She said please call her back because she can't go to the bathroom.

## 2021-12-30 NOTE — TELEPHONE ENCOUNTER
Pt said she has a severe belly ache and she is constipated. She wants a call back she is in a lot of pain.

## 2021-12-30 NOTE — TELEPHONE ENCOUNTER
Patient c/o abdominal pain that started today. Reports last BM was yesterday and it was normal. Denies fever, N/V/D. Patient counseled on relaxation techniques to help with anxiety. Advised she is already on medications to help symptoms.    She STEPHANIA and agr

## 2022-01-03 ENCOUNTER — TELEPHONE (OUTPATIENT)
Dept: FAMILY MEDICINE CLINIC | Facility: CLINIC | Age: 82
End: 2022-01-03

## 2022-01-03 NOTE — TELEPHONE ENCOUNTER
Patient states she was fine all weekend. No pain or diarrhea or constipation. States she started having diarrhea this morning. She is not sure how many stools. Denies fever. c/o abd pain and cramping. Took a hyoscyamine at 0900.   At one point in the con

## 2022-01-03 NOTE — TELEPHONE ENCOUNTER
I do encourage her to take no medications for her symptoms. If she is having watery stools we can check a C. difficile toxin.

## 2022-01-03 NOTE — TELEPHONE ENCOUNTER
Phoned patient. Stools are not watery. She seems to be feeling better. Advised her to call if symptoms worsen again.

## 2022-01-05 DIAGNOSIS — F41.0 ANXIETY DISORDER DUE TO GENERAL MEDICAL CONDITION WITH PANIC ATTACK: ICD-10-CM

## 2022-01-05 DIAGNOSIS — F06.4 ANXIETY DISORDER DUE TO GENERAL MEDICAL CONDITION WITH PANIC ATTACK: ICD-10-CM

## 2022-01-05 RX ORDER — CLONAZEPAM 0.5 MG/1
TABLET ORAL
Qty: 60 TABLET | Refills: 0 | Status: SHIPPED | OUTPATIENT
Start: 2022-01-05 | End: 2022-02-02

## 2022-01-05 NOTE — TELEPHONE ENCOUNTER
Pt requesting refill of CLONAZEPAM 0.5MG TABLETS    Last Time Medication was Prescribed :  12/10/2021 qty # 61 with 0 refills     Last Office Visit with Provider: 12/13/2021 Dr. Robles Ricardo for Constipation    Recommended to return by Provider:Return in about 4

## 2022-01-06 ENCOUNTER — TELEPHONE (OUTPATIENT)
Dept: FAMILY MEDICINE CLINIC | Facility: CLINIC | Age: 82
End: 2022-01-06

## 2022-01-06 NOTE — TELEPHONE ENCOUNTER
Patient phoned with c/o constipation and some abd cramping. She states she had a normal bm yesterday and no cramping. Noted she called on 1/3/22 with diarrhea. Denies any diarrhea. Advised it was most likely her IBS.  Advised to rest and try some relaxat

## 2022-01-08 ENCOUNTER — TELEPHONE (OUTPATIENT)
Dept: FAMILY MEDICINE CLINIC | Facility: CLINIC | Age: 82
End: 2022-01-08

## 2022-01-08 NOTE — TELEPHONE ENCOUNTER
Incoming call from patient after hours. Patient complains of constipation. Last BM was yesterday and was her normal.  Patient states she feels like she has stool in the rectal area but is unable to pass it. She took some MiraLAX this morning.     Plan:

## 2022-01-11 ENCOUNTER — TELEPHONE (OUTPATIENT)
Dept: FAMILY MEDICINE CLINIC | Facility: CLINIC | Age: 82
End: 2022-01-11

## 2022-01-11 RX ORDER — AMLODIPINE BESYLATE 10 MG/1
TABLET ORAL
Qty: 90 TABLET | Refills: 0 | Status: SHIPPED | OUTPATIENT
Start: 2022-01-11

## 2022-01-11 RX ORDER — PRAVASTATIN SODIUM 40 MG
TABLET ORAL
Qty: 90 TABLET | Refills: 0 | Status: SHIPPED | OUTPATIENT
Start: 2022-01-11

## 2022-01-11 NOTE — TELEPHONE ENCOUNTER
Requested Prescriptions     Signed Prescriptions Disp Refills   • AMLODIPINE 10 MG Oral Tab 90 tablet 0     Sig: TAKE 1 TABLET(10 MG) BY MOUTH DAILY     Authorizing Provider: Vasyl Devlin     Ordering User: Sulema Larsen   • PRAVASTATIN 40 MG Oral Tab

## 2022-01-11 NOTE — TELEPHONE ENCOUNTER
Pt called and said she can't go to the bathroom she has a terrible bellyache. She wants to know what she can take.

## 2022-01-11 NOTE — TELEPHONE ENCOUNTER
Patient called stating she has constipation and a stomach ache. States she had normal BM yesterday and felt fine yesterday. Advised patient to be sure to drink plenty of fluids. Advised this is most likely her IBS.   Advised to call us back tomorrow if c

## 2022-01-13 ENCOUNTER — TELEPHONE (OUTPATIENT)
Dept: FAMILY MEDICINE CLINIC | Facility: CLINIC | Age: 82
End: 2022-01-13

## 2022-01-13 NOTE — TELEPHONE ENCOUNTER
Patient has called again forgetting that I called prior. Again, told her once we hear from BODØ we will call her.

## 2022-01-13 NOTE — TELEPHONE ENCOUNTER
Phoned patient. States she is constipated. States her stools were a little hard yesterday but she could go. C/o abd cramping. I did bring up to patient that she initially c/o diarrhea. She says she had some during the night. No vomiting.   Denies a fev

## 2022-01-13 NOTE — TELEPHONE ENCOUNTER
Pt called again and said she is having cramping and she wants to know what she can take for the cramps.

## 2022-01-13 NOTE — TELEPHONE ENCOUNTER
No medication for the cramping get some Thermacare heat pads and do not use any medications for constipation. Increase water intake to 60 ounces of water and drink Prune juice.

## 2022-01-16 ENCOUNTER — TELEPHONE (OUTPATIENT)
Dept: FAMILY MEDICINE CLINIC | Facility: CLINIC | Age: 82
End: 2022-01-16

## 2022-01-16 NOTE — TELEPHONE ENCOUNTER
On call- yesterday pt called c/o terrible abdominal cramping with diarrhea. Usually has chronic constipation and takes Linzess. Has dicyclomine but was told to stop it after her last episode of diarrhea.    Denies- fever, chills, n/v, hematochezia    Rec- a

## 2022-01-18 ENCOUNTER — TELEPHONE (OUTPATIENT)
Dept: FAMILY MEDICINE CLINIC | Facility: CLINIC | Age: 82
End: 2022-01-18

## 2022-01-18 NOTE — TELEPHONE ENCOUNTER
Pt called and said she had a BM about 10:30 this morning but her belly hurts real bad and it's cramping.

## 2022-01-18 NOTE — TELEPHONE ENCOUNTER
Spoke to patient. C/o constipation. She did say she had BM today but feels she has to go more. Advised prune juice. Advised warm pack to abdomen. She STEPHANIA.

## 2022-01-24 ENCOUNTER — OFFICE VISIT (OUTPATIENT)
Dept: FAMILY MEDICINE CLINIC | Facility: CLINIC | Age: 82
End: 2022-01-24
Payer: MEDICARE

## 2022-01-24 VITALS
BODY MASS INDEX: 19.81 KG/M2 | WEIGHT: 116 LBS | HEART RATE: 60 BPM | SYSTOLIC BLOOD PRESSURE: 112 MMHG | DIASTOLIC BLOOD PRESSURE: 62 MMHG | HEIGHT: 64 IN | TEMPERATURE: 99 F

## 2022-01-24 DIAGNOSIS — F41.1 GAD (GENERALIZED ANXIETY DISORDER): ICD-10-CM

## 2022-01-24 DIAGNOSIS — M81.8 OTHER OSTEOPOROSIS WITHOUT CURRENT PATHOLOGICAL FRACTURE: ICD-10-CM

## 2022-01-24 DIAGNOSIS — M79.7 FIBROMYALGIA: ICD-10-CM

## 2022-01-24 DIAGNOSIS — R10.9 CHRONIC ABDOMINAL PAIN: ICD-10-CM

## 2022-01-24 DIAGNOSIS — Z12.31 VISIT FOR SCREENING MAMMOGRAM: ICD-10-CM

## 2022-01-24 DIAGNOSIS — Z91.19 NONCOMPLIANCE: ICD-10-CM

## 2022-01-24 DIAGNOSIS — Z87.442 HISTORY OF NEPHROLITHIASIS: ICD-10-CM

## 2022-01-24 DIAGNOSIS — I10 ESSENTIAL HYPERTENSION: ICD-10-CM

## 2022-01-24 DIAGNOSIS — F13.20 BENZODIAZEPINE DEPENDENCE (HCC): ICD-10-CM

## 2022-01-24 DIAGNOSIS — N18.30 CHRONIC RENAL INSUFFICIENCY, STAGE 3 (MODERATE) (HCC): ICD-10-CM

## 2022-01-24 DIAGNOSIS — Z91.81 AT RISK FOR INJURY RELATED TO FALL: ICD-10-CM

## 2022-01-24 DIAGNOSIS — M25.551 HIP PAIN, BILATERAL: ICD-10-CM

## 2022-01-24 DIAGNOSIS — F33.1 MODERATE EPISODE OF RECURRENT MAJOR DEPRESSIVE DISORDER (HCC): ICD-10-CM

## 2022-01-24 DIAGNOSIS — R25.1 TREMORS OF NERVOUS SYSTEM: ICD-10-CM

## 2022-01-24 DIAGNOSIS — F41.0 ANXIETY DISORDER DUE TO GENERAL MEDICAL CONDITION WITH PANIC ATTACK: ICD-10-CM

## 2022-01-24 DIAGNOSIS — E21.0 PRIMARY HYPERPARATHYROIDISM (HCC): ICD-10-CM

## 2022-01-24 DIAGNOSIS — E83.52 HYPERCALCEMIA: ICD-10-CM

## 2022-01-24 DIAGNOSIS — Z78.0 POSTMENOPAUSAL: ICD-10-CM

## 2022-01-24 DIAGNOSIS — M51.16 LUMBAR DISC HERNIATION WITH RADICULOPATHY: ICD-10-CM

## 2022-01-24 DIAGNOSIS — Z13.6 SCREENING FOR CARDIOVASCULAR CONDITION: ICD-10-CM

## 2022-01-24 DIAGNOSIS — F45.41 PAIN AGGRAVATED BY ANXIETY: ICD-10-CM

## 2022-01-24 DIAGNOSIS — M25.552 HIP PAIN, BILATERAL: ICD-10-CM

## 2022-01-24 DIAGNOSIS — F51.04 PSYCHOPHYSIOLOGICAL INSOMNIA: ICD-10-CM

## 2022-01-24 DIAGNOSIS — E78.2 MIXED HYPERLIPIDEMIA: ICD-10-CM

## 2022-01-24 DIAGNOSIS — Z00.00 ENCOUNTER FOR ANNUAL HEALTH EXAMINATION: Primary | ICD-10-CM

## 2022-01-24 DIAGNOSIS — K59.04 CHRONIC IDIOPATHIC CONSTIPATION: ICD-10-CM

## 2022-01-24 DIAGNOSIS — I25.10 CORONARY ARTERY DISEASE INVOLVING NATIVE CORONARY ARTERY OF NATIVE HEART WITHOUT ANGINA PECTORIS: ICD-10-CM

## 2022-01-24 DIAGNOSIS — G89.29 CHRONIC ABDOMINAL PAIN: ICD-10-CM

## 2022-01-24 DIAGNOSIS — F43.0 ACUTE STRESS REACTION: ICD-10-CM

## 2022-01-24 DIAGNOSIS — Z87.11 HISTORY OF PEPTIC ULCER DISEASE: ICD-10-CM

## 2022-01-24 DIAGNOSIS — R41.3 POOR SHORT TERM MEMORY: ICD-10-CM

## 2022-01-24 DIAGNOSIS — Z13.0 SCREENING FOR DEFICIENCY ANEMIA: ICD-10-CM

## 2022-01-24 DIAGNOSIS — F41.9 PAIN AGGRAVATED BY ANXIETY: ICD-10-CM

## 2022-01-24 DIAGNOSIS — K58.2 IRRITABLE BOWEL SYNDROME WITH BOTH CONSTIPATION AND DIARRHEA: ICD-10-CM

## 2022-01-24 DIAGNOSIS — Z86.19 HISTORY OF CLOSTRIDIOIDES DIFFICILE INFECTION: ICD-10-CM

## 2022-01-24 DIAGNOSIS — Z96.89 S/P INSERTION OF SPINAL CORD STIMULATOR: ICD-10-CM

## 2022-01-24 DIAGNOSIS — Z98.1 HISTORY OF LUMBAR SPINAL FUSION: ICD-10-CM

## 2022-01-24 DIAGNOSIS — R19.8 RECTAL PRESSURE: ICD-10-CM

## 2022-01-24 DIAGNOSIS — Z78.9 POOR HISTORIAN: ICD-10-CM

## 2022-01-24 DIAGNOSIS — E87.6 HYPOKALEMIA: ICD-10-CM

## 2022-01-24 DIAGNOSIS — N39.46 MIXED STRESS AND URGE URINARY INCONTINENCE: ICD-10-CM

## 2022-01-24 DIAGNOSIS — M51.36 DDD (DEGENERATIVE DISC DISEASE), LUMBAR: ICD-10-CM

## 2022-01-24 DIAGNOSIS — F06.4 ANXIETY DISORDER DUE TO GENERAL MEDICAL CONDITION WITH PANIC ATTACK: ICD-10-CM

## 2022-01-24 DIAGNOSIS — Z95.5 HISTORY OF CORONARY ARTERY STENT PLACEMENT: ICD-10-CM

## 2022-01-24 DIAGNOSIS — R41.3 MEMORY DIFFICULTIES: ICD-10-CM

## 2022-01-24 PROCEDURE — 99214 OFFICE O/P EST MOD 30 MIN: CPT | Performed by: FAMILY MEDICINE

## 2022-01-24 PROCEDURE — G0439 PPPS, SUBSEQ VISIT: HCPCS | Performed by: FAMILY MEDICINE

## 2022-01-24 RX ORDER — SERTRALINE HYDROCHLORIDE 100 MG/1
100 TABLET, FILM COATED ORAL DAILY
Qty: 90 TABLET | Refills: 1 | Status: SHIPPED | OUTPATIENT
Start: 2022-01-24 | End: 2022-01-24

## 2022-01-24 RX ORDER — ONDANSETRON 8 MG/1
8 TABLET, ORALLY DISINTEGRATING ORAL EVERY 6 HOURS PRN
Qty: 20 TABLET | Refills: 1 | Status: SHIPPED | OUTPATIENT
Start: 2022-01-24

## 2022-01-24 RX ORDER — SERTRALINE HYDROCHLORIDE 100 MG/1
100 TABLET, FILM COATED ORAL DAILY
Qty: 90 TABLET | Refills: 1 | Status: SHIPPED | OUTPATIENT
Start: 2022-01-24

## 2022-01-24 RX ORDER — HYDROCODONE BITARTRATE AND ACETAMINOPHEN 5; 325 MG/1; MG/1
TABLET ORAL EVERY 8 HOURS PRN
Qty: 10 TABLET | Refills: 0 | Status: SHIPPED | OUTPATIENT
Start: 2022-01-24

## 2022-01-24 RX ORDER — ONDANSETRON 8 MG/1
8 TABLET, ORALLY DISINTEGRATING ORAL EVERY 6 HOURS PRN
Qty: 20 TABLET | Refills: 1 | Status: SHIPPED | OUTPATIENT
Start: 2022-01-24 | End: 2022-01-24

## 2022-01-24 NOTE — PROGRESS NOTES
Subjective:   Beatriz Ross is a 80year old female who presents for a Medicare Subsequent Annual Wellness visit (Pt already had Initial Annual Wellness).      Cassie Orr is a 68-year-old female who is accompanied with her  who does majority of the lanie on things, such as reading the newspaper or watching television: Nearly every day  8. Moving or speaking so slowly that other people could have noticed.  Or the opposite - being so fidgety or restless that you have been moving around a lot more than usual: visits 6/26/18 patient is taking Sensipar for elevated calcium related to hyperparathyroidism unsuccessfully treated with surgery  Patient has a history of nephrolithiasis, elevated calcium, depression and anxiety, chronic pain in back and joint pain  On K anal pressure  Off of the PPI has renal insufficiency taking famotidine 40 mg in the morning  Zofran as needed for nausea  Cosamin as needed for abdominal cramping though told to avoid if having constipation  Uses MiraLAX and Linzess 72 mcg.   Patient admit incontinence/vaginal atrophy  History of kidney stones  Saw urologist  12/17/2021  Had URS/LL with Dr. Broderick Falk 2021  CT scanning ordered 12/20/2021 discussed with urologist bilateral nephrolithiasis  Per Dr Arellano>>>>>Your CT scan shows some mild stabl as reading the newspaper or watching television: Nearly every day  If you checked off any problems, how difficult have these problems made it for you to do your work, take care of things at home, or get along with other people?: Not difficult at all Chronic abdominal pain     Rectal pressure     S/P insertion of spinal cord stimulator     History of lumbar spinal fusion    Allergies:  She is allergic to penicillins, bactrim, sulfa drugs cross reactors, and yeast-related.     Current Medications:  sert for Itching.         Medical History:  She  has a past medical history of Abdominal distention, Abdominal pain, VINNY (acute kidney injury) ([facility] [address] Anxiety state, unspecified, Arthritis, Atherosclerosis of coronary artery, Autoimmune disease ([facility] [address] Back pain, lumbar/sacral (2/26/2013); m-sedaj by sm phys perfrmg svc 5+ yr (2/26/2013); fluor gid & loclzj ndl/cath spi dx/ther njx (2/26/2013); patient withough preoperative order for iv antibiotic surgical site infection prophylaxis.  (2/26/2013); patient documented Cigarettes        Quit date: 1997        Years since quittin.0      Smokeless tobacco: Never Used         CAGE Alcohol Screen:   CAGE screening score of 0 on 2022, showing low risk of alcohol abuse.       Patient Care Team:  Michael García, frequency, urgency or burning  MUSCULOSKELETAL: Chronic significant lower back pain affects her psyche and radiating pain into left leg chronic  NEURO: denies headaches  PSYCHE: Has depression and anxiety over health issues constantly worrying especially a Pelvic: Deferred   Extremities: Extremities normal, atraumatic, no cyanosis or edema   Pulses: 1+ and symmetric   Skin: Skin color, texture, turgor normal, no rashes or lesions   Lymph nodes: Cervical, supraclavicular, and axillary nodes normal   Neurolo extra medication for chronic constipation  unless directed by GI   Rectal eye exam, dental exams, advanced directives, booster for COVID 2/20/2022  Get mammogram and bone density  2.  Mixed hyperlipidemia  Continue with pravastatin 40 mg  -     Lipid Panel; INTERNAL  Keep appointment with psychologist for full neuropsych eval follow-up with neurologist after  16. Mixed stress and urge urinary incontinence  Follow-up with urology as needed  17.  Chronic renal insufficiency, stage 3 (moderate) (HCC)   Follow-up with MiraLAX, 16 ounces of water per day avoid laxatives/magnesium citrate and suppositories  Discussed BM every 3 days is normal.  34. Chronic abdominal pain  Related to constipation and anxiety.   Trial of lidocaine patch during the day/heating pad and CB maintain positive mental well-being?: Visiting Family  On a scale of 0 to 10, with 0 being no pain and 10 being severe pain, what is your pain level?: 8 - (Severe)  In the past six months, have you experienced urine leakage?: 0-No  At any time do you feel risk or previous colonoscopy was abnormal 05/22/2019    No recommendations at this time    Flexible Sigmoidoscopy   Covered every 4 years -    Fecal Occult Blood Test Covered annually 04/24/2021   Bone Density Screening    Bone density screening    Covered CREATSERUM 1.11 (H) 10/29/2021         BUN Annually Lab Results   Component Value Date    BUN 10 10/29/2021       Drug Serum Conc Annually No results found for: DIGOXIN, DIG, VALP

## 2022-01-24 NOTE — PATIENT INSTRUCTIONS
Routine Healthcare for Women   Routine checkups can find treatable problems early. For many medical problems, early treatment can help prevent more serious complications. The value of checkups and how often you have them depend mainly on your age.  Your per family history of high cholesterol. • Colorectal cancer test: if you are 48 or older.  Recommended tests include a yearly test for blood in the stool, called the fecal occult blood test (FOBT) or fecal immunochemical test (FIT), and one of the following t personal and family medical history. Many other tests are often done at routine checkups, but there is no current evidence that they are helpful as routine screening tests for healthy women.  Examples of such tests are a CBC (complete blood count), thyroi a younger age if you have a high-risk medical condition, such as diabetes. • Varicella (chickenpox) if you have never had chickenpox. • Zoster (shingles) vaccine: if you are 50 or older. The vaccine can help prevent shingles.  It can also reduce the emmanuel recommended by your physician but may not be covered, or covered at this frequency, by your insurer. Please check with your insurance carrier before scheduling to verify coverage.    PREVENTATIVE SERVICES FREQUENCY &  COVERAGE DETAILS LAST COMPLETION DATE Covered every 2 years for women at normal risk;  Annually if at high risk -  No recommendations at this time    Chlamydia Annually if high risk 04/24/2021  No recommendations at this time   Screening Mammogram    Mammogram     Recommend annually for all fem

## 2022-01-25 PROBLEM — B37.9 YEAST INFECTION: Status: RESOLVED | Noted: 2021-11-07 | Resolved: 2022-01-25

## 2022-01-25 PROBLEM — N23 RENAL COLIC: Status: RESOLVED | Noted: 2021-03-23 | Resolved: 2022-01-25

## 2022-01-25 PROBLEM — R93.2: Status: RESOLVED | Noted: 2021-11-07 | Resolved: 2022-01-25

## 2022-01-25 PROBLEM — E87.2 METABOLIC ACIDOSIS: Status: RESOLVED | Noted: 2021-10-28 | Resolved: 2022-01-25

## 2022-01-25 PROBLEM — N28.9 KIDNEY LESION: Status: RESOLVED | Noted: 2021-11-07 | Resolved: 2022-01-25

## 2022-01-25 PROBLEM — Z96.89 S/P INSERTION OF SPINAL CORD STIMULATOR: Status: ACTIVE | Noted: 2022-01-25

## 2022-01-25 PROBLEM — A04.72 CLOSTRIDIUM DIFFICILE COLITIS: Status: RESOLVED | Noted: 2021-10-28 | Resolved: 2022-01-25

## 2022-01-25 PROBLEM — N17.9 ACUTE KIDNEY INJURY: Status: RESOLVED | Noted: 2021-10-28 | Resolved: 2022-01-25

## 2022-01-25 PROBLEM — E87.20 METABOLIC ACIDOSIS: Status: RESOLVED | Noted: 2021-10-28 | Resolved: 2022-01-25

## 2022-01-25 PROBLEM — Z98.1 HISTORY OF LUMBAR SPINAL FUSION: Status: ACTIVE | Noted: 2022-01-25

## 2022-01-25 PROBLEM — K25.3 ACUTE GASTRIC EROSION: Status: RESOLVED | Noted: 2019-08-29 | Resolved: 2022-01-25

## 2022-01-25 PROBLEM — N28.9 RENAL INSUFFICIENCY: Status: RESOLVED | Noted: 2021-10-28 | Resolved: 2022-01-25

## 2022-01-25 PROBLEM — K29.01 ACUTE GASTRITIS WITH BLEEDING: Status: RESOLVED | Noted: 2021-10-12 | Resolved: 2022-01-25

## 2022-01-25 PROBLEM — K59.04 CHRONIC IDIOPATHIC CONSTIPATION: Status: RESOLVED | Noted: 2021-10-09 | Resolved: 2022-01-25

## 2022-01-25 PROBLEM — R10.2 SUPRAPUBIC PAIN, ACUTE: Status: RESOLVED | Noted: 2021-08-19 | Resolved: 2022-01-25

## 2022-01-25 PROBLEM — R10.84 GENERALIZED ABDOMINAL PAIN: Status: RESOLVED | Noted: 2019-05-22 | Resolved: 2022-01-25

## 2022-01-25 PROBLEM — N76.0 VAGINITIS AND VULVOVAGINITIS: Status: RESOLVED | Noted: 2021-08-21 | Resolved: 2022-01-25

## 2022-01-25 PROBLEM — F43.0 ACUTE STRESS REACTION: Status: RESOLVED | Noted: 2020-08-08 | Resolved: 2022-01-25

## 2022-01-25 PROBLEM — N17.9 ACUTE KIDNEY INJURY (HCC): Status: RESOLVED | Noted: 2021-10-28 | Resolved: 2022-01-25

## 2022-01-25 PROBLEM — K55.9 ISCHEMIC COLITIS (HCC): Status: RESOLVED | Noted: 2021-10-28 | Resolved: 2022-01-25

## 2022-01-25 PROBLEM — K25.0 ACUTE GASTRIC ULCER WITH HEMORRHAGE: Status: RESOLVED | Noted: 2021-06-01 | Resolved: 2022-01-25

## 2022-01-28 ENCOUNTER — TELEPHONE (OUTPATIENT)
Dept: FAMILY MEDICINE CLINIC | Facility: CLINIC | Age: 82
End: 2022-01-28

## 2022-01-28 NOTE — TELEPHONE ENCOUNTER
Advised patient to drink plenty of fluids. Advised her ok for her thermocare heat pad. Advised to follow a bland diet. Advised not to take any anti-diarrheals.   Advised to call on Monday if the loose stools persist.

## 2022-01-28 NOTE — TELEPHONE ENCOUNTER
Patient states she is having a belly ache & diarrhea all morning, having pain, would like a call asap, please advise.

## 2022-02-02 RX ORDER — CLONAZEPAM 0.5 MG/1
TABLET ORAL
Qty: 60 TABLET | Refills: 0 | Status: SHIPPED | OUTPATIENT
Start: 2022-02-02 | End: 2022-03-10

## 2022-02-07 ENCOUNTER — TELEPHONE (OUTPATIENT)
Dept: FAMILY MEDICINE CLINIC | Facility: CLINIC | Age: 82
End: 2022-02-07

## 2022-02-07 NOTE — TELEPHONE ENCOUNTER
Message sent to [address] encouraging her to use the lidocaine patch and heating pad for the pain and to avoid any other medications for bowel movements. She is to keep the appointment with the gastroenterologist tomorrow and did tell her again that bowel movements every 3 to 4 days would be successful. She needs to quit using multiple medications when she feels she is constipated.

## 2022-02-07 NOTE — TELEPHONE ENCOUNTER
Patient states they are having abdominal pain. 1. Where are the pains? lower  2. How long have you been having pain? All morning suppository took  1 hr ago  3. Is the pain constant or intermittent? constant  4. On a scale of 0 to 10 with 10 being the worst, please rate your pain? 8 or 9  5. Do you have a fever? no  6. Are you vomiting? no  7. Are you nauseous? no  8. Do you have diarrhea or constipation? bm yesterday regular, diarrhea this am  9. Are you eating or drinking anything? Taking smooth move a tea to make you have BM and pt took Linzess this AM   9. Any other symptoms?no      Pt has appt with GI tomorrow. Pt called GI and there was no call back. Referred patient to the ER for immediate evaluation based on patients symptoms. Routed to the Provider for review of above recommendations.

## 2022-02-08 RX ORDER — TRAZODONE HYDROCHLORIDE 50 MG/1
TABLET ORAL
Qty: 90 TABLET | Refills: 1 | Status: SHIPPED | OUTPATIENT
Start: 2022-02-08

## 2022-02-08 NOTE — TELEPHONE ENCOUNTER
traZODone 50 MG Oral Tab 90 tablet 1 8/18/2021     Sig - Route: Take 1 tablet (50 mg total) by mouth nightly.  - Oral      LOV 1/24/22  FOV 4/25/22

## 2022-02-10 ENCOUNTER — TELEPHONE (OUTPATIENT)
Dept: FAMILY MEDICINE CLINIC | Facility: CLINIC | Age: 82
End: 2022-02-10

## 2022-02-10 NOTE — TELEPHONE ENCOUNTER
Recommendations given. Emotional support offered. She says her appointment with Dr. Diane Zhang is now April 4 because she was sick the day she was supposed to go.

## 2022-02-10 NOTE — TELEPHONE ENCOUNTER
Pt called and said she has a real bad belly ache and she can't go to the bathroom and she wants to know what she can take.

## 2022-02-10 NOTE — TELEPHONE ENCOUNTER
Patient c/o abdominal pain and constipation. Lower abd, under umbilicus. Started this morning. States she had a normal BM yesterday and felt good yesterday. Denies nausea. Denies fever. Says she took a colace at noon. See notes from [address] [place] [address] on 2/8/22. I asked patient if she took mag citrate as she said she had at home and she did not recall anything about that discussion with Fani Merino Last.  I advised to use the lidocaine and heat patches. I advised on calming tech d/t her anxiety. Please advise further if needed.

## 2022-02-10 NOTE — TELEPHONE ENCOUNTER
Agree with her following the recommendations that were given to her at the gastroenterologist office. Also have her make appointment with neurologist after the neuropsych testing is complete to discuss the memory issues.

## 2022-02-11 ENCOUNTER — TELEPHONE (OUTPATIENT)
Dept: FAMILY MEDICINE CLINIC | Facility: CLINIC | Age: 82
End: 2022-02-11

## 2022-02-11 NOTE — TELEPHONE ENCOUNTER
Pt called and said she has a headache and swollen glands and she would like an antibiotic sent to [place].

## 2022-02-11 NOTE — TELEPHONE ENCOUNTER
Spoke to patient. States a sore throat started this morning. Advised symptomatic treatment for now. Tylenol. Throat lozenges. Honey. Cool drinks. Advised if symptoms worsen or other symptoms appear such as fever, to proceed to an IC for evaluation. Soco CHEN

## 2022-02-15 ENCOUNTER — TELEPHONE (OUTPATIENT)
Dept: FAMILY MEDICINE CLINIC | Facility: CLINIC | Age: 82
End: 2022-02-15

## 2022-02-15 ENCOUNTER — APPOINTMENT (OUTPATIENT)
Dept: GENERAL RADIOLOGY | Age: 82
End: 2022-02-15
Attending: EMERGENCY MEDICINE
Payer: MEDICARE

## 2022-02-15 ENCOUNTER — VIRTUAL PHONE E/M (OUTPATIENT)
Dept: PAIN CLINIC | Facility: CLINIC | Age: 82
End: 2022-02-15
Payer: MEDICARE

## 2022-02-15 ENCOUNTER — HOSPITAL ENCOUNTER (EMERGENCY)
Age: 82
Discharge: HOME | End: 2022-02-15
Attending: EMERGENCY MEDICINE
Payer: MEDICARE

## 2022-02-15 VITALS
TEMPERATURE: 97 F | WEIGHT: 117 LBS | DIASTOLIC BLOOD PRESSURE: 62 MMHG | RESPIRATION RATE: 16 BRPM | HEART RATE: 60 BPM | BODY MASS INDEX: 20 KG/M2 | OXYGEN SATURATION: 98 % | SYSTOLIC BLOOD PRESSURE: 181 MMHG

## 2022-02-15 DIAGNOSIS — G89.29 CHRONIC BILATERAL LOWER ABDOMINAL PAIN: Primary | ICD-10-CM

## 2022-02-15 DIAGNOSIS — M54.16 LUMBAR RADICULOPATHY: Primary | ICD-10-CM

## 2022-02-15 DIAGNOSIS — R10.31 CHRONIC BILATERAL LOWER ABDOMINAL PAIN: Primary | ICD-10-CM

## 2022-02-15 DIAGNOSIS — R10.32 CHRONIC BILATERAL LOWER ABDOMINAL PAIN: Primary | ICD-10-CM

## 2022-02-15 PROCEDURE — 99442 PHONE E/M BY PHYS 11-20 MIN: CPT | Performed by: ANESTHESIOLOGY

## 2022-02-15 PROCEDURE — 99283 EMERGENCY DEPT VISIT LOW MDM: CPT

## 2022-02-15 PROCEDURE — 74018 RADEX ABDOMEN 1 VIEW: CPT | Performed by: EMERGENCY MEDICINE

## 2022-02-15 RX ORDER — DICYCLOMINE HCL 20 MG
20 TABLET ORAL ONCE
Status: COMPLETED | OUTPATIENT
Start: 2022-02-15 | End: 2022-02-15

## 2022-02-15 NOTE — TELEPHONE ENCOUNTER
The patient is calling back to say that she is in terrible pain with her bellyache and is wondering if she can take an enema or take something for the pain. The patient stated that she feels like she can't go to bathroom. She stated that she does remember telling us earlier that she had diarrhea, but the diarrhea was early this morning around 3 am.  She does remember talking to [facility] regarding recommendations, but she would like to speak with her again because she cannot tolerate the pain.

## 2022-02-15 NOTE — TELEPHONE ENCOUNTER
The patient called to report worsening pain, which she describes as a bellyache across her lower abdomen with one bout of diarrhea so far. She stated that the pain just started this morning at 9 am.  She denies any vomiting, fever, runny nose, stuffy nose, cough, and shortness of breath. She would like to be called back soon at [phone].

## 2022-02-15 NOTE — TELEPHONE ENCOUNTER
Spoke to patient. She says she can't go to the bathroom and she is in horrible pain. States the pain is worse than usual.  Advised she told me earlier she had several diarrhea stools today after waking up this morning. Now patient is saying they were in the middle of the night. C/o pain under her umbilicus. Denies vomiting. Denies fever. Advised if she thinks this pain is worse than usual and not her IBS pain, she should proceed to IC for evaluation. Soco CAT.

## 2022-02-15 NOTE — ED QUICK NOTES
Pt states she had diarrhea this morning and then used two enemas.  Pt asking for pain medications on arrival, pt was informed that she would need to be evaluated by the doctor first. [facility]

## 2022-02-15 NOTE — TELEPHONE ENCOUNTER
Symptoms are as described below. States has had a couple diarrhea stools today. Has cramping. Advised to eat foods such as crackers an bananas to help bind. Advised warm pack for her cramping. Relaxation tech provided. Advised to call us tomorrow if diarrhea continues. She SETPHANIA.

## 2022-02-16 NOTE — ED QUICK NOTES
Pt walking around unit, c/o continued pain. Pt encouraged to return to her room and MD was notified.

## 2022-02-17 ENCOUNTER — TELEPHONE (OUTPATIENT)
Dept: FAMILY MEDICINE CLINIC | Facility: CLINIC | Age: 82
End: 2022-02-17

## 2022-02-20 ENCOUNTER — HOSPITAL ENCOUNTER (INPATIENT)
Facility: HOSPITAL | Age: 82
LOS: 3 days | Discharge: HOME | DRG: 918 | End: 2022-02-23
Attending: EMERGENCY MEDICINE | Admitting: HOSPITALIST
Payer: MEDICARE

## 2022-02-20 ENCOUNTER — APPOINTMENT (OUTPATIENT)
Dept: CT IMAGING | Facility: HOSPITAL | Age: 82
DRG: 918 | End: 2022-02-20
Attending: EMERGENCY MEDICINE
Payer: MEDICARE

## 2022-02-20 DIAGNOSIS — R19.7 NAUSEA VOMITING AND DIARRHEA: ICD-10-CM

## 2022-02-20 DIAGNOSIS — R11.2 NAUSEA VOMITING AND DIARRHEA: ICD-10-CM

## 2022-02-20 DIAGNOSIS — E87.6 HYPOKALEMIA: ICD-10-CM

## 2022-02-20 DIAGNOSIS — E87.2 METABOLIC ACIDOSIS: ICD-10-CM

## 2022-02-20 DIAGNOSIS — R10.84 ABDOMINAL PAIN, GENERALIZED: ICD-10-CM

## 2022-02-20 DIAGNOSIS — T50.901A ACCIDENTAL OVERDOSE, INITIAL ENCOUNTER: Primary | ICD-10-CM

## 2022-02-20 DIAGNOSIS — D72.829 LEUKOCYTOSIS, UNSPECIFIED TYPE: ICD-10-CM

## 2022-02-20 DIAGNOSIS — E87.2 LACTIC ACIDOSIS: ICD-10-CM

## 2022-02-20 PROBLEM — E87.20 LACTIC ACIDOSIS: Status: ACTIVE | Noted: 2022-02-20

## 2022-02-20 PROBLEM — E87.20 METABOLIC ACIDOSIS: Status: ACTIVE | Noted: 2022-02-20

## 2022-02-20 LAB
ADENOVIRUS F 40/41 PCR: NEGATIVE
ALBUMIN SERPL-MCNC: 4.1 G/DL (ref 3.4–5)
ALBUMIN SERPL-MCNC: 4.4 G/DL (ref 3.4–5)
ALBUMIN/GLOB SERPL: 1.1 {RATIO} (ref 1–2)
ALBUMIN/GLOB SERPL: 1.1 {RATIO} (ref 1–2)
ALP LIVER SERPL-CCNC: 93 U/L
ALP LIVER SERPL-CCNC: 96 U/L
ALT SERPL-CCNC: 24 U/L
ALT SERPL-CCNC: 27 U/L
AMPHET UR QL SCN: NEGATIVE
ANION GAP SERPL CALC-SCNC: 13 MMOL/L (ref 0–18)
ANION GAP SERPL CALC-SCNC: 15 MMOL/L (ref 0–18)
APAP SERPL-MCNC: 3 UG/ML (ref 10–30)
AST SERPL-CCNC: 30 U/L (ref 15–37)
AST SERPL-CCNC: 43 U/L (ref 15–37)
ASTROVIRUS PCR: NEGATIVE
BARBITURATES UR QL SCN: NEGATIVE
BASOPHILS # BLD AUTO: 0.02 X10(3) UL (ref 0–0.2)
BASOPHILS # BLD AUTO: 0.05 X10(3) UL (ref 0–0.2)
BASOPHILS NFR BLD AUTO: 0.1 %
BASOPHILS NFR BLD AUTO: 0.2 %
BENZODIAZ UR QL SCN: NEGATIVE
BILIRUB SERPL-MCNC: 0.7 MG/DL (ref 0.1–2)
BILIRUB SERPL-MCNC: 1.1 MG/DL (ref 0.1–2)
BILIRUB UR QL STRIP.AUTO: NEGATIVE
BUN BLD-MCNC: 11 MG/DL (ref 7–18)
BUN BLD-MCNC: 12 MG/DL (ref 7–18)
C CAYETANENSIS DNA SPEC QL NAA+PROBE: NEGATIVE
C DIFF TOX B STL QL: POSITIVE
CALCIUM BLD-MCNC: 10.1 MG/DL (ref 8.5–10.1)
CALCIUM BLD-MCNC: 10.6 MG/DL (ref 8.5–10.1)
CANNABINOIDS UR QL SCN: NEGATIVE
CHLORIDE SERPL-SCNC: 91 MMOL/L (ref 98–112)
CHLORIDE SERPL-SCNC: 98 MMOL/L (ref 98–112)
CLARITY UR REFRACT.AUTO: CLEAR
CO2 SERPL-SCNC: 17 MMOL/L (ref 21–32)
CO2 SERPL-SCNC: 18 MMOL/L (ref 21–32)
COCAINE UR QL: NEGATIVE
COLOR UR AUTO: COLORLESS
CREAT BLD-MCNC: 0.93 MG/DL
CREAT BLD-MCNC: 1.12 MG/DL
CREAT UR-SCNC: 14 MG/DL
CRYPTOSP DNA SPEC QL NAA+PROBE: NEGATIVE
EAEC PAA PLAS AGGR+AATA ST NAA+NON-PRB: NEGATIVE
EC STX1+STX2 + H7 FLIC SPEC NAA+PROBE: NEGATIVE
ENTAMOEBA HISTOLYTICA PCR: NEGATIVE
EOSINOPHIL # BLD AUTO: 0 X10(3) UL (ref 0–0.7)
EOSINOPHIL # BLD AUTO: 0 X10(3) UL (ref 0–0.7)
EOSINOPHIL NFR BLD AUTO: 0 %
EOSINOPHIL NFR BLD AUTO: 0 %
EPEC EAE GENE STL QL NAA+NON-PROBE: NEGATIVE
ERYTHROCYTE [DISTWIDTH] IN BLOOD BY AUTOMATED COUNT: 12.4 %
ERYTHROCYTE [DISTWIDTH] IN BLOOD BY AUTOMATED COUNT: 12.5 %
ETEC LTA+ST1A+ST1B TOX ST NAA+NON-PROBE: NEGATIVE
GIARDIA LAMBLIA PCR: NEGATIVE
GLOBULIN PLAS-MCNC: 3.7 G/DL (ref 2.8–4.4)
GLOBULIN PLAS-MCNC: 3.9 G/DL (ref 2.8–4.4)
GLUCOSE BLD-MCNC: 109 MG/DL (ref 70–99)
GLUCOSE BLD-MCNC: 128 MG/DL (ref 70–99)
GLUCOSE UR STRIP.AUTO-MCNC: NEGATIVE MG/DL
HCT VFR BLD AUTO: 36.6 %
HCT VFR BLD AUTO: 37.4 %
HGB BLD-MCNC: 13 G/DL
HGB BLD-MCNC: 13 G/DL
IMM GRANULOCYTES # BLD AUTO: 0.1 X10(3) UL (ref 0–1)
IMM GRANULOCYTES # BLD AUTO: 0.12 X10(3) UL (ref 0–1)
IMM GRANULOCYTES NFR BLD: 0.4 %
IMM GRANULOCYTES NFR BLD: 0.5 %
INR BLD: 1.09 (ref 0.8–1.2)
KETONES UR STRIP.AUTO-MCNC: NEGATIVE MG/DL
LACTATE SERPL-SCNC: 1.9 MMOL/L (ref 0.4–2)
LACTATE SERPL-SCNC: 2.2 MMOL/L (ref 0.4–2)
LEUKOCYTE ESTERASE UR QL STRIP.AUTO: NEGATIVE
LIPASE SERPL-CCNC: 92 U/L (ref 73–393)
LYMPHOCYTES # BLD AUTO: 1.11 X10(3) UL (ref 1–4)
LYMPHOCYTES # BLD AUTO: 1.94 X10(3) UL (ref 1–4)
LYMPHOCYTES NFR BLD AUTO: 4.9 %
LYMPHOCYTES NFR BLD AUTO: 8.6 %
MCH RBC QN AUTO: 31.7 PG (ref 26–34)
MCH RBC QN AUTO: 32.3 PG (ref 26–34)
MCHC RBC AUTO-ENTMCNC: 34.8 G/DL (ref 31–37)
MCHC RBC AUTO-ENTMCNC: 35.5 G/DL (ref 31–37)
MCV RBC AUTO: 91 FL
MCV RBC AUTO: 91.2 FL
MDMA UR QL SCN: NEGATIVE
METHADONE UR QL SCN: NEGATIVE
MONOCYTES # BLD AUTO: 1.29 X10(3) UL (ref 0.1–1)
MONOCYTES # BLD AUTO: 1.97 X10(3) UL (ref 0.1–1)
MONOCYTES NFR BLD AUTO: 5.7 %
MONOCYTES NFR BLD AUTO: 8.7 %
NEUTROPHILS # BLD AUTO: 18.48 X10 (3) UL (ref 1.5–7.7)
NEUTROPHILS # BLD AUTO: 18.48 X10(3) UL (ref 1.5–7.7)
NEUTROPHILS # BLD AUTO: 20.29 X10 (3) UL (ref 1.5–7.7)
NEUTROPHILS # BLD AUTO: 20.29 X10(3) UL (ref 1.5–7.7)
NEUTROPHILS NFR BLD AUTO: 82 %
NEUTROPHILS NFR BLD AUTO: 88.9 %
NITRITE UR QL STRIP.AUTO: NEGATIVE
NOROVIRUS GI/GII PCR: NEGATIVE
OSMOLALITY SERPL CALC.SUM OF ELEC: 259 MOSM/KG (ref 275–295)
OSMOLALITY SERPL CALC.SUM OF ELEC: 266 MOSM/KG (ref 275–295)
OXYCODONE UR QL SCN: NEGATIVE
P SHIGELLOIDES DNA STL QL NAA+PROBE: NEGATIVE
PCP UR QL SCN: NEGATIVE
PH UR STRIP.AUTO: 7 [PH] (ref 5–8)
PLATELET # BLD AUTO: 319 10(3)UL (ref 150–450)
PLATELET # BLD AUTO: 331 10(3)UL (ref 150–450)
POTASSIUM SERPL-SCNC: 2.8 MMOL/L (ref 3.5–5.1)
POTASSIUM SERPL-SCNC: 3.4 MMOL/L (ref 3.5–5.1)
POTASSIUM SERPL-SCNC: 3.6 MMOL/L (ref 3.5–5.1)
PROCALCITONIN SERPL-MCNC: <0.05 NG/ML (ref ?–0.16)
PROT SERPL-MCNC: 7.8 G/DL (ref 6.4–8.2)
PROT SERPL-MCNC: 8.3 G/DL (ref 6.4–8.2)
PROT UR STRIP.AUTO-MCNC: 30 MG/DL
PROTHROMBIN TIME: 14.1 SECONDS (ref 11.6–14.8)
RBC # BLD AUTO: 4.02 X10(6)UL
RBC # BLD AUTO: 4.1 X10(6)UL
ROTAVIRUS A PCR: NEGATIVE
SALMONELLA DNA SPEC QL NAA+PROBE: NEGATIVE
SAPOVIRUS PCR: NEGATIVE
SARS-COV-2 RNA RESP QL NAA+PROBE: NOT DETECTED
SHIGELLA SP+EIEC IPAH ST NAA+NON-PROBE: NEGATIVE
SODIUM SERPL-SCNC: 124 MMOL/L (ref 136–145)
SODIUM SERPL-SCNC: 128 MMOL/L (ref 136–145)
SP GR UR STRIP.AUTO: 1.01 (ref 1–1.03)
UROBILINOGEN UR STRIP.AUTO-MCNC: <2 MG/DL
V CHOLERAE DNA SPEC QL NAA+PROBE: NEGATIVE
VIBRIO DNA SPEC NAA+PROBE: NEGATIVE
WBC # BLD AUTO: 22.6 X10(3) UL (ref 4–11)
WBC # BLD AUTO: 22.8 X10(3) UL (ref 4–11)
YERSINIA DNA SPEC NAA+PROBE: NEGATIVE

## 2022-02-20 PROCEDURE — 74177 CT ABD & PELVIS W/CONTRAST: CPT | Performed by: EMERGENCY MEDICINE

## 2022-02-20 PROCEDURE — 99223 1ST HOSP IP/OBS HIGH 75: CPT | Performed by: HOSPITALIST

## 2022-02-20 RX ORDER — CINACALCET 30 MG/1
30 TABLET, FILM COATED ORAL
Status: DISCONTINUED | OUTPATIENT
Start: 2022-02-20 | End: 2022-02-23

## 2022-02-20 RX ORDER — MORPHINE SULFATE 4 MG/ML
INJECTION, SOLUTION INTRAMUSCULAR; INTRAVENOUS
Status: DISPENSED
Start: 2022-02-20 | End: 2022-02-20

## 2022-02-20 RX ORDER — METRONIDAZOLE 500 MG/100ML
500 INJECTION, SOLUTION INTRAVENOUS ONCE
Status: DISCONTINUED | OUTPATIENT
Start: 2022-02-20 | End: 2022-02-20

## 2022-02-20 RX ORDER — SERTRALINE HYDROCHLORIDE 100 MG/1
100 TABLET, FILM COATED ORAL DAILY
Status: DISCONTINUED | OUTPATIENT
Start: 2022-02-20 | End: 2022-02-23

## 2022-02-20 RX ORDER — MORPHINE SULFATE 4 MG/ML
4 INJECTION, SOLUTION INTRAMUSCULAR; INTRAVENOUS ONCE
Status: COMPLETED | OUTPATIENT
Start: 2022-02-20 | End: 2022-02-20

## 2022-02-20 RX ORDER — LEVOFLOXACIN 5 MG/ML
750 INJECTION, SOLUTION INTRAVENOUS ONCE
Status: DISCONTINUED | OUTPATIENT
Start: 2022-02-20 | End: 2022-02-20

## 2022-02-20 RX ORDER — ONDANSETRON 2 MG/ML
4 INJECTION INTRAMUSCULAR; INTRAVENOUS EVERY 4 HOURS PRN
Status: ACTIVE | OUTPATIENT
Start: 2022-02-20 | End: 2022-02-20

## 2022-02-20 RX ORDER — SODIUM CHLORIDE 9 MG/ML
INJECTION, SOLUTION INTRAVENOUS CONTINUOUS
Status: DISCONTINUED | OUTPATIENT
Start: 2022-02-20 | End: 2022-02-22

## 2022-02-20 RX ORDER — KETOROLAC TROMETHAMINE 15 MG/ML
15 INJECTION, SOLUTION INTRAMUSCULAR; INTRAVENOUS EVERY 6 HOURS PRN
Status: DISPENSED | OUTPATIENT
Start: 2022-02-20 | End: 2022-02-22

## 2022-02-20 RX ORDER — ONDANSETRON 2 MG/ML
4 INJECTION INTRAMUSCULAR; INTRAVENOUS EVERY 6 HOURS PRN
Status: DISCONTINUED | OUTPATIENT
Start: 2022-02-20 | End: 2022-02-23

## 2022-02-20 RX ORDER — MORPHINE SULFATE 4 MG/ML
4 INJECTION, SOLUTION INTRAMUSCULAR; INTRAVENOUS EVERY 30 MIN PRN
Status: ACTIVE | OUTPATIENT
Start: 2022-02-20 | End: 2022-02-20

## 2022-02-20 RX ORDER — IOHEXOL 350 MG/ML
80 INJECTION, SOLUTION INTRAVENOUS
Status: COMPLETED | OUTPATIENT
Start: 2022-02-20 | End: 2022-02-20

## 2022-02-20 RX ORDER — HYDROMORPHONE HYDROCHLORIDE 1 MG/ML
0.5 INJECTION, SOLUTION INTRAMUSCULAR; INTRAVENOUS; SUBCUTANEOUS ONCE
Status: COMPLETED | OUTPATIENT
Start: 2022-02-20 | End: 2022-02-20

## 2022-02-20 RX ORDER — HYDRALAZINE HYDROCHLORIDE 20 MG/ML
10 INJECTION INTRAMUSCULAR; INTRAVENOUS EVERY 4 HOURS PRN
Status: DISCONTINUED | OUTPATIENT
Start: 2022-02-20 | End: 2022-02-23

## 2022-02-20 RX ORDER — FAMOTIDINE 20 MG/1
20 TABLET, FILM COATED ORAL
Status: DISCONTINUED | OUTPATIENT
Start: 2022-02-20 | End: 2022-02-22

## 2022-02-20 RX ORDER — ONDANSETRON 2 MG/ML
4 INJECTION INTRAMUSCULAR; INTRAVENOUS ONCE
Status: COMPLETED | OUTPATIENT
Start: 2022-02-20 | End: 2022-02-20

## 2022-02-20 RX ORDER — POTASSIUM CHLORIDE 20 MEQ/1
40 TABLET, EXTENDED RELEASE ORAL EVERY 4 HOURS
Status: DISPENSED | OUTPATIENT
Start: 2022-02-20 | End: 2022-02-20

## 2022-02-20 RX ORDER — AMLODIPINE BESYLATE 5 MG/1
10 TABLET ORAL DAILY
Status: DISCONTINUED | OUTPATIENT
Start: 2022-02-20 | End: 2022-02-23

## 2022-02-20 RX ORDER — TRAZODONE HYDROCHLORIDE 50 MG/1
50 TABLET ORAL NIGHTLY
Status: DISCONTINUED | OUTPATIENT
Start: 2022-02-20 | End: 2022-02-23

## 2022-02-20 RX ORDER — ACETAMINOPHEN 325 MG/1
650 TABLET ORAL EVERY 6 HOURS PRN
Status: DISCONTINUED | OUTPATIENT
Start: 2022-02-20 | End: 2022-02-23

## 2022-02-20 RX ORDER — ENOXAPARIN SODIUM 100 MG/ML
40 INJECTION SUBCUTANEOUS DAILY
Status: DISCONTINUED | OUTPATIENT
Start: 2022-02-20 | End: 2022-02-23

## 2022-02-20 RX ORDER — CLONAZEPAM 0.5 MG/1
0.5 TABLET ORAL 2 TIMES DAILY PRN
Status: DISCONTINUED | OUTPATIENT
Start: 2022-02-20 | End: 2022-02-23

## 2022-02-20 NOTE — ED QUICK NOTES
Poison control called and writer spoke with eddie and she stated that the patient will have abd pain and loose stool, but no particular action needs to be taken

## 2022-02-20 NOTE — ED INITIAL ASSESSMENT (HPI)
Pt states took unknown amount of vitamin c, ibs peppermint pills, and a bottle of mag citrate. To ed with complaints of vomiting and abdominal pain.  Pt took pills because she was having trouble with bowel movements, denies attempt to hurt herself

## 2022-02-20 NOTE — ED QUICK NOTES
Patient concerned regarding abx because the last time she had abx she had c diff.  MD aware and states to hold abx at this time

## 2022-02-20 NOTE — PLAN OF CARE
Problem: Patient/Family Goals  Goal: Patient/Family Long Term Goal  Description: Patient's Long Term Goal:     Interventions:  -   - See additional Care Plan goals for specific interventions  Outcome: Progressing  Goal: Patient/Family Short Term Goal  Description: Patient's Short Term Goal:     Interventions:   - See additional Care Plan goals for specific interventions  Outcome: Progressing

## 2022-02-20 NOTE — PROGRESS NOTES
[facility] Note: Renal dose adjustment for Famotidine (Pepcid)  Jose Oliva has been prescribed Famotidine (Pepcid) 40 mg every 24 hours. Estimated Creatinine Clearance: 33.6 mL/min (A) (based on SCr of 1.12 mg/dL (H)). Her calculated creatinine clearance is less than 50 ml/min, therefore, the dose of Famotidine (Pepcid) has been decreased to 20 mg once daily per protocol.     Thank you,   Carlito Cano, [facility]  2/20/2022,  5:09 AM

## 2022-02-20 NOTE — PROGRESS NOTES
Pt arrived to unit from ed. AOx2, very forgetful, per  its pt baseline. Pt hypertensive from ED, scheduled medication given along with pain medication. MD made aware, states to see if pain medication and scheduled medication helps decrease. Pt reporting abd cramping, diarrhea, NV. PRNs given. Navigator and home meds completed with  and pt. Pt impulsive, camera placed for safety. IVF. Updated pt on POC for NOC. Will follow.

## 2022-02-20 NOTE — PROGRESS NOTES
Multidisciplinary Discharge Rounds held 2/20/2022. Treatment team members present today include , , Charge Nurse, Nurse, RT, PT and Pharmacy caring for [employer]. Other care providers present:    Mobility Goal: Up to chair TID    Readmission Risk:     [] Low     [x] Medium     [] High    Active issue(s) requiring resolution prior to discharge: No abdominal pain. Tolerating diet. Anticipated discharge date: TBD    Current discharge plan: Home with ? F/U appointment scheduled within 7 days. .. [x] [facility] ([facility])     [] Pulmonologist     [x] Primary Care Physician     [x] Other Specialty    Watched the home discharge recovery videos related to diagnosis. .. [] Pneumonia     [] COPD    [] Home Health set up. [x] Care partner identified and updated with the plan of care. Problem:  N,V,D  Abdominal Pain     Data:  Alert and oriented X 2. Very forgetful and impulsive put pleasant. Sitter at bedside for increased Fall Risk.  visiting and updated on the plan of care. Room air. Tele, BP's elevated. Emesis X 2 today. Abdominal pain with diarrhea. Stool sent. Positive for C-diff. Proper precautions placed. Up with standby. Bed alarm and chair alarm. IVF. PO vanc started. Monitor labs, white count elevated. See results CT abdomen and pelvis. IV pain medication scheduled for abdominal pain. Action: Keep patient comfortable and continue to monitor every 2 hours. Education: POC for the day. Response: Verbalized understanding.

## 2022-02-20 NOTE — ED QUICK NOTES
Orders for admission, patient is aware of plan and ready to go upstairs. Any questions, please call ED RN Josué Perrin  at extension [phone]. Vaccinated?  yes  Type of COVID test sent:rapid  COVID Suspicion level: Low/High  low    Titratable drug(s) infusing:  Rate:    LOC at time of transport:  aox4  Other pertinent information:    CIWA score=  NIH score=

## 2022-02-21 ENCOUNTER — ANESTHESIA EVENT (OUTPATIENT)
Dept: ENDOSCOPY | Facility: HOSPITAL | Age: 82
DRG: 918 | End: 2022-02-21
Payer: MEDICARE

## 2022-02-21 ENCOUNTER — APPOINTMENT (OUTPATIENT)
Dept: ULTRASOUND IMAGING | Facility: HOSPITAL | Age: 82
DRG: 918 | End: 2022-02-21
Attending: STUDENT IN AN ORGANIZED HEALTH CARE EDUCATION/TRAINING PROGRAM
Payer: MEDICARE

## 2022-02-21 ENCOUNTER — TELEPHONE (OUTPATIENT)
Dept: PAIN CLINIC | Facility: CLINIC | Age: 82
End: 2022-02-21

## 2022-02-21 LAB
ALBUMIN SERPL-MCNC: 3.2 G/DL (ref 3.4–5)
ALBUMIN/GLOB SERPL: 1 {RATIO} (ref 1–2)
ALP LIVER SERPL-CCNC: 72 U/L
ALT SERPL-CCNC: 26 U/L
ANION GAP SERPL CALC-SCNC: 5 MMOL/L (ref 0–18)
AST SERPL-CCNC: 37 U/L (ref 15–37)
BASOPHILS # BLD AUTO: 0.03 X10(3) UL (ref 0–0.2)
BASOPHILS NFR BLD AUTO: 0.3 %
BILIRUB SERPL-MCNC: 0.6 MG/DL (ref 0.1–2)
BUN BLD-MCNC: 10 MG/DL (ref 7–18)
CALCIUM BLD-MCNC: 9.6 MG/DL (ref 8.5–10.1)
CHLORIDE SERPL-SCNC: 113 MMOL/L (ref 98–112)
CO2 SERPL-SCNC: 20 MMOL/L (ref 21–32)
CREAT BLD-MCNC: 0.98 MG/DL
EOSINOPHIL # BLD AUTO: 0.03 X10(3) UL (ref 0–0.7)
EOSINOPHIL NFR BLD AUTO: 0.3 %
ERYTHROCYTE [DISTWIDTH] IN BLOOD BY AUTOMATED COUNT: 13.2 %
GLOBULIN PLAS-MCNC: 3.1 G/DL (ref 2.8–4.4)
GLUCOSE BLD-MCNC: 88 MG/DL (ref 70–99)
HCT VFR BLD AUTO: 34.1 %
HGB BLD-MCNC: 12.4 G/DL
IMM GRANULOCYTES # BLD AUTO: 0.04 X10(3) UL (ref 0–1)
IMM GRANULOCYTES NFR BLD: 0.4 %
LYMPHOCYTES # BLD AUTO: 2.65 X10(3) UL (ref 1–4)
LYMPHOCYTES NFR BLD AUTO: 24.3 %
MCH RBC QN AUTO: 33.5 PG (ref 26–34)
MCHC RBC AUTO-ENTMCNC: 36.4 G/DL (ref 31–37)
MCV RBC AUTO: 92.2 FL
MONOCYTES # BLD AUTO: 1.28 X10(3) UL (ref 0.1–1)
MONOCYTES NFR BLD AUTO: 11.8 %
NEUTROPHILS # BLD AUTO: 6.86 X10 (3) UL (ref 1.5–7.7)
NEUTROPHILS # BLD AUTO: 6.86 X10(3) UL (ref 1.5–7.7)
NEUTROPHILS NFR BLD AUTO: 62.9 %
OSMOLALITY SERPL CALC.SUM OF ELEC: 284 MOSM/KG (ref 275–295)
PLATELET # BLD AUTO: 302 10(3)UL (ref 150–450)
POTASSIUM SERPL-SCNC: 3.9 MMOL/L (ref 3.5–5.1)
POTASSIUM SERPL-SCNC: 3.9 MMOL/L (ref 3.5–5.1)
PROT SERPL-MCNC: 6.3 G/DL (ref 6.4–8.2)
RBC # BLD AUTO: 3.7 X10(6)UL
SODIUM SERPL-SCNC: 138 MMOL/L (ref 136–145)
WBC # BLD AUTO: 10.9 X10(3) UL (ref 4–11)

## 2022-02-21 PROCEDURE — 99232 SBSQ HOSP IP/OBS MODERATE 35: CPT | Performed by: HOSPITALIST

## 2022-02-21 PROCEDURE — 76705 ECHO EXAM OF ABDOMEN: CPT | Performed by: STUDENT IN AN ORGANIZED HEALTH CARE EDUCATION/TRAINING PROGRAM

## 2022-02-21 RX ORDER — LOSARTAN POTASSIUM 100 MG/1
100 TABLET ORAL DAILY
Status: DISCONTINUED | OUTPATIENT
Start: 2022-02-21 | End: 2022-02-23

## 2022-02-21 NOTE — PROGRESS NOTES
Multidisciplinary Discharge Rounds held 2/21/2022. Treatment team members present today include , , Charge Nurse, Nurse, RT, PT and Pharmacy caring for [employer]. Other care providers present:    Mobility Goal: Up to chair TID    Readmission Risk:     [] Low     [x] Medium     [] High    Active issue(s) requiring resolution prior to discharge:   No abdominal pain, tolerating diet. Anticipated discharge date:  TBD    Current discharge plan: Home with     F/U appointment scheduled within 7 days. .. [x] [facility] ([facility])     [] Pulmonologist     [x] Primary Care Physician     [x] Other Specialty    Watched the home discharge recovery videos related to diagnosis. .. [] Pneumonia     [] COPD    [] Home Health set up. [x] Care partner identified and updated with the plan of care. Problem:   Abdominal Pain N,V,D    Data:  Alert X 2. Room air. High Bp's, PRN hydralazine. Diarrhea. Abdominal pain improving today. Tolerated clear liquid diet, will advance to fulls. Up with standby. Bed alarm. Sitter at the bedside. IVF. PRN pain medications.  at the bedside. Updated on the plan of care. Action: Keep patient comfortable and continue to monitor every 2 hours. Education: POC for the day. Response: Verbalized understanding.

## 2022-02-21 NOTE — TELEPHONE ENCOUNTER
Question Answer   Anesthesia Type Sedation   Provider [facility]   Location Lab   Procedure Transforaminal   Laterality/Level left transforaminal lumbar epidural steroid injection at L3-L4 and L4-L5 level   Implants No   Medical clearance requested (will send to Pain Navigator) No   Patient has Medicare coverage?  Yes

## 2022-02-21 NOTE — CM/SW NOTE
Order written to check coverage for Dificid-need script sent to pt's pharmacy so coverage can be checked. Lauro sent message to Dr. Alexis Barragan.     Torres Chacko, LCSW  /Discharge Planner  [phone]

## 2022-02-21 NOTE — PLAN OF CARE
Assumed care at [address] pt on bed, on room air. Alert, pleasant, follows commands  Verbalized she's hungry. Tolerate water with meds but doesn't want to try apple juice or crackers, scared she will be nauseous again,Asked for Klonopin, just want to sleep. Up in the [facility], no BM at this time  K+ 3.6, replaced with rider.   Denies abdominal pain  Plan:US abdomen, labs in am.  Problem: GASTROINTESTINAL - ADULT  Goal: Minimal or absence of nausea and vomiting  Description: INTERVENTIONS:  - Maintain adequate hydration with IV or PO as ordered and tolerated  - Nasogastric tube to low intermittent suction as ordered  - Evaluate effectiveness of ordered antiemetic medications  - Provide nonpharmacologic comfort measures as appropriate  - Advance diet as tolerated, if ordered  - Obtain nutritional consult as needed  - Evaluate fluid balance  Outcome: Progressing  Goal: Maintains or returns to baseline bowel function  Description: INTERVENTIONS:  - Assess bowel function  - Maintain adequate hydration with IV or PO as ordered and tolerated  - Evaluate effectiveness of GI medications  - Encourage mobilization and activity  - Obtain nutritional consult as needed  - Establish a toileting routine/schedule  - Consider collaborating with pharmacy to review patient's medication profile  Outcome: Progressing  Goal: Maintains adequate nutritional intake (undernourished)  Description: INTERVENTIONS:  - Monitor percentage of each meal consumed  - Identify factors contributing to decreased intake, treat as appropriate  - Assist with meals as needed  - Monitor I&O, WT and lab values  - Obtain nutritional consult as needed  - Optimize oral hygiene and moisture  - Encourage food from home; allow for food preferences  - Enhance eating environment  Outcome: Progressing     Problem: GASTROINTESTINAL - ADULT  Goal: Maintains or returns to baseline bowel function  Description: INTERVENTIONS:  - Assess bowel function  - Maintain adequate hydration with IV or PO as ordered and tolerated  - Evaluate effectiveness of GI medications  - Encourage mobilization and activity  - Obtain nutritional consult as needed  - Establish a toileting routine/schedule  - Consider collaborating with pharmacy to review patient's medication profile  Outcome: Progressing     Problem: GASTROINTESTINAL - ADULT  Goal: Maintains adequate nutritional intake (undernourished)  Description: INTERVENTIONS:  - Monitor percentage of each meal consumed  - Identify factors contributing to decreased intake, treat as appropriate  - Assist with meals as needed  - Monitor I&O, WT and lab values  - Obtain nutritional consult as needed  - Optimize oral hygiene and moisture  - Encourage food from home; allow for food preferences  - Enhance eating environment  Outcome: Progressing     Problem: METABOLIC/FLUID AND ELECTROLYTES - ADULT  Goal: Electrolytes maintained within normal limits  Description: INTERVENTIONS:  - Monitor labs and rhythm and assess patient for signs and symptoms of electrolyte imbalances  - Administer electrolyte replacement as ordered  - Monitor response to electrolyte replacements, including rhythm and repeat lab results as appropriate  - Fluid restriction as ordered  - Instruct patient on fluid and nutrition restrictions as appropriate  Outcome: Progressing

## 2022-02-22 ENCOUNTER — APPOINTMENT (OUTPATIENT)
Dept: MRI IMAGING | Facility: HOSPITAL | Age: 82
DRG: 918 | End: 2022-02-22
Attending: INTERNAL MEDICINE
Payer: MEDICARE

## 2022-02-22 ENCOUNTER — ANESTHESIA (OUTPATIENT)
Dept: ENDOSCOPY | Facility: HOSPITAL | Age: 82
DRG: 918 | End: 2022-02-22
Payer: MEDICARE

## 2022-02-22 LAB
ALBUMIN SERPL-MCNC: 3.3 G/DL (ref 3.4–5)
ALBUMIN/GLOB SERPL: 1.1 {RATIO} (ref 1–2)
ALP LIVER SERPL-CCNC: 66 U/L
ALT SERPL-CCNC: 29 U/L
ANION GAP SERPL CALC-SCNC: 9 MMOL/L (ref 0–18)
AST SERPL-CCNC: 24 U/L (ref 15–37)
BASOPHILS # BLD AUTO: 0.06 X10(3) UL (ref 0–0.2)
BASOPHILS NFR BLD AUTO: 0.6 %
BILIRUB SERPL-MCNC: 0.5 MG/DL (ref 0.1–2)
BUN BLD-MCNC: 16 MG/DL (ref 7–18)
CALCIUM BLD-MCNC: 9.4 MG/DL (ref 8.5–10.1)
CHLORIDE SERPL-SCNC: 113 MMOL/L (ref 98–112)
CO2 SERPL-SCNC: 19 MMOL/L (ref 21–32)
CREAT BLD-MCNC: 1.02 MG/DL
EOSINOPHIL # BLD AUTO: 0.05 X10(3) UL (ref 0–0.7)
EOSINOPHIL NFR BLD AUTO: 0.5 %
ERYTHROCYTE [DISTWIDTH] IN BLOOD BY AUTOMATED COUNT: 13.7 %
GLOBULIN PLAS-MCNC: 3.1 G/DL (ref 2.8–4.4)
GLUCOSE BLD-MCNC: 91 MG/DL (ref 70–99)
HCT VFR BLD AUTO: 34.7 %
HGB BLD-MCNC: 12.2 G/DL
IMM GRANULOCYTES # BLD AUTO: 0.03 X10(3) UL (ref 0–1)
IMM GRANULOCYTES NFR BLD: 0.3 %
LYMPHOCYTES # BLD AUTO: 3.97 X10(3) UL (ref 1–4)
LYMPHOCYTES NFR BLD AUTO: 38.6 %
MCH RBC QN AUTO: 32.3 PG (ref 26–34)
MCHC RBC AUTO-ENTMCNC: 35.2 G/DL (ref 31–37)
MCV RBC AUTO: 91.8 FL
MONOCYTES # BLD AUTO: 1.44 X10(3) UL (ref 0.1–1)
MONOCYTES NFR BLD AUTO: 14 %
NEUTROPHILS # BLD AUTO: 4.74 X10 (3) UL (ref 1.5–7.7)
NEUTROPHILS # BLD AUTO: 4.74 X10(3) UL (ref 1.5–7.7)
NEUTROPHILS NFR BLD AUTO: 46 %
PLATELET # BLD AUTO: 296 10(3)UL (ref 150–450)
POTASSIUM SERPL-SCNC: 3.1 MMOL/L (ref 3.5–5.1)
PROT SERPL-MCNC: 6.4 G/DL (ref 6.4–8.2)
RBC # BLD AUTO: 3.78 X10(6)UL
SODIUM SERPL-SCNC: 141 MMOL/L (ref 136–145)
WBC # BLD AUTO: 10.3 X10(3) UL (ref 4–11)

## 2022-02-22 PROCEDURE — 74181 MRI ABDOMEN W/O CONTRAST: CPT | Performed by: INTERNAL MEDICINE

## 2022-02-22 PROCEDURE — 0DB68ZX EXCISION OF STOMACH, VIA NATURAL OR ARTIFICIAL OPENING ENDOSCOPIC, DIAGNOSTIC: ICD-10-PCS | Performed by: INTERNAL MEDICINE

## 2022-02-22 PROCEDURE — 76376 3D RENDER W/INTRP POSTPROCES: CPT | Performed by: INTERNAL MEDICINE

## 2022-02-22 PROCEDURE — 99232 SBSQ HOSP IP/OBS MODERATE 35: CPT | Performed by: HOSPITALIST

## 2022-02-22 RX ORDER — LIDOCAINE HYDROCHLORIDE 10 MG/ML
INJECTION, SOLUTION EPIDURAL; INFILTRATION; INTRACAUDAL; PERINEURAL AS NEEDED
Status: DISCONTINUED | OUTPATIENT
Start: 2022-02-22 | End: 2022-02-22 | Stop reason: SURG

## 2022-02-22 RX ORDER — FLUTICASONE PROPIONATE 50 MCG
1 SPRAY, SUSPENSION (ML) NASAL DAILY
Status: DISCONTINUED | OUTPATIENT
Start: 2022-02-22 | End: 2022-02-23

## 2022-02-22 RX ORDER — POTASSIUM CHLORIDE 20 MEQ/1
40 TABLET, EXTENDED RELEASE ORAL EVERY 4 HOURS
Status: COMPLETED | OUTPATIENT
Start: 2022-02-22 | End: 2022-02-22

## 2022-02-22 RX ORDER — DEXTROSE AND SODIUM CHLORIDE 5; .9 G/100ML; G/100ML
INJECTION, SOLUTION INTRAVENOUS CONTINUOUS
Status: DISCONTINUED | OUTPATIENT
Start: 2022-02-22 | End: 2022-02-23

## 2022-02-22 RX ORDER — NALOXONE HYDROCHLORIDE 0.4 MG/ML
80 INJECTION, SOLUTION INTRAMUSCULAR; INTRAVENOUS; SUBCUTANEOUS AS NEEDED
Status: DISCONTINUED | OUTPATIENT
Start: 2022-02-22 | End: 2022-02-22 | Stop reason: HOSPADM

## 2022-02-22 RX ORDER — PANTOPRAZOLE SODIUM 40 MG/1
40 TABLET, DELAYED RELEASE ORAL
Status: DISCONTINUED | OUTPATIENT
Start: 2022-02-22 | End: 2022-02-23

## 2022-02-22 RX ORDER — KETOROLAC TROMETHAMINE 15 MG/ML
15 INJECTION, SOLUTION INTRAMUSCULAR; INTRAVENOUS EVERY 6 HOURS PRN
Status: DISPENSED | OUTPATIENT
Start: 2022-02-22 | End: 2022-02-23

## 2022-02-22 RX ORDER — SODIUM CHLORIDE, SODIUM LACTATE, POTASSIUM CHLORIDE, CALCIUM CHLORIDE 600; 310; 30; 20 MG/100ML; MG/100ML; MG/100ML; MG/100ML
INJECTION, SOLUTION INTRAVENOUS CONTINUOUS
Status: DISCONTINUED | OUTPATIENT
Start: 2022-02-22 | End: 2022-02-23

## 2022-02-22 RX ADMIN — LIDOCAINE HYDROCHLORIDE 100 MG: 10 INJECTION, SOLUTION EPIDURAL; INFILTRATION; INTRACAUDAL; PERINEURAL at 15:24:00

## 2022-02-22 NOTE — CM/SW NOTE
Order received to check coverage on Dificid. Message to Dr Jennifer Winn for script to be sent to patient's [facility] [phone]. Patient has Medicare and [facility] Choice insurance listed. Call to patient room, spoke with both patient and spouse to confirm patient drug coverage, both are unsure but state insurance on file w/wal[employer] B [employer]. If medication is not covered, through [employer] >$4,300. Per [employer], they do not offer savings coupon or financial assist for those who have medicare. 15:35 call to [facility] [phone], dificid prescription not called in at this time, will check back tomorrow. 1605 spoke with GI APN [facility] Apgar re: above, will send script. Notified by primary rn and [facility] med approved thru  program and being mailed to patient home.      Marbin Butler RN,   [id number]

## 2022-02-22 NOTE — OPERATIVE REPORT
Operative Report-Esophagogastroduodenoscopy      PREOPERATIVE DIAGNOSIS/INDICATION: Nausea, vomiting, esophageal thickening     POSTOPERTATIVE DIAGNOSIS: Esophagitis, PUD, Duodenal ulcer     PROCEDURE PERFORMED: EGD    INFORMED CONSENT: Once a brief history and physical examination was performed, the risks, benefits and alternatives to the procedure were discussed with the patient and/or family and informed consent was obtained. The risks of sedation, perforation, missed lesions and need for surgery were all discussed. Patient expressed understanding of the risks and agreed to proceed. PROCEDURE DESCRIPTION:  The patient was then brought to the endoscopy suite where his/her pulse, pulse oximetry and blood pressure were monitored. He/she was placed in the left lateral decubitus position and deep sedation was administered. Once adequate sedation was achieved, a bite block was placed and a lubricated tip of an Olympus video upper endoscope was inserted through the oropharynx and gently manipulated through the esophagus into the stomach and the distal duodenum. Upon withdrawal of the endoscope, careful visualization of the mucosa was performed. FINDINGS:    - ESOPHAGUS: Grade C esophagitis of the distal esophagus. Z line at 40 cm.     - STOMACH: Moderate gastritis with hematin material seen in the stomach, which was J shaped in nature. Pt with multiple small clean based antral ulcers with no active bleeding. Random gastric biopsies taken with cold forceps for histology.     - DUODENUM: Multiple superficial clean based duodenal ulcers of the bulb. THERAPEUTICS: Biopsies were performed.     RECOMMENDATIONS:     - Post EGD precautions, watch for bleeding, infection, perforation, adverse drug reactions     - Follow biopsies.    - Pantoprazole 40 mg BID for 8 weeks     - Repeat EGD in 8 weeks to assess healing    - OV in 2-4 weeks     - Continue Dificid 200 mg BID for 10 days total; SW working with pt to see if pt can qualify for pt assistance program.     Umm Orona MD  2/22/2022  3:43 PM

## 2022-02-22 NOTE — PROGRESS NOTES
02/22/22 5496   Provider Notification   Reason for Communication Patient request   Provider Name Eloise Khalil MD   Method of Communication Page   Response Waiting for response   Notification Time [phone]   Rm 222- pt given IV toradol for pain rated 9/10 at 0530. Pt still c/o \"terrible belly pain\" at recheck. Currently NPO for scheduled EGD today. No other PRN IV pain meds on. Wanting to give something else?  Thanks

## 2022-02-22 NOTE — PLAN OF CARE
Patient Aox2-3. Very forgetful and confused at times. Patient c/o severe abdominal pain- medications and nonpharmacologics applied when able. Patient VSS- some high BP -prn available. Maintaining O2 sats on RA. IV fluids. K+ replaced today. Advanced to soft diet and asking for food despite severe stomach pains reported by patient. Lovenox held today for procedure. Patient and  updated on POC.         Goal: Patient/Family Short Term Goal  Description: Patient's Short Term Goal: 2/21 noc: get sleep, manage pain   2/22AM: get procedures done    Interventions:   - prn pain medication  - sleep bundle offered  - nonpharmacologic pain interventions  - cluster care   - See additional Care Plan goals for specific interventions  Outcome: Progressing     Problem: Delirium  Goal: Minimize duration of delirium  Description: Interventions:  - Encourage use of hearing aids, eye glasses  - Promote highest level of mobility daily  - Provide frequent reorientation  - Promote wakefulness i.e. lights on, blinds open  - Promote sleep, encourage patient's normal rest cycle i.e. lights off, TV off, minimize noise and interruptions  - Encourage family to assist in orientation and promotion of home routines  Outcome: Progressing

## 2022-02-22 NOTE — PLAN OF CARE
Pt aox2-3, very forgetful. VSS on RA. Tele SB with sleep. Denies n/v/d. Voids. Given tyelonol for headache. Up SBA. Bed alarm on, sitter at bedside. Seizure precautions maintained. NS running at 100ml/hr, PIV dressing changed and c/d/I. Tolerating soft diet, NPO at midnight. Discussed with spouse to bring spinal cord stimulator remote prior to MRI tomorrow, agreeable with plan. Resting in bed with call light in reach, no further needs at this time. WCTM.      Problem: Delirium  Goal: Minimize duration of delirium  Description: Interventions:  - Encourage use of hearing aids, eye glasses  - Promote highest level of mobility daily  - Provide frequent reorientation  - Promote wakefulness i.e. lights on, blinds open  - Promote sleep, encourage patient's normal rest cycle i.e. lights off, TV off, minimize noise and interruptions  - Encourage family to assist in orientation and promotion of home routines  Outcome: Progressing     Problem: GASTROINTESTINAL - ADULT  Goal: Minimal or absence of nausea and vomiting  Description: INTERVENTIONS:  - Maintain adequate hydration with IV or PO as ordered and tolerated  - Nasogastric tube to low intermittent suction as ordered  - Evaluate effectiveness of ordered antiemetic medications  - Provide nonpharmacologic comfort measures as appropriate  - Advance diet as tolerated, if ordered  - Obtain nutritional consult as needed  - Evaluate fluid balance  Outcome: Progressing  Goal: Maintains or returns to baseline bowel function  Description: INTERVENTIONS:  - Assess bowel function  - Maintain adequate hydration with IV or PO as ordered and tolerated  - Evaluate effectiveness of GI medications  - Encourage mobilization and activity  - Obtain nutritional consult as needed  - Establish a toileting routine/schedule  - Consider collaborating with pharmacy to review patient's medication profile  Outcome: Progressing  Goal: Maintains adequate nutritional intake (undernourished)  Description: INTERVENTIONS:  - Monitor percentage of each meal consumed  - Identify factors contributing to decreased intake, treat as appropriate  - Assist with meals as needed  - Monitor I&O, WT and lab values  - Obtain nutritional consult as needed  - Optimize oral hygiene and moisture  - Encourage food from home; allow for food preferences  - Enhance eating environment  Outcome: Progressing     Problem: METABOLIC/FLUID AND ELECTROLYTES - ADULT  Goal: Electrolytes maintained within normal limits  Description: INTERVENTIONS:  - Monitor labs and rhythm and assess patient for signs and symptoms of electrolyte imbalances  - Administer electrolyte replacement as ordered  - Monitor response to electrolyte replacements, including rhythm and repeat lab results as appropriate  - Fluid restriction as ordered  - Instruct patient on fluid and nutrition restrictions as appropriate  Outcome: Progressing

## 2022-02-22 NOTE — ANESTHESIA POSTPROCEDURE EVALUATION
[address] Patient Status:  Inpatient   Age/Gender 80year old female MRN [id number]   Location [address] [address] Attending Naomie Brock MD   [address] PCP Charlotte oRss PA-C       Anesthesia Post-op Note    ESOPHAGOGASTRODUODENOSCOPY (EGD) w/ biopsies    Procedure Summary     Date: 02/22/22 Room / Location: [facility]4 [facility] ENDOSCOPY 04 / 1404 [facility] ENDOSCOPY    Anesthesia Start: 4109 Anesthesia Stop: 9562    Procedure: ESOPHAGOGASTRODUODENOSCOPY (EGD) w/ biopsies (N/A ) Diagnosis: (Gastritis, Esophagitis, Gastric ulcers, Duodenal ulcers)    Surgeons: Wendy Mcnally MD Anesthesiologist: Silvia De MD    Anesthesia Type: MAC ASA Status: 3          Anesthesia Type: MAC    Vitals Value Taken Time   /66 02/22/22 1549   Temp    02/22/22 1549   Pulse 76 02/22/22 1549   Resp 16 02/22/22 1549   SpO2 96 02/22/22 1549       Patient Location: Endoscopy    Anesthesia Type: MAC    Airway Patency: patent    Postop Pain Control: adequate    Mental Status: preanesthetic baseline    Nausea/Vomiting: none    Cardiopulmonary/Hydration status: stable euvolemic    Complications: no apparent anesthesia related complications    Postop vital signs: stable    Dental Exam: Unchanged from Preop

## 2022-02-23 VITALS
WEIGHT: 118.19 LBS | RESPIRATION RATE: 17 BRPM | OXYGEN SATURATION: 99 % | TEMPERATURE: 98 F | BODY MASS INDEX: 20 KG/M2 | DIASTOLIC BLOOD PRESSURE: 89 MMHG | SYSTOLIC BLOOD PRESSURE: 155 MMHG | HEART RATE: 62 BPM

## 2022-02-23 LAB — POTASSIUM SERPL-SCNC: 3.4 MMOL/L (ref 3.5–5.1)

## 2022-02-23 PROCEDURE — 99239 HOSP IP/OBS DSCHRG MGMT >30: CPT | Performed by: INTERNAL MEDICINE

## 2022-02-23 RX ORDER — SUCRALFATE ORAL 1 G/10ML
1 SUSPENSION ORAL
Qty: 420 ML | Refills: 0 | Status: SHIPPED | OUTPATIENT
Start: 2022-02-23 | End: 2022-03-04

## 2022-02-23 RX ORDER — POTASSIUM CHLORIDE 20 MEQ/1
40 TABLET, EXTENDED RELEASE ORAL EVERY 4 HOURS
Status: DISCONTINUED | OUTPATIENT
Start: 2022-02-23 | End: 2022-02-23

## 2022-02-23 RX ORDER — PANTOPRAZOLE SODIUM 40 MG/1
40 GRANULE, DELAYED RELEASE ORAL 2 TIMES DAILY
Qty: 60 EACH | Refills: 1 | Status: SHIPPED | OUTPATIENT
Start: 2022-02-23 | End: 2022-03-04

## 2022-02-23 RX ORDER — SUCRALFATE ORAL 1 G/10ML
1 SUSPENSION ORAL
Status: DISCONTINUED | OUTPATIENT
Start: 2022-02-23 | End: 2022-02-23

## 2022-02-23 NOTE — PLAN OF CARE
Pt aox2-3, very forgetful. VSS on RA. Tele SB with sleep. Voids. Continually c/o stomach pain, attempting to manage with PRN pain meds and nonpharmacologic interventions. Up SBA. Bed alarm on, has attempted to get up multiple times. Seizure precautions maintained. Fluids running at 83ml/hr. Tolerating soft diet. Resting in bed with call light in reach, no further needs at this time. WCTM.      Problem: Delirium  Goal: Minimize duration of delirium  Description: Interventions:  - Encourage use of hearing aids, eye glasses  - Promote highest level of mobility daily  - Provide frequent reorientation  - Promote wakefulness i.e. lights on, blinds open  - Promote sleep, encourage patient's normal rest cycle i.e. lights off, TV off, minimize noise and interruptions  - Encourage family to assist in orientation and promotion of home routines  Outcome: Progressing     Problem: GASTROINTESTINAL - ADULT  Goal: Minimal or absence of nausea and vomiting  Description: INTERVENTIONS:  - Maintain adequate hydration with IV or PO as ordered and tolerated  - Nasogastric tube to low intermittent suction as ordered  - Evaluate effectiveness of ordered antiemetic medications  - Provide nonpharmacologic comfort measures as appropriate  - Advance diet as tolerated, if ordered  - Obtain nutritional consult as needed  - Evaluate fluid balance  Outcome: Progressing  Goal: Maintains or returns to baseline bowel function  Description: INTERVENTIONS:  - Assess bowel function  - Maintain adequate hydration with IV or PO as ordered and tolerated  - Evaluate effectiveness of GI medications  - Encourage mobilization and activity  - Obtain nutritional consult as needed  - Establish a toileting routine/schedule  - Consider collaborating with pharmacy to review patient's medication profile  Outcome: Progressing  Goal: Maintains adequate nutritional intake (undernourished)  Description: INTERVENTIONS:  - Monitor percentage of each meal consumed  - Identify factors contributing to decreased intake, treat as appropriate  - Assist with meals as needed  - Monitor I&O, WT and lab values  - Obtain nutritional consult as needed  - Optimize oral hygiene and moisture  - Encourage food from home; allow for food preferences  - Enhance eating environment  Outcome: Progressing     Problem: METABOLIC/FLUID AND ELECTROLYTES - ADULT  Goal: Electrolytes maintained within normal limits  Description: INTERVENTIONS:  - Monitor labs and rhythm and assess patient for signs and symptoms of electrolyte imbalances  - Administer electrolyte replacement as ordered  - Monitor response to electrolyte replacements, including rhythm and repeat lab results as appropriate  - Fluid restriction as ordered  - Instruct patient on fluid and nutrition restrictions as appropriate  Outcome: Progressing

## 2022-02-23 NOTE — DISCHARGE PLANNING
NURSING DISCHARGE NOTE    Discharged Home via Wheelchair. Accompanied by Support staff  Belongings Taken by patient/family. Discharge navigator complete  Discharge instructions reviewed with patient at bedside  All questions & concerns addressed at this time.

## 2022-02-24 ENCOUNTER — PATIENT OUTREACH (OUTPATIENT)
Dept: CASE MANAGEMENT | Age: 82
End: 2022-02-24

## 2022-02-24 ENCOUNTER — TELEPHONE (OUTPATIENT)
Dept: FAMILY MEDICINE CLINIC | Facility: CLINIC | Age: 82
End: 2022-02-24

## 2022-02-24 PROCEDURE — 1111F DSCHRG MED/CURRENT MED MERGE: CPT

## 2022-02-24 NOTE — TELEPHONE ENCOUNTER
Spoke to patient. Denies fever. Denies chills. Denies diarrhea. Denies nausea or vomiting. States had two BM's earlier today. Advised warm pack and tylenol as needed.  Advised to call if symptoms persist.

## 2022-02-24 NOTE — TELEPHONE ENCOUNTER
Patient calling is in terrible pain her last Bm 20 minutes ago only symptom lower abdomen pain no other symptome   Wanting advice

## 2022-02-25 NOTE — TELEPHONE ENCOUNTER
COVID order placed and [facility] message sent.

## 2022-02-28 ENCOUNTER — TELEPHONE (OUTPATIENT)
Dept: FAMILY MEDICINE CLINIC | Facility: CLINIC | Age: 82
End: 2022-02-28

## 2022-02-28 NOTE — TELEPHONE ENCOUNTER
Rosey Mccarty called and said she has an upset stomach, her body is trembling and she has a pounding headache. She wants to know if Mattie Cade can send something to the pharmacy so she will feel better. Rick jamil [facility].

## 2022-03-01 ENCOUNTER — TELEPHONE (OUTPATIENT)
Dept: FAMILY MEDICINE CLINIC | Facility: CLINIC | Age: 82
End: 2022-03-01

## 2022-03-01 RX ORDER — FLUCONAZOLE 150 MG/1
150 TABLET ORAL ONCE
Qty: 1 TABLET | Refills: 0 | Status: SHIPPED | OUTPATIENT
Start: 2022-03-01 | End: 2022-03-01

## 2022-03-01 NOTE — TELEPHONE ENCOUNTER
Pt called and she was crying and she said she has a terrible belly ache and she can't go to the bathroom. She said she is also in a lot of pain. What should she be taking? She would like a call ASAP.

## 2022-03-01 NOTE — TELEPHONE ENCOUNTER
Verify she is taking OTC probiotic. Can prescribe Diflucan 150 mg for 1 day if it sounds like yeast infection. She could also try plain no sugar yogurt to soothe the area and provide probiotics topically. Constipation is normal she can have a BM every 2-3 days and be normal. Heating pad again. Call if experiences watery stools/blood in stools.

## 2022-03-01 NOTE — TELEPHONE ENCOUNTER
Patient c/o vaginal itching and burning. She thinks it started yesterday. Also c/o abdominal discomfort and constipation. Her headache and trembling is resolved.

## 2022-03-01 NOTE — TELEPHONE ENCOUNTER
Spoke to patient. She states she feels better. She actually said she had one diarrhea stool today. She is following a bland diet. Denies chills or fever. Denies vomiting.

## 2022-03-01 NOTE — TELEPHONE ENCOUNTER
Prescription sent. All recommendations given. She does have probiotic at home but has not been taking. She will start taking daily.

## 2022-03-02 ENCOUNTER — TELEPHONE (OUTPATIENT)
Dept: FAMILY MEDICINE CLINIC | Facility: CLINIC | Age: 82
End: 2022-03-02

## 2022-03-02 NOTE — TELEPHONE ENCOUNTER
No further advise besides agreeing with her writing down her BM so she can recall that she had one this would cause her to be less anxious about her BM. Remind her BM every 3 days is ok. If abdominal pain persists follow up in office.

## 2022-03-02 NOTE — TELEPHONE ENCOUNTER
Wallace Coreas called and said she has a horrible belly ache and she is constipated. She wants to know what to do?

## 2022-03-02 NOTE — TELEPHONE ENCOUNTER
Spoke to patient. She at first said she had a normal BM yesterday but no BM today. She did tell me yesterday she had a loose stool. She is having difficulty remembering when she is having bowel moments and if they are normal or loose, etc.  I advised she try to write down her BM's so she can keep track. She says she is having horrible abd. Pain. Her  says she is taking the Dificid prescribed by Dr. Marcy Altamirano. She was unaware of this. I advised on relaxation techniques. Advised on warm pack for abdomen. She denies chills or fever. No vomiting. I am reminding her to schedule with Dr. Dara Zhang for memory problems as her appointment with neuro psych was delayed until April. Please advise further.

## 2022-03-04 ENCOUNTER — OFFICE VISIT (OUTPATIENT)
Dept: FAMILY MEDICINE CLINIC | Facility: CLINIC | Age: 82
End: 2022-03-04
Payer: MEDICARE

## 2022-03-04 VITALS
HEIGHT: 64 IN | BODY MASS INDEX: 19.63 KG/M2 | WEIGHT: 115 LBS | SYSTOLIC BLOOD PRESSURE: 120 MMHG | TEMPERATURE: 99 F | DIASTOLIC BLOOD PRESSURE: 60 MMHG | HEART RATE: 64 BPM

## 2022-03-04 DIAGNOSIS — G89.29 CHRONIC ABDOMINAL PAIN: Primary | ICD-10-CM

## 2022-03-04 DIAGNOSIS — R19.8 RECTAL PRESSURE: ICD-10-CM

## 2022-03-04 DIAGNOSIS — N18.30 CHRONIC RENAL INSUFFICIENCY, STAGE 3 (MODERATE) (HCC): ICD-10-CM

## 2022-03-04 DIAGNOSIS — K58.2 IRRITABLE BOWEL SYNDROME WITH BOTH CONSTIPATION AND DIARRHEA: ICD-10-CM

## 2022-03-04 DIAGNOSIS — K27.9 PUD (PEPTIC ULCER DISEASE): ICD-10-CM

## 2022-03-04 DIAGNOSIS — R41.3 POOR SHORT TERM MEMORY: ICD-10-CM

## 2022-03-04 DIAGNOSIS — M51.16 LUMBAR DISC HERNIATION WITH RADICULOPATHY: ICD-10-CM

## 2022-03-04 DIAGNOSIS — R10.9 CHRONIC ABDOMINAL PAIN: Primary | ICD-10-CM

## 2022-03-04 DIAGNOSIS — R41.3 MEMORY DEFICITS: ICD-10-CM

## 2022-03-04 DIAGNOSIS — A04.72 C. DIFFICILE COLITIS: ICD-10-CM

## 2022-03-04 PROCEDURE — 99495 TRANSJ CARE MGMT MOD F2F 14D: CPT | Performed by: FAMILY MEDICINE

## 2022-03-04 RX ORDER — SUCRALFATE ORAL 1 G/10ML
1 SUSPENSION ORAL
Qty: 420 ML | Refills: 0 | Status: SHIPPED | OUTPATIENT
Start: 2022-03-04 | End: 2022-03-08

## 2022-03-04 RX ORDER — OMEPRAZOLE 20 MG/1
20 TABLET, DELAYED RELEASE ORAL 2 TIMES DAILY
COMMUNITY
End: 2022-03-04

## 2022-03-04 RX ORDER — NICOTINE POLACRILEX 4 MG/1
20 GUM, CHEWING ORAL 2 TIMES DAILY
COMMUNITY
Start: 2022-03-04

## 2022-03-04 RX ORDER — HYDROCODONE BITARTRATE AND ACETAMINOPHEN 5; 325 MG/1; MG/1
TABLET ORAL EVERY 8 HOURS PRN
Qty: 10 TABLET | Refills: 0 | Status: SHIPPED | OUTPATIENT
Start: 2022-03-04

## 2022-03-06 ENCOUNTER — LAB ENCOUNTER (OUTPATIENT)
Dept: LAB | Facility: HOSPITAL | Age: 82
End: 2022-03-06
Attending: ANESTHESIOLOGY
Payer: MEDICARE

## 2022-03-06 DIAGNOSIS — M54.16 LUMBAR RADICULOPATHY: ICD-10-CM

## 2022-03-06 PROBLEM — E87.2 LACTIC ACIDOSIS: Status: RESOLVED | Noted: 2022-02-20 | Resolved: 2022-03-06

## 2022-03-06 PROBLEM — T50.901A ACCIDENTAL OVERDOSE: Status: RESOLVED | Noted: 2022-02-20 | Resolved: 2022-03-06

## 2022-03-06 PROBLEM — A04.72 C. DIFFICILE COLITIS: Status: ACTIVE | Noted: 2021-10-28

## 2022-03-06 PROBLEM — E87.20 METABOLIC ACIDOSIS: Status: RESOLVED | Noted: 2022-02-20 | Resolved: 2022-03-06

## 2022-03-06 PROBLEM — K27.9 PUD (PEPTIC ULCER DISEASE): Status: ACTIVE | Noted: 2022-03-06

## 2022-03-06 PROBLEM — R10.84 ABDOMINAL PAIN, GENERALIZED: Status: RESOLVED | Noted: 2022-02-20 | Resolved: 2022-03-06

## 2022-03-06 PROBLEM — E87.2 METABOLIC ACIDOSIS: Status: RESOLVED | Noted: 2022-02-20 | Resolved: 2022-03-06

## 2022-03-06 PROBLEM — R11.2 NAUSEA VOMITING AND DIARRHEA: Status: RESOLVED | Noted: 2022-02-20 | Resolved: 2022-03-06

## 2022-03-06 PROBLEM — E87.20 LACTIC ACIDOSIS: Status: RESOLVED | Noted: 2022-02-20 | Resolved: 2022-03-06

## 2022-03-06 PROBLEM — R19.7 NAUSEA VOMITING AND DIARRHEA: Status: RESOLVED | Noted: 2022-02-20 | Resolved: 2022-03-06

## 2022-03-06 PROBLEM — T50.901A ACCIDENTAL OVERDOSE, INITIAL ENCOUNTER: Status: RESOLVED | Noted: 2022-02-20 | Resolved: 2022-03-06

## 2022-03-07 ENCOUNTER — TELEPHONE (OUTPATIENT)
Dept: FAMILY MEDICINE CLINIC | Facility: CLINIC | Age: 82
End: 2022-03-07

## 2022-03-07 LAB — SARS-COV-2 RNA RESP QL NAA+PROBE: NOT DETECTED

## 2022-03-07 RX ORDER — NYSTATIN 100000 U/G
CREAM TOPICAL
Qty: 30 G | Refills: 0 | Status: SHIPPED | OUTPATIENT
Start: 2022-03-07

## 2022-03-07 NOTE — TELEPHONE ENCOUNTER
Pt states she has a yeast infection and requesting medication oral and gel (nystatin). Pt states she has a terrible itch and pains in pelvic area. Please sent to [address]

## 2022-03-07 NOTE — TELEPHONE ENCOUNTER
Patient c/o vaginal itching since last night. She has not tried anything OTC. Was given diflucan on 3/1/22 for same symptoms. She said the symptoms completely went away at that time but returned.

## 2022-03-08 ENCOUNTER — TELEPHONE (OUTPATIENT)
Dept: FAMILY MEDICINE CLINIC | Facility: CLINIC | Age: 82
End: 2022-03-08

## 2022-03-08 VITALS — WEIGHT: 115 LBS | BODY MASS INDEX: 19.63 KG/M2 | HEIGHT: 64 IN

## 2022-03-08 RX ORDER — SUCRALFATE 1 G/1
1 TABLET ORAL
Qty: 30 TABLET | Refills: 0 | Status: SHIPPED | OUTPATIENT
Start: 2022-03-08 | End: 2022-03-17

## 2022-03-08 NOTE — TELEPHONE ENCOUNTER
Pt called and said she already got the vaginal creme but she also needs the pill form of the medication. She would like it sent to [place] [facility].

## 2022-03-08 NOTE — TELEPHONE ENCOUNTER
Pharmacy states the liquid form of sucralfate is not covered by plan but tablet are. Pended. Please advise further if needed.

## 2022-03-08 NOTE — TELEPHONE ENCOUNTER
Pt called and said she went to the bathroom 3 times and she still has cramps in her belly. She wants to know if Noris Gaines will send something in for her?

## 2022-03-08 NOTE — TELEPHONE ENCOUNTER
Called patient. She c/o constipation and also abdominal discomfort. She denied frequent trips to bathroom. Advised to take her carafate as prescribed and apply her warm pack. She denies vomiting or fever.

## 2022-03-09 ENCOUNTER — HOSPITAL ENCOUNTER (OUTPATIENT)
Facility: HOSPITAL | Age: 82
Setting detail: HOSPITAL OUTPATIENT SURGERY
Discharge: HOME | End: 2022-03-09
Attending: ANESTHESIOLOGY | Admitting: ANESTHESIOLOGY
Payer: MEDICARE

## 2022-03-09 ENCOUNTER — APPOINTMENT (OUTPATIENT)
Dept: GENERAL RADIOLOGY | Facility: HOSPITAL | Age: 82
End: 2022-03-09
Attending: ANESTHESIOLOGY
Payer: MEDICARE

## 2022-03-09 DIAGNOSIS — M54.16 LUMBAR RADICULOPATHY: ICD-10-CM

## 2022-03-09 PROCEDURE — 99152 MOD SED SAME PHYS/QHP 5/>YRS: CPT | Performed by: ANESTHESIOLOGY

## 2022-03-09 RX ORDER — ONDANSETRON 2 MG/ML
4 INJECTION INTRAMUSCULAR; INTRAVENOUS ONCE AS NEEDED
OUTPATIENT
Start: 2022-03-09 | End: 2022-03-09

## 2022-03-09 RX ORDER — DIPHENHYDRAMINE HYDROCHLORIDE 50 MG/ML
50 INJECTION INTRAMUSCULAR; INTRAVENOUS ONCE AS NEEDED
OUTPATIENT
Start: 2022-03-09 | End: 2022-03-09

## 2022-03-09 RX ORDER — SODIUM CHLORIDE, SODIUM LACTATE, POTASSIUM CHLORIDE, CALCIUM CHLORIDE 600; 310; 30; 20 MG/100ML; MG/100ML; MG/100ML; MG/100ML
100 INJECTION, SOLUTION INTRAVENOUS CONTINUOUS
OUTPATIENT
Start: 2022-03-09

## 2022-03-10 RX ORDER — CLONAZEPAM 0.5 MG/1
TABLET ORAL
Qty: 60 TABLET | Refills: 0 | Status: SHIPPED | OUTPATIENT
Start: 2022-03-10 | End: 2022-04-01

## 2022-03-11 ENCOUNTER — OFFICE VISIT (OUTPATIENT)
Dept: FAMILY MEDICINE CLINIC | Facility: CLINIC | Age: 82
End: 2022-03-11
Payer: MEDICARE

## 2022-03-11 VITALS
HEART RATE: 52 BPM | BODY MASS INDEX: 20 KG/M2 | DIASTOLIC BLOOD PRESSURE: 56 MMHG | WEIGHT: 115 LBS | SYSTOLIC BLOOD PRESSURE: 90 MMHG

## 2022-03-11 DIAGNOSIS — F06.4 ANXIETY DISORDER DUE TO GENERAL MEDICAL CONDITION WITH PANIC ATTACK: ICD-10-CM

## 2022-03-11 DIAGNOSIS — F41.0 ANXIETY DISORDER DUE TO GENERAL MEDICAL CONDITION WITH PANIC ATTACK: ICD-10-CM

## 2022-03-11 DIAGNOSIS — R10.9 CHRONIC ABDOMINAL PAIN: Primary | ICD-10-CM

## 2022-03-11 DIAGNOSIS — G89.29 CHRONIC ABDOMINAL PAIN: Primary | ICD-10-CM

## 2022-03-11 DIAGNOSIS — K27.9 PUD (PEPTIC ULCER DISEASE): ICD-10-CM

## 2022-03-11 DIAGNOSIS — K59.04 CHRONIC IDIOPATHIC CONSTIPATION: ICD-10-CM

## 2022-03-11 PROCEDURE — 99213 OFFICE O/P EST LOW 20 MIN: CPT | Performed by: FAMILY MEDICINE

## 2022-03-17 ENCOUNTER — TELEPHONE (OUTPATIENT)
Dept: FAMILY MEDICINE CLINIC | Facility: CLINIC | Age: 82
End: 2022-03-17

## 2022-03-17 RX ORDER — SUCRALFATE 1 G/1
1 TABLET ORAL
Qty: 20 TABLET | Refills: 0 | Status: SHIPPED | OUTPATIENT
Start: 2022-03-17

## 2022-03-17 NOTE — TELEPHONE ENCOUNTER
Pt called and said she is in bed because she has a horrible bellyache and is in a lot of pain. She said she already had a BM today but the pain is horrible.

## 2022-03-17 NOTE — TELEPHONE ENCOUNTER
Spoke to patient. Advised she was most likely having a bad day with her stomach pain. Advised to rest in bed, drink fluids, maintain bland diet, and apply her heat pack. She is in agreement. She did mention she will be out soon of the sucralfate. Pended another #30. Please advise.

## 2022-04-01 RX ORDER — CLONAZEPAM 0.5 MG/1
TABLET ORAL
Qty: 60 TABLET | Refills: 0 | Status: SHIPPED | OUTPATIENT
Start: 2022-04-01

## 2022-04-06 RX ORDER — AMLODIPINE BESYLATE 10 MG/1
TABLET ORAL
Qty: 90 TABLET | Refills: 0 | Status: SHIPPED | OUTPATIENT
Start: 2022-04-06

## 2022-04-06 RX ORDER — LOSARTAN POTASSIUM 100 MG/1
TABLET ORAL
Qty: 90 TABLET | Refills: 0 | Status: SHIPPED | OUTPATIENT
Start: 2022-04-06

## 2022-04-06 RX ORDER — PRAVASTATIN SODIUM 40 MG
TABLET ORAL
Qty: 90 TABLET | Refills: 0 | Status: SHIPPED | OUTPATIENT
Start: 2022-04-06

## 2022-04-06 NOTE — TELEPHONE ENCOUNTER
CLONAZEPAM 0.5 MG Oral Tab 60 tablet 0 4/1/2022     Sig: TAKE 1 TABLET(0.5 MG) BY MOUTH TWICE DAILY AS NEEDED    Sent to pharmacy as: clonazePAM 0.5 MG Oral Tablet (KLONOPIN)      LOV 3/11/22  FOV 4/25/22

## 2022-04-07 RX ORDER — CLONAZEPAM 0.5 MG/1
TABLET ORAL
Qty: 60 TABLET | Refills: 0 | Status: SHIPPED | OUTPATIENT
Start: 2022-04-07

## 2022-04-11 ENCOUNTER — TELEPHONE (OUTPATIENT)
Dept: FAMILY MEDICINE CLINIC | Facility: CLINIC | Age: 82
End: 2022-04-11

## 2022-04-11 NOTE — TELEPHONE ENCOUNTER
Pt called saying she is so sick and she can't go to the bathroom yet. She wants to know if the nurse will call her. She said she doesn't know what to do she is in a lot of pain.

## 2022-04-11 NOTE — TELEPHONE ENCOUNTER
Spoke to patient. Again, she said she feels like she has to have a BM. She said she gave herself an enema and no stool came out, just the enema. Verbal support give to patient. Again, told her she is most likely not constipated. She is laying down and trying to relax.

## 2022-04-11 NOTE — TELEPHONE ENCOUNTER
Spoke to patient. She says she is constipated and having stomach cramping. States she felt good yesterday and had a normal BM yesterday. Advised she is most likely not constipated. She said she has eaten a bran muffin, prunes, and a fiber medication. Advised her to give it time and not to take anything else. Advised warm pack on abdomen. She STEPHANIA.

## 2022-04-14 ENCOUNTER — TELEPHONE (OUTPATIENT)
Dept: FAMILY MEDICINE CLINIC | Facility: CLINIC | Age: 82
End: 2022-04-14

## 2022-04-14 NOTE — TELEPHONE ENCOUNTER
Spoke to patient. States she had normal BM and felt fine yesterday. She had this same stomach ache on Monday. She felt better on Tuesday. Advised to rest and relax with the heating pad. Advised if she doesn't have BM tomorrow to call the office. [place] [facility].

## 2022-04-20 ENCOUNTER — OFFICE VISIT (OUTPATIENT)
Dept: NEUROLOGY | Facility: CLINIC | Age: 82
End: 2022-04-20
Payer: MEDICARE

## 2022-04-20 VITALS — DIASTOLIC BLOOD PRESSURE: 76 MMHG | HEART RATE: 70 BPM | SYSTOLIC BLOOD PRESSURE: 118 MMHG | RESPIRATION RATE: 16 BRPM

## 2022-04-20 DIAGNOSIS — F41.1 GENERALIZED ANXIETY DISORDER: ICD-10-CM

## 2022-04-20 DIAGNOSIS — R41.3 SHORT-TERM MEMORY LOSS: Primary | ICD-10-CM

## 2022-04-20 DIAGNOSIS — G31.84 MILD COGNITIVE IMPAIRMENT: ICD-10-CM
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
## 2022-04-25 ENCOUNTER — TELEPHONE (OUTPATIENT)
Dept: FAMILY MEDICINE CLINIC | Facility: CLINIC | Age: 82
End: 2022-04-25

## 2022-04-26 PROBLEM — M99.09 SOMATIC DYSFUNCTION OF ABDOMINAL REGION: Status: ACTIVE | Noted: 2022-04-26

## 2022-04-26 NOTE — TELEPHONE ENCOUNTER
Spoke to pharmacy. They state nexium. Is not covered by plan. States really no PPI covered. Advised she purchase OTC. Phoned patient and informed her.

## 2022-04-28 NOTE — TELEPHONE ENCOUNTER
Refill is too early will not be due until next week last refill was 4/7/2022. Did she ran out of the prescription?

## 2022-05-02 RX ORDER — CLONAZEPAM 0.5 MG/1
TABLET ORAL
Qty: 60 TABLET | Refills: 0 | Status: SHIPPED | OUTPATIENT
Start: 2022-05-02

## 2022-05-02 NOTE — TELEPHONE ENCOUNTER
Patient returning call she states is completely out of medication and will not be able to make it until next week

## 2022-05-04 ENCOUNTER — TELEPHONE (OUTPATIENT)
Dept: FAMILY MEDICINE CLINIC | Facility: CLINIC | Age: 82
End: 2022-05-04

## 2022-05-04 NOTE — TELEPHONE ENCOUNTER
Spoke to patient. States she had normal BM and only a little abd discomfort yesterday. Today, she says she has not yet had BM and is having cramping. She states she ate fish and asparagus last night. Explained asparagus is a high FODMAP food and might be aggravating her IBS. She will try to relax and lay on the couch with warm pack on. Jacinto Carter did send her a list of psychiatrists on the [place]. She will look at it.

## 2022-05-04 NOTE — TELEPHONE ENCOUNTER
Patient calling is in terrible pain has been constipated all day    Is requesting medication to help with BM

## 2022-05-10 ENCOUNTER — TELEPHONE (OUTPATIENT)
Dept: FAMILY MEDICINE CLINIC | Facility: CLINIC | Age: 82
End: 2022-05-10

## 2022-05-10 NOTE — TELEPHONE ENCOUNTER
Spoke to patient. As I was talking to her she went to [facility] and had a BM. Normal.  Advised to rest with warm pack on stomach. She said she would. Advised to call back with further problems.

## 2022-05-11 ENCOUNTER — TELEPHONE (OUTPATIENT)
Dept: FAMILY MEDICINE CLINIC | Facility: CLINIC | Age: 82
End: 2022-05-11

## 2022-05-11 NOTE — TELEPHONE ENCOUNTER
Pt called and said she is really sick and she can't got to the bathroom. She said she has a really bad bellyache.

## 2022-05-11 NOTE — TELEPHONE ENCOUNTER
Spoke to patient. She called the same time yesterday with the same symptoms. She says she had BM yesterday and actually she did while on the phone with me. States she has not had BM yet today. She ate a bowl of cereal this morning and a hot dog on a bun this afternoon. Reminded patient Dorothea Naranjo is wanting her to avoid dairy and also avoid gluten products. She VU. Verbal support offered to patient. She VU. Advised to call us tomorrow if continues to have difficulty.

## 2022-05-12 ENCOUNTER — TELEPHONE (OUTPATIENT)
Dept: FAMILY MEDICINE CLINIC | Facility: CLINIC | Age: 82
End: 2022-05-12

## 2022-05-13 NOTE — TELEPHONE ENCOUNTER
Spoke to patient. She thought she was out of CLONAZEPAM  But she is not. She did just . She said her stomach hurt yesterday until she had a BM later in the day. She says its not too bad right now. She is on her way to visit her relatives new baby.

## 2022-05-16 NOTE — ED INITIAL ASSESSMENT (HPI)
Pt awake and alert w/ easy respirations,k reports feeling shakey and having panic attack, would like something to calm her down

## 2022-05-18 ENCOUNTER — HOSPITAL ENCOUNTER (INPATIENT)
Facility: HOSPITAL | Age: 82
LOS: 1 days | Discharge: HOME | End: 2022-05-19
Attending: EMERGENCY MEDICINE | Admitting: INTERNAL MEDICINE
Payer: MEDICARE

## 2022-05-18 ENCOUNTER — TELEPHONE (OUTPATIENT)
Dept: FAMILY MEDICINE CLINIC | Facility: CLINIC | Age: 82
End: 2022-05-18

## 2022-05-18 ENCOUNTER — APPOINTMENT (OUTPATIENT)
Dept: CT IMAGING | Age: 82
End: 2022-05-18
Attending: EMERGENCY MEDICINE
Payer: MEDICARE

## 2022-05-18 DIAGNOSIS — R79.89 ELEVATED LACTIC ACID LEVEL: ICD-10-CM

## 2022-05-18 DIAGNOSIS — R10.9 ABDOMINAL PAIN OF UNKNOWN ETIOLOGY: Primary | ICD-10-CM

## 2022-05-18 LAB
ALBUMIN SERPL-MCNC: 4 G/DL (ref 3.4–5)
ALBUMIN/GLOB SERPL: 0.9 {RATIO} (ref 1–2)
ALP LIVER SERPL-CCNC: 87 U/L
ALT SERPL-CCNC: 17 U/L
ANION GAP SERPL CALC-SCNC: 10 MMOL/L (ref 0–18)
AST SERPL-CCNC: 13 U/L (ref 15–37)
BASOPHILS # BLD AUTO: 0.07 X10(3) UL (ref 0–0.2)
BASOPHILS NFR BLD AUTO: 0.5 %
BILIRUB SERPL-MCNC: 0.4 MG/DL (ref 0.1–2)
BILIRUB UR QL STRIP.AUTO: NEGATIVE
BUN BLD-MCNC: 21 MG/DL (ref 7–18)
CALCIUM BLD-MCNC: 11.2 MG/DL (ref 8.5–10.1)
CHLORIDE SERPL-SCNC: 100 MMOL/L (ref 98–112)
CLARITY UR REFRACT.AUTO: CLEAR
CO2 SERPL-SCNC: 26 MMOL/L (ref 21–32)
COLOR UR AUTO: YELLOW
CREAT BLD-MCNC: 1.59 MG/DL
EOSINOPHIL # BLD AUTO: 0.06 X10(3) UL (ref 0–0.7)
EOSINOPHIL NFR BLD AUTO: 0.5 %
ERYTHROCYTE [DISTWIDTH] IN BLOOD BY AUTOMATED COUNT: 13.5 %
GLOBULIN PLAS-MCNC: 4.3 G/DL (ref 2.8–4.4)
GLUCOSE BLD-MCNC: 136 MG/DL (ref 70–99)
GLUCOSE UR STRIP.AUTO-MCNC: NEGATIVE MG/DL
HCT VFR BLD AUTO: 39.6 %
HGB BLD-MCNC: 12.9 G/DL
IMM GRANULOCYTES # BLD AUTO: 0.04 X10(3) UL (ref 0–1)
IMM GRANULOCYTES NFR BLD: 0.3 %
KETONES UR STRIP.AUTO-MCNC: NEGATIVE MG/DL
LACTATE SERPL-SCNC: 0.8 MMOL/L (ref 0.4–2)
LACTATE SERPL-SCNC: 3.8 MMOL/L (ref 0.4–2)
LEUKOCYTE ESTERASE UR QL STRIP.AUTO: NEGATIVE
LIPASE SERPL-CCNC: 124 U/L (ref 73–393)
LYMPHOCYTES # BLD AUTO: 2.85 X10(3) UL (ref 1–4)
LYMPHOCYTES NFR BLD AUTO: 21.6 %
MCH RBC QN AUTO: 31.2 PG (ref 26–34)
MCHC RBC AUTO-ENTMCNC: 32.6 G/DL (ref 31–37)
MCV RBC AUTO: 95.9 FL
MONOCYTES # BLD AUTO: 1.41 X10(3) UL (ref 0.1–1)
MONOCYTES NFR BLD AUTO: 10.7 %
NEUTROPHILS # BLD AUTO: 8.74 X10 (3) UL (ref 1.5–7.7)
NEUTROPHILS # BLD AUTO: 8.74 X10(3) UL (ref 1.5–7.7)
NEUTROPHILS NFR BLD AUTO: 66.4 %
NITRITE UR QL STRIP.AUTO: NEGATIVE
OSMOLALITY SERPL CALC.SUM OF ELEC: 287 MOSM/KG (ref 275–295)
PH UR STRIP.AUTO: 7 [PH] (ref 5–8)
PLATELET # BLD AUTO: 350 10(3)UL (ref 150–450)
POTASSIUM SERPL-SCNC: 3.5 MMOL/L (ref 3.5–5.1)
PROT SERPL-MCNC: 8.3 G/DL (ref 6.4–8.2)
PROT UR STRIP.AUTO-MCNC: NEGATIVE MG/DL
RBC # BLD AUTO: 4.13 X10(6)UL
RBC UR QL AUTO: NEGATIVE
SARS-COV-2 RNA RESP QL NAA+PROBE: NOT DETECTED
SODIUM SERPL-SCNC: 136 MMOL/L (ref 136–145)
SP GR UR STRIP.AUTO: <=1.005 (ref 1–1.03)
UROBILINOGEN UR STRIP.AUTO-MCNC: 0.2 MG/DL
WBC # BLD AUTO: 13.2 X10(3) UL (ref 4–11)

## 2022-05-18 PROCEDURE — 99223 1ST HOSP IP/OBS HIGH 75: CPT | Performed by: INTERNAL MEDICINE

## 2022-05-18 PROCEDURE — 74176 CT ABD & PELVIS W/O CONTRAST: CPT | Performed by: EMERGENCY MEDICINE

## 2022-05-18 RX ORDER — CINACALCET 30 MG/1
30 TABLET, FILM COATED ORAL
Status: DISCONTINUED | OUTPATIENT
Start: 2022-05-19 | End: 2022-05-19

## 2022-05-18 RX ORDER — CLONAZEPAM 0.5 MG/1
0.5 TABLET ORAL 2 TIMES DAILY PRN
Status: DISCONTINUED | OUTPATIENT
Start: 2022-05-18 | End: 2022-05-19

## 2022-05-18 RX ORDER — SODIUM CHLORIDE 9 MG/ML
INJECTION, SOLUTION INTRAVENOUS CONTINUOUS
Status: DISCONTINUED | OUTPATIENT
Start: 2022-05-18 | End: 2022-05-19

## 2022-05-18 RX ORDER — HYDRALAZINE HYDROCHLORIDE 20 MG/ML
10 INJECTION INTRAMUSCULAR; INTRAVENOUS EVERY 6 HOURS PRN
Status: DISCONTINUED | OUTPATIENT
Start: 2022-05-18 | End: 2022-05-19

## 2022-05-18 RX ORDER — AMLODIPINE BESYLATE 5 MG/1
10 TABLET ORAL DAILY
Status: DISCONTINUED | OUTPATIENT
Start: 2022-05-19 | End: 2022-05-19

## 2022-05-18 RX ORDER — CLONAZEPAM 0.5 MG/1
0.5 TABLET ORAL ONCE
Status: COMPLETED | OUTPATIENT
Start: 2022-05-18 | End: 2022-05-18

## 2022-05-18 RX ORDER — MORPHINE SULFATE 2 MG/ML
2 INJECTION, SOLUTION INTRAMUSCULAR; INTRAVENOUS EVERY 2 HOUR PRN
Status: DISCONTINUED | OUTPATIENT
Start: 2022-05-18 | End: 2022-05-19

## 2022-05-18 RX ORDER — SERTRALINE HYDROCHLORIDE 100 MG/1
100 TABLET, FILM COATED ORAL DAILY
Status: DISCONTINUED | OUTPATIENT
Start: 2022-05-19 | End: 2022-05-19

## 2022-05-18 RX ORDER — TRAZODONE HYDROCHLORIDE 50 MG/1
50 TABLET ORAL NIGHTLY
Status: DISCONTINUED | OUTPATIENT
Start: 2022-05-18 | End: 2022-05-19

## 2022-05-18 RX ORDER — MORPHINE SULFATE 4 MG/ML
4 INJECTION, SOLUTION INTRAMUSCULAR; INTRAVENOUS EVERY 2 HOUR PRN
Status: DISCONTINUED | OUTPATIENT
Start: 2022-05-18 | End: 2022-05-19

## 2022-05-18 RX ORDER — ONDANSETRON 2 MG/ML
4 INJECTION INTRAMUSCULAR; INTRAVENOUS EVERY 6 HOURS PRN
Status: DISCONTINUED | OUTPATIENT
Start: 2022-05-18 | End: 2022-05-19

## 2022-05-18 RX ORDER — HYDROCODONE BITARTRATE AND ACETAMINOPHEN 5; 325 MG/1; MG/1
2 TABLET ORAL EVERY 4 HOURS PRN
Status: DISCONTINUED | OUTPATIENT
Start: 2022-05-18 | End: 2022-05-19

## 2022-05-18 RX ORDER — ATORVASTATIN CALCIUM 10 MG/1
10 TABLET, FILM COATED ORAL NIGHTLY
Status: DISCONTINUED | OUTPATIENT
Start: 2022-05-18 | End: 2022-05-19

## 2022-05-18 RX ORDER — ACETAMINOPHEN 325 MG/1
650 TABLET ORAL EVERY 4 HOURS PRN
Status: DISCONTINUED | OUTPATIENT
Start: 2022-05-18 | End: 2022-05-19

## 2022-05-18 RX ORDER — LOSARTAN POTASSIUM 100 MG/1
100 TABLET ORAL DAILY
Status: DISCONTINUED | OUTPATIENT
Start: 2022-05-19 | End: 2022-05-19

## 2022-05-18 RX ORDER — MORPHINE SULFATE 2 MG/ML
1 INJECTION, SOLUTION INTRAMUSCULAR; INTRAVENOUS EVERY 2 HOUR PRN
Status: DISCONTINUED | OUTPATIENT
Start: 2022-05-18 | End: 2022-05-19

## 2022-05-18 RX ORDER — HYDROCODONE BITARTRATE AND ACETAMINOPHEN 5; 325 MG/1; MG/1
1 TABLET ORAL EVERY 4 HOURS PRN
Status: DISCONTINUED | OUTPATIENT
Start: 2022-05-18 | End: 2022-05-19

## 2022-05-18 RX ORDER — HYDROMORPHONE HYDROCHLORIDE 1 MG/ML
0.5 INJECTION, SOLUTION INTRAMUSCULAR; INTRAVENOUS; SUBCUTANEOUS EVERY 30 MIN PRN
Status: COMPLETED | OUTPATIENT
Start: 2022-05-18 | End: 2022-05-18

## 2022-05-18 RX ORDER — PANTOPRAZOLE SODIUM 40 MG/1
40 TABLET, DELAYED RELEASE ORAL
Status: DISCONTINUED | OUTPATIENT
Start: 2022-05-19 | End: 2022-05-18

## 2022-05-18 RX ORDER — METOCLOPRAMIDE HYDROCHLORIDE 5 MG/ML
5 INJECTION INTRAMUSCULAR; INTRAVENOUS EVERY 8 HOURS PRN
Status: DISCONTINUED | OUTPATIENT
Start: 2022-05-18 | End: 2022-05-19

## 2022-05-18 RX ORDER — HEPARIN SODIUM 5000 [USP'U]/ML
5000 INJECTION, SOLUTION INTRAVENOUS; SUBCUTANEOUS EVERY 8 HOURS SCHEDULED
Status: DISCONTINUED | OUTPATIENT
Start: 2022-05-18 | End: 2022-05-19

## 2022-05-18 RX ORDER — MORPHINE SULFATE 4 MG/ML
4 INJECTION, SOLUTION INTRAMUSCULAR; INTRAVENOUS ONCE
Status: COMPLETED | OUTPATIENT
Start: 2022-05-18 | End: 2022-05-18

## 2022-05-18 RX ORDER — SODIUM CHLORIDE, SODIUM LACTATE, POTASSIUM CHLORIDE, CALCIUM CHLORIDE 600; 310; 30; 20 MG/100ML; MG/100ML; MG/100ML; MG/100ML
INJECTION, SOLUTION INTRAVENOUS CONTINUOUS
Status: DISCONTINUED | OUTPATIENT
Start: 2022-05-18 | End: 2022-05-19

## 2022-05-18 RX ORDER — MELATONIN
3 NIGHTLY PRN
Status: DISCONTINUED | OUTPATIENT
Start: 2022-05-18 | End: 2022-05-19

## 2022-05-18 RX ORDER — LABETALOL HYDROCHLORIDE 5 MG/ML
10 INJECTION, SOLUTION INTRAVENOUS EVERY 6 HOURS PRN
Status: DISCONTINUED | OUTPATIENT
Start: 2022-05-18 | End: 2022-05-19

## 2022-05-18 RX ORDER — MONTELUKAST SODIUM 10 MG/1
10 TABLET ORAL NIGHTLY
Status: DISCONTINUED | OUTPATIENT
Start: 2022-05-18 | End: 2022-05-19

## 2022-05-18 NOTE — ED QUICK NOTES
Orders for admission, patient is aware of plan and ready to go upstairs. Any questions, please call ED RN Gaby Mckeon  at extension [phone]. Vaccinated? yes  Type of COVID test sent:rap neg  COVID Suspicion level: Low      Titratable drug(s) infusing:  Rate:na    LOC at time of transport:pt forgetful    Other pertinent information:    CIWA score=na  NIH score=na

## 2022-05-19 VITALS
OXYGEN SATURATION: 97 % | SYSTOLIC BLOOD PRESSURE: 148 MMHG | BODY MASS INDEX: 19.49 KG/M2 | HEART RATE: 61 BPM | DIASTOLIC BLOOD PRESSURE: 63 MMHG | HEIGHT: 65 IN | WEIGHT: 117 LBS | RESPIRATION RATE: 18 BRPM | TEMPERATURE: 98 F

## 2022-05-19 LAB
ALBUMIN SERPL-MCNC: 3.5 G/DL (ref 3.4–5)
ALBUMIN/GLOB SERPL: 1.1 {RATIO} (ref 1–2)
ALP LIVER SERPL-CCNC: 74 U/L
ALT SERPL-CCNC: 14 U/L
ANION GAP SERPL CALC-SCNC: 7 MMOL/L (ref 0–18)
AST SERPL-CCNC: 17 U/L (ref 15–37)
ATRIAL RATE: 66 BPM
BASOPHILS # BLD AUTO: 0.1 X10(3) UL (ref 0–0.2)
BASOPHILS NFR BLD AUTO: 0.9 %
BILIRUB SERPL-MCNC: 0.6 MG/DL (ref 0.1–2)
BUN BLD-MCNC: 16 MG/DL (ref 7–18)
CALCIUM BLD-MCNC: 10.1 MG/DL (ref 8.5–10.1)
CHLORIDE SERPL-SCNC: 108 MMOL/L (ref 98–112)
CO2 SERPL-SCNC: 25 MMOL/L (ref 21–32)
CREAT BLD-MCNC: 1.26 MG/DL
EOSINOPHIL # BLD AUTO: 0.17 X10(3) UL (ref 0–0.7)
EOSINOPHIL NFR BLD AUTO: 1.5 %
ERYTHROCYTE [DISTWIDTH] IN BLOOD BY AUTOMATED COUNT: 13.3 %
GLOBULIN PLAS-MCNC: 3.3 G/DL (ref 2.8–4.4)
GLUCOSE BLD-MCNC: 112 MG/DL (ref 70–99)
HCT VFR BLD AUTO: 34.6 %
HGB BLD-MCNC: 11.2 G/DL
IMM GRANULOCYTES # BLD AUTO: 0.04 X10(3) UL (ref 0–1)
IMM GRANULOCYTES NFR BLD: 0.4 %
LYMPHOCYTES # BLD AUTO: 3.35 X10(3) UL (ref 1–4)
LYMPHOCYTES NFR BLD AUTO: 29.6 %
MAGNESIUM SERPL-MCNC: 2.4 MG/DL (ref 1.6–2.6)
MCH RBC QN AUTO: 31 PG (ref 26–34)
MCHC RBC AUTO-ENTMCNC: 32.4 G/DL (ref 31–37)
MCV RBC AUTO: 95.8 FL
MONOCYTES # BLD AUTO: 1.35 X10(3) UL (ref 0.1–1)
MONOCYTES NFR BLD AUTO: 11.9 %
NEUTROPHILS # BLD AUTO: 6.31 X10 (3) UL (ref 1.5–7.7)
NEUTROPHILS # BLD AUTO: 6.31 X10(3) UL (ref 1.5–7.7)
NEUTROPHILS NFR BLD AUTO: 55.7 %
OSMOLALITY SERPL CALC.SUM OF ELEC: 292 MOSM/KG (ref 275–295)
P AXIS: 57 DEGREES
P-R INTERVAL: 194 MS
PHOSPHATE SERPL-MCNC: 4.1 MG/DL (ref 2.5–4.9)
PLATELET # BLD AUTO: 291 10(3)UL (ref 150–450)
POTASSIUM SERPL-SCNC: 3.6 MMOL/L (ref 3.5–5.1)
POTASSIUM SERPL-SCNC: 4.3 MMOL/L (ref 3.5–5.1)
PROT SERPL-MCNC: 6.8 G/DL (ref 6.4–8.2)
Q-T INTERVAL: 400 MS
QRS DURATION: 76 MS
QTC CALCULATION (BEZET): 419 MS
R AXIS: -4 DEGREES
RBC # BLD AUTO: 3.61 X10(6)UL
SODIUM SERPL-SCNC: 140 MMOL/L (ref 136–145)
T AXIS: 37 DEGREES
VENTRICULAR RATE: 66 BPM
WBC # BLD AUTO: 11.3 X10(3) UL (ref 4–11)

## 2022-05-19 PROCEDURE — 99238 HOSP IP/OBS DSCHRG MGMT 30/<: CPT | Performed by: INTERNAL MEDICINE

## 2022-05-19 RX ORDER — POTASSIUM CHLORIDE 20 MEQ/1
40 TABLET, EXTENDED RELEASE ORAL EVERY 4 HOURS
Status: COMPLETED | OUTPATIENT
Start: 2022-05-19 | End: 2022-05-19

## 2022-05-19 RX ORDER — LORAZEPAM 2 MG/ML
0.5 INJECTION INTRAMUSCULAR EVERY 6 HOURS PRN
Status: DISCONTINUED | OUTPATIENT
Start: 2022-05-19 | End: 2022-05-19

## 2022-05-19 RX ORDER — LORAZEPAM 2 MG/ML
1 INJECTION INTRAMUSCULAR EVERY 6 HOURS PRN
Status: DISCONTINUED | OUTPATIENT
Start: 2022-05-19 | End: 2022-05-19

## 2022-05-19 NOTE — PLAN OF CARE
Problem: Patient/Family Goals  Goal: Patient/Family Long Term Goal  Description: Patient's Long Term Goal: go home     Interventions:  - clear liquid diet   - See additional Care Plan goals for specific interventions  Outcome: Progressing  Goal: Patient/Family Short Term Goal  Description: Patient's Short Term Goal: free from pain     Interventions:   - pain medicine as needed   - See additional Care Plan goals for specific interventions  Outcome: Progressing     Problem: PAIN - ADULT  Goal: Verbalizes/displays adequate comfort level or patient's stated pain goal  Description: INTERVENTIONS:  - Encourage pt to monitor pain and request assistance  - Assess pain using appropriate pain scale  - Administer analgesics based on type and severity of pain and evaluate response  - Implement non-pharmacological measures as appropriate and evaluate response  - Consider cultural and social influences on pain and pain management  - Manage/alleviate anxiety  - Utilize distraction and/or relaxation techniques  - Monitor for opioid side effects  - Notify MD/LIP if interventions unsuccessful or patient reports new pain  - Anticipate increased pain with activity and pre-medicate as appropriate  Outcome: Not Progressing     Problem: RISK FOR INFECTION - ADULT  Goal: Absence of fever/infection during anticipated neutropenic period  Description: INTERVENTIONS  - Monitor WBC  - Administer growth factors as ordered  - Implement neutropenic guidelines  Outcome: Progressing     Problem: SAFETY ADULT - FALL  Goal: Free from fall injury  Description: INTERVENTIONS:  - Assess pt frequently for physical needs  - Identify cognitive and physical deficits and behaviors that affect risk of falls.   - [place] fall precautions as indicated by assessment.  - Educate pt/family on patient safety including physical limitations  - Instruct pt to call for assistance with activity based on assessment  - Modify environment to reduce risk of injury  - Provide assistive devices as appropriate  - Consider OT/PT consult to assist with strengthening/mobility  - Encourage toileting schedule  Outcome: Not Progressing   Alert and orient x 4 , on room air , c pox . On tele with sr . Forgetful , keep on asking same questions  again and again . Crying, Very anxious and shaky . Notified MD , received new order . Provided medicine as needed . Family at bedside . Seems  getting better , less anxious . Abdomen soft , bs present , no BM noted . Tolerated clear liquid diet . Denies any nausea or vomiting . Voids well . Up in room , ambulated to  with one assist . Getting ivf . Plan of care discussed , answered all questions . Verbalized understanding .  Will continue to monitor

## 2022-05-19 NOTE — PLAN OF CARE
Patient alert and oriented x4. Forgetful at times. Wears glasses and has bilateral hearing aids at the bedside. On room air. Report pain 8/10, Pain meds as needed. Fine tremors in both hands. Pt had an episode of emesis, received Zofran. Melontin administered PRN. Voids. Up with standby assist. [place] running per order.      Problem: Patient/Family Goals  Goal: Patient/Family Long Term Goal  Description: Patient's Long Term Goal: discharge    Interventions:  - Pain management  - GI consulted  - See additional Care Plan goals for specific interventions  5/18/2022 2048 by Julio C Vaca RN  Outcome: Progressing  5/18/2022 2048 by Julio C Vaca RN  Outcome: Progressing  Goal: Patient/Family Short Term Goal  Description: Patient's Short Term Goal:   Discharge  Interventions:   - IVF  - pain management  - See additional Care Plan goals for specific interventions  5/18/2022 2048 by Julio C Vaca RN  Outcome: Progressing  5/18/2022 2048 by Julio C Vaca RN  Outcome: Progressing     Problem: PAIN - ADULT  Goal: Verbalizes/displays adequate comfort level or patient's stated pain goal  Description: INTERVENTIONS:  - Encourage pt to monitor pain and request assistance  - Assess pain using appropriate pain scale  - Administer analgesics based on type and severity of pain and evaluate response  - Implement non-pharmacological measures as appropriate and evaluate response  - Consider cultural and social influences on pain and pain management  - Manage/alleviate anxiety  - Utilize distraction and/or relaxation techniques  - Monitor for opioid side effects  - Notify MD/LIP if interventions unsuccessful or patient reports new pain  - Anticipate increased pain with activity and pre-medicate as appropriate  Outcome: Progressing     Problem: RISK FOR INFECTION - ADULT  Goal: Absence of fever/infection during anticipated neutropenic period  Description: INTERVENTIONS  - Monitor WBC  - Administer growth factors as ordered  - Implement neutropenic guidelines  Outcome: Progressing     Problem: SAFETY ADULT - FALL  Goal: Free from fall injury  Description: INTERVENTIONS:  - Assess pt frequently for physical needs  - Identify cognitive and physical deficits and behaviors that affect risk of falls.   - [place] fall precautions as indicated by assessment.  - Educate pt/family on patient safety including physical limitations  - Instruct pt to call for assistance with activity based on assessment  - Modify environment to reduce risk of injury  - Provide assistive devices as appropriate  - Consider OT/PT consult to assist with strengthening/mobility  - Encourage toileting schedule  Outcome: Progressing     Problem: DISCHARGE PLANNING  Goal: Discharge to home or other facility with appropriate resources  Description: INTERVENTIONS:  - Identify barriers to discharge w/pt and caregiver  - Include patient/family/discharge partner in discharge planning  - Arrange for needed discharge resources and transportation as appropriate  - Identify discharge learning needs (meds, wound care, etc)  - Arrange for interpreters to assist at discharge as needed  - Consider post-discharge preferences of patient/family/discharge partner  - Complete POLST form as appropriate  - Assess patient's ability to be responsible for managing their own health  - Refer to Case Management Department for coordinating discharge planning if the patient needs post-hospital services based on physician/LIP order or complex needs related to functional status, cognitive ability or social support system  Outcome: Progressing

## 2022-05-19 NOTE — PROGRESS NOTES
GI Attending Addendum:  I have personally seen and examined this patient and agree with above nurse practitioner's history, physical exam, assessment and recommendations with the following modifications and/or additions:     Patient is an [de-identified]year old with CKD, depression, HTN, CAD s/p PCI, with GI consult for abdominal pain. Lower band abdominal pain. Non radiating. Constipation history. She takes a variety of multiple laxatives, including miralax, senna, dulcolax. Given progressive abdominal pain, patient presented to ER. No overt GI bleeding - denies hematochezia, melena;  denies any weight loss; No nsaid use. No longer having nausea, or diarrhea or abdominal pain. Prior EGD 2/2022 w/ Dr Alexis Barragan - moderate gastritis, grade C esophagitis; small clean base ulcers; CT reviewed, unchanged intrahepatic/extrahepatic biliary dilation; Now clinically, her abdominal pain has improved. Normal liver enzymes. Her abdominal pain is now resolved. Given symptoms prior to laxative use, likely she was constipated. Recommendations  1. Tolerating clear liquid diet; ok to advance to as tolerated  2. IVF, analgesia, anti-emetics per primary team  3. Scheduled for outpatient EGD -  6/24/2022  4. PPI BID  5.  Avoid non aspirin nsaids        Beata Jain MD  5/19/2022  [facility]

## 2022-05-19 NOTE — PLAN OF CARE
Problem: Patient/Family Goals  Goal: Patient/Family Long Term Goal  Description: Patient's Long Term Goal:     Interventions:  -   - See additional Care Plan goals for specific interventions  5/19/2022 1826 by Ranulfo العراقي RN  Outcome: Adequate for Discharge  5/19/2022 1114 by Ranulfo العراقي RN  Outcome: Progressing  Goal: Patient/Family Short Term Goal  Description: Patient's Short Term Goal:     Interventions:   -   - See additional Care Plan goals for specific interventions  5/19/2022 1826 by Ranulfo العراقي RN  Outcome: Adequate for Discharge  5/19/2022 1114 by Ranulfo العراقي RN  Outcome: Progressing     Problem: PAIN - ADULT  Goal: Verbalizes/displays adequate comfort level or patient's stated pain goal  Description: INTERVENTIONS:  - Encourage pt to monitor pain and request assistance  - Assess pain using appropriate pain scale  - Administer analgesics based on type and severity of pain and evaluate response  - Implement non-pharmacological measures as appropriate and evaluate response  - Consider cultural and social influences on pain and pain management  - Manage/alleviate anxiety  - Utilize distraction and/or relaxation techniques  - Monitor for opioid side effects  - Notify MD/LIP if interventions unsuccessful or patient reports new pain  - Anticipate increased pain with activity and pre-medicate as appropriate  5/19/2022 1826 by Ranulfo العراقي RN  Outcome: Adequate for Discharge  5/19/2022 1114 by Ranulfo العراقي RN  Outcome: Not Progressing     Problem: RISK FOR INFECTION - ADULT  Goal: Absence of fever/infection during anticipated neutropenic period  Description: INTERVENTIONS  - Monitor WBC  - Administer growth factors as ordered  - Implement neutropenic guidelines  5/19/2022 1826 by Ranulfo العراقي RN  Outcome: Adequate for Discharge  5/19/2022 1114 by Ranulfo العراقي RN  Outcome: Progressing     Problem: SAFETY ADULT - FALL  Goal: Free from fall injury  Description: INTERVENTIONS:  - Assess pt frequently for physical needs  - Identify cognitive and physical deficits and behaviors that affect risk of falls. - [place] fall precautions as indicated by assessment.  - Educate pt/family on patient safety including physical limitations  - Instruct pt to call for assistance with activity based on assessment  - Modify environment to reduce risk of injury  - Provide assistive devices as appropriate  - Consider OT/PT consult to assist with strengthening/mobility  - Encourage toileting schedule  5/19/2022 1826 by Eugene Redmond RN  Outcome: Adequate for Discharge  5/19/2022 1114 by Eugene Redmond RN  Outcome: Not Progressing     Problem: DISCHARGE PLANNING  Goal: Discharge to home or other facility with appropriate resources  Description: INTERVENTIONS:  - Identify barriers to discharge w/pt and caregiver  - Include patient/family/discharge partner in discharge planning  - Arrange for needed discharge resources and transportation as appropriate  - Identify discharge learning needs (meds, wound care, etc)  - Arrange for interpreters to assist at discharge as needed  - Consider post-discharge preferences of patient/family/discharge partner  - Complete POLST form as appropriate  - Assess patient's ability to be responsible for managing their own health  - Refer to Case Management Department for coordinating discharge planning if the patient needs post-hospital services based on physician/LIP order or complex needs related to functional status, cognitive ability or social support system  5/19/2022 1826 by Eugene Redmond RN  Outcome: Adequate for Discharge  5/19/2022 1114 by Eugene Redmond RN  Outcome: Progressing   Tolerated full liquid diet well , denies any nausea or vomiting . Denies any abdominal pain . Less anxious .  Family at bedside , voids well

## 2022-05-19 NOTE — PROGRESS NOTES
NURSING ADMISSION NOTE      Patient admitted via Cart  Oriented to room. Safety precautions initiated. Bed in low position. Call light in reach. Received patient from er. Alert and forgetful . C/o severe abdominal pain . Provided pain medicine as needed . Fine tremors on both hands . Started clear liquid diet . Consulted GI , notified . Plan of care discussed , answered all questions . Verbalized understanding .  Will continue to monitor

## 2022-05-20 ENCOUNTER — PATIENT OUTREACH (OUTPATIENT)
Dept: CASE MANAGEMENT | Age: 82
End: 2022-05-20

## 2022-05-20 ENCOUNTER — TELEPHONE (OUTPATIENT)
Dept: FAMILY MEDICINE CLINIC | Facility: CLINIC | Age: 82
End: 2022-05-20

## 2022-05-20 DIAGNOSIS — Z02.9 ENCOUNTERS FOR UNSPECIFIED ADMINISTRATIVE PURPOSE: ICD-10-CM

## 2022-05-20 DIAGNOSIS — R10.9 ABDOMINAL PAIN OF UNKNOWN ETIOLOGY: ICD-10-CM

## 2022-05-20 PROCEDURE — 1111F DSCHRG MED/CURRENT MED MERGE: CPT

## 2022-05-20 NOTE — TELEPHONE ENCOUNTER
Pt called and said she has a bellyache and she can't go to the bathroom. She wants to know what Leatha Gaucher wants her to do.

## 2022-05-20 NOTE — TELEPHONE ENCOUNTER
EARL, Spoke to pt for TCM today. Pt states continues to have abdominal pain rates 7-8/10. She indicates had a sandwich yesterday and today a cup of tea, sandwich gave her a stomach ache. Pt states she is unable to have a BM, her last BM was during her hospital admission. She takes Miralax daily. Pt denies nausea, vomiting, fevers, chills, or any other symptom at this time. Pt advised if pain is severe she should proceed to ER. Pt requested message sent to PCP office for recommendations. Please discuss with PCP and contact patient. Thank you!

## 2022-05-23 ENCOUNTER — OFFICE VISIT (OUTPATIENT)
Dept: FAMILY MEDICINE CLINIC | Facility: CLINIC | Age: 82
End: 2022-05-23
Payer: MEDICARE

## 2022-05-23 VITALS
DIASTOLIC BLOOD PRESSURE: 82 MMHG | OXYGEN SATURATION: 99 % | HEIGHT: 60 IN | WEIGHT: 113 LBS | BODY MASS INDEX: 22.19 KG/M2 | TEMPERATURE: 98 F | SYSTOLIC BLOOD PRESSURE: 158 MMHG | HEART RATE: 85 BPM | RESPIRATION RATE: 18 BRPM

## 2022-05-23 DIAGNOSIS — R10.84 GENERALIZED ABDOMINAL PAIN: Primary | ICD-10-CM

## 2022-05-23 DIAGNOSIS — F45.41 PAIN AGGRAVATED BY ANXIETY: ICD-10-CM

## 2022-05-23 DIAGNOSIS — F41.9 PAIN AGGRAVATED BY ANXIETY: ICD-10-CM

## 2022-05-23 RX ORDER — KETOROLAC TROMETHAMINE 15 MG/ML
15 INJECTION, SOLUTION INTRAMUSCULAR; INTRAVENOUS ONCE
Status: DISCONTINUED | OUTPATIENT
Start: 2022-05-23 | End: 2022-05-23

## 2022-05-23 RX ORDER — HYDROXYZINE HYDROCHLORIDE 10 MG/1
10 TABLET, FILM COATED ORAL 3 TIMES DAILY PRN
Qty: 90 TABLET | Refills: 0 | Status: SHIPPED | OUTPATIENT
Start: 2022-05-23

## 2022-05-23 RX ORDER — KETOROLAC TROMETHAMINE 30 MG/ML
15 INJECTION, SOLUTION INTRAMUSCULAR; INTRAVENOUS ONCE
Status: COMPLETED | OUTPATIENT
Start: 2022-05-23 | End: 2022-05-23

## 2022-05-23 RX ADMIN — KETOROLAC TROMETHAMINE 15 MG: 30 INJECTION, SOLUTION INTRAMUSCULAR; INTRAVENOUS at 13:42:00

## 2022-05-23 NOTE — PATIENT INSTRUCTIONS
Keep your appointment with Kaur Rao as scheduled on 5/25/2022. You were given Toradol 15 mg as a shot for pain while in the office today. Decrease the Clonazepam 0.5 mg to one half tablet three times a day as needed for anxiety. Start the Hydroxyzine 10 mg every 6-8 hours as needed for anxiety. Stop the Duloxetine. Continue the Sertraline 100 mg daily. Take a dose of Gas X when you get home to help with the gassiness. Use a hot water bottle on your abdomen for comfort. Go to the ER for worsening symptoms.

## 2022-05-25 ENCOUNTER — OFFICE VISIT (OUTPATIENT)
Dept: FAMILY MEDICINE CLINIC | Facility: CLINIC | Age: 82
End: 2022-05-25
Payer: MEDICARE

## 2022-05-25 VITALS
WEIGHT: 114 LBS | TEMPERATURE: 97 F | SYSTOLIC BLOOD PRESSURE: 102 MMHG | HEART RATE: 60 BPM | DIASTOLIC BLOOD PRESSURE: 56 MMHG | BODY MASS INDEX: 22 KG/M2

## 2022-05-25 DIAGNOSIS — F45.41 PAIN AGGRAVATED BY ANXIETY: ICD-10-CM

## 2022-05-25 DIAGNOSIS — M51.16 LUMBAR DISC HERNIATION WITH RADICULOPATHY: ICD-10-CM

## 2022-05-25 DIAGNOSIS — F41.0 ANXIETY DISORDER DUE TO GENERAL MEDICAL CONDITION WITH PANIC ATTACK: ICD-10-CM

## 2022-05-25 DIAGNOSIS — F06.4 ANXIETY DISORDER DUE TO GENERAL MEDICAL CONDITION WITH PANIC ATTACK: ICD-10-CM

## 2022-05-25 DIAGNOSIS — R41.3 MEMORY LOSS, SHORT TERM: Primary | ICD-10-CM

## 2022-05-25 DIAGNOSIS — R10.9 CHRONIC ABDOMINAL PAIN: ICD-10-CM

## 2022-05-25 DIAGNOSIS — K27.9 PUD (PEPTIC ULCER DISEASE): ICD-10-CM

## 2022-05-25 DIAGNOSIS — K59.04 CHRONIC IDIOPATHIC CONSTIPATION: ICD-10-CM

## 2022-05-25 DIAGNOSIS — G89.29 CHRONIC ABDOMINAL PAIN: ICD-10-CM

## 2022-05-25 DIAGNOSIS — F41.9 PAIN AGGRAVATED BY ANXIETY: ICD-10-CM

## 2022-05-25 DIAGNOSIS — M99.09 SOMATIC DYSFUNCTION OF ABDOMINAL REGION: ICD-10-CM

## 2022-05-25 DIAGNOSIS — K58.2 IRRITABLE BOWEL SYNDROME WITH BOTH CONSTIPATION AND DIARRHEA: ICD-10-CM

## 2022-05-25 DIAGNOSIS — F13.20 BENZODIAZEPINE DEPENDENCE (HCC): ICD-10-CM

## 2022-05-25 PROCEDURE — 99496 TRANSJ CARE MGMT HIGH F2F 7D: CPT | Performed by: FAMILY MEDICINE

## 2022-05-25 PROCEDURE — 1111F DSCHRG MED/CURRENT MED MERGE: CPT | Performed by: FAMILY MEDICINE

## 2022-05-25 RX ORDER — HYDROCODONE BITARTRATE AND ACETAMINOPHEN 5; 325 MG/1; MG/1
TABLET ORAL EVERY 8 HOURS PRN
Qty: 10 TABLET | Refills: 0 | Status: SHIPPED | OUTPATIENT
Start: 2022-05-25

## 2022-05-25 RX ORDER — CLONAZEPAM 0.5 MG/1
TABLET ORAL 2 TIMES DAILY PRN
Qty: 60 TABLET | Refills: 0 | COMMUNITY
Start: 2022-05-25

## 2022-05-25 RX ORDER — LANSOPRAZOLE 30 MG/1
30 TABLET, ORALLY DISINTEGRATING, DELAYED RELEASE ORAL
Qty: 30 TABLET | Refills: 3 | Status: SHIPPED | OUTPATIENT
Start: 2022-05-25 | End: 2022-06-24

## 2022-05-25 RX ORDER — NYSTATIN 100000 U/G
CREAM TOPICAL
Qty: 30 G | Refills: 0 | Status: SHIPPED | OUTPATIENT
Start: 2022-05-25

## 2022-05-25 RX ORDER — HYDROXYZINE HYDROCHLORIDE 10 MG/1
20 TABLET, FILM COATED ORAL 3 TIMES DAILY PRN
Qty: 90 TABLET | Refills: 0 | COMMUNITY
Start: 2022-05-25

## 2022-05-25 NOTE — PATIENT INSTRUCTIONS
Linzess 72 mcg twice a week   Miralax as needed  Change to Prevacid from Omeprazole prescription sent   Hydroxyzine 20 mg three times per day (take 2 of the 10 mg for total of 20 mg)  Klonopin 0.5 twice a day (evenually will taper down)   MRi of the brain ordered  Keep psychologist visit next month  Keep appointment with GI 6/15/22   See me in 1 month  Take pictures of BM

## 2022-05-26 PROBLEM — R79.89 ELEVATED LACTIC ACID LEVEL: Status: RESOLVED | Noted: 2022-05-18 | Resolved: 2022-05-26

## 2022-05-26 PROBLEM — R41.3 MEMORY LOSS, SHORT TERM: Status: ACTIVE | Noted: 2020-08-08

## 2022-05-26 PROBLEM — A04.72 C. DIFFICILE COLITIS: Status: RESOLVED | Noted: 2021-10-28 | Resolved: 2022-05-26

## 2022-05-26 PROBLEM — R10.9 ABDOMINAL PAIN OF UNKNOWN ETIOLOGY: Status: RESOLVED | Noted: 2022-05-18 | Resolved: 2022-05-26

## 2022-05-26 RX ORDER — MONTELUKAST SODIUM 10 MG/1
TABLET ORAL
Qty: 90 TABLET | Refills: 0 | Status: SHIPPED | OUTPATIENT
Start: 2022-05-26

## 2022-05-27 ENCOUNTER — TELEPHONE (OUTPATIENT)
Dept: FAMILY MEDICINE CLINIC | Facility: CLINIC | Age: 82
End: 2022-05-27

## 2022-05-27 DIAGNOSIS — R19.7 DIARRHEA, UNSPECIFIED TYPE: Primary | ICD-10-CM

## 2022-05-27 NOTE — TELEPHONE ENCOUNTER
See if any bloody stool or dark stool. Order C dif if has another episode of watery stool. I assume she took the Linzess 72 mcg yesterday or the day before which will cause a loose stool.

## 2022-05-27 NOTE — TELEPHONE ENCOUNTER
She did take the Linzess. She will decrease taking it to just once weekly. Patient denies bloody or dark stool. States the stool starts out with substance but does become watery sometimes. CDiff order placed. Instructed if this continues to submit a stool specimen.  Soco Cartwright.

## 2022-05-27 NOTE — TELEPHONE ENCOUNTER
Patient c/o diarrhea x1 this a.m., lower abdominal pain, and vomiting a couple times this morning. Denies fever. No longer nauseated. Main c/o is the abd pain. Please advise.

## 2022-05-31 ENCOUNTER — TELEPHONE (OUTPATIENT)
Dept: FAMILY MEDICINE CLINIC | Facility: CLINIC | Age: 82
End: 2022-05-31

## 2022-05-31 NOTE — TELEPHONE ENCOUNTER
Patient took one of her daughters Xanax and worked very well for her the one that was prescribed is not working is requesting refill until her appt 6/15    Patient also went to [facility] Saturday was given a list of doctors are not taking new patients

## 2022-05-31 NOTE — TELEPHONE ENCOUNTER
Spoke to Steve. States she is having \"a nervous breakdown\". I spoke to her . He is having difficulty finding a psychiatrist that is taking new patients and takes their insurance. He is waiting to hear from two offices. He says she has been taking up to 4 clonazepam on some days and feels this medication is no longer helping. She took one of her daughters Xanax the other day and this helped her. I advised if needed, to proceed to [facility]. Please advise further if needed.

## 2022-06-01 ENCOUNTER — TELEPHONE (OUTPATIENT)
Dept: FAMILY MEDICINE CLINIC | Facility: CLINIC | Age: 82
End: 2022-06-01

## 2022-06-01 DIAGNOSIS — F06.4 ANXIETY DISORDER DUE TO GENERAL MEDICAL CONDITION WITH PANIC ATTACK: ICD-10-CM

## 2022-06-01 DIAGNOSIS — F41.0 ANXIETY DISORDER DUE TO GENERAL MEDICAL CONDITION WITH PANIC ATTACK: ICD-10-CM

## 2022-06-01 RX ORDER — MIRTAZAPINE 7.5 MG/1
TABLET, FILM COATED ORAL
Qty: 45 TABLET | Refills: 0 | Status: SHIPPED | OUTPATIENT
Start: 2022-06-01 | End: 2022-06-06

## 2022-06-01 RX ORDER — CLONAZEPAM 0.5 MG/1
TABLET ORAL
Qty: 60 TABLET | Refills: 0 | OUTPATIENT
Start: 2022-06-01

## 2022-06-01 NOTE — TELEPHONE ENCOUNTER
Stop trazodone when she is taking the mirtazapine and reduce down to  50 mg of sertraline daily when taking the mirtazapine. If she is too sedated from the half dose in the morning we can just do the 7.5 mg at night.

## 2022-06-01 NOTE — TELEPHONE ENCOUNTER
Start mirtazapine 7.5 mg at night and one half of a 7.5 mg in the morning if needed for anxiety. #30 and follow up in office.

## 2022-06-01 NOTE — TELEPHONE ENCOUNTER
Spoke to both patient and her . All instructions given to her . Appointment made. Advised to call with questions or problems.

## 2022-06-02 ENCOUNTER — TELEPHONE (OUTPATIENT)
Dept: FAMILY MEDICINE CLINIC | Facility: CLINIC | Age: 82
End: 2022-06-02

## 2022-06-02 NOTE — TELEPHONE ENCOUNTER
Spoke to patient. States she is having belly pain and she is constipated. She said she has had a BM earlier today. She thinks she has more to go. Advised her not to take any laxative as then she will have diarrhea. She VU. Advised she can maybe try one glass of warm prune juice but most likely, she is not constipated. Advised her to relax with warm pack on her abdomen. She STEPHANIA.

## 2022-06-02 NOTE — TELEPHONE ENCOUNTER
Keilyjosh Loja called and said she has been trembling and she is constipated. She needs something to calm her nerves. She called back 2 minutes after this call came thru asking if anyone would be calling her. I told her she just called 2 minutes ago and I was writing the note to the nurse.

## 2022-06-05 DIAGNOSIS — F06.4 ANXIETY DISORDER DUE TO GENERAL MEDICAL CONDITION WITH PANIC ATTACK: ICD-10-CM

## 2022-06-05 DIAGNOSIS — F41.0 ANXIETY DISORDER DUE TO GENERAL MEDICAL CONDITION WITH PANIC ATTACK: ICD-10-CM

## 2022-06-06 PROBLEM — F13.230: Status: ACTIVE | Noted: 2022-06-06

## 2022-06-06 PROBLEM — F13.930: Status: ACTIVE | Noted: 2022-06-06

## 2022-06-06 RX ORDER — CLONAZEPAM 0.5 MG/1
TABLET ORAL
Qty: 60 TABLET | Refills: 0 | OUTPATIENT
Start: 2022-06-06

## 2022-06-07 PROBLEM — F13.230: Chronic | Status: ACTIVE | Noted: 2022-06-06

## 2022-06-07 PROBLEM — M99.09 SOMATIC DYSFUNCTION OF ABDOMINAL REGION: Status: RESOLVED | Noted: 2022-04-26 | Resolved: 2022-06-07

## 2022-06-07 PROBLEM — F13.930: Chronic | Status: ACTIVE | Noted: 2022-06-06

## 2022-06-21 ENCOUNTER — OFFICE VISIT (OUTPATIENT)
Dept: PAIN CLINIC | Facility: CLINIC | Age: 82
End: 2022-06-21
Payer: MEDICARE

## 2022-06-21 VITALS
HEART RATE: 50 BPM | DIASTOLIC BLOOD PRESSURE: 60 MMHG | HEIGHT: 60 IN | OXYGEN SATURATION: 96 % | SYSTOLIC BLOOD PRESSURE: 122 MMHG | WEIGHT: 116 LBS | BODY MASS INDEX: 22.78 KG/M2

## 2022-06-21 DIAGNOSIS — M54.16 LUMBAR RADICULOPATHY: Primary | ICD-10-CM

## 2022-06-21 NOTE — PROGRESS NOTES
Patient presents in office today with reported pain in Left Leg    Current pain level reported = 8/10    Last reported dose of Tylenol this morning

## 2022-06-22 ENCOUNTER — LAB ENCOUNTER (OUTPATIENT)
Dept: LAB | Age: 82
End: 2022-06-22
Attending: INTERNAL MEDICINE
Payer: MEDICARE

## 2022-06-22 DIAGNOSIS — Z20.822 ENCOUNTER FOR PREOPERATIVE SCREENING LABORATORY TESTING FOR COVID-19 VIRUS: ICD-10-CM

## 2022-06-22 DIAGNOSIS — Z01.812 ENCOUNTER FOR PREOPERATIVE SCREENING LABORATORY TESTING FOR COVID-19 VIRUS: ICD-10-CM

## 2022-06-23 ENCOUNTER — TELEPHONE (OUTPATIENT)
Dept: FAMILY MEDICINE CLINIC | Facility: CLINIC | Age: 82
End: 2022-06-23

## 2022-06-23 LAB — SARS-COV-2 RNA RESP QL NAA+PROBE: DETECTED

## 2022-06-23 NOTE — TELEPHONE ENCOUNTER
The patient has no respiratory symptoms whatsoever. She reported having diarrhea all night but when asked if they were watery or formed, she said no episodes of watery stool. She said they were all \"formed and looked healthy\". Jean Pettit is aware that repeat testing is not done.

## 2022-06-23 NOTE — TELEPHONE ENCOUNTER
Patient was notified by Dr. Kisha Ahumada office that she had a positive Covid test. She reported \"terrible\" abdominal pain in bilateral lower quadrants and having several bowel movements overnight that started after she went to bed last night, unable to specify how many. Denied any other symptoms and asked if she could re-test for Covid to confirm 1st test was positive. Patient informed this is not usually done. Endoscopy scheduled with Dr. Dalton Culp for tomorrow has been cancelled. Please advise on treatment recommendations.

## 2022-06-23 NOTE — TELEPHONE ENCOUNTER
Does she have any symptoms at all of her COVID i.e. sore throat, congestion, cough. Her abdominal pain is chronic and unchanged. Find out if she is having any watery stools she does have history of C. difficile if there are watery stools would need C. difficile toxin stool test.      We do not repeat testing she is welcome to do COVID testing at her house with a home kit. If she is having any significant respiratory symptoms of COVID she would be a possible candidate for Paxlovid the oral antiviral treatment. Let me know if she is having any COVID symptoms so I can decide if she has any interactions before sending medication over   The dosage of PAXLOVID is 150 mg nirmatrelvir and 100 mg ritonavir twice daily for 5 days for patients with renal insufficiency    If she is having any significant shortness of breath or cough then to the emergency room for monoclonal antibodies. Follow the CDC guidelines for isolation. People with COVID-19 should isolate for 5 days and additional days until symptoms are resolving including being without fever for 24 hours, followed that by 5 days of wearing a mask when walking around to minimize the risk of infecting people the encounter. Guaifenesin generic (Mucinex) for expectorant if symptomatic with respiratory symptoms  Vitamin C, vitamin D and Zinc    Warm steams increase humidity. Review COVID-19 breathing exercises on YouTube. Also proning positions for sleep. Review CDC. gov COVID-19 website for suggestions on how to maintain isolation. If symptoms are not better or worsening after 24 to 48 hours call the office back. Practice home hygiene and isolation to prevent spread of infection. Call office if any shortness of breath, chest pain or any difficulty breathing. Drink adequate amounts of water and Tylenol (acetaminophen) as needed for fever. Call office for a virtual appointment if you need guidance.

## 2022-06-23 NOTE — TELEPHONE ENCOUNTER
Patient positive for COVID results today.   No sxs except abdominal pain and diarrhea  Wanted to know if any meds she can take

## 2022-06-27 RX ORDER — LIDOCAINE 50 MG/G
PATCH TOPICAL
Qty: 60 PATCH | Refills: 0 | Status: SHIPPED | OUTPATIENT
Start: 2022-06-27

## 2022-06-29 ENCOUNTER — TELEPHONE (OUTPATIENT)
Dept: FAMILY MEDICINE CLINIC | Facility: CLINIC | Age: 82
End: 2022-06-29

## 2022-06-29 DIAGNOSIS — M51.16 LUMBAR DISC HERNIATION WITH RADICULOPATHY: ICD-10-CM

## 2022-06-29 RX ORDER — MIRTAZAPINE 7.5 MG/1
TABLET, FILM COATED ORAL
Qty: 45 TABLET | Refills: 0 | OUTPATIENT
Start: 2022-06-29

## 2022-06-29 RX ORDER — POTASSIUM CHLORIDE 20 MEQ/1
TABLET, EXTENDED RELEASE ORAL
Qty: 60 TABLET | Refills: 0 | Status: SHIPPED | OUTPATIENT
Start: 2022-06-29

## 2022-06-29 RX ORDER — HYDROCODONE BITARTRATE AND ACETAMINOPHEN 5; 325 MG/1; MG/1
TABLET ORAL EVERY 8 HOURS PRN
Qty: 5 TABLET | Refills: 0 | Status: SHIPPED | OUTPATIENT
Start: 2022-06-29

## 2022-06-29 NOTE — TELEPHONE ENCOUNTER
Pt Name and  verified. Pt states that she has ran out of her hydrocodone medication for her leg pain. Did try to get in to see Dr. Haris Santillan (pain specialist) but was unable to do so due to a recent diagnosis of Covid. Pt states that she did not experience any Covid symptoms but was positive on 2022. She requests a refill be sent to her until she is able to see Dr. Estella Martinez. This RN advised that message would be routed to [facility] as in her last visit LONI did mention that she'd like pt to limit hydrocodone use.  Pt VU. pls advise

## 2022-06-29 NOTE — TELEPHONE ENCOUNTER
Current Outpatient Medications:     Patient calling states she is out of her Hydrocodone and is with terrible leg pain

## 2022-06-30 NOTE — TELEPHONE ENCOUNTER
Pt Name and  verified. Upon chart review, Mattie Cade sent in script of medication. This RN called pt to inform and confirm receipt of script. Pt states she was able to  her medication and is thankful. No further questions.

## 2022-07-05 DIAGNOSIS — I10 ESSENTIAL HYPERTENSION: ICD-10-CM

## 2022-07-05 RX ORDER — PRAVASTATIN SODIUM 40 MG
TABLET ORAL
Qty: 90 TABLET | Refills: 0 | Status: CANCELLED | OUTPATIENT
Start: 2022-07-05

## 2022-07-06 ENCOUNTER — TELEPHONE (OUTPATIENT)
Dept: FAMILY MEDICINE CLINIC | Facility: CLINIC | Age: 82
End: 2022-07-06

## 2022-07-06 RX ORDER — LOSARTAN POTASSIUM 100 MG/1
TABLET ORAL
Qty: 90 TABLET | Refills: 0 | Status: SHIPPED | OUTPATIENT
Start: 2022-07-06

## 2022-07-06 RX ORDER — AMLODIPINE BESYLATE 10 MG/1
TABLET ORAL
Qty: 90 TABLET | Refills: 0 | Status: SHIPPED | OUTPATIENT
Start: 2022-07-06

## 2022-07-06 RX ORDER — PRAVASTATIN SODIUM 40 MG
TABLET ORAL
Qty: 90 TABLET | Refills: 0 | Status: SHIPPED | OUTPATIENT
Start: 2022-07-06

## 2022-07-06 NOTE — TELEPHONE ENCOUNTER
Patient states stomach hurts, took stool softener and has stomach ache and headache. Getting worse. Started this morning. She cannot go to bathroom and feels constipated. She uses [facility] in [place] by [facility].   Requesting medicine

## 2022-07-06 NOTE — TELEPHONE ENCOUNTER
Spoke to patient. C/o generalized abd discomfort. States had two loose stools earlier today and feels like now she is constipated. Advised her she is most likely not constipated and not to take anything. Advised to rest and sip on fluids and follow  BRAT diet. Advised warm pack to abdomen. Advised to call if pain does not improve. She STEPHANIA.

## 2022-07-18 ENCOUNTER — TELEPHONE (OUTPATIENT)
Dept: PAIN CLINIC | Facility: CLINIC | Age: 82
End: 2022-07-18

## 2022-07-18 DIAGNOSIS — M54.16 LUMBAR RADICULOPATHY: Primary | ICD-10-CM

## 2022-07-18 NOTE — TELEPHONE ENCOUNTER
Pt called requesting to schedule injection with Dr Red Veloz. PSR advised pt no order has been placed at this time. Advised pt a message would be sent to Dr Red Veloz to place order. PSR spoke to [employer] and was advised we will need to await CHELLY Mccormick's return for recommendations. Pt asked when is Dr Red Veloz expected to return PSR advised first week of august. Pt VU stated she will await call back with Dr Negrito Park recommendation.

## 2022-07-18 NOTE — TELEPHONE ENCOUNTER
Question Answer   Anesthesia Type Local   Provider [facility]   Location Lab   Procedure Transforaminal   Laterality/Level LEFT L3/4, L4/5   Medical clearance requested (will send to Pain Navigator) No   Patient has Medicare coverage?  Yes

## 2022-07-19 DIAGNOSIS — M51.16 LUMBAR DISC HERNIATION WITH RADICULOPATHY: ICD-10-CM

## 2022-07-19 RX ORDER — HYDROCODONE BITARTRATE AND ACETAMINOPHEN 5; 325 MG/1; MG/1
TABLET ORAL EVERY 8 HOURS PRN
Qty: 5 TABLET | Refills: 0 | OUTPATIENT
Start: 2022-07-19

## 2022-07-21 NOTE — TELEPHONE ENCOUNTER
It looks like she is scheuduled for 8/8/22:    TRANSFORAMINAL LUMBAR EPIDURAL STEROID INJECTION LEFT L3/4, L4/5 with local

## 2022-07-22 RX ORDER — HYDROCODONE BITARTRATE AND ACETAMINOPHEN 5; 325 MG/1; MG/1
TABLET ORAL EVERY 8 HOURS PRN
Qty: 5 TABLET | Refills: 0 | Status: SHIPPED | OUTPATIENT
Start: 2022-07-22

## 2022-07-27 ENCOUNTER — OFFICE VISIT (OUTPATIENT)
Dept: FAMILY MEDICINE CLINIC | Facility: CLINIC | Age: 82
End: 2022-07-27
Payer: MEDICARE

## 2022-07-27 VITALS
HEART RATE: 56 BPM | SYSTOLIC BLOOD PRESSURE: 128 MMHG | TEMPERATURE: 98 F | OXYGEN SATURATION: 96 % | WEIGHT: 116 LBS | BODY MASS INDEX: 21 KG/M2 | DIASTOLIC BLOOD PRESSURE: 60 MMHG

## 2022-07-27 DIAGNOSIS — Z87.11 HISTORY OF PEPTIC ULCER DISEASE: ICD-10-CM

## 2022-07-27 DIAGNOSIS — I35.1 MILD AORTIC VALVE REGURGITATION: Primary | ICD-10-CM

## 2022-07-27 DIAGNOSIS — R53.82 CHRONIC FATIGUE: ICD-10-CM

## 2022-07-27 DIAGNOSIS — R10.9 CHRONIC ABDOMINAL PAIN: ICD-10-CM

## 2022-07-27 DIAGNOSIS — Z87.11 HISTORY OF PEPTIC ULCER: ICD-10-CM

## 2022-07-27 DIAGNOSIS — R01.1 MURMUR, CARDIAC: ICD-10-CM

## 2022-07-27 DIAGNOSIS — F41.1 GENERALIZED ANXIETY DISORDER: Chronic | ICD-10-CM

## 2022-07-27 DIAGNOSIS — K59.00 CONSTIPATION, UNSPECIFIED CONSTIPATION TYPE: ICD-10-CM

## 2022-07-27 DIAGNOSIS — G89.29 CHRONIC ABDOMINAL PAIN: ICD-10-CM

## 2022-07-27 PROCEDURE — 99214 OFFICE O/P EST MOD 30 MIN: CPT | Performed by: FAMILY MEDICINE

## 2022-07-27 RX ORDER — ESTRADIOL 0.1 MG/G
1 CREAM VAGINAL
COMMUNITY
Start: 2022-04-01

## 2022-07-28 PROBLEM — K25.0 ACUTE GASTRIC ULCER WITH HEMORRHAGE: Status: ACTIVE | Noted: 2021-10-08

## 2022-07-28 PROBLEM — D72.829 LEUKOCYTOSIS, UNSPECIFIED TYPE: Status: RESOLVED | Noted: 2022-02-20 | Resolved: 2022-07-28

## 2022-07-28 PROBLEM — R10.9 INTRACTABLE ABDOMINAL PAIN: Status: RESOLVED | Noted: 2021-03-21 | Resolved: 2022-07-28

## 2022-07-28 PROBLEM — K25.0 ACUTE GASTRIC ULCER WITH HEMORRHAGE: Status: RESOLVED | Noted: 2021-10-08 | Resolved: 2022-07-28

## 2022-07-28 PROBLEM — F13.930: Chronic | Status: RESOLVED | Noted: 2022-06-06 | Resolved: 2022-07-28

## 2022-07-28 PROBLEM — K27.9 PUD (PEPTIC ULCER DISEASE): Status: RESOLVED | Noted: 2022-03-06 | Resolved: 2022-07-28

## 2022-07-29 DIAGNOSIS — M51.16 LUMBAR DISC HERNIATION WITH RADICULOPATHY: ICD-10-CM

## 2022-07-29 NOTE — TELEPHONE ENCOUNTER
Patient requesting refill on noco.  Last fill 7/22/22 5 tabs.  She does have an appointment with Dr. Dayana Mcclendon on 8/29/22 for    TRANSFORAMINAL LUMBAR EPIDURAL STEROID INJECTION LEFT L3/4, L4/5 with local Left

## 2022-08-01 ENCOUNTER — TELEPHONE (OUTPATIENT)
Dept: FAMILY MEDICINE CLINIC | Facility: CLINIC | Age: 82
End: 2022-08-01

## 2022-08-01 RX ORDER — HYDROCODONE BITARTRATE AND ACETAMINOPHEN 5; 325 MG/1; MG/1
TABLET ORAL EVERY 8 HOURS PRN
Qty: 5 TABLET | Refills: 0 | Status: SHIPPED | OUTPATIENT
Start: 2022-08-01

## 2022-08-01 NOTE — TELEPHONE ENCOUNTER
Spoke to patient. Refill sent in a different encounter. Advised to apply her warm pack. She had a normal BM this morning. Stomach was fine over the weekend. Denies vomiting. Advised most likely IBS. Advised if worsens to proceed to ER for evaluation. Advised to call tomorrow if continues.    She VU

## 2022-08-01 NOTE — TELEPHONE ENCOUNTER
Patient states she is having stomach pains feeling like she has to have BM but states nothing there which started this morning. She also needs hydrocodone.  [facility]   Has none left

## 2022-08-05 ENCOUNTER — TELEPHONE (OUTPATIENT)
Dept: FAMILY MEDICINE CLINIC | Facility: CLINIC | Age: 82
End: 2022-08-05

## 2022-08-05 RX ORDER — LIDOCAINE 50 MG/G
PATCH TOPICAL
Qty: 60 PATCH | Refills: 0 | Status: SHIPPED | OUTPATIENT
Start: 2022-08-05

## 2022-08-05 NOTE — TELEPHONE ENCOUNTER
Spoke to patient. She was feeling better she says. States she had normal BM today and yesterday. Advised to follow bland diet, sip on liquids, rest and apply the warm pack if needed. She STEPHANIA.

## 2022-08-05 NOTE — TELEPHONE ENCOUNTER
Patient calling had 2 bowel movements today and still with terrible stomach pain     Requesting advise

## 2022-08-08 ENCOUNTER — TELEPHONE (OUTPATIENT)
Dept: FAMILY MEDICINE CLINIC | Facility: CLINIC | Age: 82
End: 2022-08-08

## 2022-08-08 DIAGNOSIS — M51.16 LUMBAR DISC HERNIATION WITH RADICULOPATHY: ICD-10-CM

## 2022-08-08 NOTE — TELEPHONE ENCOUNTER
Patient states she has terrible abdominal cramping since yesterday and feels like she is constipated. She is crying and requesting help  She states she already had  BM this a.m.   Has a lot of pain

## 2022-08-08 NOTE — TELEPHONE ENCOUNTER
Patient states she feels like she needs to have a BM but can't. States she had a normal BM today earlier. States she is having some cramping. Advised her to apply warm pack to abdomen and rest.  Advised her to follow bland diet. She VU and in agreement.

## 2022-08-11 ENCOUNTER — TELEPHONE (OUTPATIENT)
Dept: FAMILY MEDICINE CLINIC | Facility: CLINIC | Age: 82
End: 2022-08-11

## 2022-08-11 RX ORDER — HYDROCODONE BITARTRATE AND ACETAMINOPHEN 5; 325 MG/1; MG/1
TABLET ORAL EVERY 8 HOURS PRN
Qty: 5 TABLET | Refills: 0 | OUTPATIENT
Start: 2022-08-11

## 2022-08-11 NOTE — TELEPHONE ENCOUNTER
Since this is a gastrointestinal medication she should contact the GI who is managing the medication.

## 2022-08-11 NOTE — TELEPHONE ENCOUNTER
Spoke with patient. She states that she has lower abdominal pain that is intermittent throughout most of the day. She feels like she has to have a bowel movement constantly. Pain is relieved with a bowel movement for about 10 minutes. She denies nausea, vomiting or diarrhea. Patient takes dicyclomine. She is asking for a refill on this medication and any recommendations from [facility]. Advised that patient should call her GI specialist for further recommendations. She has an upcoming procedure scheduled on 8/27/2022. She states that that office is \"not very helpful\" and is asking for a refill and recommendations from [facility]. Advised that [facility] is out of the office today. She should go to ER if pain is severe or if she develops nausea or vomiting. She verbalized understanding.

## 2022-08-11 NOTE — TELEPHONE ENCOUNTER
Patient states this 3RD day she has called with complain of abdominal cramps like she has to move bowels constantly all day and no one has returned her call  For last three days

## 2022-08-12 RX ORDER — DICYCLOMINE HCL 20 MG
20 TABLET ORAL EVERY 4 HOURS PRN
Qty: 90 TABLET | Refills: 0 | OUTPATIENT
Start: 2022-08-12

## 2022-08-15 ENCOUNTER — TELEPHONE (OUTPATIENT)
Dept: FAMILY MEDICINE CLINIC | Facility: CLINIC | Age: 82
End: 2022-08-15

## 2022-08-15 NOTE — TELEPHONE ENCOUNTER
Spoke to patient. C/o sinus congestion, runny nose, mild cough, sore throat, loose stool today. Other symptoms began yesterday. I did advise completing a COVID test.  Advised tylenol, musinex, warm steams, plenty of rest and fluids.    She VU

## 2022-08-15 NOTE — TELEPHONE ENCOUNTER
Patient calling c/o cough that burns her chest, sinus drainage and sore throat     Patient did not do Covid testing    Symptoms started 8/14

## 2022-08-16 ENCOUNTER — TELEPHONE (OUTPATIENT)
Dept: FAMILY MEDICINE CLINIC | Facility: CLINIC | Age: 82
End: 2022-08-16

## 2022-08-16 ENCOUNTER — HOSPITAL ENCOUNTER (EMERGENCY)
Age: 82
Discharge: HOME | End: 2022-08-16
Attending: EMERGENCY MEDICINE
Payer: MEDICARE

## 2022-08-16 ENCOUNTER — APPOINTMENT (OUTPATIENT)
Dept: CT IMAGING | Age: 82
End: 2022-08-16
Attending: NURSE PRACTITIONER
Payer: MEDICARE

## 2022-08-16 VITALS
SYSTOLIC BLOOD PRESSURE: 177 MMHG | OXYGEN SATURATION: 96 % | BODY MASS INDEX: 19.63 KG/M2 | HEIGHT: 64 IN | RESPIRATION RATE: 18 BRPM | HEART RATE: 61 BPM | TEMPERATURE: 98 F | WEIGHT: 115 LBS | DIASTOLIC BLOOD PRESSURE: 67 MMHG

## 2022-08-16 DIAGNOSIS — N30.01 ACUTE CYSTITIS WITH HEMATURIA: Primary | ICD-10-CM

## 2022-08-16 DIAGNOSIS — K59.00 CONSTIPATION, UNSPECIFIED CONSTIPATION TYPE: ICD-10-CM

## 2022-08-16 LAB
ALBUMIN SERPL-MCNC: 3.7 G/DL (ref 3.4–5)
ALBUMIN/GLOB SERPL: 1 {RATIO} (ref 1–2)
ALP LIVER SERPL-CCNC: 86 U/L
ALT SERPL-CCNC: 15 U/L
ANION GAP SERPL CALC-SCNC: 5 MMOL/L (ref 0–18)
AST SERPL-CCNC: 10 U/L (ref 15–37)
BASOPHILS # BLD AUTO: 0.11 X10(3) UL (ref 0–0.2)
BASOPHILS NFR BLD AUTO: 0.9 %
BILIRUB SERPL-MCNC: 0.3 MG/DL (ref 0.1–2)
BILIRUB UR QL STRIP.AUTO: NEGATIVE
BUN BLD-MCNC: 23 MG/DL (ref 7–18)
CALCIUM BLD-MCNC: 10.4 MG/DL (ref 8.5–10.1)
CHLORIDE SERPL-SCNC: 111 MMOL/L (ref 98–112)
CLARITY UR REFRACT.AUTO: CLEAR
CO2 SERPL-SCNC: 26 MMOL/L (ref 21–32)
COLOR UR AUTO: YELLOW
CREAT BLD-MCNC: 1.25 MG/DL
EOSINOPHIL # BLD AUTO: 0.52 X10(3) UL (ref 0–0.7)
EOSINOPHIL NFR BLD AUTO: 4 %
ERYTHROCYTE [DISTWIDTH] IN BLOOD BY AUTOMATED COUNT: 14 %
GFR SERPLBLD BASED ON 1.73 SQ M-ARVRAT: 44 ML/MIN/1.73M2 (ref 60–?)
GLOBULIN PLAS-MCNC: 3.8 G/DL (ref 2.8–4.4)
GLUCOSE BLD-MCNC: 118 MG/DL (ref 70–99)
GLUCOSE UR STRIP.AUTO-MCNC: NEGATIVE MG/DL
HCT VFR BLD AUTO: 38.5 %
HGB BLD-MCNC: 12.6 G/DL
IMM GRANULOCYTES # BLD AUTO: 0.05 X10(3) UL (ref 0–1)
IMM GRANULOCYTES NFR BLD: 0.4 %
KETONES UR STRIP.AUTO-MCNC: NEGATIVE MG/DL
LIPASE SERPL-CCNC: 149 U/L (ref 73–393)
LYMPHOCYTES # BLD AUTO: 4.59 X10(3) UL (ref 1–4)
LYMPHOCYTES NFR BLD AUTO: 35.6 %
MCH RBC QN AUTO: 31.2 PG (ref 26–34)
MCHC RBC AUTO-ENTMCNC: 32.7 G/DL (ref 31–37)
MCV RBC AUTO: 95.3 FL
MONOCYTES # BLD AUTO: 1.14 X10(3) UL (ref 0.1–1)
MONOCYTES NFR BLD AUTO: 8.8 %
NEUTROPHILS # BLD AUTO: 6.48 X10 (3) UL (ref 1.5–7.7)
NEUTROPHILS # BLD AUTO: 6.48 X10(3) UL (ref 1.5–7.7)
NEUTROPHILS NFR BLD AUTO: 50.3 %
NITRITE UR QL STRIP.AUTO: NEGATIVE
OSMOLALITY SERPL CALC.SUM OF ELEC: 299 MOSM/KG (ref 275–295)
PH UR STRIP.AUTO: 7 [PH] (ref 5–8)
PLATELET # BLD AUTO: 355 10(3)UL (ref 150–450)
POTASSIUM SERPL-SCNC: 3.5 MMOL/L (ref 3.5–5.1)
PROT SERPL-MCNC: 7.5 G/DL (ref 6.4–8.2)
RBC # BLD AUTO: 4.04 X10(6)UL
SODIUM SERPL-SCNC: 142 MMOL/L (ref 136–145)
SP GR UR STRIP.AUTO: 1.01 (ref 1–1.03)
UROBILINOGEN UR STRIP.AUTO-MCNC: 0.2 MG/DL
WBC # BLD AUTO: 12.9 X10(3) UL (ref 4–11)
WBC CLUMPS UR QL AUTO: PRESENT /HPF
WBC CLUMPS UR QL AUTO: PRESENT /HPF

## 2022-08-16 PROCEDURE — 99284 EMERGENCY DEPT VISIT MOD MDM: CPT | Performed by: EMERGENCY MEDICINE

## 2022-08-16 PROCEDURE — 87088 URINE BACTERIA CULTURE: CPT | Performed by: NURSE PRACTITIONER

## 2022-08-16 PROCEDURE — 96376 TX/PRO/DX INJ SAME DRUG ADON: CPT | Performed by: EMERGENCY MEDICINE

## 2022-08-16 PROCEDURE — 96372 THER/PROPH/DIAG INJ SC/IM: CPT | Performed by: EMERGENCY MEDICINE

## 2022-08-16 PROCEDURE — 85025 COMPLETE CBC W/AUTO DIFF WBC: CPT | Performed by: NURSE PRACTITIONER

## 2022-08-16 PROCEDURE — 81001 URINALYSIS AUTO W/SCOPE: CPT | Performed by: NURSE PRACTITIONER

## 2022-08-16 PROCEDURE — 74177 CT ABD & PELVIS W/CONTRAST: CPT | Performed by: NURSE PRACTITIONER

## 2022-08-16 PROCEDURE — 87186 SC STD MICRODIL/AGAR DIL: CPT | Performed by: NURSE PRACTITIONER

## 2022-08-16 PROCEDURE — 80053 COMPREHEN METABOLIC PANEL: CPT | Performed by: NURSE PRACTITIONER

## 2022-08-16 PROCEDURE — 99285 EMERGENCY DEPT VISIT HI MDM: CPT | Performed by: EMERGENCY MEDICINE

## 2022-08-16 PROCEDURE — 96375 TX/PRO/DX INJ NEW DRUG ADDON: CPT | Performed by: EMERGENCY MEDICINE

## 2022-08-16 PROCEDURE — 83690 ASSAY OF LIPASE: CPT | Performed by: NURSE PRACTITIONER

## 2022-08-16 PROCEDURE — 81015 MICROSCOPIC EXAM OF URINE: CPT | Performed by: NURSE PRACTITIONER

## 2022-08-16 PROCEDURE — 87086 URINE CULTURE/COLONY COUNT: CPT | Performed by: NURSE PRACTITIONER

## 2022-08-16 PROCEDURE — 96374 THER/PROPH/DIAG INJ IV PUSH: CPT | Performed by: EMERGENCY MEDICINE

## 2022-08-16 RX ORDER — MORPHINE SULFATE 4 MG/ML
4 INJECTION, SOLUTION INTRAMUSCULAR; INTRAVENOUS ONCE
Status: DISCONTINUED | OUTPATIENT
Start: 2022-08-16 | End: 2022-08-16

## 2022-08-16 RX ORDER — DIPHENHYDRAMINE HYDROCHLORIDE 50 MG/ML
25 INJECTION INTRAMUSCULAR; INTRAVENOUS ONCE
Status: COMPLETED | OUTPATIENT
Start: 2022-08-16 | End: 2022-08-16

## 2022-08-16 RX ORDER — IOHEXOL 350 MG/ML
65 INJECTION, SOLUTION INTRAVENOUS
Status: COMPLETED | OUTPATIENT
Start: 2022-08-16 | End: 2022-08-16

## 2022-08-16 RX ORDER — MORPHINE SULFATE 4 MG/ML
2 INJECTION, SOLUTION INTRAMUSCULAR; INTRAVENOUS ONCE
Status: COMPLETED | OUTPATIENT
Start: 2022-08-16 | End: 2022-08-16

## 2022-08-16 RX ORDER — POLYETHYLENE GLYCOL 3350 17 G/17G
17 POWDER, FOR SOLUTION ORAL DAILY PRN
Qty: 12 EACH | Refills: 0 | Status: SHIPPED | OUTPATIENT
Start: 2022-08-16 | End: 2022-09-15

## 2022-08-16 RX ORDER — LEVOFLOXACIN 250 MG/1
250 TABLET ORAL ONCE
Status: COMPLETED | OUTPATIENT
Start: 2022-08-16 | End: 2022-08-16

## 2022-08-16 RX ORDER — DICYCLOMINE HYDROCHLORIDE 10 MG/ML
20 INJECTION INTRAMUSCULAR ONCE
Status: COMPLETED | OUTPATIENT
Start: 2022-08-16 | End: 2022-08-16

## 2022-08-16 NOTE — TELEPHONE ENCOUNTER
PT called again requesting to speak to someone to advise her on what to do about her stomach cramping

## 2022-08-16 NOTE — TELEPHONE ENCOUNTER
Pt called again and is having stomach cramping since 6am, Feels like she needs to relieve herself and is not able to. Needs advising on what to do.

## 2022-08-16 NOTE — ED NOTES
I reviewed that chart and discussed the case. I have examined the patient and noted    The patient arrived here she said she was having a bowel and felt really constipated had a small bowel movement and then started having increasing pain in her lower abdomen she denies any fevers or chills denies any diarrhea dysuria nausea or vomiting denies any fevers or chills abdominal pain is throughout her mid abdomen is not regular relieved with anything describes moderate in nature. General: Female no respiratory distress  The patient is in no respiratory distress    HEENT: There is no signs of trauma. Oral mucosa is wet. Lungs: Clear to auscultation without wheezing or retractions  Abdomen is diffusely tender primarily in the mid abdomen area no rebound no guarding no CVA tenderness  Cardiovascular: Regular without murmurs    Extremities: Good pulses bilaterally. No calf tenderness    Neuro: Alert and oriented. The patient is moving all extremities there is no focal findings. CT ABDOMEN+PELVIS(CONTRAST ONLY)(CPT=74177)    Result Date: 8/16/2022  PROCEDURE:  CT ABDOMEN+PELVIS (CONTRAST ONLY) (EHG=87727)  LOCATION:  [place]   COMPARISON:  [place] , CT, CT ABDOMEN PELVIS IV CONTRAST, NO ORAL (ER), 2/20/2022, 2:09 AM.  [facility] , MR, MRI ABDOMEN&MRCP W/3D (SEE=[id number]), 2/22/2022, 10:40 AM.  [place], CT, CT ABDOMEN+PELVIS(CPT=74176), 5/18/2022, 4:05 PM.  [place], CT, CT  ABDOMEN+PELVIS(CONTRAST ONLY)(CPT=74177), 10/27/2021, 2:51 PM.  [place], CT, CT ABDOMEN+PELVIS(CONTRAST ONLY)(CPT=74177), 8/15/2021, 1:30 PM.  INDICATIONS:  lower abd pain since this am, last bm yesterday, no nausea  TECHNIQUE:  CT scanning was performed from the dome of the diaphragm to the pubic symphysis with non-ionic intravenous contrast material. Post contrast coronal MPR imaging was performed. Dose reduction techniques were used.  Dose information is transmitted to the [facility]) [facility] Radiology Data Registry) which includes the Dose Index Registry. PATIENT STATED HISTORY:(As transcribed by Technologist)  Patient complain of bilateral lower quadrant pain with difficulty bowel movement. CONTRAST USED:  65cc of Omnipaque 350  FINDINGS:  LIVER:  No enlargement, atrophy, abnormal density, or significant focal lesion. BILIARY:  Stable mild prominence of the common bile duct measuring up to 9 mm in size there is no definite stone or mass lesion obstructing the bile duct. PANCREAS:  No lesion, fluid collection, ductal dilatation, or atrophy. SPLEEN:  Status post splenectomy. KIDNEYS:  Multiple renal cysts with the largest in the right midpole measures 35 x 32 mm. Largest parapelvic cyst in the left measures 57 x 44 mm. Nonobstructing bilateral kidney stones largest in the right midpole measures 3 mm largest in the left lower pole measures 2-3 mm. ADRENALS:  No mass or enlargement. AORTA/VASCULAR:  No aneurysm or dissection. RETROPERITONEUM:  No mass or adenopathy. BOWEL/MESENTERY:  No visible mass, obstruction, or bowel wall thickening. ABDOMINAL WALL:  No mass or hernia. URINARY BLADDER:  No visible focal wall thickening, lesion, or calculus. PELVIC NODES:  No adenopathy. PELVIC ORGANS:  Patient is status post a hysterectomy. No evidence of mass lesion or fluid collection. BONES:  No bony lesion or fracture. Levoscoliosis and moderate multilevel degenerative changes of the lumbar spine. LUNG BASES:  No visible pulmonary or pleural disease. CONCLUSION:   1. No evidence of acute abdominal pelvic process. 2. Nonobstructing bilateral kidney stones. 3. Stable prominence of the intrahepatic and extrahepatic biliary tree without evidence of stone or mass lesion.    Dictated by (CST): Elizabeth Shaver MD on 8/16/2022 at 7:42 PM     Finalized by (CST): Elizabeth Shaver MD on 8/16/2022 at 7:52 PM         The patient's pain all started after she was getting a bowel movement and really started after that. She was constipated admitted because some discomfort in her abdomen urine did show findings of a questionable urinary tract infection we will give her first dose of oral antibiotics at home with short course of antibiotics comprehensive, lipase, was normal.  CBC was slightly elevated 12.8 I did go back and reexamined her she is got some mild tenderness no rebound no guarding I do feel that she can go home with close follow-up. But I discussed importance of close follow-up and return if increasing pain discomfort  Assessment  Abdominal pain  Urinary tract infection      I provided a substantive portion of care for this patient. I personally performed the medical decision making for this encounter.

## 2022-08-16 NOTE — ED QUICK NOTES
Pt frequently on the call light during this hospital stay. Pt informed of appropriate use of the call light.  Pt updated on the continued wait time for CT scan

## 2022-08-17 ENCOUNTER — TELEPHONE (OUTPATIENT)
Dept: FAMILY MEDICINE CLINIC | Facility: CLINIC | Age: 82
End: 2022-08-17

## 2022-08-17 NOTE — TELEPHONE ENCOUNTER
Pt called to follow up on earlier call. Stated that she was in the ER last night is suffering from abdominal pain. Pt stated that is looking for something for the pain.

## 2022-08-17 NOTE — TELEPHONE ENCOUNTER
Patient calling she went to ER last night for severe abdominal pain was told to follow up with pcp ASAP    Requesting appointment

## 2022-08-17 NOTE — TELEPHONE ENCOUNTER
Pt went to ER for abdominal pain 8/16/22. Discharge dx:   - acute cystitis with hematuria  - constipation     Given one dose of Levaquin 250mg at ER    Preliminary result of urine culture:    50,000-99,000 CFU/ML Proteus mirabilis      Discharge med list did not include antibiotic    Requesting appt for f/u, 1st available is 9/7    Also requesting something for the pain.      Please advise

## 2022-08-17 NOTE — TELEPHONE ENCOUNTER
Patient calling patient aware messages have been sent to [facility] waiting for response     Still in pain needs rx

## 2022-08-19 ENCOUNTER — TELEPHONE (OUTPATIENT)
Dept: FAMILY MEDICINE CLINIC | Facility: CLINIC | Age: 82
End: 2022-08-19

## 2022-08-19 DIAGNOSIS — M51.16 LUMBAR DISC HERNIATION WITH RADICULOPATHY: ICD-10-CM

## 2022-08-19 RX ORDER — HYDROCODONE BITARTRATE AND ACETAMINOPHEN 5; 325 MG/1; MG/1
TABLET ORAL EVERY 8 HOURS PRN
Qty: 5 TABLET | Refills: 0 | Status: SHIPPED | OUTPATIENT
Start: 2022-08-19 | End: 2022-08-19

## 2022-08-19 RX ORDER — SODIUM CHLORIDE, SODIUM LACTATE, POTASSIUM CHLORIDE, CALCIUM CHLORIDE 600; 310; 30; 20 MG/100ML; MG/100ML; MG/100ML; MG/100ML
INJECTION, SOLUTION INTRAVENOUS CONTINUOUS
Status: CANCELLED | OUTPATIENT
Start: 2022-08-19

## 2022-08-19 RX ORDER — HYDROCODONE BITARTRATE AND ACETAMINOPHEN 5; 325 MG/1; MG/1
TABLET ORAL EVERY 8 HOURS PRN
Qty: 5 TABLET | Refills: 0 | Status: SHIPPED | OUTPATIENT
Start: 2022-08-19 | End: 2022-09-09

## 2022-08-19 NOTE — TELEPHONE ENCOUNTER
GI has not refilled the dicyclomine. They state she needs an appointment. Informed patient.     Pended norco. Last fill was 8/1/22 #5

## 2022-08-19 NOTE — TELEPHONE ENCOUNTER
Patient calling requesting appointment with Kaur Rao for ER FU    Was told ASAP puree thin honey thick/nectar thick puree thick solid thin liquid

## 2022-08-20 NOTE — TELEPHONE ENCOUNTER
Pt called and in a lot of pain. Says she has a steroid injection planned, but would like a refill of the 5# of norco. Refill request seen in chart and done. Informed her in future she needs to request sooner before the weekend. I accidentally called it into the non[facility] on [facility] so had to call and cancel it there and then sent to [facility] on [address].

## 2022-08-21 ENCOUNTER — TELEPHONE (OUTPATIENT)
Dept: FAMILY MEDICINE CLINIC | Facility: CLINIC | Age: 82
End: 2022-08-21

## 2022-08-21 NOTE — TELEPHONE ENCOUNTER
On call 1:08PM- No answer. Answered 2nd time 1:30PM- Called c/o abdominal cramping. Had a BM already today, but cramping is worsening and no further BM. Has been on norco small amount lately for her back pain while awaiting her steroid injection. Was told to take colace by PCP and miralax by ER, but hasn't taken this b/c wanted to speak with the doctor first. Says her BM today was normal/soft. Used dicyclomine in the past, but was advised not to take it until her procedure which is to be done on Wed. Denies- diarrhea, fever, chills, n/v    Rec- take miralax today, if still no better tomorrow may take colace and miralax together. If pain worsens to go to ER.

## 2022-08-22 ENCOUNTER — HOSPITAL ENCOUNTER (OUTPATIENT)
Dept: CV DIAGNOSTICS | Age: 82
Discharge: HOME | End: 2022-08-22
Attending: FAMILY MEDICINE
Payer: MEDICARE

## 2022-08-22 ENCOUNTER — TELEPHONE (OUTPATIENT)
Dept: FAMILY MEDICINE CLINIC | Facility: CLINIC | Age: 82
End: 2022-08-22

## 2022-08-22 DIAGNOSIS — I35.1 MILD AORTIC VALVE REGURGITATION: ICD-10-CM

## 2022-08-22 DIAGNOSIS — R53.82 CHRONIC FATIGUE: ICD-10-CM

## 2022-08-22 DIAGNOSIS — R01.1 MURMUR, CARDIAC: ICD-10-CM

## 2022-08-22 PROCEDURE — 93306 TTE W/DOPPLER COMPLETE: CPT | Performed by: FAMILY MEDICINE

## 2022-08-22 NOTE — TELEPHONE ENCOUNTER
Spoke to patient. States she had a normal BM this morning. C/o cramping and feeling like she still needs to have a BM which is a common feeling for her. She denies vomiting or diarrhea. She denies fever. Advised to try a colace tab as recommended  She will rest with warm pack on her abdomen.  She VU

## 2022-08-23 ENCOUNTER — TELEPHONE (OUTPATIENT)
Dept: FAMILY MEDICINE CLINIC | Facility: CLINIC | Age: 82
End: 2022-08-23

## 2022-08-23 NOTE — TELEPHONE ENCOUNTER
Patient called with complaints of constipation, states she has horrible pain and is unable to go to the bathroom for BM

## 2022-08-23 NOTE — TELEPHONE ENCOUNTER
Patient c/o pain lower abdomen in the middle, below umbilicus. States it is constant. Describes it as \"cramps\" States she had a normal BM. She did tell PSR staff she had diarrhea. Denies fever. Denies pain with urination. Verbal support offered. Advised warm pack to area to help relieve cramps.

## 2022-08-23 NOTE — PROGRESS NOTES
Ejection fraction normal.  Grade 1 diastolic dysfunction, abnormal left ventricular relaxation. This can eventually increase the left ventricular filling pressure and can in the future lead to lung congestion and shortness of breath seen with early congestive heart failure. Treatment is blood pressure control, cholesterol control, weight loss and maintaining healthy kidney function. This can be achieved by a healthy diet with low sodium intake and physical activity on a daily basis.   Aortic valve is thickened leaves with mild regurgitation which would coincide with the murmur that was heard during exam.

## 2022-08-23 NOTE — TELEPHONE ENCOUNTER
Patient states she did not have a BM yet today. Verbal support given. Advised warm prune juice. Advised warm pack on her abdomen. She VU and is in agreement.

## 2022-08-23 NOTE — TELEPHONE ENCOUNTER
Patient states she did have a bowel movement this morning and forgot to mention.  She states she has severe pain

## 2022-08-24 ENCOUNTER — ANESTHESIA (OUTPATIENT)
Dept: ENDOSCOPY | Facility: HOSPITAL | Age: 82
End: 2022-08-24
Payer: MEDICARE

## 2022-08-24 ENCOUNTER — ANESTHESIA EVENT (OUTPATIENT)
Dept: ENDOSCOPY | Facility: HOSPITAL | Age: 82
End: 2022-08-24
Payer: MEDICARE

## 2022-08-24 ENCOUNTER — TELEPHONE (OUTPATIENT)
Dept: FAMILY MEDICINE CLINIC | Facility: CLINIC | Age: 82
End: 2022-08-24

## 2022-08-24 ENCOUNTER — HOSPITAL ENCOUNTER (OUTPATIENT)
Facility: HOSPITAL | Age: 82
Setting detail: HOSPITAL OUTPATIENT SURGERY
Discharge: HOME | End: 2022-08-24
Attending: INTERNAL MEDICINE | Admitting: INTERNAL MEDICINE
Payer: MEDICARE

## 2022-08-24 VITALS
DIASTOLIC BLOOD PRESSURE: 47 MMHG | WEIGHT: 117 LBS | OXYGEN SATURATION: 96 % | SYSTOLIC BLOOD PRESSURE: 128 MMHG | RESPIRATION RATE: 16 BRPM | TEMPERATURE: 98 F | HEIGHT: 64 IN | BODY MASS INDEX: 19.97 KG/M2 | HEART RATE: 63 BPM

## 2022-08-24 DIAGNOSIS — R10.84 GENERALIZED ABDOMINAL PAIN: ICD-10-CM

## 2022-08-24 DIAGNOSIS — R10.13 EPIGASTRIC ABDOMINAL PAIN: ICD-10-CM

## 2022-08-24 PROCEDURE — 0DB68ZX EXCISION OF STOMACH, VIA NATURAL OR ARTIFICIAL OPENING ENDOSCOPIC, DIAGNOSTIC: ICD-10-PCS | Performed by: INTERNAL MEDICINE

## 2022-08-24 PROCEDURE — 0DB98ZX EXCISION OF DUODENUM, VIA NATURAL OR ARTIFICIAL OPENING ENDOSCOPIC, DIAGNOSTIC: ICD-10-PCS | Performed by: INTERNAL MEDICINE

## 2022-08-24 PROCEDURE — 88342 IMHCHEM/IMCYTCHM 1ST ANTB: CPT | Performed by: INTERNAL MEDICINE

## 2022-08-24 PROCEDURE — 88305 TISSUE EXAM BY PATHOLOGIST: CPT | Performed by: INTERNAL MEDICINE

## 2022-08-24 RX ORDER — METOPROLOL TARTRATE 5 MG/5ML
2.5 INJECTION INTRAVENOUS ONCE
OUTPATIENT
Start: 2022-08-24 | End: 2022-08-24

## 2022-08-24 RX ORDER — SODIUM CHLORIDE, SODIUM LACTATE, POTASSIUM CHLORIDE, CALCIUM CHLORIDE 600; 310; 30; 20 MG/100ML; MG/100ML; MG/100ML; MG/100ML
INJECTION, SOLUTION INTRAVENOUS CONTINUOUS
Status: DISCONTINUED | OUTPATIENT
Start: 2022-08-24 | End: 2022-08-24

## 2022-08-24 RX ORDER — HYDROMORPHONE HYDROCHLORIDE 1 MG/ML
0.4 INJECTION, SOLUTION INTRAMUSCULAR; INTRAVENOUS; SUBCUTANEOUS EVERY 5 MIN PRN
OUTPATIENT
Start: 2022-08-24 | End: 2022-08-24

## 2022-08-24 RX ORDER — HYDROMORPHONE HYDROCHLORIDE 1 MG/ML
0.6 INJECTION, SOLUTION INTRAMUSCULAR; INTRAVENOUS; SUBCUTANEOUS EVERY 5 MIN PRN
OUTPATIENT
Start: 2022-08-24 | End: 2022-08-24

## 2022-08-24 RX ORDER — LIDOCAINE HYDROCHLORIDE 10 MG/ML
INJECTION, SOLUTION EPIDURAL; INFILTRATION; INTRACAUDAL; PERINEURAL AS NEEDED
Status: DISCONTINUED | OUTPATIENT
Start: 2022-08-24 | End: 2022-08-24 | Stop reason: SURG

## 2022-08-24 RX ORDER — NALOXONE HYDROCHLORIDE 0.4 MG/ML
80 INJECTION, SOLUTION INTRAMUSCULAR; INTRAVENOUS; SUBCUTANEOUS AS NEEDED
OUTPATIENT
Start: 2022-08-24 | End: 2022-08-24

## 2022-08-24 RX ORDER — HYDROMORPHONE HYDROCHLORIDE 1 MG/ML
0.2 INJECTION, SOLUTION INTRAMUSCULAR; INTRAVENOUS; SUBCUTANEOUS EVERY 5 MIN PRN
OUTPATIENT
Start: 2022-08-24 | End: 2022-08-24

## 2022-08-24 RX ORDER — SODIUM CHLORIDE, SODIUM LACTATE, POTASSIUM CHLORIDE, CALCIUM CHLORIDE 600; 310; 30; 20 MG/100ML; MG/100ML; MG/100ML; MG/100ML
INJECTION, SOLUTION INTRAVENOUS CONTINUOUS
OUTPATIENT
Start: 2022-08-24

## 2022-08-24 RX ADMIN — LIDOCAINE HYDROCHLORIDE 25 MG: 10 INJECTION, SOLUTION EPIDURAL; INFILTRATION; INTRACAUDAL; PERINEURAL at 08:48:00

## 2022-08-24 RX ADMIN — SODIUM CHLORIDE, SODIUM LACTATE, POTASSIUM CHLORIDE, CALCIUM CHLORIDE: 600; 310; 30; 20 INJECTION, SOLUTION INTRAVENOUS at 08:47:00

## 2022-08-24 RX ADMIN — SODIUM CHLORIDE, SODIUM LACTATE, POTASSIUM CHLORIDE, CALCIUM CHLORIDE: 600; 310; 30; 20 INJECTION, SOLUTION INTRAVENOUS at 09:11:00

## 2022-08-25 ENCOUNTER — HOSPITAL ENCOUNTER (EMERGENCY)
Age: 82
Discharge: HOME | End: 2022-08-25
Attending: EMERGENCY MEDICINE
Payer: MEDICARE

## 2022-08-25 ENCOUNTER — TELEPHONE (OUTPATIENT)
Dept: FAMILY MEDICINE CLINIC | Facility: CLINIC | Age: 82
End: 2022-08-25

## 2022-08-25 ENCOUNTER — APPOINTMENT (OUTPATIENT)
Dept: GENERAL RADIOLOGY | Age: 82
End: 2022-08-25
Attending: EMERGENCY MEDICINE
Payer: MEDICARE

## 2022-08-25 VITALS
DIASTOLIC BLOOD PRESSURE: 108 MMHG | BODY MASS INDEX: 23 KG/M2 | SYSTOLIC BLOOD PRESSURE: 171 MMHG | WEIGHT: 117 LBS | RESPIRATION RATE: 20 BRPM | HEART RATE: 52 BPM | OXYGEN SATURATION: 98 % | TEMPERATURE: 98 F

## 2022-08-25 DIAGNOSIS — G89.29 CHRONIC ABDOMINAL PAIN: Primary | ICD-10-CM

## 2022-08-25 DIAGNOSIS — R10.9 CHRONIC ABDOMINAL PAIN: Primary | ICD-10-CM

## 2022-08-25 LAB
ALBUMIN SERPL-MCNC: 4.3 G/DL (ref 3.4–5)
ALBUMIN/GLOB SERPL: 0.9 {RATIO} (ref 1–2)
ALP LIVER SERPL-CCNC: 94 U/L
ALT SERPL-CCNC: 21 U/L
ANION GAP SERPL CALC-SCNC: 10 MMOL/L (ref 0–18)
AST SERPL-CCNC: 28 U/L (ref 15–37)
BASOPHILS # BLD AUTO: 0.15 X10(3) UL (ref 0–0.2)
BASOPHILS NFR BLD AUTO: 1.1 %
BILIRUB SERPL-MCNC: 0.4 MG/DL (ref 0.1–2)
BILIRUB UR QL STRIP.AUTO: NEGATIVE
BUN BLD-MCNC: 26 MG/DL (ref 7–18)
CALCIUM BLD-MCNC: 11.3 MG/DL (ref 8.5–10.1)
CHLORIDE SERPL-SCNC: 108 MMOL/L (ref 98–112)
CLARITY UR REFRACT.AUTO: CLEAR
CO2 SERPL-SCNC: 21 MMOL/L (ref 21–32)
COLOR UR AUTO: YELLOW
CREAT BLD-MCNC: 1.17 MG/DL
EOSINOPHIL # BLD AUTO: 0.59 X10(3) UL (ref 0–0.7)
EOSINOPHIL NFR BLD AUTO: 4.3 %
ERYTHROCYTE [DISTWIDTH] IN BLOOD BY AUTOMATED COUNT: 14 %
GFR SERPLBLD BASED ON 1.73 SQ M-ARVRAT: 47 ML/MIN/1.73M2 (ref 60–?)
GLOBULIN PLAS-MCNC: 4.6 G/DL (ref 2.8–4.4)
GLUCOSE BLD-MCNC: 97 MG/DL (ref 70–99)
GLUCOSE UR STRIP.AUTO-MCNC: NEGATIVE MG/DL
HCT VFR BLD AUTO: 42.7 %
HGB BLD-MCNC: 14.4 G/DL
IMM GRANULOCYTES # BLD AUTO: 0.04 X10(3) UL (ref 0–1)
IMM GRANULOCYTES NFR BLD: 0.3 %
KETONES UR STRIP.AUTO-MCNC: NEGATIVE MG/DL
LEUKOCYTE ESTERASE UR QL STRIP.AUTO: NEGATIVE
LYMPHOCYTES # BLD AUTO: 6.92 X10(3) UL (ref 1–4)
LYMPHOCYTES NFR BLD AUTO: 50 %
MCH RBC QN AUTO: 31.2 PG (ref 26–34)
MCHC RBC AUTO-ENTMCNC: 33.7 G/DL (ref 31–37)
MCV RBC AUTO: 92.4 FL
MONOCYTES # BLD AUTO: 1.13 X10(3) UL (ref 0.1–1)
MONOCYTES NFR BLD AUTO: 8.2 %
NEUTROPHILS # BLD AUTO: 5.01 X10 (3) UL (ref 1.5–7.7)
NEUTROPHILS # BLD AUTO: 5.01 X10(3) UL (ref 1.5–7.7)
NEUTROPHILS NFR BLD AUTO: 36.1 %
NITRITE UR QL STRIP.AUTO: NEGATIVE
OSMOLALITY SERPL CALC.SUM OF ELEC: 293 MOSM/KG (ref 275–295)
PH UR STRIP.AUTO: 6.5 [PH] (ref 5–8)
PLATELET # BLD AUTO: 331 10(3)UL (ref 150–450)
POTASSIUM SERPL-SCNC: 4 MMOL/L (ref 3.5–5.1)
PROT SERPL-MCNC: 8.9 G/DL (ref 6.4–8.2)
RBC # BLD AUTO: 4.62 X10(6)UL
SARS-COV-2 RNA RESP QL NAA+PROBE: NOT DETECTED
SODIUM SERPL-SCNC: 139 MMOL/L (ref 136–145)
SP GR UR STRIP.AUTO: 1.01 (ref 1–1.03)
UROBILINOGEN UR STRIP.AUTO-MCNC: 0.2 MG/DL
WBC # BLD AUTO: 13.8 X10(3) UL (ref 4–11)

## 2022-08-25 PROCEDURE — 74019 RADEX ABDOMEN 2 VIEWS: CPT | Performed by: EMERGENCY MEDICINE

## 2022-08-25 PROCEDURE — 81001 URINALYSIS AUTO W/SCOPE: CPT | Performed by: EMERGENCY MEDICINE

## 2022-08-25 PROCEDURE — 81015 MICROSCOPIC EXAM OF URINE: CPT | Performed by: EMERGENCY MEDICINE

## 2022-08-25 PROCEDURE — 80053 COMPREHEN METABOLIC PANEL: CPT | Performed by: EMERGENCY MEDICINE

## 2022-08-25 PROCEDURE — 96375 TX/PRO/DX INJ NEW DRUG ADDON: CPT

## 2022-08-25 PROCEDURE — 99284 EMERGENCY DEPT VISIT MOD MDM: CPT

## 2022-08-25 PROCEDURE — 96361 HYDRATE IV INFUSION ADD-ON: CPT

## 2022-08-25 PROCEDURE — 85025 COMPLETE CBC W/AUTO DIFF WBC: CPT | Performed by: EMERGENCY MEDICINE

## 2022-08-25 PROCEDURE — 96374 THER/PROPH/DIAG INJ IV PUSH: CPT

## 2022-08-25 RX ORDER — DICYCLOMINE HCL 20 MG
20 TABLET ORAL 4 TIMES DAILY PRN
Qty: 20 TABLET | Refills: 0 | Status: SHIPPED | OUTPATIENT
Start: 2022-08-25

## 2022-08-25 RX ORDER — SODIUM CHLORIDE 9 MG/ML
125 INJECTION, SOLUTION INTRAVENOUS CONTINUOUS
Status: DISCONTINUED | OUTPATIENT
Start: 2022-08-25 | End: 2022-08-25

## 2022-08-25 RX ORDER — DICYCLOMINE HCL 20 MG
20 TABLET ORAL ONCE
Status: COMPLETED | OUTPATIENT
Start: 2022-08-25 | End: 2022-08-25

## 2022-08-25 RX ORDER — KETOROLAC TROMETHAMINE 15 MG/ML
15 INJECTION, SOLUTION INTRAMUSCULAR; INTRAVENOUS ONCE
Status: DISCONTINUED | OUTPATIENT
Start: 2022-08-25 | End: 2022-08-25

## 2022-08-25 RX ORDER — MORPHINE SULFATE 4 MG/ML
4 INJECTION, SOLUTION INTRAMUSCULAR; INTRAVENOUS ONCE
Status: COMPLETED | OUTPATIENT
Start: 2022-08-25 | End: 2022-08-25

## 2022-08-25 RX ORDER — HYDROMORPHONE HYDROCHLORIDE 1 MG/ML
0.5 INJECTION, SOLUTION INTRAMUSCULAR; INTRAVENOUS; SUBCUTANEOUS ONCE
Status: COMPLETED | OUTPATIENT
Start: 2022-08-25 | End: 2022-08-25

## 2022-08-25 NOTE — TELEPHONE ENCOUNTER
Patient states no one called back (see previous note) on this TE.   She is requesting call back for stomach pain

## 2022-08-25 NOTE — TELEPHONE ENCOUNTER
Patient states she has severe ongoing  abdominal cramps and had a bm this a.m.   Looking for advice    She did take Pepto Bismol  No help   Really severe belly pain this is her third time calling today   Patient calling for fourth time today and is requesting help for pain

## 2022-08-25 NOTE — TELEPHONE ENCOUNTER
Patient stated she is having adominal pain, wants to know if there is anything she can do to help. Had 2 formed bowel movements this morning. Denied any other symptoms. Patient had upper endoscopy yesterday, results pending. Advised patient to rest, apply warm pack to abdomen, await results of procedure. Verbalized understanding.

## 2022-08-25 NOTE — TELEPHONE ENCOUNTER
Patient called states she is still having terrible pain and has tried heat and rest. She is requesting pain relief

## 2022-08-29 ENCOUNTER — APPOINTMENT (OUTPATIENT)
Dept: GENERAL RADIOLOGY | Facility: HOSPITAL | Age: 82
End: 2022-08-29
Attending: ANESTHESIOLOGY
Payer: MEDICARE

## 2022-08-29 ENCOUNTER — HOSPITAL ENCOUNTER (OUTPATIENT)
Facility: HOSPITAL | Age: 82
Setting detail: HOSPITAL OUTPATIENT SURGERY
Discharge: HOME | End: 2022-08-29
Attending: ANESTHESIOLOGY | Admitting: ANESTHESIOLOGY
Payer: MEDICARE

## 2022-08-29 VITALS
HEIGHT: 60 IN | TEMPERATURE: 99 F | WEIGHT: 116 LBS | DIASTOLIC BLOOD PRESSURE: 63 MMHG | BODY MASS INDEX: 22.78 KG/M2 | OXYGEN SATURATION: 99 % | RESPIRATION RATE: 18 BRPM | HEART RATE: 49 BPM | SYSTOLIC BLOOD PRESSURE: 153 MMHG

## 2022-08-29 DIAGNOSIS — M54.16 LUMBAR RADICULOPATHY: ICD-10-CM

## 2022-08-29 PROCEDURE — 3E0R3BZ INTRODUCTION OF ANESTHETIC AGENT INTO SPINAL CANAL, PERCUTANEOUS APPROACH: ICD-10-PCS | Performed by: ANESTHESIOLOGY

## 2022-08-29 PROCEDURE — 3E0R33Z INTRODUCTION OF ANTI-INFLAMMATORY INTO SPINAL CANAL, PERCUTANEOUS APPROACH: ICD-10-PCS | Performed by: ANESTHESIOLOGY

## 2022-08-29 RX ORDER — SODIUM CHLORIDE 9 MG/ML
INJECTION INTRAVENOUS
Status: DISCONTINUED | OUTPATIENT
Start: 2022-08-29 | End: 2022-08-29

## 2022-08-29 RX ORDER — DEXAMETHASONE SODIUM PHOSPHATE 10 MG/ML
INJECTION, SOLUTION INTRAMUSCULAR; INTRAVENOUS
Status: DISCONTINUED | OUTPATIENT
Start: 2022-08-29 | End: 2022-08-29

## 2022-08-29 RX ORDER — LIDOCAINE HYDROCHLORIDE 10 MG/ML
INJECTION, SOLUTION EPIDURAL; INFILTRATION; INTRACAUDAL; PERINEURAL
Status: DISCONTINUED | OUTPATIENT
Start: 2022-08-29 | End: 2022-08-29

## 2022-08-29 NOTE — OPERATIVE REPORT
[facility]  Operative Report  2022     Alvarado Pimentel Patient Status:  Hospital Outpatient Surgery    1940 MRN [id number]   Location [address] [address] Attending No att. providers found   [facility] Day [facility] 0 PCP Kiah Pratt DO     Indication: Marti Harris is a [de-identified]year old female patient with chronic low back pain failed conservative treatment with medication and physical therapy was here for a transforaminal lumbar epidural steroid injection. Preoperative Diagnosis:  Lumbar radiculopathy [M54.16]    Postoperative Diagnosis: Same as above. Procedure performed: Left transforaminal lumbar epidural steroid injection at L3-L4 and L4-L5 level under fluoroscopy    Anesthesia: Local.    EBL: Less than 1 ml. Procedure Description:  After reviewing the patient's history and performing a focused physical examination, the diagnosis was confirmed and contraindications such as infection and coagulopathy were ruled out. Following review of potential side effects and complications, including but not necessarily limited to infection, allergic reaction, local tissue breakdown, nerve injury, and paresis, the patient indicated they understood and agreed to proceed. After obtaining the informed consent, the patient was brought to the procedure room and monitored. The vital signs including continuous pulse oximetry and cardiac monitoring were monitored and recorded by an experienced R.N. In the prone position, following sterile prep and drape of the lumbar region,  the left L3-L4 neural foramen was identified under fluoroscopy. The skin and subcutaneous tissue was anesthetized via 25-gauge 1.5\" needle with approximately 2 cc of 1% lidocaine. A 22-gauge 5\" Quincke spinal needle was introduced toward the inferior aspect of the junction between the transverse process and pedicle of the left L3-L4 level atraumatically under fluoroscopic guidance.  The needle was advanced into the anterior epidural space at this level. The needle position was confirmed under AP and lateral fluoroscopic view. Following negative aspiration for CSF and blood, approximately 1 cc of Omnipaque 240 was injected. An excellent contrast spread along the epidural space and the nerve root was obtained. At this point, 2 cc of 1% PF lidocaine with 5 mg of dexamethasone was injected without complication. The needle was withdrawn with stylet in situ after being flushed with 1 cc PF lidocaine. The left L4-L5 neural foramen was also identified under fluoroscopy. The skin and subcutaneous tissue was anesthetized via 25-gauge 1.5\" needle with approximately 2 cc of 1% lidocaine. A 22-gauge 5\" Quincke spinal needle was introduced toward the inferior aspect of the junction between the transverse process and pedicle of the left L4-L5 level atraumatically under fluoroscopic guidance. The needle was advanced into the anterior epidural space at this level. The needle position was confirmed under AP and lateral fluoroscopic view. Following negative aspiration for CSF and blood, approximately 1 cc of Omnipaque 240 was injected. An excellent contrast spread along the epidural space and the nerve root was obtained. At this point, 2 cc of 1% PF lidocaine with 5 mg of dexamethasone was injected without complication. The needle was withdrawn with stylet in situ after being flushed with 1 cc PF lidocaine. .  The patient tolerated procedure very well. The patient was observed until discharge criteria met. Discharge instructions were given and patient was released to a responsible adult. Complications: None. Follow up: The patient was followed in the pain clinic as needed basis.         Kiran Loco MD

## 2022-08-30 ENCOUNTER — OFFICE VISIT (OUTPATIENT)
Dept: PAIN CLINIC | Facility: CLINIC | Age: 82
End: 2022-08-30
Payer: MEDICARE

## 2022-08-30 DIAGNOSIS — M54.16 LUMBAR RADICULITIS: Primary | ICD-10-CM

## 2022-08-30 NOTE — PROGRESS NOTES
Last procedure: TRANSFORAMINAL LUMBAR EPIDURAL STEROID INJECTION LEFT L3/4, L4/5 with local  Date: 08/24/22  Percentage of relief obtained:   Duration of relief:     Current Pain Score:

## 2022-08-31 ENCOUNTER — TELEPHONE (OUTPATIENT)
Dept: FAMILY MEDICINE CLINIC | Facility: CLINIC | Age: 82
End: 2022-08-31

## 2022-08-31 NOTE — TELEPHONE ENCOUNTER
Patient called states she is shaking because of pain  She requested a call back urgently because she is in so much pain.  States she feels terrible with body shaking all over

## 2022-08-31 NOTE — TELEPHONE ENCOUNTER
Patient calling back still in pain and requesting someone talk to Rodney Burnham about it.  States she has not received call back yet

## 2022-08-31 NOTE — TELEPHONE ENCOUNTER
Patient is requesting a call for stomach pain.   She states no one has called her back Speech Therapy    Visit Type: Treatment  Born at Gestational Age: 28w1d and now corrected gestational age 36w 0d    Nursing comments: RN reported infant able to be seen at this time. She reported infant did not alert during cares  Present at bedside: SLP and patient    SUBJECTIVE  Infant awake and alert in isolette. Infant was intermittently tachypneic. No parent present.   Face, Legs, Activity, Cry, Consolability Scale (FLACC)     Face: 0 - No particular expression or smile    Legs: 0 - Normal position or relaxed    Activity: 0 - Lying quietly, normal position, moves easily    Cry: 0 - No cry (awake or asleep)    Consolability: 0 - Content, relaxed    Score: 0    OBJECTIVE    Diet:  Schedule: fortified breastmilk   - 34 ml every 3, calories: 26  Environment and Equipment:   - Bed type: isolette   - Lights: covered isolette   - Sound: environmental noise present  Room air     Oral Structure: intact    Infant Feeding   - Neurobehavioral Stress onset of session       • Physiologic stability: stable heart rate, oxygen saturations within parameters and smooth respirations       • State stability: quiet alert       State instability: hyper alert.       • Motor stability: hands to mouth       • Motor instability: finger splay and facial grimace   - Nutrition: breastmilk with HMF;  26 Juan   - Interventions prior to feeding: anticipatory touch, neurodevelopmental handling, organizing techniques, flexed midline positioning, containment, non nutritive suck and swaddle  Non-Nutritive Suck   - Interest in rooting: delayed and with significant stimulation   - Root to latch sequencing: disorganized, delayed and with significant stimulation   - Drive to suck: delayed   - Tongue movement: flat with some cupping   - Suck strength: weak   - Suck endurance: intermittent suck bursts   - Sucking pattern: arrhythmic  Feeding Readiness   - Neurodevelopmental maturation including alertness for feedings: yes   - Demonstrates hunger cues:  yes   - Able to display NNS intermittently for 5-10 minutes: emerging   - Infant appropriate for: non nutritive suck trials and taste trials   - Oral motor treatment: base of tongue techniques, respiratory intervention, muscular release intervention, massage, latching techniques and buccal/facial intervention  Infant tolerated oral motor techniques with improved ability to maintain RR following prefeeding intervention. Initially, infant presented with isolated non-nutritive sucks, but as session progressed, improved ability to maintain non-nutritive suck noted. Attempted po feeding. Infant actively rooted, but was noted to mash nipple between tongue and palate eliciting tastes from the nipple, but not true nutritive suck was elicited this session. Infant noted grimace and pull off of nipple with tastes of fortified breast milk containing 2mLs of microlipids. Tolerance did not improve despite prolonged rest breaks, organizing techniques and calming techniques throughout the sessions. No measurable po intake noted this session.           Session Summary  - Session focused on: target pre-feeding skills via buiding tolerance to sensory input,, improving oral motor skills and feeding         ASSESSMENT                                                                        • Impairments: oral motor  • Functional Limitations: eating/drinking  • Skilled therapy is required to address changes in swallow/feeding function.  • Infant with tachypnea (RR increasing to 70s-80s) with oral motor stimulation this session. Infant tolerated handling and organizing techniques with RR decreasing to 40s-50s with improved ability to maintain non-nutritive suck. Attempted po feeding, but infant unable to initiate a nutritive suck pattern this session. Minimal expression noted.    Recommend initiating po feedings 1x/shift only if awake, alert, and not tachypneic. PO feed in an elevated sidelying position using the Dr. Jiang bottle with ultra  preemie nipple and strictly pace every 3 sucks. Stop feeding with signs of fatigue or poor tolerance Speech to continue to follow.    Requires SLP Follow Up: Yes    Discharge Recommendations  SLP Referrals/Discharge Recommendations: Refer to outpatient, Refer to NICU follow up clinic    • Rehab Potential: good  • Progress: Slow progress, decreased activity tolerance    Patient at end of session:    [place] [facility] [place]: bed rails x2    - hand off to: nurse    PLAN   Frequency: 3-4x/week      Interventions:  Dysphagia therapy and oral conditioning exercises    RECOMMENDATIONS  Diet: PO/NG   - Recommend initiating po feedings 1x/shift only if awake, alert, and not tachypneic. PO feed in an elevated sidelying position using the Dr. Jiang bottle with ultra preemie nipple and strictly pace every 3 sucks. Stop feeding with signs of fatigue or poor tolerance.  Aspiration risk: moderate  Factors impacting feeding: respiratory status, prematurity and disengagement/behavioral stress  Infant Feeding Plan:    - NNS at onset of tube feedings per infant cues, provide neuroprotection, provide oral motor techniques as tolerated, tastes via drops on pacifier at onset of tube feedings, read and respect infant cues, PO only when infant is showing sign of engagement and interest, follow infant cues, reinforce positive feeding practice and establish NNS prior to feeding   - Mode: Dr. Jiang's bottle system and ultra preemie flow   - Positioning: elevated sidelying   - Pacing: every 2-3 sucks, feeder initiated pacing for safety and throughout feeding    GOALS    Long Term Goals:   Infant will tolerate neurodevelopmental techniques, without stress cues, to improve overall organization and ability to initiate and sustain a suck pattern    Infant will tolerate oral-facial stimulation and organizing techniques to improve tongue movement, tongue cup and strength of seal to improve overall efficiency of non-nutritive and  nutritive suck    Infant will demonstrate a safe, organized oral feeding pattern to support nutritional needs      Documented in the chart in the following areas: Assessment.      Therapy procedure time and total treatment time can be found documented on the Time Entry flowsheet.

## 2022-08-31 NOTE — TELEPHONE ENCOUNTER
Patient c/o burning on urination and pubic pressure. Denies fever. She said she just took an [address]. Please advise further if needed. Recent UA on 8/25/22.

## 2022-09-01 ENCOUNTER — TELEPHONE (OUTPATIENT)
Dept: FAMILY MEDICINE CLINIC | Facility: CLINIC | Age: 82
End: 2022-09-01

## 2022-09-01 ENCOUNTER — HOSPITAL ENCOUNTER (EMERGENCY)
Age: 82
Discharge: HOME | End: 2022-09-01
Attending: EMERGENCY MEDICINE
Payer: MEDICARE

## 2022-09-01 VITALS
BODY MASS INDEX: 19.97 KG/M2 | HEIGHT: 64 IN | SYSTOLIC BLOOD PRESSURE: 185 MMHG | TEMPERATURE: 98 F | HEART RATE: 51 BPM | OXYGEN SATURATION: 98 % | WEIGHT: 117 LBS | DIASTOLIC BLOOD PRESSURE: 66 MMHG | RESPIRATION RATE: 18 BRPM

## 2022-09-01 DIAGNOSIS — N30.00 ACUTE CYSTITIS WITHOUT HEMATURIA: Primary | ICD-10-CM

## 2022-09-01 LAB
ALBUMIN SERPL-MCNC: 3.3 G/DL (ref 3.4–5)
ALBUMIN/GLOB SERPL: 0.9 {RATIO} (ref 1–2)
ALP LIVER SERPL-CCNC: 78 U/L
ALT SERPL-CCNC: 19 U/L
ANION GAP SERPL CALC-SCNC: 6 MMOL/L (ref 0–18)
AST SERPL-CCNC: 18 U/L (ref 15–37)
BASOPHILS # BLD AUTO: 0.14 X10(3) UL (ref 0–0.2)
BASOPHILS NFR BLD AUTO: 1 %
BILIRUB SERPL-MCNC: 0.3 MG/DL (ref 0.1–2)
BILIRUB UR QL STRIP.AUTO: NEGATIVE
BUN BLD-MCNC: 27 MG/DL (ref 7–18)
CALCIUM BLD-MCNC: 10.6 MG/DL (ref 8.5–10.1)
CHLORIDE SERPL-SCNC: 110 MMOL/L (ref 98–112)
CLARITY UR REFRACT.AUTO: CLEAR
CO2 SERPL-SCNC: 24 MMOL/L (ref 21–32)
COLOR UR AUTO: YELLOW
CREAT BLD-MCNC: 1.25 MG/DL
EOSINOPHIL # BLD AUTO: 0.77 X10(3) UL (ref 0–0.7)
EOSINOPHIL NFR BLD AUTO: 5.5 %
ERYTHROCYTE [DISTWIDTH] IN BLOOD BY AUTOMATED COUNT: 14.2 %
GFR SERPLBLD BASED ON 1.73 SQ M-ARVRAT: 44 ML/MIN/1.73M2 (ref 60–?)
GLOBULIN PLAS-MCNC: 3.7 G/DL (ref 2.8–4.4)
GLUCOSE BLD-MCNC: 102 MG/DL (ref 70–99)
GLUCOSE UR STRIP.AUTO-MCNC: NEGATIVE MG/DL
HCT VFR BLD AUTO: 38.5 %
HGB BLD-MCNC: 12.7 G/DL
IMM GRANULOCYTES # BLD AUTO: 0.05 X10(3) UL (ref 0–1)
IMM GRANULOCYTES NFR BLD: 0.4 %
KETONES UR STRIP.AUTO-MCNC: NEGATIVE MG/DL
LIPASE SERPL-CCNC: 139 U/L (ref 73–393)
LYMPHOCYTES # BLD AUTO: 6.74 X10(3) UL (ref 1–4)
LYMPHOCYTES NFR BLD AUTO: 48 %
MCH RBC QN AUTO: 30.8 PG (ref 26–34)
MCHC RBC AUTO-ENTMCNC: 33 G/DL (ref 31–37)
MCV RBC AUTO: 93.4 FL
MONOCYTES # BLD AUTO: 1.4 X10(3) UL (ref 0.1–1)
MONOCYTES NFR BLD AUTO: 10 %
NEUTROPHILS # BLD AUTO: 4.94 X10 (3) UL (ref 1.5–7.7)
NEUTROPHILS # BLD AUTO: 4.94 X10(3) UL (ref 1.5–7.7)
NEUTROPHILS NFR BLD AUTO: 35.1 %
NITRITE UR QL STRIP.AUTO: NEGATIVE
OSMOLALITY SERPL CALC.SUM OF ELEC: 295 MOSM/KG (ref 275–295)
PH UR STRIP.AUTO: 7 [PH] (ref 5–8)
PLATELET # BLD AUTO: 333 10(3)UL (ref 150–450)
POTASSIUM SERPL-SCNC: 4.1 MMOL/L (ref 3.5–5.1)
PROT SERPL-MCNC: 7 G/DL (ref 6.4–8.2)
PROT UR STRIP.AUTO-MCNC: NEGATIVE MG/DL
RBC # BLD AUTO: 4.12 X10(6)UL
RBC UR QL AUTO: NEGATIVE
SODIUM SERPL-SCNC: 140 MMOL/L (ref 136–145)
SP GR UR STRIP.AUTO: 1.01 (ref 1–1.03)
UROBILINOGEN UR STRIP.AUTO-MCNC: 0.2 MG/DL
WBC # BLD AUTO: 14 X10(3) UL (ref 4–11)

## 2022-09-01 PROCEDURE — 81001 URINALYSIS AUTO W/SCOPE: CPT | Performed by: EMERGENCY MEDICINE

## 2022-09-01 PROCEDURE — 87086 URINE CULTURE/COLONY COUNT: CPT | Performed by: EMERGENCY MEDICINE

## 2022-09-01 PROCEDURE — 87186 SC STD MICRODIL/AGAR DIL: CPT | Performed by: EMERGENCY MEDICINE

## 2022-09-01 PROCEDURE — 85025 COMPLETE CBC W/AUTO DIFF WBC: CPT | Performed by: EMERGENCY MEDICINE

## 2022-09-01 PROCEDURE — 81015 MICROSCOPIC EXAM OF URINE: CPT | Performed by: EMERGENCY MEDICINE

## 2022-09-01 PROCEDURE — 96374 THER/PROPH/DIAG INJ IV PUSH: CPT

## 2022-09-01 PROCEDURE — 87088 URINE BACTERIA CULTURE: CPT | Performed by: EMERGENCY MEDICINE

## 2022-09-01 PROCEDURE — 96376 TX/PRO/DX INJ SAME DRUG ADON: CPT

## 2022-09-01 PROCEDURE — 99284 EMERGENCY DEPT VISIT MOD MDM: CPT

## 2022-09-01 PROCEDURE — 83690 ASSAY OF LIPASE: CPT | Performed by: EMERGENCY MEDICINE

## 2022-09-01 PROCEDURE — 96361 HYDRATE IV INFUSION ADD-ON: CPT

## 2022-09-01 PROCEDURE — 80053 COMPREHEN METABOLIC PANEL: CPT | Performed by: EMERGENCY MEDICINE

## 2022-09-01 RX ORDER — SODIUM CHLORIDE 9 MG/ML
125 INJECTION, SOLUTION INTRAVENOUS CONTINUOUS
Status: DISCONTINUED | OUTPATIENT
Start: 2022-09-01 | End: 2022-09-01

## 2022-09-01 RX ORDER — HYDROMORPHONE HYDROCHLORIDE 1 MG/ML
0.5 INJECTION, SOLUTION INTRAMUSCULAR; INTRAVENOUS; SUBCUTANEOUS EVERY 30 MIN PRN
Status: DISCONTINUED | OUTPATIENT
Start: 2022-09-01 | End: 2022-09-01

## 2022-09-01 RX ORDER — CIPROFLOXACIN 500 MG/1
500 TABLET, FILM COATED ORAL 2 TIMES DAILY
Qty: 10 TABLET | Refills: 0 | Status: SHIPPED | OUTPATIENT
Start: 2022-09-01 | End: 2022-09-08

## 2022-09-01 NOTE — TELEPHONE ENCOUNTER
Pt called and is requesting a sooner apt.  Pt has an apt on 9/9 but stated that the ER informed her to see her primary as soon as possible

## 2022-09-01 NOTE — TELEPHONE ENCOUNTER
Patient called states she was in [facility] last night and states they told her she has a UTI and is on antibiotic for 5 days, requesting something for pain in stomach with cramps

## 2022-09-02 ENCOUNTER — TELEPHONE (OUTPATIENT)
Dept: FAMILY MEDICINE CLINIC | Facility: CLINIC | Age: 82
End: 2022-09-02

## 2022-09-02 DIAGNOSIS — M51.16 LUMBAR DISC HERNIATION WITH RADICULOPATHY: ICD-10-CM

## 2022-09-02 RX ORDER — HYDROCODONE BITARTRATE AND ACETAMINOPHEN 5; 325 MG/1; MG/1
TABLET ORAL EVERY 8 HOURS PRN
Qty: 5 TABLET | Refills: 0 | Status: CANCELLED | OUTPATIENT
Start: 2022-09-02

## 2022-09-02 NOTE — TELEPHONE ENCOUNTER
Phoned patient. States she had one emesis during the night. None today. She is eating and drinking ok. 3 more days of CIPRO.

## 2022-09-06 ENCOUNTER — TELEPHONE (OUTPATIENT)
Dept: FAMILY MEDICINE CLINIC | Facility: CLINIC | Age: 82
End: 2022-09-06

## 2022-09-06 NOTE — TELEPHONE ENCOUNTER
Spoke to patient. States she had frequent bowel movements last night. Denies diarrhea or watery stool. States they were formed and \"normal'. States her stomach is a little 'upset\". She is finished with antibiotics for UTI. Advised probiotic daily or to eat yogurt daily. She says she has yogurt and will eat that. She denies fever or chills. She says she had two \"normal' bm's today. She will call if symptoms continues.

## 2022-09-06 NOTE — TELEPHONE ENCOUNTER
Pt called to f/u on med request for abdominal pain. Stated that she is in pain and can not go to bathroom.

## 2022-09-07 ENCOUNTER — HOSPITAL ENCOUNTER (OUTPATIENT)
Facility: HOSPITAL | Age: 82
Setting detail: OBSERVATION
LOS: 1 days | Discharge: HOME | End: 2022-09-08
Attending: EMERGENCY MEDICINE | Admitting: HOSPITALIST
Payer: MEDICARE

## 2022-09-07 ENCOUNTER — APPOINTMENT (OUTPATIENT)
Dept: GENERAL RADIOLOGY | Age: 82
End: 2022-09-07
Attending: EMERGENCY MEDICINE
Payer: MEDICARE

## 2022-09-07 ENCOUNTER — APPOINTMENT (OUTPATIENT)
Dept: CT IMAGING | Age: 82
End: 2022-09-07
Attending: EMERGENCY MEDICINE
Payer: MEDICARE

## 2022-09-07 ENCOUNTER — TELEPHONE (OUTPATIENT)
Dept: FAMILY MEDICINE CLINIC | Facility: CLINIC | Age: 82
End: 2022-09-07

## 2022-09-07 DIAGNOSIS — R10.9 ABDOMINAL PAIN OF UNKNOWN ETIOLOGY: Primary | ICD-10-CM

## 2022-09-07 DIAGNOSIS — R77.8 ELEVATED TROPONIN: ICD-10-CM

## 2022-09-07 DIAGNOSIS — N39.0 URINARY TRACT INFECTION WITHOUT HEMATURIA, SITE UNSPECIFIED: ICD-10-CM

## 2022-09-07 PROBLEM — R79.89 ELEVATED TROPONIN: Status: ACTIVE | Noted: 2022-09-07

## 2022-09-07 PROBLEM — E87.2 METABOLIC ACIDOSIS: Status: ACTIVE | Noted: 2022-09-07

## 2022-09-07 PROBLEM — E87.20 METABOLIC ACIDOSIS: Status: ACTIVE | Noted: 2022-09-07

## 2022-09-07 LAB
ALBUMIN SERPL-MCNC: 4.1 G/DL (ref 3.4–5)
ALBUMIN/GLOB SERPL: 0.9 {RATIO} (ref 1–2)
ALP LIVER SERPL-CCNC: 97 U/L
ALT SERPL-CCNC: 22 U/L
ANION GAP SERPL CALC-SCNC: 11 MMOL/L (ref 0–18)
AST SERPL-CCNC: 16 U/L (ref 15–37)
BASOPHILS # BLD AUTO: 0.1 X10(3) UL (ref 0–0.2)
BASOPHILS NFR BLD AUTO: 0.8 %
BILIRUB SERPL-MCNC: 0.4 MG/DL (ref 0.1–2)
BILIRUB UR QL STRIP.AUTO: NEGATIVE
BUN BLD-MCNC: 18 MG/DL (ref 7–18)
CALCIUM BLD-MCNC: 11.4 MG/DL (ref 8.5–10.1)
CHLORIDE SERPL-SCNC: 108 MMOL/L (ref 98–112)
CHOLEST SERPL-MCNC: 199 MG/DL (ref ?–200)
CLARITY UR REFRACT.AUTO: CLEAR
CO2 SERPL-SCNC: 21 MMOL/L (ref 21–32)
COLOR UR AUTO: YELLOW
CREAT BLD-MCNC: 1.14 MG/DL
EOSINOPHIL # BLD AUTO: 0.14 X10(3) UL (ref 0–0.7)
EOSINOPHIL NFR BLD AUTO: 1.2 %
ERYTHROCYTE [DISTWIDTH] IN BLOOD BY AUTOMATED COUNT: 14.4 %
GFR SERPLBLD BASED ON 1.73 SQ M-ARVRAT: 49 ML/MIN/1.73M2 (ref 60–?)
GLOBULIN PLAS-MCNC: 4.4 G/DL (ref 2.8–4.4)
GLUCOSE BLD-MCNC: 106 MG/DL (ref 70–99)
GLUCOSE UR STRIP.AUTO-MCNC: NEGATIVE MG/DL
HCT VFR BLD AUTO: 41.8 %
HDLC SERPL-MCNC: 62 MG/DL (ref 40–59)
HGB BLD-MCNC: 14.4 G/DL
IMM GRANULOCYTES # BLD AUTO: 0.03 X10(3) UL (ref 0–1)
IMM GRANULOCYTES NFR BLD: 0.2 %
KETONES UR STRIP.AUTO-MCNC: NEGATIVE MG/DL
LDLC SERPL CALC-MCNC: 109 MG/DL (ref ?–100)
LEUKOCYTE ESTERASE UR QL STRIP.AUTO: NEGATIVE
LIPASE SERPL-CCNC: 133 U/L (ref 73–393)
LYMPHOCYTES # BLD AUTO: 3.59 X10(3) UL (ref 1–4)
LYMPHOCYTES NFR BLD AUTO: 29.8 %
MCH RBC QN AUTO: 32.4 PG (ref 26–34)
MCHC RBC AUTO-ENTMCNC: 34.4 G/DL (ref 31–37)
MCV RBC AUTO: 93.9 FL
MONOCYTES # BLD AUTO: 1.36 X10(3) UL (ref 0.1–1)
MONOCYTES NFR BLD AUTO: 11.3 %
NEUTROPHILS # BLD AUTO: 6.81 X10 (3) UL (ref 1.5–7.7)
NEUTROPHILS # BLD AUTO: 6.81 X10(3) UL (ref 1.5–7.7)
NEUTROPHILS NFR BLD AUTO: 56.7 %
NITRITE UR QL STRIP.AUTO: NEGATIVE
NONHDLC SERPL-MCNC: 137 MG/DL (ref ?–130)
OSMOLALITY SERPL CALC.SUM OF ELEC: 292 MOSM/KG (ref 275–295)
PH UR STRIP.AUTO: 7 [PH] (ref 5–8)
PLATELET # BLD AUTO: 354 10(3)UL (ref 150–450)
POTASSIUM SERPL-SCNC: 3.8 MMOL/L (ref 3.5–5.1)
PROT SERPL-MCNC: 8.5 G/DL (ref 6.4–8.2)
RBC # BLD AUTO: 4.45 X10(6)UL
SARS-COV-2 RNA RESP QL NAA+PROBE: NOT DETECTED
SODIUM SERPL-SCNC: 140 MMOL/L (ref 136–145)
SP GR UR STRIP.AUTO: 1.01 (ref 1–1.03)
TRIGL SERPL-MCNC: 162 MG/DL (ref 30–149)
TROPONIN I HIGH SENSITIVITY: 171 NG/L
UROBILINOGEN UR STRIP.AUTO-MCNC: 0.2 MG/DL
VLDLC SERPL CALC-MCNC: 28 MG/DL (ref 0–30)
WBC # BLD AUTO: 12 X10(3) UL (ref 4–11)

## 2022-09-07 PROCEDURE — 99220 INITIAL OBSERVATION CARE,LEVL III: CPT | Performed by: HOSPITALIST

## 2022-09-07 PROCEDURE — 71045 X-RAY EXAM CHEST 1 VIEW: CPT | Performed by: EMERGENCY MEDICINE

## 2022-09-07 PROCEDURE — 74177 CT ABD & PELVIS W/CONTRAST: CPT | Performed by: EMERGENCY MEDICINE

## 2022-09-07 RX ORDER — METOCLOPRAMIDE HYDROCHLORIDE 5 MG/ML
10 INJECTION INTRAMUSCULAR; INTRAVENOUS ONCE
Status: COMPLETED | OUTPATIENT
Start: 2022-09-07 | End: 2022-09-07

## 2022-09-07 RX ORDER — LOSARTAN POTASSIUM 100 MG/1
100 TABLET ORAL DAILY
Status: DISCONTINUED | OUTPATIENT
Start: 2022-09-08 | End: 2022-09-08

## 2022-09-07 RX ORDER — DIPHENHYDRAMINE HYDROCHLORIDE 50 MG/ML
25 INJECTION INTRAMUSCULAR; INTRAVENOUS ONCE
Status: COMPLETED | OUTPATIENT
Start: 2022-09-07 | End: 2022-09-07

## 2022-09-07 RX ORDER — PANTOPRAZOLE SODIUM 40 MG/1
40 TABLET, DELAYED RELEASE ORAL
Status: DISCONTINUED | OUTPATIENT
Start: 2022-09-08 | End: 2022-09-08

## 2022-09-07 RX ORDER — DICYCLOMINE HCL 20 MG
20 TABLET ORAL 4 TIMES DAILY PRN
Qty: 20 TABLET | Refills: 0 | Status: CANCELLED | OUTPATIENT
Start: 2022-09-07

## 2022-09-07 RX ORDER — MONTELUKAST SODIUM 10 MG/1
10 TABLET ORAL NIGHTLY
Status: DISCONTINUED | OUTPATIENT
Start: 2022-09-08 | End: 2022-09-08

## 2022-09-07 RX ORDER — GABAPENTIN 300 MG/1
300 CAPSULE ORAL
Status: DISCONTINUED | OUTPATIENT
Start: 2022-09-08 | End: 2022-09-08

## 2022-09-07 RX ORDER — DICYCLOMINE HYDROCHLORIDE 10 MG/ML
10 INJECTION INTRAMUSCULAR ONCE
Status: COMPLETED | OUTPATIENT
Start: 2022-09-07 | End: 2022-09-07

## 2022-09-07 RX ORDER — TRAZODONE HYDROCHLORIDE 50 MG/1
50 TABLET ORAL NIGHTLY
Status: DISCONTINUED | OUTPATIENT
Start: 2022-09-08 | End: 2022-09-08

## 2022-09-07 RX ORDER — ASPIRIN 81 MG/1
324 TABLET, CHEWABLE ORAL ONCE
Status: COMPLETED | OUTPATIENT
Start: 2022-09-07 | End: 2022-09-07

## 2022-09-07 RX ORDER — DICYCLOMINE HCL 20 MG
20 TABLET ORAL 4 TIMES DAILY PRN
Qty: 30 TABLET | Refills: 0 | Status: SHIPPED | OUTPATIENT
Start: 2022-09-07 | End: 2022-09-08

## 2022-09-07 RX ORDER — DOCUSATE SODIUM 100 MG/1
100 CAPSULE, LIQUID FILLED ORAL 2 TIMES DAILY
Status: DISCONTINUED | OUTPATIENT
Start: 2022-09-07 | End: 2022-09-08

## 2022-09-07 RX ORDER — LEVOFLOXACIN 5 MG/ML
250 INJECTION, SOLUTION INTRAVENOUS ONCE
Status: COMPLETED | OUTPATIENT
Start: 2022-09-07 | End: 2022-09-07

## 2022-09-07 RX ORDER — ENOXAPARIN SODIUM 100 MG/ML
40 INJECTION SUBCUTANEOUS DAILY
Status: DISCONTINUED | OUTPATIENT
Start: 2022-09-08 | End: 2022-09-08

## 2022-09-07 RX ORDER — ONDANSETRON 2 MG/ML
4 INJECTION INTRAMUSCULAR; INTRAVENOUS EVERY 6 HOURS PRN
Status: DISCONTINUED | OUTPATIENT
Start: 2022-09-07 | End: 2022-09-08

## 2022-09-07 RX ORDER — HYDRALAZINE HYDROCHLORIDE 20 MG/ML
10 INJECTION INTRAMUSCULAR; INTRAVENOUS ONCE
Status: COMPLETED | OUTPATIENT
Start: 2022-09-07 | End: 2022-09-07

## 2022-09-07 RX ORDER — DICYCLOMINE HCL 20 MG
20 TABLET ORAL 4 TIMES DAILY PRN
Status: DISCONTINUED | OUTPATIENT
Start: 2022-09-07 | End: 2022-09-08

## 2022-09-07 RX ORDER — ONDANSETRON 2 MG/ML
4 INJECTION INTRAMUSCULAR; INTRAVENOUS ONCE
Status: COMPLETED | OUTPATIENT
Start: 2022-09-07 | End: 2022-09-07

## 2022-09-07 RX ORDER — MORPHINE SULFATE 4 MG/ML
4 INJECTION, SOLUTION INTRAMUSCULAR; INTRAVENOUS EVERY 2 HOUR PRN
Status: DISCONTINUED | OUTPATIENT
Start: 2022-09-07 | End: 2022-09-08

## 2022-09-07 RX ORDER — MORPHINE SULFATE 2 MG/ML
2 INJECTION, SOLUTION INTRAMUSCULAR; INTRAVENOUS EVERY 2 HOUR PRN
Status: DISCONTINUED | OUTPATIENT
Start: 2022-09-07 | End: 2022-09-08

## 2022-09-07 RX ORDER — ONDANSETRON 4 MG/1
4 TABLET, ORALLY DISINTEGRATING ORAL EVERY 4 HOURS PRN
Qty: 10 TABLET | Refills: 0 | Status: SHIPPED | OUTPATIENT
Start: 2022-09-07 | End: 2022-09-08

## 2022-09-07 RX ORDER — AMLODIPINE BESYLATE 5 MG/1
10 TABLET ORAL DAILY
Status: DISCONTINUED | OUTPATIENT
Start: 2022-09-08 | End: 2022-09-08

## 2022-09-07 RX ORDER — MORPHINE SULFATE 4 MG/ML
4 INJECTION, SOLUTION INTRAMUSCULAR; INTRAVENOUS EVERY 30 MIN PRN
Status: DISCONTINUED | OUTPATIENT
Start: 2022-09-07 | End: 2022-09-07

## 2022-09-07 RX ORDER — SERTRALINE HYDROCHLORIDE 100 MG/1
200 TABLET, FILM COATED ORAL DAILY
Status: DISCONTINUED | OUTPATIENT
Start: 2022-09-08 | End: 2022-09-08

## 2022-09-07 RX ORDER — ACETAMINOPHEN 500 MG
500 TABLET ORAL EVERY 4 HOURS PRN
Status: DISCONTINUED | OUTPATIENT
Start: 2022-09-07 | End: 2022-09-08

## 2022-09-07 RX ORDER — METOCLOPRAMIDE HYDROCHLORIDE 5 MG/ML
5 INJECTION INTRAMUSCULAR; INTRAVENOUS EVERY 8 HOURS PRN
Status: DISCONTINUED | OUTPATIENT
Start: 2022-09-07 | End: 2022-09-08

## 2022-09-07 RX ORDER — CINACALCET 30 MG/1
30 TABLET, FILM COATED ORAL
Status: DISCONTINUED | OUTPATIENT
Start: 2022-09-08 | End: 2022-09-08

## 2022-09-07 RX ORDER — LORAZEPAM 2 MG/ML
0.5 INJECTION INTRAMUSCULAR ONCE
Status: COMPLETED | OUTPATIENT
Start: 2022-09-07 | End: 2022-09-07

## 2022-09-07 RX ORDER — MORPHINE SULFATE 2 MG/ML
1 INJECTION, SOLUTION INTRAMUSCULAR; INTRAVENOUS EVERY 2 HOUR PRN
Status: DISCONTINUED | OUTPATIENT
Start: 2022-09-07 | End: 2022-09-08

## 2022-09-07 RX ORDER — SODIUM CHLORIDE 9 MG/ML
INJECTION, SOLUTION INTRAVENOUS CONTINUOUS
Status: DISCONTINUED | OUTPATIENT
Start: 2022-09-07 | End: 2022-09-08

## 2022-09-07 RX ORDER — LEVOFLOXACIN 250 MG/1
250 TABLET ORAL DAILY
Qty: 5 TABLET | Refills: 0 | Status: SHIPPED | OUTPATIENT
Start: 2022-09-07 | End: 2022-09-08

## 2022-09-07 RX ORDER — ONDANSETRON 2 MG/ML
4 INJECTION INTRAMUSCULAR; INTRAVENOUS EVERY 4 HOURS PRN
Status: ACTIVE | OUTPATIENT
Start: 2022-09-07 | End: 2022-09-08

## 2022-09-07 RX ORDER — GABAPENTIN 300 MG/1
CAPSULE ORAL
Qty: 90 CAPSULE | Refills: 2 | Status: SHIPPED | OUTPATIENT
Start: 2022-09-07

## 2022-09-07 RX ORDER — SODIUM CHLORIDE 9 MG/ML
INJECTION, SOLUTION INTRAVENOUS CONTINUOUS
Status: ACTIVE | OUTPATIENT
Start: 2022-09-07 | End: 2022-09-08

## 2022-09-07 RX ORDER — ASPIRIN 325 MG
325 TABLET ORAL DAILY
Status: DISCONTINUED | OUTPATIENT
Start: 2022-09-08 | End: 2022-09-08

## 2022-09-07 RX ORDER — ATORVASTATIN CALCIUM 10 MG/1
10 TABLET, FILM COATED ORAL NIGHTLY
Status: DISCONTINUED | OUTPATIENT
Start: 2022-09-07 | End: 2022-09-08

## 2022-09-07 RX ORDER — SODIUM CHLORIDE 9 MG/ML
125 INJECTION, SOLUTION INTRAVENOUS CONTINUOUS
Status: DISCONTINUED | OUTPATIENT
Start: 2022-09-07 | End: 2022-09-07

## 2022-09-07 NOTE — TELEPHONE ENCOUNTER
Patient requesting information on giving herself an enema and also the name of something that she could take over the counter for her pain

## 2022-09-07 NOTE — TELEPHONE ENCOUNTER
Patient called again wants to know if she can do an enema or not. She is requesting something for pain.   Patient called back states she is in a lot of pain and it is an emergency

## 2022-09-07 NOTE — TELEPHONE ENCOUNTER
Patient stated she is having terrible abdominal pain. Stated she had a BM today but this did not help. Requested refill for dicyclomine. Patient informed this was prescribed at ER, refill not recommended because of possible side effect of constipation. Instructed to apply warm compress to adbomen, lie down, rest, try to think of something else. Verbalized understanding. Has upcoming appt on 9/9/22, to discuss issue with MH at that time.

## 2022-09-07 NOTE — ED INITIAL ASSESSMENT (HPI)
Pt presents to ed w/ bilat cramping lower abd pain. States last bm was pta. Denies n/v/d and fevers.

## 2022-09-08 VITALS
SYSTOLIC BLOOD PRESSURE: 119 MMHG | OXYGEN SATURATION: 96 % | HEIGHT: 64 IN | HEART RATE: 55 BPM | TEMPERATURE: 98 F | BODY MASS INDEX: 20.11 KG/M2 | WEIGHT: 117.81 LBS | RESPIRATION RATE: 16 BRPM | DIASTOLIC BLOOD PRESSURE: 57 MMHG

## 2022-09-08 LAB
ALBUMIN SERPL-MCNC: 3.4 G/DL (ref 3.4–5)
ALBUMIN/GLOB SERPL: 0.9 {RATIO} (ref 1–2)
ALP LIVER SERPL-CCNC: 80 U/L
ALT SERPL-CCNC: 18 U/L
ANION GAP SERPL CALC-SCNC: 3 MMOL/L (ref 0–18)
AST SERPL-CCNC: 17 U/L (ref 15–37)
ATRIAL RATE: 63 BPM
BASOPHILS # BLD AUTO: 0.08 X10(3) UL (ref 0–0.2)
BASOPHILS NFR BLD AUTO: 0.7 %
BILIRUB SERPL-MCNC: 0.4 MG/DL (ref 0.1–2)
BUN BLD-MCNC: 15 MG/DL (ref 7–18)
CALCIUM BLD-MCNC: 9.7 MG/DL (ref 8.5–10.1)
CHLORIDE SERPL-SCNC: 113 MMOL/L (ref 98–112)
CO2 SERPL-SCNC: 23 MMOL/L (ref 21–32)
CREAT BLD-MCNC: 1.05 MG/DL
EOSINOPHIL # BLD AUTO: 0.13 X10(3) UL (ref 0–0.7)
EOSINOPHIL NFR BLD AUTO: 1.1 %
ERYTHROCYTE [DISTWIDTH] IN BLOOD BY AUTOMATED COUNT: 14.5 %
GFR SERPLBLD BASED ON 1.73 SQ M-ARVRAT: 54 ML/MIN/1.73M2 (ref 60–?)
GLOBULIN PLAS-MCNC: 3.9 G/DL (ref 2.8–4.4)
GLUCOSE BLD-MCNC: 93 MG/DL (ref 70–99)
HCT VFR BLD AUTO: 37.4 %
HGB BLD-MCNC: 12.3 G/DL
IMM GRANULOCYTES # BLD AUTO: 0.02 X10(3) UL (ref 0–1)
IMM GRANULOCYTES NFR BLD: 0.2 %
LYMPHOCYTES # BLD AUTO: 4.43 X10(3) UL (ref 1–4)
LYMPHOCYTES NFR BLD AUTO: 38.9 %
MCH RBC QN AUTO: 31.5 PG (ref 26–34)
MCHC RBC AUTO-ENTMCNC: 32.9 G/DL (ref 31–37)
MCV RBC AUTO: 95.9 FL
MONOCYTES # BLD AUTO: 1.44 X10(3) UL (ref 0.1–1)
MONOCYTES NFR BLD AUTO: 12.7 %
NEUTROPHILS # BLD AUTO: 5.28 X10 (3) UL (ref 1.5–7.7)
NEUTROPHILS # BLD AUTO: 5.28 X10(3) UL (ref 1.5–7.7)
NEUTROPHILS NFR BLD AUTO: 46.4 %
OSMOLALITY SERPL CALC.SUM OF ELEC: 289 MOSM/KG (ref 275–295)
P AXIS: 51 DEGREES
P-R INTERVAL: 182 MS
PLATELET # BLD AUTO: 327 10(3)UL (ref 150–450)
POTASSIUM SERPL-SCNC: 4.2 MMOL/L (ref 3.5–5.1)
PROT SERPL-MCNC: 7.3 G/DL (ref 6.4–8.2)
PTH-INTACT SERPL-MCNC: 257.2 PG/ML (ref 18.5–88)
Q-T INTERVAL: 410 MS
QRS DURATION: 82 MS
QTC CALCULATION (BEZET): 419 MS
R AXIS: -37 DEGREES
RBC # BLD AUTO: 3.9 X10(6)UL
SODIUM SERPL-SCNC: 139 MMOL/L (ref 136–145)
T AXIS: 59 DEGREES
T4 FREE SERPL-MCNC: 1 NG/DL (ref 0.8–1.7)
TROPONIN I HIGH SENSITIVITY: 259 NG/L
TSI SER-ACNC: 2.15 MIU/ML (ref 0.36–3.74)
VENTRICULAR RATE: 63 BPM
WBC # BLD AUTO: 11.4 X10(3) UL (ref 4–11)

## 2022-09-08 PROCEDURE — 99217 OBSERVATION CARE DISCHARGE: CPT | Performed by: INTERNAL MEDICINE

## 2022-09-08 RX ORDER — POTASSIUM CHLORIDE 14.9 MG/ML
20 INJECTION INTRAVENOUS ONCE
Status: COMPLETED | OUTPATIENT
Start: 2022-09-08 | End: 2022-09-08

## 2022-09-08 NOTE — PLAN OF CARE
NURSING ADMISSION NOTE        Patient admitted via Cart  Oriented to room. Safety precautions initiated. Bed in low position. 09/07/22 2320 09/07/22 2325 09/07/22 2330   Vital Signs   BP (!) 180/80 (!) 180/99 (!) 161/108   MAP (mmHg) 103 115 117   BP Location Left arm Left arm Left arm   BP Method Automatic Automatic Automatic   Patient Position Lying Sitting Standing     Admission navigator completed, PTA meds reviewed. C/o severe headache and lower stomach ache. A&Ox4, forgetful. Keeps asking for sleeping pill. Trazadone + seroquel given. BP elevated, home dose metoprolol given. Continent, briefed for stress incontinence. Up with 1 assist d/t IV pole, 100cc/hr. K 3.8 replaced with K rider. Plan: NPO, troponin AM draw, pain management, monitor urine output. Bed alarm on, call light in reach. Able to make needs known.

## 2022-09-08 NOTE — CM/SW NOTE
Patient failed inpatient criteria. Second level of review completed and supports observation. UR committee in agreement. Discussed with Dr. Mandeep Mendoza  who approves observation status. MOON given to the patient and order written.

## 2022-09-08 NOTE — ED QUICK NOTES
.Orders for admission, patient is aware of plan and ready to go upstairs. Any questions, please call ED RN Elan Fischer  at extension [phone]. Vaccinated?  Yes  Type of COVID test sent: Rapid- negative  COVID Suspicion level: Low      Titratable drug(s) infusing:  Rate:  20g LAC  LOC at time of transport:  Normal for patient,   Other pertinent information:    CIWA score=N/A  NIH score=N/A

## 2022-09-08 NOTE — PROGRESS NOTES
Discharge instructions reviewed with pt and spouse. Verbalized understanding. All questions answered. IV removed with catheter intact.  Pt transported to [facility] by RN for transportation home

## 2022-09-08 NOTE — CM/SW NOTE
09/08/22 1200   CM/SW Referral Data   Referral Source Social Work (self-referral)   Reason for Referral Discharge planning   Informant Patient;Spouse/Significant Other;EMR   Patient Info   Patient's Current Mental Status at Time of Assessment Alert;Oriented  (forgetful at times)   Patient's [address]   Patient lives with Spouse/Significant other   Discharge Needs   Anticipated D/C needs No anticipated discharge needs     Patient is an [de-identified] y/o female who admitted with elevated troponin, abdominal pain of unknown etiology, and urinary tract infection without hematuria. Discussed patient with RN during rounds and discussed possible need for [facility] RN for medication management and medical oversight. Informed that patient has some short-term memory deficits at times during conversation. Spoke with patient's  Martina Linda) to complete assessment. Patient and  live together in a house in [place]. Patient/spouse are retired and are always together. Patient normally uses a cane to ambulate and Karime Villagran reports she gets around well. Patient has a routine for medication management at home,  is also able to assist with reminders to take medications appropriately. Patient is planning to see her PCP tomorrow. Discussed benefits of [facility] RN at discharge and encouraged them to try [facility] RN, patient and  each declined over the phone. Discussed that if they change their minds to follow up with PCP to arrange [facility]. Karime Villagran feels patient is doing well at home with his assistance. Updated RN. DHEERAJ will remain available.     ESTER Cherry  Discharge Planner  [phone]

## 2022-09-08 NOTE — ED QUICK NOTES
Notified Casi Pedraza receiving RN that EMS is at patient's bedside for transfer over to [facility].

## 2022-09-08 NOTE — PLAN OF CARE
Problem: Patient/Family Goals  Goal: Patient/Family Long Term Goal  Description: Patient's Long Term Goal: take medications as prescribed    Interventions:  - follow up outpatient with MD for refills as needed  - take medication only as prescribed  - See additional Care Plan goals for specific interventions  Outcome: Progressing  Goal: Patient/Family Short Term Goal  Description: Patient's Short Term Goal: gp home    Interventions:   - plan for discharge today  - See additional Care Plan goals for specific interventions  Outcome: Progressing

## 2022-09-09 ENCOUNTER — PATIENT OUTREACH (OUTPATIENT)
Dept: CASE MANAGEMENT | Age: 82
End: 2022-09-09

## 2022-09-09 ENCOUNTER — OFFICE VISIT (OUTPATIENT)
Dept: FAMILY MEDICINE CLINIC | Facility: CLINIC | Age: 82
End: 2022-09-09
Payer: MEDICARE

## 2022-09-09 VITALS
SYSTOLIC BLOOD PRESSURE: 126 MMHG | TEMPERATURE: 98 F | WEIGHT: 119 LBS | HEIGHT: 64 IN | BODY MASS INDEX: 20.32 KG/M2 | OXYGEN SATURATION: 97 % | DIASTOLIC BLOOD PRESSURE: 68 MMHG | HEART RATE: 57 BPM

## 2022-09-09 DIAGNOSIS — F02.81 ALZHEIMER'S DEMENTIA WITH BEHAVIORAL DISTURBANCE, UNSPECIFIED TIMING OF DEMENTIA ONSET (HCC): Chronic | ICD-10-CM

## 2022-09-09 DIAGNOSIS — E21.0 PRIMARY HYPERPARATHYROIDISM (HCC): ICD-10-CM

## 2022-09-09 DIAGNOSIS — K59.04 CHRONIC IDIOPATHIC CONSTIPATION: ICD-10-CM

## 2022-09-09 DIAGNOSIS — G30.9 ALZHEIMER'S DEMENTIA WITH BEHAVIORAL DISTURBANCE, UNSPECIFIED TIMING OF DEMENTIA ONSET (HCC): Chronic | ICD-10-CM

## 2022-09-09 DIAGNOSIS — Z91.14 MEDICATION NONCOMPLIANCE DUE TO COGNITIVE IMPAIRMENT: ICD-10-CM

## 2022-09-09 DIAGNOSIS — R10.9 CHRONIC ABDOMINAL PAIN: ICD-10-CM

## 2022-09-09 DIAGNOSIS — M51.16 LUMBAR DISC HERNIATION WITH RADICULOPATHY: Primary | ICD-10-CM

## 2022-09-09 DIAGNOSIS — G89.29 CHRONIC ABDOMINAL PAIN: ICD-10-CM

## 2022-09-09 DIAGNOSIS — R25.1 TREMORS OF NERVOUS SYSTEM: ICD-10-CM

## 2022-09-09 DIAGNOSIS — F41.1 GENERALIZED ANXIETY DISORDER: ICD-10-CM

## 2022-09-09 DIAGNOSIS — R41.3 MEMORY LOSS, SHORT TERM: ICD-10-CM

## 2022-09-09 DIAGNOSIS — I10 ESSENTIAL HYPERTENSION: ICD-10-CM

## 2022-09-09 PROCEDURE — 99215 OFFICE O/P EST HI 40 MIN: CPT | Performed by: FAMILY MEDICINE

## 2022-09-09 PROCEDURE — 1111F DSCHRG MED/CURRENT MED MERGE: CPT | Performed by: FAMILY MEDICINE

## 2022-09-09 RX ORDER — OMEPRAZOLE 20 MG/1
20 CAPSULE, DELAYED RELEASE ORAL DAILY
COMMUNITY

## 2022-09-09 RX ORDER — HYDROCODONE BITARTRATE AND ACETAMINOPHEN 5; 325 MG/1; MG/1
TABLET ORAL EVERY 8 HOURS PRN
Qty: 5 TABLET | Refills: 0 | Status: SHIPPED | OUTPATIENT
Start: 2022-09-09

## 2022-09-09 RX ORDER — SERTRALINE HYDROCHLORIDE 100 MG/1
100 TABLET, FILM COATED ORAL DAILY
Qty: 90 TABLET | Refills: 0 | Status: SHIPPED | OUTPATIENT
Start: 2022-09-09

## 2022-09-09 NOTE — PATIENT INSTRUCTIONS
For the acute anxiety and shakes for 1-2 days ok to increase the QUEtiapine 25 MG Oral Tab from 1/2 to 1 then go back to 1/2 three times a day     Sertraline go from 1/2 to full 100 mg daily     Metoprolol 1/2 twice daily

## 2022-09-11 PROBLEM — Z91.14 MEDICATION NONCOMPLIANCE DUE TO COGNITIVE IMPAIRMENT: Status: ACTIVE | Noted: 2022-09-11

## 2022-09-11 PROBLEM — K59.00 CONSTIPATION: Status: RESOLVED | Noted: 2020-05-06 | Resolved: 2022-09-11

## 2022-09-11 PROBLEM — Z91.148 MEDICATION NONCOMPLIANCE DUE TO COGNITIVE IMPAIRMENT: Status: ACTIVE | Noted: 2022-09-11

## 2022-09-11 PROBLEM — R41.9 MEDICATION NONCOMPLIANCE DUE TO COGNITIVE IMPAIRMENT: Status: ACTIVE | Noted: 2022-09-11

## 2022-09-12 ENCOUNTER — TELEPHONE (OUTPATIENT)
Dept: FAMILY MEDICINE CLINIC | Facility: CLINIC | Age: 82
End: 2022-09-12

## 2022-09-12 NOTE — PROGRESS NOTES
Pt completed TCM at PCP appointment on 9/9/22. NCM closing encounter.
1. Provide food preferences as requested by Pt/family within diet restrictions  2. Encourage PO intake during meal times 3. Reviewed menu ordering procedures 4. Encouraged Pt to order additional snacks 5. Encouraged pt to have unfinished meals placed in unit fridge to consume at a later time./Feeding Assistance/Other (specify)

## 2022-09-12 NOTE — TELEPHONE ENCOUNTER
Patient called complaining of pain in stomach , feeling she has to go to bathroom but cannot go.   Looking for advice

## 2022-09-13 ENCOUNTER — OFFICE VISIT (OUTPATIENT)
Dept: PAIN CLINIC | Facility: CLINIC | Age: 82
End: 2022-09-13
Payer: MEDICARE

## 2022-09-13 VITALS — HEART RATE: 52 BPM | DIASTOLIC BLOOD PRESSURE: 80 MMHG | OXYGEN SATURATION: 98 % | SYSTOLIC BLOOD PRESSURE: 130 MMHG

## 2022-09-13 DIAGNOSIS — M54.16 LUMBAR RADICULOPATHY: Primary | ICD-10-CM

## 2022-09-13 DIAGNOSIS — M54.16 LUMBAR RADICULITIS: ICD-10-CM

## 2022-09-13 PROCEDURE — 99213 OFFICE O/P EST LOW 20 MIN: CPT | Performed by: NURSE PRACTITIONER

## 2022-09-13 PROCEDURE — 1111F DSCHRG MED/CURRENT MED MERGE: CPT | Performed by: NURSE PRACTITIONER

## 2022-09-13 NOTE — PROGRESS NOTES
Last procedure: TRANSFORAMINAL LUMBAR EPIDURAL STEROID INJECTION LEFT L3/4, L4/5 with local  Date: 08/29/22  Percentage of relief obtained: 98%  Duration of relief: current     Current Pain Score: 6/10

## 2022-09-14 ENCOUNTER — TELEPHONE (OUTPATIENT)
Dept: FAMILY MEDICINE CLINIC | Facility: CLINIC | Age: 82
End: 2022-09-14

## 2022-09-14 NOTE — TELEPHONE ENCOUNTER
Advised they may have messaged through the computer. She was wondering if she could take duloxetine along with sertraline. I advised no. Last progress notes states she was taken off the duloxtine.       She was seen in the office on Monday and changed back to sertraline and taken off the duloxetine with concerns over increased constipation with duloxetine

## 2022-09-14 NOTE — TELEPHONE ENCOUNTER
Patient wants to know if pain management has contacted [facility] regarding her last appointment with them

## 2022-09-16 RX ORDER — LIDOCAINE 50 MG/G
PATCH TOPICAL
Qty: 60 PATCH | Refills: 0 | Status: SHIPPED | OUTPATIENT
Start: 2022-09-16

## 2022-10-04 RX ORDER — AMLODIPINE BESYLATE 10 MG/1
TABLET ORAL
Qty: 90 TABLET | Refills: 0 | Status: SHIPPED | OUTPATIENT
Start: 2022-10-04

## 2022-10-04 RX ORDER — PRAVASTATIN SODIUM 40 MG
TABLET ORAL
Qty: 90 TABLET | Refills: 0 | Status: SHIPPED | OUTPATIENT
Start: 2022-10-04

## 2022-10-28 ENCOUNTER — TELEPHONE (OUTPATIENT)
Dept: FAMILY MEDICINE CLINIC | Facility: CLINIC | Age: 82
End: 2022-10-28

## 2022-10-28 NOTE — TELEPHONE ENCOUNTER
Pt has an appt at 2:30 that is being cancelled. States she just returned back from [place] and last night started feeling ill with sore throat, feeling cold, nausea, swollen glands and headache.    Requesting OTC or RX for nausea and headache, sore throat sxs

## 2022-10-29 ENCOUNTER — TELEPHONE (OUTPATIENT)
Dept: FAMILY MEDICINE CLINIC | Facility: CLINIC | Age: 82
End: 2022-10-29

## 2022-10-29 NOTE — TELEPHONE ENCOUNTER
Kat Costello PA-C on-call patient states \"I have abdominal pain and diarrhea\"  History of dementia and chronic concerns over abdominal pain and poor bowel movement evacuation. Prior abuse of medications for constipation/intermittent diarrhea. When asked how her bowel movement was today she states it was \"normal no blood or mucous and not watery\"  When asked where the pain is she states \"lower abdomen right side last night, today is moving around and not localized\". No fever, chills or body aches. No urinary changes denies dysuria, frequency or urgency   HA last night behind eye no vision changes still has a headache today. States she has no symptoms of infection. Tylenol for headache took 2 at 5 AM and repeated at 11 AM.  Did tell her she can take 2 more this evening only maximum of 4 g/day. Try heating pad to the neck and heating pad to the abdomen. Cool compress to the frontal aspect of the head for the headache. Patient did take her omeprazole today. She is to continue taking omeprazole daily and avoiding nonsteroidals is not to take ibuprofen or Excedrin patient verbalized understanding. Call back if symptoms are worsening.

## 2022-10-31 NOTE — TELEPHONE ENCOUNTER
Phoned patient. No complaints. Patient states she forgot she had an appointment with Elena Mckeon on Friday. She will call back to schedule.

## 2022-11-04 DIAGNOSIS — M51.16 LUMBAR DISC HERNIATION WITH RADICULOPATHY: ICD-10-CM

## 2022-11-04 RX ORDER — HYDROCODONE BITARTRATE AND ACETAMINOPHEN 5; 325 MG/1; MG/1
TABLET ORAL EVERY 8 HOURS PRN
Qty: 5 TABLET | Refills: 0 | Status: SHIPPED | OUTPATIENT
Start: 2022-11-04

## 2022-11-04 NOTE — TELEPHONE ENCOUNTER
Per MA, signed script faxed. Per [employer], scripts for narcotics must be e-prescribed or physically brought into the pharmacy. Faxed prescriptions willl not be accepted. Prescription pended.

## 2022-11-04 NOTE — TELEPHONE ENCOUNTER
Pharmacy called states Norco 5 tabs  needs to escript  Or pt needs to bring in px rx in to pharmacy. [address]  [phone]

## 2022-11-04 NOTE — TELEPHONE ENCOUNTER
Patient informed prescription for Norco sent electronically, Kelby Redmond confirmed it was received, to send a text to patient when ready to .

## 2022-11-04 NOTE — TELEPHONE ENCOUNTER
Patient called regarding Jc Rodriguez rx. She picked up rx for antibiotic but  Received no [place]. Informed her pharmacy called and stated the RX could not be faxed and that mssg sent to RN.  She would like a call back

## 2022-11-05 PROBLEM — Z87.11 HISTORY OF PEPTIC ULCER DISEASE: Status: RESOLVED | Noted: 2021-10-12 | Resolved: 2022-11-05

## 2022-11-05 PROBLEM — R10.9 ABDOMINAL PAIN OF UNKNOWN ETIOLOGY: Status: RESOLVED | Noted: 2022-09-07 | Resolved: 2022-11-05

## 2022-11-05 PROBLEM — R77.8 ELEVATED TROPONIN: Status: RESOLVED | Noted: 2022-09-07 | Resolved: 2022-11-05

## 2022-11-05 PROBLEM — E87.20 METABOLIC ACIDOSIS: Status: RESOLVED | Noted: 2022-09-07 | Resolved: 2022-11-05

## 2022-11-05 PROBLEM — N39.0 URINARY TRACT INFECTION WITHOUT HEMATURIA, SITE UNSPECIFIED: Status: RESOLVED | Noted: 2022-09-07 | Resolved: 2022-11-05

## 2022-11-05 PROBLEM — R79.89 ELEVATED TROPONIN: Status: RESOLVED | Noted: 2022-09-07 | Resolved: 2022-11-05

## 2022-11-07 ENCOUNTER — TELEPHONE (OUTPATIENT)
Dept: FAMILY MEDICINE CLINIC | Facility: CLINIC | Age: 82
End: 2022-11-07

## 2022-11-07 NOTE — TELEPHONE ENCOUNTER
Patient c/o abd discomfort and constipation. States pain is all across abd. States she had a normal BM yesterday but no BM today. Advised patient to apply warm pack to abd. Relaxation tech encouraged. She VU and is in agreement.

## 2022-11-08 ENCOUNTER — TELEPHONE (OUTPATIENT)
Dept: FAMILY MEDICINE CLINIC | Facility: CLINIC | Age: 82
End: 2022-11-08

## 2022-11-08 NOTE — TELEPHONE ENCOUNTER
Patient requesting antibiotic for sore throat/swollen glands and sinus pressure that started yesterday. Patient informed office visit required for new prescription, provider is out of the office today. Can try mucinex, for congestion, flonase for sinus pressure, tylenol for pain. Maintain hydration with 64oz of water. Monitor symptoms, if no better in a day or 2, proceed to [address] [place] [address] or [facility] for eval. Verbalized understanding.

## 2022-11-08 NOTE — TELEPHONE ENCOUNTER
Pt calling states symptoms started yesterday    Sinus pressure and pain with sore throat and her glands are swollen    Requesting antibiotic

## 2022-11-21 ENCOUNTER — HOSPITAL ENCOUNTER (OUTPATIENT)
Dept: BONE DENSITY | Age: 82
Discharge: HOME | End: 2022-11-21
Attending: FAMILY MEDICINE
Payer: MEDICARE

## 2022-11-21 DIAGNOSIS — M81.8 OTHER OSTEOPOROSIS WITHOUT CURRENT PATHOLOGICAL FRACTURE: ICD-10-CM

## 2022-11-21 DIAGNOSIS — Z78.0 POSTMENOPAUSAL: ICD-10-CM

## 2022-11-21 DIAGNOSIS — M81.0 AGE-RELATED OSTEOPOROSIS WITHOUT CURRENT PATHOLOGICAL FRACTURE: Primary | ICD-10-CM

## 2022-11-21 PROCEDURE — 77080 DXA BONE DENSITY AXIAL: CPT | Performed by: FAMILY MEDICINE

## 2022-11-22 NOTE — PROGRESS NOTES
Bone density shows osteoporosis total hip and femoral neck follow-up with Dr. Francisco Leon to discuss treatment of osteoporosis make appointment soon as possible also to follow-up on hyperparathyroidism.   Make sure you are taking vitamin D daily  Forward results to Dr. Francisco Leon patient's endocrinologist

## 2022-11-29 ENCOUNTER — TELEPHONE (OUTPATIENT)
Dept: FAMILY MEDICINE CLINIC | Facility: CLINIC | Age: 82
End: 2022-11-29

## 2022-11-29 NOTE — TELEPHONE ENCOUNTER
Patient c/o sore throat, nasal congestion for 3 days. Advised musinex, flonase, throat lozenges. Advised COVID test as she has not done one. She VU and in agreement.

## 2022-11-29 NOTE — TELEPHONE ENCOUNTER
Pt called and stated that she has had a sore throat for the past three days and is requesting to have something prescribed for her throat.

## 2022-12-05 RX ORDER — PRAVASTATIN SODIUM 40 MG
TABLET ORAL
Qty: 90 TABLET | Refills: 0 | Status: SHIPPED | OUTPATIENT
Start: 2022-12-05

## 2022-12-06 ENCOUNTER — TELEPHONE (OUTPATIENT)
Dept: FAMILY MEDICINE CLINIC | Facility: CLINIC | Age: 82
End: 2022-12-06

## 2022-12-06 NOTE — TELEPHONE ENCOUNTER
Patient reported symptoms of congestion, sinus pressure, sore throat that started yesterday. Advised on supportive treatment for symptoms, continue to monitor symptoms. Call office if symptoms get worse. Proceed to ER if shortness of breath, chest pain develop. Verbalized understanding.

## 2022-12-06 NOTE — TELEPHONE ENCOUNTER
Pt calling c/o  Swollen glands, sore throat,stuffy head,headache    Did not test for Covid     Preferred pharmacy   [facility]

## 2022-12-08 RX ORDER — GABAPENTIN 300 MG/1
CAPSULE ORAL
Qty: 90 CAPSULE | Refills: 2 | Status: SHIPPED | OUTPATIENT
Start: 2022-12-08

## 2022-12-10 ENCOUNTER — TELEPHONE (OUTPATIENT)
Dept: FAMILY MEDICINE CLINIC | Facility: CLINIC | Age: 82
End: 2022-12-10

## 2022-12-10 NOTE — TELEPHONE ENCOUNTER
Kat Costello PA-C on call 12/10/22 at 11:59 am. Has lower abdomen pain which is recurrent. Just above her pant line does not have any urinary or bowel movement issue but states that she needs to have a bowel movement. Patient is using heating pad and took MiraLAX this morning. Did have a normal bowel movement yesterday with normal color and consistency. Used MiraLAX and prune juice this morning is able to palpate over the abdomen without any acute pain. Self palpation encouraged from the right to the left of the lower abdomen to help with gas and to help facilitate bowel movement.   Patient verbalized understanding and will call back if any problems persist.

## 2022-12-12 ENCOUNTER — TELEPHONE (OUTPATIENT)
Dept: NEUROLOGY | Facility: CLINIC | Age: 82
End: 2022-12-12

## 2022-12-12 ENCOUNTER — OFFICE VISIT (OUTPATIENT)
Dept: PAIN CLINIC | Facility: CLINIC | Age: 82
End: 2022-12-12
Payer: MEDICARE

## 2022-12-12 VITALS
HEART RATE: 68 BPM | DIASTOLIC BLOOD PRESSURE: 80 MMHG | SYSTOLIC BLOOD PRESSURE: 128 MMHG | BODY MASS INDEX: 21 KG/M2 | OXYGEN SATURATION: 95 % | WEIGHT: 124 LBS

## 2022-12-12 DIAGNOSIS — M54.16 LUMBAR RADICULITIS: Primary | ICD-10-CM

## 2022-12-12 DIAGNOSIS — Z96.89 S/P INSERTION OF SPINAL CORD STIMULATOR: ICD-10-CM

## 2022-12-12 DIAGNOSIS — M48.061 LUMBAR FORAMINAL STENOSIS: ICD-10-CM

## 2022-12-12 DIAGNOSIS — M54.16 LUMBAR RADICULOPATHY: Primary | ICD-10-CM

## 2022-12-12 PROCEDURE — 99214 OFFICE O/P EST MOD 30 MIN: CPT | Performed by: PHYSICIAN ASSISTANT

## 2022-12-12 RX ORDER — ONDANSETRON 8 MG/1
TABLET, ORALLY DISINTEGRATING ORAL
COMMUNITY
Start: 2022-11-23

## 2022-12-12 NOTE — PROGRESS NOTES
Patient presents in office today with reported pain in low back, left leg    Current pain level reported = 7/10    Last reported dose of nothing recent      Narcotic Contract renewal none    Urine Drug screen none

## 2022-12-12 NOTE — TELEPHONE ENCOUNTER
Question Answer   Anesthesia Type Local   Provider [facility]   Procedure Transforaminal   Laterality/Level LEFT L3/4 and L4/5   Medical clearance requested (will send to Pain Navigator) No   Patient has Medicare coverage?  Yes

## 2022-12-19 DIAGNOSIS — M51.16 LUMBAR DISC HERNIATION WITH RADICULOPATHY: ICD-10-CM

## 2022-12-20 ENCOUNTER — TELEPHONE (OUTPATIENT)
Dept: FAMILY MEDICINE CLINIC | Facility: CLINIC | Age: 82
End: 2022-12-20

## 2022-12-20 RX ORDER — LIDOCAINE 50 MG/G
PATCH TOPICAL
Qty: 60 PATCH | Refills: 0 | Status: SHIPPED | OUTPATIENT
Start: 2022-12-20

## 2022-12-20 NOTE — TELEPHONE ENCOUNTER
Patient c/o of abdominal pain/cramping that started today. Denied other symptoms, stated abdomen is soft and had bowel movement this morning. Feels like she should have another bm. Advised on symptom management including adequate hydration, warm pack to abdomen, and rest. Verbalized understanding.

## 2022-12-20 NOTE — TELEPHONE ENCOUNTER
Patient calling with terrible stomach ache did have a BM this morning but still with pain    Wants to know what to do

## 2022-12-21 ENCOUNTER — APPOINTMENT (OUTPATIENT)
Dept: URBAN - METROPOLITAN AREA CLINIC 247 | Age: 82
Setting detail: DERMATOLOGY
End: 2022-12-21

## 2022-12-21 DIAGNOSIS — L57.8 OTHER SKIN CHANGES DUE TO CHRONIC EXPOSURE TO NONIONIZING RADIATION: ICD-10-CM

## 2022-12-21 DIAGNOSIS — L82.0 INFLAMED SEBORRHEIC KERATOSIS: ICD-10-CM

## 2022-12-21 DIAGNOSIS — D49.2 NEOPLASM OF UNSPECIFIED BEHAVIOR OF BONE, SOFT TISSUE, AND SKIN: ICD-10-CM

## 2022-12-21 PROCEDURE — OTHER COUNSELING: OTHER

## 2022-12-21 PROCEDURE — 11312 SHAVE SKIN LESION 1.1-2.0 CM: CPT

## 2022-12-21 PROCEDURE — OTHER SHAVE REMOVAL: OTHER

## 2022-12-21 PROCEDURE — A4550 SURGICAL TRAYS: HCPCS

## 2022-12-21 PROCEDURE — OTHER LIQUID NITROGEN: OTHER

## 2022-12-21 PROCEDURE — 17110 DESTRUCT B9 LESION 1-14: CPT | Mod: 59

## 2022-12-21 PROCEDURE — OTHER MIPS QUALITY: OTHER

## 2022-12-21 PROCEDURE — 99213 OFFICE O/P EST LOW 20 MIN: CPT | Mod: 25

## 2022-12-21 ASSESSMENT — LOCATION DETAILED DESCRIPTION DERM
LOCATION DETAILED: LEFT SUPERIOR CENTRAL BUCCAL CHEEK
LOCATION DETAILED: LEFT CENTRAL MALAR CHEEK
LOCATION DETAILED: LEFT INFERIOR CENTRAL MALAR CHEEK
LOCATION DETAILED: RIGHT MEDIAL FOREHEAD
LOCATION DETAILED: RIGHT LATERAL MALAR CHEEK
LOCATION DETAILED: RIGHT INFERIOR CENTRAL MALAR CHEEK

## 2022-12-21 ASSESSMENT — LOCATION ZONE DERM: LOCATION ZONE: FACE

## 2022-12-21 ASSESSMENT — LOCATION SIMPLE DESCRIPTION DERM
LOCATION SIMPLE: LEFT CHEEK
LOCATION SIMPLE: RIGHT FOREHEAD
LOCATION SIMPLE: RIGHT CHEEK

## 2022-12-21 NOTE — PROCEDURE: LIQUID NITROGEN
Consent: The patient's consent was obtained including but not limited to risks of crusting, scabbing, blistering, scarring, darker or lighter pigmentary change, recurrence, incomplete removal and infection.
Spray Paint Technique: No
Show Applicator Variable?: Yes
Medical Necessity Information: It is in your best interest to select a reason for this procedure from the list below. All of these items fulfill various CMS LCD requirements except the new and changing color options.
Medical Necessity Clause: This procedure was medically necessary because the lesions that were treated were:
Post-Care Instructions: I reviewed with the patient in detail post-care instructions. Patient is to wear sunprotection, and avoid picking at any of the treated lesions. Pt may apply Vaseline to crusted or scabbing areas.
Spray Paint Text: The liquid nitrogen was applied to the skin utilizing a spray paint frosting technique.
Detail Level: Simple

## 2022-12-21 NOTE — PROCEDURE: SHAVE REMOVAL
Bill 27053 For Specimen Handling/Conveyance To Laboratory?: no
Was A Bandage Applied: Yes
Wound Care: Petrolatum
Consent was obtained from the patient. The risks and benefits to therapy were discussed in detail. Specifically, the risks of infection, scarring, bleeding, prolonged wound healing, incomplete removal, allergy to anesthesia, nerve injury and recurrence were addressed. Prior to the procedure, the treatment site was clearly identified and confirmed by the patient. All components of Universal Protocol/PAUSE Rule completed.
Biopsy Method: Personna blade
Medical Necessity Clause: This procedure was medically necessary because the lesion that was treated was:
X Size Of Lesion In Cm (Optional): 0
Notification Instructions: Patient will be notified of pathology results. However, patient instructed to call the office if not contacted within 2 weeks.
Size Of Lesion In Cm (Required): 1.7
Hemostasis: Drysol
Detail Level: Detailed
Medical Necessity Information: It is in your best interest to select a reason for this procedure from the list below. All of these items fulfill various CMS LCD requirements except the new and changing color options.
Post-Care Instructions: I reviewed with the patient in detail post-care instructions. Patient is to keep the biopsy site dry overnight, and then apply bacitracin twice daily until healed. Patient may apply hydrogen peroxide soaks to remove any crusting.
Depth Of Shave: dermis
Anesthesia Type: 1% lidocaine with epinephrine
Body Location Override (Optional - Billing Will Still Be Based On Selected Body Map Location If Applicable): right cheek
Anesthesia Volume In Cc: 0.5
Billing Type: Third-Party Bill

## 2022-12-22 ENCOUNTER — APPOINTMENT (OUTPATIENT)
Dept: GENERAL RADIOLOGY | Facility: HOSPITAL | Age: 82
End: 2022-12-22
Attending: ANESTHESIOLOGY
Payer: MEDICARE

## 2022-12-22 ENCOUNTER — HOSPITAL ENCOUNTER (OUTPATIENT)
Facility: HOSPITAL | Age: 82
Setting detail: HOSPITAL OUTPATIENT SURGERY
Discharge: HOME | End: 2022-12-22
Attending: ANESTHESIOLOGY | Admitting: ANESTHESIOLOGY
Payer: MEDICARE

## 2022-12-22 VITALS
TEMPERATURE: 99 F | RESPIRATION RATE: 16 BRPM | DIASTOLIC BLOOD PRESSURE: 66 MMHG | OXYGEN SATURATION: 96 % | HEART RATE: 53 BPM | SYSTOLIC BLOOD PRESSURE: 172 MMHG

## 2022-12-22 PROCEDURE — 64483 NJX AA&/STRD TFRM EPI L/S 1: CPT | Performed by: ANESTHESIOLOGY

## 2022-12-22 PROCEDURE — 64484 NJX AA&/STRD TFRM EPI L/S EA: CPT | Performed by: ANESTHESIOLOGY

## 2022-12-22 PROCEDURE — 3E0R33Z INTRODUCTION OF ANTI-INFLAMMATORY INTO SPINAL CANAL, PERCUTANEOUS APPROACH: ICD-10-PCS | Performed by: ANESTHESIOLOGY

## 2022-12-22 RX ORDER — DEXAMETHASONE SODIUM PHOSPHATE 10 MG/ML
INJECTION, SOLUTION INTRAMUSCULAR; INTRAVENOUS
Status: DISCONTINUED | OUTPATIENT
Start: 2022-12-22 | End: 2022-12-22

## 2022-12-22 RX ORDER — SODIUM CHLORIDE 9 MG/ML
INJECTION INTRAVENOUS
Status: DISCONTINUED | OUTPATIENT
Start: 2022-12-22 | End: 2022-12-22

## 2022-12-22 RX ORDER — LIDOCAINE HYDROCHLORIDE 10 MG/ML
INJECTION, SOLUTION EPIDURAL; INFILTRATION; INTRACAUDAL; PERINEURAL
Status: DISCONTINUED | OUTPATIENT
Start: 2022-12-22 | End: 2022-12-22

## 2022-12-22 NOTE — OPERATIVE REPORT
[facility]  Operative Report  2022     Katerin Joe Patient Status:  Hospital Outpatient Surgery    1940 MRN [id number]   Location [id number] [place] [address] Attending Declan Porras MD   [facility] Day # 0 PADMAJA Rankin DO     Indication: Gee Griffiths is a [de-identified]year old female with lumbar radiculopathy    Imaging: MRI    Preoperative Diagnosis:  Lumbar radiculitis [M54.16]    Postoperative Diagnosis: Same as above. Procedure performed: TRANSFORAMINAL LUMBAR EPIDURAL STEROID INJECTION MULTIPLE LEVEL left L3/4, L4/5 with local      Anesthesia: Local and IV Sedation. EBL: Less than 1 ml. Procedure Description:  After reviewing the patient's history and performing a focused physical examination, the diagnosis was confirmed and contraindications such as infection and coagulopathy were ruled out. Following review of potential side effects and complications, including but not necessarily limited to infection, allergic reaction, local tissue breakdown, nerve injury, and paresis, the patient indicated they understood and agreed to proceed. After obtaining the informed consent, the patient was brought to the procedure room and monitored. In the prone position, following sterile prep and drape of the lumbar region,  the  L3 neural foramen was identified under fluoroscopy. The skin and subcutaneous tissue was anesthetized via 25-gauge 1.5\" needle with approximately 2 cc of 1% lidocaine. A 22-gauge 5\" Quincke spinal needle was introduced toward the inferior aspect of the junction between the transverse process and pedicle of the  L3 level atraumatically under fluoroscopic guidance. The needle was advanced into the anterior epidural space at this level. The needle position was confirmed under AP and lateral fluoroscopic view. Following negative aspiration for CSF and blood, approximately 1 cc of Omnipaque 240 was injected.   An excellent contrast spread along the epidural space and the nerve root was obtained. At this point, 1 cc normal saline with 5 mg of dexamethasone was injected without complication. The needle was withdrawn with stylet in situ after being flushed with 1 cc PF lidocaine. The  L4 neural foramen was also identified under fluoroscopy. The skin and subcutaneous tissue was anesthetized via 25-gauge 1.5\" needle with approximately 2 cc of 1% lidocaine. A 22-gauge 5\" Quincke spinal needle was introduced toward the inferior aspect of the junction between the transverse process and pedicle of the L4 level atraumatically under fluoroscopic guidance. The needle was advanced into the anterior epidural space at this level. The needle position was confirmed under AP and lateral fluoroscopic view. Following negative aspiration for CSF and blood, approximately 1 cc of Omnipaque 240 was injected. An excellent contrast spread along the epidural space and the nerve root was obtained. At this point, 1cc of normal saline with 5 mg of dexamethasone was injected without complication. The needle was withdrawn with stylet in situ after being flushed with 1 cc PF lidocaine. .  The patient tolerated procedure very well. The patient was observed until discharge criteria met. Discharge instructions were given and patient was released to a responsible adult. Complications: None. Follow up: The patient was followed in the pain clinic as needed basis.         Marvella Holter, MD

## 2022-12-22 NOTE — DISCHARGE INSTRUCTIONS
Home Care Instructions Following Your Pain Procedure     Morro Foots,  It has been a pleasure to have you as our patient. To help you at home, you must follow these general discharge instructions. We will review these with you before you are discharged. It is our hope that you have a complete and uneventful recovery from our procedure. General Instructions:  What to Expect:  Bandages from your procedure today can be removed when you get home. Please avoid soaking and/or swimming for 24 hours. Showering is okay  It is normal to have increased pain symptoms and/or pain at injection site for up to 3-5 days after procedure, you can use heat or ice (20 minutes on 20 minutes off) for comfort. You may experience some temporary side effects which may include restlessness or insomnia, flushing of the face, or heart palpitations. Please contact the provider if these symptoms do not resolve within 3-4 days. Lightheadedness or nausea may occur and should resolve within 24 to 48 hours. If you develop a headache after treatment, rest, drink fluids (with caffeine, if possible) and take mild over-the-counter pain medication. If the headache does not improve with the above treatment, contact the physician. Home Medications:  Resume all previously prescribed medication. Please avoid taking NSAIDs (Non-Steriodal Anti-Inflammatory Drugs) such as:  Ibuprofen ( Advil, Motrin) Aleve (Naproxen), Diclofenac, Meloxicam for 6 hours after procedure. If you are on Coumadin (Warfarin) or any other anti-coagulant (or \"blood thinning\") medication such as Plavix (Clopidogrel), Xarelto (Rivaroxaban), Eliquis (Apixaban), Effient (Prasugrel) etc., restart on the following day from the procedure unless otherwise directed by your provider. If you are a diabetic, please increase the frequency of your glucose monitoring after the procedure as steroids may cause a temporary (2-3 day) increase in your blood sugar.   Contact your primary care physician if your blood sugar remains elevated as you may require some medication adjustment. Diet:  Resume your regular diet as tolerated. Activity: We recommend that you relax and rest during the rest of your procedure day. If you feel weakness in your arms or legs do not drive. Follow-up Appointment  Please schedule a follow-up visit within 3 to 4 weeks after your last procedure date. Question or Concerns:  Feel free to call our office with any questions or concerns at [phone] (option #2)    Donna Silva  Thank you for coming to [facility] for your procedure. The nurses try very hard to make sure you receive the best care possible. Your trust in them as well as us is greatly appreciated.     Thanks so much,   Dr. Magan Mahoney

## 2022-12-23 ENCOUNTER — TELEPHONE (OUTPATIENT)
Dept: PAIN CLINIC | Facility: CLINIC | Age: 82
End: 2022-12-23

## 2022-12-23 NOTE — TELEPHONE ENCOUNTER
Courtesy called placed to patient for post procedure follow up. Patient stated \"im fine but some pain in my. Informed patient that soreness is to be expected after the procedure. Educated patient that it takes 3-5 days for the steroid to be effective and to allow adequate time for medication to work. Encouraged patient to alternate ice and heat and to take medications as prescribed. Pt verbalized understanding to call with any questions or concerns.       Procedure:TFESI  Date: 12/22/2022  Follow up Visit Scheduled: 1/3/2023 with Yudith Gtz

## 2023-01-09 ENCOUNTER — OFFICE VISIT (OUTPATIENT)
Dept: PAIN CLINIC | Facility: CLINIC | Age: 83
End: 2023-01-09
Payer: MEDICARE

## 2023-01-09 ENCOUNTER — TELEPHONE (OUTPATIENT)
Dept: NEUROLOGY | Facility: CLINIC | Age: 83
End: 2023-01-09

## 2023-01-09 VITALS
BODY MASS INDEX: 21 KG/M2 | WEIGHT: 124 LBS | OXYGEN SATURATION: 97 % | HEART RATE: 99 BPM | SYSTOLIC BLOOD PRESSURE: 136 MMHG | DIASTOLIC BLOOD PRESSURE: 62 MMHG

## 2023-01-09 DIAGNOSIS — M54.16 LUMBAR RADICULOPATHY: Primary | ICD-10-CM

## 2023-01-09 DIAGNOSIS — M54.16 LUMBAR RADICULITIS: Primary | ICD-10-CM

## 2023-01-09 DIAGNOSIS — M48.061 LUMBAR FORAMINAL STENOSIS: ICD-10-CM

## 2023-01-09 PROCEDURE — 99214 OFFICE O/P EST MOD 30 MIN: CPT | Performed by: PHYSICIAN ASSISTANT

## 2023-01-09 NOTE — PROGRESS NOTES
Last procedure: TRANSFORAMINAL LUMBAR EPIDURAL STEROID INJECTION MULTIPLE LEVEL left L3/4, L4/5 with local  Date: 12/22/22  Percentage of relief obtained: 85%  Duration of relief: 1-2 weeks  Current Pain Score: 8

## 2023-01-09 NOTE — TELEPHONE ENCOUNTER
Question Answer   Anesthesia Type Local   Provider [facility]   Procedure Transforaminal   Laterality/Level LEFT L3/4 and L4/5 TF-AMADO   Medical clearance requested (will send to Pain Navigator) No   Patient has Medicare coverage?  Yes

## 2023-01-17 ENCOUNTER — APPOINTMENT (OUTPATIENT)
Dept: GENERAL RADIOLOGY | Facility: HOSPITAL | Age: 83
End: 2023-01-17
Attending: ANESTHESIOLOGY
Payer: MEDICARE

## 2023-01-17 ENCOUNTER — HOSPITAL ENCOUNTER (OUTPATIENT)
Facility: HOSPITAL | Age: 83
Setting detail: HOSPITAL OUTPATIENT SURGERY
Discharge: HOME | End: 2023-01-17
Attending: ANESTHESIOLOGY | Admitting: ANESTHESIOLOGY
Payer: MEDICARE

## 2023-01-17 VITALS
TEMPERATURE: 98 F | HEART RATE: 65 BPM | WEIGHT: 124 LBS | RESPIRATION RATE: 18 BRPM | HEIGHT: 64 IN | DIASTOLIC BLOOD PRESSURE: 65 MMHG | BODY MASS INDEX: 21.17 KG/M2 | SYSTOLIC BLOOD PRESSURE: 165 MMHG | OXYGEN SATURATION: 97 %

## 2023-01-17 PROCEDURE — 64483 NJX AA&/STRD TFRM EPI L/S 1: CPT | Performed by: ANESTHESIOLOGY

## 2023-01-17 PROCEDURE — 64484 NJX AA&/STRD TFRM EPI L/S EA: CPT | Performed by: ANESTHESIOLOGY

## 2023-01-17 PROCEDURE — 3E0R33Z INTRODUCTION OF ANTI-INFLAMMATORY INTO SPINAL CANAL, PERCUTANEOUS APPROACH: ICD-10-PCS | Performed by: ANESTHESIOLOGY

## 2023-01-17 PROCEDURE — B01B1ZZ FLUOROSCOPY OF SPINAL CORD USING LOW OSMOLAR CONTRAST: ICD-10-PCS | Performed by: ANESTHESIOLOGY

## 2023-01-17 RX ORDER — LIDOCAINE HYDROCHLORIDE 10 MG/ML
INJECTION, SOLUTION EPIDURAL; INFILTRATION; INTRACAUDAL; PERINEURAL
Status: DISCONTINUED | OUTPATIENT
Start: 2023-01-17 | End: 2023-01-17

## 2023-01-17 RX ORDER — DEXAMETHASONE SODIUM PHOSPHATE 10 MG/ML
INJECTION, SOLUTION INTRAMUSCULAR; INTRAVENOUS
Status: DISCONTINUED | OUTPATIENT
Start: 2023-01-17 | End: 2023-01-17

## 2023-01-17 RX ORDER — SODIUM CHLORIDE 9 MG/ML
INJECTION INTRAVENOUS
Status: DISCONTINUED | OUTPATIENT
Start: 2023-01-17 | End: 2023-01-17

## 2023-01-17 NOTE — DISCHARGE INSTRUCTIONS
Home Care Instructions Following Your Pain Procedure     Morro Foots,  It has been a pleasure to have you as our patient. To help you at home, you must follow these general discharge instructions. We will review these with you before you are discharged. It is our hope that you have a complete and uneventful recovery from our procedure. General Instructions:  What to Expect:  Bandages from your procedure today can be removed when you get home. Please avoid soaking and/or swimming for 24 hours. Showering is okay  It is normal to have increased pain symptoms and/or pain at injection site for up to 3-5 days after procedure, you can use heat or ice (20 minutes on 20 minutes off) for comfort. You may experience some temporary side effects which may include restlessness or insomnia, flushing of the face, or heart palpitations. Please contact the provider if these symptoms do not resolve within 3-4 days. Lightheadedness or nausea may occur and should resolve within 24 to 48 hours. If you develop a headache after treatment, rest, drink fluids (with caffeine, if possible) and take mild over-the-counter pain medication. If the headache does not improve with the above treatment, contact the physician. Home Medications:  Resume all previously prescribed medication. Please avoid taking NSAIDs (Non-Steriodal Anti-Inflammatory Drugs) such as:  Ibuprofen ( Advil, Motrin) Aleve (Naproxen), Diclofenac, Meloxicam for 6 hours after procedure. If you are on Coumadin (Warfarin) or any other anti-coagulant (or \"blood thinning\") medication such as Plavix (Clopidogrel), Xarelto (Rivaroxaban), Eliquis (Apixaban), Effient (Prasugrel) etc., restart on the following day from the procedure unless otherwise directed by your provider. If you are a diabetic, please increase the frequency of your glucose monitoring after the procedure as steroids may cause a temporary (2-3 day) increase in your blood sugar.   Contact your primary care physician if your blood sugar remains elevated as you may require some medication adjustment. Diet:  Resume your regular diet as tolerated. Activity: We recommend that you relax and rest during the rest of your procedure day. If you feel weakness in your arms or legs do not drive. Follow-up Appointment  Please schedule a follow-up visit within 3 to 4 weeks after your last procedure date. Question or Concerns:  Feel free to call our office with any questions or concerns at [phone] (option #2)    April Tejeda  Thank you for coming to [facility] for your procedure. The nurses try very hard to make sure you receive the best care possible. Your trust in them as well as us is greatly appreciated.     Thanks so much,   Dr. Mary Rodriguez

## 2023-01-17 NOTE — OPERATIVE REPORT
[facility]  Operative Report  2023     Rajiv West Patient Status:  Hospital Outpatient Surgery    1940 MRN [id number]   Location [address] [address] Attending Angela Nava MD   [facility] Day # 0 PADMAJA Cruz DO     Indication: Savanah Gonsales is a [de-identified]year old female with lumbar radiculopathy    Imaging: MRI reviewed    Preoperative Diagnosis:  Lumbar radiculitis [M54.16]    Postoperative Diagnosis: Same as above. Procedure performed: TRANSFORAMINAL LUMBAR EPIDURAL STEROID INJECTION MULTIPLE LEVEL LEFT L3/4 and L4/5 with local      Anesthesia: Local      EBL: Less than 1 ml. Procedure Description:  After reviewing the patient's history and performing a focused physical examination, the diagnosis was confirmed and contraindications such as infection and coagulopathy were ruled out. Following review of potential side effects and complications, including but not necessarily limited to infection, allergic reaction, local tissue breakdown, nerve injury, and paresis, the patient indicated they understood and agreed to proceed. After obtaining the informed consent, the patient was brought to the procedure room and monitored. In the prone position, following sterile prep and drape of the lumbar region,  the  L3 neural foramen was identified under fluoroscopy. The skin and subcutaneous tissue was anesthetized via 25-gauge 1.5\" needle with approximately 2 cc of 1% lidocaine. A 22-gauge 5\" Quincke spinal needle was introduced toward the inferior aspect of the junction between the transverse process and pedicle of the  L3 level atraumatically under fluoroscopic guidance. The needle was advanced into the anterior epidural space at this level. The needle position was confirmed under AP and lateral fluoroscopic view. Following negative aspiration for CSF and blood, approximately 1 cc of Omnipaque 240 was injected.   An excellent contrast spread along the epidural space and the nerve root was obtained. At this point, 1cc of NS with 5 mg of dexamethasone was injected without complication. The needle was withdrawn with stylet in situ after being flushed with 1 cc PF lidocaine. The  L4 neural foramen was also identified under fluoroscopy. The skin and subcutaneous tissue was anesthetized via 25-gauge 1.5\" needle with approximately 2 cc of 1% lidocaine. A 22-gauge 5\" Quincke spinal needle was introduced toward the inferior aspect of the junction between the transverse process and pedicle of the L4 level atraumatically under fluoroscopic guidance. The needle was advanced into the anterior epidural space at this level. The needle position was confirmed under AP and lateral fluoroscopic view. Following negative aspiration for CSF and blood, approximately 1 cc of Omnipaque 240 was injected. An excellent contrast spread along the epidural space and the nerve root was obtained. At this point, 1cc of NS with 5 mg of dexamethasone was injected without complication. The needle was withdrawn with stylet in situ after being flushed with 1 cc PF lidocaine. .  The patient tolerated procedure very well. The patient was observed until discharge criteria met. Discharge instructions were given and patient was released to a responsible adult. Complications: None. Follow up: The patient was followed in the pain clinic as needed basis.         Murtaza Hicks MD

## 2023-01-17 NOTE — OR NURSING
Patient denies any current symptoms of c-diff. States last episode of diarrhea was prior to last summer.

## 2023-01-18 ENCOUNTER — TELEPHONE (OUTPATIENT)
Dept: NEUROLOGY | Facility: CLINIC | Age: 83
End: 2023-01-18

## 2023-01-18 NOTE — TELEPHONE ENCOUNTER
Courtesy called placed to patient for post procedure follow up. Patient stated she is doing well . Pt verbalized understanding to call with any questions or concerns.       Procedure: Left L3/4,L4/5 TLESI   Date: 01/17/2023  Follow up Visit Scheduled: 01/26/2023 @ 3PM with Alphonso Lopez

## 2023-01-23 ENCOUNTER — TELEPHONE (OUTPATIENT)
Dept: FAMILY MEDICINE CLINIC | Facility: CLINIC | Age: 83
End: 2023-01-23

## 2023-01-23 NOTE — TELEPHONE ENCOUNTER
Patient calling states she woke up in the middle of the night with diarrhea and continues    Also has terrible headache

## 2023-01-24 NOTE — TELEPHONE ENCOUNTER
Spoke to patient. C/o mild nausea. No headache or diarrhea. She is able to eat and drink. Advised BRAT diet. Sips of liquids. Call if this continues over the next few days. She VU and is in agreement.

## 2023-01-25 ENCOUNTER — TELEPHONE (OUTPATIENT)
Dept: FAMILY MEDICINE CLINIC | Facility: CLINIC | Age: 83
End: 2023-01-25

## 2023-01-25 NOTE — TELEPHONE ENCOUNTER
Spoke to pt and she said she has really bad pains below her bellybutton, cramping. Started this AM after she had a BM. Normal formed stool. Rates pain at 8. Advised pt to go to nearest IC or ER as she can then be evaluated for gallbladder, appendix or diverticulosis issues as provider sees fit. Pt verbalized understanding      Also pain in left breast around nipple started last night woke up from it.

## 2023-01-25 NOTE — TELEPHONE ENCOUNTER
Called patient and she continues to have the belly pain and she was looking for a medication to take for the pain. Advised that pt go to the IC as was discussed previously due to the number of issues that may be causing the discomfort. Pt verbalized understanding.

## 2023-01-25 NOTE — TELEPHONE ENCOUNTER
Pt called and stated she has a question regarding her stomach pain. Pt wants to know if she can take a med to help.  Requesting to speak to a nurse

## 2023-01-26 ENCOUNTER — VIRTUAL PHONE E/M (OUTPATIENT)
Dept: PAIN CLINIC | Facility: CLINIC | Age: 83
End: 2023-01-26
Payer: MEDICARE

## 2023-01-26 DIAGNOSIS — Z98.1 HISTORY OF LUMBAR SPINAL FUSION: Primary | ICD-10-CM

## 2023-01-26 PROCEDURE — 99442 PHONE E/M BY PHYS 11-20 MIN: CPT | Performed by: PHYSICIAN ASSISTANT

## 2023-02-05 DIAGNOSIS — I10 ESSENTIAL HYPERTENSION: ICD-10-CM

## 2023-02-06 ENCOUNTER — TELEPHONE (OUTPATIENT)
Dept: FAMILY MEDICINE CLINIC | Facility: CLINIC | Age: 83
End: 2023-02-06

## 2023-02-06 RX ORDER — LOSARTAN POTASSIUM 100 MG/1
TABLET ORAL
Qty: 90 TABLET | Refills: 0 | Status: SHIPPED | OUTPATIENT
Start: 2023-02-06

## 2023-02-06 NOTE — TELEPHONE ENCOUNTER
Spoke to patient. States she feels like she needs to have a BM. States had normal BM yesterday and a small one today. Advised she most likely is not constipated. Advised to wait to see if she goes tomorrow before a suppository is used. She VU and is in agreement.

## 2023-02-06 NOTE — TELEPHONE ENCOUNTER
Patient is calling has terrible stomach pain wants to know ok to take a suppositories    Patient is requesting call back

## 2023-02-08 ENCOUNTER — APPOINTMENT (OUTPATIENT)
Dept: URBAN - METROPOLITAN AREA CLINIC 247 | Age: 83
Setting detail: DERMATOLOGY
End: 2023-02-08

## 2023-02-08 DIAGNOSIS — D49.2 NEOPLASM OF UNSPECIFIED BEHAVIOR OF BONE, SOFT TISSUE, AND SKIN: ICD-10-CM

## 2023-02-08 DIAGNOSIS — L57.8 OTHER SKIN CHANGES DUE TO CHRONIC EXPOSURE TO NONIONIZING RADIATION: ICD-10-CM

## 2023-02-08 DIAGNOSIS — L82.0 INFLAMED SEBORRHEIC KERATOSIS: ICD-10-CM

## 2023-02-08 PROCEDURE — OTHER COUNSELING: OTHER

## 2023-02-08 PROCEDURE — 11312 SHAVE SKIN LESION 1.1-2.0 CM: CPT

## 2023-02-08 PROCEDURE — OTHER MEDICATION COUNSELING: OTHER

## 2023-02-08 PROCEDURE — OTHER SHAVE REMOVAL: OTHER

## 2023-02-08 PROCEDURE — A4550 SURGICAL TRAYS: HCPCS

## 2023-02-08 PROCEDURE — OTHER PRESCRIPTION MEDICATION MANAGEMENT: OTHER

## 2023-02-08 PROCEDURE — OTHER PRESCRIPTION: OTHER

## 2023-02-08 PROCEDURE — OTHER LIQUID NITROGEN: OTHER

## 2023-02-08 PROCEDURE — 99213 OFFICE O/P EST LOW 20 MIN: CPT | Mod: 25

## 2023-02-08 PROCEDURE — 17110 DESTRUCT B9 LESION 1-14: CPT | Mod: 59

## 2023-02-08 RX ORDER — TRETIONIN 0.25 MG/G
CREAM TOPICAL
Qty: 45 | Refills: 11 | Status: ERX | COMMUNITY
Start: 2023-02-08

## 2023-02-08 ASSESSMENT — LOCATION DETAILED DESCRIPTION DERM
LOCATION DETAILED: LEFT LATERAL FOREHEAD
LOCATION DETAILED: RIGHT INFERIOR MEDIAL FOREHEAD

## 2023-02-08 ASSESSMENT — LOCATION ZONE DERM: LOCATION ZONE: FACE

## 2023-02-08 ASSESSMENT — LOCATION SIMPLE DESCRIPTION DERM
LOCATION SIMPLE: LEFT FOREHEAD
LOCATION SIMPLE: RIGHT FOREHEAD

## 2023-02-08 NOTE — PROCEDURE: PRESCRIPTION MEDICATION MANAGEMENT
Detail Level: Zone
Render In Strict Bullet Format?: No
Initiate Treatment: Tretinoin QHS  start low and slow once this week, x2 next week, 3 week 3x, 4week 4x and so on

## 2023-02-08 NOTE — PROCEDURE: LIQUID NITROGEN
Detail Level: Simple
Consent: The patient's consent was obtained including but not limited to risks of crusting, scabbing, blistering, scarring, darker or lighter pigmentary change, recurrence, incomplete removal and infection.
Render Post-Care Instructions In Note?: no
Spray Paint Text: The liquid nitrogen was applied to the skin utilizing a spray paint frosting technique.
Show Spray Paint Technique Variable?: Yes
Medical Necessity Information: It is in your best interest to select a reason for this procedure from the list below. All of these items fulfill various CMS LCD requirements except the new and changing color options.
Post-Care Instructions: I reviewed with the patient in detail post-care instructions. Patient is to wear sunprotection, and avoid picking at any of the treated lesions. Pt may apply Vaseline to crusted or scabbing areas.
Medical Necessity Clause: This procedure was medically necessary because the lesions that were treated were:

## 2023-02-08 NOTE — HPI: OTHER
Condition:: Patient is concerned with her skin complexion is bad.
Please Describe Your Condition:: Questions about skin treatment

## 2023-02-08 NOTE — PROCEDURE: SHAVE REMOVAL
Post-Care Instructions: I reviewed with the patient in detail post-care instructions. Patient is to keep the biopsy site dry overnight, and then apply bacitracin twice daily until healed. Patient may apply hydrogen peroxide soaks to remove any crusting.
Medical Necessity Clause: This procedure was medically necessary because the lesion that was treated was:
Render Path Notes In Note?: No
Depth Of Shave: dermis
Render Post-Care Instructions In Note?: yes
Detail Level: Detailed
Body Location Override (Optional - Billing Will Still Be Based On Selected Body Map Location If Applicable): left forehead
Size Of Lesion In Cm (Required): 1.1
Medical Necessity Information: It is in your best interest to select a reason for this procedure from the list below. All of these items fulfill various CMS LCD requirements except the new and changing color options.
Notification Instructions: Patient will be notified of pathology results. However, patient instructed to call the office if not contacted within 2 weeks.
Consent was obtained from the patient. The risks and benefits to therapy were discussed in detail. Specifically, the risks of infection, scarring, bleeding, prolonged wound healing, incomplete removal, allergy to anesthesia, nerve injury and recurrence were addressed. Prior to the procedure, the treatment site was clearly identified and confirmed by the patient. All components of Universal Protocol/PAUSE Rule completed.
Anesthesia Volume In Cc: 0.5
Wound Care: Petrolatum
X Size Of Lesion In Cm (Optional): 0
Billing Type: Third-Party Bill
Anesthesia Type: 1% lidocaine with epinephrine
Biopsy Method: Personna blade
Hemostasis: Drysol

## 2023-02-12 DIAGNOSIS — M51.16 LUMBAR DISC HERNIATION WITH RADICULOPATHY: ICD-10-CM

## 2023-02-14 RX ORDER — LIDOCAINE 50 MG/G
PATCH TOPICAL
Qty: 60 PATCH | Refills: 0 | Status: SHIPPED | OUTPATIENT
Start: 2023-02-14

## 2023-02-15 ENCOUNTER — OFFICE VISIT (OUTPATIENT)
Dept: FAMILY MEDICINE CLINIC | Facility: CLINIC | Age: 83
End: 2023-02-15
Payer: MEDICARE

## 2023-02-15 VITALS
SYSTOLIC BLOOD PRESSURE: 138 MMHG | OXYGEN SATURATION: 98 % | WEIGHT: 132 LBS | RESPIRATION RATE: 18 BRPM | TEMPERATURE: 97 F | DIASTOLIC BLOOD PRESSURE: 74 MMHG | BODY MASS INDEX: 22.53 KG/M2 | HEART RATE: 55 BPM | HEIGHT: 64 IN

## 2023-02-15 DIAGNOSIS — M51.16 LUMBAR DISC HERNIATION WITH RADICULOPATHY: ICD-10-CM

## 2023-02-15 DIAGNOSIS — F02.818 ALZHEIMER'S DEMENTIA WITH BEHAVIORAL DISTURBANCE (HCC): ICD-10-CM

## 2023-02-15 DIAGNOSIS — R25.1 TREMORS OF NERVOUS SYSTEM: ICD-10-CM

## 2023-02-15 DIAGNOSIS — M81.8 OTHER OSTEOPOROSIS WITHOUT CURRENT PATHOLOGICAL FRACTURE: ICD-10-CM

## 2023-02-15 DIAGNOSIS — Z96.89 S/P INSERTION OF SPINAL CORD STIMULATOR: ICD-10-CM

## 2023-02-15 DIAGNOSIS — E78.2 MIXED HYPERLIPIDEMIA: ICD-10-CM

## 2023-02-15 DIAGNOSIS — R29.898 WEAKNESS OF BOTH LOWER EXTREMITIES: ICD-10-CM

## 2023-02-15 DIAGNOSIS — N39.46 MIXED STRESS AND URGE URINARY INCONTINENCE: ICD-10-CM

## 2023-02-15 DIAGNOSIS — Z98.1 HISTORY OF LUMBAR SPINAL FUSION: ICD-10-CM

## 2023-02-15 DIAGNOSIS — I35.1 MILD AORTIC VALVE REGURGITATION: ICD-10-CM

## 2023-02-15 DIAGNOSIS — H02.403 PTOSIS OF BOTH EYELIDS: ICD-10-CM

## 2023-02-15 DIAGNOSIS — Z87.11 HISTORY OF PEPTIC ULCER: ICD-10-CM

## 2023-02-15 DIAGNOSIS — M51.36 DDD (DEGENERATIVE DISC DISEASE), LUMBAR: ICD-10-CM

## 2023-02-15 DIAGNOSIS — N18.30 CHRONIC RENAL INSUFFICIENCY, STAGE 3 (MODERATE) (HCC): ICD-10-CM

## 2023-02-15 DIAGNOSIS — R00.1 BRADYCARDIA: ICD-10-CM

## 2023-02-15 DIAGNOSIS — I10 ESSENTIAL HYPERTENSION: ICD-10-CM

## 2023-02-15 DIAGNOSIS — Z00.00 ENCOUNTER FOR ANNUAL HEALTH EXAMINATION: Primary | ICD-10-CM

## 2023-02-15 DIAGNOSIS — I51.89 GRADE I DIASTOLIC DYSFUNCTION: ICD-10-CM

## 2023-02-15 DIAGNOSIS — Z87.442 HISTORY OF NEPHROLITHIASIS: ICD-10-CM

## 2023-02-15 DIAGNOSIS — Z95.5 HISTORY OF CORONARY ARTERY STENT PLACEMENT: ICD-10-CM

## 2023-02-15 DIAGNOSIS — E21.0 PRIMARY HYPERPARATHYROIDISM (HCC): ICD-10-CM

## 2023-02-15 DIAGNOSIS — F41.1 GAD (GENERALIZED ANXIETY DISORDER): ICD-10-CM

## 2023-02-15 DIAGNOSIS — R10.9 CHRONIC ABDOMINAL PAIN: ICD-10-CM

## 2023-02-15 DIAGNOSIS — F45.41 PAIN AGGRAVATED BY ANXIETY: ICD-10-CM

## 2023-02-15 DIAGNOSIS — R73.9 HYPERGLYCEMIA: ICD-10-CM

## 2023-02-15 DIAGNOSIS — K58.2 IRRITABLE BOWEL SYNDROME WITH BOTH CONSTIPATION AND DIARRHEA: ICD-10-CM

## 2023-02-15 DIAGNOSIS — N64.4 BREAST PAIN, LEFT: ICD-10-CM

## 2023-02-15 DIAGNOSIS — I25.10 CORONARY ARTERY DISEASE INVOLVING NATIVE CORONARY ARTERY OF NATIVE HEART WITHOUT ANGINA PECTORIS: ICD-10-CM

## 2023-02-15 DIAGNOSIS — G89.29 CHRONIC ABDOMINAL PAIN: ICD-10-CM

## 2023-02-15 DIAGNOSIS — Z91.81 AT RISK FOR INJURY RELATED TO FALL: ICD-10-CM

## 2023-02-15 DIAGNOSIS — E83.52 HYPERCALCEMIA: ICD-10-CM

## 2023-02-15 DIAGNOSIS — G30.9 ALZHEIMER'S DEMENTIA WITH BEHAVIORAL DISTURBANCE (HCC): ICD-10-CM

## 2023-02-15 DIAGNOSIS — Z87.898 HISTORY OF BENZODIAZEPINE USE: ICD-10-CM

## 2023-02-15 DIAGNOSIS — F41.9 PAIN AGGRAVATED BY ANXIETY: ICD-10-CM

## 2023-02-15 LAB
ATRIAL RATE: 41 BPM
P AXIS: 108 DEGREES
P-R INTERVAL: 212 MS
Q-T INTERVAL: 484 MS
QRS DURATION: 72 MS
QTC CALCULATION (BEZET): 399 MS
R AXIS: 181 DEGREES
T AXIS: 166 DEGREES
VENTRICULAR RATE: 41 BPM

## 2023-02-15 PROCEDURE — 93000 ELECTROCARDIOGRAM COMPLETE: CPT | Performed by: FAMILY MEDICINE

## 2023-02-15 PROCEDURE — G0439 PPPS, SUBSEQ VISIT: HCPCS | Performed by: FAMILY MEDICINE

## 2023-02-15 PROCEDURE — 1125F AMNT PAIN NOTED PAIN PRSNT: CPT | Performed by: FAMILY MEDICINE

## 2023-02-15 PROCEDURE — 99214 OFFICE O/P EST MOD 30 MIN: CPT | Performed by: FAMILY MEDICINE

## 2023-02-15 RX ORDER — ZOSTER VACCINE RECOMBINANT, ADJUVANTED 50 MCG/0.5
50 KIT INTRAMUSCULAR ONCE
Qty: 1 EACH | Refills: 1 | Status: SHIPPED | OUTPATIENT
Start: 2023-02-15 | End: 2023-02-15

## 2023-02-15 RX ORDER — METOPROLOL SUCCINATE 25 MG/1
12.5 TABLET, EXTENDED RELEASE ORAL DAILY
Qty: 30 TABLET | Refills: 1 | Status: SHIPPED | OUTPATIENT
Start: 2023-02-15

## 2023-02-16 ENCOUNTER — TELEPHONE (OUTPATIENT)
Dept: FAMILY MEDICINE CLINIC | Facility: CLINIC | Age: 83
End: 2023-02-16

## 2023-02-16 PROBLEM — I51.89 GRADE I DIASTOLIC DYSFUNCTION: Status: ACTIVE | Noted: 2023-02-16

## 2023-02-16 PROBLEM — R19.8 RECTAL PRESSURE: Status: RESOLVED | Noted: 2021-11-07 | Resolved: 2023-02-16

## 2023-02-16 PROBLEM — N64.4 BREAST PAIN, LEFT: Status: ACTIVE | Noted: 2023-02-16

## 2023-02-16 PROBLEM — Z91.199 NONCOMPLIANCE: Status: RESOLVED | Noted: 2018-12-10 | Resolved: 2023-02-16

## 2023-02-16 PROBLEM — I35.1 MILD AORTIC VALVE REGURGITATION: Status: ACTIVE | Noted: 2023-02-16

## 2023-02-16 PROBLEM — Z87.898 HISTORY OF BENZODIAZEPINE USE: Status: ACTIVE | Noted: 2023-02-16

## 2023-02-16 PROBLEM — R41.3 MEMORY DIFFICULTIES: Status: RESOLVED | Noted: 2021-08-19 | Resolved: 2023-02-16

## 2023-02-16 PROBLEM — R41.3 MEMORY LOSS, SHORT TERM: Status: RESOLVED | Noted: 2020-08-08 | Resolved: 2023-02-16

## 2023-02-16 PROBLEM — R29.898 WEAKNESS OF BOTH LOWER EXTREMITIES: Status: ACTIVE | Noted: 2023-02-16

## 2023-02-16 PROBLEM — E87.6 HYPOKALEMIA: Status: RESOLVED | Noted: 2017-06-28 | Resolved: 2023-02-16

## 2023-02-16 PROBLEM — H02.403 PTOSIS OF BOTH EYELIDS: Status: ACTIVE | Noted: 2023-02-16

## 2023-02-16 PROBLEM — R00.1 BRADYCARDIA: Status: ACTIVE | Noted: 2023-02-16

## 2023-02-16 PROBLEM — K59.04 CHRONIC IDIOPATHIC CONSTIPATION: Status: RESOLVED | Noted: 2021-10-09 | Resolved: 2023-02-16

## 2023-02-16 RX ORDER — HYDROCODONE BITARTRATE AND ACETAMINOPHEN 5; 325 MG/1; MG/1
TABLET ORAL EVERY 8 HOURS PRN
Qty: 5 TABLET | Refills: 0 | Status: SHIPPED | OUTPATIENT
Start: 2023-02-16

## 2023-02-16 NOTE — TELEPHONE ENCOUNTER
Spoke to patient. She says she did have normal BM this morning but feels like she needs to go more. Advised on symptom management, drinking enough fluids and also warm pack to abdomen. She VU and is in agreement. She will call back if not improving or worsening.

## 2023-02-27 RX ORDER — AMLODIPINE BESYLATE 10 MG/1
TABLET ORAL
Qty: 90 TABLET | Refills: 0 | Status: SHIPPED | OUTPATIENT
Start: 2023-02-27

## 2023-03-01 ENCOUNTER — TELEPHONE (OUTPATIENT)
Dept: FAMILY MEDICINE CLINIC | Facility: CLINIC | Age: 83
End: 2023-03-01

## 2023-03-01 NOTE — TELEPHONE ENCOUNTER
Pt called and stated that she is having bad abdominal pain since she woke up this morning.  Pt is requesting to speak to someone

## 2023-03-01 NOTE — TELEPHONE ENCOUNTER
Patient c/o lower quadrant abdominal pain. Had a bowel movement today, this was formed. Denied diarrhea or constipation. Advised to rest, apply warm compress to abdomen, call back if new symptoms develop. Verbalized understanding.

## 2023-03-10 ENCOUNTER — TELEPHONE (OUTPATIENT)
Dept: FAMILY MEDICINE CLINIC | Facility: CLINIC | Age: 83
End: 2023-03-10

## 2023-03-10 NOTE — TELEPHONE ENCOUNTER
Pt called and stated that she has a possible yeast infection. States she is in pain and it hurts to urinate. Pt is seeking advice.

## 2023-03-10 NOTE — TELEPHONE ENCOUNTER
Patient c/o of dysuria, abdominal pain and low back pain that started today. Requesting antibiotic for UTI. Advise appt with provider is needed or she can have eval at [phone]. Verbalized understanding, will see how it goes over the weekend.

## 2023-03-11 ENCOUNTER — TELEPHONE (OUTPATIENT)
Dept: FAMILY MEDICINE CLINIC | Facility: CLINIC | Age: 83
End: 2023-03-11

## 2023-03-11 ENCOUNTER — HOSPITAL ENCOUNTER (EMERGENCY)
Age: 83
Discharge: HOME | End: 2023-03-11
Attending: STUDENT IN AN ORGANIZED HEALTH CARE EDUCATION/TRAINING PROGRAM
Payer: MEDICARE

## 2023-03-11 VITALS
SYSTOLIC BLOOD PRESSURE: 171 MMHG | RESPIRATION RATE: 18 BRPM | TEMPERATURE: 98 F | HEART RATE: 61 BPM | BODY MASS INDEX: 21.85 KG/M2 | DIASTOLIC BLOOD PRESSURE: 78 MMHG | HEIGHT: 64 IN | OXYGEN SATURATION: 95 % | WEIGHT: 128 LBS

## 2023-03-11 DIAGNOSIS — N30.01 ACUTE CYSTITIS WITH HEMATURIA: Primary | ICD-10-CM

## 2023-03-11 DIAGNOSIS — N30.90 CYSTITIS: Primary | ICD-10-CM

## 2023-03-11 PROCEDURE — 87086 URINE CULTURE/COLONY COUNT: CPT

## 2023-03-11 PROCEDURE — 87088 URINE BACTERIA CULTURE: CPT | Performed by: STUDENT IN AN ORGANIZED HEALTH CARE EDUCATION/TRAINING PROGRAM

## 2023-03-11 PROCEDURE — 99283 EMERGENCY DEPT VISIT LOW MDM: CPT

## 2023-03-11 PROCEDURE — 99284 EMERGENCY DEPT VISIT MOD MDM: CPT

## 2023-03-11 PROCEDURE — 87086 URINE CULTURE/COLONY COUNT: CPT | Performed by: STUDENT IN AN ORGANIZED HEALTH CARE EDUCATION/TRAINING PROGRAM

## 2023-03-11 PROCEDURE — 81001 URINALYSIS AUTO W/SCOPE: CPT

## 2023-03-11 PROCEDURE — 81015 MICROSCOPIC EXAM OF URINE: CPT

## 2023-03-11 PROCEDURE — 87186 SC STD MICRODIL/AGAR DIL: CPT | Performed by: STUDENT IN AN ORGANIZED HEALTH CARE EDUCATION/TRAINING PROGRAM

## 2023-03-11 PROCEDURE — 81001 URINALYSIS AUTO W/SCOPE: CPT | Performed by: STUDENT IN AN ORGANIZED HEALTH CARE EDUCATION/TRAINING PROGRAM

## 2023-03-11 RX ORDER — PHENAZOPYRIDINE HYDROCHLORIDE 200 MG/1
200 TABLET, FILM COATED ORAL 3 TIMES DAILY PRN
Qty: 15 TABLET | Refills: 0 | Status: SHIPPED | OUTPATIENT
Start: 2023-03-11

## 2023-03-11 RX ORDER — CIPROFLOXACIN 500 MG/1
500 TABLET, FILM COATED ORAL 2 TIMES DAILY
Qty: 14 TABLET | Refills: 0 | Status: SHIPPED | OUTPATIENT
Start: 2023-03-11 | End: 2023-03-18

## 2023-03-11 NOTE — TELEPHONE ENCOUNTER
On call- pt was in the ER this AM for a UTI and received cipro. Says she is in a lot of pain suprapubically and would like something for the pain itself. Has had a h/o kidney stones and doesn't feel this is the problem currently. Rec- erxed phenazopyridine.  Told her if not improving her sxs to be seen

## 2023-03-16 ENCOUNTER — APPOINTMENT (OUTPATIENT)
Dept: CT IMAGING | Age: 83
End: 2023-03-16
Attending: EMERGENCY MEDICINE
Payer: MEDICARE

## 2023-03-16 ENCOUNTER — HOSPITAL ENCOUNTER (INPATIENT)
Facility: HOSPITAL | Age: 83
LOS: 2 days | Discharge: HOME | End: 2023-03-18
Attending: EMERGENCY MEDICINE | Admitting: HOSPITALIST
Payer: MEDICARE

## 2023-03-16 DIAGNOSIS — N30.01 ACUTE CYSTITIS WITH HEMATURIA: Primary | ICD-10-CM

## 2023-03-16 DIAGNOSIS — R10.32 ABDOMINAL PAIN, LEFT LOWER QUADRANT: ICD-10-CM

## 2023-03-16 PROBLEM — I10 PRIMARY HYPERTENSION: Status: ACTIVE | Noted: 2018-03-10

## 2023-03-16 PROBLEM — I25.119 CORONARY ARTERY DISEASE INVOLVING NATIVE CORONARY ARTERY OF NATIVE HEART WITH ANGINA PECTORIS: Status: ACTIVE | Noted: 2017-03-10

## 2023-03-16 PROBLEM — R10.2 SUPRAPUBIC PAIN: Status: ACTIVE | Noted: 2021-08-19

## 2023-03-16 PROBLEM — I25.119 CORONARY ARTERY DISEASE INVOLVING NATIVE CORONARY ARTERY OF NATIVE HEART WITH ANGINA PECTORIS (HCC): Status: ACTIVE | Noted: 2017-03-10

## 2023-03-16 LAB
ALBUMIN SERPL-MCNC: 3.6 G/DL (ref 3.4–5)
ALBUMIN/GLOB SERPL: 1 {RATIO} (ref 1–2)
ALP LIVER SERPL-CCNC: 65 U/L
ALT SERPL-CCNC: 19 U/L
ANION GAP SERPL CALC-SCNC: 6 MMOL/L (ref 0–18)
AST SERPL-CCNC: 20 U/L (ref 15–37)
BASOPHILS # BLD AUTO: 0.11 X10(3) UL (ref 0–0.2)
BASOPHILS NFR BLD AUTO: 0.8 %
BILIRUB SERPL-MCNC: 0.4 MG/DL (ref 0.1–2)
BILIRUB UR QL CFM: NEGATIVE
BUN BLD-MCNC: 21 MG/DL (ref 7–18)
CALCIUM BLD-MCNC: 9.6 MG/DL (ref 8.5–10.1)
CHLORIDE SERPL-SCNC: 109 MMOL/L (ref 98–112)
CO2 SERPL-SCNC: 24 MMOL/L (ref 21–32)
CREAT BLD-MCNC: 1.27 MG/DL
EOSINOPHIL # BLD AUTO: 0.19 X10(3) UL (ref 0–0.7)
EOSINOPHIL NFR BLD AUTO: 1.4 %
ERYTHROCYTE [DISTWIDTH] IN BLOOD BY AUTOMATED COUNT: 13.4 %
GFR SERPLBLD BASED ON 1.73 SQ M-ARVRAT: 43 ML/MIN/1.73M2 (ref 60–?)
GLOBULIN PLAS-MCNC: 3.7 G/DL (ref 2.8–4.4)
GLUCOSE BLD-MCNC: 90 MG/DL (ref 70–99)
HCT VFR BLD AUTO: 38.8 %
HGB BLD-MCNC: 12.8 G/DL
IMM GRANULOCYTES # BLD AUTO: 0.04 X10(3) UL (ref 0–1)
IMM GRANULOCYTES NFR BLD: 0.3 %
LIPASE SERPL-CCNC: 29 U/L (ref 13–75)
LYMPHOCYTES # BLD AUTO: 3.37 X10(3) UL (ref 1–4)
LYMPHOCYTES NFR BLD AUTO: 24.5 %
MCH RBC QN AUTO: 31.8 PG (ref 26–34)
MCHC RBC AUTO-ENTMCNC: 33 G/DL (ref 31–37)
MCV RBC AUTO: 96.5 FL
MONOCYTES # BLD AUTO: 1.27 X10(3) UL (ref 0.1–1)
MONOCYTES NFR BLD AUTO: 9.2 %
NEUTROPHILS # BLD AUTO: 8.8 X10 (3) UL (ref 1.5–7.7)
NEUTROPHILS # BLD AUTO: 8.8 X10(3) UL (ref 1.5–7.7)
NEUTROPHILS NFR BLD AUTO: 63.8 %
OSMOLALITY SERPL CALC.SUM OF ELEC: 291 MOSM/KG (ref 275–295)
PLATELET # BLD AUTO: 350 10(3)UL (ref 150–450)
POTASSIUM SERPL-SCNC: 3.8 MMOL/L (ref 3.5–5.1)
PROT SERPL-MCNC: 7.3 G/DL (ref 6.4–8.2)
RBC # BLD AUTO: 4.02 X10(6)UL
SARS-COV-2 RNA RESP QL NAA+PROBE: NOT DETECTED
SODIUM SERPL-SCNC: 139 MMOL/L (ref 136–145)
WBC # BLD AUTO: 13.8 X10(3) UL (ref 4–11)

## 2023-03-16 PROCEDURE — 74177 CT ABD & PELVIS W/CONTRAST: CPT | Performed by: EMERGENCY MEDICINE

## 2023-03-16 PROCEDURE — 99223 1ST HOSP IP/OBS HIGH 75: CPT | Performed by: STUDENT IN AN ORGANIZED HEALTH CARE EDUCATION/TRAINING PROGRAM

## 2023-03-16 RX ORDER — ATORVASTATIN CALCIUM 10 MG/1
10 TABLET, FILM COATED ORAL NIGHTLY
Status: DISCONTINUED | OUTPATIENT
Start: 2023-03-16 | End: 2023-03-18

## 2023-03-16 RX ORDER — SODIUM CHLORIDE 9 MG/ML
INJECTION, SOLUTION INTRAVENOUS CONTINUOUS
Status: DISCONTINUED | OUTPATIENT
Start: 2023-03-16 | End: 2023-03-18

## 2023-03-16 RX ORDER — AMLODIPINE BESYLATE 5 MG/1
10 TABLET ORAL DAILY
Status: DISCONTINUED | OUTPATIENT
Start: 2023-03-16 | End: 2023-03-18

## 2023-03-16 RX ORDER — HYDROMORPHONE HYDROCHLORIDE 1 MG/ML
0.5 INJECTION, SOLUTION INTRAMUSCULAR; INTRAVENOUS; SUBCUTANEOUS ONCE
Status: COMPLETED | OUTPATIENT
Start: 2023-03-16 | End: 2023-03-16

## 2023-03-16 RX ORDER — ACETAMINOPHEN 500 MG
500 TABLET ORAL EVERY 4 HOURS PRN
Status: DISCONTINUED | OUTPATIENT
Start: 2023-03-16 | End: 2023-03-18

## 2023-03-16 RX ORDER — ONDANSETRON 2 MG/ML
4 INJECTION INTRAMUSCULAR; INTRAVENOUS EVERY 4 HOURS PRN
Status: DISCONTINUED | OUTPATIENT
Start: 2023-03-16 | End: 2023-03-16 | Stop reason: HOSPADM

## 2023-03-16 RX ORDER — SODIUM PHOSPHATE, DIBASIC AND SODIUM PHOSPHATE, MONOBASIC 7; 19 G/133ML; G/133ML
1 ENEMA RECTAL ONCE AS NEEDED
Status: DISCONTINUED | OUTPATIENT
Start: 2023-03-16 | End: 2023-03-18

## 2023-03-16 RX ORDER — MORPHINE SULFATE 4 MG/ML
4 INJECTION, SOLUTION INTRAMUSCULAR; INTRAVENOUS EVERY 2 HOUR PRN
Status: DISCONTINUED | OUTPATIENT
Start: 2023-03-16 | End: 2023-03-18

## 2023-03-16 RX ORDER — MORPHINE SULFATE 4 MG/ML
4 INJECTION, SOLUTION INTRAMUSCULAR; INTRAVENOUS ONCE
Status: COMPLETED | OUTPATIENT
Start: 2023-03-16 | End: 2023-03-16

## 2023-03-16 RX ORDER — MORPHINE SULFATE 2 MG/ML
2 INJECTION, SOLUTION INTRAMUSCULAR; INTRAVENOUS EVERY 2 HOUR PRN
Status: DISCONTINUED | OUTPATIENT
Start: 2023-03-16 | End: 2023-03-18

## 2023-03-16 RX ORDER — SERTRALINE HYDROCHLORIDE 100 MG/1
100 TABLET, FILM COATED ORAL DAILY
Status: DISCONTINUED | OUTPATIENT
Start: 2023-03-16 | End: 2023-03-18

## 2023-03-16 RX ORDER — SODIUM CHLORIDE 9 MG/ML
INJECTION, SOLUTION INTRAVENOUS ONCE
Status: COMPLETED | OUTPATIENT
Start: 2023-03-16 | End: 2023-03-16

## 2023-03-16 RX ORDER — KETOROLAC TROMETHAMINE 15 MG/ML
15 INJECTION, SOLUTION INTRAMUSCULAR; INTRAVENOUS EVERY 6 HOURS PRN
Status: DISCONTINUED | OUTPATIENT
Start: 2023-03-16 | End: 2023-03-18

## 2023-03-16 RX ORDER — HYDRALAZINE HYDROCHLORIDE 20 MG/ML
5 INJECTION INTRAMUSCULAR; INTRAVENOUS EVERY 6 HOURS PRN
Status: DISCONTINUED | OUTPATIENT
Start: 2023-03-16 | End: 2023-03-17

## 2023-03-16 RX ORDER — PANTOPRAZOLE SODIUM 20 MG/1
20 TABLET, DELAYED RELEASE ORAL
Status: DISCONTINUED | OUTPATIENT
Start: 2023-03-17 | End: 2023-03-18

## 2023-03-16 RX ORDER — SENNOSIDES 8.6 MG
17.2 TABLET ORAL NIGHTLY PRN
Status: DISCONTINUED | OUTPATIENT
Start: 2023-03-16 | End: 2023-03-18

## 2023-03-16 RX ORDER — POLYETHYLENE GLYCOL 3350 17 G/17G
17 POWDER, FOR SOLUTION ORAL DAILY PRN
Status: DISCONTINUED | OUTPATIENT
Start: 2023-03-16 | End: 2023-03-18

## 2023-03-16 RX ORDER — MELATONIN
3 NIGHTLY PRN
Status: DISCONTINUED | OUTPATIENT
Start: 2023-03-16 | End: 2023-03-18

## 2023-03-16 RX ORDER — SODIUM CHLORIDE 9 MG/ML
INJECTION, SOLUTION INTRAVENOUS CONTINUOUS
Status: ACTIVE | OUTPATIENT
Start: 2023-03-16 | End: 2023-03-16

## 2023-03-16 RX ORDER — GABAPENTIN 300 MG/1
300 CAPSULE ORAL
Status: DISCONTINUED | OUTPATIENT
Start: 2023-03-16 | End: 2023-03-18

## 2023-03-16 RX ORDER — MORPHINE SULFATE 2 MG/ML
1 INJECTION, SOLUTION INTRAMUSCULAR; INTRAVENOUS EVERY 2 HOUR PRN
Status: DISCONTINUED | OUTPATIENT
Start: 2023-03-16 | End: 2023-03-18

## 2023-03-16 RX ORDER — METOCLOPRAMIDE HYDROCHLORIDE 5 MG/ML
5 INJECTION INTRAMUSCULAR; INTRAVENOUS EVERY 8 HOURS PRN
Status: DISCONTINUED | OUTPATIENT
Start: 2023-03-16 | End: 2023-03-18

## 2023-03-16 RX ORDER — BENZONATATE 100 MG/1
200 CAPSULE ORAL 3 TIMES DAILY PRN
Status: DISCONTINUED | OUTPATIENT
Start: 2023-03-16 | End: 2023-03-18

## 2023-03-16 RX ORDER — BISACODYL 10 MG
10 SUPPOSITORY, RECTAL RECTAL
Status: DISCONTINUED | OUTPATIENT
Start: 2023-03-16 | End: 2023-03-18

## 2023-03-16 RX ORDER — TRAZODONE HYDROCHLORIDE 50 MG/1
50 TABLET ORAL NIGHTLY
Status: DISCONTINUED | OUTPATIENT
Start: 2023-03-16 | End: 2023-03-18

## 2023-03-16 RX ORDER — KETOROLAC TROMETHAMINE 30 MG/ML
30 INJECTION, SOLUTION INTRAMUSCULAR; INTRAVENOUS EVERY 6 HOURS PRN
Status: DISCONTINUED | OUTPATIENT
Start: 2023-03-16 | End: 2023-03-16 | Stop reason: DRUGHIGH

## 2023-03-16 RX ORDER — LOSARTAN POTASSIUM 100 MG/1
100 TABLET ORAL DAILY
Status: DISCONTINUED | OUTPATIENT
Start: 2023-03-16 | End: 2023-03-18

## 2023-03-16 RX ORDER — GUAIFENESIN 600 MG/1
600 TABLET, EXTENDED RELEASE ORAL 2 TIMES DAILY PRN
Status: DISCONTINUED | OUTPATIENT
Start: 2023-03-16 | End: 2023-03-18

## 2023-03-16 RX ORDER — ONDANSETRON 2 MG/ML
4 INJECTION INTRAMUSCULAR; INTRAVENOUS EVERY 6 HOURS PRN
Status: DISCONTINUED | OUTPATIENT
Start: 2023-03-16 | End: 2023-03-18

## 2023-03-16 RX ORDER — HYDROMORPHONE HYDROCHLORIDE 1 MG/ML
0.5 INJECTION, SOLUTION INTRAMUSCULAR; INTRAVENOUS; SUBCUTANEOUS EVERY 30 MIN PRN
Status: ACTIVE | OUTPATIENT
Start: 2023-03-16 | End: 2023-03-16

## 2023-03-16 RX ORDER — CINACALCET 30 MG/1
30 TABLET, FILM COATED ORAL
Status: DISCONTINUED | OUTPATIENT
Start: 2023-03-17 | End: 2023-03-18

## 2023-03-16 RX ORDER — ENOXAPARIN SODIUM 100 MG/ML
40 INJECTION SUBCUTANEOUS NIGHTLY
Status: DISCONTINUED | OUTPATIENT
Start: 2023-03-16 | End: 2023-03-18

## 2023-03-16 NOTE — ED QUICK NOTES
Orders for admission, patient is aware of plan and ready to go upstairs. Any questions, please call ED RN Royal Rhoades   at extension [phone]     Vaccinated?  yes  Type of COVID test sent: negative rapid  COVID Suspicion level: Low      Titratable drug(s) infusing:n/a  Rate: NS 125cc/hour    LOC at time of transport:alert    Other pertinent information:n/a    CIWA score= n/a  NIH score= n/a

## 2023-03-16 NOTE — PROGRESS NOTES
NURSING ADMISSION NOTE      Patient admitted via Ambulance  Oriented to room. Safety precautions initiated. Bed in low position. Call light in reach. Oriented x4. VSS. Admission navigator completed. Updated on plan of care. Denies shortness of breath or difficulty in breathing. Called MRI.  Plan is to be NPO at midnight for procedure tomorrow morning

## 2023-03-17 ENCOUNTER — APPOINTMENT (OUTPATIENT)
Dept: MRI IMAGING | Facility: HOSPITAL | Age: 83
End: 2023-03-17
Attending: STUDENT IN AN ORGANIZED HEALTH CARE EDUCATION/TRAINING PROGRAM
Payer: MEDICARE

## 2023-03-17 ENCOUNTER — APPOINTMENT (OUTPATIENT)
Dept: CT IMAGING | Facility: HOSPITAL | Age: 83
End: 2023-03-17
Attending: INTERNAL MEDICINE
Payer: MEDICARE

## 2023-03-17 LAB
ALBUMIN SERPL-MCNC: 4.2 G/DL (ref 3.4–5)
ALBUMIN/GLOB SERPL: 1 {RATIO} (ref 1–2)
ALP LIVER SERPL-CCNC: 71 U/L
ALT SERPL-CCNC: 21 U/L
ANION GAP SERPL CALC-SCNC: 8 MMOL/L (ref 0–18)
AST SERPL-CCNC: 25 U/L (ref 15–37)
BASOPHILS # BLD AUTO: 0.1 X10(3) UL (ref 0–0.2)
BASOPHILS NFR BLD AUTO: 0.9 %
BILIRUB SERPL-MCNC: 0.5 MG/DL (ref 0.1–2)
BUN BLD-MCNC: 12 MG/DL (ref 7–18)
CALCIUM BLD-MCNC: 10.4 MG/DL (ref 8.5–10.1)
CHLORIDE SERPL-SCNC: 109 MMOL/L (ref 98–112)
CO2 SERPL-SCNC: 25 MMOL/L (ref 21–32)
CREAT BLD-MCNC: 0.98 MG/DL
EOSINOPHIL # BLD AUTO: 0.05 X10(3) UL (ref 0–0.7)
EOSINOPHIL NFR BLD AUTO: 0.5 %
ERYTHROCYTE [DISTWIDTH] IN BLOOD BY AUTOMATED COUNT: 13.4 %
GFR SERPLBLD BASED ON 1.73 SQ M-ARVRAT: 58 ML/MIN/1.73M2 (ref 60–?)
GLOBULIN PLAS-MCNC: 4.2 G/DL (ref 2.8–4.4)
GLUCOSE BLD-MCNC: 96 MG/DL (ref 70–99)
HCT VFR BLD AUTO: 39.6 %
HGB BLD-MCNC: 13.1 G/DL
IMM GRANULOCYTES # BLD AUTO: 0.04 X10(3) UL (ref 0–1)
IMM GRANULOCYTES NFR BLD: 0.4 %
LYMPHOCYTES # BLD AUTO: 4.05 X10(3) UL (ref 1–4)
LYMPHOCYTES NFR BLD AUTO: 37 %
MCH RBC QN AUTO: 31.7 PG (ref 26–34)
MCHC RBC AUTO-ENTMCNC: 33.1 G/DL (ref 31–37)
MCV RBC AUTO: 95.9 FL
MONOCYTES # BLD AUTO: 0.97 X10(3) UL (ref 0.1–1)
MONOCYTES NFR BLD AUTO: 8.9 %
NEUTROPHILS # BLD AUTO: 5.75 X10 (3) UL (ref 1.5–7.7)
NEUTROPHILS # BLD AUTO: 5.75 X10(3) UL (ref 1.5–7.7)
NEUTROPHILS NFR BLD AUTO: 52.3 %
OSMOLALITY SERPL CALC.SUM OF ELEC: 294 MOSM/KG (ref 275–295)
PLATELET # BLD AUTO: 348 10(3)UL (ref 150–450)
POTASSIUM SERPL-SCNC: 3.3 MMOL/L (ref 3.5–5.1)
PROT SERPL-MCNC: 8.4 G/DL (ref 6.4–8.2)
RBC # BLD AUTO: 4.13 X10(6)UL
SODIUM SERPL-SCNC: 142 MMOL/L (ref 136–145)
WBC # BLD AUTO: 11 X10(3) UL (ref 4–11)

## 2023-03-17 PROCEDURE — 70450 CT HEAD/BRAIN W/O DYE: CPT | Performed by: INTERNAL MEDICINE

## 2023-03-17 PROCEDURE — 99232 SBSQ HOSP IP/OBS MODERATE 35: CPT | Performed by: INTERNAL MEDICINE

## 2023-03-17 RX ORDER — BUTALBITAL, ACETAMINOPHEN AND CAFFEINE 50; 325; 40 MG/1; MG/1; MG/1
1 TABLET ORAL EVERY 4 HOURS PRN
Status: DISCONTINUED | OUTPATIENT
Start: 2023-03-17 | End: 2023-03-18

## 2023-03-17 RX ORDER — HYDRALAZINE HYDROCHLORIDE 20 MG/ML
10 INJECTION INTRAMUSCULAR; INTRAVENOUS EVERY 6 HOURS PRN
Status: DISCONTINUED | OUTPATIENT
Start: 2023-03-17 | End: 2023-03-18

## 2023-03-17 RX ORDER — CETIRIZINE HYDROCHLORIDE 10 MG/1
10 TABLET ORAL DAILY
Status: DISCONTINUED | OUTPATIENT
Start: 2023-03-17 | End: 2023-03-18

## 2023-03-17 RX ORDER — DIPHENHYDRAMINE HYDROCHLORIDE 50 MG/ML
25 INJECTION INTRAMUSCULAR; INTRAVENOUS EVERY 6 HOURS PRN
Status: DISCONTINUED | OUTPATIENT
Start: 2023-03-17 | End: 2023-03-18

## 2023-03-17 RX ORDER — ACETAMINOPHEN 10 MG/ML
1000 INJECTION, SOLUTION INTRAVENOUS ONCE
Status: COMPLETED | OUTPATIENT
Start: 2023-03-17 | End: 2023-03-17

## 2023-03-17 RX ORDER — HYDROMORPHONE HYDROCHLORIDE 1 MG/ML
0.2 INJECTION, SOLUTION INTRAMUSCULAR; INTRAVENOUS; SUBCUTANEOUS ONCE
Status: COMPLETED | OUTPATIENT
Start: 2023-03-17 | End: 2023-03-17

## 2023-03-17 RX ORDER — LORATADINE 10 MG/1
10 TABLET ORAL DAILY
COMMUNITY

## 2023-03-17 RX ORDER — POTASSIUM CHLORIDE 20 MEQ/1
40 TABLET, EXTENDED RELEASE ORAL ONCE
Status: COMPLETED | OUTPATIENT
Start: 2023-03-17 | End: 2023-03-17

## 2023-03-17 NOTE — PLAN OF CARE
A&Ox4. VSS on room air. Up SBA. IVF and IV abx. PRN morphine, toradol and one time dose of IV tylenol for pain. Voiding. PRN reglan, zofran, and benadryl given for nausea. One episode of emesis. PRN miralax and suppository given. Lovenox. Potassium replacement given.   Problem: Patient/Family Goals  Goal: Patient/Family Long Term Goal  Description: Patient's Long Term Goal: Discharge    Interventions:  - PRN pain medications  -IV abx  -MRI as ordered  - See additional Care Plan goals for specific interventions  3/17/2023 1136 by Kojo Allan RN  Outcome: Progressing  Note: Discharge home  3/17/2023 1136 by Kojo Allan RN  Note: Discharge home  Goal: Patient/Family Short Term Goal  Description: Patient's Short Term Goal: Pain management, treat infection, scans as ordered, VTE prophylaxis  Interventions:   - PRN pain medications  -IV abx  -MRI as ordered  -SCDs  -Lovenox  - See additional Care Plan goals for specific interventions  3/17/2023 1136 by Kojo Allan RN  Outcome: Progressing  Note: Pain management, treat infection, scans as ordered, VTE prophylaxis  3/17/2023 1136 by Kojo Allan RN  Note: Pain management, treat infection, scans as ordered, VTE prophylaxis     Problem: PAIN - ADULT  Goal: Verbalizes/displays adequate comfort level or patient's stated pain goal  Description: INTERVENTIONS:  - Encourage pt to monitor pain and request assistance  - Assess pain using appropriate pain scale  - Administer analgesics based on type and severity of pain and evaluate response  - Implement non-pharmacological measures as appropriate and evaluate response  - Consider cultural and social influences on pain and pain management  - Manage/alleviate anxiety  - Utilize distraction and/or relaxation techniques  - Monitor for opioid side effects  - Notify MD/LIP if interventions unsuccessful or patient reports new pain  - Anticipate increased pain with activity and pre-medicate as appropriate  Outcome: Progressing     Problem: RISK FOR INFECTION - ADULT  Goal: Absence of fever/infection during anticipated neutropenic period  Description: INTERVENTIONS  - Monitor WBC  - Administer growth factors as ordered  - Implement neutropenic guidelines  Outcome: Progressing     Problem: SAFETY ADULT - FALL  Goal: Free from fall injury  Description: INTERVENTIONS:  - Assess pt frequently for physical needs  - Identify cognitive and physical deficits and behaviors that affect risk of falls.   - [place] fall precautions as indicated by assessment.  - Educate pt/family on patient safety including physical limitations  - Instruct pt to call for assistance with activity based on assessment  - Modify environment to reduce risk of injury  - Provide assistive devices as appropriate  - Consider OT/PT consult to assist with strengthening/mobility  - Encourage toileting schedule  Outcome: Progressing     Problem: DISCHARGE PLANNING  Goal: Discharge to home or other facility with appropriate resources  Description: INTERVENTIONS:  - Identify barriers to discharge w/pt and caregiver  - Include patient/family/discharge partner in discharge planning  - Arrange for needed discharge resources and transportation as appropriate  - Identify discharge learning needs (meds, wound care, etc)  - Arrange for interpreters to assist at discharge as needed  - Consider post-discharge preferences of patient/family/discharge partner  - Complete POLST form as appropriate  - Assess patient's ability to be responsible for managing their own health  - Refer to Case Management Department for coordinating discharge planning if the patient needs post-hospital services based on physician/LIP order or complex needs related to functional status, cognitive ability or social support system  Outcome: Progressing     Problem: Altered Communication/Language Barrier  Goal: Patient/Family is able to understand and participate in their care  Description: Interventions:  - Assess communication ability and preferred communication style  - Implement communication aides and strategies  - Use visual cues when possible  - Listen attentively, be patient, do not interrupt  - Minimize distractions  - Allow time for understanding and response  - Establish method for patient to ask for assistance (call light)  - Provide an  as needed  - Communicate barriers and strategies to overcome with those who interact with patient  Outcome: Progressing

## 2023-03-17 NOTE — PLAN OF CARE
A&Ox4 but forgetful and confused at times. VSS. RA. . Denies chest pain and SOB. GI: Abdomen soft, nondistended. Passing gas. Zofran and Compazine given for nausea. : Voids. C/o of increased frequency but no burning or visible blood in urine   Pain controlled with PRN pain medications. Up ad eulogio in room. Diet: Regular diet but NPO at 0000 per RN to RN report   IVF running per order. All appropriate safety measures in place. All questions and concerns addressed. Pt appears very stressed and has not slept. Verbalized she didn't plan on staying, has a terrible HA that wont go away. Tried pain meds, sinus meds, HA meds, and BP meds. HA is still bad. Hosp ordered a CT of brain just incase. Pt explained upon multiple assessment that she gets many sinus headaches but this one is worse than usual. Pts VS are stable.      Problem: Patient/Family Goals  Goal: Patient/Family Long Term Goal  Description: Patient's Long Term Goal: Discharge    Interventions:  - Pain control, iv abx, return of ADLs  - See additional Care Plan goals for specific interventions  Outcome: Progressing  Goal: Patient/Family Short Term Goal  Description: Patient's Short Term Goal: Comfort care    Interventions:   - Prn pain meds, Prn BP meds, cluster care  - See additional Care Plan goals for specific interventions  Outcome: Progressing

## 2023-03-18 VITALS
SYSTOLIC BLOOD PRESSURE: 148 MMHG | DIASTOLIC BLOOD PRESSURE: 77 MMHG | OXYGEN SATURATION: 92 % | RESPIRATION RATE: 16 BRPM | WEIGHT: 127 LBS | BODY MASS INDEX: 22 KG/M2 | TEMPERATURE: 98 F | HEART RATE: 94 BPM

## 2023-03-18 LAB
ANION GAP SERPL CALC-SCNC: 4 MMOL/L (ref 0–18)
BUN BLD-MCNC: 14 MG/DL (ref 7–18)
CALCIUM BLD-MCNC: 10.1 MG/DL (ref 8.5–10.1)
CHLORIDE SERPL-SCNC: 113 MMOL/L (ref 98–112)
CO2 SERPL-SCNC: 24 MMOL/L (ref 21–32)
CREAT BLD-MCNC: 0.92 MG/DL
GFR SERPLBLD BASED ON 1.73 SQ M-ARVRAT: 63 ML/MIN/1.73M2 (ref 60–?)
GLUCOSE BLD-MCNC: 105 MG/DL (ref 70–99)
OSMOLALITY SERPL CALC.SUM OF ELEC: 293 MOSM/KG (ref 275–295)
POTASSIUM SERPL-SCNC: 3.8 MMOL/L (ref 3.5–5.1)
POTASSIUM SERPL-SCNC: 3.8 MMOL/L (ref 3.5–5.1)
SODIUM SERPL-SCNC: 141 MMOL/L (ref 136–145)

## 2023-03-18 PROCEDURE — 99239 HOSP IP/OBS DSCHRG MGMT >30: CPT | Performed by: INTERNAL MEDICINE

## 2023-03-18 RX ORDER — HYDRALAZINE HYDROCHLORIDE 25 MG/1
25 TABLET, FILM COATED ORAL EVERY 8 HOURS SCHEDULED
Qty: 90 TABLET | Refills: 1 | Status: SHIPPED | OUTPATIENT
Start: 2023-03-18

## 2023-03-18 RX ORDER — HYDRALAZINE HYDROCHLORIDE 25 MG/1
25 TABLET, FILM COATED ORAL EVERY 8 HOURS SCHEDULED
Status: DISCONTINUED | OUTPATIENT
Start: 2023-03-18 | End: 2023-03-18

## 2023-03-18 NOTE — PLAN OF CARE
Problem: Patient/Family Goals  Goal: Patient/Family Long Term Goal  Description: Patient's Long Term Goal: Discharge    Interventions:  - Pain control, iv abx, return of ADLs  - See additional Care Plan goals for specific interventions  Outcome: Progressing  Goal: Patient/Family Short Term Goal  Description: Patient's Short Term Goal: Comfort care    Interventions:   - Prn pain meds, Prn BP meds, cluster care  - See additional Care Plan goals for specific interventions  Outcome: Progressing     Problem: PAIN - ADULT  Goal: Verbalizes/displays adequate comfort level or patient's stated pain goal  Description: INTERVENTIONS:  - Encourage pt to monitor pain and request assistance  - Assess pain using appropriate pain scale  - Administer analgesics based on type and severity of pain and evaluate response  - Implement non-pharmacological measures as appropriate and evaluate response  - Consider cultural and social influences on pain and pain management  - Manage/alleviate anxiety  - Utilize distraction and/or relaxation techniques  - Monitor for opioid side effects  - Notify MD/LIP if interventions unsuccessful or patient reports new pain  - Anticipate increased pain with activity and pre-medicate as appropriate  Outcome: Progressing     Problem: RISK FOR INFECTION - ADULT  Goal: Absence of fever/infection during anticipated neutropenic period  Description: INTERVENTIONS  - Monitor WBC  - Administer growth factors as ordered  - Implement neutropenic guidelines  Outcome: Progressing     Problem: SAFETY ADULT - FALL  Goal: Free from fall injury  Description: INTERVENTIONS:  - Assess pt frequently for physical needs  - Identify cognitive and physical deficits and behaviors that affect risk of falls.   - [place] fall precautions as indicated by assessment.  - Educate pt/family on patient safety including physical limitations  - Instruct pt to call for assistance with activity based on assessment  - Modify environment to reduce risk of injury  - Provide assistive devices as appropriate  - Consider OT/PT consult to assist with strengthening/mobility  - Encourage toileting schedule  Outcome: Progressing     Problem: DISCHARGE PLANNING  Goal: Discharge to home or other facility with appropriate resources  Description: INTERVENTIONS:  - Identify barriers to discharge w/pt and caregiver  - Include patient/family/discharge partner in discharge planning  - Arrange for needed discharge resources and transportation as appropriate  - Identify discharge learning needs (meds, wound care, etc)  - Arrange for interpreters to assist at discharge as needed  - Consider post-discharge preferences of patient/family/discharge partner  - Complete POLST form as appropriate  - Assess patient's ability to be responsible for managing their own health  - Refer to Case Management Department for coordinating discharge planning if the patient needs post-hospital services based on physician/LIP order or complex needs related to functional status, cognitive ability or social support system  Outcome: Progressing     Problem: Altered Communication/Language Barrier  Goal: Patient/Family is able to understand and participate in their care  Description: Interventions:  - Assess communication ability and preferred communication style  - Implement communication aides and strategies  - Use visual cues when possible  - Listen attentively, be patient, do not interrupt  - Minimize distractions  - Allow time for understanding and response  - Establish method for patient to ask for assistance (call light)  - Provide an  as needed  - Communicate barriers and strategies to overcome with those who interact with patient  Outcome: Progressing

## 2023-03-20 ENCOUNTER — TELEPHONE (OUTPATIENT)
Dept: FAMILY MEDICINE CLINIC | Facility: CLINIC | Age: 83
End: 2023-03-20

## 2023-03-20 DIAGNOSIS — M51.16 LUMBAR DISC HERNIATION WITH RADICULOPATHY: ICD-10-CM

## 2023-03-20 DIAGNOSIS — R10.30 LOWER ABDOMINAL PAIN: Primary | ICD-10-CM

## 2023-03-20 DIAGNOSIS — K86.89 PANCREATIC MASS: ICD-10-CM

## 2023-03-20 RX ORDER — HYDROCODONE BITARTRATE AND ACETAMINOPHEN 5; 325 MG/1; MG/1
TABLET ORAL EVERY 8 HOURS PRN
Qty: 5 TABLET | Refills: 0 | Status: SHIPPED | OUTPATIENT
Start: 2023-03-20 | End: 2023-03-23

## 2023-03-20 NOTE — TELEPHONE ENCOUNTER
MRI was ordered and she is scheduled. She is asking for pain pills. She does have appointment with GI tomorrow.

## 2023-03-20 NOTE — TELEPHONE ENCOUNTER
There is a 3 mm hypodense focus at the pancreatic tail which is too small to accurately characterize. A nonemergent outpatient contrast-enhanced abdominal MRI is recommended for further assessment. Order a contrast MRI of the ABdomen/pelvis  Mass pancreases and abd pain. For pain control heating pad and rest,  water avoid fatty spicy foods.   Rice and chicken and lean meats cooked vegetables no raw veges can  be tried

## 2023-03-20 NOTE — TELEPHONE ENCOUNTER
Patient complaining of bilat lower quad abdominal pain. States she was ok yesterday. Denies pain with urination. Denies fever. States she had normal BM today. States she just took a Cipro, 1000mg Tylenol, and also AZO. She has a warm bad on her lower abdomin. FOV 3/22/23 with Dr. Gm Norman. Osteomyelitis HFrEF (heart failure with reduced ejection fraction)

## 2023-03-20 NOTE — TELEPHONE ENCOUNTER
If she has appointment with GI tomorrow and is getting the MRI of the abdomen then she probably does not need to see Dr Luciana Rivera on Wednesday  I will give her #5 of the [address] for the pain

## 2023-03-20 NOTE — TELEPHONE ENCOUNTER
Patient calling is in severe pain is scheduled with Dr Ramon Ortega  3/22    Is in terrible pain wants to know if Michael Dixon with prescribe something

## 2023-03-20 NOTE — TELEPHONE ENCOUNTER
Patient calling was [facility] from [facility] and was told to follow up     Is scheduled for /24    requesting sooner appt is in pain

## 2023-03-21 ENCOUNTER — PATIENT OUTREACH (OUTPATIENT)
Dept: CASE MANAGEMENT | Age: 83
End: 2023-03-21

## 2023-03-21 ENCOUNTER — HOSPITAL ENCOUNTER (EMERGENCY)
Facility: HOSPITAL | Age: 83
Discharge: HOME | End: 2023-03-21
Attending: EMERGENCY MEDICINE
Payer: MEDICARE

## 2023-03-21 ENCOUNTER — APPOINTMENT (OUTPATIENT)
Dept: CT IMAGING | Facility: HOSPITAL | Age: 83
End: 2023-03-21
Attending: EMERGENCY MEDICINE
Payer: MEDICARE

## 2023-03-21 VITALS
TEMPERATURE: 100 F | WEIGHT: 130 LBS | HEIGHT: 64 IN | RESPIRATION RATE: 12 BRPM | DIASTOLIC BLOOD PRESSURE: 78 MMHG | HEART RATE: 56 BPM | OXYGEN SATURATION: 97 % | BODY MASS INDEX: 22.2 KG/M2 | SYSTOLIC BLOOD PRESSURE: 193 MMHG

## 2023-03-21 DIAGNOSIS — N30.00 ACUTE CYSTITIS WITHOUT HEMATURIA: Primary | ICD-10-CM

## 2023-03-21 DIAGNOSIS — R10.32 ABDOMINAL PAIN, LEFT LOWER QUADRANT: ICD-10-CM

## 2023-03-21 DIAGNOSIS — Z02.9 ENCOUNTERS FOR UNSPECIFIED ADMINISTRATIVE PURPOSE: ICD-10-CM

## 2023-03-21 LAB
ALBUMIN SERPL-MCNC: 4.3 G/DL (ref 3.4–5)
ALBUMIN/GLOB SERPL: 1.1 {RATIO} (ref 1–2)
ALP LIVER SERPL-CCNC: 71 U/L
ALT SERPL-CCNC: 26 U/L
ANION GAP SERPL CALC-SCNC: 3 MMOL/L (ref 0–18)
AST SERPL-CCNC: 17 U/L (ref 15–37)
BASOPHILS # BLD AUTO: 0.11 X10(3) UL (ref 0–0.2)
BASOPHILS NFR BLD AUTO: 0.8 %
BILIRUB SERPL-MCNC: 0.6 MG/DL (ref 0.1–2)
BUN BLD-MCNC: 22 MG/DL (ref 7–18)
CALCIUM BLD-MCNC: 10.8 MG/DL (ref 8.5–10.1)
CHLORIDE SERPL-SCNC: 111 MMOL/L (ref 98–112)
CO2 SERPL-SCNC: 24 MMOL/L (ref 21–32)
CREAT BLD-MCNC: 1.23 MG/DL
EOSINOPHIL # BLD AUTO: 0.29 X10(3) UL (ref 0–0.7)
EOSINOPHIL NFR BLD AUTO: 2.2 %
ERYTHROCYTE [DISTWIDTH] IN BLOOD BY AUTOMATED COUNT: 13.4 %
GFR SERPLBLD BASED ON 1.73 SQ M-ARVRAT: 44 ML/MIN/1.73M2 (ref 60–?)
GLOBULIN PLAS-MCNC: 4 G/DL (ref 2.8–4.4)
GLUCOSE BLD-MCNC: 93 MG/DL (ref 70–99)
HCT VFR BLD AUTO: 40.4 %
HGB BLD-MCNC: 13.3 G/DL
IMM GRANULOCYTES # BLD AUTO: 0.04 X10(3) UL (ref 0–1)
IMM GRANULOCYTES NFR BLD: 0.3 %
LIPASE SERPL-CCNC: 33 U/L (ref 13–75)
LYMPHOCYTES # BLD AUTO: 5 X10(3) UL (ref 1–4)
LYMPHOCYTES NFR BLD AUTO: 38.4 %
MCH RBC QN AUTO: 31.9 PG (ref 26–34)
MCHC RBC AUTO-ENTMCNC: 32.9 G/DL (ref 31–37)
MCV RBC AUTO: 96.9 FL
MONOCYTES # BLD AUTO: 1.15 X10(3) UL (ref 0.1–1)
MONOCYTES NFR BLD AUTO: 8.8 %
NEUTROPHILS # BLD AUTO: 6.43 X10 (3) UL (ref 1.5–7.7)
NEUTROPHILS # BLD AUTO: 6.43 X10(3) UL (ref 1.5–7.7)
NEUTROPHILS NFR BLD AUTO: 49.5 %
OSMOLALITY SERPL CALC.SUM OF ELEC: 289 MOSM/KG (ref 275–295)
PLATELET # BLD AUTO: 382 10(3)UL (ref 150–450)
POTASSIUM SERPL-SCNC: 3.3 MMOL/L (ref 3.5–5.1)
PROT SERPL-MCNC: 8.3 G/DL (ref 6.4–8.2)
RBC # BLD AUTO: 4.17 X10(6)UL
RBC #/AREA URNS AUTO: >10 /HPF
SODIUM SERPL-SCNC: 138 MMOL/L (ref 136–145)
SP GR UR REFRACTOMETRY: 1.02 (ref 1–1.03)
WBC # BLD AUTO: 13 X10(3) UL (ref 4–11)

## 2023-03-21 PROCEDURE — 99284 EMERGENCY DEPT VISIT MOD MDM: CPT

## 2023-03-21 PROCEDURE — 74176 CT ABD & PELVIS W/O CONTRAST: CPT | Performed by: EMERGENCY MEDICINE

## 2023-03-21 PROCEDURE — 81001 URINALYSIS AUTO W/SCOPE: CPT

## 2023-03-21 PROCEDURE — 87086 URINE CULTURE/COLONY COUNT: CPT | Performed by: EMERGENCY MEDICINE

## 2023-03-21 PROCEDURE — 80053 COMPREHEN METABOLIC PANEL: CPT

## 2023-03-21 PROCEDURE — 80053 COMPREHEN METABOLIC PANEL: CPT | Performed by: EMERGENCY MEDICINE

## 2023-03-21 PROCEDURE — 81001 URINALYSIS AUTO W/SCOPE: CPT | Performed by: EMERGENCY MEDICINE

## 2023-03-21 PROCEDURE — 83690 ASSAY OF LIPASE: CPT | Performed by: EMERGENCY MEDICINE

## 2023-03-21 PROCEDURE — 96375 TX/PRO/DX INJ NEW DRUG ADDON: CPT

## 2023-03-21 PROCEDURE — 85025 COMPLETE CBC W/AUTO DIFF WBC: CPT | Performed by: EMERGENCY MEDICINE

## 2023-03-21 PROCEDURE — 96365 THER/PROPH/DIAG IV INF INIT: CPT

## 2023-03-21 PROCEDURE — 85025 COMPLETE CBC W/AUTO DIFF WBC: CPT

## 2023-03-21 RX ORDER — POTASSIUM CHLORIDE 20 MEQ/1
20 TABLET, EXTENDED RELEASE ORAL ONCE
Status: COMPLETED | OUTPATIENT
Start: 2023-03-21 | End: 2023-03-21

## 2023-03-21 RX ORDER — KETOROLAC TROMETHAMINE 15 MG/ML
15 INJECTION, SOLUTION INTRAMUSCULAR; INTRAVENOUS ONCE
Status: COMPLETED | OUTPATIENT
Start: 2023-03-21 | End: 2023-03-21

## 2023-03-21 RX ORDER — ONDANSETRON 8 MG/1
8 TABLET, ORALLY DISINTEGRATING ORAL EVERY 6 HOURS PRN
Qty: 10 TABLET | Refills: 0 | Status: SHIPPED | OUTPATIENT
Start: 2023-03-21

## 2023-03-21 RX ORDER — CEPHALEXIN 500 MG/1
500 CAPSULE ORAL 4 TIMES DAILY
Qty: 40 CAPSULE | Refills: 0 | Status: SHIPPED | OUTPATIENT
Start: 2023-03-21 | End: 2023-03-31

## 2023-03-21 RX ORDER — ONDANSETRON 2 MG/ML
4 INJECTION INTRAMUSCULAR; INTRAVENOUS
Status: DISCONTINUED | OUTPATIENT
Start: 2023-03-21 | End: 2023-03-21

## 2023-03-21 NOTE — ED INITIAL ASSESSMENT (HPI)
Pt reports recently being treated for uti, finished antibitoics but still has lower abdominal pain, occasional nausea

## 2023-03-21 NOTE — TELEPHONE ENCOUNTER
Patient informed of all of below. Appointment canceled with Dr. Gm Norman. She did schedule f/u with Mansi Bolden.

## 2023-03-21 NOTE — DISCHARGE INSTRUCTIONS
Push fluids  Rest  Zofran around-the-clock for the first day, then as needed  Tylenol for pain  Keflex antibiotic starts this evening    Contact your primary care doctor today to arrange close follow-up

## 2023-03-22 ENCOUNTER — TELEPHONE (OUTPATIENT)
Dept: FAMILY MEDICINE CLINIC | Facility: CLINIC | Age: 83
End: 2023-03-22

## 2023-03-22 DIAGNOSIS — B37.0 ORAL THRUSH: ICD-10-CM

## 2023-03-22 DIAGNOSIS — M51.16 LUMBAR DISC HERNIATION WITH RADICULOPATHY: ICD-10-CM

## 2023-03-22 RX ORDER — HYDROCODONE BITARTRATE AND ACETAMINOPHEN 5; 325 MG/1; MG/1
TABLET ORAL EVERY 8 HOURS PRN
Qty: 5 TABLET | Refills: 0 | Status: CANCELLED | OUTPATIENT
Start: 2023-03-22

## 2023-03-22 RX ORDER — NYSTATIN 100000 U/G
CREAM TOPICAL
Qty: 30 G | Refills: 0 | Status: SHIPPED | OUTPATIENT
Start: 2023-03-22

## 2023-03-22 RX ORDER — L. ACIDOPHILUS/L.BULGARICUS 1MM CELL
1 TABLET ORAL 2 TIMES DAILY
Qty: 60 TABLET | Refills: 0 | Status: SHIPPED | OUTPATIENT
Start: 2023-03-22

## 2023-03-22 NOTE — TELEPHONE ENCOUNTER
Pt called and stated that she went to urgent care and was prescribed med cephalexin. Pt has a question about the med and would like to speak to a nurse.

## 2023-03-22 NOTE — TELEPHONE ENCOUNTER
Patient seen at ED yesterday for abdominal pain. Discharge with orders for Keflex and Norco. Urine culture pending. Patient started antibiotic, is requesting  1. Nystatin to start when she completes antibiotic  2. Norco, patient stated she has horrible pain  3. Recommend probiotic while on antibiotic?      Medications pended, please advise

## 2023-03-23 ENCOUNTER — TELEPHONE (OUTPATIENT)
Dept: FAMILY MEDICINE CLINIC | Facility: CLINIC | Age: 83
End: 2023-03-23

## 2023-03-23 DIAGNOSIS — M51.16 LUMBAR DISC HERNIATION WITH RADICULOPATHY: ICD-10-CM

## 2023-03-23 RX ORDER — HYDROCODONE BITARTRATE AND ACETAMINOPHEN 5; 325 MG/1; MG/1
0.5 TABLET ORAL 2 TIMES DAILY PRN
Qty: 5 TABLET | Refills: 0 | Status: SHIPPED | OUTPATIENT
Start: 2023-03-23 | End: 2023-03-27

## 2023-03-23 NOTE — TELEPHONE ENCOUNTER
Spoke with patient. She says she was up during the night 3 times having bowel movements. States they were normal and formed. Not diarrhea. Complains of abdominal discomfort. Denies nausea or vomiting. Denies fever. Denies urinary complaints. She is requesting something for abdominal discomfort. Katia Coley was filled 3/20/23 #5. I did advise to be sure she is taking the probiotic and to use warm pack to abdomen. FOV 4/5/23. MRI 4/3/23 and appointment with GI 4/27/23. Please advise further if needed.

## 2023-03-25 ENCOUNTER — MOBILE ENCOUNTER (OUTPATIENT)
Dept: FAMILY MEDICINE CLINIC | Facility: CLINIC | Age: 83
End: 2023-03-25

## 2023-03-25 DIAGNOSIS — N39.0 RECURRENT UTI: Primary | ICD-10-CM

## 2023-03-25 RX ORDER — PHENAZOPYRIDINE HYDROCHLORIDE 200 MG/1
200 TABLET, FILM COATED ORAL 3 TIMES DAILY PRN
Qty: 30 TABLET | Refills: 0 | Status: SHIPPED | OUTPATIENT
Start: 2023-03-25

## 2023-03-25 RX ORDER — NITROFURANTOIN 25; 75 MG/1; MG/1
100 CAPSULE ORAL 2 TIMES DAILY
Qty: 20 CAPSULE | Refills: 0 | Status: SHIPPED | OUTPATIENT
Start: 2023-03-25

## 2023-03-27 ENCOUNTER — OFFICE VISIT (OUTPATIENT)
Dept: FAMILY MEDICINE CLINIC | Facility: CLINIC | Age: 83
End: 2023-03-27
Payer: MEDICARE

## 2023-03-27 VITALS
HEART RATE: 75 BPM | HEIGHT: 64 IN | TEMPERATURE: 98 F | OXYGEN SATURATION: 97 % | WEIGHT: 133 LBS | DIASTOLIC BLOOD PRESSURE: 58 MMHG | RESPIRATION RATE: 18 BRPM | BODY MASS INDEX: 22.71 KG/M2 | SYSTOLIC BLOOD PRESSURE: 142 MMHG

## 2023-03-27 DIAGNOSIS — E21.3 HYPERPARATHYROIDISM (HCC): ICD-10-CM

## 2023-03-27 DIAGNOSIS — R10.30 LOWER ABDOMINAL PAIN: Primary | ICD-10-CM

## 2023-03-27 DIAGNOSIS — M51.16 LUMBAR DISC HERNIATION WITH RADICULOPATHY: ICD-10-CM

## 2023-03-27 DIAGNOSIS — N20.0 CALCIUM NEPHROLITHIASIS: ICD-10-CM

## 2023-03-27 DIAGNOSIS — Z87.440 HISTORY OF UTI: ICD-10-CM

## 2023-03-27 DIAGNOSIS — Z87.442 HISTORY OF KIDNEY STONES: ICD-10-CM

## 2023-03-27 PROCEDURE — 87086 URINE CULTURE/COLONY COUNT: CPT | Performed by: FAMILY MEDICINE

## 2023-03-27 PROCEDURE — 99215 OFFICE O/P EST HI 40 MIN: CPT | Performed by: FAMILY MEDICINE

## 2023-03-27 PROCEDURE — 1125F AMNT PAIN NOTED PAIN PRSNT: CPT | Performed by: FAMILY MEDICINE

## 2023-03-27 PROCEDURE — 1111F DSCHRG MED/CURRENT MED MERGE: CPT | Performed by: FAMILY MEDICINE

## 2023-03-27 RX ORDER — HYDROCODONE BITARTRATE AND ACETAMINOPHEN 5; 325 MG/1; MG/1
0.5 TABLET ORAL 2 TIMES DAILY PRN
Qty: 10 TABLET | Refills: 0 | Status: SHIPPED | OUTPATIENT
Start: 2023-03-27 | End: 2023-03-31

## 2023-03-29 ENCOUNTER — HOSPITAL ENCOUNTER (OUTPATIENT)
Age: 83
Discharge: ANOTHER HOSPITAL | End: 2023-03-29
Attending: EMERGENCY MEDICINE
Payer: MEDICARE

## 2023-03-29 ENCOUNTER — HOSPITAL ENCOUNTER (INPATIENT)
Facility: HOSPITAL | Age: 83
LOS: 2 days | Discharge: HOME | End: 2023-03-31
Attending: STUDENT IN AN ORGANIZED HEALTH CARE EDUCATION/TRAINING PROGRAM | Admitting: STUDENT IN AN ORGANIZED HEALTH CARE EDUCATION/TRAINING PROGRAM
Payer: MEDICARE

## 2023-03-29 ENCOUNTER — APPOINTMENT (OUTPATIENT)
Dept: CT IMAGING | Facility: HOSPITAL | Age: 83
End: 2023-03-29
Attending: HOSPITALIST
Payer: MEDICARE

## 2023-03-29 ENCOUNTER — TELEPHONE (OUTPATIENT)
Dept: FAMILY MEDICINE CLINIC | Facility: CLINIC | Age: 83
End: 2023-03-29

## 2023-03-29 VITALS
SYSTOLIC BLOOD PRESSURE: 185 MMHG | OXYGEN SATURATION: 96 % | DIASTOLIC BLOOD PRESSURE: 70 MMHG | WEIGHT: 130 LBS | HEART RATE: 57 BPM | TEMPERATURE: 98 F | BODY MASS INDEX: 22 KG/M2 | RESPIRATION RATE: 18 BRPM

## 2023-03-29 DIAGNOSIS — R10.9 INTRACTABLE ABDOMINAL PAIN: Primary | ICD-10-CM

## 2023-03-29 DIAGNOSIS — N30.91 HEMORRHAGIC CYSTITIS: ICD-10-CM

## 2023-03-29 DIAGNOSIS — R10.9 ABDOMINAL PAIN, ACUTE: Primary | ICD-10-CM

## 2023-03-29 DIAGNOSIS — R10.9 CHRONIC ABDOMINAL PAIN: ICD-10-CM

## 2023-03-29 DIAGNOSIS — G89.29 CHRONIC ABDOMINAL PAIN: ICD-10-CM

## 2023-03-29 LAB
ALBUMIN SERPL-MCNC: 4 G/DL (ref 3.4–5)
ALBUMIN/GLOB SERPL: 1 {RATIO} (ref 1–2)
ALP LIVER SERPL-CCNC: 68 U/L
ALT SERPL-CCNC: 22 U/L
ANION GAP SERPL CALC-SCNC: 4 MMOL/L (ref 0–18)
AST SERPL-CCNC: 21 U/L (ref 15–37)
BASOPHILS # BLD AUTO: 0.12 X10(3) UL (ref 0–0.2)
BASOPHILS NFR BLD AUTO: 1 %
BILIRUB SERPL-MCNC: 0.5 MG/DL (ref 0.1–2)
BILIRUB UR QL STRIP.AUTO: NEGATIVE
BUN BLD-MCNC: 24 MG/DL (ref 7–18)
CALCIUM BLD-MCNC: 10.9 MG/DL (ref 8.5–10.1)
CHLORIDE SERPL-SCNC: 113 MMOL/L (ref 98–112)
CLARITY UR REFRACT.AUTO: CLEAR
CO2 SERPL-SCNC: 22 MMOL/L (ref 21–32)
CREAT BLD-MCNC: 1.42 MG/DL
EOSINOPHIL # BLD AUTO: 0.31 X10(3) UL (ref 0–0.7)
EOSINOPHIL NFR BLD AUTO: 2.7 %
ERYTHROCYTE [DISTWIDTH] IN BLOOD BY AUTOMATED COUNT: 13.5 %
GFR SERPLBLD BASED ON 1.73 SQ M-ARVRAT: 37 ML/MIN/1.73M2 (ref 60–?)
GLOBULIN PLAS-MCNC: 3.9 G/DL (ref 2.8–4.4)
GLUCOSE BLD-MCNC: 90 MG/DL (ref 70–99)
GLUCOSE UR STRIP.AUTO-MCNC: NEGATIVE MG/DL
HCT VFR BLD AUTO: 37.5 %
HGB BLD-MCNC: 12.3 G/DL
IMM GRANULOCYTES # BLD AUTO: 0.03 X10(3) UL (ref 0–1)
IMM GRANULOCYTES NFR BLD: 0.3 %
KETONES UR STRIP.AUTO-MCNC: NEGATIVE MG/DL
LACTATE SERPL-SCNC: 0.8 MMOL/L (ref 0.4–2)
LEUKOCYTE ESTERASE UR QL STRIP.AUTO: NEGATIVE
LIPASE SERPL-CCNC: 29 U/L (ref 13–75)
LYMPHOCYTES # BLD AUTO: 4.39 X10(3) UL (ref 1–4)
LYMPHOCYTES NFR BLD AUTO: 38.1 %
MCH RBC QN AUTO: 31.9 PG (ref 26–34)
MCHC RBC AUTO-ENTMCNC: 32.8 G/DL (ref 31–37)
MCV RBC AUTO: 97.4 FL
MONOCYTES # BLD AUTO: 1.22 X10(3) UL (ref 0.1–1)
MONOCYTES NFR BLD AUTO: 10.6 %
NEUTROPHILS # BLD AUTO: 5.44 X10 (3) UL (ref 1.5–7.7)
NEUTROPHILS # BLD AUTO: 5.44 X10(3) UL (ref 1.5–7.7)
NEUTROPHILS NFR BLD AUTO: 47.3 %
NITRITE UR QL STRIP.AUTO: POSITIVE
OSMOLALITY SERPL CALC.SUM OF ELEC: 292 MOSM/KG (ref 275–295)
PH UR STRIP.AUTO: 6 [PH] (ref 5–8)
PLATELET # BLD AUTO: 400 10(3)UL (ref 150–450)
POTASSIUM SERPL-SCNC: 4.5 MMOL/L (ref 3.5–5.1)
PROT SERPL-MCNC: 7.9 G/DL (ref 6.4–8.2)
PROT UR STRIP.AUTO-MCNC: 100 MG/DL
RBC # BLD AUTO: 3.85 X10(6)UL
RBC #/AREA URNS AUTO: >10 /HPF
SARS-COV-2 RNA RESP QL NAA+PROBE: NOT DETECTED
SODIUM SERPL-SCNC: 139 MMOL/L (ref 136–145)
SP GR UR STRIP.AUTO: 1.01 (ref 1–1.03)
UROBILINOGEN UR STRIP.AUTO-MCNC: 2 MG/DL
WBC # BLD AUTO: 11.5 X10(3) UL (ref 4–11)

## 2023-03-29 PROCEDURE — 74174 CTA ABD&PLVS W/CONTRAST: CPT | Performed by: HOSPITALIST

## 2023-03-29 PROCEDURE — 99223 1ST HOSP IP/OBS HIGH 75: CPT | Performed by: HOSPITALIST

## 2023-03-29 PROCEDURE — 99214 OFFICE O/P EST MOD 30 MIN: CPT

## 2023-03-29 RX ORDER — HYDROCODONE BITARTRATE AND ACETAMINOPHEN 5; 325 MG/1; MG/1
1 TABLET ORAL ONCE
Status: COMPLETED | OUTPATIENT
Start: 2023-03-29 | End: 2023-03-29

## 2023-03-29 RX ORDER — AMLODIPINE BESYLATE 5 MG/1
10 TABLET ORAL DAILY
Status: DISCONTINUED | OUTPATIENT
Start: 2023-03-30 | End: 2023-03-31

## 2023-03-29 RX ORDER — MORPHINE SULFATE 4 MG/ML
4 INJECTION, SOLUTION INTRAMUSCULAR; INTRAVENOUS ONCE
Status: COMPLETED | OUTPATIENT
Start: 2023-03-29 | End: 2023-03-29

## 2023-03-29 RX ORDER — HYDROMORPHONE HYDROCHLORIDE 1 MG/ML
0.4 INJECTION, SOLUTION INTRAMUSCULAR; INTRAVENOUS; SUBCUTANEOUS EVERY 2 HOUR PRN
Status: DISCONTINUED | OUTPATIENT
Start: 2023-03-29 | End: 2023-03-31

## 2023-03-29 RX ORDER — ONDANSETRON 2 MG/ML
4 INJECTION INTRAMUSCULAR; INTRAVENOUS ONCE
Status: COMPLETED | OUTPATIENT
Start: 2023-03-29 | End: 2023-03-29

## 2023-03-29 RX ORDER — HYDROMORPHONE HYDROCHLORIDE 1 MG/ML
0.5 INJECTION, SOLUTION INTRAMUSCULAR; INTRAVENOUS; SUBCUTANEOUS ONCE
Status: COMPLETED | OUTPATIENT
Start: 2023-03-29 | End: 2023-03-29

## 2023-03-29 RX ORDER — KETOROLAC TROMETHAMINE 30 MG/ML
30 INJECTION, SOLUTION INTRAMUSCULAR; INTRAVENOUS ONCE
Status: COMPLETED | OUTPATIENT
Start: 2023-03-29 | End: 2023-03-29

## 2023-03-29 RX ORDER — ACETAMINOPHEN 500 MG
1000 TABLET ORAL EVERY 4 HOURS PRN
Status: DISCONTINUED | OUTPATIENT
Start: 2023-03-29 | End: 2023-03-31

## 2023-03-29 RX ORDER — MELATONIN
3 NIGHTLY PRN
Status: DISCONTINUED | OUTPATIENT
Start: 2023-03-29 | End: 2023-03-31

## 2023-03-29 RX ORDER — TRAZODONE HYDROCHLORIDE 50 MG/1
50 TABLET ORAL NIGHTLY
Status: DISCONTINUED | OUTPATIENT
Start: 2023-03-29 | End: 2023-03-31

## 2023-03-29 RX ORDER — SENNOSIDES 8.6 MG
17.2 TABLET ORAL NIGHTLY PRN
Status: DISCONTINUED | OUTPATIENT
Start: 2023-03-29 | End: 2023-03-30

## 2023-03-29 RX ORDER — ECHINACEA PURPUREA EXTRACT 125 MG
1 TABLET ORAL
Status: DISCONTINUED | OUTPATIENT
Start: 2023-03-29 | End: 2023-03-31

## 2023-03-29 RX ORDER — SERTRALINE HYDROCHLORIDE 100 MG/1
100 TABLET, FILM COATED ORAL DAILY
Status: DISCONTINUED | OUTPATIENT
Start: 2023-03-30 | End: 2023-03-31

## 2023-03-29 RX ORDER — BISACODYL 10 MG
10 SUPPOSITORY, RECTAL RECTAL
Status: DISCONTINUED | OUTPATIENT
Start: 2023-03-29 | End: 2023-03-31

## 2023-03-29 RX ORDER — HYDROCODONE BITARTRATE AND ACETAMINOPHEN 5; 325 MG/1; MG/1
0.5 TABLET ORAL 2 TIMES DAILY PRN
Status: DISCONTINUED | OUTPATIENT
Start: 2023-03-29 | End: 2023-03-30

## 2023-03-29 RX ORDER — ONDANSETRON 2 MG/ML
4 INJECTION INTRAMUSCULAR; INTRAVENOUS EVERY 6 HOURS PRN
Status: DISCONTINUED | OUTPATIENT
Start: 2023-03-29 | End: 2023-03-31

## 2023-03-29 RX ORDER — CINACALCET 30 MG/1
30 TABLET, FILM COATED ORAL
Status: DISCONTINUED | OUTPATIENT
Start: 2023-03-30 | End: 2023-03-31

## 2023-03-29 RX ORDER — ATORVASTATIN CALCIUM 10 MG/1
10 TABLET, FILM COATED ORAL NIGHTLY
Status: DISCONTINUED | OUTPATIENT
Start: 2023-03-29 | End: 2023-03-31

## 2023-03-29 RX ORDER — POLYETHYLENE GLYCOL 3350 17 G/17G
17 POWDER, FOR SOLUTION ORAL DAILY PRN
Status: DISCONTINUED | OUTPATIENT
Start: 2023-03-29 | End: 2023-03-30

## 2023-03-29 RX ORDER — HYDRALAZINE HYDROCHLORIDE 20 MG/ML
20 INJECTION INTRAMUSCULAR; INTRAVENOUS ONCE
Status: COMPLETED | OUTPATIENT
Start: 2023-03-29 | End: 2023-03-29

## 2023-03-29 RX ORDER — GABAPENTIN 300 MG/1
300 CAPSULE ORAL
Status: DISCONTINUED | OUTPATIENT
Start: 2023-03-29 | End: 2023-03-31

## 2023-03-29 RX ORDER — ENOXAPARIN SODIUM 100 MG/ML
30 INJECTION SUBCUTANEOUS DAILY
Status: DISCONTINUED | OUTPATIENT
Start: 2023-03-30 | End: 2023-03-31

## 2023-03-29 RX ORDER — PANTOPRAZOLE SODIUM 20 MG/1
20 TABLET, DELAYED RELEASE ORAL
Status: DISCONTINUED | OUTPATIENT
Start: 2023-03-30 | End: 2023-03-31

## 2023-03-29 RX ORDER — SODIUM CHLORIDE 9 MG/ML
INJECTION, SOLUTION INTRAVENOUS CONTINUOUS
Status: DISCONTINUED | OUTPATIENT
Start: 2023-03-29 | End: 2023-03-31

## 2023-03-29 RX ORDER — HYDROMORPHONE HYDROCHLORIDE 1 MG/ML
0.8 INJECTION, SOLUTION INTRAMUSCULAR; INTRAVENOUS; SUBCUTANEOUS EVERY 2 HOUR PRN
Status: DISCONTINUED | OUTPATIENT
Start: 2023-03-29 | End: 2023-03-30

## 2023-03-29 RX ORDER — PROCHLORPERAZINE EDISYLATE 5 MG/ML
10 INJECTION INTRAMUSCULAR; INTRAVENOUS EVERY 6 HOURS PRN
Status: DISCONTINUED | OUTPATIENT
Start: 2023-03-29 | End: 2023-03-31

## 2023-03-29 RX ORDER — LOSARTAN POTASSIUM 100 MG/1
100 TABLET ORAL DAILY
Status: DISCONTINUED | OUTPATIENT
Start: 2023-03-30 | End: 2023-03-31

## 2023-03-29 NOTE — TELEPHONE ENCOUNTER
Patient stated she was in terrible pain all night. Rated lower abdominal pain 10/10. Stated she tried heating pad, rest, norco. Nothing has helped. Advised on eval at the ER. Patient requesting appointment with pcp. Patient has MRI abdomen scheduled on 4/3/23.     Please advise

## 2023-03-29 NOTE — TELEPHONE ENCOUNTER
Patient tearful, stated she is having terrible abdomnial pain. Stated [place] not helping. Did not request refill. Patient received call in the middle of our conversation, stopped crying, stated she would call back.

## 2023-03-29 NOTE — ED QUICK NOTES
Orders for admission, patient is aware of plan and ready to go upstairs. Any questions, please call ED RN Mir Medina at extension [phone].      Patient Covid vaccination status: Fully vaccinated     COVID Test Ordered in ED: Rapid SARS-CoV-2 by PCR    COVID Suspicion at Admission: N/A    Running Infusions:  None    Mental Status/LOC at time of transport: A&Ox4    Other pertinent information:   CIWA score: N/A   NIH score:  N/A

## 2023-03-29 NOTE — ED INITIAL ASSESSMENT (HPI)
Pt c/o lower abdominal pain since last night. Pt states recent UTI and was given a course of ABX that has finished. Pt c/o nausea.  Pt seen at , no testing done, pt sent here for eval.

## 2023-03-29 NOTE — TELEPHONE ENCOUNTER
Patient calling states she is in terrible stomach pain and requesting refill on her Norco only has 1 pill left

## 2023-03-29 NOTE — TELEPHONE ENCOUNTER
Spoke with patient, informed of plan to order abdominal ultrasound. Patient stated she is already at the ER. Handed the phone over to Dr. Christopher Smith. Dr. Christopher Smith informed Kat Costello was going to order doppler ultrasound to rule out mesenteric ischemia. Dr. Christopher Smith stated she would order test. Also stated ua was positive for nitrites, 1 dose of antibiotic was given, to hold order for antibiotics until culture is resulted, patient with history of c-diff.

## 2023-03-30 ENCOUNTER — APPOINTMENT (OUTPATIENT)
Dept: MRI IMAGING | Facility: HOSPITAL | Age: 83
End: 2023-03-30
Attending: HOSPITALIST
Payer: MEDICARE

## 2023-03-30 LAB
ANION GAP SERPL CALC-SCNC: 6 MMOL/L (ref 0–18)
BASOPHILS # BLD AUTO: 0.09 X10(3) UL (ref 0–0.2)
BASOPHILS NFR BLD AUTO: 0.9 %
BUN BLD-MCNC: 22 MG/DL (ref 7–18)
CALCIUM BLD-MCNC: 10.6 MG/DL (ref 8.5–10.1)
CHLORIDE SERPL-SCNC: 109 MMOL/L (ref 98–112)
CO2 SERPL-SCNC: 23 MMOL/L (ref 21–32)
CREAT BLD-MCNC: 1.18 MG/DL
EOSINOPHIL # BLD AUTO: 0 X10(3) UL (ref 0–0.7)
EOSINOPHIL NFR BLD AUTO: 0 %
ERYTHROCYTE [DISTWIDTH] IN BLOOD BY AUTOMATED COUNT: 13.3 %
GFR SERPLBLD BASED ON 1.73 SQ M-ARVRAT: 47 ML/MIN/1.73M2 (ref 60–?)
GLUCOSE BLD-MCNC: 107 MG/DL (ref 70–99)
HCT VFR BLD AUTO: 35 %
HGB BLD-MCNC: 11.3 G/DL
IMM GRANULOCYTES # BLD AUTO: 0.03 X10(3) UL (ref 0–1)
IMM GRANULOCYTES NFR BLD: 0.3 %
LYMPHOCYTES # BLD AUTO: 2.74 X10(3) UL (ref 1–4)
LYMPHOCYTES NFR BLD AUTO: 27 %
MAGNESIUM SERPL-MCNC: 2.2 MG/DL (ref 1.6–2.6)
MCH RBC QN AUTO: 31.8 PG (ref 26–34)
MCHC RBC AUTO-ENTMCNC: 32.3 G/DL (ref 31–37)
MCV RBC AUTO: 98.6 FL
MONOCYTES # BLD AUTO: 0.77 X10(3) UL (ref 0.1–1)
MONOCYTES NFR BLD AUTO: 7.6 %
NEUTROPHILS # BLD AUTO: 6.53 X10 (3) UL (ref 1.5–7.7)
NEUTROPHILS # BLD AUTO: 6.53 X10(3) UL (ref 1.5–7.7)
NEUTROPHILS NFR BLD AUTO: 64.2 %
OSMOLALITY SERPL CALC.SUM OF ELEC: 290 MOSM/KG (ref 275–295)
PLATELET # BLD AUTO: 377 10(3)UL (ref 150–450)
POTASSIUM SERPL-SCNC: 4.5 MMOL/L (ref 3.5–5.1)
RBC # BLD AUTO: 3.55 X10(6)UL
SODIUM SERPL-SCNC: 138 MMOL/L (ref 136–145)
WBC # BLD AUTO: 10.2 X10(3) UL (ref 4–11)

## 2023-03-30 PROCEDURE — 74183 MRI ABD W/O CNTR FLWD CNTR: CPT | Performed by: HOSPITALIST

## 2023-03-30 PROCEDURE — 72197 MRI PELVIS W/O & W/DYE: CPT | Performed by: HOSPITALIST

## 2023-03-30 PROCEDURE — 99233 SBSQ HOSP IP/OBS HIGH 50: CPT | Performed by: HOSPITALIST

## 2023-03-30 RX ORDER — DOCUSATE SODIUM 100 MG/1
100 CAPSULE, LIQUID FILLED ORAL 2 TIMES DAILY
Status: DISCONTINUED | OUTPATIENT
Start: 2023-03-30 | End: 2023-03-31

## 2023-03-30 RX ORDER — POLYETHYLENE GLYCOL 3350 17 G/17G
17 POWDER, FOR SOLUTION ORAL DAILY
Status: DISCONTINUED | OUTPATIENT
Start: 2023-03-30 | End: 2023-03-31

## 2023-03-30 RX ORDER — HYDRALAZINE HYDROCHLORIDE 25 MG/1
25 TABLET, FILM COATED ORAL EVERY 8 HOURS SCHEDULED
Status: DISCONTINUED | OUTPATIENT
Start: 2023-03-30 | End: 2023-03-31

## 2023-03-30 RX ORDER — QUETIAPINE FUMARATE 25 MG/1
25 TABLET, FILM COATED ORAL 3 TIMES DAILY PRN
Status: DISCONTINUED | OUTPATIENT
Start: 2023-03-30 | End: 2023-03-31

## 2023-03-30 RX ORDER — DICYCLOMINE HCL 20 MG
20 TABLET ORAL
Qty: 90 TABLET | Refills: 5 | Status: SHIPPED | OUTPATIENT
Start: 2023-03-30

## 2023-03-30 RX ORDER — SENNOSIDES 8.6 MG
8.6 TABLET ORAL 2 TIMES DAILY
Status: DISCONTINUED | OUTPATIENT
Start: 2023-03-30 | End: 2023-03-31

## 2023-03-30 RX ORDER — METOPROLOL SUCCINATE 25 MG/1
25 TABLET, EXTENDED RELEASE ORAL
Status: DISCONTINUED | OUTPATIENT
Start: 2023-03-30 | End: 2023-03-31

## 2023-03-30 RX ORDER — GADOTERATE MEGLUMINE 376.9 MG/ML
15 INJECTION INTRAVENOUS
Status: COMPLETED | OUTPATIENT
Start: 2023-03-30 | End: 2023-03-30

## 2023-03-30 RX ORDER — DICYCLOMINE HCL 20 MG
20 TABLET ORAL
Status: DISCONTINUED | OUTPATIENT
Start: 2023-03-30 | End: 2023-03-31

## 2023-03-30 RX ORDER — QUETIAPINE FUMARATE 25 MG/1
25 TABLET, FILM COATED ORAL NIGHTLY
Status: DISCONTINUED | OUTPATIENT
Start: 2023-03-30 | End: 2023-03-31

## 2023-03-30 RX ORDER — HYDROCODONE BITARTRATE AND ACETAMINOPHEN 5; 325 MG/1; MG/1
1 TABLET ORAL EVERY 6 HOURS PRN
Status: DISCONTINUED | OUTPATIENT
Start: 2023-03-30 | End: 2023-03-31

## 2023-03-30 NOTE — PLAN OF CARE
Problem: Patient/Family Goals  Goal: Patient/Family Long Term Goal  Description: Patient's Long Term Goal: Discharge     Interventions:  - Tolerate diet  - Urine lab results   - See additional Care Plan goals for specific interventions  Outcome: Progressing  Goal: Patient/Family Short Term Goal  Description: Patient's Short Term Goal: Comfort care    Interventions:   - Cluster care  - Pain medications   - See additional Care Plan goals for specific interventions  Outcome: Progressing     Problem: PAIN - ADULT  Goal: Verbalizes/displays adequate comfort level or patient's stated pain goal  Description: INTERVENTIONS:  - Encourage pt to monitor pain and request assistance  - Assess pain using appropriate pain scale  - Administer analgesics based on type and severity of pain and evaluate response  - Implement non-pharmacological measures as appropriate and evaluate response  - Consider cultural and social influences on pain and pain management  - Manage/alleviate anxiety  - Utilize distraction and/or relaxation techniques  - Monitor for opioid side effects  - Notify MD/LIP if interventions unsuccessful or patient reports new pain  - Anticipate increased pain with activity and pre-medicate as appropriate  Outcome: Progressing     Problem: SAFETY ADULT - FALL  Goal: Free from fall injury  Description: INTERVENTIONS:  - Assess pt frequently for physical needs  - Identify cognitive and physical deficits and behaviors that affect risk of falls.   - [place] fall precautions as indicated by assessment.  - Educate pt/family on patient safety including physical limitations  - Instruct pt to call for assistance with activity based on assessment  - Modify environment to reduce risk of injury  - Provide assistive devices as appropriate  - Consider OT/PT consult to assist with strengthening/mobility  - Encourage toileting schedule  Outcome: Progressing     Problem: GASTROINTESTINAL - ADULT  Goal: Minimal or absence of nausea and vomiting  Description: INTERVENTIONS:  - Maintain adequate hydration with IV or PO as ordered and tolerated  - Nasogastric tube to low intermittent suction as ordered  - Evaluate effectiveness of ordered antiemetic medications  - Provide nonpharmacologic comfort measures as appropriate  - Advance diet as tolerated, if ordered  - Obtain nutritional consult as needed  - Evaluate fluid balance  Outcome: Progressing    A&Ox4. VSS. RA. Telemetry: NSR  GI: Abdomen soft, nondistended. Denies nausea. : Voids. Pain controlled with PRN pain medications  Up with standby assist.  Diet:Clear liquids, NPO after noon for MRI  IVF running per order. All appropriate safety measures in place. All questions and concerns addressed.  Will continue to monitor

## 2023-03-30 NOTE — PROGRESS NOTES
Multiple attempts to reach pt and messages left with no return call. Patient went in for HFU appt with PCP office on 3/27/23. Encounter closing.

## 2023-03-30 NOTE — PLAN OF CARE
NURSING ADMISSION NOTE:  Patient admitted via ED around 1. Oriented to room. Safety precautions initiated. Bed in low position. Call light in reach. A&Ox3-4 very forgetful, able to be redirected with ease and reorientation needed frequently. VSS. RA. Denies chest pain and SOB. GI: Abdomen with tenderness and active bowel sounds. Passage of gas. Nausea and dry heaving present, PRN medication administered. : Voids- Clear yellow. Pain manageable with PRN pain medications. Up with standby assist.  Skin: Warm, dry, and intact. Diet: NPO.   IVF running per order. All appropriate safety measures in place. All questions and concerns addressed.        Problem: Patient/Family Goals  Goal: Patient/Family Long Term Goal  Description: Patient's Long Term Goal: Discharge     Interventions:  - Tolerate diet  - Urine lab results   - See additional Care Plan goals for specific interventions  Outcome: Progressing  Goal: Patient/Family Short Term Goal  Description: Patient's Short Term Goal: Comfort care    Interventions:   - Cluster care  - Pain medications   - See additional Care Plan goals for specific interventions  Outcome: Progressing     Problem: PAIN - ADULT  Goal: Verbalizes/displays adequate comfort level or patient's stated pain goal  Description: INTERVENTIONS:  - Encourage pt to monitor pain and request assistance  - Assess pain using appropriate pain scale  - Administer analgesics based on type and severity of pain and evaluate response  - Implement non-pharmacological measures as appropriate and evaluate response  - Consider cultural and social influences on pain and pain management  - Manage/alleviate anxiety  - Utilize distraction and/or relaxation techniques  - Monitor for opioid side effects  - Notify MD/LIP if interventions unsuccessful or patient reports new pain  - Anticipate increased pain with activity and pre-medicate as appropriate  Outcome: Progressing     Problem: SAFETY ADULT - FALL  Goal: Free from fall injury  Description: INTERVENTIONS:  - Assess pt frequently for physical needs  - Identify cognitive and physical deficits and behaviors that affect risk of falls.   - [place] fall precautions as indicated by assessment.  - Educate pt/family on patient safety including physical limitations  - Instruct pt to call for assistance with activity based on assessment  - Modify environment to reduce risk of injury  - Provide assistive devices as appropriate  - Consider OT/PT consult to assist with strengthening/mobility  - Encourage toileting schedule  Outcome: Progressing     Problem: GASTROINTESTINAL - ADULT  Goal: Minimal or absence of nausea and vomiting  Description: INTERVENTIONS:  - Maintain adequate hydration with IV or PO as ordered and tolerated  - Nasogastric tube to low intermittent suction as ordered  - Evaluate effectiveness of ordered antiemetic medications  - Provide nonpharmacologic comfort measures as appropriate  - Advance diet as tolerated, if ordered  - Obtain nutritional consult as needed  - Evaluate fluid balance  Outcome: Progressing

## 2023-03-30 NOTE — PROGRESS NOTES
[facility] Pharmacy Note:  Renal Dose Adjustment for enoxaparin (LOVENOX)    Frannie Matt has been prescribed enoxaparin 40 mg subcutaneously every 24 hours. Estimated Creatinine Clearance: 27.3 mL/min (A) (based on SCr of 1.42 mg/dL (H)). Calculated CrCl 20 to 30 mL/min so the dose of Enoxaparin (LOVENOX) has been changed to enoxaparin 30 mg every 24 hours per P&T approved protocol. Pharmacy will continue to follow, and make additional adjustments if needed.       Thank you,  Adela Waldron, PharmD  3/29/2023 8:10 PM

## 2023-03-30 NOTE — PROGRESS NOTES
[facility] cardiology    Attempted to see patient this evening. She is off the floor at MRI. Cardiology will see tomorrow morning.     Timo Mora MD

## 2023-03-31 VITALS
WEIGHT: 131.81 LBS | HEIGHT: 64 IN | TEMPERATURE: 99 F | RESPIRATION RATE: 18 BRPM | BODY MASS INDEX: 22.5 KG/M2 | HEART RATE: 59 BPM | SYSTOLIC BLOOD PRESSURE: 152 MMHG | OXYGEN SATURATION: 95 % | DIASTOLIC BLOOD PRESSURE: 60 MMHG

## 2023-03-31 PROCEDURE — 99239 HOSP IP/OBS DSCHRG MGMT >30: CPT | Performed by: HOSPITALIST

## 2023-03-31 RX ORDER — HYDRALAZINE HYDROCHLORIDE 25 MG/1
25 TABLET, FILM COATED ORAL ONCE
Status: COMPLETED | OUTPATIENT
Start: 2023-03-31 | End: 2023-03-31

## 2023-03-31 RX ORDER — HYDRALAZINE HYDROCHLORIDE 25 MG/1
50 TABLET, FILM COATED ORAL 2 TIMES DAILY
Qty: 90 TABLET | Refills: 1 | Status: SHIPPED | COMMUNITY
Start: 2023-03-31

## 2023-03-31 RX ORDER — HYDRALAZINE HYDROCHLORIDE 50 MG/1
50 TABLET, FILM COATED ORAL 2 TIMES DAILY
Status: DISCONTINUED | OUTPATIENT
Start: 2023-03-31 | End: 2023-03-31

## 2023-03-31 NOTE — PLAN OF CARE
Problem: Patient/Family Goals  Goal: Patient/Family Long Term Goal  Description: Patient's Long Term Goal: Discharge     Interventions:  - Tolerate diet  - Urine lab results   - See additional Care Plan goals for specific interventions  Outcome: Progressing  Goal: Patient/Family Short Term Goal  Description: Patient's Short Term Goal: Comfort care    Interventions:   - Cluster care  - Pain medications   - See additional Care Plan goals for specific interventions  Outcome: Progressing     Problem: PAIN - ADULT  Goal: Verbalizes/displays adequate comfort level or patient's stated pain goal  Description: INTERVENTIONS:  - Encourage pt to monitor pain and request assistance  - Assess pain using appropriate pain scale  - Administer analgesics based on type and severity of pain and evaluate response  - Implement non-pharmacological measures as appropriate and evaluate response  - Consider cultural and social influences on pain and pain management  - Manage/alleviate anxiety  - Utilize distraction and/or relaxation techniques  - Monitor for opioid side effects  - Notify MD/LIP if interventions unsuccessful or patient reports new pain  - Anticipate increased pain with activity and pre-medicate as appropriate  Outcome: Progressing     Problem: SAFETY ADULT - FALL  Goal: Free from fall injury  Description: INTERVENTIONS:  - Assess pt frequently for physical needs  - Identify cognitive and physical deficits and behaviors that affect risk of falls.   - [place] fall precautions as indicated by assessment.  - Educate pt/family on patient safety including physical limitations  - Instruct pt to call for assistance with activity based on assessment  - Modify environment to reduce risk of injury  - Provide assistive devices as appropriate  - Consider OT/PT consult to assist with strengthening/mobility  - Encourage toileting schedule  Outcome: Progressing     Problem: GASTROINTESTINAL - ADULT  Goal: Minimal or absence of nausea and vomiting  Description: INTERVENTIONS:  - Maintain adequate hydration with IV or PO as ordered and tolerated  - Nasogastric tube to low intermittent suction as ordered  - Evaluate effectiveness of ordered antiemetic medications  - Provide nonpharmacologic comfort measures as appropriate  - Advance diet as tolerated, if ordered  - Obtain nutritional consult as needed  - Evaluate fluid balance  Outcome: Progressing     A&Ox4. VSS. RA. . Telemetry: NSR  GI: Abdomen soft, nondistended. Passing gas, reports multiple bowel movements overnight. Denies nausea. : Voids. Pain controlled with PRN pain medications  Up with standby assist.  Diet:general  IVF running per order. All appropriate safety measures in place. All questions and concerns addressed.  Will continue to monitor

## 2023-03-31 NOTE — PLAN OF CARE
A/ox3. Poor judgement, poor historian. Pt pleasant and friendly. History of sundowning. Bed alarm active. [place] bilat hearing aids and glasses at bedside. VSS. . TELE. X1 episode PAT at 2019, HR in 130's. Pt denies any palpitations, no dizziness. Tolerating RA. Lungs clear. Nonlabored respirations. Lovonox. Voiding. Abdomen tender, soft, nondistended. Bowel sound active. Passing gas. Up with standby. MRI-pelvic schd. For AM. Positive for UTI and on iv abx. Left PIV infiltrated and removed without incident. Right PIV place and ivf infusing well. Pt tolerating well. NPO at midnight. Denies pain at this time.      Problem: Patient/Family Goals  Goal: Patient/Family Long Term Goal  Description: Patient's Long Term Goal: Discharge     Interventions:  - Tolerate diet  - Urine lab results   - See additional Care Plan goals for specific interventions  Outcome: Progressing  Goal: Patient/Family Short Term Goal  Description: Patient's Short Term Goal: Comfort care    Interventions:   - Cluster care  - Pain medications   - See additional Care Plan goals for specific interventions  Outcome: Progressing     Problem: PAIN - ADULT  Goal: Verbalizes/displays adequate comfort level or patient's stated pain goal  Description: INTERVENTIONS:  - Encourage pt to monitor pain and request assistance  - Assess pain using appropriate pain scale  - Administer analgesics based on type and severity of pain and evaluate response  - Implement non-pharmacological measures as appropriate and evaluate response  - Consider cultural and social influences on pain and pain management  - Manage/alleviate anxiety  - Utilize distraction and/or relaxation techniques  - Monitor for opioid side effects  - Notify MD/LIP if interventions unsuccessful or patient reports new pain  - Anticipate increased pain with activity and pre-medicate as appropriate  Outcome: Progressing     Problem: SAFETY ADULT - FALL  Goal: Free from fall injury  Description: INTERVENTIONS:  - Assess pt frequently for physical needs  - Identify cognitive and physical deficits and behaviors that affect risk of falls. - [place] fall precautions as indicated by assessment.  - Educate pt/family on patient safety including physical limitations  - Instruct pt to call for assistance with activity based on assessment  - Modify environment to reduce risk of injury  - Provide assistive devices as appropriate  - Consider OT/PT consult to assist with strengthening/mobility  - Encourage toileting schedule  Outcome: Progressing     Problem: GASTROINTESTINAL - ADULT  Goal: Minimal or absence of nausea and vomiting  Description: INTERVENTIONS:  - Maintain adequate hydration with IV or PO as ordered and tolerated  - Nasogastric tube to low intermittent suction as ordered  - Evaluate effectiveness of ordered antiemetic medications  - Provide nonpharmacologic comfort measures as appropriate  - Advance diet as tolerated, if ordered  - Obtain nutritional consult as needed  - Evaluate fluid balance  Outcome: Progressing   Safety precautions in place. Pt updated on plan of care. Pt has no questions or complaints at this time.

## 2023-03-31 NOTE — PROGRESS NOTES
NURSING DISCHARGE NOTE    Discharged Home via Wheelchair. Accompanied by Spouse. Belongings Taken by patient/family.

## 2023-04-01 ENCOUNTER — APPOINTMENT (OUTPATIENT)
Dept: GENERAL RADIOLOGY | Facility: HOSPITAL | Age: 83
End: 2023-04-01
Attending: STUDENT IN AN ORGANIZED HEALTH CARE EDUCATION/TRAINING PROGRAM
Payer: MEDICARE

## 2023-04-01 ENCOUNTER — HOSPITAL ENCOUNTER (EMERGENCY)
Facility: HOSPITAL | Age: 83
Discharge: HOME | End: 2023-04-01
Attending: STUDENT IN AN ORGANIZED HEALTH CARE EDUCATION/TRAINING PROGRAM
Payer: MEDICARE

## 2023-04-01 VITALS
SYSTOLIC BLOOD PRESSURE: 144 MMHG | OXYGEN SATURATION: 97 % | DIASTOLIC BLOOD PRESSURE: 95 MMHG | HEART RATE: 87 BPM | TEMPERATURE: 98 F | RESPIRATION RATE: 20 BRPM

## 2023-04-01 DIAGNOSIS — R10.9 ABDOMINAL PAIN, ACUTE: Primary | ICD-10-CM

## 2023-04-01 DIAGNOSIS — M25.551 BILATERAL HIP PAIN: ICD-10-CM

## 2023-04-01 DIAGNOSIS — M19.90 ARTHRITIS: ICD-10-CM

## 2023-04-01 DIAGNOSIS — M25.552 BILATERAL HIP PAIN: ICD-10-CM

## 2023-04-01 LAB
ALBUMIN SERPL-MCNC: 3.8 G/DL (ref 3.4–5)
ALBUMIN/GLOB SERPL: 1.1 {RATIO} (ref 1–2)
ALP LIVER SERPL-CCNC: 61 U/L
ALT SERPL-CCNC: 22 U/L
ANION GAP SERPL CALC-SCNC: 6 MMOL/L (ref 0–18)
APTT PPP: 28.5 SECONDS (ref 23.3–35.6)
AST SERPL-CCNC: 24 U/L (ref 15–37)
BASOPHILS # BLD AUTO: 0.09 X10(3) UL (ref 0–0.2)
BASOPHILS NFR BLD AUTO: 1 %
BILIRUB SERPL-MCNC: 0.4 MG/DL (ref 0.1–2)
BILIRUB UR QL STRIP.AUTO: NEGATIVE
BUN BLD-MCNC: 23 MG/DL (ref 7–18)
CALCIUM BLD-MCNC: 10.3 MG/DL (ref 8.5–10.1)
CHLORIDE SERPL-SCNC: 113 MMOL/L (ref 98–112)
CLARITY UR REFRACT.AUTO: CLEAR
CO2 SERPL-SCNC: 20 MMOL/L (ref 21–32)
COLOR UR AUTO: YELLOW
CREAT BLD-MCNC: 1.42 MG/DL
EOSINOPHIL # BLD AUTO: 0.16 X10(3) UL (ref 0–0.7)
EOSINOPHIL NFR BLD AUTO: 1.8 %
ERYTHROCYTE [DISTWIDTH] IN BLOOD BY AUTOMATED COUNT: 13.3 %
GFR SERPLBLD BASED ON 1.73 SQ M-ARVRAT: 37 ML/MIN/1.73M2 (ref 60–?)
GLOBULIN PLAS-MCNC: 3.6 G/DL (ref 2.8–4.4)
GLUCOSE BLD-MCNC: 108 MG/DL (ref 70–99)
GLUCOSE UR STRIP.AUTO-MCNC: NEGATIVE MG/DL
HCT VFR BLD AUTO: 34.9 %
HGB BLD-MCNC: 11.4 G/DL
IMM GRANULOCYTES # BLD AUTO: 0.04 X10(3) UL (ref 0–1)
IMM GRANULOCYTES NFR BLD: 0.5 %
INR BLD: 0.99 (ref 0.85–1.16)
KETONES UR STRIP.AUTO-MCNC: NEGATIVE MG/DL
LEUKOCYTE ESTERASE UR QL STRIP.AUTO: NEGATIVE
LIPASE SERPL-CCNC: 32 U/L (ref 13–75)
LYMPHOCYTES # BLD AUTO: 3.54 X10(3) UL (ref 1–4)
LYMPHOCYTES NFR BLD AUTO: 40.9 %
MCH RBC QN AUTO: 31.8 PG (ref 26–34)
MCHC RBC AUTO-ENTMCNC: 32.7 G/DL (ref 31–37)
MCV RBC AUTO: 97.5 FL
MONOCYTES # BLD AUTO: 0.93 X10(3) UL (ref 0.1–1)
MONOCYTES NFR BLD AUTO: 10.8 %
NEUTROPHILS # BLD AUTO: 3.89 X10 (3) UL (ref 1.5–7.7)
NEUTROPHILS # BLD AUTO: 3.89 X10(3) UL (ref 1.5–7.7)
NEUTROPHILS NFR BLD AUTO: 45 %
NITRITE UR QL STRIP.AUTO: NEGATIVE
OSMOLALITY SERPL CALC.SUM OF ELEC: 292 MOSM/KG (ref 275–295)
PH UR STRIP.AUTO: 6 [PH] (ref 5–8)
PLATELET # BLD AUTO: 368 10(3)UL (ref 150–450)
POTASSIUM SERPL-SCNC: 3.6 MMOL/L (ref 3.5–5.1)
PROT SERPL-MCNC: 7.4 G/DL (ref 6.4–8.2)
PROT UR STRIP.AUTO-MCNC: 100 MG/DL
PROTHROMBIN TIME: 13.1 SECONDS (ref 11.6–14.8)
RBC # BLD AUTO: 3.58 X10(6)UL
RBC #/AREA URNS AUTO: >10 /HPF
SODIUM SERPL-SCNC: 139 MMOL/L (ref 136–145)
SP GR UR STRIP.AUTO: 1.01 (ref 1–1.03)
UROBILINOGEN UR STRIP.AUTO-MCNC: <2 MG/DL
WBC # BLD AUTO: 8.7 X10(3) UL (ref 4–11)

## 2023-04-01 PROCEDURE — 96374 THER/PROPH/DIAG INJ IV PUSH: CPT

## 2023-04-01 PROCEDURE — 81001 URINALYSIS AUTO W/SCOPE: CPT | Performed by: STUDENT IN AN ORGANIZED HEALTH CARE EDUCATION/TRAINING PROGRAM

## 2023-04-01 PROCEDURE — 83690 ASSAY OF LIPASE: CPT | Performed by: STUDENT IN AN ORGANIZED HEALTH CARE EDUCATION/TRAINING PROGRAM

## 2023-04-01 PROCEDURE — 85730 THROMBOPLASTIN TIME PARTIAL: CPT | Performed by: STUDENT IN AN ORGANIZED HEALTH CARE EDUCATION/TRAINING PROGRAM

## 2023-04-01 PROCEDURE — 99285 EMERGENCY DEPT VISIT HI MDM: CPT

## 2023-04-01 PROCEDURE — 96375 TX/PRO/DX INJ NEW DRUG ADDON: CPT

## 2023-04-01 PROCEDURE — 85610 PROTHROMBIN TIME: CPT | Performed by: STUDENT IN AN ORGANIZED HEALTH CARE EDUCATION/TRAINING PROGRAM

## 2023-04-01 PROCEDURE — 80053 COMPREHEN METABOLIC PANEL: CPT | Performed by: STUDENT IN AN ORGANIZED HEALTH CARE EDUCATION/TRAINING PROGRAM

## 2023-04-01 PROCEDURE — 99284 EMERGENCY DEPT VISIT MOD MDM: CPT

## 2023-04-01 PROCEDURE — 85025 COMPLETE CBC W/AUTO DIFF WBC: CPT | Performed by: STUDENT IN AN ORGANIZED HEALTH CARE EDUCATION/TRAINING PROGRAM

## 2023-04-01 PROCEDURE — 73523 X-RAY EXAM HIPS BI 5/> VIEWS: CPT | Performed by: STUDENT IN AN ORGANIZED HEALTH CARE EDUCATION/TRAINING PROGRAM

## 2023-04-01 RX ORDER — ONDANSETRON 2 MG/ML
4 INJECTION INTRAMUSCULAR; INTRAVENOUS ONCE
Status: COMPLETED | OUTPATIENT
Start: 2023-04-01 | End: 2023-04-01

## 2023-04-01 RX ORDER — LORAZEPAM 2 MG/ML
0.5 INJECTION INTRAMUSCULAR ONCE
Status: COMPLETED | OUTPATIENT
Start: 2023-04-01 | End: 2023-04-01

## 2023-04-01 RX ORDER — MORPHINE SULFATE 4 MG/ML
4 INJECTION, SOLUTION INTRAMUSCULAR; INTRAVENOUS EVERY 30 MIN PRN
Status: DISCONTINUED | OUTPATIENT
Start: 2023-04-01 | End: 2023-04-01

## 2023-04-01 RX ORDER — LORAZEPAM 0.5 MG/1
0.5 TABLET ORAL 2 TIMES DAILY PRN
Qty: 20 TABLET | Refills: 0 | Status: SHIPPED | OUTPATIENT
Start: 2023-04-01 | End: 2023-04-08

## 2023-04-03 ENCOUNTER — PATIENT OUTREACH (OUTPATIENT)
Dept: CASE MANAGEMENT | Age: 83
End: 2023-04-03

## 2023-04-03 ENCOUNTER — TELEPHONE (OUTPATIENT)
Dept: FAMILY MEDICINE CLINIC | Facility: CLINIC | Age: 83
End: 2023-04-03

## 2023-04-03 ENCOUNTER — HOSPITAL ENCOUNTER (OUTPATIENT)
Dept: MRI IMAGING | Facility: HOSPITAL | Age: 83
Discharge: HOME | End: 2023-04-03
Attending: FAMILY MEDICINE
Payer: MEDICARE

## 2023-04-03 DIAGNOSIS — M51.16 LUMBAR DISC HERNIATION WITH RADICULOPATHY: ICD-10-CM

## 2023-04-03 DIAGNOSIS — N20.0 CALCIUM NEPHROLITHIASIS: ICD-10-CM

## 2023-04-03 DIAGNOSIS — R10.30 LOWER ABDOMINAL PAIN: ICD-10-CM

## 2023-04-03 DIAGNOSIS — Z02.9 ENCOUNTERS FOR UNSPECIFIED ADMINISTRATIVE PURPOSE: ICD-10-CM

## 2023-04-03 DIAGNOSIS — K86.89 PANCREATIC MASS: ICD-10-CM

## 2023-04-03 PROCEDURE — 74183 MRI ABD W/O CNTR FLWD CNTR: CPT | Performed by: FAMILY MEDICINE

## 2023-04-03 PROCEDURE — 72197 MRI PELVIS W/O & W/DYE: CPT | Performed by: FAMILY MEDICINE

## 2023-04-03 PROCEDURE — A9575 INJ GADOTERATE MEGLUMI 0.1ML: HCPCS | Performed by: FAMILY MEDICINE

## 2023-04-03 RX ORDER — GADOTERATE MEGLUMINE 376.9 MG/ML
15 INJECTION INTRAVENOUS
Status: COMPLETED | OUTPATIENT
Start: 2023-04-03 | End: 2023-04-03

## 2023-04-03 RX ADMIN — GADOTERATE MEGLUMINE 12 ML: 376.9 INJECTION INTRAVENOUS at 19:09:00

## 2023-04-03 NOTE — TELEPHONE ENCOUNTER
Pt called and stated she was recently at the hospital. Pt made a follow up apt with Michael Dixon for 4/10 but is requesting pain meds.

## 2023-04-03 NOTE — TELEPHONE ENCOUNTER
Spoke to patient. States she continues to have abdominal pains. I did advise her to use her warm pack. She said she currently can't eat d/t MRI this evening. Advised her to wait and see what results show and I would call her in the morning. She VU and is in agreement.

## 2023-04-03 NOTE — PROGRESS NOTES
LM for pt to call [facility] for TCM since discharge. [facility] phone number was provided for pt to call back.

## 2023-04-04 ENCOUNTER — TELEPHONE (OUTPATIENT)
Dept: FAMILY MEDICINE CLINIC | Facility: CLINIC | Age: 83
End: 2023-04-04

## 2023-04-04 RX ORDER — HYDROCODONE BITARTRATE AND ACETAMINOPHEN 5; 325 MG/1; MG/1
0.5 TABLET ORAL 2 TIMES DAILY PRN
Qty: 10 TABLET | Refills: 0 | Status: SHIPPED | OUTPATIENT
Start: 2023-04-04 | End: 2023-04-10

## 2023-04-04 NOTE — TELEPHONE ENCOUNTER
Noticed in chart message from [facility] LORY Willams - 03/31/2023 2:35 PM CDT  Formatting of this note might be different from the original.  Needs OV ASAP. Okay to use hold/break spot. Abnormal MRI imaging. Possible EUS ERCP  Electronically signed by LORY Carey at 03/31/2023 2:36 PM CDT  I did speak with this office. They have left a VM with the patient but she has not called them back. They advise she call the office and they will fit her in. I called Danii Alva. Spoke to her .  Explained to him to call [address] [place] [address] Trivedi [place]

## 2023-04-04 NOTE — TELEPHONE ENCOUNTER
Did she try the Ativan that was given during the hospitalization and is she scheduled for the ERCP yet as directed after hospitalization?

## 2023-04-04 NOTE — TELEPHONE ENCOUNTER
Wilfredo called [facility]. They are working on getting Rosey Jurist in for appointment. Courtney Dukes says she does take the lorazepam. It just makes her sleepy. He is asking for the norco again. Last fill was 3/27/23.   #10

## 2023-04-04 NOTE — TELEPHONE ENCOUNTER
Patient continues to complain of pain to lower part of the abdomen. She is using warm pack. She is requesting pain medication.

## 2023-04-04 NOTE — PROGRESS NOTES
Call patient and  see if she is aware that she needs an ERCP as directed after last hospitalization should get the ERCP as soon as possible. Do they want us to call the gastroenterologist to initiate the ERCP order? Looks like she has an appointment with Dr. Arely Butler but not until 4/27/2023. There is distention of the common bile duct can and ERCP indicated. Cain Amin

## 2023-04-04 NOTE — TELEPHONE ENCOUNTER
Spoke to pt for TCM today. Pt has an appt with Maria Fernanda Garza on 4/10/23, attempted to scheduled a sooner appt due to pt still having abdominal pain however unable to due to schedule limitations. TCM/HFU appt recommended by 4/7/23 as pt is a high risk for readmission. Please advise. Pt is declining to do to ED. Pt did request a refill for Norco as as she is out and Dicyclomine seems to help for a short time. Pt is experiencing loose stool but denies blood is present in the stool. Please advise on refill. Pt did state she has called GI office twice today and is awaiting a call back to set up the ERCP. Clinical staff:  Please f/u with pt to see if they can schedule as pt would greatly benefit from TCM/HFU appt. Also please FU on [place] refill. Thank you!

## 2023-04-05 ENCOUNTER — TELEPHONE (OUTPATIENT)
Dept: FAMILY MEDICINE CLINIC | Facility: CLINIC | Age: 83
End: 2023-04-05

## 2023-04-05 DIAGNOSIS — M51.16 LUMBAR DISC HERNIATION WITH RADICULOPATHY: ICD-10-CM

## 2023-04-05 RX ORDER — LIDOCAINE 50 MG/G
PATCH TOPICAL
Qty: 60 PATCH | Refills: 0 | Status: SHIPPED | OUTPATIENT
Start: 2023-04-05

## 2023-04-05 NOTE — TELEPHONE ENCOUNTER
Pt called and stated that she is having stomach pain for the past few days. Pt is seeking something to help.

## 2023-04-05 NOTE — TELEPHONE ENCOUNTER
[place] refill was sent in last night 4/4/23 to pharmacy. Pt was informed. She will keep her appt on 4/10 with [facility]. Pt refuses to go to ER.

## 2023-04-06 NOTE — TELEPHONE ENCOUNTER
Spoke to patient. She is complaining of bilat groin pain. She said it just started. I advised her to use warm pack, apply her pain patches. She can use the norco as well. Advised her to call back if this is not helping.

## 2023-04-10 ENCOUNTER — OFFICE VISIT (OUTPATIENT)
Dept: FAMILY MEDICINE CLINIC | Facility: CLINIC | Age: 83
End: 2023-04-10
Payer: MEDICARE

## 2023-04-10 VITALS
WEIGHT: 131 LBS | BODY MASS INDEX: 22.36 KG/M2 | HEIGHT: 64 IN | SYSTOLIC BLOOD PRESSURE: 140 MMHG | HEART RATE: 68 BPM | OXYGEN SATURATION: 98 % | RESPIRATION RATE: 18 BRPM | DIASTOLIC BLOOD PRESSURE: 84 MMHG | TEMPERATURE: 97 F

## 2023-04-10 DIAGNOSIS — R10.30 LOWER ABDOMINAL PAIN: Primary | ICD-10-CM

## 2023-04-10 DIAGNOSIS — I70.0 ATHEROSCLEROSIS OF ABDOMINAL AORTA (HCC): ICD-10-CM

## 2023-04-10 DIAGNOSIS — I70.209 FEMORAL ARTERY STENOSIS (HCC): ICD-10-CM

## 2023-04-10 DIAGNOSIS — I10 PRIMARY HYPERTENSION: ICD-10-CM

## 2023-04-10 DIAGNOSIS — I72.3 ILIAC ARTERY ANEURYSM, RIGHT (HCC): ICD-10-CM

## 2023-04-10 DIAGNOSIS — G44.209 TENSION HEADACHE: ICD-10-CM

## 2023-04-10 DIAGNOSIS — I70.1 RIGHT RENAL ARTERY STENOSIS (HCC): ICD-10-CM

## 2023-04-10 DIAGNOSIS — M51.16 LUMBAR DISC HERNIATION WITH RADICULOPATHY: ICD-10-CM

## 2023-04-10 PROCEDURE — 1111F DSCHRG MED/CURRENT MED MERGE: CPT | Performed by: FAMILY MEDICINE

## 2023-04-10 PROCEDURE — 99496 TRANSJ CARE MGMT HIGH F2F 7D: CPT | Performed by: FAMILY MEDICINE

## 2023-04-10 RX ORDER — HYDROCODONE BITARTRATE AND ACETAMINOPHEN 5; 325 MG/1; MG/1
0.5 TABLET ORAL 2 TIMES DAILY PRN
Qty: 10 TABLET | Refills: 0 | Status: SHIPPED | OUTPATIENT
Start: 2023-04-10

## 2023-04-11 ENCOUNTER — PATIENT MESSAGE (OUTPATIENT)
Dept: FAMILY MEDICINE CLINIC | Facility: CLINIC | Age: 83
End: 2023-04-11

## 2023-04-11 PROBLEM — I70.0 ATHEROSCLEROSIS OF ABDOMINAL AORTA (HCC): Status: ACTIVE | Noted: 2023-04-11

## 2023-04-11 PROBLEM — I70.0 ATHEROSCLEROSIS OF ABDOMINAL AORTA: Status: ACTIVE | Noted: 2023-04-11

## 2023-04-11 PROBLEM — I72.3 ILIAC ARTERY ANEURYSM, RIGHT: Status: ACTIVE | Noted: 2023-04-11

## 2023-04-11 PROBLEM — I70.1 RIGHT RENAL ARTERY STENOSIS: Status: ACTIVE | Noted: 2023-04-11

## 2023-04-11 PROBLEM — I70.1 RIGHT RENAL ARTERY STENOSIS (HCC): Status: ACTIVE | Noted: 2023-04-11

## 2023-04-11 PROBLEM — I72.3 ILIAC ARTERY ANEURYSM, RIGHT (HCC): Status: ACTIVE | Noted: 2023-04-11

## 2023-04-11 PROBLEM — I70.209 FEMORAL ARTERY STENOSIS (HCC): Status: ACTIVE | Noted: 2023-04-11

## 2023-04-11 PROBLEM — G44.209 TENSION HEADACHE: Status: ACTIVE | Noted: 2023-04-11

## 2023-04-11 PROBLEM — I70.209 FEMORAL ARTERY STENOSIS: Status: ACTIVE | Noted: 2023-04-11

## 2023-04-17 ENCOUNTER — TELEPHONE (OUTPATIENT)
Dept: FAMILY MEDICINE CLINIC | Facility: CLINIC | Age: 83
End: 2023-04-17

## 2023-04-17 RX ORDER — DIPHENHYDRAMINE HCL 25 MG
25 CAPSULE ORAL EVERY 6 HOURS PRN
COMMUNITY

## 2023-04-17 RX ORDER — ACETAMINOPHEN 325 MG/1
325 TABLET ORAL EVERY 6 HOURS PRN
COMMUNITY

## 2023-04-17 NOTE — TELEPHONE ENCOUNTER
Alea Hi calling from Cardiology requesting the name of the procedure pt will be having. They are to be providing medical clearance.     Alea Hi can be reached at:    Phone: [phone]    Fax [phone]

## 2023-04-18 NOTE — TELEPHONE ENCOUNTER
Spoke with Caleb Pierre from [facility]. Informed her that patient had CTA in ER on 3/29/23. Joss Zhu would like to be sure Dr. Ila Eldridge is aware of results including renal artery stenosis and the fact that she is having a EGD on 4/24/23 and if there is any contraindications d/t this. She will get back to us.

## 2023-04-23 ENCOUNTER — HOSPITAL ENCOUNTER (EMERGENCY)
Facility: HOSPITAL | Age: 83
Discharge: HOME | End: 2023-04-23
Attending: EMERGENCY MEDICINE
Payer: MEDICARE

## 2023-04-23 ENCOUNTER — APPOINTMENT (OUTPATIENT)
Dept: CT IMAGING | Facility: HOSPITAL | Age: 83
End: 2023-04-23
Attending: EMERGENCY MEDICINE
Payer: MEDICARE

## 2023-04-23 VITALS
WEIGHT: 130 LBS | TEMPERATURE: 98 F | HEIGHT: 64 IN | DIASTOLIC BLOOD PRESSURE: 76 MMHG | OXYGEN SATURATION: 96 % | RESPIRATION RATE: 16 BRPM | HEART RATE: 84 BPM | SYSTOLIC BLOOD PRESSURE: 165 MMHG | BODY MASS INDEX: 22.2 KG/M2

## 2023-04-23 DIAGNOSIS — R10.9 ABDOMINAL PAIN OF UNKNOWN ETIOLOGY: Primary | ICD-10-CM

## 2023-04-23 LAB
ALBUMIN SERPL-MCNC: 4 G/DL (ref 3.4–5)
ALBUMIN/GLOB SERPL: 1.1 {RATIO} (ref 1–2)
ALP LIVER SERPL-CCNC: 70 U/L
ALT SERPL-CCNC: 21 U/L
ANION GAP SERPL CALC-SCNC: 6 MMOL/L (ref 0–18)
AST SERPL-CCNC: 21 U/L (ref 15–37)
BASOPHILS # BLD AUTO: 0.1 X10(3) UL (ref 0–0.2)
BASOPHILS NFR BLD AUTO: 1 %
BILIRUB SERPL-MCNC: 0.4 MG/DL (ref 0.1–2)
BILIRUB UR QL STRIP.AUTO: NEGATIVE
BUN BLD-MCNC: 22 MG/DL (ref 7–18)
CALCIUM BLD-MCNC: 10.1 MG/DL (ref 8.5–10.1)
CHLORIDE SERPL-SCNC: 106 MMOL/L (ref 98–112)
CLARITY UR REFRACT.AUTO: CLEAR
CO2 SERPL-SCNC: 26 MMOL/L (ref 21–32)
CREAT BLD-MCNC: 1.2 MG/DL
EOSINOPHIL # BLD AUTO: 0.34 X10(3) UL (ref 0–0.7)
EOSINOPHIL NFR BLD AUTO: 3.3 %
ERYTHROCYTE [DISTWIDTH] IN BLOOD BY AUTOMATED COUNT: 12.8 %
GFR SERPLBLD BASED ON 1.73 SQ M-ARVRAT: 46 ML/MIN/1.73M2 (ref 60–?)
GLOBULIN PLAS-MCNC: 3.8 G/DL (ref 2.8–4.4)
GLUCOSE BLD-MCNC: 96 MG/DL (ref 70–99)
GLUCOSE UR STRIP.AUTO-MCNC: NEGATIVE MG/DL
HCT VFR BLD AUTO: 37.8 %
HGB BLD-MCNC: 12.7 G/DL
IMM GRANULOCYTES # BLD AUTO: 0.02 X10(3) UL (ref 0–1)
IMM GRANULOCYTES NFR BLD: 0.2 %
KETONES UR STRIP.AUTO-MCNC: NEGATIVE MG/DL
LEUKOCYTE ESTERASE UR QL STRIP.AUTO: NEGATIVE
LIPASE SERPL-CCNC: 29 U/L (ref 13–75)
LYMPHOCYTES # BLD AUTO: 5.15 X10(3) UL (ref 1–4)
LYMPHOCYTES NFR BLD AUTO: 50.3 %
MCH RBC QN AUTO: 32.2 PG (ref 26–34)
MCHC RBC AUTO-ENTMCNC: 33.6 G/DL (ref 31–37)
MCV RBC AUTO: 95.7 FL
MONOCYTES # BLD AUTO: 0.91 X10(3) UL (ref 0.1–1)
MONOCYTES NFR BLD AUTO: 8.9 %
NEUTROPHILS # BLD AUTO: 3.72 X10 (3) UL (ref 1.5–7.7)
NEUTROPHILS # BLD AUTO: 3.72 X10(3) UL (ref 1.5–7.7)
NEUTROPHILS NFR BLD AUTO: 36.3 %
NITRITE UR QL STRIP.AUTO: NEGATIVE
OSMOLALITY SERPL CALC.SUM OF ELEC: 289 MOSM/KG (ref 275–295)
PH UR STRIP.AUTO: 6 [PH] (ref 5–8)
PLATELET # BLD AUTO: 315 10(3)UL (ref 150–450)
POTASSIUM SERPL-SCNC: 3.8 MMOL/L (ref 3.5–5.1)
PROT SERPL-MCNC: 7.8 G/DL (ref 6.4–8.2)
PROT UR STRIP.AUTO-MCNC: 100 MG/DL
RBC # BLD AUTO: 3.95 X10(6)UL
RBC UR QL AUTO: NEGATIVE
SODIUM SERPL-SCNC: 138 MMOL/L (ref 136–145)
SP GR UR STRIP.AUTO: 1.01 (ref 1–1.03)
UROBILINOGEN UR STRIP.AUTO-MCNC: <2 MG/DL
WBC # BLD AUTO: 10.2 X10(3) UL (ref 4–11)

## 2023-04-23 PROCEDURE — 85025 COMPLETE CBC W/AUTO DIFF WBC: CPT | Performed by: EMERGENCY MEDICINE

## 2023-04-23 PROCEDURE — 81001 URINALYSIS AUTO W/SCOPE: CPT | Performed by: EMERGENCY MEDICINE

## 2023-04-23 PROCEDURE — 80053 COMPREHEN METABOLIC PANEL: CPT | Performed by: EMERGENCY MEDICINE

## 2023-04-23 PROCEDURE — 99284 EMERGENCY DEPT VISIT MOD MDM: CPT

## 2023-04-23 PROCEDURE — 83690 ASSAY OF LIPASE: CPT | Performed by: EMERGENCY MEDICINE

## 2023-04-23 PROCEDURE — 74177 CT ABD & PELVIS W/CONTRAST: CPT | Performed by: EMERGENCY MEDICINE

## 2023-04-23 PROCEDURE — 96376 TX/PRO/DX INJ SAME DRUG ADON: CPT

## 2023-04-23 PROCEDURE — 96375 TX/PRO/DX INJ NEW DRUG ADDON: CPT

## 2023-04-23 PROCEDURE — 99285 EMERGENCY DEPT VISIT HI MDM: CPT

## 2023-04-23 PROCEDURE — 96374 THER/PROPH/DIAG INJ IV PUSH: CPT

## 2023-04-23 RX ORDER — HYDROCODONE BITARTRATE AND ACETAMINOPHEN 5; 325 MG/1; MG/1
1-2 TABLET ORAL EVERY 6 HOURS PRN
Qty: 20 TABLET | Refills: 0 | Status: SHIPPED | OUTPATIENT
Start: 2023-04-23 | End: 2023-04-23

## 2023-04-23 RX ORDER — ONDANSETRON 2 MG/ML
4 INJECTION INTRAMUSCULAR; INTRAVENOUS ONCE
Status: DISCONTINUED | OUTPATIENT
Start: 2023-04-23 | End: 2023-04-23

## 2023-04-23 RX ORDER — MORPHINE SULFATE 4 MG/ML
4 INJECTION, SOLUTION INTRAMUSCULAR; INTRAVENOUS EVERY 30 MIN PRN
Status: DISCONTINUED | OUTPATIENT
Start: 2023-04-23 | End: 2023-04-23

## 2023-04-23 RX ORDER — ONDANSETRON 2 MG/ML
4 INJECTION INTRAMUSCULAR; INTRAVENOUS ONCE
Status: COMPLETED | OUTPATIENT
Start: 2023-04-23 | End: 2023-04-23

## 2023-04-23 RX ORDER — HYDROCODONE BITARTRATE AND ACETAMINOPHEN 5; 325 MG/1; MG/1
1-2 TABLET ORAL EVERY 6 HOURS PRN
Qty: 20 TABLET | Refills: 0 | Status: SHIPPED | OUTPATIENT
Start: 2023-04-23 | End: 2023-04-28

## 2023-04-23 NOTE — ED QUICK NOTES
This PCT walked by patient's room and saw she was drinking water. Pt reminded that she cannot eat or drink prior to CT results. NPO from this point forward.

## 2023-04-23 NOTE — ED INITIAL ASSESSMENT (HPI)
Pt reports lower abdominal pain that has been gradually getting worse since yesterday. States pain is sharp and moves across lower back. Pt reports nausea. Denies fever, urinary symptoms, or diarrhea.

## 2023-04-24 ENCOUNTER — ANESTHESIA EVENT (OUTPATIENT)
Dept: ENDOSCOPY | Facility: HOSPITAL | Age: 83
End: 2023-04-24
Payer: MEDICARE

## 2023-04-24 ENCOUNTER — ANESTHESIA (OUTPATIENT)
Dept: ENDOSCOPY | Facility: HOSPITAL | Age: 83
End: 2023-04-24
Payer: MEDICARE

## 2023-04-24 ENCOUNTER — HOSPITAL ENCOUNTER (OUTPATIENT)
Facility: HOSPITAL | Age: 83
Setting detail: HOSPITAL OUTPATIENT SURGERY
Discharge: HOME | End: 2023-04-24
Attending: INTERNAL MEDICINE | Admitting: INTERNAL MEDICINE
Payer: MEDICARE

## 2023-04-24 VITALS
DIASTOLIC BLOOD PRESSURE: 83 MMHG | RESPIRATION RATE: 18 BRPM | TEMPERATURE: 99 F | SYSTOLIC BLOOD PRESSURE: 177 MMHG | BODY MASS INDEX: 22.2 KG/M2 | OXYGEN SATURATION: 95 % | HEIGHT: 64 IN | HEART RATE: 80 BPM | WEIGHT: 130 LBS

## 2023-04-24 PROCEDURE — 0DB68ZX EXCISION OF STOMACH, VIA NATURAL OR ARTIFICIAL OPENING ENDOSCOPIC, DIAGNOSTIC: ICD-10-PCS | Performed by: INTERNAL MEDICINE

## 2023-04-24 PROCEDURE — 88305 TISSUE EXAM BY PATHOLOGIST: CPT | Performed by: INTERNAL MEDICINE

## 2023-04-24 PROCEDURE — BF47ZZZ ULTRASONOGRAPHY OF PANCREAS: ICD-10-PCS | Performed by: INTERNAL MEDICINE

## 2023-04-24 PROCEDURE — BD47ZZZ ULTRASONOGRAPHY OF GASTROINTESTINAL TRACT: ICD-10-PCS | Performed by: INTERNAL MEDICINE

## 2023-04-24 RX ORDER — SODIUM CHLORIDE, SODIUM LACTATE, POTASSIUM CHLORIDE, CALCIUM CHLORIDE 600; 310; 30; 20 MG/100ML; MG/100ML; MG/100ML; MG/100ML
INJECTION, SOLUTION INTRAVENOUS CONTINUOUS
Status: DISCONTINUED | OUTPATIENT
Start: 2023-04-24 | End: 2023-04-24

## 2023-04-24 RX ORDER — HYDROCODONE BITARTRATE AND ACETAMINOPHEN 5; 325 MG/1; MG/1
2 TABLET ORAL ONCE AS NEEDED
Status: DISCONTINUED | OUTPATIENT
Start: 2023-04-24 | End: 2023-04-24

## 2023-04-24 RX ORDER — HYDROMORPHONE HYDROCHLORIDE 1 MG/ML
0.4 INJECTION, SOLUTION INTRAMUSCULAR; INTRAVENOUS; SUBCUTANEOUS EVERY 5 MIN PRN
Status: DISCONTINUED | OUTPATIENT
Start: 2023-04-24 | End: 2023-04-24

## 2023-04-24 RX ORDER — NALOXONE HYDROCHLORIDE 0.4 MG/ML
80 INJECTION, SOLUTION INTRAMUSCULAR; INTRAVENOUS; SUBCUTANEOUS AS NEEDED
Status: DISCONTINUED | OUTPATIENT
Start: 2023-04-24 | End: 2023-04-24

## 2023-04-24 RX ORDER — HYDROMORPHONE HYDROCHLORIDE 1 MG/ML
0.6 INJECTION, SOLUTION INTRAMUSCULAR; INTRAVENOUS; SUBCUTANEOUS EVERY 5 MIN PRN
Status: DISCONTINUED | OUTPATIENT
Start: 2023-04-24 | End: 2023-04-24

## 2023-04-24 RX ORDER — ACETAMINOPHEN 500 MG
1000 TABLET ORAL ONCE AS NEEDED
Status: DISCONTINUED | OUTPATIENT
Start: 2023-04-24 | End: 2023-04-24

## 2023-04-24 RX ORDER — METOCLOPRAMIDE HYDROCHLORIDE 5 MG/ML
5 INJECTION INTRAMUSCULAR; INTRAVENOUS EVERY 8 HOURS PRN
Status: DISCONTINUED | OUTPATIENT
Start: 2023-04-24 | End: 2023-04-24

## 2023-04-24 RX ORDER — LIDOCAINE HYDROCHLORIDE 10 MG/ML
INJECTION, SOLUTION EPIDURAL; INFILTRATION; INTRACAUDAL; PERINEURAL AS NEEDED
Status: DISCONTINUED | OUTPATIENT
Start: 2023-04-24 | End: 2023-04-24 | Stop reason: SURG

## 2023-04-24 RX ORDER — HYDROCODONE BITARTRATE AND ACETAMINOPHEN 5; 325 MG/1; MG/1
1 TABLET ORAL ONCE AS NEEDED
Status: DISCONTINUED | OUTPATIENT
Start: 2023-04-24 | End: 2023-04-24

## 2023-04-24 RX ORDER — ONDANSETRON 2 MG/ML
4 INJECTION INTRAMUSCULAR; INTRAVENOUS EVERY 6 HOURS PRN
Status: DISCONTINUED | OUTPATIENT
Start: 2023-04-24 | End: 2023-04-24

## 2023-04-24 RX ORDER — HYDROMORPHONE HYDROCHLORIDE 1 MG/ML
0.2 INJECTION, SOLUTION INTRAMUSCULAR; INTRAVENOUS; SUBCUTANEOUS EVERY 5 MIN PRN
Status: DISCONTINUED | OUTPATIENT
Start: 2023-04-24 | End: 2023-04-24

## 2023-04-24 RX ORDER — KETAMINE HYDROCHLORIDE 50 MG/ML
INJECTION, SOLUTION, CONCENTRATE INTRAMUSCULAR; INTRAVENOUS AS NEEDED
Status: DISCONTINUED | OUTPATIENT
Start: 2023-04-24 | End: 2023-04-24 | Stop reason: SURG

## 2023-04-24 RX ADMIN — KETAMINE HYDROCHLORIDE 12.5 MG: 50 INJECTION, SOLUTION, CONCENTRATE INTRAMUSCULAR; INTRAVENOUS at 14:52:00

## 2023-04-24 RX ADMIN — LIDOCAINE HYDROCHLORIDE 50 MG: 10 INJECTION, SOLUTION EPIDURAL; INFILTRATION; INTRACAUDAL; PERINEURAL at 14:46:00

## 2023-04-24 NOTE — OPERATIVE REPORT
OPERATIVE REPORT   PATIENT NAME: Elva Menezes  MRN: [id number]  DATE OF OPERATION: 4/24/2023  PREOPERATIVE DIAGNOSIS:   1. Dilated bile duct. 2. Chronic abdominal pain  POSTOPERATIVE DIAGNOSES:  1. Sliding hiatal hernia. 2. Gastritis  3. Gastric antral healed prior ulcer scar  4. Prominent bile duct without obvious mass lesion or stones  PROCEDURE PERFORMED:  1. Upper endoscopy with biopsy. 2.  Linear endoscopic ultrasound  SURGEON: Jimmy Parekh MD   MEDICATIONS: None    ANESTHESIA: MAC  CONSENT: Informed and obtained from the patient  SPECIMEN: Gastric biopsies  COMPLICATIONS: None immediately apparent  PROCEDURE AND FINDINGS:   After the risks and benefits of the procedure were discussed with the patient and all questions were answered, the patient signed informed consent for the procedure. I discussed the rationale for the procedure as well as the risks and benefits, with the risks including but not limited to bleeding, perforation, medication adverse event and missed lesions. She was placed in the left lateral position and once an adequate level of  sedation was achieved, the lubricated tip of an Olympus video gastroscope was introduced into the mouth and the esophagus was intubated under direct visualization. A complete examination of the esophagus, stomach and duodenum was performed including retroflexion in the stomach to view the cardia and fundus. The endoscope was straightened and removed, and the procedure was completed. The patient tolerated the procedure well. There were no immediate complications. The patient was given a written copy of their results at discharge. FINDINGS:  1. Normal esophagus with no evidence of esophagitis, stricture, ulceration, mass or other abnormalities. The squamocolumnar junction was intact. The esophagogastric junction was patent. There were no endoscopic features of eosinophilic esophagitis or Hernandez's esophagus. Small sliding hiatal hernia was seen  2.   Few scattered erosions were seen in the stomach with a white scar in the antrum most consistent with prior ulcer site. No active ulcers were seen. The gastric folds were nonthickened. The cardia and the fundus were inspected retroflexion no obvious abnormality was seen. 3. Normal duodenum to the second portion with no ulcers or masses seen. The major papilla appeared unremarkable. Next, the Olympus linear echoendoscope was introduced under direct visualization in the esophagus passed into the stomach and second portion of the duodenum. The pancreatic body and tail were examined from the stomach and appeared unremarkable. The main pancreatic duct did not appear to be dilated. The previously described pancreatic tail cyst on the CT scan could not be visualized. The head of the pancreas and the uncinate process were then examined from the second portion of the duodenum as well as duodenal bulb. The main pancreatic duct and the bile duct were both seen entering the duodenum and the level of the major papilla and both appeared unremarkable. The bile duct appeared to be prominent proximally measuring approximately 8 mm in maximum diameter and tapers towards the head of the pancreas measuring approximately 5 mm in maximum diameter. No obvious chronic pancreatitis changes seen. IMPRESSION:  1. Findings described above  RECOMMENDATIONS:  1. Patient with chronic abdominal pain. 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 2. Check follow biopsy results treat H. pylori positive  3.  Patient may benefit from behavioral therapy  Mik Garrett MD

## 2023-04-25 NOTE — ANESTHESIA POSTPROCEDURE EVALUATION
[address] Patient Status:  Hospital Outpatient Surgery   Age/Gender [de-identified]year old female MRN [id number]   Location [address] [facility] 28 Attending No att. providers found   [address] Day # 0 PCP Josue Haider DO       Anesthesia Post-op Note    ESOPHAGOGASTRODUODENOSCOPY with biopsies,  UPPER ENDOSCOPIC ULTRASOUND    Procedure Summary     Date: 04/24/23 Room / Location: [facility] ENDOSCOPY 02 / 140[facility] ENDOSCOPY    Anesthesia Start: 1760 Anesthesia Stop: 8785    Procedures:       ESOPHAGOGASTRODUODENOSCOPY with biopsies,  UPPER ENDOSCOPIC ULTRASOUND      ENDOSCOPIC ULTRASOUND Diagnosis: (EGD: hiatal hernia, gastritis; EUS: prominent bile duct)    Surgeons: Sanjana Conner MD Anesthesiologist: Chelsea Weaver MD    Anesthesia Type: MAC ASA Status: 3          Anesthesia Type: MAC    Vitals Value Taken Time   /83 04/24/23 1532   Temp 98 04/24/23 2147   Pulse 80 04/24/23 1532   Resp 18 04/24/23 1510   SpO2 95 % 04/24/23 1532       Patient Location: Endoscopy    Anesthesia Type: MAC    Airway Patency: patent    Postop Pain Control: adequate    Mental Status: preanesthetic baseline    Nausea/Vomiting: none    Cardiopulmonary/Hydration status: stable euvolemic    Complications: no apparent anesthesia related complications    Postop vital signs: stable    Dental Exam: Unchanged from Preop    Patient to be discharged home when criteria met.

## 2023-05-03 DIAGNOSIS — I10 ESSENTIAL HYPERTENSION: ICD-10-CM

## 2023-05-03 RX ORDER — LOSARTAN POTASSIUM 100 MG/1
TABLET ORAL
Qty: 90 TABLET | Refills: 0 | Status: SHIPPED | OUTPATIENT
Start: 2023-05-03

## 2023-05-11 PROBLEM — K29.30 CHRONIC SUPERFICIAL GASTRITIS WITHOUT BLEEDING: Status: ACTIVE | Noted: 2023-05-11

## 2023-05-11 PROBLEM — K44.9 SLIDING HIATAL HERNIA: Status: ACTIVE | Noted: 2023-05-11

## 2023-05-11 PROBLEM — I10 UNCONTROLLED HYPERTENSION: Status: ACTIVE | Noted: 2023-05-11

## 2023-05-17 RX ORDER — PRAVASTATIN SODIUM 40 MG
TABLET ORAL
Qty: 90 TABLET | Refills: 0 | Status: SHIPPED | OUTPATIENT
Start: 2023-05-17

## 2023-05-23 DIAGNOSIS — M51.16 LUMBAR DISC HERNIATION WITH RADICULOPATHY: ICD-10-CM

## 2023-05-26 RX ORDER — LIDOCAINE 50 MG/G
PATCH TOPICAL
Qty: 60 PATCH | Refills: 0 | Status: SHIPPED | OUTPATIENT
Start: 2023-05-26

## 2023-06-02 ENCOUNTER — TELEPHONE (OUTPATIENT)
Dept: PAIN CLINIC | Facility: CLINIC | Age: 83
End: 2023-06-02

## 2023-06-02 DIAGNOSIS — M51.16 LUMBAR DISC HERNIATION WITH RADICULOPATHY: ICD-10-CM

## 2023-06-02 DIAGNOSIS — R10.30 LOWER ABDOMINAL PAIN: ICD-10-CM

## 2023-06-02 RX ORDER — HYDROCODONE BITARTRATE AND ACETAMINOPHEN 5; 325 MG/1; MG/1
0.5 TABLET ORAL 2 TIMES DAILY PRN
Qty: 10 TABLET | Refills: 0 | Status: SHIPPED | OUTPATIENT
Start: 2023-06-02

## 2023-06-02 NOTE — TELEPHONE ENCOUNTER
Pt called and stated that she needs a refill on her HYDROcodone-acetaminophen.  States she is completely out

## 2023-06-02 NOTE — TELEPHONE ENCOUNTER
Pt calling in requesting to speak to nursing staff regarding her lower abdomen and left leg pain. Pt requesting for recommendation. Informed pt nursing staff is busy assisting patients but it would be best to schedule a follow up appointment. Pt scheduled for 06/06 with ALLEN Wilson.

## 2023-06-02 NOTE — TELEPHONE ENCOUNTER
Contacted pt at this time and per pt she's having lower left abdominal pain. advise pt that we do not treat abdominal pain advised to call her gastroenterologist ,per pt she saw them before and can't find anything. Advised pt that we don't treat abdominal pain here as this is out of our scope. Pt STEPHANIA. Pt states that her left hip going to her left leg is also hurting really bad,per pt she's taking Norco for pain that was prescribe by her PCP and she only have 1 tab left. Advised to call her pcp is she needs refill. Advised to take Tylenol /motrin otc,pt states she's been taking it and not helping. Pt is scheduled to see Judy Ortega on 6/6/2023

## 2023-06-04 NOTE — TELEPHONE ENCOUNTER
Spoke with the patient. She complains of severe pelvic pain which started this morning \"where her ovaries were. \" she denies fever, vomiting, diarrhea, dysuria, hematuria. Admits to some mild nausea. Took ASA without relief. In light of the patient's complex medical history, I advised to be seen at the [facility] today. Patient voiced understanding of these instructions.

## 2023-06-20 DIAGNOSIS — R10.30 LOWER ABDOMINAL PAIN: ICD-10-CM

## 2023-06-20 DIAGNOSIS — M51.16 LUMBAR DISC HERNIATION WITH RADICULOPATHY: ICD-10-CM

## 2023-06-20 RX ORDER — HYDROCODONE BITARTRATE AND ACETAMINOPHEN 5; 325 MG/1; MG/1
0.5 TABLET ORAL 2 TIMES DAILY PRN
Qty: 10 TABLET | Refills: 0 | OUTPATIENT
Start: 2023-06-20

## 2023-06-20 NOTE — TELEPHONE ENCOUNTER
Patient requesting refill for HYDROcodone-acetaminophen ([facility]) 5-325 MG Oral Tab    Last office visit note 5/10/23, last refill 6/2/23. Please advise. Per 5/10/23 note Conservative use maximum of 10/month     Please advise.

## 2023-06-20 NOTE — TELEPHONE ENCOUNTER
Patient would refills on ,states she is in a lot of pain.  Pharmacy did not want to give her medication, please send to Rick in [facility]   HYDROcodone-acetaminophen [facility]) 5-325 MG Oral Tab

## 2023-06-21 NOTE — TELEPHONE ENCOUNTER
Patient calling to find a status of prescription,states that she is in a lot of pain, and would like medication.

## 2023-06-22 ENCOUNTER — TELEPHONE (OUTPATIENT)
Dept: FAMILY MEDICINE CLINIC | Facility: CLINIC | Age: 83
End: 2023-06-22

## 2023-06-22 RX ORDER — HYDROCODONE BITARTRATE AND ACETAMINOPHEN 5; 325 MG/1; MG/1
0.5 TABLET ORAL 2 TIMES DAILY PRN
Qty: 10 TABLET | Refills: 0 | Status: SHIPPED | OUTPATIENT
Start: 2023-06-22

## 2023-06-22 NOTE — TELEPHONE ENCOUNTER
Per note below, Left message for patient at      Telephone Information:   Mobile [phone]   to update insurance  Patient to return call to Shawna DREW [phone].  Or  [phone].     Pt calling to check status on prescription. Pt states she's in a lot of pain and can't wait until Aissatou Adame is back in-office tomorrow. Please advise. Thank you.

## 2023-06-25 ENCOUNTER — HOSPITAL ENCOUNTER (EMERGENCY)
Facility: HOSPITAL | Age: 83
Discharge: HOME | End: 2023-06-25
Attending: EMERGENCY MEDICINE
Payer: MEDICARE

## 2023-06-25 VITALS
RESPIRATION RATE: 14 BRPM | HEIGHT: 64 IN | SYSTOLIC BLOOD PRESSURE: 140 MMHG | OXYGEN SATURATION: 99 % | TEMPERATURE: 99 F | HEART RATE: 51 BPM | WEIGHT: 133 LBS | BODY MASS INDEX: 22.71 KG/M2 | DIASTOLIC BLOOD PRESSURE: 85 MMHG

## 2023-06-25 DIAGNOSIS — N30.90 CYSTITIS: Primary | ICD-10-CM

## 2023-06-25 LAB
ALBUMIN SERPL-MCNC: 3.8 G/DL (ref 3.4–5)
ALBUMIN/GLOB SERPL: 1.1 {RATIO} (ref 1–2)
ALP LIVER SERPL-CCNC: 70 U/L
ALT SERPL-CCNC: 19 U/L
ANION GAP SERPL CALC-SCNC: 4 MMOL/L (ref 0–18)
AST SERPL-CCNC: 20 U/L (ref 15–37)
BASOPHILS # BLD AUTO: 0.11 X10(3) UL (ref 0–0.2)
BASOPHILS NFR BLD AUTO: 1 %
BILIRUB SERPL-MCNC: 0.4 MG/DL (ref 0.1–2)
BILIRUB UR QL STRIP.AUTO: NEGATIVE
BUN BLD-MCNC: 24 MG/DL (ref 7–18)
CALCIUM BLD-MCNC: 9.9 MG/DL (ref 8.5–10.1)
CHLORIDE SERPL-SCNC: 109 MMOL/L (ref 98–112)
CLARITY UR REFRACT.AUTO: CLEAR
CO2 SERPL-SCNC: 26 MMOL/L (ref 21–32)
COLOR UR AUTO: YELLOW
CREAT BLD-MCNC: 1.32 MG/DL
EOSINOPHIL # BLD AUTO: 0.26 X10(3) UL (ref 0–0.7)
EOSINOPHIL NFR BLD AUTO: 2.4 %
ERYTHROCYTE [DISTWIDTH] IN BLOOD BY AUTOMATED COUNT: 12.3 %
GFR SERPLBLD BASED ON 1.73 SQ M-ARVRAT: 41 ML/MIN/1.73M2 (ref 60–?)
GLOBULIN PLAS-MCNC: 3.5 G/DL (ref 2.8–4.4)
GLUCOSE BLD-MCNC: 98 MG/DL (ref 70–99)
GLUCOSE UR STRIP.AUTO-MCNC: NEGATIVE MG/DL
HCT VFR BLD AUTO: 37.9 %
HGB BLD-MCNC: 13 G/DL
HYALINE CASTS #/AREA URNS AUTO: PRESENT /LPF
IMM GRANULOCYTES # BLD AUTO: 0.02 X10(3) UL (ref 0–1)
IMM GRANULOCYTES NFR BLD: 0.2 %
KETONES UR STRIP.AUTO-MCNC: NEGATIVE MG/DL
LEUKOCYTE ESTERASE UR QL STRIP.AUTO: NEGATIVE
LYMPHOCYTES # BLD AUTO: 4.62 X10(3) UL (ref 1–4)
LYMPHOCYTES NFR BLD AUTO: 43 %
MCH RBC QN AUTO: 32.7 PG (ref 26–34)
MCHC RBC AUTO-ENTMCNC: 34.3 G/DL (ref 31–37)
MCV RBC AUTO: 95.2 FL
MONOCYTES # BLD AUTO: 1.06 X10(3) UL (ref 0.1–1)
MONOCYTES NFR BLD AUTO: 9.9 %
NEUTROPHILS # BLD AUTO: 4.67 X10 (3) UL (ref 1.5–7.7)
NEUTROPHILS # BLD AUTO: 4.67 X10(3) UL (ref 1.5–7.7)
NEUTROPHILS NFR BLD AUTO: 43.5 %
NITRITE UR QL STRIP.AUTO: NEGATIVE
OSMOLALITY SERPL CALC.SUM OF ELEC: 292 MOSM/KG (ref 275–295)
PH UR STRIP.AUTO: 6 [PH] (ref 5–8)
PLATELET # BLD AUTO: 333 10(3)UL (ref 150–450)
POTASSIUM SERPL-SCNC: 3.7 MMOL/L (ref 3.5–5.1)
PROT SERPL-MCNC: 7.3 G/DL (ref 6.4–8.2)
PROT UR STRIP.AUTO-MCNC: 100 MG/DL
RBC # BLD AUTO: 3.98 X10(6)UL
RBC #/AREA URNS AUTO: >10 /HPF
RBC UR QL AUTO: NEGATIVE
SODIUM SERPL-SCNC: 139 MMOL/L (ref 136–145)
SP GR UR STRIP.AUTO: 1.01 (ref 1–1.03)
UROBILINOGEN UR STRIP.AUTO-MCNC: <2 MG/DL
WBC # BLD AUTO: 10.7 X10(3) UL (ref 4–11)

## 2023-06-25 PROCEDURE — 96360 HYDRATION IV INFUSION INIT: CPT

## 2023-06-25 PROCEDURE — 80053 COMPREHEN METABOLIC PANEL: CPT | Performed by: EMERGENCY MEDICINE

## 2023-06-25 PROCEDURE — 81001 URINALYSIS AUTO W/SCOPE: CPT | Performed by: EMERGENCY MEDICINE

## 2023-06-25 PROCEDURE — 99284 EMERGENCY DEPT VISIT MOD MDM: CPT

## 2023-06-25 PROCEDURE — 85025 COMPLETE CBC W/AUTO DIFF WBC: CPT | Performed by: EMERGENCY MEDICINE

## 2023-06-25 RX ORDER — SODIUM CHLORIDE 9 MG/ML
INJECTION, SOLUTION INTRAVENOUS CONTINUOUS
Status: DISCONTINUED | OUTPATIENT
Start: 2023-06-25 | End: 2023-06-25

## 2023-06-25 RX ORDER — VANCOMYCIN HYDROCHLORIDE 125 MG/1
125 CAPSULE ORAL ONCE
Qty: 10 CAPSULE | Refills: 0 | Status: SHIPPED | OUTPATIENT
Start: 2023-06-25 | End: 2023-06-25

## 2023-06-25 RX ORDER — LEVOFLOXACIN 250 MG/1
250 TABLET ORAL DAILY
Qty: 10 TABLET | Refills: 0 | Status: SHIPPED | OUTPATIENT
Start: 2023-06-25 | End: 2023-07-05

## 2023-06-25 NOTE — ED INITIAL ASSESSMENT (HPI)
PT c/o dysuria starting yesterday that has progressively gotten worse. PT reports constant lower abdominal pain. Denies fever.

## 2023-06-28 ENCOUNTER — TELEPHONE (OUTPATIENT)
Dept: FAMILY MEDICINE CLINIC | Facility: CLINIC | Age: 83
End: 2023-06-28

## 2023-06-28 ENCOUNTER — OFFICE VISIT (OUTPATIENT)
Dept: FAMILY MEDICINE CLINIC | Facility: CLINIC | Age: 83
End: 2023-06-28
Payer: MEDICARE

## 2023-06-28 VITALS
HEART RATE: 78 BPM | RESPIRATION RATE: 18 BRPM | OXYGEN SATURATION: 96 % | WEIGHT: 135 LBS | HEIGHT: 64 IN | SYSTOLIC BLOOD PRESSURE: 146 MMHG | TEMPERATURE: 98 F | BODY MASS INDEX: 23.05 KG/M2 | DIASTOLIC BLOOD PRESSURE: 68 MMHG

## 2023-06-28 DIAGNOSIS — G89.29 CHRONIC BILATERAL LOW BACK PAIN WITHOUT SCIATICA: ICD-10-CM

## 2023-06-28 DIAGNOSIS — M79.7 FIBROMYALGIA: ICD-10-CM

## 2023-06-28 DIAGNOSIS — M54.50 CHRONIC BILATERAL LOW BACK PAIN WITHOUT SCIATICA: ICD-10-CM

## 2023-06-28 DIAGNOSIS — G89.29 CHRONIC ABDOMINAL PAIN: ICD-10-CM

## 2023-06-28 DIAGNOSIS — R10.30 LOWER ABDOMINAL PAIN: Primary | ICD-10-CM

## 2023-06-28 DIAGNOSIS — R10.9 CHRONIC ABDOMINAL PAIN: ICD-10-CM

## 2023-06-28 DIAGNOSIS — M51.16 LUMBAR DISC HERNIATION WITH RADICULOPATHY: ICD-10-CM

## 2023-06-28 PROCEDURE — 87086 URINE CULTURE/COLONY COUNT: CPT | Performed by: FAMILY MEDICINE

## 2023-06-28 PROCEDURE — 99214 OFFICE O/P EST MOD 30 MIN: CPT | Performed by: FAMILY MEDICINE

## 2023-06-28 RX ORDER — HYDROCODONE BITARTRATE AND ACETAMINOPHEN 5; 325 MG/1; MG/1
0.5 TABLET ORAL 2 TIMES DAILY PRN
Qty: 10 TABLET | Refills: 0 | Status: CANCELLED | OUTPATIENT
Start: 2023-06-28

## 2023-06-28 RX ORDER — VANCOMYCIN HYDROCHLORIDE 125 MG/1
CAPSULE ORAL
COMMUNITY
Start: 2023-06-25

## 2023-06-28 RX ORDER — TIZANIDINE 2 MG/1
TABLET ORAL EVERY 6 HOURS PRN
Qty: 30 TABLET | Refills: 1 | Status: SHIPPED | OUTPATIENT
Start: 2023-06-28

## 2023-06-28 RX ORDER — METAXALONE 800 MG/1
800 TABLET ORAL 3 TIMES DAILY PRN
Qty: 30 TABLET | Refills: 0 | Status: SHIPPED | OUTPATIENT
Start: 2023-06-28 | End: 2023-06-28

## 2023-06-28 RX ORDER — GABAPENTIN 400 MG/1
400 CAPSULE ORAL 3 TIMES DAILY
Qty: 270 CAPSULE | Refills: 0 | Status: SHIPPED | OUTPATIENT
Start: 2023-06-28 | End: 2023-09-26

## 2023-06-29 PROBLEM — N30.01 ACUTE CYSTITIS WITH HEMATURIA: Status: RESOLVED | Noted: 2023-03-16 | Resolved: 2023-06-29

## 2023-06-29 PROBLEM — R10.32 ABDOMINAL PAIN, LEFT LOWER QUADRANT: Status: RESOLVED | Noted: 2023-03-16 | Resolved: 2023-06-29

## 2023-06-29 PROBLEM — M54.50 CHRONIC BILATERAL LOW BACK PAIN WITHOUT SCIATICA: Status: ACTIVE | Noted: 2023-06-29

## 2023-06-29 PROBLEM — G89.29 CHRONIC BILATERAL LOW BACK PAIN WITHOUT SCIATICA: Status: ACTIVE | Noted: 2023-06-29

## 2023-07-03 ENCOUNTER — TELEPHONE (OUTPATIENT)
Dept: FAMILY MEDICINE CLINIC | Facility: CLINIC | Age: 83
End: 2023-07-03

## 2023-07-03 NOTE — TELEPHONE ENCOUNTER
Spoke with patient was seen 6/28/23 for lower abdominal pain. Reports pain today is 9/10. Denies any urinary symptoms. Wondering if she can have a referral to a surgeon feels she has scar tissue that needs to be removed from pelvic area that could be contributing to pain. Please advise.

## 2023-07-04 NOTE — TELEPHONE ENCOUNTER
If she is not having any signs of obstruction such as bowel movement issues that have led to constipation I do not think they are going to have surgery on her since she is extremely high risk. I will send this also to [facility] to her but they do not always read the [employer] messages.   If they really want to discuss this with a general surgeon I can refer but again I doubt if they will want to do surgery on her with her heart complications

## 2023-07-05 ENCOUNTER — HOSPITAL ENCOUNTER (EMERGENCY)
Age: 83
Discharge: HOME | End: 2023-07-05
Attending: EMERGENCY MEDICINE
Payer: MEDICARE

## 2023-07-05 ENCOUNTER — APPOINTMENT (OUTPATIENT)
Dept: GENERAL RADIOLOGY | Age: 83
End: 2023-07-05
Attending: EMERGENCY MEDICINE
Payer: MEDICARE

## 2023-07-05 ENCOUNTER — APPOINTMENT (OUTPATIENT)
Dept: GENERAL RADIOLOGY | Age: 83
End: 2023-07-05
Payer: MEDICARE

## 2023-07-05 VITALS
TEMPERATURE: 99 F | SYSTOLIC BLOOD PRESSURE: 138 MMHG | WEIGHT: 134.94 LBS | HEART RATE: 61 BPM | OXYGEN SATURATION: 99 % | BODY MASS INDEX: 23 KG/M2 | RESPIRATION RATE: 16 BRPM | DIASTOLIC BLOOD PRESSURE: 88 MMHG

## 2023-07-05 DIAGNOSIS — S92.202A CLOSED NONDISPLACED FRACTURE OF TARSAL BONE OF LEFT FOOT, UNSPECIFIED TARSAL BONE, INITIAL ENCOUNTER: Primary | ICD-10-CM

## 2023-07-05 PROCEDURE — 99284 EMERGENCY DEPT VISIT MOD MDM: CPT

## 2023-07-05 PROCEDURE — 73610 X-RAY EXAM OF ANKLE: CPT | Performed by: EMERGENCY MEDICINE

## 2023-07-05 PROCEDURE — 73630 X-RAY EXAM OF FOOT: CPT | Performed by: EMERGENCY MEDICINE

## 2023-07-05 RX ORDER — HYDROCODONE BITARTRATE AND ACETAMINOPHEN 10; 325 MG/1; MG/1
1 TABLET ORAL EVERY 6 HOURS PRN
Qty: 6 TABLET | Refills: 0 | Status: SHIPPED | OUTPATIENT
Start: 2023-07-05 | End: 2023-07-21

## 2023-07-05 NOTE — TELEPHONE ENCOUNTER
Please advise on any further recommendations for severe abdominal pain. Patient reports she trying heating pad and abdominal massage with no relief. Patient was also educated on changing diet to avoid fatty foods.      Please advise

## 2023-07-05 NOTE — TELEPHONE ENCOUNTER
Spoke with patient advised per Pao Grimm if she is not having any signs of obstruction such as bowel movement issues that have led to constipation she does not think they are going to have surgery on her since she is extremely high risk. If they really want to discuss this with a general surgeon I can refer but again I doubt if they will want to do surgery on her with her heart complications. Try gentle abdominal massage heating pad avoid foods that are irritating such as fatty foods try to increase your fiber with fruits and vegetables. Patient verbalized understanding wants to hold off on a referral for general surgery at this time.

## 2023-07-05 NOTE — TELEPHONE ENCOUNTER
Pt called and stated that she is still having severe lower abdominal pain. States it is by her ovaries. . Pt seeking advice.

## 2023-07-06 NOTE — TELEPHONE ENCOUNTER
Did she try a lidocaine patch? You can refer her to Dr. Camilla Doss but again I doubt if surgery will do anything for the abdominal pain since there is no signs of obstruction.

## 2023-07-06 NOTE — DISCHARGE INSTRUCTIONS
Wear Ace wrap and postop shoe while up walking around  Use a walker nonweightbearing left foot  Tylenol for mild pain. May take 1 Norco for severe pain. Take Colace stool softener to avoid constipation. Balinda Shackle may make you sleepy.   Return for any problems  Follow-up with orthopedics, call office tomorrow for follow-up appointment

## 2023-07-21 ENCOUNTER — TELEPHONE (OUTPATIENT)
Dept: FAMILY MEDICINE CLINIC | Facility: CLINIC | Age: 83
End: 2023-07-21

## 2023-07-21 RX ORDER — HYDROCODONE BITARTRATE AND ACETAMINOPHEN 5; 325 MG/1; MG/1
0.5 TABLET ORAL 2 TIMES DAILY PRN
Qty: 20 TABLET | Refills: 0 | Status: SHIPPED | OUTPATIENT
Start: 2023-07-21

## 2023-07-21 NOTE — TELEPHONE ENCOUNTER
Patient calling back missed call from nurse yesterday. Still having sever abdominal pains.  Totally out of pain medications   Please advise

## 2023-07-21 NOTE — TELEPHONE ENCOUNTER
Pt complains of worsening abdominal pain. Needs refill on [place]. Same lower abdominal pain see has had previously. No fever, N/V/D. Will sed RF on [place]. Instructed to go to the IC or ER if symptoms worsen. Follow up with PCP next week.

## 2023-07-24 ENCOUNTER — OFFICE VISIT (OUTPATIENT)
Dept: FAMILY MEDICINE CLINIC | Facility: CLINIC | Age: 83
End: 2023-07-24
Payer: MEDICARE

## 2023-07-24 VITALS
DIASTOLIC BLOOD PRESSURE: 78 MMHG | SYSTOLIC BLOOD PRESSURE: 136 MMHG | BODY MASS INDEX: 23.05 KG/M2 | HEART RATE: 78 BPM | HEIGHT: 64 IN | OXYGEN SATURATION: 98 % | TEMPERATURE: 99 F | RESPIRATION RATE: 18 BRPM | WEIGHT: 135 LBS

## 2023-07-24 DIAGNOSIS — R10.31 CHRONIC BILATERAL LOWER ABDOMINAL PAIN: Primary | ICD-10-CM

## 2023-07-24 DIAGNOSIS — G89.29 CHRONIC SUPRAPUBIC PAIN: ICD-10-CM

## 2023-07-24 DIAGNOSIS — R10.2 CHRONIC SUPRAPUBIC PAIN: ICD-10-CM

## 2023-07-24 DIAGNOSIS — G89.29 CHRONIC BILATERAL LOWER ABDOMINAL PAIN: Primary | ICD-10-CM

## 2023-07-24 DIAGNOSIS — R10.32 CHRONIC BILATERAL LOWER ABDOMINAL PAIN: Primary | ICD-10-CM

## 2023-07-24 LAB
BILIRUBIN: NEGATIVE
GLUCOSE (URINE DIPSTICK): NEGATIVE MG/DL
KETONES (URINE DIPSTICK): NEGATIVE MG/DL
MULTISTIX LOT#: ABNORMAL NUMERIC
NITRITE, URINE: NEGATIVE
PH, URINE: 6.5 (ref 4.5–8)
PROTEIN (URINE DIPSTICK): NEGATIVE MG/DL
SPECIFIC GRAVITY: 1.02 (ref 1–1.03)
URINE-COLOR: YELLOW
UROBILINOGEN,SEMI-QN: 1 MG/DL (ref 0–1.9)

## 2023-07-24 PROCEDURE — 81003 URINALYSIS AUTO W/O SCOPE: CPT | Performed by: FAMILY MEDICINE

## 2023-07-24 PROCEDURE — 99214 OFFICE O/P EST MOD 30 MIN: CPT | Performed by: FAMILY MEDICINE

## 2023-07-24 PROCEDURE — 87086 URINE CULTURE/COLONY COUNT: CPT | Performed by: FAMILY MEDICINE

## 2023-07-25 ENCOUNTER — OFFICE VISIT (OUTPATIENT)
Facility: LOCATION | Age: 83
End: 2023-07-25
Payer: MEDICARE

## 2023-07-25 DIAGNOSIS — R10.9 CHRONIC ABDOMINAL PAIN: Primary | ICD-10-CM

## 2023-07-25 DIAGNOSIS — G89.29 CHRONIC ABDOMINAL PAIN: Primary | ICD-10-CM

## 2023-07-25 PROCEDURE — 99205 OFFICE O/P NEW HI 60 MIN: CPT | Performed by: SURGERY
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
## 2023-07-27 ENCOUNTER — PATIENT OUTREACH (OUTPATIENT)
Dept: CASE MANAGEMENT | Age: 83
End: 2023-07-27

## 2023-07-30 DIAGNOSIS — M51.16 LUMBAR DISC HERNIATION WITH RADICULOPATHY: ICD-10-CM

## 2023-07-31 ENCOUNTER — TELEPHONE (OUTPATIENT)
Dept: FAMILY MEDICINE CLINIC | Facility: CLINIC | Age: 83
End: 2023-07-31

## 2023-07-31 RX ORDER — LIDOCAINE 50 MG/G
PATCH TOPICAL
Qty: 60 PATCH | Refills: 5 | Status: SHIPPED | OUTPATIENT
Start: 2023-07-31

## 2023-08-01 NOTE — TELEPHONE ENCOUNTER
The use of hydrocodone was discussed with patient and I told her I will not allow her to have more than 20/month it is only been 10 days and she is already taken 20. My goal was to only have her have 10/month. She needs to be able to go without hydrocodone for 3 to 4 days in a row and should not take it daily. Sonal Chatman

## 2023-08-02 ENCOUNTER — TELEPHONE (OUTPATIENT)
Dept: FAMILY MEDICINE CLINIC | Facility: CLINIC | Age: 83
End: 2023-08-02

## 2023-08-02 DIAGNOSIS — M51.16 LUMBAR DISC HERNIATION WITH RADICULOPATHY: Primary | ICD-10-CM

## 2023-08-02 NOTE — TELEPHONE ENCOUNTER
Patient In severe abdominal pain had f/up with Surgeon and she does not want to do surgery patient would like to know what next steps and options are She cannot deal with this pain (crying on phone)  Please advise.

## 2023-08-02 NOTE — TELEPHONE ENCOUNTER
Refer to pain clinic again too early for hydrocodone refill  sounds like it could be related to back issues since surgeon felt ist was not from adhesions

## 2023-08-02 NOTE — TELEPHONE ENCOUNTER
Spoke to pt and has pains in lower part of stomach in her ovaries. She saw the surgeon  Dr Aminata Khan on 7/25: On physical examination, Marti Harris does appear to be comfortable and moves rather easily from the chair to the examination table with the limitation of her recent orthopedic foot injury. The patient does have some tenderness to palpation in the bilateral pubic symphysis and left ASIS. She has no reproducible focal abdominal tenderness. Marti Harris has had extensive imaging for this problem of abdominal pain in the past-Donna did have 15 CT scans of the abdomen and pelvis in 2021 including a CTA, she has had 4 CT scans of the abdomen and pelvis thus far this year. She has had 4 MRIs of the abdomen and MRCP over the past 2 years. Patient has been evaluated by GI service as well     All imaging was nondiagnostic with no significant findings to explain the patient's chronic abdominal pain     The patient's symptomatology does not seem to be consistent with abdominal pain due to adhesions as the onset was sudden 40 years after her last procedure and the pain remains constant. Pt is asking for something for the pain but discussed that per provider the use of pain med was discussed with her yesterday.  (See TE 7/31/23)

## 2023-08-04 NOTE — TELEPHONE ENCOUNTER
Patient calling for more pain pills. Patient says the pain pills are the only thing can help her. Please advise.

## 2023-08-04 NOTE — TELEPHONE ENCOUNTER
Called pt and left vm that pt needs to contact pain management as was discussed previously.   Left her the contact info to call to schedule

## 2023-08-04 NOTE — TELEPHONE ENCOUNTER
Pt called back saying she missed a call. Message below was read to pt and referral information given. Pt is going to call Dr. Manuel Landers office. Pt voiced understanding.

## 2023-08-07 ENCOUNTER — OFFICE VISIT (OUTPATIENT)
Dept: PAIN CLINIC | Facility: CLINIC | Age: 83
End: 2023-08-07
Payer: MEDICARE

## 2023-08-07 ENCOUNTER — TELEPHONE (OUTPATIENT)
Dept: PAIN CLINIC | Facility: CLINIC | Age: 83
End: 2023-08-07

## 2023-08-07 VITALS
WEIGHT: 135 LBS | HEART RATE: 72 BPM | BODY MASS INDEX: 23 KG/M2 | OXYGEN SATURATION: 98 % | DIASTOLIC BLOOD PRESSURE: 72 MMHG | SYSTOLIC BLOOD PRESSURE: 144 MMHG

## 2023-08-07 DIAGNOSIS — M25.552 CHRONIC LEFT HIP PAIN: Primary | ICD-10-CM

## 2023-08-07 DIAGNOSIS — G89.29 CHRONIC LEFT HIP PAIN: Primary | ICD-10-CM

## 2023-08-07 DIAGNOSIS — M25.552 BILATERAL HIP PAIN: Primary | ICD-10-CM

## 2023-08-07 DIAGNOSIS — M25.551 BILATERAL HIP PAIN: Primary | ICD-10-CM

## 2023-08-07 PROCEDURE — 99214 OFFICE O/P EST MOD 30 MIN: CPT | Performed by: PHYSICIAN ASSISTANT

## 2023-08-07 NOTE — TELEPHONE ENCOUNTER
Question Answer Comment   Anesthesia Type Sedation    Provider [facility] Procedure Lab    Procedure Other (please add comment) L intra-articular hip injection   CPT (Hit enter after each entry) DRAIN/INJECT LARGE JOINT/BURSA    Medical clearance requested (will send to Pain Navigator) No    Patient has Medicare coverage?  Yes

## 2023-08-07 NOTE — PROGRESS NOTES
Patient presents in office today with reported pain in low back, groin area    Current pain level reported = 8/10    Last reported dose of 2 tylenol this morning      Narcotic Contract renewal none    Urine Drug screen none

## 2023-08-07 NOTE — TELEPHONE ENCOUNTER
Patient advised of insurance approval to proceed with injections and is agreeable to scheduling. Patient scheduled for procedure, pre-procedure instructions reviewed. Patient prefers conscious sedation. Reviewed sedation instructions including fasting required,  required. Patient denies taking medications that require holding prior to procedure. Patient verbalized understanding of instructions, no further needs at this time. [address]  PRE-PROCEDURE INSTRUCTIONS WITH IV SEDATION     Procedure: Left intra-articular hip injection    Appointment Date: 8/17/23      Check-In Time: 10:15am  Follow-Up Date/Time: 8/28/23 1:00pm    Prior to the procedure:  Please update us prior to the procedure if you are experiencing any symptoms of infection such as cough, fever, chills, urinary symptoms, or have recently been prescribed antibiotics, have open wounds, have recently had surgery or dental procedures. Day of Procedure:  Do not eat or drink anything (including water) 8 hours prior to your procedure. If you take morning blood pressure medication or oral diabetic medication, please take with a small sip of water. You are required to have a responsible adult drive you home after your procedure. You may not take a cab or ride share unless you have a responsible adult with you in the cab or ride share. A family member or friend is required to stay in our waiting room or hospital garage because of the sedation you will receive, you may be sleepy and forgetful. You may not remember anything told to you after your procedure including discharge instructions. Please note: children are not allowed in the holding area so please make appropriate arrangements. Any additional family members and friends will need to remain in the surgical waiting room or hospital garage for the duration of your procedure.  *If your family member or friend elects to wait in the garage, they must leave a cell phone number with the lab staff in case they need to be reached. Please park in the [facility] parking garage and follow the signs to the [facility]. Please bring your Insurance Card, Photo ID, List of Current Medications and Referral (if applicable) to your appointment. Check in at [facility] ([address] [place] [address]. [address], [place], [place]) outpatient registration in the [facility]. Please note-No prescriptions will be written by Pain Clinic in OR on the day of procedure. If you require a refill of medications, please contact the office 48 hours prior to your procedure. If you have an implanted Spinal Cord or Peripheral Nerve Stimulator: Please remember to turn device off for procedure    *If you are fasting, you may take blood pressure and thyroid medications with a small sip of water the day of your procedure. *If you are diabetic, your glucose must be within a normal range for you. If you are fasting, you should check your glucose levels and adjust with medication if needed. Medication Hold:  Number of days you need to be off for the following medications:    Aggrenox 10 days   Agrylin (Anagrelide) 10 days  Brilinta (Ticagrelor) 7 days  Imbruvica (Ibrutinib) 3 days   Enbrel (Etanercept) 24 hours   Fragmin (Dalteparin) 24 hours   Pletal (Cilostazol) 7 days  Effient (Prasugrel) 7 days  Pradaxa 10 days  Trental 7 days  Eliquis (Apixaban) 3 days  Xarelto (Rivaroxaban) 3 days  Lovenox (Enoxaparin) 24 hours  Aspirin  Greater than 81mg but less than 325mg   5 days  325mg and greater                  7 days  (*81 mg      24 hours preferred, but not required)  Coumadin       5 days  Procedure may be cancelled if INR is elevated.    Excedrin (with aspirin) 7 days  Plavix (Clopidogrel)                             7 days    NSAIDs: 24 hours preferred, but not required      Ibuprofen (Motrin, Advil, Vicoprofen), Naproxen (Naprosyn, Aleve), Piroxcam (Feldene), Meloxicam (Mobic), Oxaprozin (Daypro), Diclofenac (Voltaren), Indomethacin (Indocin), Etodolac (Lodine), Nabumetone (Relafen), Celebrex (Celecoxib)           HERBAL SUPPLEMENTS  5 days preferred, but not required  Fish oil, krill oil, Omega-3, Vascepa, Vitamin E, Turmeric, Garlic                       Insurance Authorization:   Most insurances are now requiring a preauthorization for all procedures. Please contact your insurance carrier to determine what your financial responsibility will be for the procedure(s). Cancellation/Rescheduling Appointment:   In the event you need to cancel or reschedule your appointment, you must notify the office 24 hours prior. Post-procedure instructions:        Please schedule a follow up visit within 2 to 4 weeks after your last procedure date   Please call our office with any questions or concerns before or after your procedure at [phone], #[phone]. If you are a diabetic, please increase the frequency of your glucose monitoring after the procedure as this may cause a temporary increase in your blood sugar. Contact your primary care physician if your blood sugar rises as you may require some medication adjustment. It is normal to have increased pain at injection site for up to 3-5 days after procedure, you can        use heat or ice (20 minutes on 20 minutes off) for comfort.

## 2023-08-08 RX ORDER — PRAVASTATIN SODIUM 40 MG
40 TABLET ORAL NIGHTLY
Qty: 90 TABLET | Refills: 0 | Status: SHIPPED | OUTPATIENT
Start: 2023-08-08

## 2023-08-09 ENCOUNTER — TELEPHONE (OUTPATIENT)
Dept: FAMILY MEDICINE CLINIC | Facility: CLINIC | Age: 83
End: 2023-08-09

## 2023-08-09 NOTE — TELEPHONE ENCOUNTER
Donna Umaña  LOV: 7/24/2023     Calling for cramps  Duration/Onset of symptom: started last night   NON-URGENT SICK CALL     Donna Umaña  LOV: 7/24/2023     Patient experiencing very bad cramps since last night. Please advise.

## 2023-08-09 NOTE — TELEPHONE ENCOUNTER
Pt states c/o pains in both sides of stomach going down to vagina rates this am around 9 but now rates 8. Just describes as \"intense\"  did take some aspirin and she is going to wait and see if that helps. Discussed if pain remains as is or getting worse advised she go to the nearest ER and pt verbalized understanding.

## 2023-08-15 ENCOUNTER — TELEPHONE (OUTPATIENT)
Dept: FAMILY MEDICINE CLINIC | Facility: CLINIC | Age: 83
End: 2023-08-15

## 2023-08-15 DIAGNOSIS — F45.41 PAIN AGGRAVATED BY ANXIETY: ICD-10-CM

## 2023-08-15 DIAGNOSIS — F41.9 PAIN AGGRAVATED BY ANXIETY: ICD-10-CM

## 2023-08-15 RX ORDER — QUETIAPINE FUMARATE 25 MG/1
25 TABLET, FILM COATED ORAL 2 TIMES DAILY
Qty: 60 TABLET | Refills: 1 | Status: CANCELLED | OUTPATIENT
Start: 2023-08-15

## 2023-08-15 NOTE — TELEPHONE ENCOUNTER
Patient stated she does have new psychiatrist, Dr. Bourne Rued with pharmacist at [facility], psychiatrist sent a new prescription on 8/1/23 for 3 months, okay to fill today. Patient informed.

## 2023-08-15 NOTE — TELEPHONE ENCOUNTER
Requested Prescriptions     Pending Prescriptions Disp Refills    QUEtiapine 25 MG Oral Tab 60 tablet 1     Sig: Take 1 tablet (25 mg total) by mouth 2 (two) times daily. or insomnia     Recent Visits  Date Type Provider Dept   07/24/23 Office Visit Michaelene Claude, PA-C [facility]      Patient does see a psychiatrist and medication changes made increase the sertraline to 200 mg. Quetiapine  states they only do as needed but would benefit from doing it daily due to her severe anxiety. Dr. Michelle Goyal is managing hypertension has been elevated did have a PET stress test yesterday.  states she is taking the blood pressure medication including the Norvasc 10 mg, half hydralazine 25 mg takes 2 twice a day, losartan 100 mg and blood pressure is still up today has been elevated the last several readings difficult to medically manage blood pressure. Is taking sertraline 200 mg and is using trazodone 50 mg at night which is helping with sleep does not need the Seroquel at night. Will be getting a new psychiatrist  states Dr. Chidi Venegas is changing his practice. Taking gabapentin 300 mg 3 times a day. Patient is notably anxious but again because of chronic abdominal pain and \"chronic constipation\"     Signed Prescriptions Disp Refills    QUEtiapine 25 MG Oral Tab 60 tablet 1   Sig: Take 1 tablet (25 mg total) by mouth 2 (two) times daily. or insomnia      Continue with follow-ups with psychiatrist presently on sertraline 200 mg was increased recently  Take the quetiapine 25 mg twice a day continue with trazodone for the insomnia discuss further with psychiatrist  Continue with gabapentin 300 mg 3 times a day    2. Pain aggravated by anxiety  Due to her severe anxiety was only taking quetiapine as needed encouraged  to have her take it twice a day was not using the half a pill when they did use it only the fall 2020 5 mg and did seem to tolerate well. - QUEtiapine 25 MG Oral Tab;  Take 1 tablet (25 mg total) by mouth 2 (two) times daily. or insomnia Dispense: 60 tablet; Refill: 1    3.  Chronic abdominal pain   Is with psychiatrist probably has vascular dementia starting does get intermittent episodes where her memory is good and then others where she hyper focuses on pain is going to try to increase quetiapine 25 mg twice a day to help with anxiety   Continue to make appointments with psychiatrist    Future Appointments  Date Type Provider Dept   08/30/23 Appointment Rae Huber PA-C [facility]     Last refill 5/10/23 for 2  months  -----------------------------------------------------------------------------------  Left message for patient to let us know if she has a new psychiatrist.

## 2023-08-15 NOTE — TELEPHONE ENCOUNTER
Yulia Kilpatrick requesting medication refill for QUEtiapine 25 MG Oral Tab. Patient Contacted Pharmacy No  LOV: 7/24/2023   Last Refill Date: 05/10/2023  30 or 90 Day Supply: 60 tab   Pharmacy Location: [facility] on file   Prescribed By: Clemente Bates   Next Scheduled Appointment: 8/30/2023      Informed patient to allow up to 24 to 48 Business hours for a call back from a nurse.

## 2023-08-16 ENCOUNTER — TELEPHONE (OUTPATIENT)
Dept: FAMILY MEDICINE CLINIC | Facility: CLINIC | Age: 83
End: 2023-08-16

## 2023-08-16 NOTE — TELEPHONE ENCOUNTER
Patient requesting rx for pain medication regarding abdominal pain. Patient states that it is lower abdominal pain on left and right side. Advised patient on needing to be evaluated prior to being prescribed pain medication. Advised patient on going to IC. Patient verbalized understanding.

## 2023-08-16 NOTE — TELEPHONE ENCOUNTER
Patient states she is having pain in lower stomach and would like pain medication prescribed. Per patient sent to [facility] in [facility].

## 2023-08-17 ENCOUNTER — HOSPITAL ENCOUNTER (OUTPATIENT)
Facility: HOSPITAL | Age: 83
Setting detail: HOSPITAL OUTPATIENT SURGERY
Discharge: HOME | End: 2023-08-17
Attending: ANESTHESIOLOGY | Admitting: ANESTHESIOLOGY
Payer: MEDICARE

## 2023-08-17 ENCOUNTER — APPOINTMENT (OUTPATIENT)
Dept: GENERAL RADIOLOGY | Facility: HOSPITAL | Age: 83
End: 2023-08-17
Attending: ANESTHESIOLOGY
Payer: MEDICARE

## 2023-08-17 VITALS
DIASTOLIC BLOOD PRESSURE: 199 MMHG | RESPIRATION RATE: 18 BRPM | OXYGEN SATURATION: 92 % | BODY MASS INDEX: 23.05 KG/M2 | HEART RATE: 66 BPM | HEIGHT: 64 IN | WEIGHT: 135 LBS | TEMPERATURE: 98 F | SYSTOLIC BLOOD PRESSURE: 233 MMHG

## 2023-08-17 PROCEDURE — 3E0U33Z INTRODUCTION OF ANTI-INFLAMMATORY INTO JOINTS, PERCUTANEOUS APPROACH: ICD-10-PCS | Performed by: ANESTHESIOLOGY

## 2023-08-17 PROCEDURE — 77002 NEEDLE LOCALIZATION BY XRAY: CPT | Performed by: ANESTHESIOLOGY

## 2023-08-17 PROCEDURE — 3E0U3BZ INTRODUCTION OF ANESTHETIC AGENT INTO JOINTS, PERCUTANEOUS APPROACH: ICD-10-PCS | Performed by: ANESTHESIOLOGY

## 2023-08-17 PROCEDURE — 20610 DRAIN/INJ JOINT/BURSA W/O US: CPT | Performed by: ANESTHESIOLOGY

## 2023-08-17 RX ORDER — SODIUM CHLORIDE, SODIUM LACTATE, POTASSIUM CHLORIDE, CALCIUM CHLORIDE 600; 310; 30; 20 MG/100ML; MG/100ML; MG/100ML; MG/100ML
100 INJECTION, SOLUTION INTRAVENOUS CONTINUOUS
Status: DISCONTINUED | OUTPATIENT
Start: 2023-08-17 | End: 2023-08-17

## 2023-08-17 RX ORDER — MIDAZOLAM HYDROCHLORIDE 1 MG/ML
INJECTION INTRAMUSCULAR; INTRAVENOUS
Status: DISCONTINUED | OUTPATIENT
Start: 2023-08-17 | End: 2023-08-17

## 2023-08-17 RX ORDER — ONDANSETRON 2 MG/ML
4 INJECTION INTRAMUSCULAR; INTRAVENOUS ONCE AS NEEDED
Status: DISCONTINUED | OUTPATIENT
Start: 2023-08-17 | End: 2023-08-17

## 2023-08-17 RX ORDER — LIDOCAINE HYDROCHLORIDE 10 MG/ML
INJECTION, SOLUTION EPIDURAL; INFILTRATION; INTRACAUDAL; PERINEURAL
Status: DISCONTINUED | OUTPATIENT
Start: 2023-08-17 | End: 2023-08-17

## 2023-08-17 RX ORDER — DIPHENHYDRAMINE HYDROCHLORIDE 50 MG/ML
50 INJECTION INTRAMUSCULAR; INTRAVENOUS ONCE AS NEEDED
Status: DISCONTINUED | OUTPATIENT
Start: 2023-08-17 | End: 2023-08-17

## 2023-08-17 RX ORDER — METHYLPREDNISOLONE ACETATE 40 MG/ML
INJECTION, SUSPENSION INTRA-ARTICULAR; INTRALESIONAL; INTRAMUSCULAR; SOFT TISSUE
Status: DISCONTINUED | OUTPATIENT
Start: 2023-08-17 | End: 2023-08-17

## 2023-08-17 NOTE — TELEPHONE ENCOUNTER
Noted patient was instructed to go to immediate care center if needed did recently see pain clinic for hip and abdominal pain.

## 2023-08-17 NOTE — DISCHARGE INSTRUCTIONS
You have been given medication that makes you sleepy. This may have included both pain medication and sleeping medication. Most of the effects have worn off but you may still have some drowsiness for the next 6 to 8 hours. Home Care  Follow these guidelines when you get home:    --Don't drink any alcohol for the next 24 hours. --Don't drive, operate dangerous machinery, make important business or personal    decisions, or sign legal documents during the next 24 hours. Follow Up Care  Follow up with your healthcare provider if you are not alert and back to your usual level of activity within 12 hours    When to seek medical advice  Call your healthcare provider right away if any of these occur:    --Drowsiness that gets worse  --Weakness or dizziness that gets worse  --Repeated vomiting  --You can not be awakened   Home Care Instructions Following Your Pain Procedure     Swathi Kennymatt,  It has been a pleasure to have you as our patient. To help you at home, you must follow these general discharge instructions. We will review these with you before you are discharged. It is our hope that you have a complete and uneventful recovery from our procedure. General Instructions:  What to Expect:  Bandages from your procedure today can be removed when you get home. Please avoid soaking and/or swimming for 24 hours. Showering is okay  It is normal to have increased pain symptoms and/or pain at injection site for up to 3-5 days after procedure, you can use heat or ice (20 minutes on 20 minutes off) for comfort. You may experience some temporary side effects which may include restlessness or insomnia, flushing of the face, or heart palpitations. Please contact the provider if these symptoms do not resolve within 3-4 days. Lightheadedness or nausea may occur and should resolve within 24 to 48 hours.   If you develop a headache after treatment, rest, drink fluids (with caffeine, if possible) and take mild over-the-counter pain medication. If the headache does not improve with the above treatment, contact the physician. Home Medications:  Resume all previously prescribed medication. Please avoid taking NSAIDs (Non-Steriodal Anti-Inflammatory Drugs) such as:  Ibuprofen ( Advil, Motrin) Aleve (Naproxen), Diclofenac, Meloxicam for 6 hours after procedure. If you are on Coumadin (Warfarin) or any other anti-coagulant (or \"blood thinning\") medication such as Plavix (Clopidogrel), Xarelto (Rivaroxaban), Eliquis (Apixaban), Effient (Prasugrel) etc., restart on the following day from the procedure unless otherwise directed by your provider. If you are a diabetic, please increase the frequency of your glucose monitoring after the procedure as steroids may cause a temporary (2-3 day) increase in your blood sugar. Contact your primary care physician if your blood sugar remains elevated as you may require some medication adjustment. Diet:  Resume your regular diet as tolerated. Activity: We recommend that you relax and rest during the rest of your procedure day. If you feel weakness in your arms or legs do not drive. Follow-up Appointment  Please schedule a follow-up visit within 3 to 4 weeks after your last procedure date. Question or Concerns:  Feel free to call our office with any questions or concerns at [phone] (option #2)    Arlette Jeans  Thank you for coming to [facility] for your procedure. The nurses try very hard to make sure you receive the best care possible. Your trust in them as well as us is greatly appreciated. Thanks so much,   Dr. Melida Doss,  It has been a pleasure to have you as our patient. To help you at home, you must follow these general discharge instructions. We will review these with you before you are discharged. It is our hope that you have a complete and uneventful recovery from our procedure.      General Instructions:  What to Expect:  Bandages from your procedure today can be removed when you get home. Please avoid soaking and/or swimming for 24 hours. Showering is okay  It is normal to have increased pain symptoms and/or pain at injection site for up to 3-5 days after procedure, you can use heat or ice (20 minutes on 20 minutes off) for comfort. You may experience some temporary side effects which may include restlessness or insomnia, flushing of the face, or heart palpitations. Please contact the provider if these symptoms do not resolve within 3-4 days. Lightheadedness or nausea may occur and should resolve within 24 to 48 hours. If you develop a headache after treatment, rest, drink fluids (with caffeine, if possible) and take mild over-the-counter pain medication. If the headache does not improve with the above treatment, contact the physician. Home Medications:  Resume all previously prescribed medication. Please avoid taking NSAIDs (Non-Steriodal Anti-Inflammatory Drugs) such as:  Ibuprofen ( Advil, Motrin) Aleve (Naproxen), Diclofenac, Meloxicam for 6 hours after procedure. If you are on Coumadin (Warfarin) or any other anti-coagulant (or \"blood thinning\") medication such as Plavix (Clopidogrel), Xarelto (Rivaroxaban), Eliquis (Apixaban), Effient (Prasugrel) etc., restart on the following day from the procedure unless otherwise directed by your provider. If you are a diabetic, please increase the frequency of your glucose monitoring after the procedure as steroids may cause a temporary (2-3 day) increase in your blood sugar. Contact your primary care physician if your blood sugar remains elevated as you may require some medication adjustment. Diet:  Resume your regular diet as tolerated. Activity: We recommend that you relax and rest during the rest of your procedure day. If you feel weakness in your arms or legs do not drive.   Follow-up Appointment  Please schedule a follow-up visit within 3 to 4 weeks after your last procedure date. Question or Concerns:  Feel free to call our office with any questions or concerns at [phone] (option #2)    Noemi Pierce  Thank you for coming to [facility] for your procedure. The nurses try very hard to make sure you receive the best care possible. Your trust in them as well as us is greatly appreciated.     Thanks so much,   Dr. Jeniffer Mckeon
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
## 2023-08-18 ENCOUNTER — TELEPHONE (OUTPATIENT)
Dept: PAIN CLINIC | Facility: CLINIC | Age: 83
End: 2023-08-18

## 2023-08-18 NOTE — TELEPHONE ENCOUNTER
Claritza called placed to patient for post procedure follow up. Patient stated she is doing very well after the procedure and has no questions or concerns at this time. Pt verbalized understanding to call with any questions or concerns.       Procedure: LEFT HIP JOINT INJECTION WITH CONSCIOUS SEDATION   Date: 8/17/23  Follow up Visit Scheduled: 8/28/23

## 2023-08-28 ENCOUNTER — OFFICE VISIT (OUTPATIENT)
Dept: PAIN CLINIC | Facility: CLINIC | Age: 83
End: 2023-08-28
Payer: MEDICARE

## 2023-08-28 VITALS — OXYGEN SATURATION: 94 % | DIASTOLIC BLOOD PRESSURE: 60 MMHG | HEART RATE: 46 BPM | SYSTOLIC BLOOD PRESSURE: 110 MMHG

## 2023-08-28 DIAGNOSIS — M16.12 ARTHRITIS OF LEFT HIP: ICD-10-CM

## 2023-08-28 DIAGNOSIS — M25.552 LEFT HIP PAIN: Primary | ICD-10-CM

## 2023-08-28 PROCEDURE — 99214 OFFICE O/P EST MOD 30 MIN: CPT | Performed by: PHYSICIAN ASSISTANT

## 2023-08-28 NOTE — PROGRESS NOTES
Last procedure: LEFT HIP JOINT INJECTION WITH CONSCIOUS SEDATION   Date: 8/17/23  Percentage of relief obtained: 100%  Duration of relief: current    Current Pain Score: 9/10     Pt is having pain in lower abdomen right side

## 2023-08-30 ENCOUNTER — OFFICE VISIT (OUTPATIENT)
Dept: FAMILY MEDICINE CLINIC | Facility: CLINIC | Age: 83
End: 2023-08-30
Payer: MEDICARE

## 2023-08-30 VITALS
WEIGHT: 139.38 LBS | DIASTOLIC BLOOD PRESSURE: 76 MMHG | HEART RATE: 52 BPM | SYSTOLIC BLOOD PRESSURE: 158 MMHG | TEMPERATURE: 98 F | BODY MASS INDEX: 24 KG/M2

## 2023-08-30 DIAGNOSIS — M25.552 CHRONIC HIP PAIN, LEFT: Primary | ICD-10-CM

## 2023-08-30 DIAGNOSIS — G89.29 CHRONIC HIP PAIN, LEFT: Primary | ICD-10-CM

## 2023-08-30 DIAGNOSIS — R10.30 PAIN RADIATING TO LOWER ABDOMEN: ICD-10-CM

## 2023-08-30 PROCEDURE — 99214 OFFICE O/P EST MOD 30 MIN: CPT | Performed by: FAMILY MEDICINE

## 2023-08-30 RX ORDER — ACETAMINOPHEN AND CODEINE PHOSPHATE 300; 30 MG/1; MG/1
TABLET ORAL 2 TIMES DAILY PRN
Qty: 20 TABLET | Refills: 0 | Status: SHIPPED | OUTPATIENT
Start: 2023-08-30

## 2023-08-31 PROBLEM — M25.552 CHRONIC HIP PAIN, LEFT: Status: ACTIVE | Noted: 2020-09-22

## 2023-08-31 PROBLEM — G89.29 CHRONIC HIP PAIN, LEFT: Status: ACTIVE | Noted: 2020-09-22

## 2023-08-31 PROBLEM — R10.30 PAIN RADIATING TO LOWER ABDOMEN: Status: ACTIVE | Noted: 2023-08-31

## 2023-09-11 DIAGNOSIS — G89.29 CHRONIC HIP PAIN, LEFT: ICD-10-CM

## 2023-09-11 DIAGNOSIS — M25.552 CHRONIC HIP PAIN, LEFT: ICD-10-CM

## 2023-09-11 RX ORDER — ACETAMINOPHEN AND CODEINE PHOSPHATE 300; 30 MG/1; MG/1
TABLET ORAL 2 TIMES DAILY PRN
Qty: 20 TABLET | Refills: 0 | Status: SHIPPED | OUTPATIENT
Start: 2023-09-11 | End: 2023-09-29

## 2023-09-11 NOTE — TELEPHONE ENCOUNTER
Anthony William requesting medication refill for acetaminophen-codeine 300-30 MG . Patient Contacted Pharmacy (Yes/No): NO  LOV: 8/30/2023   Last Refill Date: 8/28/2023  30 or 90 Day Supply: 90  Pharmacy Location: [facility] )  Prescribed By: Bogdan Villar         Informed patient to allow up to 24 to 48 Business hours for a call back from a nurse.

## 2023-09-12 ENCOUNTER — PATIENT OUTREACH (OUTPATIENT)
Dept: CASE MANAGEMENT | Age: 83
End: 2023-09-12

## 2023-09-12 DIAGNOSIS — E78.2 MIXED HYPERLIPIDEMIA: ICD-10-CM

## 2023-09-12 DIAGNOSIS — F02.818 ALZHEIMER'S DEMENTIA WITH BEHAVIORAL DISTURBANCE (HCC): ICD-10-CM

## 2023-09-12 DIAGNOSIS — G30.9 ALZHEIMER'S DEMENTIA WITH BEHAVIORAL DISTURBANCE (HCC): ICD-10-CM

## 2023-09-12 DIAGNOSIS — E21.0 PRIMARY HYPERPARATHYROIDISM (HCC): Primary | ICD-10-CM

## 2023-09-12 DIAGNOSIS — F41.1 GENERALIZED ANXIETY DISORDER: ICD-10-CM

## 2023-09-13 NOTE — ED NOTES
Chief Complaint   Patient presents with   • Follow-up       Visit Vitals  /64   Pulse 93   Ht 5' 5\" (1.651 m)   Wt 64 kg (141 lb)   BMI 23.46 kg/m²       Diana presents today for annual follow up for management of permanent atrial fibrillation.     HPI. On 01/31/2014, patient was admitted for sotalol drug loading on xarelto for prevention of stroke. Echo 02/2014 showed LVEF 59%. Patient continued to have palpations and therefore was provided a cardiac monitor 05/2014 that showed 2 episodes of atrial fibrillation. Episodes continued to increase in frequency, therefore sotalol was switched to amiodarone which was also discontinued. A 14 day monitor 12/08/20 revealed NSR with PAT lasting 6 minutes and NSVT up to 19 beats. No AF. She continues on Xarelto only.      Last office visit 09/07/22, patient was in NSR. No changes were made.        She notes she is fatigue. She states that she is only able to sleep approximately 2-4 hours a night. She notes that she has been traveling frequently post aunts death and making all arrangements. She endorses shortness of breath with activity, but notes she also smokes cigarettes. She states she was given Chantix from her PCP but has not taken any. She notes she is tolerating her medications well and denies bleeding with Xarelto. She denies any episodes of lightheadedness, dizziness, palpitations, chest pain, or syncope. Patient states she is doing ok from a rhythm standpoint.         ALLERGIES:   Allergen Reactions   • Sertraline ANXIETY and Other (See Comments)     \"Shakes\"   • Adhesive   (Environmental) RASH   • Latex   (Environmental) RASH        Current Outpatient Medications   Medication Sig   • Xarelto 20 MG Tab TAKE 1 TABLET BY MOUTH DAILY WITH DINNER   • Varenicline Tartrate, Starter, (Chantix Starting Month Pak) 0.5 MG X 11 & 1 MG X 42 Tablet Therapy Pack As directed   • clonazePAM (KlonoPIN) 0.5 MG tablet Take 1 tablet by mouth 2 times daily as needed for Anxiety.  ON APPROPRIATE ABX, NO FURTHER TREATMENT REQUIRED   • rosuvastatin (CRESTOR) 10 MG tablet TAKE 1 TABLET BY MOUTH DAILY   • albuterol 108 (90 Base) MCG/ACT inhaler Inhale 2 puffs into the lungs every 4 hours as needed for Shortness of Breath or Wheezing.   • aspirin (ECOTRIN) 81 MG EC tablet Take 1 tablet by mouth daily. Do not start before November 11, 2021.   • cholecalciferol (VITAMIN D) 25 mcg (1,000 units) tablet Take 25 mcg by mouth daily.    • calcium carbonate-vitamin D (CALTRATE+D) 600-400 MG-UNIT per tablet Take 1 tablet by mouth daily.   • Cyanocobalamin (VITAMIN B-12) 5000 MCG SL Tab Place 5,000 mcg under the tongue daily.    • OMEGA-3 KRILL OIL PO Take 1 tablet by mouth daily.      No current facility-administered medications for this visit.        Review of Systems   Constitutional: Positive for malaise/fatigue.   HENT: Negative for nosebleeds.    Cardiovascular: Negative for chest pain, dyspnea on exertion, irregular heartbeat, near-syncope, palpitations and syncope.   Respiratory: Positive for shortness of breath.    Hematologic/Lymphatic: Negative for bleeding problem.   Gastrointestinal: Negative for hematochezia.   Genitourinary: Negative for hematuria.   Neurological: Negative for dizziness and light-headedness.        Physical Exam  Vitals and nursing note reviewed.   Constitutional:       General: She is not in acute distress.     Appearance: She is well-developed.   HENT:      Head: Normocephalic and atraumatic.   Cardiovascular:      Rate and Rhythm: Normal rate and regular rhythm.   Pulmonary:      Effort: Pulmonary effort is normal.   Musculoskeletal:      Comments: ambulatory without assistance   Neurological:      Mental Status: She is alert and oriented to person, place, and time.   Psychiatric:         Behavior: Behavior normal.         Thought Content: Thought content normal.         Judgment: Judgment normal.          Assessment & Plan      Anticoagulation adequate (Xarelto)  Tolerating well without bleeding.  GFR 08/07/2023-83  LFT  08/07/2023 - normal.  Continue current dose.      Persistent atrial fibrillation (CMS/HCC)  Echo 2/4/2020: LVEF 67%, IVS 1.1, LVPW 1.0, LA 30.2  Coronary status: patent RCA stent, normal stress test 9/15/2020  CHADS-VASC score = 3 (age, CAD, female)  Anticoagulation therapy: Xarelto  Antiarrhythmic medication: Amiodarone stopped 1/20/16. Ranexa stopped 10/9/15. Failed Sotalol  Procedure: 10/9/15: PVI cryo ablation    In normal sinus rhythm.  Doing well. No palpitations except for skipping beats.  12/8/20, 2 week monitor showed NSR with 22 episodes of PAT up to 170 bpm and duration up to 5.8 minutes.  Had 3 NSVT up to 19 beats.  Patient is in AF today.    1. Continue with Xarelto.  2. Reassured.  3. F/u in 1 year.  4. Call if increase in shortness of breath.  5. Check Echo    PVCs (premature ventricular contractions)  She is having more episodes of skipping beats. Two week monitor 12/08/2020 with PVC burden <1%.    1. Continue with same management.    CAD mild (20% Circ, 40% RCA, minor LAD) cath Sept 2012 EF 70% (for false positive stress echo)         On 9/13/2023, IFanny scribed the services personally performed by Matilda Barton MD     I have reviewed and edited the visit summary above and attest that it is accurate.

## 2023-09-14 DIAGNOSIS — G89.29 CHRONIC BILATERAL LOW BACK PAIN WITHOUT SCIATICA: ICD-10-CM

## 2023-09-14 DIAGNOSIS — M79.7 FIBROMYALGIA: ICD-10-CM

## 2023-09-14 DIAGNOSIS — M54.50 CHRONIC BILATERAL LOW BACK PAIN WITHOUT SCIATICA: ICD-10-CM

## 2023-09-15 RX ORDER — GABAPENTIN 400 MG/1
400 CAPSULE ORAL 3 TIMES DAILY
Qty: 270 CAPSULE | Refills: 0 | Status: SHIPPED | OUTPATIENT
Start: 2023-09-15

## 2023-09-26 DIAGNOSIS — F51.04 PSYCHOPHYSIOLOGICAL INSOMNIA: ICD-10-CM

## 2023-09-26 RX ORDER — TRAZODONE HYDROCHLORIDE 50 MG/1
50 TABLET ORAL NIGHTLY
Qty: 90 TABLET | Refills: 1 | OUTPATIENT
Start: 2023-09-26

## 2023-09-26 NOTE — TELEPHONE ENCOUNTER
Patient is calling with sever pelvic/ovary pain (made appt. For tomorrow with Kathleen Hurst) and also refill for traZODone HCl (Tab)   Please advise.

## 2023-09-26 NOTE — TELEPHONE ENCOUNTER
Pt has appt 9/29 with Tristian Walter.     Trazodone 50mg last RF 2/8/22 for 90 with 1 refill    Script pended

## 2023-09-27 NOTE — TELEPHONE ENCOUNTER
Trazodone interaction with sertraline she has to ask psychiatrist if she can have both trazodone and sertraline together.

## 2023-09-29 ENCOUNTER — OFFICE VISIT (OUTPATIENT)
Dept: FAMILY MEDICINE CLINIC | Facility: CLINIC | Age: 83
End: 2023-09-29
Payer: MEDICARE

## 2023-09-29 VITALS
DIASTOLIC BLOOD PRESSURE: 70 MMHG | HEIGHT: 64 IN | HEART RATE: 56 BPM | BODY MASS INDEX: 22.2 KG/M2 | SYSTOLIC BLOOD PRESSURE: 160 MMHG | WEIGHT: 130 LBS | TEMPERATURE: 97 F | OXYGEN SATURATION: 98 % | RESPIRATION RATE: 18 BRPM

## 2023-09-29 DIAGNOSIS — M25.552 CHRONIC HIP PAIN, LEFT: Primary | ICD-10-CM

## 2023-09-29 DIAGNOSIS — F41.1 GENERALIZED ANXIETY DISORDER: ICD-10-CM

## 2023-09-29 DIAGNOSIS — I10 UNCONTROLLED HYPERTENSION: ICD-10-CM

## 2023-09-29 DIAGNOSIS — G89.29 CHRONIC HIP PAIN, LEFT: Primary | ICD-10-CM

## 2023-09-29 DIAGNOSIS — F51.04 PSYCHOPHYSIOLOGICAL INSOMNIA: ICD-10-CM

## 2023-09-29 PROCEDURE — 99215 OFFICE O/P EST HI 40 MIN: CPT | Performed by: FAMILY MEDICINE

## 2023-09-29 RX ORDER — SERTRALINE HYDROCHLORIDE 100 MG/1
200 TABLET, FILM COATED ORAL DAILY
Qty: 180 TABLET | Refills: 0 | Status: SHIPPED | OUTPATIENT
Start: 2023-09-29 | End: 2023-12-28

## 2023-09-29 RX ORDER — ACETAMINOPHEN AND CODEINE PHOSPHATE 300; 30 MG/1; MG/1
TABLET ORAL 2 TIMES DAILY PRN
Qty: 20 TABLET | Refills: 0 | Status: SHIPPED | OUTPATIENT
Start: 2023-09-29

## 2023-09-29 RX ORDER — QUETIAPINE FUMARATE 25 MG/1
TABLET, FILM COATED ORAL
Qty: 150 TABLET | Refills: 2 | Status: SHIPPED | OUTPATIENT
Start: 2023-09-29

## 2023-09-30 PROBLEM — N64.4 BREAST PAIN, LEFT: Status: RESOLVED | Noted: 2023-02-16 | Resolved: 2023-09-30

## 2023-10-07 DIAGNOSIS — I10 ESSENTIAL HYPERTENSION: Primary | ICD-10-CM

## 2023-10-09 ENCOUNTER — OFFICE VISIT (OUTPATIENT)
Dept: ORTHOPEDICS CLINIC | Facility: CLINIC | Age: 83
End: 2023-10-09
Payer: MEDICARE

## 2023-10-09 ENCOUNTER — HOSPITAL ENCOUNTER (OUTPATIENT)
Dept: GENERAL RADIOLOGY | Age: 83
Discharge: HOME | End: 2023-10-09
Attending: ORTHOPAEDIC SURGERY
Payer: MEDICARE

## 2023-10-09 VITALS — HEIGHT: 63 IN | WEIGHT: 138.63 LBS | BODY MASS INDEX: 24.56 KG/M2

## 2023-10-09 DIAGNOSIS — M25.552 LEFT HIP PAIN: Primary | ICD-10-CM

## 2023-10-09 DIAGNOSIS — M25.551 RIGHT HIP PAIN: ICD-10-CM

## 2023-10-09 DIAGNOSIS — M25.551 BILATERAL HIP PAIN: ICD-10-CM

## 2023-10-09 DIAGNOSIS — M25.552 BILATERAL HIP PAIN: ICD-10-CM

## 2023-10-09 DIAGNOSIS — M25.552 LEFT HIP PAIN: ICD-10-CM

## 2023-10-09 PROCEDURE — 73502 X-RAY EXAM HIP UNI 2-3 VIEWS: CPT | Performed by: ORTHOPAEDIC SURGERY

## 2023-10-09 PROCEDURE — 99203 OFFICE O/P NEW LOW 30 MIN: CPT | Performed by: ORTHOPAEDIC SURGERY

## 2023-10-09 RX ORDER — AMLODIPINE BESYLATE 10 MG/1
TABLET ORAL
Qty: 90 TABLET | Refills: 0 | Status: SHIPPED | OUTPATIENT
Start: 2023-10-09

## 2023-10-09 NOTE — TELEPHONE ENCOUNTER
Medication(s) to Refill:   Requested Prescriptions     Pending Prescriptions Disp Refills    AMLODIPINE 10 MG Oral Tab [Pharmacy Med Name: AMLODIPINE BESYLATE 10MG TABLETS] 90 tablet 0     Sig: TAKE 1 TABLET(10 MG) BY MOUTH DAILY     Last Time Medication was Filled:  7/8/23    Recent Visits  Date Type Provider Dept   09/29/23 Office Visit Rae Huber PA-C [facility]     Future Appointments  No visits were found

## 2023-10-16 ENCOUNTER — OFFICE VISIT (OUTPATIENT)
Dept: PAIN CLINIC | Facility: CLINIC | Age: 83
End: 2023-10-16
Payer: MEDICARE

## 2023-10-16 ENCOUNTER — PATIENT OUTREACH (OUTPATIENT)
Dept: CASE MANAGEMENT | Age: 83
End: 2023-10-16

## 2023-10-16 VITALS
WEIGHT: 138 LBS | DIASTOLIC BLOOD PRESSURE: 60 MMHG | HEART RATE: 56 BPM | BODY MASS INDEX: 24 KG/M2 | OXYGEN SATURATION: 97 % | SYSTOLIC BLOOD PRESSURE: 100 MMHG

## 2023-10-16 DIAGNOSIS — G30.9 ALZHEIMER'S DEMENTIA WITH BEHAVIORAL DISTURBANCE (HCC): ICD-10-CM

## 2023-10-16 DIAGNOSIS — E78.2 MIXED HYPERLIPIDEMIA: Primary | ICD-10-CM

## 2023-10-16 DIAGNOSIS — F41.1 GENERALIZED ANXIETY DISORDER: ICD-10-CM

## 2023-10-16 DIAGNOSIS — M25.551 BILATERAL HIP PAIN: Primary | ICD-10-CM

## 2023-10-16 DIAGNOSIS — F02.818 ALZHEIMER'S DEMENTIA WITH BEHAVIORAL DISTURBANCE (HCC): ICD-10-CM

## 2023-10-16 DIAGNOSIS — M25.552 BILATERAL HIP PAIN: Primary | ICD-10-CM

## 2023-10-16 PROCEDURE — 99214 OFFICE O/P EST MOD 30 MIN: CPT | Performed by: PHYSICIAN ASSISTANT

## 2023-10-16 NOTE — PROGRESS NOTES
Patient presents in office today with reported pain in pelvic area pain    Current pain level reported = 7-8/10    Last reported dose of T3 in the middle of the night      Narcotic Contract renewal none    Urine Drug screen none

## 2023-10-16 NOTE — PROGRESS NOTES
Spoke to Steve malin [facility]. Updates to patient care team/comments: No changes per patient  Patient reported changes in medications: No changes per patient  Med Adherence  Comment: Taking as prescribed    Health Maintenance: Discussed w/patient  COVID-19 Vaccine(3 - 2023-24 season) due on 09/01/2023 Deferred vaccine  Influenza Vaccine(1) Never done Deferred vaccine  HTN: BP Follow-Up due on 10/29/2023  Zoster Vaccines(1 of 2) due on 11/04/2023  Meningococcal B Vaccine(1 of 4 - Increased Risk) due on 11/04/2023  Annual Physical due on 02/15/2024  Tobacco Cessation Counseling 1 Year due on 09/29/2024  DEXA Scan Completed  Annual Depression Screening Completed  Fall Risk Screening (Annual) Completed  Pneumococcal Vaccine: 65+ Years Completed    Patient updates/concerns:  Patient stated she continues to take the pain medication as needed for now. Patient stated she does not take it everyday only if the pain becomes intolerable. Patient continues to monitor her daily diet and meal portions. Patient stated she did see the orthopedic specialist who stated for now maybe another injection would be worth trying since she did get relief previously from it. No other barriers or concerns to report at this time patient stated. Goals/Action Plan: Active goal from previous outreach:     Diet improvement and improve mobility  Patient reported progress towards goals:              - What: Patient states she has seen the orthopedic specialist who encouraged trying another injection for her hip pain as patient did get relief before. - Where/When/How: Patient continues to take tylenol 3 as needed for pain. Patient Reported Barriers and Concerns: None at this time per patient                   - Plan for overcoming barriers: Patient to follow up with care team as scheduled or as needed. Care Managers Interventions: Patient was educated on proper nutrition, hydration and exercise.  Patient is aware of our next outreach, has agreed to being contacted via telephone. Patient verbalized understanding. Patient aware she may contact me if further assistance is needed. Future Appointments: Pt aware  Future Appointments   Date Time Provider Damaris Milagros   10/16/2023  1:30 PM ALLEN Chris [id number] [address]     Next Care Manager Follow Up Date: 1 month follow up or sooner if needed. Reason For Follow Up: review progress and or barriers towards patient's goals. Time Spent This Encounter Total: 20 min medical record review, telephone communication, care plan updates where needed, education, goals, and action plan recreation/update. Provided acknowledgment and validation to patient's concerns.    Monthly Minute Total including today: 20  Physical assessment, complete health history, and need for CCM established by Wilfred Rankin DO.

## 2023-10-31 DIAGNOSIS — I10 ESSENTIAL HYPERTENSION: ICD-10-CM

## 2023-10-31 DIAGNOSIS — F41.1 GENERALIZED ANXIETY DISORDER: ICD-10-CM

## 2023-11-02 RX ORDER — LOSARTAN POTASSIUM 100 MG/1
100 TABLET ORAL DAILY
Qty: 90 TABLET | Refills: 0 | Status: SHIPPED | OUTPATIENT
Start: 2023-11-02

## 2023-11-02 RX ORDER — SERTRALINE HYDROCHLORIDE 100 MG/1
200 TABLET, FILM COATED ORAL DAILY
Qty: 180 TABLET | Refills: 0 | Status: SHIPPED | OUTPATIENT
Start: 2023-11-02

## 2023-11-06 DIAGNOSIS — I10 PRIMARY HYPERTENSION: ICD-10-CM

## 2023-11-06 DIAGNOSIS — E83.52 HYPERCALCEMIA: ICD-10-CM

## 2023-11-06 DIAGNOSIS — N18.30 CHRONIC RENAL INSUFFICIENCY, STAGE 3 (MODERATE) (HCC): ICD-10-CM

## 2023-11-06 DIAGNOSIS — E78.2 MIXED HYPERLIPIDEMIA: Primary | ICD-10-CM

## 2023-11-06 DIAGNOSIS — R25.1 TREMORS OF NERVOUS SYSTEM: ICD-10-CM

## 2023-11-06 DIAGNOSIS — E21.3 HYPERPARATHYROIDISM (HCC): ICD-10-CM

## 2023-11-07 RX ORDER — PRAVASTATIN SODIUM 40 MG
40 TABLET ORAL NIGHTLY
Qty: 90 TABLET | Refills: 0 | Status: SHIPPED | OUTPATIENT
Start: 2023-11-07

## 2023-11-08 DIAGNOSIS — M25.552 CHRONIC HIP PAIN, LEFT: ICD-10-CM

## 2023-11-08 DIAGNOSIS — G89.29 CHRONIC HIP PAIN, LEFT: ICD-10-CM

## 2023-11-08 RX ORDER — ACETAMINOPHEN AND CODEINE PHOSPHATE 300; 30 MG/1; MG/1
TABLET ORAL 2 TIMES DAILY PRN
Qty: 20 TABLET | Refills: 0 | Status: CANCELLED | OUTPATIENT
Start: 2023-11-08

## 2023-11-08 NOTE — TELEPHONE ENCOUNTER
Sue Barron requesting medication refill for acetaminophen-codeine 300-30 MG Oral Tab . Patient Contacted Pharmacy (Yes/No): no  LOV: 9/29/2023   Last Refill Date: 9/29/23  30 or 90 Day Supply:   Pharmacy Location: [facility] [address] [place] [address] [facility] [address], [phone], [phone]   Prescribed By: Charlotte Ross PA-C        Informed patient to allow up to 24 to 48 Business hours for a call back from a nurse.

## 2023-11-09 NOTE — TELEPHONE ENCOUNTER
Dispensed Written Strength Quantity Refills Days Supply Provider Pharmacy    ACETAMINOPHEN-CODEINE PHOSPHATE 10/27/2023 10/27/2023 300-30 mg 16  2 Caitie [facility]ARRO[facility]       Codeine was prescribed by another provider Francy Schultz on 10/27/2023 #16 no refill at this time I do not want her taking more than 20/month follow-up office visit or she can have a discussion with pain clinic on codeine prescription

## 2023-11-09 NOTE — TELEPHONE ENCOUNTER
Dispensed Written Strength Quantity Refills Days Supply Provider Pharmacy     ACETAMINOPHEN-CODEINE PHOSPHATE 10/27/2023 10/27/2023 300-30 mg 16   2 Amparo CARROLL         Codeine was prescribed by another provider Rufus Avila on 10/27/2023 #16 no refill at this time I do not want her taking more than 20/month follow-up office visit or she can have a discussion with pain clinic who she has been seeing on codeine prescription

## 2023-11-20 ENCOUNTER — PATIENT OUTREACH (OUTPATIENT)
Dept: CASE MANAGEMENT | Age: 83
End: 2023-11-20

## 2023-11-20 NOTE — PROGRESS NOTES
Called patient and left a message for patient to call back when they can. Reviewed patient chart.  Left contact my contact number [phone]        Time Spent This Encounter Total: 5  min medical record review  Monthly Minute Total including today: 5 minutes

## 2023-11-21 ENCOUNTER — TELEPHONE (OUTPATIENT)
Dept: FAMILY MEDICINE CLINIC | Facility: CLINIC | Age: 83
End: 2023-11-21

## 2023-11-21 NOTE — TELEPHONE ENCOUNTER
Patient called stating she had begun to have lower abdominal pain that she previously had before. Patient states she happen to have one pain pill left that she took that did help. Patient stated she is scheduled on Monday 11/27 with Elena Mckeon. Patient is looking for advise on what else she could take for pain as pain persist or if she should be seen sooner.  Patient can be reached at [phone] Please advise

## 2023-11-24 ENCOUNTER — TELEPHONE (OUTPATIENT)
Dept: FAMILY MEDICINE CLINIC | Facility: CLINIC | Age: 83
End: 2023-11-24

## 2023-11-24 NOTE — TELEPHONE ENCOUNTER
I was confirming patients appt. For Monday with Timothy Jean PA-C and patient stated she is out of her Hydrocodone-Acetaminophen 5-500 MG Oral Cap (Discontinued) and would like to get it refilled. She is having a lot of abdominal pain and Tylenol is not helping -   Please advise.

## 2023-11-27 ENCOUNTER — OFFICE VISIT (OUTPATIENT)
Dept: FAMILY MEDICINE CLINIC | Facility: CLINIC | Age: 83
End: 2023-11-27
Payer: MEDICARE

## 2023-11-27 VITALS
TEMPERATURE: 97 F | OXYGEN SATURATION: 98 % | HEART RATE: 62 BPM | WEIGHT: 138 LBS | SYSTOLIC BLOOD PRESSURE: 126 MMHG | HEIGHT: 63 IN | RESPIRATION RATE: 16 BRPM | BODY MASS INDEX: 24.45 KG/M2 | DIASTOLIC BLOOD PRESSURE: 76 MMHG

## 2023-11-27 DIAGNOSIS — G89.29 CHRONIC HIP PAIN, LEFT: Primary | ICD-10-CM

## 2023-11-27 DIAGNOSIS — F51.04 PSYCHOPHYSIOLOGICAL INSOMNIA: ICD-10-CM

## 2023-11-27 DIAGNOSIS — J02.9 THROAT SORENESS: ICD-10-CM

## 2023-11-27 DIAGNOSIS — R52 PAIN MANAGEMENT: ICD-10-CM

## 2023-11-27 DIAGNOSIS — F45.41 PAIN AGGRAVATED BY ANXIETY: ICD-10-CM

## 2023-11-27 DIAGNOSIS — Z79.899 MEDICATION MANAGEMENT: ICD-10-CM

## 2023-11-27 DIAGNOSIS — F41.1 GENERALIZED ANXIETY DISORDER: ICD-10-CM

## 2023-11-27 DIAGNOSIS — F41.9 PAIN AGGRAVATED BY ANXIETY: ICD-10-CM

## 2023-11-27 DIAGNOSIS — M25.552 CHRONIC HIP PAIN, LEFT: Primary | ICD-10-CM

## 2023-11-27 DIAGNOSIS — Z78.9 POOR HISTORIAN: ICD-10-CM

## 2023-11-27 DIAGNOSIS — R10.30 PAIN RADIATING TO LOWER ABDOMEN: ICD-10-CM

## 2023-11-27 DIAGNOSIS — R10.2 SUPRAPUBIC PAIN: ICD-10-CM

## 2023-11-27 DIAGNOSIS — R25.1 TREMORS OF NERVOUS SYSTEM: ICD-10-CM

## 2023-11-27 PROCEDURE — 99215 OFFICE O/P EST HI 40 MIN: CPT | Performed by: FAMILY MEDICINE

## 2023-11-27 RX ORDER — ACETAMINOPHEN AND CODEINE PHOSPHATE 300; 30 MG/1; MG/1
TABLET ORAL 2 TIMES DAILY PRN
Qty: 20 TABLET | Refills: 0 | Status: SHIPPED | OUTPATIENT
Start: 2023-11-27

## 2023-11-28 PROBLEM — R52 PAIN MANAGEMENT: Status: ACTIVE | Noted: 2023-11-28

## 2023-11-28 PROBLEM — I70.0: Status: ACTIVE | Noted: 2023-04-28

## 2023-11-28 PROBLEM — Z95.5 PRESENCE OF STENT IN CORONARY ARTERY: Status: ACTIVE | Noted: 2022-04-07

## 2023-12-18 ENCOUNTER — PATIENT OUTREACH (OUTPATIENT)
Dept: CASE MANAGEMENT | Age: 83
End: 2023-12-18

## 2023-12-18 DIAGNOSIS — F41.1 GENERALIZED ANXIETY DISORDER: ICD-10-CM

## 2023-12-18 DIAGNOSIS — F02.818 ALZHEIMER'S DEMENTIA WITH BEHAVIORAL DISTURBANCE (HCC): ICD-10-CM

## 2023-12-18 DIAGNOSIS — E21.0 PRIMARY HYPERPARATHYROIDISM (HCC): ICD-10-CM

## 2023-12-18 DIAGNOSIS — G30.9 ALZHEIMER'S DEMENTIA WITH BEHAVIORAL DISTURBANCE (HCC): ICD-10-CM

## 2023-12-18 DIAGNOSIS — I10 PRIMARY HYPERTENSION: Primary | ICD-10-CM

## 2023-12-18 NOTE — PROGRESS NOTES
Spoke to Steve malin [facility]. Updates to patient care team/comments: No changes per patient   Patient reported changes in medications: No changes per patient  Med Adherence  Comment: Taking as prescribed     Health Maintenance:   Health Maintenance   Topic Date Due    Meningococcal B Vaccine (1 of 4 - Increased Risk) Never done    Zoster Vaccines (1 of 2) Never done    COVID-19 Vaccine (3 - 2023-24 season) 09/01/2023    Influenza Vaccine (1) Never done    Annual Physical  02/15/2024    Tobacco Cessation Counseling 1 Year  09/29/2024    DEXA Scan  Completed    Annual Depression Screening  Completed    Fall Risk Screening (Annual)  Completed    Pneumococcal Vaccine: 65+ Years  Completed       Patient updates/concerns:  Patient states she did see Lucy Dong at the end of November and discussed in great detail of her chronic hip pain and how the otc medications did not really help her for the pain. Patient stated Sherry Lim was able to prescribe her acetaminophen-codeine. Patient states she was thankful for that and feels she is not so anxious if or when her hip pain flares up. Patient reported she is planning to get her lab work done this week. No other concerns or barriers at this time per patient. Goals/Action Plan: Active goal from previous outreach:     Chronic left hip pain   Patient reported progress towards goals:              - What: Patient reported seeing her pcp to discuss her chronic left hip pain           - Where/When/How: Patient stated she was prescribed acetaminophen-codeine. Patient stated she will get the blood work done this week as recommended by Lucy Dong. Patient Reported Barriers and Concerns: Patient reported she was able to get a refill for her hip when she has a flare up                   - Plan for overcoming barriers: Patient to continue to follow up with care team as scheduled or as needed.     Care Managers Interventions: No interventions needed at this time patient aware she may contact me if further assistance is needed. Continue independent living, healthy diet and exercise routine. Continue to provide encouragement, support, and education for healthy coping and diagnosis. Future Appointments: No future appointments. Next Care Manager Follow Up Date: 1 month follow up or sooner if needed    Reason For Follow Up: review progress and or barriers towards patient's goals. Time Spent This Encounter Total: 20 min medical record review, telephone communication, care plan updates where needed, education, goals, and action plan recreation/update. Provided acknowledgment and validation to patient's concerns.    Monthly Minute Total including today: 20  Physical assessment, complete health history, and need for CCM established by Ricky Bonilla DO.

## 2024-01-02 ENCOUNTER — TELEPHONE (OUTPATIENT)
Dept: FAMILY MEDICINE CLINIC | Facility: CLINIC | Age: 84
End: 2024-01-02

## 2024-01-02 NOTE — TELEPHONE ENCOUNTER
Donna Umaña requesting medication refill for Hydrocodone-Acetaminophen 5-500 MG Oral Cap (Discontinued) . (Patient is in a lot of pain)    Patient Contacted Pharmacy (Yes/No): No   LOV: 11/27/2023   Last Refill Date:   30 or 90 Day Supply:  Pharmacy Location: [facility]   Prescribed By:   Next Scheduled Appointment:       Informed patient to allow up to 24 to 48 Business hours for a call back from a nurse.

## 2024-01-02 NOTE — TELEPHONE ENCOUNTER
Pt. Requesting Norco.    Last refill: 8/7/2023 10 tablets (but dentist has been prescribing per your note?)    Last Appointment: 11/27/23    Next Appointment:  No future OV's scheduled

## 2024-01-02 NOTE — TELEPHONE ENCOUNTER
12./21/23 Rx for codeine from [facility] maybe her dentist?  She can not get pain RX from 2 providers.  She will need to contact pain clinic for further management.  We agreed to #20 codeine a month and no other providers can prescribe pain medication at the same time.

## 2024-01-02 NOTE — TELEPHONE ENCOUNTER
Spoke w/pt. Who denied getting any pain medication from any other Dr.'s. Explained that Opioids are in a database that shows all Dr.'s what the other Dr.'s are prescribing and gave her the dates. Asked her to follow up with pain clinic and she stated that they would only give shots and not give her the pills. Suggested maybe she try the shots to see if they work. She stated she would call her other Dr. For prescription.

## 2024-01-11 ENCOUNTER — PATIENT OUTREACH (OUTPATIENT)
Dept: CASE MANAGEMENT | Age: 84
End: 2024-01-11

## 2024-01-11 NOTE — PROGRESS NOTES
Called patient and left a message for patient to call back when they can. Reviewed patient chart. Left contact my contact number [phone]        Time Spent This Encounter Total: 5  min medical record review  Monthly Minute Total including today: 5 minutes

## 2024-01-13 ENCOUNTER — TELEPHONE (OUTPATIENT)
Dept: FAMILY MEDICINE CLINIC | Facility: CLINIC | Age: 84
End: 2024-01-13

## 2024-01-16 ENCOUNTER — TELEPHONE (OUTPATIENT)
Dept: FAMILY MEDICINE CLINIC | Facility: CLINIC | Age: 84
End: 2024-01-16

## 2024-01-16 RX ORDER — NYSTATIN 100000 U/G
CREAM TOPICAL
Qty: 30 G | Refills: 0 | Status: SHIPPED | OUTPATIENT
Start: 2024-01-16

## 2024-01-16 NOTE — TELEPHONE ENCOUNTER
Donna Umaña requesting medication refill for Nystatin.    Patient Contacted Pharmacy (Yes/No): no  LOV: 11/27/2023   Last Refill Date: 3/22/2023  30 or 90 Day Supply:  Pharmacy Location: [facility] #[id number] [address] AT [facility] [address], [phone], [phone] [[id number]]   Prescribed By: Enrique   Next Scheduled Appointment: 2/1/2024    Patient states she is taking an antibiotic medication, and she needs Nystatin to help in case of yeast infection.     Informed patient to allow up to 24 to 48 Business hours for a call back from a nurse.

## 2024-01-19 ENCOUNTER — OFFICE VISIT (OUTPATIENT)
Dept: PAIN CLINIC | Facility: CLINIC | Age: 84
End: 2024-01-19
Payer: MEDICARE

## 2024-01-19 ENCOUNTER — TELEPHONE (OUTPATIENT)
Dept: PAIN CLINIC | Facility: CLINIC | Age: 84
End: 2024-01-19

## 2024-01-19 VITALS — OXYGEN SATURATION: 75 % | SYSTOLIC BLOOD PRESSURE: 134 MMHG | DIASTOLIC BLOOD PRESSURE: 70 MMHG | HEART RATE: 55 BPM

## 2024-01-19 DIAGNOSIS — R10.9 CHRONIC ABDOMINAL PAIN: ICD-10-CM

## 2024-01-19 DIAGNOSIS — M25.551 BILATERAL HIP PAIN: Primary | ICD-10-CM

## 2024-01-19 DIAGNOSIS — M25.552 BILATERAL HIP PAIN: Primary | ICD-10-CM

## 2024-01-19 DIAGNOSIS — G89.29 CHRONIC ABDOMINAL PAIN: ICD-10-CM

## 2024-01-19 DIAGNOSIS — F41.1 GENERALIZED ANXIETY DISORDER: Chronic | ICD-10-CM

## 2024-01-19 PROCEDURE — 99214 OFFICE O/P EST MOD 30 MIN: CPT | Performed by: ANESTHESIOLOGY

## 2024-01-19 RX ORDER — CLINDAMYCIN HYDROCHLORIDE 300 MG/1
300 CAPSULE ORAL 3 TIMES DAILY
COMMUNITY
Start: 2024-01-15

## 2024-01-19 RX ORDER — CYCLOBENZAPRINE HCL 5 MG
5 TABLET ORAL EVERY EVENING
COMMUNITY
Start: 2023-12-21

## 2024-01-19 RX ORDER — HYDROCODONE BITARTRATE AND ACETAMINOPHEN 5; 325 MG/1; MG/1
1 TABLET ORAL EVERY 6 HOURS PRN
COMMUNITY
Start: 2024-01-15

## 2024-01-19 NOTE — TELEPHONE ENCOUNTER
Order Questions    Question Answer   Anesthesia Type Sedation   Provider Silvano   [facility] Procedure Lab   CPT (Hit enter after each entry) XR ASPIR/INJ MAJOR JOINT W/FLUORO RT(CPT=77002/34657)   Procedure Modifier ;BILATERAL PROCEDURE   Medical clearance requested (will send to Pain Navigator) No   Patient has Medicare coverage? Yes   Comments (Please list entire procedure name here.) bilateral hip injection

## 2024-01-19 NOTE — TELEPHONE ENCOUNTER
Patient advised of insurance approval to proceed with injections and is agreeable to scheduling. Patient scheduled for procedure, pre-procedure instructions reviewed. Patient prefers conscious  sedation. Reviewed sedation instructions including Fasting 8 hours prior, Required and No Covid Test Required. Patient has no medications to hold prior to procedure. Advised patient that she can take blood pressure medication w/small sip of water. Patient verbalized understanding of instructions, no further needs at this time.    Pre Procedure Instruction Sheet provided to patient at office visit.     [facility] PAIN CLINIC  PRE-PROCEDURE INSTRUCTIONS WITH IV SEDATION     Procedure: Bilateral Hip Injection     Appointment Date: 01/30/2024    Check-In Time: 11:30 AM     Follow-Up Date/Time with ALLEN Panchal in [place] Office : 02/12/24 @ 01:00 PM    Prior to the procedure:  Please update us prior to the procedure if you are experiencing any symptoms of infection such as cough, fever, chills, urinary symptoms, or have recently been prescribed antibiotics, have open wounds, have recently had surgery or dental procedures.    Day of Procedure:  Do not eat or drink anything (including water) 8 hours prior to your procedure.  If you take morning blood pressure medication or oral diabetic medication, please take with a small sip of water.  You are required to have a responsible adult drive you home after your procedure. You may not take a cab or ride share unless you have a responsible adult with you in the cab or ride share.  A family member or friend is required to stay in our waiting room or hospital garage because of the sedation you will receive, you may be sleepy and forgetful. You may not remember anything told to you after your procedure including discharge instructions. Please note: children are not allowed in the holding area so please make appropriate arrangements.  Any additional family members and friends  will need to remain in the surgical waiting room or hospital garage for the duration of your procedure. *If your family member or friend elects to wait in the garage, they must leave a cell phone number with the lab staff in case they need to be reached.  Please park in the [facility] garage and follow the signs to the [facility].  Please bring your Insurance Card, Photo ID, List of Current Medications and Referral (if applicable) to your appointment. Check in at [address]) outpatient registration in the [facility].  Please note-No prescriptions will be written by Pain Clinic in [place] on the day of procedure. If you require a refill of medications, please contact the office 48 hours prior to your procedure.  If you have an implanted Spinal Cord or Peripheral Nerve Stimulator: Please remember to turn device off for procedure    *If you are fasting, you may take blood pressure and thyroid medications with a small sip of water the day of your procedure.   *If you are diabetic, your glucose must be within a normal range for you. If you are fasting, you should check your glucose levels and adjust with medication if needed.    Medication Hold:  Number of days you need to be off for the following medications:    Aggrenox 10 days   Agrylin (Anagrelide) 10 days  Brilinta (Ticagrelor) 7 days  Imbruvica (Ibrutinib) 3 days   Enbrel (Etanercept) 24 hours   Fragmin (Dalteparin) 24 hours   Pletal (Cilostazol) 7 days  Effient (Prasugrel) 7 days  Pradaxa 10 days  Trental 7 days  Eliquis (Apixaban) 3 days  Xarelto (Rivaroxaban) 3 days  Lovenox (Enoxaparin) 24 hours  Aspirin  Greater than 81mg but less than 325mg   5 days  325mg and greater                  7 days  (*81 mg      24 hours preferred, but not required)  Coumadin       5 days  Procedure may be cancelled if INR is elevated.   Excedrin (with aspirin) 7 days  Plavix (Clopidogrel)                             7 days    NSAIDs: 24  hours preferred, but not required      Ibuprofen (Motrin, Advil, Vicoprofen), Naproxen (Naprosyn, Aleve), Piroxcam (Feldene), Meloxicam (Mobic), Oxaprozin (Daypro), Diclofenac (Voltaren), Indomethacin (Indocin), Etodolac (Lodine), Nabumetone (Relafen), Celebrex (Celecoxib)           HERBAL SUPPLEMENTS  5 days preferred, but not required  Fish oil, krill oil, Omega-3, Vascepa, Vitamin E, Turmeric, Garlic                       Insurance Authorization:   Most insurances are now requiring a preauthorization for all procedures.     Please contact your insurance carrier to determine what your financial responsibility will be for the procedure(s).    Cancellation/Rescheduling Appointment:   In the event you need to cancel or reschedule your appointment, you must notify the office 24 hours prior.    Post-procedure instructions:        Please schedule a follow up visit within 2 to 4 weeks after your last procedure date   Please call our office with any questions or concerns before or after your procedure at [phone], #2.  If you are a diabetic, please increase the frequency of your glucose monitoring after the procedure as this may cause a temporary increase in your blood sugar. Contact your primary care physician if your blood sugar rises as you may require some medication adjustment.        It is normal to have increased pain at injection site for up to 3-5 days after procedure, you can        use heat or ice (20 minutes on 20 minutes off) for comfort.

## 2024-01-19 NOTE — PROGRESS NOTES
Patient presents in office today with reported pain in bilateral hip pain/pelvic pain    Current pain level reported = 10/10    Last reported dose of Hydrocodone this morning

## 2024-01-19 NOTE — PROGRESS NOTES
Name: Donna Umaña   : 1940   DOS: 2024     Pain Clinic Follow Up Visit:     Chief Complaint   Patient presents with    Follow - Up     FU regarding pelvic pain       Donna Umaña is a 83 year old female with a longstanding history of chronic pelvic pain who presents today for follow-up.  The patient complains of pain in both hips radiating towards the pelvis.  Patient was treated for this with intra-articular hip injection in 2023 leading to greater than 80% symptomatic relief.  Today, complains of 10 out of 10 radiating pain into the hip and pelvis.  Has been evaluated multiple physicians without a clear etiology to her chronic pelvic pain.    Pt denies any chills, fever, or weakness. There is no bladder or bowel incontinence associated with the pain.    REVIEW OF SYSTEMS:  A ten point review of systems was performed with pertinent positives and negatives in the HPI.    Allergies   Allergen Reactions    Penicillins HIVES     Throat swells, difficulty breathing      Bactrim NAUSEA AND VOMITING     Tabs    Sulfa Drugs Cross Reactors NAUSEA AND VOMITING    Yeast-Related ITCHING     States if she eats too much yeast she gets vaginal yeast infections       Current Outpatient Medications   Medication Sig Dispense Refill    HYDROcodone-acetaminophen 5-325 MG Oral Tab Take 1 tablet by mouth every 6 (six) hours as needed for Pain.      clindamycin 300 MG Oral Cap Take 1 capsule (300 mg total) by mouth 3 (three) times daily.      nystatin 100,000 Units/g External Cream Apply thin film twice a day topically to external vaginal area for 10 days 30 g 0    acetaminophen-codeine 300-30 MG Oral Tab Take 0.5-1 tablets by mouth 2 (two) times daily as needed for Pain. 20 tablet 0    PRAVASTATIN 40 MG Oral Tab TAKE 1 TABLET(40 MG) BY MOUTH EVERY NIGHT 90 tablet 0    SERTRALINE 100 MG Oral Tab TAKE 2 TABLETS BY MOUTH EVERY  tablet 0    LOSARTAN 100 MG Oral Tab TAKE 1 TABLET(100 MG) BY MOUTH DAILY 90  tablet 0    AMLODIPINE 10 MG Oral Tab TAKE 1 TABLET(10 MG) BY MOUTH DAILY 90 tablet 0    QUEtiapine 25 MG Oral Tab Take 25 three times a day and 50 mg at night for sleep and anxiety 150 tablet 2    gabapentin 400 MG Oral Cap Take 1 capsule (400 mg total) by mouth 3 (three) times daily. 270 capsule 0    lidocaine 5 % External Patch APPLY UP TO 2 PATCHES TO PAINFUL AREAS EVERY 12 HOURS THEN OFF FOR 12 HOURS 60 patch 5    diphenhydrAMINE 25 MG Oral Cap Take 1 capsule (25 mg total) by mouth every 6 (six) hours as needed for Sleep.      acetaminophen 325 MG Oral Tab Take 1 tablet (325 mg total) by mouth every 6 (six) hours as needed for Pain.      Lactobacillus Probiotic Oral Tab Take 1 tablet by mouth in the morning and 1 tablet before bedtime. 60 tablet 0    loratadine 10 MG Oral Tab Take 1 tablet (10 mg total) by mouth daily.      tretinoin 0.025 % External Cream Apply 1 Application. topically every 4 (four) hours.      omeprazole 20 MG Oral Capsule Delayed Release Take 1 capsule (20 mg total) by mouth daily.      docusate sodium 100 MG Oral Cap Take 100 mg by mouth 2 (two) times daily. As needed for constipation. Do not take if having diarrhea or loose stool 60 capsule 0    cinacalcet 30 MG Oral Tab Take 1 tablet (30 mg total) by mouth daily with breakfast. 90 tablet 3    cyclobenzaprine 5 MG Oral Tab Take 1 tablet (5 mg total) by mouth every evening. (Patient not taking: Reported on 1/19/2024)           EXAM:   /70 (BP Location: Left arm, Patient Position: Sitting)   Pulse 55   SpO2 (!) 75%   General:  Patient is a(n) 83 year old year old female in no acute distress.  Neurologic:: WNL-Orientation to time, place and person, normal mood & affect, concentration & attention span intact.   Inspection:  Ambulates with well-coordinated, fluid, non-antalgic gait.  Gait is normal.  Neck: Range of motion preserved  Back: Gait intact      IMAGES:   CT abdomen pelvis reviewed without pathology      ASSESSMENT AND PLAN:      1. Bilateral hip pain    2. Chronic abdominal pain    3. Generalized anxiety disorder        The patient is a 83-year-old female who presents today for follow-up.  She reports a longstanding history of chronic pain.  Has a spinal cord stimulator in place.  Today complains of chronic pelvic pain.  To date, no distinct diagnoses for her chronic pelvic pain.  Overall imaging is benign.  Complains of hip pain as well and did well with intra-articular hip injection on the left-hand side in August 2023.  Discussed role for bilateral hip injection.  Also had a long discussion regarding her chronic abdominal/pelvic pains.  At this point, do not have a clear source.  There could certainly be a behavioral component regarding her chronic abdominal/pelvic pain.  May benefit from pain psychology behavioral health referral.    Patient is also asking for narcotic medications to help alleviate her pelvic pain symptoms.  Opiate medications have not been proven to be effective in treating chronic pelvic pain.  [place] patient[place]  and this office has not provided narcotic medications to her in the past.  Would minimize use of narcotics in managing chronic pelvic pain.  Rationale for this discussed with patient.      Orders:  Orders Placed This Encounter   Procedures    [facility] PAIN NAVIGATOR         Radiology orders and consultations:None  The patient indicates understanding of these issues and agrees to the plan.  No follow-ups on file.    Jeffrey Schaefer MD, 1/19/2024, 11:44 AM

## 2024-01-19 NOTE — PATIENT INSTRUCTIONS
Refill policies:    Allow 2-3 business days for refills; controlled substances may take longer.  Contact your pharmacy at least 5 days prior to running out of medication and have them send an electronic request or submit request through the “request refill” option in your [employer] account.  Refills are not addressed on weekends; covering physicians do not authorize routine medications on weekends.  No narcotics or controlled substances are refilled after noon on Fridays or by on call physicians.  By law, narcotics must be electronically prescribed.  A 30 day supply with no refills is the maximum allowed.  If your prescription is due for a refill, you may be due for a follow up appointment.  To best provide you care, patients receiving routine medications need to be seen at least once a year.  Patients receiving narcotic/controlled substance medications need to be seen at least once every 3 months.  In the event that your preferred pharmacy does not have the requested medication in stock (e.g. Backordered), it is your responsibility to find another pharmacy that has the requested medication available.  We will gladly send a new prescription to that pharmacy at your request.    Scheduling Tests:    If your physician has ordered radiology tests such as MRI or CT scans, please contact Central Scheduling at [phone] right away to schedule the test.  Once scheduled, the [facility] Centralized Referral Team will work with your insurance carrier to obtain pre-certification or prior authorization.  Depending on your insurance carrier, approval may take 3-10 days.  It is highly recommended patients assure they have received an authorization before having a test performed.  If test is done without insurance authorization, patient may be responsible for the entire amount billed.      Precertification and Prior Authorizations:  If your physician has recommended that you have a procedure or additional testing performed the [facility]  Centralized Referral Team will contact your insurance carrier to obtain pre-certification or prior authorization.    You are strongly encouraged to contact your insurance carrier to verify that your procedure/test has been approved and is a COVERED benefit.  Although the [facility] Centralized Referral Team does its due diligence, the insurance carrier gives the disclaimer that \"Although the procedure is authorized, this does not guarantee payment.\"    Ultimately the patient is responsible for payment.   Thank you for your understanding in this matter.  Paperwork Completion:  If you require FMLA or disability paperwork for your recovery, please make sure to either drop it off or have it faxed to our office at [phone]. Be sure the form has your name and date of birth on it.  The form will be faxed to our Forms Department and they will complete it for you.  There is a 25$ fee for all forms that need to be filled out.  Please be aware there is a 10-14 day turnaround time.  You will need to sign a release of information (RAMÓN) form if your paperwork does not come with one.  You may call the Forms Department with any questions at [phone].  Their fax number is [phone].

## 2024-01-23 ENCOUNTER — TELEPHONE (OUTPATIENT)
Dept: FAMILY MEDICINE CLINIC | Facility: CLINIC | Age: 84
End: 2024-01-23

## 2024-01-23 DIAGNOSIS — F41.1 GENERALIZED ANXIETY DISORDER: ICD-10-CM

## 2024-01-23 RX ORDER — SERTRALINE HYDROCHLORIDE 100 MG/1
200 TABLET, FILM COATED ORAL DAILY
Qty: 180 TABLET | Refills: 0 | OUTPATIENT
Start: 2024-01-23

## 2024-01-23 RX ORDER — SERTRALINE HYDROCHLORIDE 100 MG/1
200 TABLET, FILM COATED ORAL DAILY
Qty: 180 TABLET | Refills: 0 | Status: SHIPPED | OUTPATIENT
Start: 2024-01-23

## 2024-01-23 NOTE — TELEPHONE ENCOUNTER
URGENT SICK CALL    Donna Umaña  LOV: 11/27/2023     Patient experiencing lower abdominal pain. States that is has been going for a few days. Pt's in a lot of pain. Pt has an appt scheduled for 2/1/24.    Please advise.

## 2024-01-23 NOTE — TELEPHONE ENCOUNTER
Requested Prescriptions     Refused Prescriptions Disp Refills    SERTRALINE 100 MG Oral Tab [Pharmacy Med Name: SERTRALINE 100MG TABLETS] 180 tablet 0     Sig: TAKE 2 TABLETS BY MOUTH EVERY DAY     Refused By: CHERYL SÁCNHEZ     Reason for Refusal: Duplicate refill request

## 2024-01-23 NOTE — TELEPHONE ENCOUNTER
Kristan is OOO today. Advised pt. To go to IC if her pain was that severe. Patient verbalized agreement and understanding.

## 2024-01-23 NOTE — TELEPHONE ENCOUNTER
Requested Prescriptions     Signed Prescriptions Disp Refills    SERTRALINE 100 MG Oral Tab 180 tablet 0     Sig: TAKE 2 TABLETS BY MOUTH EVERY DAY     Authorizing Provider: PARKER KING     Ordering User: CHERYL SÁNCHEZ     Refilled per protocol/OV notes

## 2024-01-24 ENCOUNTER — TELEPHONE (OUTPATIENT)
Dept: FAMILY MEDICINE CLINIC | Facility: CLINIC | Age: 84
End: 2024-01-24

## 2024-01-24 NOTE — TELEPHONE ENCOUNTER
Donna Umaña requesting medication refill for HYDROcodone-acetaminophen 5-325 MG Oral Tab. Pt states she's in a lot of pain and receiving a pain injection on 1/30/24.    Patient Contacted Pharmacy (Yes/No): No  LOV: 11/27/2023   Last Refill Date: 1/15/24  30 or 90 Day Supply:  Pharmacy Location:   [facility] #[id number] [address] [facility] [address] [facility], [phone], [phone]     Prescribed By: Kristan Nunez PA-C  Next Scheduled Appointment: 2/1/2024      Informed patient to allow up to 24 to 48 Business hours for a call back from a nurse.

## 2024-01-24 NOTE — TELEPHONE ENCOUNTER
Patient has been getting hydrocodone and codeine from a Dr. Finch.  Last codeine was 1/4/2024 for #6 and last hydrocodone was on 1/15/2024 for #8.  I cannot prescribe narcotic if she is getting it from another provider as a general policy.  She can contact the pain clinic to see if they can offer something else for her before she gets the pain injection.

## 2024-01-24 NOTE — TELEPHONE ENCOUNTER
Spoke w/pt. And explained that we have discussed this before and as long as the pt. Is getting Norco elsewhere then Kristan will not prescribe. Pt. Then denied calling for pain medication stating that she called for abdominal pain. I reminded her that she actually called for abdominal pain yesterday and was told to go to IC where they could do any needed testing. Patient verbalized agreement and understanding.

## 2024-01-26 ENCOUNTER — TELEPHONE (OUTPATIENT)
Dept: FAMILY MEDICINE CLINIC | Facility: CLINIC | Age: 84
End: 2024-01-26

## 2024-01-26 NOTE — TELEPHONE ENCOUNTER
Patient calling and is in terrible pain in where her ovaries would be, she no longer has them. She cannot sleep   Please advise.

## 2024-01-26 NOTE — TELEPHONE ENCOUNTER
Phoned pt. Reminded her that she has called every day this week and that we have asked her repeatedly to go to IC or ER. Pt. Needs to be evaluated where testing can be performed. Pt. Stated she forgot. She agreed she would go today.

## 2024-01-28 DIAGNOSIS — G89.29 CHRONIC BILATERAL LOW BACK PAIN WITHOUT SCIATICA: ICD-10-CM

## 2024-01-28 DIAGNOSIS — M54.50 CHRONIC BILATERAL LOW BACK PAIN WITHOUT SCIATICA: ICD-10-CM

## 2024-01-28 DIAGNOSIS — M79.7 FIBROMYALGIA: ICD-10-CM

## 2024-01-29 ENCOUNTER — TELEPHONE (OUTPATIENT)
Dept: FAMILY MEDICINE CLINIC | Facility: CLINIC | Age: 84
End: 2024-01-29

## 2024-01-29 PROBLEM — K59.04 CHRONIC IDIOPATHIC CONSTIPATION: Status: ACTIVE | Noted: 2021-10-09

## 2024-01-29 RX ORDER — GABAPENTIN 400 MG/1
400 CAPSULE ORAL 3 TIMES DAILY
Qty: 270 CAPSULE | Refills: 0 | Status: SHIPPED | OUTPATIENT
Start: 2024-01-29

## 2024-01-29 NOTE — TELEPHONE ENCOUNTER
Patient calling with severe abdominal pain and is asking for a refill on her HYDROcodone-acetaminophen 5-325 MG Oral Tab she would like to speak to nurse  Please advise.

## 2024-01-29 NOTE — TELEPHONE ENCOUNTER
Spoke w/pt. C/O severe pain where her ovaries should be. Went over again how as long as pt. Was receiving medication from elsewhere Kristan would not prescribe. Pt. States she has not gone elsewhere or gotten anything in weeks. Explained she picked up latest script on 1/15 which is only 14 days ago. Pt. States she's in serious pain and is then asking if she could have something else for pain. Pt. Has visit in 3 days. Suggested she talk w/Kristan about it in person at that time.   Valtrex Pregnancy And Lactation Text: this medication is Pregnancy Category B and is considered safe during pregnancy. This medication is not directly found in breast milk but it's metabolite acyclovir is present.

## 2024-01-30 ENCOUNTER — APPOINTMENT (OUTPATIENT)
Dept: GENERAL RADIOLOGY | Facility: HOSPITAL | Age: 84
End: 2024-01-30
Attending: ANESTHESIOLOGY
Payer: MEDICARE

## 2024-01-30 ENCOUNTER — HOSPITAL ENCOUNTER (OUTPATIENT)
Facility: HOSPITAL | Age: 84
Setting detail: HOSPITAL OUTPATIENT SURGERY
Discharge: HOME | End: 2024-01-30
Attending: ANESTHESIOLOGY | Admitting: ANESTHESIOLOGY
Payer: MEDICARE

## 2024-01-30 VITALS
SYSTOLIC BLOOD PRESSURE: 212 MMHG | TEMPERATURE: 97 F | OXYGEN SATURATION: 96 % | HEART RATE: 69 BPM | RESPIRATION RATE: 18 BRPM | DIASTOLIC BLOOD PRESSURE: 74 MMHG

## 2024-01-30 PROCEDURE — 77002 NEEDLE LOCALIZATION BY XRAY: CPT | Performed by: ANESTHESIOLOGY

## 2024-01-30 PROCEDURE — 3E0U33Z INTRODUCTION OF ANTI-INFLAMMATORY INTO JOINTS, PERCUTANEOUS APPROACH: ICD-10-PCS | Performed by: ANESTHESIOLOGY

## 2024-01-30 PROCEDURE — 20610 DRAIN/INJ JOINT/BURSA W/O US: CPT | Performed by: ANESTHESIOLOGY

## 2024-01-30 RX ORDER — HYDRALAZINE HYDROCHLORIDE 20 MG/ML
INJECTION INTRAMUSCULAR; INTRAVENOUS
Status: COMPLETED
Start: 2024-01-30 | End: 2024-01-30

## 2024-01-30 RX ORDER — HYDRALAZINE HYDROCHLORIDE 20 MG/ML
10 INJECTION INTRAMUSCULAR; INTRAVENOUS ONCE
Status: COMPLETED | OUTPATIENT
Start: 2024-01-30 | End: 2024-01-30

## 2024-01-30 RX ORDER — SODIUM CHLORIDE, SODIUM LACTATE, POTASSIUM CHLORIDE, CALCIUM CHLORIDE 600; 310; 30; 20 MG/100ML; MG/100ML; MG/100ML; MG/100ML
100 INJECTION, SOLUTION INTRAVENOUS CONTINUOUS
Status: DISCONTINUED | OUTPATIENT
Start: 2024-01-30 | End: 2024-01-30

## 2024-01-30 RX ORDER — DIPHENHYDRAMINE HYDROCHLORIDE 50 MG/ML
50 INJECTION INTRAMUSCULAR; INTRAVENOUS ONCE AS NEEDED
Status: DISCONTINUED | OUTPATIENT
Start: 2024-01-30 | End: 2024-01-30

## 2024-01-30 RX ORDER — ONDANSETRON 2 MG/ML
4 INJECTION INTRAMUSCULAR; INTRAVENOUS ONCE AS NEEDED
Status: DISCONTINUED | OUTPATIENT
Start: 2024-01-30 | End: 2024-01-30

## 2024-01-30 RX ORDER — MIDAZOLAM HYDROCHLORIDE 1 MG/ML
INJECTION INTRAMUSCULAR; INTRAVENOUS
Status: DISCONTINUED | OUTPATIENT
Start: 2024-01-30 | End: 2024-01-30

## 2024-01-30 RX ORDER — METHYLPREDNISOLONE ACETATE 40 MG/ML
INJECTION, SUSPENSION INTRA-ARTICULAR; INTRALESIONAL; INTRAMUSCULAR; SOFT TISSUE
Status: DISCONTINUED | OUTPATIENT
Start: 2024-01-30 | End: 2024-01-30

## 2024-01-30 RX ORDER — LIDOCAINE HYDROCHLORIDE 10 MG/ML
INJECTION, SOLUTION EPIDURAL; INFILTRATION; INTRACAUDAL; PERINEURAL
Status: DISCONTINUED | OUTPATIENT
Start: 2024-01-30 | End: 2024-01-30

## 2024-01-30 NOTE — DISCHARGE INSTRUCTIONS
You have been given medication that makes you sleepy. This may have included both pain medication and sleeping medication. Most of the effects have worn off but you may still have some drowsiness for the next 6 to 8 hours.    Home Care  Follow these guidelines when you get home:    --Don't drink any alcohol for the next 24 hours.    --Don't drive, operate dangerous machinery, make important business or personal    decisions, or sign legal documents during the next 24 hours.    Follow Up Care  Follow up with your healthcare provider if you are not alert and back to your usual level of activity within 12 hours    When to seek medical advice  Call your healthcare provider right away if any of these occur:    --Drowsiness that gets worse  --Weakness or dizziness that gets worse  --Repeated vomiting  --You can not be awakened   Home Care Instructions Following Your Pain Procedure     Donna,  It has been a pleasure to have you as our patient. To help you at home, you must follow these general discharge instructions. We will review these with you before you are discharged. It is our hope that you have a complete and uneventful recovery from our procedure.     General Instructions:  What to Expect:  Bandages from your procedure today can be removed when you get home.  Please avoid soaking and/or swimming for 24 hours.  Showering is okay  It is normal to have increased pain symptoms and/or pain at injection site for up to 3-5 days after procedure, you can use heat or ice (20 minutes on 20 minutes off) for comfort.  You may experience some temporary side effects which may include restlessness or insomnia, flushing of the face, or heart palpitations.  Please contact the provider if these symptoms do not resolve within 3-4 days.  Lightheadedness or nausea may occur and should resolve within 24 to 48 hours.  If you develop a headache after treatment, rest, drink fluids (with caffeine, if possible) and take mild over-the-counter  pain medication.  If the headache does not improve with the above treatment, contact the physician.  Home Medications:  Resume all previously prescribed medication.  Please avoid taking NSAIDs (Non-Steriodal Anti-Inflammatory Drugs) such as:  Ibuprofen ( Advil, Motrin) Aleve (Naproxen), Diclofenac, Meloxicam for 6 hours after procedure.   If you are on Coumadin (Warfarin) or any other anti-coagulant (or \"blood thinning\") medication such as Plavix (Clopidogrel), Xarelto (Rivaroxaban), Eliquis (Apixaban), Effient (Prasugrel) etc., restart on the following day from the procedure unless otherwise directed by your provider.  If you are a diabetic, please increase the frequency of your glucose monitoring after the procedure as steroids may cause a temporary (2-3 day) increase in your blood sugar.  Contact your primary care physician if your blood sugar remains elevated as you may require some medication adjustment.  Diet:  Resume your regular diet as tolerated.  Activity:  We recommend that you relax and rest during the rest of your procedure day.  If you feel weakness in your arms or legs do not drive.  Follow-up Appointment  Please schedule a follow-up visit within 3 to 4 weeks after your last procedure date.  Question or Concerns:  Feel free to call our office with any questions or concerns at [phone] (option #2)    Donna  Thank you for coming to [facility] for your procedure.  The nurses try very hard to make sure you receive the best care possible.  Your trust in them as well as us is greatly appreciated.    Thanks so much,   Dr. Jeffrey Schaefer

## 2024-01-30 NOTE — OPERATIVE REPORT
[facility]  Operative Report  2024     Donna Umaña Patient Status:  Hospital Outpatient Surgery    1940 MRN [id number]   Location [facility] ENDOSCOPY [facility] Attending Jeffrey Schaefer MD   Hosp Day # 0 PCP Sherri Alvarenga DO     Indication: Donna is a 83 year old female with bilateral hip pain    Preoperative Diagnosis:  Bilateral hip pain [M25.551, M25.552]    Postoperative Diagnosis: Same as above.    Procedure performed: BILATERAL HIP JOINT INJECTION with sedation    Anesthesia: Local and IV Sedation.    EBL: Less than 1 ml.    Procedure Description:  After reviewing the patient’s history and performing a focused physical examination, the diagnosis was confirmed and contraindications such as infection and coagulopathy were ruled out.  Following review of potential side effects and complications, including but not necessarily limited to infection, allergic reaction, local tissue breakdown, nerve injury, and paresis, the patient indicated they understood and agreed to proceed.  Per my order and under my supervision, the patient was sedated with intermittent intravenous medications (as recorded).  The vital signs were monitored and recorded by a trained RN.  Total sedation time was 12 minutes.    The patient was brought to the procedure room and placed in supine position. After prepping and draping, the right hip joint was identified with the help of fluoroscopy.  A 22-gauge 3.5-inch spinal needle was used to enter the joint after local infiltration with lidocaine.  Then 0.5 cc dye was injected and a nice hip arthrogram was revealed.  After that, 20 mg Depo-Medrol mixed with 4 cc 1% Lidocaine was injected.  The needle was then released dilated and withdrawn tip intact.  This was repeated on the contralateral side.  The patient tolerated the procedure very well. The patient had complete understanding of the risks and benefits of the procedure.      Complications: None.    Follow up:  Clinic    Jeffrey Schaefer MD
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
## 2024-01-31 ENCOUNTER — TELEPHONE (OUTPATIENT)
Dept: PAIN CLINIC | Facility: CLINIC | Age: 84
End: 2024-01-31

## 2024-01-31 ENCOUNTER — TELEPHONE (OUTPATIENT)
Dept: FAMILY MEDICINE CLINIC | Facility: CLINIC | Age: 84
End: 2024-01-31

## 2024-01-31 NOTE — TELEPHONE ENCOUNTER
URGENT SICK CALL    Donna Umaña  LOV: 11/27/2023     Patient experiencing lower stomach pain . States that is has been going on since last night. Patient is scheduled for 2/1/2024. Requesting HYDROcodone-acetaminophen 5-325 MG Oral Tab.     Please advise.

## 2024-01-31 NOTE — TELEPHONE ENCOUNTER
Spoke w/pt. Again to let her know we are not prescribing and she can discuss F2F at tomorrows office visit. Patient verbalized agreement and understanding.

## 2024-01-31 NOTE — TELEPHONE ENCOUNTER
Courtesy called placed to patient for post procedure follow up. Patient stated doing fine. Informed patient that soreness is to be expected after the procedure. Educated patient that it takes 3-5 days for the steroid to be effective and to allow adequate time for medication to work. Encouraged patient to alternate ice and heat and to take medications as prescribed. Pt verbalized understanding to call with any questions or concerns.      Procedure: BILATERAL HIP JOINT INJECTION with sedation   Date: 01/30/24  Follow up Visit Scheduled: 02/12/24

## 2024-02-01 ENCOUNTER — OFFICE VISIT (OUTPATIENT)
Dept: FAMILY MEDICINE CLINIC | Facility: CLINIC | Age: 84
End: 2024-02-01
Payer: MEDICARE

## 2024-02-01 ENCOUNTER — TELEPHONE (OUTPATIENT)
Dept: FAMILY MEDICINE CLINIC | Facility: CLINIC | Age: 84
End: 2024-02-01

## 2024-02-01 VITALS
WEIGHT: 141 LBS | SYSTOLIC BLOOD PRESSURE: 168 MMHG | RESPIRATION RATE: 18 BRPM | TEMPERATURE: 98 F | HEIGHT: 62 IN | DIASTOLIC BLOOD PRESSURE: 90 MMHG | HEART RATE: 78 BPM | BODY MASS INDEX: 25.95 KG/M2 | OXYGEN SATURATION: 99 %

## 2024-02-01 DIAGNOSIS — Z87.11 HISTORY OF PEPTIC ULCER: ICD-10-CM

## 2024-02-01 DIAGNOSIS — F02.A4 MILD EARLY ONSET ALZHEIMER'S DEMENTIA WITH ANXIETY (HCC): ICD-10-CM

## 2024-02-01 DIAGNOSIS — M79.7 FIBROMYALGIA: ICD-10-CM

## 2024-02-01 DIAGNOSIS — F45.41 PAIN AGGRAVATED BY ANXIETY: ICD-10-CM

## 2024-02-01 DIAGNOSIS — M51.16 LUMBAR DISC HERNIATION WITH RADICULOPATHY: ICD-10-CM

## 2024-02-01 DIAGNOSIS — K58.2 IRRITABLE BOWEL SYNDROME WITH BOTH CONSTIPATION AND DIARRHEA: ICD-10-CM

## 2024-02-01 DIAGNOSIS — I25.119 CORONARY ARTERY DISEASE INVOLVING NATIVE CORONARY ARTERY OF NATIVE HEART WITH ANGINA PECTORIS (HCC): ICD-10-CM

## 2024-02-01 DIAGNOSIS — F41.9 PAIN AGGRAVATED BY ANXIETY: ICD-10-CM

## 2024-02-01 DIAGNOSIS — R41.3 SHORT-TERM MEMORY LOSS: ICD-10-CM

## 2024-02-01 DIAGNOSIS — I50.32 CHRONIC HEART FAILURE WITH PRESERVED EJECTION FRACTION (HCC): ICD-10-CM

## 2024-02-01 DIAGNOSIS — R10.9 CHRONIC ABDOMINAL PAIN: ICD-10-CM

## 2024-02-01 DIAGNOSIS — Z78.9 POOR HISTORIAN: ICD-10-CM

## 2024-02-01 DIAGNOSIS — Z86.19 HISTORY OF CLOSTRIDIOIDES DIFFICILE INFECTION: ICD-10-CM

## 2024-02-01 DIAGNOSIS — G89.29 CHRONIC BILATERAL LOW BACK PAIN WITHOUT SCIATICA: ICD-10-CM

## 2024-02-01 DIAGNOSIS — F41.1 GENERALIZED ANXIETY DISORDER: ICD-10-CM

## 2024-02-01 DIAGNOSIS — Z79.899 MEDICATION MANAGEMENT: ICD-10-CM

## 2024-02-01 DIAGNOSIS — K44.9 SLIDING HIATAL HERNIA: ICD-10-CM

## 2024-02-01 DIAGNOSIS — I70.209 FEMORAL ARTERY STENOSIS (HCC): ICD-10-CM

## 2024-02-01 DIAGNOSIS — F51.04 PSYCHOPHYSIOLOGICAL INSOMNIA: ICD-10-CM

## 2024-02-01 DIAGNOSIS — G89.29 CHRONIC HIP PAIN, LEFT: ICD-10-CM

## 2024-02-01 DIAGNOSIS — K29.30 CHRONIC SUPERFICIAL GASTRITIS WITHOUT BLEEDING: ICD-10-CM

## 2024-02-01 DIAGNOSIS — E21.3 HYPERPARATHYROIDISM (HCC): ICD-10-CM

## 2024-02-01 DIAGNOSIS — G30.0 MILD EARLY ONSET ALZHEIMER'S DEMENTIA WITH ANXIETY (HCC): ICD-10-CM

## 2024-02-01 DIAGNOSIS — N39.46 MIXED STRESS AND URGE URINARY INCONTINENCE: ICD-10-CM

## 2024-02-01 DIAGNOSIS — I70.1 RIGHT RENAL ARTERY STENOSIS (HCC): ICD-10-CM

## 2024-02-01 DIAGNOSIS — E78.2 MIXED HYPERLIPIDEMIA: ICD-10-CM

## 2024-02-01 DIAGNOSIS — N18.30 CHRONIC RENAL INSUFFICIENCY, STAGE 3 (MODERATE) (HCC): ICD-10-CM

## 2024-02-01 DIAGNOSIS — Z90.81 HISTORY OF SPLENECTOMY: ICD-10-CM

## 2024-02-01 DIAGNOSIS — Z12.31 VISIT FOR SCREENING MAMMOGRAM: ICD-10-CM

## 2024-02-01 DIAGNOSIS — I70.0 AORTOILIAC STENOSIS, LEFT (HCC): ICD-10-CM

## 2024-02-01 DIAGNOSIS — R25.1 TREMORS OF NERVOUS SYSTEM: ICD-10-CM

## 2024-02-01 DIAGNOSIS — R29.898 WEAKNESS OF BOTH LOWER EXTREMITIES: ICD-10-CM

## 2024-02-01 DIAGNOSIS — M25.552 CHRONIC HIP PAIN, LEFT: ICD-10-CM

## 2024-02-01 DIAGNOSIS — R10.30 PAIN RADIATING TO LOWER ABDOMEN: ICD-10-CM

## 2024-02-01 DIAGNOSIS — E83.52 HYPERCALCEMIA: ICD-10-CM

## 2024-02-01 DIAGNOSIS — G89.29 CHRONIC ABDOMINAL PAIN: ICD-10-CM

## 2024-02-01 DIAGNOSIS — M81.0 AGE-RELATED OSTEOPOROSIS WITHOUT CURRENT PATHOLOGICAL FRACTURE: ICD-10-CM

## 2024-02-01 DIAGNOSIS — Z87.442 HISTORY OF NEPHROLITHIASIS: ICD-10-CM

## 2024-02-01 DIAGNOSIS — I10 PRIMARY HYPERTENSION: ICD-10-CM

## 2024-02-01 DIAGNOSIS — Z98.1 HISTORY OF LUMBAR SPINAL FUSION: ICD-10-CM

## 2024-02-01 DIAGNOSIS — I70.0 ATHEROSCLEROSIS OF ABDOMINAL AORTA (HCC): ICD-10-CM

## 2024-02-01 DIAGNOSIS — I51.89 GRADE I DIASTOLIC DYSFUNCTION: ICD-10-CM

## 2024-02-01 DIAGNOSIS — M51.36 DDD (DEGENERATIVE DISC DISEASE), LUMBAR: ICD-10-CM

## 2024-02-01 DIAGNOSIS — I72.3 ILIAC ARTERY ANEURYSM, RIGHT (HCC): ICD-10-CM

## 2024-02-01 DIAGNOSIS — Z95.5 HISTORY OF CORONARY ARTERY STENT PLACEMENT: ICD-10-CM

## 2024-02-01 DIAGNOSIS — Z00.00 ENCOUNTER FOR ANNUAL HEALTH EXAMINATION: Primary | ICD-10-CM

## 2024-02-01 DIAGNOSIS — Z96.89 S/P INSERTION OF SPINAL CORD STIMULATOR: ICD-10-CM

## 2024-02-01 DIAGNOSIS — R52 PAIN MANAGEMENT: ICD-10-CM

## 2024-02-01 DIAGNOSIS — Z91.81 AT RISK FOR INJURY RELATED TO FALL: ICD-10-CM

## 2024-02-01 DIAGNOSIS — Z95.5 PRESENCE OF STENT IN CORONARY ARTERY: ICD-10-CM

## 2024-02-01 DIAGNOSIS — I35.1 MILD AORTIC VALVE REGURGITATION: ICD-10-CM

## 2024-02-01 DIAGNOSIS — G89.29 CHRONIC GROIN PAIN, LEFT: ICD-10-CM

## 2024-02-01 DIAGNOSIS — M54.50 CHRONIC BILATERAL LOW BACK PAIN WITHOUT SCIATICA: ICD-10-CM

## 2024-02-01 DIAGNOSIS — K59.04 CHRONIC IDIOPATHIC CONSTIPATION: ICD-10-CM

## 2024-02-01 DIAGNOSIS — R10.32 CHRONIC GROIN PAIN, LEFT: ICD-10-CM

## 2024-02-01 PROCEDURE — G0439 PPPS, SUBSEQ VISIT: HCPCS | Performed by: FAMILY MEDICINE

## 2024-02-01 PROCEDURE — 99214 OFFICE O/P EST MOD 30 MIN: CPT | Performed by: FAMILY MEDICINE

## 2024-02-01 RX ORDER — ACETAMINOPHEN AND CODEINE PHOSPHATE 300; 30 MG/1; MG/1
TABLET ORAL 2 TIMES DAILY PRN
Qty: 20 TABLET | Refills: 0 | Status: SHIPPED | OUTPATIENT
Start: 2024-02-01

## 2024-02-01 RX ORDER — QUETIAPINE FUMARATE 25 MG/1
TABLET, FILM COATED ORAL
Qty: 150 TABLET | Refills: 2 | Status: SHIPPED | OUTPATIENT
Start: 2024-02-01

## 2024-02-02 PROBLEM — I10 UNCONTROLLED HYPERTENSION: Status: RESOLVED | Noted: 2023-05-11 | Resolved: 2024-02-02

## 2024-02-02 PROBLEM — R00.1 BRADYCARDIA: Status: RESOLVED | Noted: 2023-02-16 | Resolved: 2024-02-02

## 2024-02-02 PROBLEM — Z90.81 HISTORY OF SPLENECTOMY: Status: ACTIVE | Noted: 2024-02-02

## 2024-02-02 PROBLEM — R10.32 CHRONIC GROIN PAIN, LEFT: Status: ACTIVE | Noted: 2024-02-02

## 2024-02-02 PROBLEM — G44.209 TENSION HEADACHE: Status: RESOLVED | Noted: 2023-04-11 | Resolved: 2024-02-02

## 2024-02-02 PROBLEM — R10.2 SUPRAPUBIC PAIN: Status: RESOLVED | Noted: 2021-08-19 | Resolved: 2024-02-02

## 2024-02-02 PROBLEM — Z87.898 HISTORY OF BENZODIAZEPINE USE: Status: RESOLVED | Noted: 2023-02-16 | Resolved: 2024-02-02

## 2024-02-02 PROBLEM — Z91.148 MEDICATION NONCOMPLIANCE DUE TO COGNITIVE IMPAIRMENT: Status: RESOLVED | Noted: 2022-09-11 | Resolved: 2024-02-02

## 2024-02-02 PROBLEM — N39.0 RECURRENT UTI: Status: RESOLVED | Noted: 2021-01-17 | Resolved: 2024-02-02

## 2024-02-02 PROBLEM — R41.9 MEDICATION NONCOMPLIANCE DUE TO COGNITIVE IMPAIRMENT: Status: RESOLVED | Noted: 2022-09-11 | Resolved: 2024-02-02

## 2024-02-02 PROBLEM — N30.91 HEMORRHAGIC CYSTITIS: Status: RESOLVED | Noted: 2023-03-29 | Resolved: 2024-02-02

## 2024-02-02 PROBLEM — E21.0 PRIMARY HYPERPARATHYROIDISM (HCC): Status: RESOLVED | Noted: 2017-03-10 | Resolved: 2024-02-02

## 2024-02-02 PROBLEM — R10.9 INTRACTABLE ABDOMINAL PAIN: Status: RESOLVED | Noted: 2023-03-29 | Resolved: 2024-02-02

## 2024-02-02 PROBLEM — G89.29 CHRONIC GROIN PAIN, LEFT: Status: ACTIVE | Noted: 2024-02-02

## 2024-02-02 NOTE — PROGRESS NOTES
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 bilaterally, respirations unlabored   Heart:  Regular rate and rhythm, S1 and S2 normal, grade 2 murmur no rub, or gallop   Abdomen:   Soft, tender to the left lower quadrant near the inguinal ligament increases with external rotation of the left hip , bowel sounds active all four quadrants,  no masses, no organomegaly   Pelvic: Deferred   Extremities: Extremities normal, atraumatic, no cyanosis or edema   Pulses: 2+ and symmetric   Skin: Skin color, texture, turgor normal, no rashes or lesions   Lymph nodes: Cervical, supraclavicular, and axillary nodes normal   Neurologic: Tremors bilateral hands  Psych patient has severe anxiety has poor short-term memory  does most of the talking for her    Musculoskeletal walks with a cane due to tremors as well as weakness in both legs due to lower back pain and pain with external rotation of left hip    and Breasts:  normal appearance, no masses or tenderness, Inspection negative, dense breasts    BP (!) 168/90   Pulse 78   Temp 98 °F (36.7 °C)   Resp 18   Ht 5' 2\" (1.575 m)   Wt 141 lb (64 kg)   SpO2 99%   BMI 25.79 kg/m²  Estimated body mass index is 25.79 kg/m² as calculated from the following:    Height as of this  encounter: 5' 2\" (1.575 m).    Weight as of this encounter: 141 lb (64 kg).    Medicare Hearing Assessment:   Hearing Screening    Screening Method: Finger Rub  Finger Rub Result: Pass (Comment: With hearing aids)                     Assessment & Plan:   Donna Umaña is a 83 year old female who presents for a Medicare Assessment.     1. Encounter for annual health examination (Primary)  Forms given including DNR POLST form, power of  form and living will.  Fill these out and return them to clinic or forward through [facility].  Dental exams every 6 months  Eye exams yearly or as directed by eye specialist  Dermatological exams yearly  Follow-up with specialists as directed endocrinologist, pain clinic, cardiologist  Healthy diet avoiding processed foods focus on high-fiber with fruits, vegetables and lean protein.  Exercise regularly as tolerated both aerobic and weightbearing.  Sleep goal 8 hours per night practice good sleep hygiene.  Schedule for further testing as discussed during office visit.  Stay updated on vaccinations including  COVID-19 vaccine, shingles vaccine and tetanus vaccine (every 10 years) at pharmacy.   2. Generalized anxiety disorder  -     QUEtiapine Fumarate; Take 25 three times a day and 50 mg at night for sleep and anxiety  Dispense: 150 tablet; Refill: 2  Continue with sertraline 200 mg daily  Continue with Neurontin 400 mg 3 times a day well-tolerated no side effects helping with anxiety and pain  -     Detailed, Mod Complex (55760)  Discussed medications refills of content of being given does help to take care of the anxiety and helps her sleep.  Discussed at length generalized anxiety disorder over 60 minutes was spent with  and patient discussing her symptoms including her short-term memory issues.  3. Tremors of nervous system  -     Neuro Referral - In Network    4. Short-term memory loss  -     Neuro Referral - In Network  5. Mild early onset Alzheimer's dementia with  anxiety ([facility])  6. Psychophysiological insomnia  -     QUEtiapine Fumarate; Take 25 three times a day and 50 mg at night for sleep and anxiety  Dispense: 150 tablet; Refill: 2  -     Domingo Mod Complex (17278)  Reviewed medication benefits and side effects.   Discussed if any problems call office immediately especially if gets increased depression, anxiety or any homicidal or suicidal symptoms.     7. Age-related osteoporosis without current pathological fracture  -     XR DEXA BONE DENSITOMETRY (CPT=77080); Future; Expected date: 11/21/2024  -     Domingo Mod Complex (27141)  Discussed her starting Prolia has completed her dental procedures the Prolia prior authorized with insurance and blood work will need to be done prior to getting Prolia injection  Prior authorization initiated  Follow-up with endocrinologist Dr. Isabel bearden    8. Chronic renal insufficiency, stage 3 (moderate) ([facility])  -     CBC With Differential With Platelet; Future; Expected date: 02/01/2024  -     Domingo Mod Complex (97207)  Orders in system for CBC, B12, folic acid, thyroid, lipid panel and CMP overdue to have this test done  9. Chronic groin pain, left  -     Domingo Mod Complex (30171)  10. Chronic hip pain, left  -     Acetaminophen-Codeine; Take 0.5-1 tablets by mouth 2 (two) times daily as needed for Pain.  Dispense: 20 tablet; Refill: 0  No other pain medication is to be prescribed by any other provider this was emphasized if it is seen no other refills will be given 20 maximum per month    -     Domingo Mod Complex (32026)  11. Pain aggravated by anxiety  -     Domingo Mod Complex (17307)  As above discussed anxiety and pain  12. Aortoiliac stenosis, left ([facility])  13. Atherosclerosis of abdominal aorta ([facility])  Overview:  CTA 3/29/2023 atherosclerotic calcification of abdominal aorta  Continue with follow-ups with cardiologist  14. Chronic heart failure with preserved ejection fraction ([facility])  15. 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 Iliac artery aneurysm, right ([facility])  Overview:  Focal outpouching consistent with aneurysm/pseudoaneurysm anterior proximal right common iliac artery CTA 3/2023  Continue with follow-ups with cardiologist  18. Mild aortic valve regurgitation  19. Mixed hyperlipidemia  20. Coronary artery disease involving native coronary artery of native heart with angina pectoris ([facility])  21. Presence of stent in coronary artery  22. History of coronary artery stent placement  23. Grade I diastolic dysfunction  24. Primary hypertension  All above continue follow-up with Dr. Cox continue with losartan 100 mg, amlodipine 10 mg and pravastatin 40 mg  25. Pain radiating to lower abdomen  26. Chronic superficial gastritis without bleeding  27. Chronic idiopathic constipation  28. Sliding hiatal hernia  29. Irritable bowel syndrome with both constipation and diarrhea  30. Chronic abdominal pain  -     Detailed, Mod Complex (50672)  31. History of Clostridioides difficile infection  32. History of peptic ulcer  Colace as needed as needed for stool softener  Continue with avoiding nonsteroidals continue with omeprazole 20 mg for above GI history  33. Fibromyalgia  34. DDD (degenerative disc disease), lumbar  35. 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 S/P insertion of spinal cord stimulator  38. History of lumbar spinal fusion  39. Weakness of both lower extremities  40. At risk for injury related to fall  Fall risk discussed at length continue with pain clinic as needed, continue with use of cane  Continue with gabapentin 400 mg 3 times a day  Codeine 1/2-1 daily as needed for pain no more than 20/month  41. Hypercalcemia  42. Hyperparathyroidism ([facility])  Follow-up with endocrinology Dr. Hall appointment needed continue with Sensipar 30 mg as directed by Dr. Hall  43.  Mixed stress and urge urinary incontinence  44. History of nephrolithiasis  Presently asymptomatic with urinary symptoms  45. Poor historian  46. Medication management  -     Detailed, Mod Complex (04609)  47. Pain management  48. History of splenectomy  Discussed the importance of vaccinations patient defers all vaccinations despite long discussion on importance of vaccinations  49. Visit for screening mammogram  -     [facility] FRANCK 2D+3D SCREENING BILAT (CPT=77067/39138); Future; Expected date: 02/01/2024  1 hour and 30 minutes was spent with  and patient's reviewing chronic medications chronic health issues and documenting on chronic health issues  The patient indicates understanding of these issues and agrees to the plan.  Consult ordered.  Continue with current treatment plan.  Further testing ordered.  Imaging studies ordered.  Lab work ordered.  Prescription medication ordered.  Reinforced healthy diet, lifestyle, and exercise.      Return in 3 months (on 5/1/2024).     Kristan Nunez PA-C, 2/2/2024     Supplementary Documentation:   General Health:  In the past six months, have you lost more than 10 pounds without trying?: 2 - No  Has your appetite been poor?: No  Type of Diet: Balanced  How does the patient maintain a good energy level?: Appropriate Exercise  How would you describe your daily physical activity?: Light  How would you describe your current health state?: Fair  How do you maintain positive mental well-being?: Visiting Family  On a scale of 0 to 10, with 0 being no pain and 10 being severe pain, what is your pain level?: 9 - (Severe)  In the past six months, have you experienced urine leakage?: 0-No  At any time do you feel concerned for the safety/well-being of yourself and/or your children, in your home or elsewhere?: No  Have you had any immunizations at another office such as Influenza, Hepatitis B, Tetanus, or Pneumococcal?: No         Donna Umaña's SCREENING SCHEDULE   Tests on  this list are recommended by your physician but may not be covered, or covered at this frequency, by your insurer.   Please check with your insurance carrier before scheduling to verify coverage.   PREVENTATIVE SERVICES FREQUENCY &  COVERAGE DETAILS LAST COMPLETION DATE   Diabetes Screening    Fasting Blood Sugar /  Glucose    One screening every 12 months if never tested or if previously tested but not diagnosed with pre-diabetes   One screening every 6 months if diagnosed with pre-diabetes Lab Results   Component Value Date    GLU 98 06/25/2023        Cardiovascular Disease Screening    Lipid Panel  Cholesterol  Lipoprotein (HDL)  Triglycerides Covered every 5 years for all Medicare beneficiaries without apparent signs or symptoms of cardiovascular disease Lab Results   Component Value Date    CHOLEST 199 09/07/2022    HDL 62 (H) 09/07/2022     (H) 09/07/2022    TRIG 162 (H) 09/07/2022         Electrocardiogram (EKG)   Covered if needed at Welcome to Medicare, and non-screening if indicated for medical reasons 02/15/2023      Ultrasound Screening for Abdominal Aortic Aneurysm (AAA) Covered once in a lifetime for one of the following risk factors    Men who are 65-75 years old and have ever smoked    Anyone with a family history -     Colorectal Cancer Screening  Covered for ages 50-85; only need ONE of the following:    Colonoscopy   Covered every 10 years    Covered every 2 years if patient is at high risk or previous colonoscopy was abnormal 05/22/2019    No recommendations at this time    Flexible Sigmoidoscopy   Covered every 4 years -    Fecal Occult Blood Test Covered annually -   Bone Density Screening    Bone density screening    Covered every 2 years after age 65 if diagnosed with risk of osteoporosis or estrogen deficiency.    Covered yearly for long-term glucocorticoid medication use (Steroids) Last Dexa Scan:    XR DEXA BONE DENSITOMETRY (CPT=77080) 11/21/2022      No recommendations at this  time   Pap and Pelvic    Pap   Covered every 2 years for women at normal risk; Annually if at high risk -  No recommendations at this time    Chlamydia Annually if high risk -  No recommendations at this time   Screening Mammogram    Mammogram     Recommend annually for all female patients aged 40 and older    One baseline mammogram covered for patients aged 35-39 -    No recommendations at this time    Immunizations    Influenza Covered once per flu season  Please get every year -  No recommendations at this time    Pneumococcal Each vaccine ([id number] & [id number]) covered once after 65 Prevnar 13: 09/25/2015    [id number]: 03/20/2014     No recommendations at this time    Hepatitis B One screening covered for patients with certain risk factors   -  No recommendations at this time    Tetanus Toxoid Not covered by Medicare Part B unless medically necessary (cut with metal); may be covered with your pharmacy prescription benefits -    Tetanus, Diptheria and Pertusis TD and TDaP Not covered by Medicare Part B -  No recommendations at this time    Zoster Not covered by Medicare Part B; may be covered with your pharmacy  prescription benefits -  No recommendations at this time     Annual Monitoring of Persistent Medications (ACE/ARB, digoxin diuretics, anticonvulsants)    Potassium Annually Lab Results   Component Value Date    K 3.7 06/25/2023         Creatinine   Annually Lab Results   Component Value Date    CREATSERUM 1.32 (H) 06/25/2023         BUN Annually Lab Results   Component Value Date    BUN 24 (H) 06/25/2023       Drug Serum Conc Annually No results found for: \"DIGOXIN\", \"DIG\", \"VALP\"

## 2024-02-02 NOTE — PATIENT INSTRUCTIONS
Donna Umaña's SCREENING SCHEDULE   Tests on this list are recommended by your physician but may not be covered, or covered at this frequency, by your insurer.   Please check with your insurance carrier before scheduling to verify coverage.   PREVENTATIVE SERVICES FREQUENCY &  COVERAGE DETAILS LAST COMPLETION DATE   Diabetes Screening    Fasting Blood Sugar /  Glucose    One screening every 12 months if never tested or if previously tested but not diagnosed with pre-diabetes   One screening every 6 months if diagnosed with pre-diabetes Lab Results   Component Value Date    GLU 98 06/25/2023        Cardiovascular Disease Screening    Lipid Panel  Cholesterol  Lipoprotein (HDL)  Triglycerides Covered every 5 years for all Medicare beneficiaries without apparent signs or symptoms of cardiovascular disease Lab Results   Component Value Date    CHOLEST 199 09/07/2022    HDL 62 (H) 09/07/2022     (H) 09/07/2022    TRIG 162 (H) 09/07/2022         Electrocardiogram (EKG)   Covered if needed at Welcome to Medicare, and non-screening if indicated for medical reasons 02/15/2023      Ultrasound Screening for Abdominal Aortic Aneurysm (AAA) Covered once in a lifetime for one of the following risk factors   • Men who are 65-75 years old and have ever smoked   • Anyone with a family history -     Colorectal Cancer Screening  Covered for ages 50-85; only need ONE of the following:    Colonoscopy   Covered every 10 years    Covered every 2 years if patient is at high risk or previous colonoscopy was abnormal 05/22/2019    No recommendations at this time    Flexible Sigmoidoscopy   Covered every 4 years -    Fecal Occult Blood Test Covered annually -   Bone Density Screening    Bone density screening    Covered every 2 years after age 65 if diagnosed with risk of osteoporosis or estrogen deficiency.    Covered yearly for long-term glucocorticoid medication use (Steroids) Last Dexa Scan:    XR DEXA BONE DENSITOMETRY  (CPT=77080) 11/21/2022      No recommendations at this time   Pap and Pelvic    Pap   Covered every 2 years for women at normal risk; Annually if at high risk -  No recommendations at this time    Chlamydia Annually if high risk -  No recommendations at this time   Screening Mammogram    Mammogram     Recommend annually for all female patients aged 40 and older    One baseline mammogram covered for patients aged 35-39 -    No recommendations at this time    Immunizations    Influenza Covered once per flu season  Please get every year -  No recommendations at this time    Pneumococcal Each vaccine ([id number] & Zrmuygyfz35) covered once after 65 Prevnar 13: 09/25/2015    [id number]: 03/20/2014     No recommendations at this time    Hepatitis B One screening covered for patients with certain risk factors   -  No recommendations at this time    Tetanus Toxoid Not covered by Medicare Part B unless medically necessary (cut with metal); may be covered with your pharmacy prescription benefits -    Tetanus, Diptheria and Pertusis TD and TDaP Not covered by Medicare Part B -  No recommendations at this time    Zoster Not covered by Medicare Part B; may be covered with your pharmacy  prescription benefits -  No recommendations at this time     Annual Monitoring of Persistent Medications (ACE/ARB, digoxin diuretics, anticonvulsants)    Potassium Annually Lab Results   Component Value Date    K 3.7 06/25/2023         Creatinine   Annually Lab Results   Component Value Date    CREATSERUM 1.32 (H) 06/25/2023         BUN Annually Lab Results   Component Value Date    BUN 24 (H) 06/25/2023       Drug Serum Conc Annually No results found for: \"DIGOXIN\", \"DIG\", \"VALP\"          No

## 2024-02-04 DIAGNOSIS — E78.2 MIXED HYPERLIPIDEMIA: ICD-10-CM

## 2024-02-05 RX ORDER — PRAVASTATIN SODIUM 40 MG
40 TABLET ORAL NIGHTLY
Qty: 90 TABLET | Refills: 0 | Status: SHIPPED | OUTPATIENT
Start: 2024-02-05

## 2024-02-05 NOTE — TELEPHONE ENCOUNTER
Requested Prescriptions     Signed Prescriptions Disp Refills    PRAVASTATIN 40 MG Oral Tab 90 tablet 0     Sig: TAKE 1 TABLET(40 MG) BY MOUTH EVERY NIGHT     Authorizing Provider: PARKER KING     Ordering User: CHERYL SÁNCHEZ     Refilled per protocol/OV notes

## 2024-02-06 DIAGNOSIS — I10 ESSENTIAL HYPERTENSION: ICD-10-CM

## 2024-02-06 RX ORDER — AMLODIPINE BESYLATE 10 MG/1
TABLET ORAL
Qty: 90 TABLET | Refills: 0 | Status: SHIPPED | OUTPATIENT
Start: 2024-02-06

## 2024-02-06 NOTE — TELEPHONE ENCOUNTER
Requested Prescriptions     Signed Prescriptions Disp Refills    AMLODIPINE 10 MG Oral Tab 90 tablet 0     Sig: TAKE 1 TABLET(10 MG) BY MOUTH DAILY     Authorizing Provider: PARKER KING     Ordering User: CHERYL SÁNCHEZ     Refilled per protocol/OV notes

## 2024-02-08 ENCOUNTER — PATIENT OUTREACH (OUTPATIENT)
Dept: CASE MANAGEMENT | Age: 84
End: 2024-02-08

## 2024-02-08 NOTE — PROGRESS NOTES
Called patient and left a message for patient to call back when they can. Reviewed patient chart. Left contact my contact number [phone]        Time Spent This Encounter Total: 5  min medical record review  Monthly Minute Total including today: 5 minutes

## 2024-02-11 DIAGNOSIS — I10 ESSENTIAL HYPERTENSION: ICD-10-CM

## 2024-02-12 ENCOUNTER — OFFICE VISIT (OUTPATIENT)
Dept: PAIN CLINIC | Facility: CLINIC | Age: 84
End: 2024-02-12
Payer: MEDICARE

## 2024-02-12 VITALS — OXYGEN SATURATION: 91 % | DIASTOLIC BLOOD PRESSURE: 62 MMHG | HEART RATE: 58 BPM | SYSTOLIC BLOOD PRESSURE: 130 MMHG

## 2024-02-12 DIAGNOSIS — M79.7 FIBROMYALGIA: Primary | ICD-10-CM

## 2024-02-12 DIAGNOSIS — R10.32 CHRONIC GROIN PAIN, LEFT: ICD-10-CM

## 2024-02-12 DIAGNOSIS — G89.29 CHRONIC GROIN PAIN, LEFT: ICD-10-CM

## 2024-02-12 PROCEDURE — 99214 OFFICE O/P EST MOD 30 MIN: CPT | Performed by: PHYSICIAN ASSISTANT

## 2024-02-12 RX ORDER — LOSARTAN POTASSIUM 100 MG/1
100 TABLET ORAL DAILY
Qty: 90 TABLET | Refills: 0 | Status: SHIPPED | OUTPATIENT
Start: 2024-02-12

## 2024-02-12 NOTE — PROGRESS NOTES
Last procedure: BILATERAL HIP JOINT INJECTION with sedation   Date: 1/30/24  Percentage of relief obtained: 20%  Duration of relief: current    Current Pain Score: 8/10    Narcotic Contract Exp: n/a

## 2024-02-12 NOTE — PATIENT INSTRUCTIONS
Refill policies:    Allow 2-3 business days for refills; controlled substances may take longer.  Contact your pharmacy at least 5 days prior to running out of medication and have them send an electronic request or submit request through the “request refill” option in your [employer] account.  Refills are not addressed on weekends; covering physicians do not authorize routine medications on weekends.  No narcotics or controlled substances are refilled after noon on Fridays or by on call physicians.  By law, narcotics must be electronically prescribed.  A 30 day supply with no refills is the maximum allowed.  If your prescription is due for a refill, you may be due for a follow up appointment.  To best provide you care, patients receiving routine medications need to be seen at least once a year.  Patients receiving narcotic/controlled substance medications need to be seen at least once every 3 months.  In the event that your preferred pharmacy does not have the requested medication in stock (e.g. Backordered), it is your responsibility to find another pharmacy that has the requested medication available.  We will gladly send a new prescription to that pharmacy at your request.    Scheduling Tests:    If your physician has ordered radiology tests such as MRI or CT scans, please contact Central Scheduling at [phone] right away to schedule the test.  Once scheduled, the [facility] Centralized Referral Team will work with your insurance carrier to obtain pre-certification or prior authorization.  Depending on your insurance carrier, approval may take 3-10 days.  It is highly recommended patients assure they have received an authorization before having a test performed.  If test is done without insurance authorization, patient may be responsible for the entire amount billed.      Precertification and Prior Authorizations:  If your physician has recommended that you have a procedure or additional testing performed the [facility]  Centralized Referral Team will contact your insurance carrier to obtain pre-certification or prior authorization.    You are strongly encouraged to contact your insurance carrier to verify that your procedure/test has been approved and is a COVERED benefit.  Although the [facility] Centralized Referral Team does its due diligence, the insurance carrier gives the disclaimer that \"Although the procedure is authorized, this does not guarantee payment.\"    Ultimately the patient is responsible for payment.   Thank you for your understanding in this matter.  Paperwork Completion:  If you require FMLA or disability paperwork for your recovery, please make sure to either drop it off or have it faxed to our office at [phone]. Be sure the form has your name and date of birth on it.  The form will be faxed to our Forms Department and they will complete it for you.  There is a 25$ fee for all forms that need to be filled out.  Please be aware there is a 10-14 day turnaround time.  You will need to sign a release of information (RAMÓN) form if your paperwork does not come with one.  You may call the Forms Department with any questions at [phone].  Their fax number is [phone].

## 2024-02-12 NOTE — PROGRESS NOTES
HPI:   Donna Umaña presents with complaints of B groin/lower quadrant pain    The pain is described as moderate aching, shooting that is intermittent.  The patient’s activity level has remained the same since last visit.  The pain is worst in the late evening.    Changes in condition/history since last visit: here today for follow up from B hip injection on 1/30/24.  Procedure was well tolerated, and had no adverse effects.  Overall, reports that it has certainly been helpful, in that the groin pain is eliminated, though continues with B lower quadrant pain.      By way of review, she had previously reported excellent relief with left L3/4 and L4/5 TF-AMADO x3 (injections on 8/29/22, 12/22/22, and 1/17/23).  Had been 90% improved, and was pleased with her response.  With time, reports some return of L LE pain, though does not feel that it is significant enough to warrant repeat procedure.  For her chronic pelvic pain, has had extensive workup without definitive explanation as to source.  Had 80% relief with left hip injection on 8/17/23, though after follow up with ortho, was told that her OA is only mild, and is not surgical candidate.      Of note, has medtronic SCS.       Last procedure: B hip inj    Date: 1/30/24     Percentage of relief experienced from the procedure: 100% for groin, 0% for low quadrant pain    Duration of the relief: a few weeks      Previous procedure: left L3/4 and L4/5 TF-AMADO #3    Date: 1/17/23 (previous on 12/22/22, 8/29/22)    Percentage of relief experienced from the procedure: 90%    Duration of the relief: lasting to some degree       The following activities will increase the patient’s pain:  worse in PM    The following activities decrease the patient’s pain: limiting activity     Functional Assessment: Patient reports that they are able to complete all of their ADL's such as eating, bathing, using the toilet, dressing and getting up from a bed or a chair independently.    Current  Medications:  Current Outpatient Medications   Medication Sig Dispense Refill    LOSARTAN 100 MG Oral Tab TAKE 1 TABLET(100 MG) BY MOUTH DAILY 90 tablet 0    AMLODIPINE 10 MG Oral Tab TAKE 1 TABLET(10 MG) BY MOUTH DAILY 90 tablet 0    PRAVASTATIN 40 MG Oral Tab TAKE 1 TABLET(40 MG) BY MOUTH EVERY NIGHT 90 tablet 0    QUEtiapine 25 MG Oral Tab Take 25 three times a day and 50 mg at night for sleep and anxiety 150 tablet 2    acetaminophen-codeine 300-30 MG Oral Tab Take 0.5-1 tablets by mouth 2 (two) times daily as needed for Pain. 20 tablet 0    gabapentin 400 MG Oral Cap Take 1 capsule (400 mg total) by mouth 3 (three) times daily. 270 capsule 0    SERTRALINE 100 MG Oral Tab TAKE 2 TABLETS BY MOUTH EVERY  tablet 0    clindamycin 300 MG Oral Cap Take 1 capsule (300 mg total) by mouth 3 (three) times daily.      nystatin 100,000 Units/g External Cream Apply thin film twice a day topically to external vaginal area for 10 days 30 g 0    lidocaine 5 % External Patch APPLY UP TO 2 PATCHES TO PAINFUL AREAS EVERY 12 HOURS THEN OFF FOR 12 HOURS 60 patch 5    diphenhydrAMINE 25 MG Oral Cap Take 1 capsule (25 mg total) by mouth every 6 (six) hours as needed for Sleep.      Lactobacillus Probiotic Oral Tab Take 1 tablet by mouth in the morning and 1 tablet before bedtime. 60 tablet 0    loratadine 10 MG Oral Tab Take 1 tablet (10 mg total) by mouth daily.      tretinoin 0.025 % External Cream Apply 1 Application. topically every 4 (four) hours.      omeprazole 20 MG Oral Capsule Delayed Release Take 1 capsule (20 mg total) by mouth daily.      docusate sodium 100 MG Oral Cap Take 100 mg by mouth 2 (two) times daily. As needed for constipation. Do not take if having diarrhea or loose stool 60 capsule 0    cinacalcet 30 MG Oral Tab Take 1 tablet (30 mg total) by mouth daily with breakfast. 90 tablet 3      Patient requires assistance with: No assistance required    Reviewed Patient History Dated: 8/17/23 no changes  noted    Physical Exam:   /62 (BP Location: Right arm, Patient Position: Sitting, Cuff Size: adult)   Pulse 58   SpO2 91%   VAS Pain Score:  8/10  General Appearance: Well developed, well nourished, normal build, independent body habitus, no apparent physical disabilities, well groomed    Neurological Exam: WNL-Orientation to time, place and person, normal mood & effect, normal concentration & attention span  Inspection: antalgic, no acute distress   Radiology/Lab Test Reviewed: XR  hip:  FINDINGS:  No acute fracture or dislocation.  Degenerative changes are noted.  Hips and SI joints are symmetric.  2 screws are noted in the left iliac wing.  Soft tissues are within normal limits.       MRI L spine 1/4/21:  T12-L1:  Mild disc bulge.  Mild facet hypertrophy.  No canal or foraminal narrowing.       L1-L2:  Mild disc bulge with moderate facet hypertrophy.  Mild spinal canal stenosis.  Mild bilateral foraminal narrowing.       L2-L3:  Mild to moderate facet hypertrophy.  Mild disc bulge.  No spinal canal stenosis.  Mild bilateral foraminal narrowing.       L3-L4:  Mild posterior disc osteophyte complex with mild to moderate facet hypertrophy.  Right-sided subarticular zone narrowing again noted.  There is a small superimposed right paracentral disc protrusion again seen.  Mild spinal canal stenosis.  Mild left and moderate right foraminal narrowing.       L4-5:  Mild to moderate facet hypertrophy.  Moderate left-sided ligamentum flavum thickening.  Mild right-sided ligamentum flavum thickening.  Mild posterior disc osteophyte complex.  Mild spinal canal stenosis.  Severe right and moderate to severe left foraminal narrowing again noted.       L5-S1:  Mild to moderate facet hypertrophy.  Mild disc bulge.  No spinal canal stenosis.  Moderate bilateral foraminal narrowing    Lab Results   Component Value Date    WBC 10.7 06/25/2023    WBC 10.2 04/23/2023    WBC 8.7 04/01/2023     Lab Results   Component Value Date     HEMOGLOBIN 12.2 10/27/2021    HEMOGLOBIN 12.2 06/04/2021    HEMOGLOBIN 12.8 02/06/2019     Lab Results   Component Value Date    .0 06/25/2023    .0 04/23/2023    .0 04/01/2023     Do you have any known blood/bleeding disorders?  No  Does patient currently take blood thinners?   None  Does patient currently take any antibiotics?   No  Patient educated and verbalized understanding.  Medical Decision Making:   Diagnosis:    No diagnosis found.     Impression: excellent relief with L L3/4 and L4/5 TF-AMADO on 1/17/23, 12/22/22, 8/29/22 reporting 95% relief of her L LE pain.  With time, some of this has begun to wane, though certainly still improved enough over baseline that repeat injections are not necessary.    Her larger concern is her ongoing bilateral groin and lower quadrant abdominal pain, for which, she has had extensive GI work-up without definitive explanation as to the source of the symptoms.  She has been evaluated by general surgery, and was told that there is nothing from general surgery perspective to explain her pain.  States that has been proposed that this could be related to her anxiety, and as such, was inquiring about acupuncture, which was ordered in 6/2023, though has yet to schedule.  Again, here today to discuss acupuncture, and I have placed another referral for her.      Does have some degenerative changes of the hips, with pain with internal/external rotation of the L>R hip.  For diagnostic purposes, did undergo L intra-articular hip injection 8/17/23, which eliminated her groin pain, though it has since returned.  Underwent B hip injection on 1/30/24, and again reports elimination of groin pain, though lower quadrant pain persists.      She did follow with ortho, and was told that her OA was mild, and did not necessitate surgery.  She had discussed with PCP, and was told about possible RFA, and discussed with ortho, who told her to contact us.  While we did discuss  obturator nerve block in staging for RFA, after further discussion with Dr. Schaefer, she is not interested in this.      Plan: elimination of groin pain with B hip injection, though continues with B lower quadrant pain.  She is again asking about acupuncture, and was reminded that we have placed referral for this already.  Did place another referral, and can contact them at her convenience.      No orders of the defined types were placed in this encounter.      Meds & Refills for this Visit:  Requested Prescriptions      No prescriptions requested or ordered in this encounter       Imaging & Consults:  None    The patient indicates understanding of these issues and agrees to the plan.    ALLEN Ball

## 2024-02-15 ENCOUNTER — OFFICE VISIT (OUTPATIENT)
Dept: PAIN CLINIC | Facility: CLINIC | Age: 84
End: 2024-02-15

## 2024-02-15 DIAGNOSIS — R10.32 INGUINAL PAIN OF BOTH SIDES: Primary | ICD-10-CM

## 2024-02-15 DIAGNOSIS — R10.31 INGUINAL PAIN OF BOTH SIDES: Primary | ICD-10-CM
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
## 2024-02-18 ENCOUNTER — TELEPHONE (OUTPATIENT)
Dept: FAMILY MEDICINE CLINIC | Facility: CLINIC | Age: 84
End: 2024-02-18

## 2024-02-18 DIAGNOSIS — G89.29 CHRONIC HIP PAIN, LEFT: ICD-10-CM

## 2024-02-18 DIAGNOSIS — M25.552 CHRONIC HIP PAIN, LEFT: ICD-10-CM

## 2024-02-18 RX ORDER — ACETAMINOPHEN AND CODEINE PHOSPHATE 300; 30 MG/1; MG/1
TABLET ORAL 2 TIMES DAILY PRN
Qty: 6 TABLET | Refills: 0 | Status: SHIPPED | OUTPATIENT
Start: 2024-02-18

## 2024-02-26 ENCOUNTER — TELEPHONE (OUTPATIENT)
Dept: FAMILY MEDICINE CLINIC | Facility: CLINIC | Age: 84
End: 2024-02-26

## 2024-02-26 DIAGNOSIS — G89.29 CHRONIC HIP PAIN, LEFT: ICD-10-CM

## 2024-02-26 DIAGNOSIS — M25.552 CHRONIC HIP PAIN, LEFT: ICD-10-CM

## 2024-02-26 RX ORDER — ACETAMINOPHEN AND CODEINE PHOSPHATE 300; 30 MG/1; MG/1
TABLET ORAL 2 TIMES DAILY PRN
Qty: 6 TABLET | Refills: 0 | OUTPATIENT
Start: 2024-02-26

## 2024-02-26 NOTE — TELEPHONE ENCOUNTER
We discussed that she would do maximum of  20/month she has had #26 since 2/1/2024 I was not going to fill it until beginning of March per our agreement is she taking it every day?  See if she remembers what we agreed to at the last office visit.

## 2024-02-26 NOTE — TELEPHONE ENCOUNTER
Donna Umaña requesting medication refill for acetaminophen-codeine 300-30 MG Oral Tab . Pt has an appt tomorrow but isn't feeling well. Didn't say wether she was cancelling or not.    LOV: 2/1/2024   Last Refill Date: 2/18/24  30 or 90 Day Supply:  Pharmacy Location:   [facility] #[id number] [place] [address] [address] [facility] [address], [phone], [phone]     Prescribed By: Kristan Nunez PA-C      Informed patient to allow up to 24 to 48 Business hours for a call back from a nurse.

## 2024-02-26 NOTE — TELEPHONE ENCOUNTER
Denied. Multiple TE and refill requests previously. Kristan can not refill controlled substances for pt.

## 2024-02-27 ENCOUNTER — OFFICE VISIT (OUTPATIENT)
Dept: FAMILY MEDICINE CLINIC | Facility: CLINIC | Age: 84
End: 2024-02-27
Payer: MEDICARE

## 2024-02-27 VITALS
HEART RATE: 64 BPM | TEMPERATURE: 97 F | SYSTOLIC BLOOD PRESSURE: 144 MMHG | DIASTOLIC BLOOD PRESSURE: 86 MMHG | OXYGEN SATURATION: 98 % | BODY MASS INDEX: 30 KG/M2 | RESPIRATION RATE: 16 BRPM | HEIGHT: 62 IN | WEIGHT: 163 LBS

## 2024-02-27 DIAGNOSIS — F41.9 PAIN AGGRAVATED BY ANXIETY: ICD-10-CM

## 2024-02-27 DIAGNOSIS — F02.A4 MILD EARLY ONSET ALZHEIMER'S DEMENTIA WITH ANXIETY (HCC): ICD-10-CM

## 2024-02-27 DIAGNOSIS — M25.552 CHRONIC HIP PAIN, LEFT: Primary | ICD-10-CM

## 2024-02-27 DIAGNOSIS — G89.29 CHRONIC HIP PAIN, LEFT: Primary | ICD-10-CM

## 2024-02-27 DIAGNOSIS — F41.1 GENERALIZED ANXIETY DISORDER: ICD-10-CM

## 2024-02-27 DIAGNOSIS — F45.41 PAIN AGGRAVATED BY ANXIETY: ICD-10-CM

## 2024-02-27 DIAGNOSIS — G30.0 MILD EARLY ONSET ALZHEIMER'S DEMENTIA WITH ANXIETY (HCC): ICD-10-CM

## 2024-02-27 PROCEDURE — 99214 OFFICE O/P EST MOD 30 MIN: CPT | Performed by: FAMILY MEDICINE

## 2024-02-27 RX ORDER — ACETAMINOPHEN AND CODEINE PHOSPHATE 300; 30 MG/1; MG/1
TABLET ORAL 2 TIMES DAILY PRN
Qty: 20 TABLET | Refills: 0 | Status: SHIPPED | OUTPATIENT
Start: 2024-02-27
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
## 2024-03-07 ENCOUNTER — PATIENT OUTREACH (OUTPATIENT)
Dept: CASE MANAGEMENT | Age: 84
End: 2024-03-07

## 2024-03-07 NOTE — PROGRESS NOTES
Called patient and left a message for patient to call back when they can. Reviewed patient chart. Left contact my contact number [phone]        Time Spent This Encounter Total: 5  min medical record review  Monthly Minute Total including today: 5 minutes

## 2024-03-10 DIAGNOSIS — M51.16 LUMBAR DISC HERNIATION WITH RADICULOPATHY: ICD-10-CM

## 2024-03-11 ENCOUNTER — TELEPHONE (OUTPATIENT)
Dept: FAMILY MEDICINE CLINIC | Facility: CLINIC | Age: 84
End: 2024-03-11

## 2024-03-11 DIAGNOSIS — M25.552 CHRONIC HIP PAIN, LEFT: ICD-10-CM

## 2024-03-11 DIAGNOSIS — G89.29 CHRONIC HIP PAIN, LEFT: ICD-10-CM

## 2024-03-11 RX ORDER — LIDOCAINE 50 MG/G
PATCH TOPICAL
Qty: 60 PATCH | Refills: 5 | Status: SHIPPED | OUTPATIENT
Start: 2024-03-11

## 2024-03-11 NOTE — TELEPHONE ENCOUNTER
Pt calling for a refill on acetaminophen-codeine 300-30 MG Oral Tab to be sent to pharmacy below.    [id number] - [place] - [address] AT [facility] [address], [phone], [phone]   [address] [place] [address]   Phone: [phone] Fax: [phone]   Hours: Not open 24 hours

## 2024-03-12 ENCOUNTER — TELEPHONE (OUTPATIENT)
Dept: FAMILY MEDICINE CLINIC | Facility: CLINIC | Age: 84
End: 2024-03-12

## 2024-03-12 RX ORDER — ACETAMINOPHEN AND CODEINE PHOSPHATE 300; 30 MG/1; MG/1
0.5 TABLET ORAL 2 TIMES DAILY PRN
Qty: 20 TABLET | Refills: 0 | Status: SHIPPED | OUTPATIENT
Start: 2024-03-12

## 2024-03-12 NOTE — TELEPHONE ENCOUNTER
Request was previously sent to provider yesterday and pt was called today to be notified med is still pending w/provider. Pt reported her pain earlier and provider was already notified this morning.

## 2024-03-12 NOTE — TELEPHONE ENCOUNTER
Spoke w/pt. She says she is trying very hard to take a half pill a day. She starts the morning w/a half pill. Problem is the pain sets in and on most days she ends up taking the other half around 3pm. She says the acupuncture worked for a few days and then it wears off. She wants you to know she is in terrible pain today.

## 2024-03-12 NOTE — TELEPHONE ENCOUNTER
See if she is taking a half of the codeine daily like we discussed,  I had told her goal was maximum  # 20 every 30 days.    Our goal is to see if acupuncture was going to help with the pain so she can cut back on the narcotics since it can affect her thinking and processing

## 2024-03-12 NOTE — TELEPHONE ENCOUNTER
Donna Umaña requesting medication refill for acetaminophen-codeine 300-30 MG Oral Tab 2.    LOV: 2/27/2024   Last Refill Date: 2/27/2024  30 or 90 Day Supply:  Pharmacy Location: [id number] [address] [facility] [address], [phone], [phone] [[id number]]   Prescribed By: FERNANDO     Patient requesting a refill on medication states she is in a lot of pain   Informed patient to allow up to 24 to 48 Business hours for a call back from a nurse.

## 2024-03-12 NOTE — TELEPHONE ENCOUNTER
Tell her not to use the codeine on the days that she is doing acupuncture if the pain is relieved on most days.  I will send her over another 20 but she needs to try to take it less maximizing 20/month.

## 2024-03-17 DIAGNOSIS — I10 ESSENTIAL HYPERTENSION: ICD-10-CM

## 2024-03-18 ENCOUNTER — TELEPHONE (OUTPATIENT)
Dept: FAMILY MEDICINE CLINIC | Facility: CLINIC | Age: 84
End: 2024-03-18

## 2024-03-18 DIAGNOSIS — Z86.19 PERSONAL HISTORY OF UNSPECIFIED INFECTIOUS AND PARASITIC DISEASE: ICD-10-CM

## 2024-03-18 DIAGNOSIS — R39.9 UTI SYMPTOMS: ICD-10-CM

## 2024-03-18 DIAGNOSIS — Z86.19 HISTORY OF CLOSTRIDIOIDES DIFFICILE INFECTION: Primary | ICD-10-CM

## 2024-03-18 NOTE — TELEPHONE ENCOUNTER
Patient woke up this morning and thinks she has a bladder infection - burns when urinates and pain  Would like something called into pharmacy on file  Please advise.

## 2024-03-19 ENCOUNTER — APPOINTMENT (OUTPATIENT)
Dept: CT IMAGING | Age: 84
End: 2024-03-19
Attending: EMERGENCY MEDICINE
Payer: MEDICARE

## 2024-03-19 ENCOUNTER — HOSPITAL ENCOUNTER (OUTPATIENT)
Age: 84
Discharge: HOME | End: 2024-03-19
Attending: EMERGENCY MEDICINE
Payer: MEDICARE

## 2024-03-19 VITALS
RESPIRATION RATE: 18 BRPM | OXYGEN SATURATION: 96 % | HEART RATE: 86 BPM | SYSTOLIC BLOOD PRESSURE: 189 MMHG | TEMPERATURE: 98 F | DIASTOLIC BLOOD PRESSURE: 88 MMHG

## 2024-03-19 DIAGNOSIS — N30.00 ACUTE CYSTITIS WITHOUT HEMATURIA: ICD-10-CM

## 2024-03-19 DIAGNOSIS — R10.9 ABDOMINAL PAIN OF UNKNOWN ETIOLOGY: Primary | ICD-10-CM

## 2024-03-19 LAB
#MXD IC: 1.4 X10ˆ3/UL (ref 0.1–1)
BILIRUB UR QL STRIP: NEGATIVE
BUN BLD-MCNC: 19 MG/DL (ref 7–18)
CHLORIDE BLD-SCNC: 107 MMOL/L (ref 98–112)
CO2 BLD-SCNC: 25 MMOL/L (ref 21–32)
COLOR UR: YELLOW
CREAT BLD-MCNC: 1.2 MG/DL
EGFRCR SERPLBLD CKD-EPI 2021: 45 ML/MIN/1.73M2 (ref 60–?)
GLUCOSE BLD-MCNC: 119 MG/DL (ref 70–99)
GLUCOSE UR STRIP-MCNC: NEGATIVE MG/DL
HCT VFR BLD AUTO: 39.7 %
HCT VFR BLD CALC: 42 %
HGB BLD-MCNC: 13 G/DL
ISTAT IONIZED CALCIUM FOR CHEM 8: 1.35 MMOL/L (ref 1.12–1.32)
KETONES UR STRIP-MCNC: NEGATIVE MG/DL
LYMPHOCYTES # BLD AUTO: 4.9 X10ˆ3/UL (ref 1–4)
LYMPHOCYTES NFR BLD AUTO: 40.5 %
MCH RBC QN AUTO: 31.5 PG (ref 26–34)
MCHC RBC AUTO-ENTMCNC: 32.7 G/DL (ref 31–37)
MCV RBC AUTO: 96.1 FL (ref 80–100)
MIXED CELL %: 11.7 %
NEUTROPHILS # BLD AUTO: 5.7 X10ˆ3/UL (ref 1.5–7.7)
NEUTROPHILS NFR BLD AUTO: 47.8 %
NITRITE UR QL STRIP: NEGATIVE
PH UR STRIP: 6 [PH]
PLATELET # BLD AUTO: 389 X10ˆ3/UL (ref 150–450)
POTASSIUM BLD-SCNC: 3.3 MMOL/L (ref 3.6–5.1)
PROT UR STRIP-MCNC: 100 MG/DL
RBC # BLD AUTO: 4.13 X10ˆ6/UL
SODIUM BLD-SCNC: 141 MMOL/L (ref 136–145)
SP GR UR STRIP: 1.02
UROBILINOGEN UR STRIP-ACNC: <2 MG/DL
WBC # BLD AUTO: 12 X10ˆ3/UL (ref 4–11)

## 2024-03-19 PROCEDURE — 85025 COMPLETE CBC W/AUTO DIFF WBC: CPT | Performed by: EMERGENCY MEDICINE

## 2024-03-19 PROCEDURE — 87086 URINE CULTURE/COLONY COUNT: CPT | Performed by: EMERGENCY MEDICINE

## 2024-03-19 PROCEDURE — 99215 OFFICE O/P EST HI 40 MIN: CPT

## 2024-03-19 PROCEDURE — 96360 HYDRATION IV INFUSION INIT: CPT

## 2024-03-19 PROCEDURE — 81002 URINALYSIS NONAUTO W/O SCOPE: CPT

## 2024-03-19 PROCEDURE — 80047 BASIC METABLC PNL IONIZED CA: CPT

## 2024-03-19 PROCEDURE — 74177 CT ABD & PELVIS W/CONTRAST: CPT | Performed by: EMERGENCY MEDICINE

## 2024-03-19 PROCEDURE — 99214 OFFICE O/P EST MOD 30 MIN: CPT

## 2024-03-19 RX ORDER — SODIUM CHLORIDE 9 MG/ML
INJECTION, SOLUTION INTRAVENOUS ONCE
Status: COMPLETED | OUTPATIENT
Start: 2024-03-19 | End: 2024-03-19

## 2024-03-19 RX ORDER — CIPROFLOXACIN 500 MG/1
500 TABLET, FILM COATED ORAL 2 TIMES DAILY
Qty: 14 TABLET | Refills: 0 | Status: SHIPPED | OUTPATIENT
Start: 2024-03-19 | End: 2024-03-26

## 2024-03-19 RX ORDER — LOSARTAN POTASSIUM 100 MG/1
100 TABLET ORAL DAILY
Qty: 90 TABLET | Refills: 0 | OUTPATIENT
Start: 2024-03-19

## 2024-03-19 NOTE — TELEPHONE ENCOUNTER
She is not allowed to have antibiotics without a urine culture secondary to history of C. difficile a urine culture and urinalysis are needed please order these and have her get them done.
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
## 2024-03-19 NOTE — DISCHARGE INSTRUCTIONS
Follow up with your primary care doctor  Take cipro for treatment of your uti  Continue your home medications

## 2024-03-20 ENCOUNTER — TELEPHONE (OUTPATIENT)
Dept: FAMILY MEDICINE CLINIC | Facility: CLINIC | Age: 84
End: 2024-03-20

## 2024-03-20 DIAGNOSIS — E87.6 HYPOKALEMIA: Primary | ICD-10-CM

## 2024-03-20 NOTE — TELEPHONE ENCOUNTER
Urine culture final results no growth no signs of bacterial infection does not need antibiotic.  Have her try heating pad, lidocaine patch, rest and drinking adequate water.  She does have a low potassium is she taking her potassium supplement?  If there is a cancellation I will see her tomorrow otherwise there was no evidence of any acute findings on CT of abdomen.  You can get update on how she is doing if there are any specific symptoms she is having.

## 2024-03-20 NOTE — TELEPHONE ENCOUNTER
Orders were placed yesterday for the UA and Culture and pt was told to go to outpatient lab. Instead pt went to immediate care. UA, CBC, Chem, and culture in labs tab. Note looks like she was prescribed ciprofloxacin. Now she is is terrible pain and told to follow up w/provider ASAP. Please advise.

## 2024-03-20 NOTE — TELEPHONE ENCOUNTER
Pt advised of all recommendations below, she verbalized understanding. She does know that she dose not to be taking an antibiotic as she has no infection. She does have potassium 20mEq tablets at home so she will take 1 a day and have repeat lab in 3 weeks. I told her that since there is no acute concern right now she does not need to be seen but that if Kristan has a cancellation tomorrow and she wants to be seen than she could. Patient said she is going to call tomorrow with an update and perhaps accept appointment if available.

## 2024-03-20 NOTE — TELEPHONE ENCOUNTER
URGENT SICK CALL    Donna REYES Georgirose  LOV: 2/27/2024     Patient experiencing UTI   Duration/Onset of symptoms: yesterday   Patient requesting to be seen asap. Patient is in extreme pain.   Patient states she was at [facility] for a urinary infection, was told to follow up asap with  provider.   Please call patient back at your earliest convenience.

## 2024-03-20 NOTE — TELEPHONE ENCOUNTER
If she does not have any potassium left we can do Kdur 20 mEq  daily for the next 10 days and then repeat with a BMP in 3  weeks.

## 2024-03-21 NOTE — TELEPHONE ENCOUNTER
Patient called states she is having extreme pain in lower abdomen.   Patient requesting pain medication to alleviate pain.     Please advise

## 2024-03-21 NOTE — TELEPHONE ENCOUNTER
Spoke w/pt. Went over information again from yesterday. There is nothing new happening and pt has an agreement w/Kristan to see the acupuncturist and will only be given 20 tabs/30 days. Stressed again to use heating pad, lidocaine patches, and plenty of fluids. Patient verbalized agreement and understanding.

## 2024-03-26 ENCOUNTER — TELEPHONE (OUTPATIENT)
Dept: FAMILY MEDICINE CLINIC | Facility: CLINIC | Age: 84
End: 2024-03-26

## 2024-03-26 DIAGNOSIS — R10.32 CHRONIC GROIN PAIN, LEFT: ICD-10-CM

## 2024-03-26 DIAGNOSIS — M51.16 LUMBAR DISC HERNIATION WITH RADICULOPATHY: Primary | ICD-10-CM

## 2024-03-26 DIAGNOSIS — G89.29 CHRONIC GROIN PAIN, LEFT: ICD-10-CM

## 2024-03-26 RX ORDER — ACETAMINOPHEN AND CODEINE PHOSPHATE 300; 15 MG/1; MG/1
TABLET ORAL
Qty: 20 TABLET | Refills: 0 | Status: SHIPPED | OUTPATIENT
Start: 2024-03-26

## 2024-03-26 NOTE — TELEPHONE ENCOUNTER
Patient calling to inform Kristan Nunez PA-C that she is in horrible pain her lower part  of her stomach  hurts where her  ovaries were and would like to speak to nurse.   Please advise

## 2024-03-26 NOTE — TELEPHONE ENCOUNTER
Yes I want to keep an eye on her usage of the codeine,   I think she will not recognize a difference between the 30 mg and the 15 mg codeine for the pain relief.

## 2024-03-26 NOTE — TELEPHONE ENCOUNTER
EARL  Spoke w/pt. She is in terrible pain and took her last pain pill last night. Let her know that she is only supposed to be taking a half pill per day and that it is only 20 per month. She was last refilled on 3/12 so she won't get more until 4/12. She reports that when she tries to cut pill in half that every time one half of the pill turns to dust. Asked if she was using pill cutter and she said yes. Explained that I had no further information as she knows the agreement but that I would pass the information on. Patient verbalized agreement and understanding.

## 2024-03-26 NOTE — TELEPHONE ENCOUNTER
Since she is unable to cut the other ones in half sending her or her over Tylenol No. 2 versus total number #3 that we will give her 15 mg of the codeine No. 20 sent to pharmacy she is to take only 1 daily

## 2024-03-29 ENCOUNTER — OFFICE VISIT (OUTPATIENT)
Dept: PAIN CLINIC | Facility: CLINIC | Age: 84
End: 2024-03-29

## 2024-03-29 DIAGNOSIS — R10.32 INGUINAL PAIN OF BOTH SIDES: Primary | ICD-10-CM

## 2024-03-29 DIAGNOSIS — R10.31 INGUINAL PAIN OF BOTH SIDES: Primary | ICD-10-CM
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
## 2024-04-10 ENCOUNTER — PATIENT OUTREACH (OUTPATIENT)
Dept: CASE MANAGEMENT | Age: 84
End: 2024-04-10

## 2024-04-11 DIAGNOSIS — G89.29 CHRONIC GROIN PAIN, LEFT: ICD-10-CM

## 2024-04-11 DIAGNOSIS — R10.32 CHRONIC GROIN PAIN, LEFT: ICD-10-CM

## 2024-04-11 DIAGNOSIS — M51.16 LUMBAR DISC HERNIATION WITH RADICULOPATHY: ICD-10-CM

## 2024-04-11 RX ORDER — ACETAMINOPHEN AND CODEINE PHOSPHATE 300; 15 MG/1; MG/1
TABLET ORAL
Qty: 20 TABLET | Refills: 0 | Status: SHIPPED | OUTPATIENT
Start: 2024-04-11

## 2024-04-11 NOTE — TELEPHONE ENCOUNTER
Donna Umaña requesting medication refill for Hydrocodone-Acetaminophen 5-500 MG Oral Cap (Discontinued) .  Patient is calling and having very bad pelvic/abdominal pain (where her ovaries were. She is asking for a refill of her pain meds     LOV: 2/27/2024   Last Refill Date:   30 or 90 Day Supply:  Pharmacy Location: [facility] on file  Prescribed By: Kristan Dumont PA-C      Informed patient to allow up to 24 to 48 Business hours for a call back from a nurse.

## 2024-04-11 NOTE — TELEPHONE ENCOUNTER
Pt is requesting a refill of Tylenol #3. She has chronic pain in her lower abdomen. This was last refilled 3/26/24 one tab a day 20 tabs. Pt says this really helps her pain but she does not have any left. She denies that she has been taking more than 1 a day but then said some times she does wake up in the middle of the night when she has bad pain and has to take another one. She is requesting a refill, please advise, thanks.

## 2024-04-12 ENCOUNTER — TELEPHONE (OUTPATIENT)
Dept: FAMILY MEDICINE CLINIC | Facility: CLINIC | Age: 84
End: 2024-04-12

## 2024-04-12 NOTE — TELEPHONE ENCOUNTER
Called pt and left msg advise to go to the ER if pain is severe.  Wait and  the medication tomorrow when ready.  Keep her upcoming appts with neuro pain

## 2024-04-12 NOTE — TELEPHONE ENCOUNTER
Patient calling and her rx for Acetaminophen-Codeine 300-15  will not be ready until tomorrow. She is in a lot of pain today in her abdominal/pelvic  area. What can she do? She cannot get in to see Kristan Nunez PA-C until    Future Appointments   Date Time Provider Department Center   5/20/2024 11:00 AM Kristan Nunez PA-C [facility] [place]       Please advise.

## 2024-04-27 DIAGNOSIS — G89.29 CHRONIC BILATERAL LOW BACK PAIN WITHOUT SCIATICA: ICD-10-CM

## 2024-04-27 DIAGNOSIS — M79.7 FIBROMYALGIA: ICD-10-CM

## 2024-04-27 DIAGNOSIS — F51.04 PSYCHOPHYSIOLOGICAL INSOMNIA: ICD-10-CM

## 2024-04-27 DIAGNOSIS — F41.1 GENERALIZED ANXIETY DISORDER: ICD-10-CM

## 2024-04-27 DIAGNOSIS — I10 ESSENTIAL HYPERTENSION: ICD-10-CM

## 2024-04-27 DIAGNOSIS — M54.50 CHRONIC BILATERAL LOW BACK PAIN WITHOUT SCIATICA: ICD-10-CM

## 2024-04-29 ENCOUNTER — TELEPHONE (OUTPATIENT)
Dept: FAMILY MEDICINE CLINIC | Facility: CLINIC | Age: 84
End: 2024-04-29

## 2024-04-29 DIAGNOSIS — E21.3 HYPERPARATHYROIDISM (HCC): Primary | ICD-10-CM

## 2024-04-29 DIAGNOSIS — M25.552 CHRONIC HIP PAIN, LEFT: ICD-10-CM

## 2024-04-29 DIAGNOSIS — G89.29 CHRONIC HIP PAIN, LEFT: ICD-10-CM

## 2024-04-29 RX ORDER — AMLODIPINE BESYLATE 10 MG/1
TABLET ORAL
Qty: 90 TABLET | Refills: 0 | Status: SHIPPED | OUTPATIENT
Start: 2024-04-29

## 2024-04-29 RX ORDER — QUETIAPINE FUMARATE 25 MG/1
TABLET, FILM COATED ORAL
Qty: 450 TABLET | Refills: 0 | Status: SHIPPED | OUTPATIENT
Start: 2024-04-29

## 2024-04-29 RX ORDER — GABAPENTIN 400 MG/1
400 CAPSULE ORAL 3 TIMES DAILY
Qty: 270 CAPSULE | Refills: 0 | Status: SHIPPED | OUTPATIENT
Start: 2024-04-29

## 2024-04-29 NOTE — TELEPHONE ENCOUNTER
Donna Umaña requesting medication refill for Hydrocodone-Acetaminophen 5-500 MG Oral Cap (Discontinued) .    She is in very bad pain - where her ovaries once were    LOV: 2/27/2024   Last Refill Date:   30 or 90 Day Supply:  Pharmacy Location: [facility] on file   Prescribed By: Kristan Nunez PA-C       Informed patient to allow up to 24 to 48 Business hours for a call back from a nurse.

## 2024-04-29 NOTE — TELEPHONE ENCOUNTER
Requested Prescriptions     Signed Prescriptions Disp Refills    QUEtiapine 25 MG Oral Tab 450 tablet 0     Sig: TAKE 1 TABLET BY MOUTH THREE TIMES DAILY AND 2 TABLETS EVERY NIGHT AT BEDTIME     Authorizing Provider: PARKER KING     Ordering User: CHERYL SÁNCHEZ    GABAPENTIN 400 MG Oral Cap 270 capsule 0     Sig: TAKE 1 CAPSULE(400 MG) BY MOUTH THREE TIMES DAILY     Authorizing Provider: PARKER KING     Ordering User: CHERYL SÁNCHEZ    AMLODIPINE 10 MG Oral Tab 90 tablet 0     Sig: TAKE 1 TABLET(10 MG) BY MOUTH DAILY     Authorizing Provider: PARKER KING     Ordering User: CHERYL SÁNCHEZ     Refilled per protocol/OV notes  Next OV 5/20/24

## 2024-04-29 NOTE — TELEPHONE ENCOUNTER
Dispensed:  Tylenol with Codeine #20 given  4/13/2024     How much is she using we discussed 20/month ?

## 2024-04-30 RX ORDER — ACETAMINOPHEN AND CODEINE PHOSPHATE 300; 30 MG/1; MG/1
0.5 TABLET ORAL 2 TIMES DAILY PRN
Qty: 20 TABLET | Refills: 0 | Status: SHIPPED | OUTPATIENT
Start: 2024-04-30

## 2024-04-30 RX ORDER — CINACALCET 30 MG/1
30 TABLET, FILM COATED ORAL
Qty: 30 TABLET | Refills: 0 | Status: SHIPPED | OUTPATIENT
Start: 2024-04-30

## 2024-04-30 NOTE — TELEPHONE ENCOUNTER
Patient reports terrible pain and is asking for a refill of her pain meds. Explained to patient that she is on an agreement w/Kristan to only receive 20 tabs per month and just received it 4/13. She is asking if you will consider refilling.  She is also asking you to refill her cinacalcet. It is normally filled by Dr. Simona Timmons but she has none left and has been unable to reach her. She takes it for her parathyroid issues.  Please advise.

## 2024-04-30 NOTE — TELEPHONE ENCOUNTER
Patient calling stating she's in a lot of pain and she needs her pain pills. Patient would like to be called back asap.    Please advise.

## 2024-05-04 DIAGNOSIS — E78.2 MIXED HYPERLIPIDEMIA: ICD-10-CM

## 2024-05-06 DIAGNOSIS — E78.2 MIXED HYPERLIPIDEMIA: ICD-10-CM

## 2024-05-06 RX ORDER — PRAVASTATIN SODIUM 40 MG
40 TABLET ORAL NIGHTLY
Qty: 90 TABLET | Refills: 0 | OUTPATIENT
Start: 2024-05-06

## 2024-05-06 RX ORDER — PRAVASTATIN SODIUM 40 MG
40 TABLET ORAL NIGHTLY
Qty: 30 TABLET | Refills: 0 | Status: SHIPPED | OUTPATIENT
Start: 2024-05-06

## 2024-05-13 ENCOUNTER — PATIENT OUTREACH (OUTPATIENT)
Dept: CASE MANAGEMENT | Age: 84
End: 2024-05-13

## 2024-05-13 DIAGNOSIS — I10 ESSENTIAL HYPERTENSION: ICD-10-CM

## 2024-05-13 RX ORDER — LOSARTAN POTASSIUM 100 MG/1
100 TABLET ORAL DAILY
Qty: 90 TABLET | Refills: 0 | Status: SHIPPED | OUTPATIENT
Start: 2024-05-13

## 2024-05-13 NOTE — PROGRESS NOTES
Called patient and left a message for patient to call back when they can. Reviewed patient chart. Left contact my contact number [phone]        Time Spent This Encounter Total: 5  min medical record review  Monthly Minute Total including today: 5 minutes

## 2024-05-19 DIAGNOSIS — F41.1 GENERALIZED ANXIETY DISORDER: ICD-10-CM

## 2024-05-20 ENCOUNTER — OFFICE VISIT (OUTPATIENT)
Dept: FAMILY MEDICINE CLINIC | Facility: CLINIC | Age: 84
End: 2024-05-20

## 2024-05-20 VITALS
RESPIRATION RATE: 18 BRPM | HEIGHT: 62 IN | HEART RATE: 68 BPM | TEMPERATURE: 99 F | SYSTOLIC BLOOD PRESSURE: 144 MMHG | DIASTOLIC BLOOD PRESSURE: 82 MMHG | BODY MASS INDEX: 26.5 KG/M2 | OXYGEN SATURATION: 98 % | WEIGHT: 144 LBS

## 2024-05-20 DIAGNOSIS — Z79.899 MEDICATION MANAGEMENT: ICD-10-CM

## 2024-05-20 DIAGNOSIS — I25.119 CORONARY ARTERY DISEASE INVOLVING NATIVE CORONARY ARTERY OF NATIVE HEART WITH ANGINA PECTORIS (HCC): ICD-10-CM

## 2024-05-20 DIAGNOSIS — N18.30 CHRONIC RENAL INSUFFICIENCY, STAGE 3 (MODERATE) (HCC): ICD-10-CM

## 2024-05-20 DIAGNOSIS — Z95.5 PRESENCE OF STENT IN CORONARY ARTERY: ICD-10-CM

## 2024-05-20 DIAGNOSIS — M25.552 CHRONIC HIP PAIN, LEFT: ICD-10-CM

## 2024-05-20 DIAGNOSIS — G89.29 CHRONIC BILATERAL LOW BACK PAIN WITHOUT SCIATICA: ICD-10-CM

## 2024-05-20 DIAGNOSIS — E78.2 MIXED HYPERLIPIDEMIA: ICD-10-CM

## 2024-05-20 DIAGNOSIS — E53.8 VITAMIN B 12 DEFICIENCY: ICD-10-CM

## 2024-05-20 DIAGNOSIS — M54.50 CHRONIC BILATERAL LOW BACK PAIN WITHOUT SCIATICA: ICD-10-CM

## 2024-05-20 DIAGNOSIS — E21.3 HYPERPARATHYROIDISM (HCC): ICD-10-CM

## 2024-05-20 DIAGNOSIS — G89.29 CHRONIC HIP PAIN, LEFT: ICD-10-CM

## 2024-05-20 DIAGNOSIS — I10 ESSENTIAL HYPERTENSION: Primary | ICD-10-CM

## 2024-05-20 DIAGNOSIS — F41.1 GENERALIZED ANXIETY DISORDER: ICD-10-CM

## 2024-05-20 DIAGNOSIS — R11.0 NAUSEA: ICD-10-CM

## 2024-05-20 DIAGNOSIS — M79.7 FIBROMYALGIA: ICD-10-CM

## 2024-05-20 DIAGNOSIS — D53.8 OTHER SPECIFIED NUTRITIONAL ANEMIAS: ICD-10-CM

## 2024-05-20 DIAGNOSIS — R73.9 HYPERGLYCEMIA: ICD-10-CM

## 2024-05-20 DIAGNOSIS — R52 PAIN MANAGEMENT: ICD-10-CM

## 2024-05-20 LAB
ALBUMIN SERPL-MCNC: 4.1 G/DL (ref 3.4–5)
ALBUMIN/GLOB SERPL: 1.1 {RATIO} (ref 1–2)
ALP LIVER SERPL-CCNC: 77 U/L
ALT SERPL-CCNC: 18 U/L
ANION GAP SERPL CALC-SCNC: 7 MMOL/L (ref 0–18)
AST SERPL-CCNC: 22 U/L (ref 15–37)
BASOPHILS # BLD AUTO: 0.11 X10(3) UL (ref 0–0.2)
BASOPHILS NFR BLD AUTO: 1 %
BILIRUB SERPL-MCNC: 0.5 MG/DL (ref 0.1–2)
BUN BLD-MCNC: 19 MG/DL (ref 9–23)
CALCIUM BLD-MCNC: 10.2 MG/DL (ref 8.5–10.1)
CHLORIDE SERPL-SCNC: 110 MMOL/L (ref 98–112)
CHOLEST SERPL-MCNC: 208 MG/DL (ref ?–200)
CO2 SERPL-SCNC: 24 MMOL/L (ref 21–32)
CREAT BLD-MCNC: 1.11 MG/DL
EGFRCR SERPLBLD CKD-EPI 2021: 50 ML/MIN/1.73M2 (ref 60–?)
EOSINOPHIL # BLD AUTO: 0.15 X10(3) UL (ref 0–0.7)
EOSINOPHIL NFR BLD AUTO: 1.3 %
ERYTHROCYTE [DISTWIDTH] IN BLOOD BY AUTOMATED COUNT: 13.3 %
FASTING PATIENT LIPID ANSWER: YES
FASTING STATUS PATIENT QL REPORTED: YES
FOLATE SERPL-MCNC: 13.7 NG/ML (ref 8.7–?)
GLOBULIN PLAS-MCNC: 3.7 G/DL (ref 2.8–4.4)
GLUCOSE BLD-MCNC: 97 MG/DL (ref 70–99)
HCT VFR BLD AUTO: 43.3 %
HDLC SERPL-MCNC: 49 MG/DL (ref 40–59)
HGB BLD-MCNC: 14 G/DL
IMM GRANULOCYTES # BLD AUTO: 0.02 X10(3) UL (ref 0–1)
IMM GRANULOCYTES NFR BLD: 0.2 %
LDLC SERPL CALC-MCNC: 123 MG/DL (ref ?–100)
LYMPHOCYTES # BLD AUTO: 4.62 X10(3) UL (ref 1–4)
LYMPHOCYTES NFR BLD AUTO: 40.2 %
MCH RBC QN AUTO: 31.9 PG (ref 26–34)
MCHC RBC AUTO-ENTMCNC: 32.3 G/DL (ref 31–37)
MCV RBC AUTO: 98.6 FL
MONOCYTES # BLD AUTO: 0.93 X10(3) UL (ref 0.1–1)
MONOCYTES NFR BLD AUTO: 8.1 %
NEUTROPHILS # BLD AUTO: 5.66 X10 (3) UL (ref 1.5–7.7)
NEUTROPHILS # BLD AUTO: 5.66 X10(3) UL (ref 1.5–7.7)
NEUTROPHILS NFR BLD AUTO: 49.2 %
NONHDLC SERPL-MCNC: 159 MG/DL (ref ?–130)
OSMOLALITY SERPL CALC.SUM OF ELEC: 294 MOSM/KG (ref 275–295)
PLATELET # BLD AUTO: 306 10(3)UL (ref 150–450)
POTASSIUM SERPL-SCNC: 3.9 MMOL/L (ref 3.5–5.1)
PROT SERPL-MCNC: 7.8 G/DL (ref 6.4–8.2)
RBC # BLD AUTO: 4.39 X10(6)UL
SODIUM SERPL-SCNC: 141 MMOL/L (ref 136–145)
TRIGL SERPL-MCNC: 204 MG/DL (ref 30–149)
TSI SER-ACNC: 0.99 MIU/ML (ref 0.36–3.74)
VIT B12 SERPL-MCNC: 394 PG/ML (ref 193–986)
VLDLC SERPL CALC-MCNC: 36 MG/DL (ref 0–30)
WBC # BLD AUTO: 11.5 X10(3) UL (ref 4–11)

## 2024-05-20 PROCEDURE — 80061 LIPID PANEL: CPT | Performed by: FAMILY MEDICINE

## 2024-05-20 PROCEDURE — 82607 VITAMIN B-12: CPT | Performed by: FAMILY MEDICINE

## 2024-05-20 PROCEDURE — 85025 COMPLETE CBC W/AUTO DIFF WBC: CPT | Performed by: FAMILY MEDICINE

## 2024-05-20 PROCEDURE — 82746 ASSAY OF FOLIC ACID SERUM: CPT | Performed by: FAMILY MEDICINE

## 2024-05-20 PROCEDURE — 84443 ASSAY THYROID STIM HORMONE: CPT | Performed by: FAMILY MEDICINE

## 2024-05-20 PROCEDURE — 80053 COMPREHEN METABOLIC PANEL: CPT | Performed by: FAMILY MEDICINE

## 2024-05-20 RX ORDER — AMLODIPINE BESYLATE 10 MG/1
10 TABLET ORAL DAILY
Qty: 90 TABLET | Refills: 0 | Status: CANCELLED | OUTPATIENT
Start: 2024-05-20

## 2024-05-20 RX ORDER — LOSARTAN POTASSIUM 100 MG/1
100 TABLET ORAL DAILY
Qty: 90 TABLET | Refills: 0 | Status: CANCELLED | OUTPATIENT
Start: 2024-05-20

## 2024-05-20 RX ORDER — GABAPENTIN 400 MG/1
400 CAPSULE ORAL 3 TIMES DAILY
Qty: 270 CAPSULE | Refills: 0 | Status: CANCELLED | OUTPATIENT
Start: 2024-05-20

## 2024-05-20 RX ORDER — PRAVASTATIN SODIUM 40 MG
40 TABLET ORAL NIGHTLY
Qty: 30 TABLET | Refills: 0 | Status: CANCELLED | OUTPATIENT
Start: 2024-05-20

## 2024-05-20 RX ORDER — SERTRALINE HYDROCHLORIDE 100 MG/1
200 TABLET, FILM COATED ORAL DAILY
Qty: 180 TABLET | Refills: 1 | Status: SHIPPED | OUTPATIENT
Start: 2024-05-20

## 2024-05-20 RX ORDER — SERTRALINE HYDROCHLORIDE 100 MG/1
200 TABLET, FILM COATED ORAL DAILY
Qty: 180 TABLET | Refills: 0 | OUTPATIENT
Start: 2024-05-20

## 2024-05-20 RX ORDER — ACETAMINOPHEN AND CODEINE PHOSPHATE 300; 30 MG/1; MG/1
0.5 TABLET ORAL 2 TIMES DAILY PRN
Qty: 30 TABLET | Refills: 0 | Status: SHIPPED | OUTPATIENT
Start: 2024-05-20

## 2024-05-20 RX ORDER — ONDANSETRON 8 MG/1
8 TABLET, ORALLY DISINTEGRATING ORAL EVERY 12 HOURS PRN
Qty: 10 TABLET | Refills: 1 | Status: SHIPPED | OUTPATIENT
Start: 2024-05-20
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
## 2024-06-12 DIAGNOSIS — M25.552 CHRONIC HIP PAIN, LEFT: ICD-10-CM

## 2024-06-12 DIAGNOSIS — G89.29 CHRONIC BILATERAL LOW BACK PAIN WITHOUT SCIATICA: ICD-10-CM

## 2024-06-12 DIAGNOSIS — M54.50 CHRONIC BILATERAL LOW BACK PAIN WITHOUT SCIATICA: ICD-10-CM

## 2024-06-12 DIAGNOSIS — G89.29 CHRONIC HIP PAIN, LEFT: ICD-10-CM

## 2024-06-12 RX ORDER — ACETAMINOPHEN AND CODEINE PHOSPHATE 300; 30 MG/1; MG/1
0.5 TABLET ORAL 2 TIMES DAILY PRN
Qty: 30 TABLET | Refills: 0 | Status: SHIPPED | OUTPATIENT
Start: 2024-06-14

## 2024-06-12 NOTE — TELEPHONE ENCOUNTER
Donna Umaña requesting medication refill for LOSARTAN 100 MG Oral Tab .    LOV: 5/20/2024   Last Refill Date:   30 or 90 Day Supply:  Pharmacy Location: [facility] on file  Prescribed By: Kristan Nunez PA-C    ++Patient also calling regarding abdominal pain where her ovaries were - she would  like to speak to nurse.  Please advise++      Informed patient to allow up to 24 to 48 Business hours for a call back from a nurse.

## 2024-06-12 NOTE — TELEPHONE ENCOUNTER
Patient requesting refill for T#3 for her chronic pain.  LF 5/20/24  LOV 5/20/24    I did let her know that her losartan was filled  or 90 days on 5/13/24

## 2024-06-12 NOTE — TELEPHONE ENCOUNTER
She is not to be filling this but once every 30 days.  I did send it over for her to get it filled on Friday.  She needs to stick to the plan we have which was one half twice a day at the most or just 1 at night.

## 2024-06-13 ENCOUNTER — PATIENT OUTREACH (OUTPATIENT)
Dept: CASE MANAGEMENT | Age: 84
End: 2024-06-13

## 2024-06-13 DIAGNOSIS — E78.2 MIXED HYPERLIPIDEMIA: ICD-10-CM

## 2024-06-13 DIAGNOSIS — F41.1 GENERALIZED ANXIETY DISORDER: ICD-10-CM

## 2024-06-13 DIAGNOSIS — I10 PRIMARY HYPERTENSION: Primary | ICD-10-CM

## 2024-06-13 DIAGNOSIS — M79.7 FIBROMYALGIA: ICD-10-CM

## 2024-06-13 NOTE — TELEPHONE ENCOUNTER
Patient returning call to find out why nurse called. Patient informed medication is at the pharmacy but not available for pick-up til tomorrow. Patient voiced understanding.

## 2024-06-13 NOTE — TELEPHONE ENCOUNTER
Left voicemail for patient giving all information about her medication being ready for  tomorrow and why.

## 2024-06-13 NOTE — PROGRESS NOTES
Spoke to Donna for [facility].      Updates to patient care team/comments: No changes per patient  Patient reported changes in medications: No changes per patient  Med Adherence  Comment: Taking as prescribed     Health Maintenance: Taking  Health Maintenance   Topic Date Due    HTN: BP Follow-Up  06/20/2024    COVID-19 Vaccine (3 - 2023-24 season) 06/20/2024 (Originally 9/1/2023)    Meningococcal B Vaccine (1 of 4 - Increased Risk) 08/01/2024 (Originally 5/5/1950)    Influenza Vaccine (Season Ended) 02/01/2025 (Originally 10/1/2024)    Zoster Vaccines (1 of 2) 02/01/2025 (Originally 5/5/1990)    Annual Physical  02/01/2025    DEXA Scan  Completed    Annual Depression Screening  Completed    Fall Risk Screening (Annual)  Completed    Tobacco Cessation Counseling  Completed    Pneumococcal Vaccine: 65+ Years  Completed     Patient updates/concerns:   Patient stated she continues to take tylenol w/codeine and gabapentin as needed for chronic left hip pain. Patient stated she continues to do acupuncture as well to help relieve pain. Patient stated she has not scheduled the follow up appointment with the cardiologist but will do so soon. Patient reminded Kristan wanted her to schedule due to the elevated blood pressure. Offered assistance to patient, patient stated she will call. Patient acknowledged. Patient state she has no other concenrs at this time thankful for the follow up call.     Goals/Action Plan:    Active goal from previous outreach:     Chronic left hip pain   Patient reported progress towards goals:                - What: Patient stated she continues to take tylenol w/codeine and gabapentin as needed for chronic left hip pain. Patient stated she continues to do acupuncture as well to help relieve pain. Patient stated she has not scheduled the follow up appointment with the cardiologist but will do so soon.            - Where/When/How: Patient reminded Kristan wanted her to schedule due to the elevated blood  pressure. Offered assistance to patient, patient stated she will call. Patient acknowledged.  Patient Reported Barriers and Concerns: None at this time per patient                   - Plan for overcoming barriers: Patient to continue to follow up with care team as scheduled or as needed.    Care Managers Interventions: Patient was educated on proper nutrition, hydration and exercise. Patient is aware of our next outreach, has agreed to being contacted via telephone. Patient verbalized understanding. Patient aware she may contact me if further assistance is needed.    Future Appointments: No future appointments.    Next Care Manager Follow Up Date: 1 month follow up or sooner if needed    Reason For Follow Up: review progress and or barriers towards patient's goals.     Time Spent This Encounter Total: 20 min medical record review, telephone communication, care plan updates where needed, education, goals, and action plan recreation/update. Provided acknowledgment and validation to patient's concerns.   Monthly Minute Total including today: 20  Physical assessment, complete health history, and need for CCM established by Sherri Alvarenga DO.

## 2024-06-21 DIAGNOSIS — E78.2 MIXED HYPERLIPIDEMIA: ICD-10-CM

## 2024-06-21 RX ORDER — PRAVASTATIN SODIUM 40 MG
40 TABLET ORAL NIGHTLY
Qty: 30 TABLET | Refills: 0 | Status: SHIPPED | OUTPATIENT
Start: 2024-06-21

## 2024-06-21 NOTE — TELEPHONE ENCOUNTER
Requested Prescriptions     Signed Prescriptions Disp Refills    PRAVASTATIN 40 MG Oral Tab 30 tablet 0     Sig: TAKE 1 TABLET(40 MG) BY MOUTH EVERY NIGHT     Authorizing Provider: PARKER KING     Ordering User: CHERYL SÁNCHEZ        Refilled per protocol/OV notes

## 2024-07-08 ENCOUNTER — TELEPHONE (OUTPATIENT)
Dept: FAMILY MEDICINE CLINIC | Facility: CLINIC | Age: 84
End: 2024-07-08

## 2024-07-08 DIAGNOSIS — M25.552 CHRONIC HIP PAIN, LEFT: ICD-10-CM

## 2024-07-08 DIAGNOSIS — G89.29 CHRONIC BILATERAL LOW BACK PAIN WITHOUT SCIATICA: ICD-10-CM

## 2024-07-08 DIAGNOSIS — M54.50 CHRONIC BILATERAL LOW BACK PAIN WITHOUT SCIATICA: ICD-10-CM

## 2024-07-08 DIAGNOSIS — G89.29 CHRONIC HIP PAIN, LEFT: ICD-10-CM

## 2024-07-08 NOTE — TELEPHONE ENCOUNTER
I informed patient that her T#3 was refilled on 7/4 to [facility] and advised her to follow up with them, verbalized understanding.

## 2024-07-08 NOTE — TELEPHONE ENCOUNTER
Received a call from Joanie at [facility]. Patient went to fill T#3 script that was filled 7/4 for 6 tablets, however, this patient filled a 30 day script on 6/17 so it is too early for refill. Patient's spouse told the pharmacist that she is out of the medication as she takes more than she is supposed to. Need verbal to fill med early if that is appropriate. Please advise, thanks.

## 2024-07-08 NOTE — TELEPHONE ENCOUNTER
Patient calling for a refill on acetaminophen-codeine 300-30 MG Oral Tab to be sent to     [id number] - [place], [place] - [address] AT [facility] [address], [phone], [phone]     Patient states she's in a lot of pain and out of her medication.   Please review and advise.

## 2024-07-08 NOTE — TELEPHONE ENCOUNTER
Patient calling back stating she didn't receive a call back from the nurse. Advised nurse's message below.    Patient voiced understanding.

## 2024-07-09 ENCOUNTER — TELEPHONE (OUTPATIENT)
Dept: FAMILY MEDICINE CLINIC | Facility: CLINIC | Age: 84
End: 2024-07-09

## 2024-07-09 ENCOUNTER — PATIENT OUTREACH (OUTPATIENT)
Dept: CASE MANAGEMENT | Age: 84
End: 2024-07-09

## 2024-07-09 DIAGNOSIS — F45.41 PAIN AGGRAVATED BY ANXIETY: ICD-10-CM

## 2024-07-09 DIAGNOSIS — G89.29 CHRONIC BILATERAL LOW BACK PAIN WITHOUT SCIATICA: Primary | ICD-10-CM

## 2024-07-09 DIAGNOSIS — E78.2 MIXED HYPERLIPIDEMIA: ICD-10-CM

## 2024-07-09 DIAGNOSIS — I10 PRIMARY HYPERTENSION: ICD-10-CM

## 2024-07-09 DIAGNOSIS — R10.32 CHRONIC GROIN PAIN, LEFT: ICD-10-CM

## 2024-07-09 DIAGNOSIS — M25.552 CHRONIC HIP PAIN, LEFT: Primary | ICD-10-CM

## 2024-07-09 DIAGNOSIS — G89.29 CHRONIC HIP PAIN, LEFT: ICD-10-CM

## 2024-07-09 DIAGNOSIS — M81.0 AGE-RELATED OSTEOPOROSIS WITHOUT CURRENT PATHOLOGICAL FRACTURE: ICD-10-CM

## 2024-07-09 DIAGNOSIS — M51.16 LUMBAR DISC HERNIATION WITH RADICULOPATHY: ICD-10-CM

## 2024-07-09 DIAGNOSIS — G89.29 CHRONIC GROIN PAIN, LEFT: ICD-10-CM

## 2024-07-09 DIAGNOSIS — G89.29 CHRONIC HIP PAIN, LEFT: Primary | ICD-10-CM

## 2024-07-09 DIAGNOSIS — Z79.899 MEDICATION MANAGEMENT: ICD-10-CM

## 2024-07-09 DIAGNOSIS — M25.552 CHRONIC HIP PAIN, LEFT: ICD-10-CM

## 2024-07-09 DIAGNOSIS — M54.50 CHRONIC BILATERAL LOW BACK PAIN WITHOUT SCIATICA: Primary | ICD-10-CM

## 2024-07-09 DIAGNOSIS — F41.9 PAIN AGGRAVATED BY ANXIETY: ICD-10-CM

## 2024-07-09 DIAGNOSIS — F41.1 GENERALIZED ANXIETY DISORDER: ICD-10-CM

## 2024-07-09 DIAGNOSIS — M79.7 FIBROMYALGIA: ICD-10-CM

## 2024-07-09 NOTE — TELEPHONE ENCOUNTER
Patient called requesting a referral for to see a pain specialist . Patient stated she has tried the acupuncture and it did nothing for her. Patient reports she takes pain medication when needed along with using a heating pad around her neck and back area. Patient stated they do help ease a little of the pain. Patient aware she may need to be seen before a referral os placed. Patient agreed. Please advise

## 2024-07-09 NOTE — TELEPHONE ENCOUNTER
Patient is interested in pain management referral for neck/back pain. I see she has seen  previously. Would you recommend she go back to him or recommend an alternative. She has VV with you 8/10.

## 2024-07-09 NOTE — PROGRESS NOTES
Spoke to Donna for Chronic Care Management.      Updates to patient care team/comments: No changes  Patient reported changes in medications: Reviewed with patient  Med Adherence  Comment: Taking as prescribed    Health Maintenance: Pt aware  Health Maintenance   Topic Date Due    COVID-19 Vaccine (3 - 2023-24 season) 09/01/2023    HTN: BP Follow-Up  06/20/2024    Meningococcal B Vaccine (1 of 4 - Increased Risk) 08/01/2024 (Originally 5/5/1950)    Zoster Vaccines (1 of 2) 02/01/2025 (Originally 5/5/1990)    Influenza Vaccine (1) 10/01/2024    Annual Physical  02/01/2025    DEXA Scan  Completed    Annual Depression Screening  Completed    Fall Risk Screening (Annual)  Completed    Tobacco Cessation Counseling  Completed    Pneumococcal Vaccine: 65+ Years  Completed     Patient updates/concerns:  Patient reports she is now wanting to see a pain specialist and is requesting a referral for one. Patient stated she has tried the acupuncture and it did nothing for her. Patient reports right now she takes pain medication when needed along with using a heating pad around her neck and back area. Patient stated they do help ease a little of the pain. Patient informed a referral request will be submitted on her behalf. Michelle aware she will be contacted to inform her when referral is placed and processed or if an appointment is needed. Patient states she will wait for a call. Michelle stated she has no other barriers at this time.     Goals/Action Plan:    TE sent to [facility] staff on patient behalf    Active goal from previous outreach:     Chronic pain and mobility   Patient reported progress towards goals:                - What: Patient reports she is now wanting to see a pain specialist and is requesting a referral for one. Patient stated she has tried the acupuncture and it did nothing for her. Patient reports right now she takes pain medication when needed along with using a heating pad around her neck and back  area. Patient stated they do help ease a little of the pain.           - Where/When/How: Patient informed a referral request will be submitted on her behalf. Patietn aware she will be contacted to inform her when referral is placed and processed or if an appointment is needed.   Patient Reported Barriers and Concerns: TE sent to pcp on patient behalf requesting a referral to pain specialists.                   - Plan for overcoming barriers: Patient to continue to follow up with care team as scheduled or as needed.    Care Managers Interventions: The patient was educated on proper nutrition, hydration and exercise. The patient is aware of our next outreach, has agreed to being contacted via telephone. The patient verbalized understanding. The patient is aware she may contact me if further assistance is needed.    Future Appointments: Pt aware  Future Appointments   Date Time Provider Department Center   8/10/2024  9:00 AM Kristan Nunez PA-C [facility]     Next Care Manager Follow Up Date: 1 month follow up or sooner if needed    Reason For Follow Up: review progress and or barriers towards patient's goals.     Time Spent This Encounter Total: 20 min medical record review, telephone communication, care plan updates where needed, education, goals, and action plan recreation/update. Provided acknowledgment and validation to patient's concerns.   Monthly Minute Total including today: 20  Physical assessment, complete health history, and need for CCM established by Sherri Alvarenga DO.

## 2024-07-14 ENCOUNTER — TELEPHONE (OUTPATIENT)
Dept: FAMILY MEDICINE CLINIC | Facility: CLINIC | Age: 84
End: 2024-07-14

## 2024-07-14 DIAGNOSIS — M54.50 CHRONIC BILATERAL LOW BACK PAIN WITHOUT SCIATICA: ICD-10-CM

## 2024-07-14 DIAGNOSIS — G89.29 CHRONIC BILATERAL LOW BACK PAIN WITHOUT SCIATICA: ICD-10-CM

## 2024-07-14 DIAGNOSIS — G89.29 CHRONIC HIP PAIN, LEFT: ICD-10-CM

## 2024-07-14 DIAGNOSIS — M25.552 CHRONIC HIP PAIN, LEFT: ICD-10-CM

## 2024-07-14 RX ORDER — ACETAMINOPHEN AND CODEINE PHOSPHATE 300; 30 MG/1; MG/1
0.5 TABLET ORAL 2 TIMES DAILY PRN
Qty: 30 TABLET | Refills: 0 | Status: SHIPPED | OUTPATIENT
Start: 2024-07-14 | End: 2024-08-13

## 2024-07-14 NOTE — TELEPHONE ENCOUNTER
Patient called requesting refill on Tylenol with Codeine for severe abdominal pain. I attempted to return the call but voice message said party not available.

## 2024-07-14 NOTE — TELEPHONE ENCOUNTER
Patient called asking for Codeine refill uses for hip/back pain.  On call provider sent message asking if she can have refill.  Will send refill she is to only use one per day for pain  #30 sent to pharmacy Codeine #3.. Mychart message sent to patient.      Sincerely,   Kristan Nunez PA-C

## 2024-07-16 ENCOUNTER — TELEPHONE (OUTPATIENT)
Dept: FAMILY MEDICINE CLINIC | Facility: CLINIC | Age: 84
End: 2024-07-16

## 2024-07-16 DIAGNOSIS — M54.50 CHRONIC BILATERAL LOW BACK PAIN WITHOUT SCIATICA: ICD-10-CM

## 2024-07-16 DIAGNOSIS — G89.29 CHRONIC BILATERAL LOW BACK PAIN WITHOUT SCIATICA: ICD-10-CM

## 2024-07-16 DIAGNOSIS — G89.29 CHRONIC HIP PAIN, LEFT: Primary | ICD-10-CM

## 2024-07-16 DIAGNOSIS — M25.552 CHRONIC HIP PAIN, LEFT: Primary | ICD-10-CM

## 2024-07-16 NOTE — TELEPHONE ENCOUNTER
Donna Umaña called requesting a referral to Pain Management  (physician or practice name)    Reason for referral: Second Opinion     LOV: 5/20/2024  Has this issue been discussed with PCP previously (Yes/No): yes (If no, schedule patient an appointment to discuss)       Informed patient may take up to 5 - 7 business days to complete. Once referral is approved patient will be notified and able to see referral via MyChart.

## 2024-07-16 NOTE — TELEPHONE ENCOUNTER
Satish Chavez  [address]  [facility] [address] [phone]   Referral placed please let patient know

## 2024-07-16 NOTE — TELEPHONE ENCOUNTER
Patient was referred to  for pain management but she is looking for a second opinion. Do you have any recommendations?

## 2024-07-21 DIAGNOSIS — M54.50 CHRONIC BILATERAL LOW BACK PAIN WITHOUT SCIATICA: ICD-10-CM

## 2024-07-21 DIAGNOSIS — F51.04 PSYCHOPHYSIOLOGICAL INSOMNIA: ICD-10-CM

## 2024-07-21 DIAGNOSIS — E78.2 MIXED HYPERLIPIDEMIA: ICD-10-CM

## 2024-07-21 DIAGNOSIS — F41.1 GENERALIZED ANXIETY DISORDER: ICD-10-CM

## 2024-07-21 DIAGNOSIS — I10 ESSENTIAL HYPERTENSION: ICD-10-CM

## 2024-07-21 DIAGNOSIS — G89.29 CHRONIC BILATERAL LOW BACK PAIN WITHOUT SCIATICA: ICD-10-CM

## 2024-07-21 DIAGNOSIS — M79.7 FIBROMYALGIA: ICD-10-CM

## 2024-07-22 RX ORDER — AMLODIPINE BESYLATE 10 MG/1
TABLET ORAL
Qty: 90 TABLET | Refills: 0 | Status: SHIPPED | OUTPATIENT
Start: 2024-07-22

## 2024-07-22 RX ORDER — QUETIAPINE FUMARATE 25 MG/1
TABLET, FILM COATED ORAL
Qty: 450 TABLET | Refills: 0 | Status: SHIPPED | OUTPATIENT
Start: 2024-07-22

## 2024-07-22 RX ORDER — GABAPENTIN 400 MG/1
400 CAPSULE ORAL 3 TIMES DAILY
Qty: 270 CAPSULE | Refills: 0 | Status: SHIPPED | OUTPATIENT
Start: 2024-07-22

## 2024-07-22 RX ORDER — PRAVASTATIN SODIUM 40 MG
40 TABLET ORAL NIGHTLY
Qty: 90 TABLET | Refills: 0 | Status: SHIPPED | OUTPATIENT
Start: 2024-07-22

## 2024-08-08 ENCOUNTER — TELEPHONE (OUTPATIENT)
Dept: FAMILY MEDICINE CLINIC | Facility: CLINIC | Age: 84
End: 2024-08-08

## 2024-08-08 NOTE — TELEPHONE ENCOUNTER
Patient calling to request refill on her pain pills. Patient says she's in a lot of pain with her arthritis. Patient was reminded she has a video visit with Kristan Nunez PA-C on 8/10/24.

## 2024-08-08 NOTE — TELEPHONE ENCOUNTER
Called patient. Clarified what pain medication she was requesting. She verified it was the acetaminophen-codeine. Made her aware that Kristan is unable to refill medication because it was too soon.Last refill 7/14,  and that she can discuss it with Kristan at her next appointment on 8/10. Patient is going to use Tylenol and ibuprofen until her appointment on Saturday.

## 2024-08-10 ENCOUNTER — TELEMEDICINE (OUTPATIENT)
Dept: FAMILY MEDICINE CLINIC | Facility: CLINIC | Age: 84
End: 2024-08-10
Payer: MEDICARE

## 2024-08-10 DIAGNOSIS — M25.552 CHRONIC HIP PAIN, LEFT: ICD-10-CM

## 2024-08-10 DIAGNOSIS — G89.29 CHRONIC HIP PAIN, LEFT: ICD-10-CM

## 2024-08-10 DIAGNOSIS — F41.1 GENERALIZED ANXIETY DISORDER: Chronic | ICD-10-CM

## 2024-08-10 DIAGNOSIS — G89.29 CHRONIC BILATERAL LOW BACK PAIN WITHOUT SCIATICA: Primary | ICD-10-CM

## 2024-08-10 DIAGNOSIS — I10 ESSENTIAL HYPERTENSION: ICD-10-CM

## 2024-08-10 DIAGNOSIS — M54.50 CHRONIC BILATERAL LOW BACK PAIN WITHOUT SCIATICA: Primary | ICD-10-CM

## 2024-08-10 DIAGNOSIS — R52 PAIN MANAGEMENT: ICD-10-CM

## 2024-08-10 DIAGNOSIS — F51.04 PSYCHOPHYSIOLOGICAL INSOMNIA: ICD-10-CM

## 2024-08-10 PROCEDURE — G2211 COMPLEX E/M VISIT ADD ON: HCPCS | Performed by: FAMILY MEDICINE

## 2024-08-10 PROCEDURE — 99214 OFFICE O/P EST MOD 30 MIN: CPT | Performed by: FAMILY MEDICINE

## 2024-08-10 RX ORDER — LOSARTAN POTASSIUM 100 MG/1
100 TABLET ORAL DAILY
Qty: 90 TABLET | Refills: 0 | Status: SHIPPED | OUTPATIENT
Start: 2024-08-10

## 2024-08-10 RX ORDER — GABAPENTIN 600 MG/1
600 TABLET ORAL 3 TIMES DAILY
Qty: 270 TABLET | Refills: 1 | Status: SHIPPED | OUTPATIENT
Start: 2024-08-10

## 2024-08-10 RX ORDER — ACETAMINOPHEN AND CODEINE PHOSPHATE 300; 30 MG/1; MG/1
0.5 TABLET ORAL 2 TIMES DAILY PRN
Qty: 30 TABLET | Refills: 0 | Status: SHIPPED | OUTPATIENT
Start: 2024-08-10 | End: 2024-09-09
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
## 2024-08-13 ENCOUNTER — PATIENT OUTREACH (OUTPATIENT)
Dept: CASE MANAGEMENT | Age: 84
End: 2024-08-13

## 2024-08-13 NOTE — PROGRESS NOTES
Called patient and left a message for patient to call back when they can. Reviewed patient chart. Left contact my contact number [phone]        Time Spent This Encounter Total: 5  min medical record review  Monthly Minute Total including today: 5 minutes

## 2024-08-17 DIAGNOSIS — F41.1 GENERALIZED ANXIETY DISORDER: ICD-10-CM

## 2024-08-19 RX ORDER — SERTRALINE HYDROCHLORIDE 100 MG/1
200 TABLET, FILM COATED ORAL DAILY
Qty: 180 TABLET | Refills: 1 | OUTPATIENT
Start: 2024-08-19

## 2024-08-19 NOTE — TELEPHONE ENCOUNTER
Please call pharmacy a refill for 6 months sent 5/20/24 for Sertraline there is a 90 day supply still at pharmacy.    Toldo message sent

## 2024-08-24 ENCOUNTER — MOBILE ENCOUNTER (OUTPATIENT)
Dept: FAMILY MEDICINE CLINIC | Facility: CLINIC | Age: 84
End: 2024-08-24

## 2024-08-24 RX ORDER — CIPROFLOXACIN 500 MG/1
500 TABLET, FILM COATED ORAL 2 TIMES DAILY
Qty: 14 TABLET | Refills: 0 | Status: SHIPPED | OUTPATIENT
Start: 2024-08-24 | End: 2024-08-31

## 2024-08-24 NOTE — PROGRESS NOTES
Patient called stating having UTI symptoms lower abdominal.  She has not had this before the same type of pain.  She would like antibiotic.  She states she recently had COVID and does not want to go out too much if she does not have to.  I told her I will give her Cipro 5 mg twice a day for 7 days risk and benefit discussed.  If it is not better she needs to see her doctor and have a urinalysis done and urine culture.  Patient agrees with treatment plan.  Also told if symptoms worsen to seek medical attention immediately through the ER.  Patient agrees.

## 2024-08-27 DIAGNOSIS — F41.1 GENERALIZED ANXIETY DISORDER: ICD-10-CM

## 2024-08-27 RX ORDER — SERTRALINE HYDROCHLORIDE 100 MG/1
200 TABLET, FILM COATED ORAL DAILY
Qty: 180 TABLET | Refills: 1 | OUTPATIENT
Start: 2024-08-27

## 2024-09-08 ENCOUNTER — TELEPHONE (OUTPATIENT)
Dept: FAMILY MEDICINE CLINIC | Facility: CLINIC | Age: 84
End: 2024-09-08

## 2024-09-08 DIAGNOSIS — M54.50 CHRONIC BILATERAL LOW BACK PAIN WITHOUT SCIATICA: ICD-10-CM

## 2024-09-08 DIAGNOSIS — G89.29 CHRONIC BILATERAL LOW BACK PAIN WITHOUT SCIATICA: ICD-10-CM

## 2024-09-08 DIAGNOSIS — M25.552 CHRONIC HIP PAIN, LEFT: ICD-10-CM

## 2024-09-08 DIAGNOSIS — G89.29 CHRONIC HIP PAIN, LEFT: ICD-10-CM

## 2024-09-08 RX ORDER — ACETAMINOPHEN AND CODEINE PHOSPHATE 300; 30 MG/1; MG/1
0.5 TABLET ORAL 2 TIMES DAILY PRN
Qty: 30 TABLET | Refills: 0 | Status: SHIPPED | OUTPATIENT
Start: 2024-09-08 | End: 2024-10-08

## 2024-09-08 NOTE — TELEPHONE ENCOUNTER
Patient called and stated she had a tooth pulled yesterday. Her dentist told her to call her PCP for pain medication. She has run out of her Tylenol with codeine. Taking plain Tylenol since the extraction has not helped. She is asking for a refill. I refilled her Tylenol with codeine 0.5 tablet bid prn pain. Patient reminded not to exceed this usage.

## 2024-09-11 ENCOUNTER — PATIENT OUTREACH (OUTPATIENT)
Dept: CASE MANAGEMENT | Age: 84
End: 2024-09-11

## 2024-09-11 NOTE — PROGRESS NOTES
Called patient and left a message for patient to call back when they can. Reviewed patient chart. Left contact my contact number [phone]        Time Spent This Encounter Total: 5  min medical record review  Monthly Minute Total including today: 5 minutes

## 2024-09-24 ENCOUNTER — TELEPHONE (OUTPATIENT)
Dept: FAMILY MEDICINE CLINIC | Facility: CLINIC | Age: 84
End: 2024-09-24

## 2024-09-24 ENCOUNTER — APPOINTMENT (OUTPATIENT)
Dept: FAMILY MEDICINE CLINIC | Facility: CLINIC | Age: 84
End: 2024-09-24
Payer: MEDICARE

## 2024-09-24 DIAGNOSIS — Z86.19 PERSONAL HISTORY OF UNSPECIFIED INFECTIOUS AND PARASITIC DISEASE: ICD-10-CM

## 2024-09-24 DIAGNOSIS — M81.0 AGE-RELATED OSTEOPOROSIS WITHOUT CURRENT PATHOLOGICAL FRACTURE: ICD-10-CM

## 2024-09-24 DIAGNOSIS — M54.50 CHRONIC BILATERAL LOW BACK PAIN WITHOUT SCIATICA: ICD-10-CM

## 2024-09-24 DIAGNOSIS — G89.29 CHRONIC HIP PAIN, LEFT: ICD-10-CM

## 2024-09-24 DIAGNOSIS — Z86.19 HISTORY OF CLOSTRIDIOIDES DIFFICILE INFECTION: ICD-10-CM

## 2024-09-24 DIAGNOSIS — E87.6 HYPOKALEMIA: ICD-10-CM

## 2024-09-24 DIAGNOSIS — M25.552 CHRONIC HIP PAIN, LEFT: ICD-10-CM

## 2024-09-24 DIAGNOSIS — R39.9 UTI SYMPTOMS: ICD-10-CM

## 2024-09-24 DIAGNOSIS — G89.29 CHRONIC BILATERAL LOW BACK PAIN WITHOUT SCIATICA: ICD-10-CM

## 2024-09-24 DIAGNOSIS — R39.9 UTI SYMPTOMS: Primary | ICD-10-CM

## 2024-09-24 LAB
APPEARANCE: CLEAR
BILIRUBIN: NEGATIVE
GLUCOSE (URINE DIPSTICK): NEGATIVE MG/DL
KETONES (URINE DIPSTICK): NEGATIVE MG/DL
MULTISTIX LOT#: ABNORMAL NUMERIC
NITRITE, URINE: NEGATIVE
PH, URINE: 6 (ref 4.5–8)
PROTEIN (URINE DIPSTICK): NEGATIVE MG/DL
SPECIFIC GRAVITY: 1.01 (ref 1–1.03)
URINE-COLOR: CLEAR
UROBILINOGEN,SEMI-QN: 0.2 MG/DL (ref 0–1.9)

## 2024-09-24 PROCEDURE — 87077 CULTURE AEROBIC IDENTIFY: CPT | Performed by: FAMILY MEDICINE

## 2024-09-24 PROCEDURE — 87086 URINE CULTURE/COLONY COUNT: CPT | Performed by: FAMILY MEDICINE

## 2024-09-24 PROCEDURE — 81003 URINALYSIS AUTO W/O SCOPE: CPT | Performed by: FAMILY MEDICINE

## 2024-09-24 RX ORDER — ACETAMINOPHEN AND CODEINE PHOSPHATE 300; 30 MG/1; MG/1
TABLET ORAL 2 TIMES DAILY PRN
Qty: 30 TABLET | Refills: 0 | Status: SHIPPED | OUTPATIENT
Start: 2024-09-24 | End: 2024-10-24

## 2024-09-24 RX ORDER — NYSTATIN 100000 U/G
CREAM TOPICAL
Qty: 30 G | Refills: 0 | Status: SHIPPED | OUTPATIENT
Start: 2024-09-24

## 2024-09-24 RX ORDER — TRAMADOL HYDROCHLORIDE 100 MG/1
TABLET, COATED ORAL
COMMUNITY
Start: 2024-09-17

## 2024-09-24 RX ORDER — CLINDAMYCIN HCL 300 MG
300 CAPSULE ORAL 3 TIMES DAILY
COMMUNITY
Start: 2024-09-04

## 2024-09-24 RX ORDER — ACETAMINOPHEN AND CODEINE PHOSPHATE 300; 15 MG/1; MG/1
1 TABLET ORAL EVERY 8 HOURS PRN
COMMUNITY
Start: 2024-09-04

## 2024-09-24 NOTE — TELEPHONE ENCOUNTER
Donna Umaña  LOV: 8/10/2024       Calling for  New condition   Patient experiencing: burning on stomach , pain in lower stomach when going to restroom  Duration/Onset of symptom: yesterday   Appointment Offered no appts available

## 2024-09-24 NOTE — TELEPHONE ENCOUNTER
Spoke with patient refusing immediate care/wic . Patient will come to office for urine sample. Orders placed.

## 2024-09-24 NOTE — ADDENDUM NOTE
Addended by: PARKER KING on: 9/24/2024 03:47 PM     Modules accepted: Orders     hair removal not indicated

## 2024-09-24 NOTE — TELEPHONE ENCOUNTER
Called patient to let her know she was positive for UTI and alpesh sent over medication to her pharmacy. Also, informed patient Alpesh refilled her pain medication and needs to be taking as direct. Told patient if her symptoms be worse she needs to follow up with immediate care or emergency room. Patient verbally understood plan of care. Patient requested nystatin cream also be sent to the pharmacy if Alpesh was ok with that.

## 2024-09-24 NOTE — TELEPHONE ENCOUNTER
Early refill codeine allowed and filled.   Keflex 500 mg bid for 5 days sent to pharmacy moderate WBC in UA awaiting culture results.  Take probiotic daily and heating pad to affected area  To ED or urgent care center if sxs get worse

## 2024-09-24 NOTE — ADDENDUM NOTE
Addended by: MALU MUHAMMAD on: 9/24/2024 12:57 PM     Modules accepted: Orders, Level of Service

## 2024-09-24 NOTE — TELEPHONE ENCOUNTER
Called patient to let her know she was positive for UTI and alpesh sent over medication to her pharmacy. Also, informed patient Alpesh refilled her pain medication and needs to be taking as direct. Told patient if her symptoms be worse she needs to follow up with immediate care or emergency room. Patient verbally understood plan of care.

## 2024-09-24 NOTE — TELEPHONE ENCOUNTER
9/24/24  1:39 PM    Called patient to let her know she was positive for UTI and alpesh sent over medication to her pharmacy. Also, informed patient Alpseh refilled her pain medication and needs to be taking as direct. Told patient if her symptoms be worse she needs to follow up with immediate care or emergency room. Patient verbally understood plan of care. Patient requested nystatin cream also be sent to the pharmacy if Alpesh was ok with debbie

## 2024-09-24 NOTE — PROGRESS NOTES
Moderate white blood cells sent Keflex antibiotic take twice daily for 5 days still awaiting culture results.

## 2024-09-24 NOTE — TELEPHONE ENCOUNTER
Donna was in this morning. She left her urine sample. She wants to know her results. She said she is in pain. I told her that Kristan Nunez PA-C is going over the results and Anaya will call her back soon.

## 2024-09-24 NOTE — TELEPHONE ENCOUNTER
Spoke with patient. Patient is having lower abdominal pain. Denies pain upon urination, fever, blood in urine. Advised patient to go to immediate care or walk in clinic. Patient requesting medication.

## 2024-09-27 NOTE — PROGRESS NOTES
You can take the antibiotic the  bacteria that was present is normal genitalis bacteria and not necessarily considered an infection.  Follow-up in the office or go to immediate care center if symptoms persist or worsen.

## 2024-10-02 ENCOUNTER — TELEPHONE (OUTPATIENT)
Dept: FAMILY MEDICINE CLINIC | Facility: CLINIC | Age: 84
End: 2024-10-02

## 2024-10-02 DIAGNOSIS — R39.9 UTI SYMPTOMS: Primary | ICD-10-CM

## 2024-10-02 DIAGNOSIS — R10.9 ABDOMINAL DISCOMFORT: ICD-10-CM

## 2024-10-02 NOTE — TELEPHONE ENCOUNTER
Called patient to see  if she was coming in the same issue we added on for last week. Patient stated she feels like the UTI isn't clearing up and would like a stronger antibody spoke with Kristan Nunez to see if patient could just bring in sample for urine dip and culture tomorrow patient was fine with doing that and she will be placed on the nurse schedule for tomorrow and orders will be placed in her chart. She will also be removed from next weeks schedule if ok with Kristan Nunez after urine culture results.

## 2024-10-03 ENCOUNTER — NURSE ONLY (OUTPATIENT)
Dept: FAMILY MEDICINE CLINIC | Facility: CLINIC | Age: 84
End: 2024-10-03
Payer: MEDICARE

## 2024-10-03 DIAGNOSIS — R10.9 ABDOMINAL DISCOMFORT: Primary | ICD-10-CM

## 2024-10-03 DIAGNOSIS — R39.9 UTI SYMPTOMS: ICD-10-CM

## 2024-10-03 DIAGNOSIS — Z86.19 HISTORY OF CLOSTRIDIOIDES DIFFICILE INFECTION: ICD-10-CM

## 2024-10-03 LAB
BILIRUB UR QL STRIP.AUTO: NEGATIVE
BILIRUBIN: NEGATIVE
CLARITY UR REFRACT.AUTO: CLEAR
GLUCOSE (URINE DIPSTICK): NEGATIVE MG/DL
GLUCOSE UR STRIP.AUTO-MCNC: NORMAL MG/DL
KETONES (URINE DIPSTICK): NEGATIVE MG/DL
KETONES UR STRIP.AUTO-MCNC: NEGATIVE MG/DL
LEUKOCYTE ESTERASE UR QL STRIP.AUTO: 250
MULTISTIX LOT#: ABNORMAL NUMERIC
NITRITE UR QL STRIP.AUTO: NEGATIVE
NITRITE, URINE: NEGATIVE
OCCULT BLOOD: NEGATIVE
PH UR STRIP.AUTO: 5.5 [PH] (ref 5–8)
PH, URINE: 6 (ref 4.5–8)
PROT UR STRIP.AUTO-MCNC: 30 MG/DL
PROTEIN (URINE DIPSTICK): 30 MG/DL
RBC UR QL AUTO: NEGATIVE
SP GR UR STRIP.AUTO: 1.02 (ref 1–1.03)
SPECIFIC GRAVITY: 1.02 (ref 1–1.03)
URINE-COLOR: YELLOW
UROBILINOGEN UR STRIP.AUTO-MCNC: NORMAL MG/DL
UROBILINOGEN,SEMI-QN: 0.2 MG/DL (ref 0–1.9)

## 2024-10-03 PROCEDURE — 87077 CULTURE AEROBIC IDENTIFY: CPT | Performed by: FAMILY MEDICINE

## 2024-10-03 PROCEDURE — 87186 SC STD MICRODIL/AGAR DIL: CPT | Performed by: FAMILY MEDICINE

## 2024-10-03 PROCEDURE — 81001 URINALYSIS AUTO W/SCOPE: CPT | Performed by: FAMILY MEDICINE

## 2024-10-03 PROCEDURE — 81003 URINALYSIS AUTO W/O SCOPE: CPT | Performed by: FAMILY MEDICINE

## 2024-10-03 PROCEDURE — 87086 URINE CULTURE/COLONY COUNT: CPT | Performed by: FAMILY MEDICINE

## 2024-10-04 NOTE — PROGRESS NOTES
The urinalysis that was sent out does show the white blood cells microscopically culture should be available today or tomorrow.

## 2024-10-06 RX ORDER — NITROFURANTOIN 25; 75 MG/1; MG/1
100 CAPSULE ORAL 2 TIMES DAILY
Qty: 10 CAPSULE | Refills: 0 | Status: SHIPPED | OUTPATIENT
Start: 2024-10-06 | End: 2024-10-11

## 2024-10-06 NOTE — PROGRESS NOTES
Sending over antibiotic Macrobid 100 mg for 5 days to pharmacy growth of Enterococcus faecalis on urine culture.

## 2024-10-08 ENCOUNTER — OFFICE VISIT (OUTPATIENT)
Dept: FAMILY MEDICINE CLINIC | Facility: CLINIC | Age: 84
End: 2024-10-08
Payer: MEDICARE

## 2024-10-08 VITALS
RESPIRATION RATE: 16 BRPM | OXYGEN SATURATION: 98 % | WEIGHT: 145 LBS | TEMPERATURE: 99 F | HEIGHT: 62 IN | HEART RATE: 70 BPM | DIASTOLIC BLOOD PRESSURE: 80 MMHG | BODY MASS INDEX: 26.68 KG/M2 | SYSTOLIC BLOOD PRESSURE: 142 MMHG

## 2024-10-08 DIAGNOSIS — M25.552 CHRONIC HIP PAIN, LEFT: ICD-10-CM

## 2024-10-08 DIAGNOSIS — Z79.899 MEDICATION MANAGEMENT: ICD-10-CM

## 2024-10-08 DIAGNOSIS — M79.7 FIBROMYALGIA: ICD-10-CM

## 2024-10-08 DIAGNOSIS — N18.30 CHRONIC RENAL INSUFFICIENCY, STAGE 3 (MODERATE) (HCC): Primary | ICD-10-CM

## 2024-10-08 DIAGNOSIS — F51.04 PSYCHOPHYSIOLOGICAL INSOMNIA: ICD-10-CM

## 2024-10-08 DIAGNOSIS — R52 PAIN MANAGEMENT: ICD-10-CM

## 2024-10-08 DIAGNOSIS — M54.50 CHRONIC BILATERAL LOW BACK PAIN WITHOUT SCIATICA: ICD-10-CM

## 2024-10-08 DIAGNOSIS — F41.1 GENERALIZED ANXIETY DISORDER: Chronic | ICD-10-CM

## 2024-10-08 DIAGNOSIS — R10.30 PAIN RADIATING TO LOWER ABDOMEN: ICD-10-CM

## 2024-10-08 DIAGNOSIS — G89.29 CHRONIC HIP PAIN, LEFT: ICD-10-CM

## 2024-10-08 DIAGNOSIS — Z86.39 HISTORY OF IRON DEFICIENCY: ICD-10-CM

## 2024-10-08 DIAGNOSIS — R53.82 CHRONIC FATIGUE: ICD-10-CM

## 2024-10-08 DIAGNOSIS — I10 PRIMARY HYPERTENSION: ICD-10-CM

## 2024-10-08 DIAGNOSIS — G89.29 CHRONIC BILATERAL LOW BACK PAIN WITHOUT SCIATICA: ICD-10-CM

## 2024-10-08 DIAGNOSIS — G89.29 CHRONIC GROIN PAIN, LEFT: ICD-10-CM

## 2024-10-08 DIAGNOSIS — R10.32 CHRONIC GROIN PAIN, LEFT: ICD-10-CM

## 2024-10-08 PROBLEM — R10.9 CHRONIC ABDOMINAL PAIN: Status: RESOLVED | Noted: 2021-11-07 | Resolved: 2024-10-08

## 2024-10-08 PROBLEM — K59.04 CHRONIC IDIOPATHIC CONSTIPATION: Status: RESOLVED | Noted: 2021-10-09 | Resolved: 2024-10-08

## 2024-10-08 PROBLEM — K29.30 CHRONIC SUPERFICIAL GASTRITIS WITHOUT BLEEDING: Status: RESOLVED | Noted: 2023-05-11 | Resolved: 2024-10-08

## 2024-10-08 PROCEDURE — 99499 UNLISTED E&M SERVICE: CPT | Performed by: FAMILY MEDICINE

## 2024-10-08 PROCEDURE — 99215 OFFICE O/P EST HI 40 MIN: CPT | Performed by: FAMILY MEDICINE

## 2024-10-08 PROCEDURE — G2211 COMPLEX E/M VISIT ADD ON: HCPCS | Performed by: FAMILY MEDICINE

## 2024-10-08 RX ORDER — ONDANSETRON 4 MG/1
TABLET, FILM COATED ORAL EVERY 8 HOURS PRN
Qty: 20 TABLET | Refills: 2 | Status: SHIPPED | OUTPATIENT
Start: 2024-10-08

## 2024-10-08 RX ORDER — DULOXETIN HYDROCHLORIDE 20 MG/1
20 CAPSULE, DELAYED RELEASE ORAL DAILY
Qty: 90 CAPSULE | Refills: 0 | Status: SHIPPED | OUTPATIENT
Start: 2024-10-08

## 2024-10-08 RX ORDER — ACETAMINOPHEN AND CODEINE PHOSPHATE 300; 30 MG/1; MG/1
TABLET ORAL 2 TIMES DAILY PRN
Qty: 10 TABLET | Refills: 0 | Status: SHIPPED | OUTPATIENT
Start: 2024-10-08 | End: 2024-11-07

## 2024-10-08 RX ORDER — TIZANIDINE HYDROCHLORIDE 2 MG/1
2 CAPSULE, GELATIN COATED ORAL 3 TIMES DAILY PRN
Qty: 20 CAPSULE | Refills: 0 | Status: SHIPPED | OUTPATIENT
Start: 2024-10-08
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
## 2024-10-09 ENCOUNTER — PATIENT OUTREACH (OUTPATIENT)
Dept: CASE MANAGEMENT | Age: 84
End: 2024-10-09

## 2024-10-09 DIAGNOSIS — I10 ESSENTIAL HYPERTENSION: ICD-10-CM

## 2024-10-09 DIAGNOSIS — F41.1 GENERALIZED ANXIETY DISORDER: ICD-10-CM

## 2024-10-09 DIAGNOSIS — F51.04 PSYCHOPHYSIOLOGICAL INSOMNIA: ICD-10-CM

## 2024-10-09 RX ORDER — AMLODIPINE BESYLATE 10 MG/1
TABLET ORAL
Qty: 90 TABLET | Refills: 1 | Status: SHIPPED | OUTPATIENT
Start: 2024-10-09

## 2024-10-09 RX ORDER — QUETIAPINE FUMARATE 25 MG/1
TABLET, FILM COATED ORAL
Qty: 450 TABLET | Refills: 0 | Status: SHIPPED | OUTPATIENT
Start: 2024-10-09

## 2024-10-09 NOTE — PROGRESS NOTES
Called patient and left a message for patient to call back when they can. Reviewed patient chart. Left contact my contact number [phone]        Time Spent This Encounter Total: 5  min medical record review  Monthly Minute Total including today: 5 minutes

## 2024-10-14 DIAGNOSIS — G89.29 CHRONIC BILATERAL LOW BACK PAIN WITHOUT SCIATICA: ICD-10-CM

## 2024-10-14 DIAGNOSIS — G89.29 CHRONIC HIP PAIN, LEFT: ICD-10-CM

## 2024-10-14 DIAGNOSIS — M25.552 CHRONIC HIP PAIN, LEFT: ICD-10-CM

## 2024-10-14 DIAGNOSIS — M54.50 CHRONIC BILATERAL LOW BACK PAIN WITHOUT SCIATICA: ICD-10-CM

## 2024-10-14 NOTE — TELEPHONE ENCOUNTER
Patient was seen at [facility] pain clinic. Patient stated that he didn't know what to do with her. Per office note, \" I do not have anything additional that I can contribute that hasn't already been done.\"    Please review and advise.

## 2024-10-14 NOTE — TELEPHONE ENCOUNTER
Donna ERIC Umaña requesting medication refill for acetaminophen-codeine 300-30 MG Oral Tab   She is having pain where her ovaries were.    LOV: 10/8/2024   Prescribed By: Kristan Nunez PA-C   Pharmacy Location: [facility] on file     Next Appointment: 12/10/2024 Kristan Nunez PA-C      Informed patient to allow up to 24 to 48 business hours before checking with Pharmacy.

## 2024-10-14 NOTE — TELEPHONE ENCOUNTER
Requested Prescriptions     Pending Prescriptions Disp Refills    acetaminophen-codeine 300-30 MG Oral Tab 10 tablet 0     Sig: Take 0.5-1 tablets by mouth 2 (two) times daily as needed for Pain.       Last Refill: 10/8    Last OV: 10/8    Next OV: 12/10  Please review and advise

## 2024-10-14 NOTE — TELEPHONE ENCOUNTER
She was referred to pain clinic [facility] for pain management did she go?  I would like pain clinic to manage pain medication due to compliance issues.

## 2024-10-15 RX ORDER — ACETAMINOPHEN AND CODEINE PHOSPHATE 300; 30 MG/1; MG/1
TABLET ORAL 2 TIMES DAILY PRN
Qty: 10 TABLET | Refills: 0 | Status: SHIPPED | OUTPATIENT
Start: 2024-10-15 | End: 2024-11-14

## 2024-10-28 DIAGNOSIS — E78.2 MIXED HYPERLIPIDEMIA: ICD-10-CM

## 2024-10-28 RX ORDER — PRAVASTATIN SODIUM 40 MG
40 TABLET ORAL NIGHTLY
Qty: 90 TABLET | Refills: 0 | Status: SHIPPED | OUTPATIENT
Start: 2024-10-28

## 2024-11-04 ENCOUNTER — TELEPHONE (OUTPATIENT)
Dept: FAMILY MEDICINE CLINIC | Facility: CLINIC | Age: 84
End: 2024-11-04

## 2024-11-04 NOTE — TELEPHONE ENCOUNTER
Spoke with patient. Reminded her that Kristan Nunez PA-C will no longer be prescribing pain medication and to please see pain management or consider some of their options. Patient voiced understanding and agreeable.

## 2024-11-04 NOTE — TELEPHONE ENCOUNTER
Donna is having pain in her ovary area (lower stomach area). She said she does not have ovaries. She is requesting pain medication from Kristan Nunez PA-C. She is asking for a few more of acetaminophen-codeine 300-30 MG Oral Tab.

## 2024-11-11 ENCOUNTER — PATIENT OUTREACH (OUTPATIENT)
Dept: CASE MANAGEMENT | Age: 84
End: 2024-11-11

## 2024-11-11 DIAGNOSIS — I10 ESSENTIAL HYPERTENSION: ICD-10-CM

## 2024-11-11 RX ORDER — LOSARTAN POTASSIUM 100 MG/1
100 TABLET ORAL DAILY
Qty: 90 TABLET | Refills: 0 | Status: SHIPPED | OUTPATIENT
Start: 2024-11-11

## 2024-11-11 NOTE — PROGRESS NOTES
Called patient and left a message for patient to call back when they can. Reviewed patient chart. Left contact my contact number [phone]        Time Spent This Encounter Total: 5  min medical record review  Monthly Minute Total including today: 5 minutes    We encourage you to review your current Medicare coverage and explore your options during this period. We have developed a Medicare Information Page on our website to serve as a resource for guidance and answers to any questions you might have.   You can access it by visiting, [email or web address]

## 2024-11-14 ENCOUNTER — TELEPHONE (OUTPATIENT)
Dept: FAMILY MEDICINE CLINIC | Facility: CLINIC | Age: 84
End: 2024-11-14

## 2024-11-14 NOTE — TELEPHONE ENCOUNTER
Donna Umaña  LOV: 10/8/2024       Calling for Existing condition   Patient experiencing: lower abdominal pain   Appointment: 12/10/2024      [id number] - [place], [place] - [address] AT [facility] [address], [phone], [phone]

## 2024-11-14 NOTE — TELEPHONE ENCOUNTER
Spoke with Donna. Discussed that Kristan Nunez PA-C will no longer be prescribing her pain medication. Reminded patient of our last conversation. Advised her to call Dr Reynolds as recommended by Dr. Chavez. Patient voiced understanding and agreeable.

## 2024-11-15 ENCOUNTER — TELEPHONE (OUTPATIENT)
Dept: FAMILY MEDICINE CLINIC | Facility: CLINIC | Age: 84
End: 2024-11-15

## 2024-11-15 ENCOUNTER — HOSPITAL ENCOUNTER (OUTPATIENT)
Age: 84
Discharge: HOME | End: 2024-11-15
Payer: MEDICARE

## 2024-11-15 ENCOUNTER — APPOINTMENT (OUTPATIENT)
Dept: CT IMAGING | Age: 84
End: 2024-11-15
Attending: NURSE PRACTITIONER
Payer: MEDICARE

## 2024-11-15 VITALS
DIASTOLIC BLOOD PRESSURE: 88 MMHG | WEIGHT: 135 LBS | BODY MASS INDEX: 23.62 KG/M2 | SYSTOLIC BLOOD PRESSURE: 183 MMHG | TEMPERATURE: 98 F | RESPIRATION RATE: 20 BRPM | HEIGHT: 63.5 IN | HEART RATE: 96 BPM | OXYGEN SATURATION: 96 %

## 2024-11-15 DIAGNOSIS — N30.01 ACUTE CYSTITIS WITH HEMATURIA: Primary | ICD-10-CM

## 2024-11-15 LAB
BILIRUB UR QL STRIP: NEGATIVE
COLOR UR: YELLOW
GLUCOSE UR STRIP-MCNC: NEGATIVE MG/DL
KETONES UR STRIP-MCNC: NEGATIVE MG/DL
NITRITE UR QL STRIP: POSITIVE
PH UR STRIP: 6 [PH]
PROT UR STRIP-MCNC: 30 MG/DL
SP GR UR STRIP: 1.01
UROBILINOGEN UR STRIP-ACNC: <2 MG/DL

## 2024-11-15 PROCEDURE — 87086 URINE CULTURE/COLONY COUNT: CPT | Performed by: NURSE PRACTITIONER

## 2024-11-15 PROCEDURE — 99214 OFFICE O/P EST MOD 30 MIN: CPT

## 2024-11-15 PROCEDURE — 81002 URINALYSIS NONAUTO W/O SCOPE: CPT

## 2024-11-15 PROCEDURE — 87186 SC STD MICRODIL/AGAR DIL: CPT | Performed by: NURSE PRACTITIONER

## 2024-11-15 PROCEDURE — 87077 CULTURE AEROBIC IDENTIFY: CPT | Performed by: NURSE PRACTITIONER

## 2024-11-15 PROCEDURE — 74176 CT ABD & PELVIS W/O CONTRAST: CPT | Performed by: NURSE PRACTITIONER

## 2024-11-15 RX ORDER — CEPHALEXIN 500 MG/1
500 CAPSULE ORAL 2 TIMES DAILY
Qty: 14 CAPSULE | Refills: 0 | Status: SHIPPED | OUTPATIENT
Start: 2024-11-15 | End: 2024-11-22

## 2024-11-15 NOTE — ED INITIAL ASSESSMENT (HPI)
Patient reports a lower abdominal pain and lower back pain since last night.  Patient reports symptoms since last night.

## 2024-11-15 NOTE — TELEPHONE ENCOUNTER
Donna Umaña requesting medication refill for nystatin pills.    LOV: 10/8/2024   Prescribed By: Kristan Nunez PA-C   Pharmacy Location: [facility][id number] [address] [facility] [address], [phone], [phone] [[id number]]     Next Appointment: 12/10/2024 Kristan Nunez PA-C    Patient was prescribed antibiotic at , patient state she gets very itchy with antibiotics and is requesting Nystatin pills. She has cream would like pill form.     Informed patient to allow up to 24 to 48 business hours before checking with Pharmacy.

## 2024-11-15 NOTE — TELEPHONE ENCOUNTER
Donna Umaña  LOV: 10/8/2024       Calling for  New  condition  Patient experiencing: woke up in the middle of the night, has a horrible bladder infection.   Duration/Onset of symptom: last night   Patient requesting medication to help  [id number] - [place], [place] - [address] AT [facility] [address], [phone], [phone] [[id number]]
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
## 2024-11-15 NOTE — TELEPHONE ENCOUNTER
Spoke with patient. Patient states that she was up all night with pain. No pain when urinating. Describes her pain as down by her ovaries. Informed patient that Kristan Nunez PA-C is out of the office today. Advised patient to go to [facility] or immediate care to be further evaluated. Patient voiced understanding.

## 2024-11-18 RX ORDER — NITROFURANTOIN 25; 75 MG/1; MG/1
100 CAPSULE ORAL 2 TIMES DAILY
Qty: 14 CAPSULE | Refills: 0 | Status: SHIPPED | OUTPATIENT
Start: 2024-11-18 | End: 2024-11-25

## 2024-11-18 NOTE — TELEPHONE ENCOUNTER
Spoke with patient. Stated patient is itchy all over while she is on this antibiotic. Patient is going to try Benadryl cream or hydrocortisone cream to see if that helps.

## 2024-11-20 NOTE — ED NOTES
Called pt and notified of message to stop keflex and to start macrobid.   Pt verbalized understanding.

## 2024-11-27 NOTE — ED NOTES
Rechecked pt states she started shaking again. IVF infusing well.  Spouse at bedside  Parvez VILLA in room No

## 2024-12-10 ENCOUNTER — OFFICE VISIT (OUTPATIENT)
Dept: FAMILY MEDICINE CLINIC | Facility: CLINIC | Age: 84
End: 2024-12-10
Payer: MEDICARE

## 2024-12-10 VITALS
OXYGEN SATURATION: 97 % | RESPIRATION RATE: 16 BRPM | HEIGHT: 63.5 IN | DIASTOLIC BLOOD PRESSURE: 80 MMHG | TEMPERATURE: 99 F | HEART RATE: 98 BPM | BODY MASS INDEX: 25.37 KG/M2 | WEIGHT: 145 LBS | SYSTOLIC BLOOD PRESSURE: 160 MMHG

## 2024-12-10 DIAGNOSIS — F41.1 GAD (GENERALIZED ANXIETY DISORDER): ICD-10-CM

## 2024-12-10 DIAGNOSIS — G89.29 CHRONIC PELVIC PAIN IN FEMALE: ICD-10-CM

## 2024-12-10 DIAGNOSIS — G89.4 CHRONIC PAIN SYNDROME: ICD-10-CM

## 2024-12-10 DIAGNOSIS — R10.2 SUPRAPUBIC PAIN: Primary | ICD-10-CM

## 2024-12-10 DIAGNOSIS — R10.2 CHRONIC PELVIC PAIN IN FEMALE: ICD-10-CM

## 2024-12-10 DIAGNOSIS — Z79.899 MEDICATION MANAGEMENT: ICD-10-CM

## 2024-12-10 DIAGNOSIS — N20.0 NEPHROLITHIASIS: ICD-10-CM

## 2024-12-10 DIAGNOSIS — F51.01 PRIMARY INSOMNIA: ICD-10-CM

## 2024-12-10 DIAGNOSIS — F33.41 DEPRESSION, MAJOR, RECURRENT, IN PARTIAL REMISSION (HCC): ICD-10-CM

## 2024-12-10 DIAGNOSIS — Z28.21 REFUSED INFLUENZA VACCINE: ICD-10-CM

## 2024-12-10 PROCEDURE — G2211 COMPLEX E/M VISIT ADD ON: HCPCS | Performed by: FAMILY MEDICINE

## 2024-12-10 PROCEDURE — 99215 OFFICE O/P EST HI 40 MIN: CPT | Performed by: FAMILY MEDICINE

## 2024-12-10 RX ORDER — AMITRIPTYLINE HYDROCHLORIDE 10 MG/1
10 TABLET ORAL NIGHTLY
Qty: 90 TABLET | Refills: 0 | Status: SHIPPED | OUTPATIENT
Start: 2024-12-10
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
## 2024-12-12 ENCOUNTER — PATIENT OUTREACH (OUTPATIENT)
Dept: CASE MANAGEMENT | Age: 84
End: 2024-12-12

## 2024-12-12 NOTE — PROGRESS NOTES
Called patient and left a message for patient to call back when they can. Reviewed patient chart. Left contact my contact number [phone]        Time Spent This Encounter Total: 5  min medical record review  Monthly Minute Total including today: 5 minutes

## 2024-12-16 ENCOUNTER — TELEPHONE (OUTPATIENT)
Dept: FAMILY MEDICINE CLINIC | Facility: CLINIC | Age: 84
End: 2024-12-16

## 2024-12-16 ENCOUNTER — HOSPITAL ENCOUNTER (OUTPATIENT)
Age: 84
Discharge: HOME | End: 2024-12-16
Payer: MEDICARE

## 2024-12-16 VITALS
SYSTOLIC BLOOD PRESSURE: 194 MMHG | OXYGEN SATURATION: 97 % | TEMPERATURE: 98 F | WEIGHT: 135 LBS | HEIGHT: 65 IN | BODY MASS INDEX: 22.49 KG/M2 | HEART RATE: 71 BPM | DIASTOLIC BLOOD PRESSURE: 70 MMHG | RESPIRATION RATE: 18 BRPM

## 2024-12-16 DIAGNOSIS — N30.00 ACUTE CYSTITIS WITHOUT HEMATURIA: Primary | ICD-10-CM

## 2024-12-16 LAB
BILIRUB UR QL STRIP: NEGATIVE
CLARITY UR: CLEAR
COLOR UR: YELLOW
GLUCOSE UR STRIP-MCNC: NEGATIVE MG/DL
HGB UR QL STRIP: NEGATIVE
KETONES UR STRIP-MCNC: NEGATIVE MG/DL
NITRITE UR QL STRIP: POSITIVE
PH UR STRIP: 6.5 [PH]
PROT UR STRIP-MCNC: 30 MG/DL
SP GR UR STRIP: 1.02
UROBILINOGEN UR STRIP-ACNC: <2 MG/DL

## 2024-12-16 PROCEDURE — 87086 URINE CULTURE/COLONY COUNT: CPT | Performed by: EMERGENCY MEDICINE

## 2024-12-16 PROCEDURE — 87186 SC STD MICRODIL/AGAR DIL: CPT | Performed by: EMERGENCY MEDICINE

## 2024-12-16 PROCEDURE — 87077 CULTURE AEROBIC IDENTIFY: CPT | Performed by: EMERGENCY MEDICINE

## 2024-12-16 PROCEDURE — 99214 OFFICE O/P EST MOD 30 MIN: CPT

## 2024-12-16 PROCEDURE — 81002 URINALYSIS NONAUTO W/O SCOPE: CPT

## 2024-12-16 RX ORDER — PHENAZOPYRIDINE HYDROCHLORIDE 100 MG/1
100 TABLET, FILM COATED ORAL 3 TIMES DAILY PRN
Qty: 6 TABLET | Refills: 0 | Status: SHIPPED | OUTPATIENT
Start: 2024-12-16 | End: 2024-12-19

## 2024-12-16 RX ORDER — NITROFURANTOIN 25; 75 MG/1; MG/1
100 CAPSULE ORAL 2 TIMES DAILY
Qty: 10 CAPSULE | Refills: 0 | Status: SHIPPED | OUTPATIENT
Start: 2024-12-16 | End: 2024-12-21
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
## 2024-12-16 NOTE — ED INITIAL ASSESSMENT (HPI)
Patient c/o recurring urinary symptoms dysuria and suprapubic pain , seen at [facility] 11/15 , f/u with PMD and has appointment with urologist 11/6/2025

## 2024-12-16 NOTE — TELEPHONE ENCOUNTER
Called and left a message for patient to return call.     PSR please notify triage w/ return call from patient.

## 2024-12-16 NOTE — TELEPHONE ENCOUNTER
Spoke with patient. States she has one UTI. Stated no burning when urinating, however, she says she has burning prior to using the bathroom and when she urinates and wipes there is no pain. Denies odor, fever. Patient states that her pain is in her in her ovaries as well and she wanted something for pain. Discussed with patient that Kristan Nunez PA-C will no longer be prescribing pain medication and she referred her to urology. Informed patient that if she has a UTI an antibiotic will be needed, not pain medication. Advised patient to walk in clinic to be evaluated for the UTI. Patient voiced understanding.

## 2024-12-16 NOTE — TELEPHONE ENCOUNTER
Donna Umaña  LOV: 12/10/2024       Calling for NEW New or Existing condition (choose one)  Patient experiencing: Possible UTI - pain/stomach swollen (list symptoms/concerns)  Duration/Onset of symptom: overnight   Appointment Offered:

## 2024-12-18 NOTE — PROGRESS NOTES
Preliminary urine culture results-  Antibiotic Rx-Macrobid,  Other medications- Pyridium  Waiting for final and sensitivity.    Please review results

## 2024-12-28 ENCOUNTER — HOSPITAL ENCOUNTER (EMERGENCY)
Age: 84
Discharge: HOME | End: 2024-12-28
Payer: MEDICARE

## 2024-12-28 VITALS
RESPIRATION RATE: 16 BRPM | WEIGHT: 140 LBS | HEART RATE: 90 BPM | DIASTOLIC BLOOD PRESSURE: 95 MMHG | SYSTOLIC BLOOD PRESSURE: 190 MMHG | OXYGEN SATURATION: 94 % | BODY MASS INDEX: 23 KG/M2 | TEMPERATURE: 98 F

## 2024-12-28 DIAGNOSIS — N30.01 ACUTE CYSTITIS WITH HEMATURIA: Primary | ICD-10-CM

## 2024-12-28 LAB
BILIRUB UR QL STRIP.AUTO: NEGATIVE
CLARITY UR REFRACT.AUTO: CLEAR
COLOR UR AUTO: YELLOW
GLUCOSE UR STRIP.AUTO-MCNC: NEGATIVE MG/DL
KETONES UR STRIP.AUTO-MCNC: NEGATIVE MG/DL
NITRITE UR QL STRIP.AUTO: NEGATIVE
PH UR STRIP.AUTO: 6 [PH] (ref 5–8)
PROT UR STRIP.AUTO-MCNC: NEGATIVE MG/DL
SP GR UR STRIP.AUTO: 1.01 (ref 1–1.03)
UROBILINOGEN UR STRIP.AUTO-MCNC: 0.2 MG/DL

## 2024-12-28 PROCEDURE — 87186 SC STD MICRODIL/AGAR DIL: CPT | Performed by: PHYSICIAN ASSISTANT

## 2024-12-28 PROCEDURE — 99283 EMERGENCY DEPT VISIT LOW MDM: CPT

## 2024-12-28 PROCEDURE — 81015 MICROSCOPIC EXAM OF URINE: CPT | Performed by: PHYSICIAN ASSISTANT

## 2024-12-28 PROCEDURE — 87086 URINE CULTURE/COLONY COUNT: CPT | Performed by: PHYSICIAN ASSISTANT

## 2024-12-28 PROCEDURE — 87077 CULTURE AEROBIC IDENTIFY: CPT | Performed by: PHYSICIAN ASSISTANT

## 2024-12-28 PROCEDURE — 99284 EMERGENCY DEPT VISIT MOD MDM: CPT

## 2024-12-28 PROCEDURE — 81001 URINALYSIS AUTO W/SCOPE: CPT | Performed by: PHYSICIAN ASSISTANT

## 2024-12-28 RX ORDER — NITROFURANTOIN 25; 75 MG/1; MG/1
100 CAPSULE ORAL 2 TIMES DAILY
Qty: 14 CAPSULE | Refills: 0 | Status: SHIPPED | OUTPATIENT
Start: 2024-12-28 | End: 2025-01-09 | Stop reason: ALTCHOICE
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
## 2024-12-28 NOTE — DISCHARGE INSTRUCTIONS
Start taking Macrobid as directed to completion.  Keep appointment with urology for follow-up.  Continue to use Pyridium for the next 2 days as needed and as directed for urinary pain relief.  He can also supplement with Tylenol.  Take your blood pressure medication when you get home.  Return to the ER for any other new or worsening symptoms.

## 2024-12-30 ENCOUNTER — TELEPHONE (OUTPATIENT)
Dept: FAMILY MEDICINE CLINIC | Facility: CLINIC | Age: 84
End: 2024-12-30

## 2024-12-30 NOTE — TELEPHONE ENCOUNTER
Spoke to patient regarding ear pain. States this started yesterday. Symptoms are only in her right ear. No drainage. Reports no fever. Patient is having a little abdominal discomfort. She has tried over the counter medications with no relief (OTC ear drops, pain medications). She did go to [facility] but the wait was over two hours so she left. Requesting an antibiotic

## 2024-12-30 NOTE — TELEPHONE ENCOUNTER
Donna Umaña  LOV: 12/10/2024       Calling for Earache   Patient experiencing: Ear pain    Duration/Onset of symptom: overnight     No appointments available with Kristan Nunez PA-C .     Patient referred to:         [x] [facility]     [facility]- [address], [place] [address]            Phone: [phone]         [x] [facility]- [address] , [place] [address]                Phone: [phone]       [] [facility]- [address] [place] [address]              Phone: [phone]    Routing to nurse for verification/reference.

## 2024-12-30 NOTE — TELEPHONE ENCOUNTER
Patient states she went to [facility], but was not seen very crowded, a wait time of 2 hours. Calling to be prescribed an antibiotic to help with ear pain.     [id number] - [place], [place] - [address] [facility] [address], [phone], [phone] [[id number]]

## 2024-12-30 NOTE — TELEPHONE ENCOUNTER
She cannot have an antibiotic without being seen secondary to her history of C. difficile and the majority of ear infections are just fluid.  I would recommend Flonase nasal spray and Mucinex the 1200 mg guaifenesin which is in the blue box and she would take 1 every 12 hours.

## 2024-12-30 NOTE — TELEPHONE ENCOUNTER
Donna Umaña  LOV: 12/10/2024       Calling for new condition   Patient experiencing: ear pain, stomach is bothering   Duration/Onset of symptom: last night  Appointment Offered: no appointment     Patient referred to Walk-in, aware no appointments available with Kristan Nunez PA-C   Voice understanding will utilize [place]

## 2025-01-02 DIAGNOSIS — E78.2 MIXED HYPERLIPIDEMIA: ICD-10-CM

## 2025-01-02 RX ORDER — PRAVASTATIN SODIUM 40 MG
40 TABLET ORAL NIGHTLY
Qty: 90 TABLET | Refills: 0 | OUTPATIENT
Start: 2025-01-02

## 2025-01-06 ENCOUNTER — TELEPHONE (OUTPATIENT)
Dept: SURGERY | Facility: CLINIC | Age: 85
End: 2025-01-06

## 2025-01-06 ENCOUNTER — HOSPITAL ENCOUNTER (EMERGENCY)
Facility: HOSPITAL | Age: 85
Discharge: STILL ADMITTED OR ADMINISTRATIVE | End: 2025-01-06
Payer: COMMERCIAL

## 2025-01-06 ENCOUNTER — HOSPITAL ENCOUNTER (EMERGENCY)
Age: 85
Discharge: HOME | End: 2025-01-06
Attending: EMERGENCY MEDICINE
Payer: MEDICARE

## 2025-01-06 VITALS
BODY MASS INDEX: 24.8 KG/M2 | DIASTOLIC BLOOD PRESSURE: 83 MMHG | OXYGEN SATURATION: 95 % | WEIGHT: 140 LBS | TEMPERATURE: 98 F | HEIGHT: 63 IN | SYSTOLIC BLOOD PRESSURE: 156 MMHG | RESPIRATION RATE: 18 BRPM | HEART RATE: 88 BPM

## 2025-01-06 DIAGNOSIS — K52.9 GASTROENTERITIS: Primary | ICD-10-CM

## 2025-01-06 LAB
ALBUMIN SERPL-MCNC: 4.9 G/DL (ref 3.2–4.8)
ALBUMIN/GLOB SERPL: 1.5 {RATIO} (ref 1–2)
ALP LIVER SERPL-CCNC: 75 U/L
ALT SERPL-CCNC: 13 U/L
ANION GAP SERPL CALC-SCNC: 8 MMOL/L (ref 0–18)
AST SERPL-CCNC: 15 U/L (ref ?–34)
BASOPHILS # BLD AUTO: 0.03 X10(3) UL (ref 0–0.2)
BASOPHILS NFR BLD AUTO: 0.3 %
BILIRUB SERPL-MCNC: 0.4 MG/DL (ref 0.2–1.1)
BILIRUB UR QL STRIP.AUTO: NEGATIVE
BUN BLD-MCNC: 14 MG/DL (ref 9–23)
CALCIUM BLD-MCNC: 11.6 MG/DL (ref 8.7–10.4)
CHLORIDE SERPL-SCNC: 109 MMOL/L (ref 98–112)
CLARITY UR REFRACT.AUTO: CLEAR
CO2 SERPL-SCNC: 22 MMOL/L (ref 21–32)
COLOR UR AUTO: YELLOW
CREAT BLD-MCNC: 1.19 MG/DL
EGFRCR SERPLBLD CKD-EPI 2021: 46 ML/MIN/1.73M2 (ref 60–?)
EOSINOPHIL # BLD AUTO: 0.01 X10(3) UL (ref 0–0.7)
EOSINOPHIL NFR BLD AUTO: 0.1 %
ERYTHROCYTE [DISTWIDTH] IN BLOOD BY AUTOMATED COUNT: 14.2 %
GLOBULIN PLAS-MCNC: 3.2 G/DL (ref 2–3.5)
GLUCOSE BLD-MCNC: 108 MG/DL (ref 70–99)
GLUCOSE UR STRIP.AUTO-MCNC: NEGATIVE MG/DL
HCT VFR BLD AUTO: 42.1 %
HGB BLD-MCNC: 14 G/DL
IMM GRANULOCYTES # BLD AUTO: 0.02 X10(3) UL (ref 0–1)
IMM GRANULOCYTES NFR BLD: 0.2 %
KETONES UR STRIP.AUTO-MCNC: NEGATIVE MG/DL
LEUKOCYTE ESTERASE UR QL STRIP.AUTO: NEGATIVE
LIPASE SERPL-CCNC: 32 U/L (ref 12–53)
LYMPHOCYTES # BLD AUTO: 3.02 X10(3) UL (ref 1–4)
LYMPHOCYTES NFR BLD AUTO: 34.9 %
MCH RBC QN AUTO: 32.9 PG (ref 26–34)
MCHC RBC AUTO-ENTMCNC: 33.3 G/DL (ref 31–37)
MCV RBC AUTO: 98.8 FL
MONOCYTES # BLD AUTO: 0.63 X10(3) UL (ref 0.1–1)
MONOCYTES NFR BLD AUTO: 7.3 %
NEUTROPHILS # BLD AUTO: 4.94 X10 (3) UL (ref 1.5–7.7)
NEUTROPHILS # BLD AUTO: 4.94 X10(3) UL (ref 1.5–7.7)
NEUTROPHILS NFR BLD AUTO: 57.2 %
NITRITE UR QL STRIP.AUTO: POSITIVE
OSMOLALITY SERPL CALC.SUM OF ELEC: 289 MOSM/KG (ref 275–295)
PH UR STRIP.AUTO: 5 [PH] (ref 5–8)
PLATELET # BLD AUTO: 342 10(3)UL (ref 150–450)
POTASSIUM SERPL-SCNC: 3.7 MMOL/L (ref 3.5–5.1)
PROT SERPL-MCNC: 8.1 G/DL (ref 5.7–8.2)
PROT UR STRIP.AUTO-MCNC: >=300 MG/DL
RBC # BLD AUTO: 4.26 X10(6)UL
RBC #/AREA URNS AUTO: >10 /HPF
SODIUM SERPL-SCNC: 139 MMOL/L (ref 136–145)
SP GR UR STRIP.AUTO: 1.02 (ref 1–1.03)
UROBILINOGEN UR STRIP.AUTO-MCNC: 0.2 MG/DL
WBC # BLD AUTO: 8.7 X10(3) UL (ref 4–11)

## 2025-01-06 PROCEDURE — S0119 ONDANSETRON 4 MG: HCPCS

## 2025-01-06 PROCEDURE — 99284 EMERGENCY DEPT VISIT MOD MDM: CPT

## 2025-01-06 PROCEDURE — 85025 COMPLETE CBC W/AUTO DIFF WBC: CPT

## 2025-01-06 PROCEDURE — 87086 URINE CULTURE/COLONY COUNT: CPT | Performed by: EMERGENCY MEDICINE

## 2025-01-06 PROCEDURE — 80053 COMPREHEN METABOLIC PANEL: CPT | Performed by: EMERGENCY MEDICINE

## 2025-01-06 PROCEDURE — 87077 CULTURE AEROBIC IDENTIFY: CPT | Performed by: EMERGENCY MEDICINE

## 2025-01-06 PROCEDURE — 96361 HYDRATE IV INFUSION ADD-ON: CPT

## 2025-01-06 PROCEDURE — 81001 URINALYSIS AUTO W/SCOPE: CPT | Performed by: EMERGENCY MEDICINE

## 2025-01-06 PROCEDURE — 83690 ASSAY OF LIPASE: CPT | Performed by: EMERGENCY MEDICINE

## 2025-01-06 PROCEDURE — 81015 MICROSCOPIC EXAM OF URINE: CPT | Performed by: EMERGENCY MEDICINE

## 2025-01-06 PROCEDURE — 85025 COMPLETE CBC W/AUTO DIFF WBC: CPT | Performed by: EMERGENCY MEDICINE

## 2025-01-06 PROCEDURE — 96375 TX/PRO/DX INJ NEW DRUG ADDON: CPT

## 2025-01-06 PROCEDURE — 96374 THER/PROPH/DIAG INJ IV PUSH: CPT

## 2025-01-06 PROCEDURE — 80053 COMPREHEN METABOLIC PANEL: CPT

## 2025-01-06 RX ORDER — ONDANSETRON 2 MG/ML
4 INJECTION INTRAMUSCULAR; INTRAVENOUS ONCE
Status: COMPLETED | OUTPATIENT
Start: 2025-01-06 | End: 2025-01-06

## 2025-01-06 RX ORDER — ONDANSETRON 4 MG/1
4 TABLET, ORALLY DISINTEGRATING ORAL ONCE
Status: COMPLETED | OUTPATIENT
Start: 2025-01-06 | End: 2025-01-06

## 2025-01-06 RX ORDER — ONDANSETRON 4 MG/1
TABLET, ORALLY DISINTEGRATING ORAL
Status: COMPLETED
Start: 2025-01-06 | End: 2025-01-06

## 2025-01-06 RX ORDER — ONDANSETRON 4 MG/1
4 TABLET, ORALLY DISINTEGRATING ORAL EVERY 4 HOURS PRN
Qty: 10 TABLET | Refills: 0 | Status: SHIPPED | OUTPATIENT
Start: 2025-01-06 | End: 2025-01-13

## 2025-01-06 NOTE — ED INITIAL ASSESSMENT (HPI)
Pt was at urologist for scheduled appointment when pt started vomiting.  They referred pt to er for eval.

## 2025-01-06 NOTE — TELEPHONE ENCOUNTER
1/6/2025 Patient arrived to office in wheelchair for scheduled appointment.  Patient verbalizes that she is nauseous. Patient had vomited in bag in waiting room. C/O pelvic pain and nausea and had vomited once in waiting room.  Patient instructed recommendation is ER Now. Patient verbalizes that she had tried to go to ER a few days ago and the wait was too long and left before being seen.  Instructed patient ER Now and to reschedule appointment (Consult) Urology.  Patient verbalizes understanding. I transported patient via wheelchair to [facility] - patient alert and talkative. Patient declines to register at [facility] due to estimated wait 3 hours and verbalizes she will go to [facility]. Instructed patient first recommendation ER now.  Patient left [facility] registration via wheelchair with family member and verbalizes she will be going to [facility].
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
## 2025-01-06 NOTE — ED QUICK NOTES
To ER for eval of lower abdominal pain \"for years\". She wa in the waiting room at a new urologists office and she threw up once,so they sent her to the ER. Her  states \"they took us to the main ER and they told us it would be a 3 hour wait,or we could come here,so we came here\". The pt has no idea as to why she threw up. She has no eaten since last night. + tenderness noted to the lower abdomen. No c/o diarrhea/cough/cold.

## 2025-01-07 ENCOUNTER — TELEPHONE (OUTPATIENT)
Dept: FAMILY MEDICINE CLINIC | Facility: CLINIC | Age: 85
End: 2025-01-07

## 2025-01-07 NOTE — ED QUICK NOTES
DC instr re gastroenteritis were given to the pt and her . To take the Zofran prn nausea and to gradually advance the diet as tolerated. To f/u with her PMD and to return to the nearest ER if any significant problems develop. She expressed an understanding and was dc'd home,in nad

## 2025-01-08 ENCOUNTER — TELEPHONE (OUTPATIENT)
Dept: FAMILY MEDICINE CLINIC | Facility: CLINIC | Age: 85
End: 2025-01-08

## 2025-01-08 NOTE — TELEPHONE ENCOUNTER
Donna Umaña  LOV: 12/10/2024       Calling for NEW New or Existing condition (choose one)  Patient experiencing: EAR ACHE/PAIN CLOGGED  (list symptoms/concerns)  Duration/Onset of symptom: 2-3DAYS   Appointment Offered:

## 2025-01-08 NOTE — TELEPHONE ENCOUNTER
Patient feels ear is still clogged. Advised she would need to be seen if antibiotic would be appropriate. Advised patient to go to walk in or immediate care. Patient declined. Re iterated all of Kristan Bellamy reccomendations.

## 2025-01-09 ENCOUNTER — HOSPITAL ENCOUNTER (OUTPATIENT)
Age: 85
Discharge: HOME | End: 2025-01-09
Payer: MEDICARE

## 2025-01-09 VITALS
HEIGHT: 64 IN | OXYGEN SATURATION: 95 % | TEMPERATURE: 98 F | WEIGHT: 135 LBS | SYSTOLIC BLOOD PRESSURE: 120 MMHG | BODY MASS INDEX: 23.05 KG/M2 | RESPIRATION RATE: 16 BRPM | HEART RATE: 100 BPM | DIASTOLIC BLOOD PRESSURE: 60 MMHG

## 2025-01-09 DIAGNOSIS — J01.10 ACUTE FRONTAL SINUSITIS, RECURRENCE NOT SPECIFIED: ICD-10-CM

## 2025-01-09 DIAGNOSIS — H92.01 RIGHT EAR PAIN: Primary | ICD-10-CM

## 2025-01-09 PROCEDURE — 99213 OFFICE O/P EST LOW 20 MIN: CPT

## 2025-01-09 PROCEDURE — 99214 OFFICE O/P EST MOD 30 MIN: CPT

## 2025-01-09 RX ORDER — FLUTICASONE PROPIONATE 50 MCG
2 SPRAY, SUSPENSION (ML) NASAL DAILY
Qty: 16 G | Refills: 0 | Status: SHIPPED | OUTPATIENT
Start: 2025-01-09 | End: 2025-02-08

## 2025-01-09 RX ORDER — CEPHALEXIN 500 MG/1
500 CAPSULE ORAL 4 TIMES DAILY
Qty: 40 CAPSULE | Refills: 0 | Status: SHIPPED | OUTPATIENT
Start: 2025-01-09 | End: 2025-01-19

## 2025-01-09 RX ORDER — OXYMETAZOLINE HYDROCHLORIDE 0.05 G/100ML
1 SPRAY NASAL 2 TIMES DAILY
Qty: 15 ML | Refills: 0 | Status: SHIPPED | OUTPATIENT
Start: 2025-01-09 | End: 2025-01-11

## 2025-01-09 RX ORDER — PSEUDOEPHEDRINE HCL 30 MG/1
30 TABLET, FILM COATED ORAL EVERY 6 HOURS PRN
Qty: 20 TABLET | Refills: 0 | Status: SHIPPED | OUTPATIENT
Start: 2025-01-09

## 2025-01-09 NOTE — TELEPHONE ENCOUNTER
Patient called back wanting ear drops and antibiotic - reinterated that Kristan Nunez PA-C does not have any openings and that she would need to go to [facility]     FYI
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
## 2025-01-13 ENCOUNTER — PATIENT OUTREACH (OUTPATIENT)
Dept: CASE MANAGEMENT | Age: 85
End: 2025-01-13

## 2025-01-13 ENCOUNTER — TELEPHONE (OUTPATIENT)
Dept: FAMILY MEDICINE CLINIC | Facility: CLINIC | Age: 85
End: 2025-01-13

## 2025-01-13 NOTE — TELEPHONE ENCOUNTER
Spoke with patient. Patient is stating that her right ear is still having symptoms. She stated that it is still clogged and making a \"hissing\" sound. Patient states that her ear hurts and the meds prescribed are not relieving her symptoms. Patient stated that she can't hear well out of her ear.     Please review and advise

## 2025-01-13 NOTE — TELEPHONE ENCOUNTER
Put her in on Wednesday for her ear   81 y/o  F, former , two sons, with a history of CVA two years ago leading to poor speech and confusion that has been getting worse. Pt has not been very verbal since her stroke, and is Chinese speaking anyway, making interview difficult. Family not sure if they want to increase Depakote, will discuss about Haldol, but as of now feel that they would prefer not to use meds. No need for 1:1 or inpt psych at present.  Pt doing fine with current regimen in place, VPA and trazodone. Spoke with grand daughter, prefers to keep with current regimen.

## 2025-01-13 NOTE — TELEPHONE ENCOUNTER
Donna Umaña  LOV: 12/10/2024       Calling for existing  New or Existing condition (choose one)  Patient experiencing: Ear Pain - clogged painful  (list symptoms/concerns)  Duration/Onset of symptom: 2days  Appointment Offered:

## 2025-01-13 NOTE — PROGRESS NOTES
Called patient and left a message for patient to call back when they can. Reviewed patient chart. Left contact my contact number [phone]        Time Spent This Encounter Total: 5  min medical record review  Monthly Minute Total including today: 5 minutes

## 2025-01-14 NOTE — TELEPHONE ENCOUNTER
Future Appointments   Date Time Provider Department Center   1/15/2025  1:30 PM Kristan Nunez PA-C [facility] [place]

## 2025-01-14 NOTE — TELEPHONE ENCOUNTER
Called and left message for patient to return call.     PSR please notify triage w/ return call from patient.

## 2025-01-14 NOTE — TELEPHONE ENCOUNTER
Spoke with patient. Reminded patient that she has appointment with Kristan Nunez PA-C tomorrow. Patient voiced understanding and agreeable.

## 2025-01-14 NOTE — TELEPHONE ENCOUNTER
Spoke with patient. Informed patient she has appointment with Kristan Nunez PA-C tomorrow with Kristan Nunez PA-C. Patient voiced understanding and agreeable.

## 2025-01-15 ENCOUNTER — OFFICE VISIT (OUTPATIENT)
Dept: FAMILY MEDICINE CLINIC | Facility: CLINIC | Age: 85
End: 2025-01-15
Payer: MEDICARE

## 2025-01-15 VITALS
WEIGHT: 139.81 LBS | HEART RATE: 115 BPM | RESPIRATION RATE: 18 BRPM | DIASTOLIC BLOOD PRESSURE: 74 MMHG | OXYGEN SATURATION: 98 % | HEIGHT: 64 IN | SYSTOLIC BLOOD PRESSURE: 138 MMHG | TEMPERATURE: 99 F | BODY MASS INDEX: 23.87 KG/M2

## 2025-01-15 DIAGNOSIS — N39.0 RECURRENT UTI: ICD-10-CM

## 2025-01-15 DIAGNOSIS — H69.91 DYSFUNCTION OF EUSTACHIAN TUBE, RIGHT: Primary | ICD-10-CM

## 2025-01-15 DIAGNOSIS — H92.01 EAR PAIN, RIGHT: ICD-10-CM

## 2025-01-15 PROCEDURE — 99213 OFFICE O/P EST LOW 20 MIN: CPT | Performed by: FAMILY MEDICINE

## 2025-01-15 PROCEDURE — G2211 COMPLEX E/M VISIT ADD ON: HCPCS | Performed by: FAMILY MEDICINE

## 2025-01-15 RX ORDER — OXYMETAZOLINE HCL 0.05 G/100ML
SPRAY NASAL
COMMUNITY
Start: 2025-01-09

## 2025-01-15 RX ORDER — PREDNISONE 20 MG/1
20 TABLET ORAL DAILY
Qty: 4 TABLET | Refills: 0 | Status: SHIPPED | OUTPATIENT
Start: 2025-01-15 | End: 2025-01-19

## 2025-01-15 RX ORDER — NEOMYCIN SULFATE, POLYMYXIN B SULFATE AND HYDROCORTISONE 10; 3.5; 1 MG/ML; MG/ML; [USP'U]/ML
3 SUSPENSION/ DROPS AURICULAR (OTIC) 3 TIMES DAILY
Qty: 10 ML | Refills: 0 | Status: SHIPPED | OUTPATIENT
Start: 2025-01-15 | End: 2025-01-22

## 2025-01-17 NOTE — TELEPHONE ENCOUNTER
Spoke with patient. Reminded patient that we just spoke. Discussed ear drops and prednisone. Patient stating she is in pain. Informed patient that Kristan Nunez PA-C is not in the office. Patient advised to continue her meds that Kristan Nunez PA-C prescribed and if her pain is unbearable to go to walk in clinic or immediate care to be evaluated. Patient voiced understanding and agreeable.
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
## 2025-01-17 NOTE — TELEPHONE ENCOUNTER
Spoke with patient. Patient has been taking medications that Kristan Nunez PA-C prescribed. Patient states she hasn't had much change. Patient will continue medications and call the office Monday to let us know if she has improvement. Patient voiced understanding and agreeable.

## 2025-01-17 NOTE — TELEPHONE ENCOUNTER
She did not have pain during the office visit just Stating that she was unable to hear through that ear there was nothing wrong besides a slight retraction of the eardrum she does have an appointment with ENT coming up agree with plan for her to use the prednisone and eardrops.

## 2025-01-24 ENCOUNTER — HOSPITAL ENCOUNTER (OUTPATIENT)
Age: 85
Discharge: HOME | End: 2025-01-24
Payer: MEDICARE

## 2025-01-24 ENCOUNTER — TELEPHONE (OUTPATIENT)
Dept: FAMILY MEDICINE CLINIC | Facility: CLINIC | Age: 85
End: 2025-01-24

## 2025-01-24 VITALS
RESPIRATION RATE: 20 BRPM | OXYGEN SATURATION: 97 % | HEIGHT: 64 IN | HEART RATE: 88 BPM | WEIGHT: 135 LBS | TEMPERATURE: 99 F | BODY MASS INDEX: 23.05 KG/M2

## 2025-01-24 DIAGNOSIS — N39.0 URINARY TRACT INFECTION WITHOUT HEMATURIA, SITE UNSPECIFIED: Primary | ICD-10-CM

## 2025-01-24 LAB
BILIRUB UR QL STRIP: NEGATIVE
GLUCOSE UR STRIP-MCNC: 100 MG/DL
HGB UR QL STRIP: NEGATIVE
KETONES UR STRIP-MCNC: NEGATIVE MG/DL
NITRITE UR QL STRIP: POSITIVE
PH UR STRIP: 5 [PH]
PROT UR STRIP-MCNC: 30 MG/DL
SP GR UR STRIP: 1.01
UROBILINOGEN UR STRIP-ACNC: 2 MG/DL

## 2025-01-24 PROCEDURE — 99214 OFFICE O/P EST MOD 30 MIN: CPT

## 2025-01-24 PROCEDURE — 87086 URINE CULTURE/COLONY COUNT: CPT | Performed by: NURSE PRACTITIONER

## 2025-01-24 PROCEDURE — 81002 URINALYSIS NONAUTO W/O SCOPE: CPT

## 2025-01-24 RX ORDER — CIPROFLOXACIN 500 MG/1
500 TABLET, FILM COATED ORAL 2 TIMES DAILY
Qty: 6 TABLET | Refills: 0 | Status: SHIPPED | OUTPATIENT
Start: 2025-01-24 | End: 2025-01-27

## 2025-01-24 NOTE — DISCHARGE INSTRUCTIONS
Please increase your water intake.  We will notify you of any abnormalities with the urine culture that indicates need to change treatment plan.

## 2025-01-24 NOTE — TELEPHONE ENCOUNTER
Spoke with patient. Patient stated that she has symptoms of UTI. Stated it burns and is painful when urinating. Patient informed Kristan Nunez PA-C is out of the office today. Advised patient to walk in clinic or immediate care to be further evaluated. Patient voiced understanding and agreeable.

## 2025-01-24 NOTE — TELEPHONE ENCOUNTER
Incoming fax received from: [place]   Regarding:Summary of Benefits - [employer]   Printed fax and placed in Kristan Nunez PA-C ma basket
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
## 2025-01-24 NOTE — TELEPHONE ENCOUNTER
Donna Umaña  LOV: 1/15/2025       Calling for new New or Existing condition (choose one)  Patient experiencing: Severe pain possible uti pain where ovaries use to be  (list symptoms/concerns)  Duration/Onset of symptom: last night   Appointment Offered:

## 2025-01-27 DIAGNOSIS — F51.04 PSYCHOPHYSIOLOGICAL INSOMNIA: ICD-10-CM

## 2025-01-27 DIAGNOSIS — F41.1 GENERALIZED ANXIETY DISORDER: ICD-10-CM

## 2025-01-28 RX ORDER — QUETIAPINE FUMARATE 25 MG/1
TABLET, FILM COATED ORAL
Qty: 450 TABLET | Refills: 0 | Status: SHIPPED | OUTPATIENT
Start: 2025-01-28

## 2025-01-28 NOTE — TELEPHONE ENCOUNTER
Requested Prescriptions     Pending Prescriptions Disp Refills    QUETIAPINE 25 MG Oral Tab [Pharmacy Med Name: QUETIAPINE 25MG TABLETS] 450 tablet 0     Sig: TAKE 1 TABLET BY MOUTH THREE TIMES DAILY AND 2 TABLETS EVERY NIGHT AT BEDTIME       Last Refill: 10/9/24    Last OV: 1/15/25

## 2025-02-03 RX ORDER — GABAPENTIN 600 MG/1
TABLET ORAL
Qty: 270 TABLET | Refills: 1 | Status: SHIPPED | OUTPATIENT
Start: 2025-02-03

## 2025-02-06 ENCOUNTER — HOSPITAL ENCOUNTER (OUTPATIENT)
Age: 85
Discharge: HOME | End: 2025-02-06
Payer: MEDICARE

## 2025-02-06 VITALS
OXYGEN SATURATION: 97 % | DIASTOLIC BLOOD PRESSURE: 74 MMHG | TEMPERATURE: 98 F | BODY MASS INDEX: 22.2 KG/M2 | RESPIRATION RATE: 18 BRPM | HEIGHT: 64 IN | HEART RATE: 88 BPM | SYSTOLIC BLOOD PRESSURE: 155 MMHG | WEIGHT: 130 LBS

## 2025-02-06 DIAGNOSIS — N30.90 RECURRENT CYSTITIS: Primary | ICD-10-CM

## 2025-02-06 LAB
BILIRUB UR QL STRIP: NEGATIVE
GLUCOSE UR STRIP-MCNC: 250 MG/DL
HGB UR QL STRIP: NEGATIVE
KETONES UR STRIP-MCNC: 15 MG/DL
NITRITE UR QL STRIP: POSITIVE
PH UR STRIP: 5 [PH]
PROT UR STRIP-MCNC: 100 MG/DL
SP GR UR STRIP: <=1.005
UROBILINOGEN UR STRIP-ACNC: 2 MG/DL

## 2025-02-06 PROCEDURE — 87186 SC STD MICRODIL/AGAR DIL: CPT | Performed by: NURSE PRACTITIONER

## 2025-02-06 PROCEDURE — 87086 URINE CULTURE/COLONY COUNT: CPT | Performed by: NURSE PRACTITIONER

## 2025-02-06 PROCEDURE — 99214 OFFICE O/P EST MOD 30 MIN: CPT

## 2025-02-06 PROCEDURE — 87077 CULTURE AEROBIC IDENTIFY: CPT | Performed by: NURSE PRACTITIONER

## 2025-02-06 PROCEDURE — 81002 URINALYSIS NONAUTO W/O SCOPE: CPT

## 2025-02-06 RX ORDER — NITROFURANTOIN 25; 75 MG/1; MG/1
100 CAPSULE ORAL 2 TIMES DAILY
Qty: 10 CAPSULE | Refills: 0 | Status: SHIPPED | OUTPATIENT
Start: 2025-02-06 | End: 2025-02-11

## 2025-02-06 NOTE — DISCHARGE INSTRUCTIONS
Urinary Tract Infection (UTI) care measures:   - Take antibiotics as directed and to completion   - Drink plenty of water   - Avoid sugary drinks & foods (e.g. Soda, juice, sweets etc.)   - You may benefit from taking Probiotics whenever you take antibiotic to restore good gut bacteria   - You may benefit from Azo (Pyridium) for bladder pain relief (will turn your urine orange)   - Drinking alcohol can make you very sick and/or make the antibiotic less effective   - Keep upcoming appointment with Urology in late February

## 2025-02-06 NOTE — ED INITIAL ASSESSMENT (HPI)
Pt c/o chronic low abdominal pain anf frequent utis' pt has seen [facility], awaiting Urologist appointment for definitive diagnosis, pt c/o not sleeping due to pain
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
## 2025-02-08 DIAGNOSIS — I10 ESSENTIAL HYPERTENSION: ICD-10-CM

## 2025-02-10 RX ORDER — LOSARTAN POTASSIUM 100 MG/1
100 TABLET ORAL DAILY
Qty: 90 TABLET | Refills: 0 | Status: SHIPPED | OUTPATIENT
Start: 2025-02-10

## 2025-02-10 NOTE — TELEPHONE ENCOUNTER
Requested Prescriptions     Pending Prescriptions Disp Refills    LOSARTAN 100 MG Oral Tab [Pharmacy Med Name: LOSARTAN 100MG TABLETS] 90 tablet 0     Sig: TAKE 1 TABLET(100 MG) BY MOUTH DAILY       Last Refill: 11/11/24    Last OV: 1/15/25

## 2025-02-11 ENCOUNTER — PATIENT OUTREACH (OUTPATIENT)
Dept: CASE MANAGEMENT | Age: 85
End: 2025-02-11

## 2025-02-11 NOTE — PROGRESS NOTES
Called patient and left a message for patient to call back when they can. Reviewed patient chart. Left contact my contact number [phone]        Time Spent This Encounter Total: 5  min medical record review  Monthly Minute Total including today: 5 minutes

## 2025-02-12 NOTE — TELEPHONE ENCOUNTER
rx approved qty 30 + 2 refills
02-12    137  |  96  |  26[H]  ----------------------------<  124[H]  4.1   |  30  |  1.04    Ca    9.3      12 Feb 2025 06:28    POCT Blood Glucose.: 202 mg/dL (02-12-25 @ 12:07)  A1C with Estimated Average Glucose Result: 6.9 % (01-26-25 @ 08:15)

## 2025-02-17 ENCOUNTER — TELEPHONE (OUTPATIENT)
Dept: FAMILY MEDICINE CLINIC | Facility: CLINIC | Age: 85
End: 2025-02-17

## 2025-02-17 NOTE — TELEPHONE ENCOUNTER
Called patient and left message for patient to return call.     PSR please notify triage w/ return call from patient.

## 2025-02-17 NOTE — TELEPHONE ENCOUNTER
Donna Umaña  LOV: 1/15/2025       Calling for new New or Existing condition (choose one)  Patient experiencing: constipation for 1 day painful stomach/abdomen can she take an enema (list symptoms/concerns)  Duration/Onset of symptom: 1day   Appointment Offered:  Patient appointment with Kristan Nunez PA-C / 2/19/2025

## 2025-02-17 NOTE — TELEPHONE ENCOUNTER
Donna wants to take an enema because she is in a lot of pain. She has terrible cramps. She has not used the bathroom today. She wants to speak with nurse about if she should take an enema.

## 2025-02-18 NOTE — TELEPHONE ENCOUNTER
Called and left message for patient to return call    PSR please notify triage w/ return call from patient.       [phone]

## 2025-02-19 ENCOUNTER — TELEPHONE (OUTPATIENT)
Dept: FAMILY MEDICINE CLINIC | Facility: CLINIC | Age: 85
End: 2025-02-19

## 2025-02-19 ENCOUNTER — OFFICE VISIT (OUTPATIENT)
Dept: FAMILY MEDICINE CLINIC | Facility: CLINIC | Age: 85
End: 2025-02-19
Payer: MEDICARE

## 2025-02-19 VITALS
WEIGHT: 143 LBS | HEIGHT: 64 IN | RESPIRATION RATE: 16 BRPM | BODY MASS INDEX: 24.41 KG/M2 | TEMPERATURE: 98 F | DIASTOLIC BLOOD PRESSURE: 67 MMHG | SYSTOLIC BLOOD PRESSURE: 128 MMHG | OXYGEN SATURATION: 98 % | HEART RATE: 88 BPM

## 2025-02-19 DIAGNOSIS — R73.9 HYPERGLYCEMIA: ICD-10-CM

## 2025-02-19 DIAGNOSIS — I70.0 ATHEROSCLEROSIS OF ABDOMINAL AORTA: ICD-10-CM

## 2025-02-19 DIAGNOSIS — M81.0 AGE-RELATED OSTEOPOROSIS WITHOUT CURRENT PATHOLOGICAL FRACTURE: ICD-10-CM

## 2025-02-19 DIAGNOSIS — I50.32 CHRONIC HEART FAILURE WITH PRESERVED EJECTION FRACTION (HCC): ICD-10-CM

## 2025-02-19 DIAGNOSIS — F02.A4 MILD EARLY ONSET ALZHEIMER'S DEMENTIA WITH ANXIETY (HCC): ICD-10-CM

## 2025-02-19 DIAGNOSIS — I72.3 ILIAC ARTERY ANEURYSM, RIGHT: ICD-10-CM

## 2025-02-19 DIAGNOSIS — M25.552 CHRONIC HIP PAIN, LEFT: ICD-10-CM

## 2025-02-19 DIAGNOSIS — K58.2 IRRITABLE BOWEL SYNDROME WITH BOTH CONSTIPATION AND DIARRHEA: ICD-10-CM

## 2025-02-19 DIAGNOSIS — G30.0 MILD EARLY ONSET ALZHEIMER'S DEMENTIA WITH ANXIETY (HCC): ICD-10-CM

## 2025-02-19 DIAGNOSIS — I70.0: ICD-10-CM

## 2025-02-19 DIAGNOSIS — Z86.19 HISTORY OF CLOSTRIDIOIDES DIFFICILE INFECTION: ICD-10-CM

## 2025-02-19 DIAGNOSIS — E78.2 MIXED HYPERLIPIDEMIA: ICD-10-CM

## 2025-02-19 DIAGNOSIS — E21.3 HYPERPARATHYROIDISM (HCC): ICD-10-CM

## 2025-02-19 DIAGNOSIS — I70.1 RIGHT RENAL ARTERY STENOSIS: ICD-10-CM

## 2025-02-19 DIAGNOSIS — R10.32 CHRONIC GROIN PAIN, LEFT: ICD-10-CM

## 2025-02-19 DIAGNOSIS — R29.898 COMPLAINTS OF LEG WEAKNESS: ICD-10-CM

## 2025-02-19 DIAGNOSIS — Z78.0 POST-MENOPAUSE: ICD-10-CM

## 2025-02-19 DIAGNOSIS — F51.04 PSYCHOPHYSIOLOGICAL INSOMNIA: ICD-10-CM

## 2025-02-19 DIAGNOSIS — Z87.891 HISTORY OF PRIOR CIGARETTE SMOKING: ICD-10-CM

## 2025-02-19 DIAGNOSIS — Z87.11 HISTORY OF PEPTIC ULCER: ICD-10-CM

## 2025-02-19 DIAGNOSIS — F33.40 DEPRESSION, MAJOR, RECURRENT, IN REMISSION: ICD-10-CM

## 2025-02-19 DIAGNOSIS — M79.7 FIBROMYALGIA: ICD-10-CM

## 2025-02-19 DIAGNOSIS — F99 ABNORMAL MINI-MENTAL STATUS EXAM: ICD-10-CM

## 2025-02-19 DIAGNOSIS — G89.29 CHRONIC HIP PAIN, LEFT: ICD-10-CM

## 2025-02-19 DIAGNOSIS — R25.1 TREMORS OF NERVOUS SYSTEM: ICD-10-CM

## 2025-02-19 DIAGNOSIS — N18.30 CHRONIC RENAL INSUFFICIENCY, STAGE 3 (MODERATE) (HCC): ICD-10-CM

## 2025-02-19 DIAGNOSIS — E83.52 HYPERCALCEMIA: ICD-10-CM

## 2025-02-19 DIAGNOSIS — Z95.5 PRESENCE OF STENT IN CORONARY ARTERY: ICD-10-CM

## 2025-02-19 DIAGNOSIS — Z91.89 COMPLIANCE WITH MEDICATION REGIMEN: ICD-10-CM

## 2025-02-19 DIAGNOSIS — I70.209 FEMORAL ARTERY STENOSIS: ICD-10-CM

## 2025-02-19 DIAGNOSIS — F45.41 PAIN AGGRAVATED BY ANXIETY: ICD-10-CM

## 2025-02-19 DIAGNOSIS — Z23 NEED FOR VACCINATION: ICD-10-CM

## 2025-02-19 DIAGNOSIS — I51.89 GRADE I DIASTOLIC DYSFUNCTION: ICD-10-CM

## 2025-02-19 DIAGNOSIS — F41.9 PAIN AGGRAVATED BY ANXIETY: ICD-10-CM

## 2025-02-19 DIAGNOSIS — N39.46 MIXED STRESS AND URGE URINARY INCONTINENCE: ICD-10-CM

## 2025-02-19 DIAGNOSIS — Z91.81 AT RISK FOR INJURY RELATED TO FALL: ICD-10-CM

## 2025-02-19 DIAGNOSIS — Z79.899 MEDICATION MANAGEMENT: ICD-10-CM

## 2025-02-19 DIAGNOSIS — I35.1 MILD AORTIC VALVE REGURGITATION: ICD-10-CM

## 2025-02-19 DIAGNOSIS — I25.119 CORONARY ARTERY DISEASE INVOLVING NATIVE CORONARY ARTERY OF NATIVE HEART WITH ANGINA PECTORIS: ICD-10-CM

## 2025-02-19 DIAGNOSIS — K44.9 SLIDING HIATAL HERNIA: ICD-10-CM

## 2025-02-19 DIAGNOSIS — Z90.81 HISTORY OF SPLENECTOMY: ICD-10-CM

## 2025-02-19 DIAGNOSIS — Z96.89 S/P INSERTION OF SPINAL CORD STIMULATOR: ICD-10-CM

## 2025-02-19 DIAGNOSIS — I10 PRIMARY HYPERTENSION: ICD-10-CM

## 2025-02-19 DIAGNOSIS — Z00.00 ENCOUNTER FOR ANNUAL HEALTH EXAMINATION: Primary | ICD-10-CM

## 2025-02-19 DIAGNOSIS — F41.1 GENERALIZED ANXIETY DISORDER: Chronic | ICD-10-CM

## 2025-02-19 DIAGNOSIS — Z98.1 HISTORY OF LUMBAR SPINAL FUSION: ICD-10-CM

## 2025-02-19 DIAGNOSIS — R09.89 DIMINISHED PULSES IN LOWER EXTREMITY: ICD-10-CM

## 2025-02-19 DIAGNOSIS — G89.29 CHRONIC GROIN PAIN, LEFT: ICD-10-CM

## 2025-02-19 DIAGNOSIS — M51.362 DEGENERATION OF INTERVERTEBRAL DISC OF LUMBAR REGION WITH DISCOGENIC BACK PAIN AND LOWER EXTREMITY PAIN: ICD-10-CM

## 2025-02-19 DIAGNOSIS — N20.0 NEPHROLITHIASIS: ICD-10-CM

## 2025-02-19 RX ORDER — ZOSTER VACCINE RECOMBINANT, ADJUVANTED 50 MCG/0.5
50 KIT INTRAMUSCULAR ONCE
Qty: 2 EACH | Refills: 0 | Status: SHIPPED | OUTPATIENT
Start: 2025-02-19 | End: 2025-02-19

## 2025-02-19 NOTE — PROGRESS NOTES
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 sinus tenderness   Throat: Lips, mucosa, and tongue normal; teeth and gums normal   Neck: Supple, symmetrical, trachea midline, no adenopathy;  thyroid: not enlarged, symmetric, no tenderness/mass/nodules; no carotid bruit or JVD   Back:   Symmetric, no curvature, ROM normal, no CVA tenderness   Lungs:   Clear to auscultation bilaterally, respirations unlabored   Heart:  Regular rate and rhythm, S1 and S2 normal, no murmur, rub, or gallop   Abdomen:   Soft,  bowel sounds active all four quadrants,  no masses, no organomegaly mild tenderness to the left lower quadrant mainly in the left hip joint at the pelvis anteriorly no guarding, rigidity or rebound.  The chronic suprapubic tenderness was not noted during office visit today   Pelvic: Deferred   Extremities: Extremities normal, atraumatic, no cyanosis or edema   Pulses: Diminished pulses to the extremities bilateral    Skin: Skin color, texture, turgor normal, no rashes or lesions   Lymph nodes: Cervical, supraclavicular, and axillary nodes normal   Neurologic: Poor memory decreased recall Mini-Mental 20 was distracted and anxious during testing,  tremors noted  upper extremities    Psych moderate anxiety depression poor communication secondary to memory  is present and does most of the memory recall for her    and Breasts:  normal appearance, no masses or tenderness, Inspection negative, No nipple retraction or dimpling, No nipple discharge or bleeding    /67 (BP Location: Left arm, Patient Position: Sitting, Cuff Size: adult)   Pulse 88   Temp 97.9 °F (36.6 °C) (Temporal)   Resp 16   Ht 5' 4\" (1.626 m)   Wt 143 lb (64.9 kg)   SpO2 98%   BMI 24.55 kg/m²  Estimated body mass index is 24.55 kg/m² as calculated from the following:    Height as of this encounter: 5' 4\" (1.626 m).    Weight as of this encounter: 143 lb (64.9 kg).    Medicare Hearing Assessment:   Hearing Screening    Screening Method: Finger Rub  Finger Rub Result: Fail       Mini Mental Status Exam (MMSE)       2/19/2025     2:41 PM   MINI MENTAL STATUS EXAM (MMSE)   What year is it? 1   Which season is this? 1   What month is it? 1   What is today's date? 1   What day of the week is this? 1   What country are we in? 1   What state are we in? 1   What city/town are we in? 1   What is the name of this medical group?  1   What floor are we on?  1   Repeat the named objects: BALL 0   Repeat the named objects: CAR 0   Repeat the named objects: MAN 0   Spell the word 'WORLD'. Now spell it backwards.  0   Recall the named objects: BALL 1   Recall the named objects: CAR 1   Recall the named objects: MAN 1   SHOW wristwatch. ASK: What is this called?  1   SHOW pencil. ASK: What is this called?  1   SAY: I would like you to repeat this phrase after me: \"No ifs, ands or buts.\"  1   Close Your Eyes 1   HAND the person a pencil and paper. SAY: Write any complete sentence on that piece of paper.  1   Copy This Design 0   SAY: Take this paper in your right/left hand (whichever is non-dominant) 0   SAY: Fold the paper in half once with both hands  1   SAY: Put the paper down on the floor 1    MMSE SCORE 20                Assessment & Plan:   Donna Umaña is a 84 year old female who presents for a Medicare Assessment.     1. Encounter for annual health examination (Primary)  -     Shingrix; Inject 50 mcg into the muscle one time for 1 dose.  Dispense: 2 each; Refill: 0  Forms given including DNR POLST form, power of  form and living will.  Fill these out and return them to clinic or [facility]  Dental exams every 6 months  Eye exams yearly or as directed by eye specialist  Dermatological exams yearly  Follow-up with specialists as directed  Healthy diet avoiding processed foods focus on high-fiber with fruits, vegetables and lean protein.  Exercise regularly as tolerated both aerobic and weightbearing.  Sleep goal 8 hours per night practice good sleep hygiene.  Schedule for further testing as discussed during office visit.  Stay updated on vaccinations stressed the importance of vaccinations with history of splenectomy including RSV, influenza, pneumonia vaccine, COVID-19 vaccine, shingles vaccine and tetanus vaccine (every 10 years) at pharmacy.  Patient refuses any vaccinations.  She did allow me to send the Shingrix order to pharmacy    2. Depression, major, recurrent, in remission  3. Generalized anxiety disorder  4. Psychophysiological insomnia  -     Amitriptyline HCl; Take 1 tablet (25 mg total) by mouth nightly.  Dispense: 90 tablet; Refill: 1  Secondary to chronic pain, sleep issues and benefits seen with amitriptyline with no side effects will increase from 10 mg to 25 mg.    Use the 10 mg that they have at home to taper up by 20 mg nightly for 5 to 7 days then go to the 25 mg nightly  voiced understanding.  Will continue with Seroquel 25 mg 3 times a day if needed for severe anxiety and 50 mg at night to help with sleep concern for Vascular dementia  Reviewed medication benefits and side effects.     5. Hyperparathyroidism (HCC)  -     Vitamin D, 25-Hydroxy;  Future; Expected date: 05/01/2025  -     Endocrine Referral - In Network  6. Hypercalcemia  -     Endocrine Referral - In Network  7. Age-related osteoporosis without current pathological fracture  -     XR DEXA BONE DENSITOMETRY (CPT=77080); Future; Expected date: 02/19/2025  -     Vitamin D, 25-Hydroxy; Future; Expected date: 05/01/2025  -     Endocrine Referral - In Network  Discussed with endocrinologist osteoporosis treatment  Take vitamin D over-the-counter  8. Mixed stress and urge urinary incontinence  9. Chronic renal insufficiency, stage 3 (moderate) (HCC)  -     CBC With Differential With Platelet; Future; Expected date: 05/01/2025  -     Comp Metabolic Panel (14); Future; Expected date: 05/01/2025  -     Vitamin D, 25-Hydroxy; Future; Expected date: 05/01/2025  10. Chronic heart failure with preserved ejection fraction (HCC)  -     Cardio Referral - External  Follow-up with cardiologist  11. Primary hypertension  -     TSH and Free T4; Future; Expected date: 05/01/2025  -     Cardio Referral - External  Take amlodipine 10 mg daily, losartan 100 mg daily as instructed by cardiologist follow-up with cardiologist  12. Mixed hyperlipidemia  -     Lipid Panel; Future; Expected date: 05/01/2025  -     Comp Metabolic Panel (14); Future; Expected date: 05/01/2025  -     Cardio Referral - External  13. Femoral artery stenosis  Overview:  CTA from 3/2023    Orders:  -     Cardio Referral - External  -     Vascular Surgery - External  14. Atherosclerosis of abdominal aorta  Overview:  CTA 3/29/2023 atherosclerotic calcification of abdominal aorta    Orders:  -     Cardio Referral - External  15. Right renal artery stenosis  Overview:  Seen on CTA abdomen 3/2023    Orders:  -     Cardio Referral - External  -     Vascular Surgery - External  16. Presence of stent in coronary artery  -     Cardio Referral - External  17. Iliac artery aneurysm, right  Overview:  Focal outpouching consistent with  aneurysm/pseudoaneurysm anterior proximal right common iliac artery CTA 3/2023    Orders:  -     Cardio Referral - External  -     Vascular Surgery - External  18. Aortoiliac stenosis, left  -     Cardio Referral - External  -     Vascular Surgery - External  19. Coronary artery disease involving native coronary artery of native heart with angina pectoris  -     Cardio Referral - External  20. Fibromyalgia  -     Vitamin B12; Future; Expected date: 05/01/2025  21. Pain aggravated by anxiety  22. Degeneration of intervertebral disc of lumbar region with discogenic back pain and lower extremity pain  -     Amitriptyline HCl; Take 1 tablet (25 mg total) by mouth nightly.  Dispense: 90 tablet; Refill: 1  Reviewed medication benefits and side effects.     23. Chronic hip pain, left  -     Vascular Surgery - External  Osteoarthritis in nature but rule out any kind of vascular cause due to decreased pulses and lower leg discomfort  24. S/P insertion of spinal cord stimulator  25. History of lumbar spinal fusion  -     Amitriptyline HCl; Take 1 tablet (25 mg total) by mouth nightly.  Dispense: 90 tablet; Refill: 1  Advised if using gabapentin needs to take it consistently the 600 mg 3 times a day has been inconsistent over the last 2 weeks.  26. 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 Abnormal mini-mental status exam  -     Neuro Referral - In Network  28. Tremors of nervous system  -     TSH and Free T4; Future; Expected date: 05/01/2025  -     Vitamin B12; Future; Expected date: 05/01/2025  -     Neuro Referral - In Network  29. Grade I diastolic dysfunction  30. Mild aortic valve regurgitation  31. Diminished pulses in lower extremity  -     Vascular Surgery - External  32. Chronic groin pain, left  33. Nephrolithiasis  -     Endocrine Referral - In Network  34. At risk for injury related to fall  35. Sliding  hiatal hernia  36. Hyperglycemia  -     Hemoglobin A1C; Future; Expected date: 02/20/2025  37. Irritable bowel syndrome with both constipation and diarrhea  38. History of peptic ulcer  Continue with no nonsteroidals and omeprazole 20 mg daily  GERD prevention reviewed avoid caffeine, alcohol, and nonstnoeroidals.  Eat small frequent meals and avoid eating 3-4 hours before bedtime, try to raise the head of bed.    39. History of Clostridioides difficile infection  40. Medication management  41. Post-menopause  -     XR DEXA BONE DENSITOMETRY (CPT=77080); Future; Expected date: 02/19/2025  42. History of prior cigarette smoking  -     Vascular Surgery - External  43. History of splenectomy  44. Need for vaccination  -     Shingrix; Inject 50 mcg into the muscle one time for 1 dose.  Dispense: 2 each; Refill: 0  45. Compliance with medication regimen  46. Complaints of leg weakness  Vascular and cardiology for evaluation of decreased pulses and leg weakness  The patient indicates understanding of these issues and agrees to the plan.  Consult ordered.  Further testing ordered.  Imaging studies ordered.  Lab work ordered.  Prescription medication ordered.  Reinforced healthy diet, lifestyle, and exercise.      Return in 3 months (on 5/19/2025).     Kristan Nunez PA-C, 2/19/2025     Supplementary Documentation:   General Health:  In the past six months, have you lost more than 10 pounds without trying?: 2 - No  Has your appetite been poor?: No  Type of Diet: Balanced  How does the patient maintain a good energy level?: Appropriate Exercise  How would you describe your daily physical activity?: None  How would you describe your current health state?: Fair  How do you maintain positive mental well-being?: Social Interaction  On a scale of 0 to 10, with 0 being no pain and 10 being severe pain, what is your pain level?: 5 - (Moderate)  In the past six months, have you experienced urine leakage?: 0-No  At any time do you  feel concerned for the safety/well-being of yourself and/or your children, in your home or elsewhere?: No  Have you had any immunizations at another office such as Influenza, Hepatitis B, Tetanus, or Pneumococcal?: No    Health Maintenance   Topic Date Due    Zoster Vaccines (1 of 2) Never done    Annual Physical  02/01/2025    Influenza Vaccine (1) 06/30/2025 (Originally 10/1/2024)    Meningococcal B Vaccine (1 of 5 - Increased Risk) 12/10/2025 (Originally 5/5/1950)    COVID-19 Vaccine (3 - 2024-25 season) 03/19/2026 (Originally 9/1/2024)    DEXA Scan  Completed    Annual Depression Screening  Completed    Fall Risk Screening (Annual)  Completed    Tobacco Cessation Counseling  Completed    Pneumococcal Vaccine: 50+ Years  Completed

## 2025-02-19 NOTE — TELEPHONE ENCOUNTER
Called and left message for patient to return call     PSR please notify triage w/ return call from patient.

## 2025-02-20 ENCOUNTER — OFFICE VISIT (OUTPATIENT)
Facility: LOCATION | Age: 85
End: 2025-02-20
Payer: MEDICARE

## 2025-02-20 DIAGNOSIS — E21.3 HYPERPARATHYROIDISM (HCC): ICD-10-CM

## 2025-02-20 DIAGNOSIS — H93.13 TINNITUS OF BOTH EARS: ICD-10-CM

## 2025-02-20 DIAGNOSIS — H90.3 SENSORINEURAL HEARING LOSS, BILATERAL: Primary | ICD-10-CM

## 2025-02-20 DIAGNOSIS — H90.3 SENSORINEURAL HEARING LOSS (SNHL) OF BOTH EARS: Primary | ICD-10-CM

## 2025-02-20 DIAGNOSIS — H93.8X3 EAR PRESSURE, BILATERAL: ICD-10-CM

## 2025-02-20 DIAGNOSIS — H69.93 DYSFUNCTION OF BOTH EUSTACHIAN TUBES: ICD-10-CM

## 2025-02-20 PROBLEM — R29.898 WEAKNESS OF BOTH LOWER EXTREMITIES: Status: RESOLVED | Noted: 2023-02-16 | Resolved: 2025-02-20

## 2025-02-20 PROBLEM — Z78.9 POOR HISTORIAN: Status: RESOLVED | Noted: 2018-10-13 | Resolved: 2025-02-20

## 2025-02-20 PROBLEM — H02.403 PTOSIS OF BOTH EYELIDS: Status: RESOLVED | Noted: 2023-02-16 | Resolved: 2025-02-20

## 2025-02-20 PROBLEM — Z95.5 HISTORY OF CORONARY ARTERY STENT PLACEMENT: Status: RESOLVED | Noted: 2018-12-10 | Resolved: 2025-02-20

## 2025-02-20 PROBLEM — F99 ABNORMAL MINI-MENTAL STATUS EXAM: Status: ACTIVE | Noted: 2025-02-20

## 2025-02-20 PROBLEM — G89.4 CHRONIC PAIN SYNDROME: Status: RESOLVED | Noted: 2024-12-10 | Resolved: 2025-02-20

## 2025-02-20 PROBLEM — F33.40 DEPRESSION, MAJOR, RECURRENT, IN REMISSION: Status: ACTIVE | Noted: 2024-12-10

## 2025-02-20 PROBLEM — R09.89 DIMINISHED PULSES IN LOWER EXTREMITY: Status: ACTIVE | Noted: 2025-02-20

## 2025-02-20 PROBLEM — R10.30 PAIN RADIATING TO LOWER ABDOMEN: Status: RESOLVED | Noted: 2023-08-31 | Resolved: 2025-02-20

## 2025-02-20 PROBLEM — G89.29 CHRONIC BILATERAL LOW BACK PAIN WITHOUT SCIATICA: Status: RESOLVED | Noted: 2023-06-29 | Resolved: 2025-02-20

## 2025-02-20 PROBLEM — J30.9 ALLERGIC RHINITIS DUE TO ALLERGEN: Status: RESOLVED | Noted: 2017-11-20 | Resolved: 2025-02-20

## 2025-02-20 PROBLEM — R10.2 SUPRAPUBIC PAIN: Status: RESOLVED | Noted: 2024-12-10 | Resolved: 2025-02-20

## 2025-02-20 PROBLEM — M54.50 CHRONIC BILATERAL LOW BACK PAIN WITHOUT SCIATICA: Status: RESOLVED | Noted: 2023-06-29 | Resolved: 2025-02-20

## 2025-02-20 PROBLEM — Z87.891 HISTORY OF PRIOR CIGARETTE SMOKING: Status: ACTIVE | Noted: 2025-02-20

## 2025-02-20 PROBLEM — R29.898 COMPLAINTS OF LEG WEAKNESS: Status: ACTIVE | Noted: 2025-02-20

## 2025-02-20 PROBLEM — Z91.89 COMPLIANCE WITH MEDICATION REGIMEN: Status: ACTIVE | Noted: 2025-02-20

## 2025-02-20 PROBLEM — R52 PAIN MANAGEMENT: Status: RESOLVED | Noted: 2023-11-28 | Resolved: 2025-02-20

## 2025-02-20 PROCEDURE — 92567 TYMPANOMETRY: CPT | Performed by: AUDIOLOGIST

## 2025-02-20 PROCEDURE — 99204 OFFICE O/P NEW MOD 45 MIN: CPT | Performed by: OTOLARYNGOLOGY

## 2025-02-20 PROCEDURE — 92557 COMPREHENSIVE HEARING TEST: CPT | Performed by: AUDIOLOGIST

## 2025-02-20 NOTE — PROGRESS NOTES
Donna Umaña was seen for an audiometric evaluation and tympanogram today. Referred back to physician.    Gaby Aldridge, AuD
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
## 2025-03-05 ENCOUNTER — HOSPITAL ENCOUNTER (OUTPATIENT)
Age: 85
Discharge: HOME | End: 2025-03-05
Payer: MEDICARE

## 2025-03-05 ENCOUNTER — TELEPHONE (OUTPATIENT)
Dept: FAMILY MEDICINE CLINIC | Facility: CLINIC | Age: 85
End: 2025-03-05

## 2025-03-05 VITALS
HEART RATE: 89 BPM | OXYGEN SATURATION: 96 % | SYSTOLIC BLOOD PRESSURE: 124 MMHG | DIASTOLIC BLOOD PRESSURE: 87 MMHG | TEMPERATURE: 98 F | RESPIRATION RATE: 16 BRPM

## 2025-03-05 DIAGNOSIS — N39.0 URINARY TRACT INFECTION WITHOUT HEMATURIA, SITE UNSPECIFIED: Primary | ICD-10-CM

## 2025-03-05 LAB
BILIRUB UR QL STRIP: NEGATIVE
CLARITY UR: CLEAR
COLOR UR: YELLOW
GLUCOSE UR STRIP-MCNC: NEGATIVE MG/DL
KETONES UR STRIP-MCNC: NEGATIVE MG/DL
NITRITE UR QL STRIP: NEGATIVE
PH UR STRIP: 6.5 [PH]
PROT UR STRIP-MCNC: 30 MG/DL
SP GR UR STRIP: 1.01
UROBILINOGEN UR STRIP-ACNC: <2 MG/DL

## 2025-03-05 PROCEDURE — 87186 SC STD MICRODIL/AGAR DIL: CPT | Performed by: NURSE PRACTITIONER

## 2025-03-05 PROCEDURE — 87077 CULTURE AEROBIC IDENTIFY: CPT | Performed by: NURSE PRACTITIONER

## 2025-03-05 PROCEDURE — 99213 OFFICE O/P EST LOW 20 MIN: CPT

## 2025-03-05 PROCEDURE — 87086 URINE CULTURE/COLONY COUNT: CPT | Performed by: NURSE PRACTITIONER

## 2025-03-05 PROCEDURE — 81002 URINALYSIS NONAUTO W/O SCOPE: CPT

## 2025-03-05 PROCEDURE — 99214 OFFICE O/P EST MOD 30 MIN: CPT

## 2025-03-05 RX ORDER — NITROFURANTOIN 25; 75 MG/1; MG/1
100 CAPSULE ORAL 2 TIMES DAILY
Qty: 14 CAPSULE | Refills: 0 | Status: SHIPPED | OUTPATIENT
Start: 2025-03-05 | End: 2025-03-12
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
## 2025-03-05 NOTE — ED INITIAL ASSESSMENT (HPI)
c/o lower abdominal pain since last night, concern bladder infection/UTI's. Similar symptoms last months treated with UTI.

## 2025-03-05 NOTE — TELEPHONE ENCOUNTER
The patient is calling in with pain, which she believes is due to a bladder infection. She is requesting a prescription to be called in.    Please review and advise on the next steps.

## 2025-03-05 NOTE — TELEPHONE ENCOUNTER
Spoke with patient. Patient has pain in lower part of abdomen. Patient believes it is her bladder and has an infection. Patient states no there symptoms of infection. Patient advised to walk in clinic for further evaluation. Patient voiced understanding.

## 2025-03-07 ENCOUNTER — PATIENT OUTREACH (OUTPATIENT)
Dept: CASE MANAGEMENT | Age: 85
End: 2025-03-07

## 2025-03-07 NOTE — PROGRESS NOTES
Called patient and left a message for patient to call back when they can. Reviewed patient chart. Left contact my contact number [phone]        Time Spent This Encounter Total: 5  min medical record review  Monthly Minute Total including today: 5 minutes

## 2025-03-19 DIAGNOSIS — R10.2 SUPRAPUBIC PAIN: ICD-10-CM

## 2025-03-19 DIAGNOSIS — G89.4 CHRONIC PAIN SYNDROME: ICD-10-CM

## 2025-03-19 DIAGNOSIS — F51.01 PRIMARY INSOMNIA: ICD-10-CM

## 2025-03-19 RX ORDER — AMITRIPTYLINE HYDROCHLORIDE 10 MG/1
10 TABLET ORAL NIGHTLY
Qty: 90 TABLET | Refills: 0 | OUTPATIENT
Start: 2025-03-19

## 2025-03-21 ENCOUNTER — OFFICE VISIT (OUTPATIENT)
Dept: SURGERY | Facility: CLINIC | Age: 85
End: 2025-03-21
Payer: MEDICARE

## 2025-03-21 DIAGNOSIS — R10.2 PELVIC PAIN: ICD-10-CM

## 2025-03-21 DIAGNOSIS — N20.0 NEPHROLITHIASIS: ICD-10-CM

## 2025-03-21 DIAGNOSIS — N95.2 VAGINAL ATROPHY: ICD-10-CM

## 2025-03-21 DIAGNOSIS — N39.0 RECURRENT UTI: Primary | ICD-10-CM

## 2025-03-21 PROCEDURE — 99204 OFFICE O/P NEW MOD 45 MIN: CPT | Performed by: PHYSICIAN ASSISTANT

## 2025-03-21 RX ORDER — ESTRADIOL 0.1 MG/G
CREAM VAGINAL
Qty: 42.5 G | Refills: 5 | Status: SHIPPED | OUTPATIENT
Start: 2025-03-21

## 2025-03-21 NOTE — PATIENT INSTRUCTIONS
Urinary tract infections can affect your general health and your quality of life.  Some UTI’s can be prevented.  Here are some suggestions that my help prevent frequent UTI’s.     Good Hygiene: Always wipe front to back.  Don’t use perfumed soaps.  Plain soaps like Ivory are the best.  Don’t use washcloths.  Place soap directly on your hands and clean the genital area.  Rinse thoroughly.  Soap can be very irritating to the vaginal mucosa.     Urination: Urinate every 2-3 hours during the day.  Empty your bladder just before going to bed and  first thing when you wake up.  Make sure you go to the bathroom when you have a strong urge. Don't hover over the toilet to urinate.  The bladder may not be completely emptying when using this technique.  Sometimes you may need to \"double-void\".  After you finish urinating, stand up, then sit back down to urinate again.     Clothing: Wear cotton underwear. Change daily.  Avoid tight jeans.       [place]: Sometimes UTI’s are related to intercourse.  Wash genital area before and rinse afterwards.  Empty your bladder before and after intercourse.  Use of vaginal lubricants may be helpful.  Condoms and some lubricants may be irritating to the vaginal mucosa.  Spermicide should be avoided.  Talk to your doctor, you may require a suppressive antibiotic to take after intercourse.     Supplements: Take Vitamin C 500 or 1000mg daily.  Cranberry pills 400mg daily or if symptomatic 400mg two times per day.  Eat yogurt or consider taking a probiotic.  D-mannose 1 gram is another supplement that can help prevent infections.  This one is harder to find, but can be found at stores such as [employer], [employer], or a specialized vitamin store.  Fluid intake should be at least 48oz daily with the goal of 64oz daily.  Water is preferable.      Constipation: Constipation has been linked to UTI’s.  You should be having a soft BM daily.  Dietary fiber should be between 20-25 grams daily.   Flaxseed, All-Bran cereal, Fiber One bars, fruits and vegetables are a good sources of fiber.  Alternatively, Metamucil can be used daily.  Gentle laxatives and stool softeners may be added on a daily or as needed basis if necessary (Miralax, Colace, Pericolace).      Vaginal creams and moisturizers: It may be recommended you try vaginal creams, moisturizers or lubricants as a prevention method.  Over-the-Counter products:  Replens:  1 applicator three times per week  Luvena prebiotic vaginal moisturizer and lubricant:  package of 6.  1 tube every three days at bedtime.  Helps restore pH balance, decrease vaginal dryness, odor and itchiness.  KY liquibeads  KY silk - moisture enhancer  MeAgain Vaginal Moisturizer.  8 per package.  Use 1 daily for 7 days then 1 daily two times per week.  Astroglide  Prescriptions:  Estrace Cream  Premarin Cream  E-string     Prescription medications: Your doctor may recommend daily or as needed prescription medications including antibiotics to prevent urinary tract infection as well.    Fingertip Application Method  For Estrogen Cream     Apply vaginal estrogen cream nightly at bedtime to external vagina for 2 weeks straight.  After 2 weeks, use cream 3 nights a week (i.e Monday, Wednesday, Friday) thereafter.   Follow the instructions below to help you.      Wash your hands thoroughly. Discard the plastic applicator.      Squeeze tube to express out enough to cover the tip of your index finger, from the last joint to the finger tip.  Figure 1       Figure 1     Locate vaginal opening.  Immediately above the vagina is the urethra (a small opening where urine is eliminated from your body). Figure 2   Carefully spread the cream onto the vaginal/urethral area.  Some may be gently inserted into the vagina additionally.          Figure 2      General Recommendations to Avoid Future Kidney Stones    1. Drink enough water to produce 2 liters of clear urine daily. You may need to use a  container at first to measure how much you are actually producing and increase your intake accordingly. Try to start the day off with a large glass of water as we all wake up dehydrated in the morning.    2. Add lemon or lime juice to your water. These juices contain citrate which naturally inhibits stone formation. An easy way to do this is using sugar free lemonade mix (ie Crystal Light or True Lemon (if you prefer to avoid aspartame))    3. Avoid salty foods such as prepackaged and fast foods, and do not add salt to foods. Try to limit sodium intake to 2500mg maximum.     4. Decrease phosphorus (soda) and caffeine.     5. Limit intake of the following group of foods to one serving daily: spinach, nuts (especially almonds), tea (especially black tea), chocolate, and potatoes.    6. Limit intake of Vitamin C supplements to less than 1000 mg daily.    7. Limit intake of animal protein (beef, chicken, turkey, fish, pork) to one serving daily. A good rule for portion size is no more meat than will fit in the palm of your hand.    8. Limit roasted nuts to 2-3 servings per week.    9.  Maintain 1-2 servings of dairy products daily (1 serving = 1 glass of milk, 2 slices of cheese, 1 scoop of ice cream, or 1 cup of yogurt). Try to decrease cheese intake as it may increase kidney stone risk compared to other dairy products. Do not eliminate calcium from your diet as it is necessary for bone health.   -Females should try not to exceed 500mg per day  -Males should try not to exceed 750mg per day    10. Take a daily B complex vitamin or 100mg vitamin B6 daily. This may help to decrease oxalate in the urine.     11. Take a daily magnesium supplement of 300mg daily. Magnesium binds to oxalate in the same way that calcium does but is more soluble.  Magnesium will take the place of calcium when binding with oxalate and be less prone to form stones.    12. Eat a low fat diet and exercise at least 30 minutes 3 times per week to try  and maintain your ideal body weight. Being overweight is a risk factor for kidney stones too!
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
## 2025-03-24 ENCOUNTER — TELEPHONE (OUTPATIENT)
Dept: SURGERY | Facility: CLINIC | Age: 85
End: 2025-03-24

## 2025-03-24 RX ORDER — NITROFURANTOIN 25; 75 MG/1; MG/1
100 CAPSULE ORAL 2 TIMES DAILY
Qty: 20 CAPSULE | Refills: 0 | Status: SHIPPED | OUTPATIENT
Start: 2025-03-24 | End: 2025-04-03

## 2025-03-24 RX ORDER — NITROFURANTOIN MACROCRYSTALS 50 MG/1
50 CAPSULE ORAL NIGHTLY
Qty: 90 CAPSULE | Refills: 0 | Status: SHIPPED | OUTPATIENT
Start: 2025-03-24

## 2025-03-24 NOTE — TELEPHONE ENCOUNTER
Aerococcus and enterococcus     Amp > macrobid > augmentin    Will treat with macrobid given allergies.

## 2025-03-24 NOTE — TELEPHONE ENCOUNTER
Received [facility] PCR urine test results via fax.   Placed on [facility] desk for review.

## 2025-03-29 DIAGNOSIS — I10 ESSENTIAL HYPERTENSION: ICD-10-CM

## 2025-03-31 RX ORDER — AMLODIPINE BESYLATE 10 MG/1
TABLET ORAL
Qty: 90 TABLET | Refills: 1 | Status: SHIPPED | OUTPATIENT
Start: 2025-03-31

## 2025-03-31 NOTE — TELEPHONE ENCOUNTER
Requested Prescriptions     Pending Prescriptions Disp Refills    AMLODIPINE 10 MG Oral Tab [Pharmacy Med Name: AMLODIPINE BESYLATE 10MG TABLETS] 90 tablet 1     Sig: TAKE 1 TABLET(10 MG) BY MOUTH DAILY       Last Refill: 10/9/24    Last OV: 2/19/25

## 2025-04-05 DIAGNOSIS — E78.2 MIXED HYPERLIPIDEMIA: ICD-10-CM

## 2025-04-07 ENCOUNTER — TELEPHONE (OUTPATIENT)
Dept: FAMILY MEDICINE CLINIC | Facility: CLINIC | Age: 85
End: 2025-04-07

## 2025-04-07 RX ORDER — PRAVASTATIN SODIUM 40 MG
40 TABLET ORAL NIGHTLY
Qty: 90 TABLET | Refills: 0 | Status: SHIPPED | OUTPATIENT
Start: 2025-04-07
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
## 2025-04-07 NOTE — TELEPHONE ENCOUNTER
Donna Umaña  LOV: 2/19/2025       Donna simons experiencing: stomach pains / headaches  Duration/Onset of symptom: 2days  Appointment Offered:  Patient denied/

## 2025-04-09 ENCOUNTER — PATIENT OUTREACH (OUTPATIENT)
Dept: CASE MANAGEMENT | Age: 85
End: 2025-04-09

## 2025-04-09 NOTE — PROGRESS NOTES
Called patient and left a message for patient to call back when they can. Reviewed patient chart. Left contact my contact number [phone]        Time Spent This Encounter Total: 5  min medical record review  Monthly Minute Total including today: 5 minutes

## 2025-04-16 DIAGNOSIS — M51.16 LUMBAR DISC HERNIATION WITH RADICULOPATHY: ICD-10-CM

## 2025-04-16 RX ORDER — LIDOCAINE 50 MG/G
PATCH TOPICAL
Qty: 60 PATCH | Refills: 5 | Status: SHIPPED | OUTPATIENT
Start: 2025-04-16

## 2025-05-08 ENCOUNTER — PATIENT OUTREACH (OUTPATIENT)
Dept: CASE MANAGEMENT | Age: 85
End: 2025-05-08

## 2025-05-08 NOTE — PROGRESS NOTES
Called patient and left a message for patient to call back when they can. Reviewed patient chart. Left contact my contact number [phone]        Time Spent This Encounter Total: 5  min medical record review  Monthly Minute Total including today: 5 minutes

## 2025-05-09 ENCOUNTER — TELEPHONE (OUTPATIENT)
Dept: FAMILY MEDICINE CLINIC | Facility: CLINIC | Age: 85
End: 2025-05-09

## 2025-05-09 NOTE — TELEPHONE ENCOUNTER
Donna Umaña  LOV: 2/19/2025       Donna simons experiencing: Was up all night with bladder infection   Duration/Onset of symptom:last night  Appointment Offered: would like to be prescribed something to help

## 2025-05-09 NOTE — TELEPHONE ENCOUNTER
Called and spoke with patient. Patient is having pain in lower abdomen. No burning during urination, no cloudiness,or foul smell. Patient has pain where her ovaries would be. Informed patient that Kristan Nunez PA-C was not in the office today. Advised patient to call Urology or go to walk in clinic to be further evaluated. Patient voiced understanding and agreeable.

## 2025-05-12 DIAGNOSIS — I10 ESSENTIAL HYPERTENSION: ICD-10-CM

## 2025-05-12 DIAGNOSIS — F41.1 GENERALIZED ANXIETY DISORDER: ICD-10-CM

## 2025-05-12 DIAGNOSIS — F51.04 PSYCHOPHYSIOLOGICAL INSOMNIA: ICD-10-CM

## 2025-05-13 RX ORDER — QUETIAPINE FUMARATE 25 MG/1
TABLET, FILM COATED ORAL
Qty: 450 TABLET | Refills: 0 | Status: SHIPPED | OUTPATIENT
Start: 2025-05-13

## 2025-05-13 RX ORDER — LOSARTAN POTASSIUM 100 MG/1
100 TABLET ORAL DAILY
Qty: 90 TABLET | Refills: 0 | Status: SHIPPED | OUTPATIENT
Start: 2025-05-13

## 2025-05-13 NOTE — TELEPHONE ENCOUNTER
Requested Prescriptions     Pending Prescriptions Disp Refills    LOSARTAN 100 MG Oral Tab [Pharmacy Med Name: LOSARTAN 100MG TABLETS] 90 tablet 0     Sig: TAKE 1 TABLET(100 MG) BY MOUTH DAILY    QUETIAPINE 25 MG Oral Tab [Pharmacy Med Name: QUETIAPINE 25MG TABLETS] 450 tablet 0     Sig: TAKE 1 TABLET BY MOUTH THREE TIMES DAILY AND 2 TABLETS EVERY NIGHT AT BEDTIME       Last Refill: 2/10, 1/28    Last OV: 2/19    Next OV:

## 2025-05-19 RX ORDER — ONDANSETRON 4 MG/1
TABLET, FILM COATED ORAL EVERY 8 HOURS PRN
Qty: 20 TABLET | Refills: 2 | OUTPATIENT
Start: 2025-05-19

## 2025-05-19 RX ORDER — ONDANSETRON 4 MG/1
4 TABLET, ORALLY DISINTEGRATING ORAL EVERY 4 HOURS PRN
Qty: 10 TABLET | Refills: 0 | Status: SHIPPED | OUTPATIENT
Start: 2025-05-19 | End: 2025-05-26

## 2025-05-19 NOTE — TELEPHONE ENCOUNTER
Donna Umaña requesting medication refill for ondansetron (ZOFRAN) 4 mg tablet [Pharmacy Med Name: ONDANSETRON 4MG TABLETS] - patient called pharmacy and they need order to fill rx     LOV: 2/19/2025   Prescribed By: Kristan Nunez PA-C   Pharmacy Location: [facility]     Next Appointment: Visit date not found      Informed patient to allow up to 24 to 48 business hours before checking with Pharmacy.

## 2025-05-19 NOTE — TELEPHONE ENCOUNTER
Requested Prescriptions     Pending Prescriptions Disp Refills    ondansetron 4 MG Oral Tablet Dispersible 10 tablet 0     Sig: Take 1 tablet (4 mg total) by mouth every 4 (four) hours as needed for Nausea.       Last Refill: 1/6/25    Last OV: 2/19/25

## 2025-05-29 ENCOUNTER — TELEPHONE (OUTPATIENT)
Dept: FAMILY MEDICINE CLINIC | Facility: CLINIC | Age: 85
End: 2025-05-29

## 2025-05-29 NOTE — TELEPHONE ENCOUNTER
Donna Umaña  LOV: 2/19/2025       Donna simons experiencing: lower abdominal pain, severe pain  Duration/Onset of symptom: 2 days   Appointment Offered: no appointments/ would like to see Dr. Carli Bates if possible.

## 2025-05-29 NOTE — TELEPHONE ENCOUNTER
Called and spoke with patient. Patient stated that she is experiencing severe pain where her ovaries were. Patient is out of pain medication. Informed patient that Kristan Nunez PA-C is out of the office today. Advised patient to go to emergency room for the severe pain to be further evaluated. Patient voiced understanding and agreeable.

## 2025-06-09 ENCOUNTER — TELEPHONE (OUTPATIENT)
Dept: FAMILY MEDICINE CLINIC | Facility: CLINIC | Age: 85
End: 2025-06-09

## 2025-06-09 ENCOUNTER — PATIENT OUTREACH (OUTPATIENT)
Dept: CASE MANAGEMENT | Age: 85
End: 2025-06-09

## 2025-06-09 NOTE — PROGRESS NOTES
Called patient and left a message for patient to call back when they can. Reviewed patient chart. Left contact my contact number [phone]        Time Spent This Encounter Total: 5  min medical record review  Monthly Minute Total including today: 5 minutes

## 2025-06-09 NOTE — TELEPHONE ENCOUNTER
Donna has terrible pain. It is located by where her ovaries would be. She said she does not have ovaries. She was up all night. She is patient of Kristan Nunez PA-C. Please advise.

## 2025-06-10 ENCOUNTER — TELEPHONE (OUTPATIENT)
Dept: FAMILY MEDICINE CLINIC | Facility: CLINIC | Age: 85
End: 2025-06-10

## 2025-06-10 DIAGNOSIS — R10.2 PELVIC PAIN: Primary | ICD-10-CM

## 2025-06-10 DIAGNOSIS — G89.29 CHRONIC PELVIC PAIN IN FEMALE: ICD-10-CM

## 2025-06-10 DIAGNOSIS — R10.2 CHRONIC PELVIC PAIN IN FEMALE: ICD-10-CM

## 2025-06-10 NOTE — TELEPHONE ENCOUNTER
Spoke with patient. Patient is feeling better. Informed patient that Kristan Nunez PA-C is out of the office. If pain persists or worsens, advised patient to immediate care or emergency room. Patient voiced understanding and agreeable.

## 2025-06-10 NOTE — TELEPHONE ENCOUNTER
Patient requesting referral. Per note from Dr. Chavez from pain management in [place], he recommended the patient to Dr. Kelby Reynolds.     Referral pended for review

## 2025-06-10 NOTE — TELEPHONE ENCOUNTER
Dr. Reynolds would not be appropriate that he is gynecologist who specializes in  infertility     Referral placed to a gynecologist to discuss chronic pelvic pain    _______________________________________________________  Referred to Provider Information:  Provider Address Phone   Eli Pratt MD [address] [place] [address]   [address] [address] [phone]

## 2025-06-16 ENCOUNTER — TELEPHONE (OUTPATIENT)
Dept: FAMILY MEDICINE CLINIC | Facility: CLINIC | Age: 85
End: 2025-06-16

## 2025-06-16 DIAGNOSIS — F51.04 PSYCHOPHYSIOLOGICAL INSOMNIA: ICD-10-CM

## 2025-06-16 DIAGNOSIS — F41.1 GENERALIZED ANXIETY DISORDER: ICD-10-CM

## 2025-06-16 RX ORDER — QUETIAPINE FUMARATE 25 MG/1
TABLET, FILM COATED ORAL
Qty: 450 TABLET | Refills: 0 | Status: SHIPPED | OUTPATIENT
Start: 2025-06-16

## 2025-06-16 NOTE — TELEPHONE ENCOUNTER
Nothing can be prescribed for pain besides lidocaine patch OTC since she has constipation. Miralax and prune juice for constipation.

## 2025-06-16 NOTE — TELEPHONE ENCOUNTER
Donna has extreme pain near ovaries area. She does not have ovaries anymore. This pain has been going on for a month. She said she ran out of her pain medication. She took many Asprin. She wants to schedule a sooner appointment with Kristan Nunez PA-C. Please advise.

## 2025-06-16 NOTE — TELEPHONE ENCOUNTER
Requested Prescriptions     Pending Prescriptions Disp Refills    QUEtiapine 25 MG Oral Tab 450 tablet 0     Sig: TAKE 1 TABLET BY MOUTH THREE TIMES DAILY AND 2 TABLETS EVERY NIGHT AT BEDTIME       Last Refill: 5/13/25    Last OV: 2/19

## 2025-06-16 NOTE — TELEPHONE ENCOUNTER
Spoke with patient. She is experiencing pain in ovaries. No ovaries. She said that it woke her up last night. Patient has been taking otc pain reliever and using lidocaine patches, but with little relief. Patient is also experiencing constipation. Patient states she has been taking Miralax and increasing her fluids. Nothing is helping. Advised patient to try adding prune juice as well. Donna would like something for the pain and if Kristan Nunez PA-C recommends anything else for her constipation. Reminded patient to call obgyn for an appointment as well.   Please advise.

## 2025-06-16 NOTE — TELEPHONE ENCOUNTER
Donna Umaña requesting medication refill for QUETIAPINE 25 MG Oral Tab - totally out of rx     LOV: 2/19/2025   Prescribed By: Kristan Nunez PA-C   Pharmacy Location: [facility]     Next Appointment: Visit date not found      Informed patient to allow up to 24 to 48 business hours before checking with Pharmacy.

## 2025-06-19 ENCOUNTER — TELEPHONE (OUTPATIENT)
Dept: FAMILY MEDICINE CLINIC | Facility: CLINIC | Age: 85
End: 2025-06-19

## 2025-06-19 NOTE — TELEPHONE ENCOUNTER
Donna called stating she has been experiencing diarrhea since this morning. She reports no stomach pain, but describes discomfort in the lower abdomen, stating, “It feels like it’s in my ovaries.”    The patient is asking for guidance on what she should do next.    Please review and advise.

## 2025-06-23 ENCOUNTER — OFFICE VISIT (OUTPATIENT)
Dept: SURGERY | Facility: CLINIC | Age: 85
End: 2025-06-23
Payer: MEDICARE

## 2025-06-23 DIAGNOSIS — N28.89 RENAL MASS: Primary | ICD-10-CM

## 2025-06-23 DIAGNOSIS — R10.2 PELVIC PAIN: ICD-10-CM

## 2025-06-23 LAB
APPEARANCE: CLEAR
BILIRUBIN: NEGATIVE
GLUCOSE (URINE DIPSTICK): NEGATIVE MG/DL
KETONES (URINE DIPSTICK): NEGATIVE MG/DL
MULTISTIX LOT#: ABNORMAL NUMERIC
NITRITE, URINE: NEGATIVE
OCCULT BLOOD: NEGATIVE
PH, URINE: 6 (ref 4.5–8)
PROTEIN (URINE DIPSTICK): 30 MG/DL
SPECIFIC GRAVITY: 1.01 (ref 1–1.03)
URINE-COLOR: YELLOW
UROBILINOGEN,SEMI-QN: 0.2 MG/DL (ref 0–1.9)

## 2025-06-23 PROCEDURE — 99214 OFFICE O/P EST MOD 30 MIN: CPT | Performed by: PHYSICIAN ASSISTANT

## 2025-06-23 PROCEDURE — 81003 URINALYSIS AUTO W/O SCOPE: CPT | Performed by: PHYSICIAN ASSISTANT

## 2025-06-23 RX ORDER — NITROFURANTOIN MACROCRYSTALS 50 MG/1
50 CAPSULE ORAL NIGHTLY
Qty: 90 CAPSULE | Refills: 1 | Status: SHIPPED | OUTPATIENT
Start: 2025-06-23

## 2025-06-23 RX ORDER — ONDANSETRON 4 MG/1
TABLET, FILM COATED ORAL
COMMUNITY
Start: 2025-04-13

## 2025-06-23 NOTE — PROGRESS NOTES
Subjective:   Patient is a 85 year old female with hx of CHF, depression, hyperparathyroidism, dementia, anxiety, right renal artery stenosis, HTN, HL, s/p hysterectomy 1963 who presents today for follow up pelvic pain.    She was seen in March 2025 for same,  noting that her pelvic pain has been ongoing for >5 years. No a/a factors. She had PFT without any improvement. Advised to resume estrace cream and increase water intake. Discussed consideration for cystoscopy (previously completed in 2021 with [facility] at time of URS).    She has not had any UTIs since last visit in March. That culture was +Aerococcus/Enterococcus. Remains on estrace cream    Per TE from PCP she developed pain in \"ovary area\" approximately 1 month ago. per TE from PCP. She was noted to be constipated and advised to take OTC miralax and increasing fluids. In follow up TE a few days later noted to be having diarrhea.     Will have times of a few days without pain.   Chronic urinary frequency and urgency.  No sensation of incomplete emptying of bladder  No flank pain.      Smoking history.      CT scan 11/15/24 multiple bilateral stones, largest LK 1cm. Simple to mildly complex renal cysts.  Had URS for right proximal ureteral stone 2021    History/Other:    Chief Complaint Reviewed and Verified  Nursing Notes Reviewed and   Verified  Allergies Reviewed  Medications Reviewed         Current Medications[1]    Review of Systems:  Pertinent items are noted in HPI.      Objective:   There were no vitals taken for this visit. Estimated body mass index is 24.55 kg/m² as calculated from the following:    Height as of 2/19/25: 5' 4\" (1.626 m).    Weight as of 2/19/25: 143 lb (64.9 kg).    No distress  Non labored respirations  Abdomen non tender    Laboratory Data:  Lab Results   Component Value Date    WBC 8.7 01/06/2025    HGB 14.0 01/06/2025    .0 01/06/2025     Lab Results   Component Value Date     01/06/2025    K 3.7 01/06/2025    CL  109 01/06/2025    CO2 22.0 01/06/2025    BUN 14 01/06/2025     (H) 01/06/2025    GFRAA 49 (L) 06/07/2022    AST 15 01/06/2025    ALT 13 01/06/2025    TP 8.1 01/06/2025    ALB 4.9 (H) 01/06/2025    PHOS 4.1 05/19/2022    CA 11.6 (H) 01/06/2025    MG 2.2 03/30/2023       Urinalysis Results (last three years):  Recent Labs     08/16/22  1606 08/25/22  1841 09/01/22  0137 09/07/22  1752 03/11/23  0536 03/16/23  1010 03/21/23  1214 03/29/23  1300 04/01/23  1535 04/23/23  1318 06/25/23  1145 07/24/23  1638 03/19/24  1235 09/24/24  1258 10/03/24  1556 10/03/24  1559 11/15/24  1002 12/16/24  1418 12/28/24  0841 01/06/25  1739 01/24/25  1608 02/06/25  1222 03/05/25  1231   COLORUR Yellow Yellow Yellow Yellow Orange* Orange* Orange* Alecia* Yellow Straw Yellow  --   --   --  Light-Yellow  --   --   --  Yellow Yellow  --   --   --    CLARITY Clear Clear Clear Clear Slightly Cloudy* Slightly Cloudy* Hazy* Clear Clear Clear Clear  --   --   --  Clear  --   --   --  Clear Clear  --   --   --    SPECGRAVITY 1.015 1.015 1.010 1.015  --   --  1.017 1.012 1.011 1.014 1.014 1.020 1.020 1.010 1.019 1.020 1.010 1.020 1.010 1.020 1.015 <=1.005 1.015   PHURINE 7.0 6.5 7.0 7.0  --   --   --  6.0 6.0 6.0 6.0 6.5  --  6.0 5.5 6.0  --   --  6.0 5.0  --   --   --    PROUR 30 mg/dL* 100 mg/dL* Negative 100 mg/dL*  --   --   --  100* 100* 100* 100*  --   --   --  30*  --   --   --  Negative >=300*  --   --   --    GLUUR Negative Negative Negative Negative  --   --   --  Negative Negative Negative Negative  --  Negative  --  Normal  --  Negative Negative Negative Negative 100* 250* Negative   KETUR Negative Negative Negative Negative  --   --   --  Negative Negative Negative Negative  --   --   --  Negative  --   --   --  Negative Negative  --   --   --    BILUR Negative Negative Negative Negative  --   --   --  Negative Negative Negative Negative  --   --   --  Negative  --   --   --  Negative Negative  --   --   --    BLOODURINE  Trace-Intact* Small* Negative Small*  --   --   --  Moderate* Small* Negative Negative  --   --   --  Negative  --   --   --  Trace-Intact* Moderate*  --   --   --    NITRITE Negative Negative Negative Negative  --   --   --  Positive* Negative Negative Negative Negative  --  Negative Negative Negative  --   --  Negative Positive*  --   --   --    UROBILINOGEN 0.2 0.2 0.2 0.2  --   --   --  2.0* <2.0 <2.0 <2.0  --   --   --  Normal  --   --   --  0.2 0.2  --   --   --    LEUUR Trace* Negative Small* Negative  --   --   --  Negative Negative Negative Negative  --   --   --  250*  --   --   --  Moderate* Negative  --   --   --    WBCUR 11-20*  11-20* 1-5  1-5 6-10*  6-10* 6-10* 21-50* 6-10*  6-10* 21-50* 11-20* 6-10* 1-5 6-10*  --   --   --  21-50*  --   --   --  21-50* 6-10*  --   --   --    RBCUR 0-2  0-2 0-2  0-2 0-2  0-2 0-2 0-2 6-10*  6-10* >10* >10* >10* 0-2 >10*  --   --   --  0-2  --   --   --  0-2 >10*  --   --   --    BACUR Rare*  Rare* Rare*  Rare* 1+*  1+* None Seen 2+* 1+*  1+* Rare* Rare* None Seen Rare* 1+*  --   --   --  2+*  --   --   --  2+* None Seen  --   --   --        Urine Culture Results (last three years):  Lab Results   Component Value Date    URINECUL >100,000 CFU/ML Enterococcus faecalis NOT VRE (A) 03/05/2025    URINECUL >100,000 CFU/ML Enterococcus faecalis NOT VRE (A) 02/06/2025    URINECUL  01/24/2025     10-50,000 cfu/ml Multiple species present-probable contamination.    URINECUL  01/06/2025     <10,000 cfu/ml Multiple species present- probable contamination.    URINECUL 10,000 - 50,000 CFU/ML Non hemolytic Streptococcus (A) 01/06/2025    URINECUL 50,000-99,000 CFU/ML Enterococcus faecalis NOT VRE (A) 12/28/2024    URINECUL 50,000-99,000 CFU/ML Enterococcus faecalis NOT VRE (A) 12/16/2024    URINECUL >100,000 CFU/ML Enterococcus faecalis NOT VRE (A) 11/15/2024    URINECUL >100,000 CFU/ML Enterococcus faecalis NOT VRE (A) 10/03/2024    URINECUL 10,000 - 50,000 CFU/ML Non  hemolytic Streptococcus (A) 09/24/2024    URINECUL No Growth at 18-24 hrs. 03/19/2024    URINECUL No Growth at 18-24 hrs. 07/24/2023    URINECUL No Growth at 18-24 hrs. 06/28/2023    URINECUL No Growth at 18-24 hrs. 03/27/2023    URINECUL No Growth at 18-24 hrs. 03/21/2023       Imaging  No results found.    Assessment & Plan:   Chronic pelvic pain  Ongoing, but currently improved  Reviewed  and non  etiologies  Has upcoming visit with gynecologist    Nephrolithiasis  Reviewed prior imaging  Will repeat CT scan for assessment    Complex renal cysts  Reviewed prior imaging  Will repeat CT scan for assessment    Recurrent UTI  Continue current regimen, estrace cream and nightly macrobid.   Consideration for cystoscopy.       Molly Gamboa PA-C, 6/23/2025       [1]   Current Outpatient Medications   Medication Sig Dispense Refill    ondansetron (ZOFRAN) 4 mg tablet       QUEtiapine 25 MG Oral Tab TAKE 1 TABLET BY MOUTH THREE TIMES DAILY AND 2 TABLETS EVERY NIGHT AT BEDTIME 450 tablet 0    LOSARTAN 100 MG Oral Tab TAKE 1 TABLET(100 MG) BY MOUTH DAILY 90 tablet 0    lidocaine 5 % External Patch APPLY UP TO 2 PATCHES TO PAINFUL AREAS DAILY(LEAVE ON FOR 12 HOURS, TAKE OFF FOR 12 HOURS) 60 patch 5    PRAVASTATIN 40 MG Oral Tab TAKE 1 TABLET(40 MG) BY MOUTH EVERY NIGHT 90 tablet 0    AMLODIPINE 10 MG Oral Tab TAKE 1 TABLET(10 MG) BY MOUTH DAILY 90 tablet 1    nitrofurantoin macrocrystal 50 MG Oral Cap Take 1 capsule (50 mg total) by mouth nightly. Start after treatment course 90 capsule 0    estradiol (ESTRACE) 0.1 MG/GM Vaginal Cream Apply a small (pea-sized) amount rosalee urethral region daily for 2 weeks and three times weekly thereafter 42.5 g 5    amitriptyline 25 MG Oral Tab Take 1 tablet (25 mg total) by mouth nightly. 90 tablet 1    GABAPENTIN 600 MG Oral Tab TAKE 1 TABLET(600 MG) BY MOUTH THREE TIMES DAILY. STOP 400 MG DOSE 270 tablet 1    12 HOUR NASAL DECONGESTANT 0.05 % Nasal Solution USE 1 SPRAY NASALLY  TWICE DAILY FOR 2 DAYS      pseudoephedrine 30 MG Oral Tab Take 1 tablet (30 mg total) by mouth every 6 (six) hours as needed for congestion. 20 tablet 0    nystatin 100,000 Units/g External Cream Apply thin film twice a day topically to external vaginal area for 10 days 30 g 0    cinacalcet 30 MG Oral Tab Take 1 tablet (30 mg total) by mouth daily with breakfast. 30 tablet 0    Lactobacillus Probiotic Oral Tab Take 1 tablet by mouth in the morning and 1 tablet before bedtime. 60 tablet 0    loratadine 10 MG Oral Tab Take 1 tablet (10 mg total) by mouth daily.      docusate sodium 100 MG Oral Cap Take 100 mg by mouth 2 (two) times daily. As needed for constipation. Do not take if having diarrhea or loose stool 60 capsule 0    omeprazole 20 MG Oral Capsule Delayed Release Take 1 capsule (20 mg total) by mouth daily. (Patient not taking: Reported on 6/23/2025)

## 2025-06-26 ENCOUNTER — HOSPITAL ENCOUNTER (OUTPATIENT)
Dept: CT IMAGING | Age: 85
Discharge: HOME | End: 2025-06-26
Attending: PHYSICIAN ASSISTANT
Payer: MEDICARE

## 2025-06-26 DIAGNOSIS — N28.89 RENAL MASS: ICD-10-CM

## 2025-06-26 DIAGNOSIS — R10.2 PELVIC PAIN: ICD-10-CM

## 2025-06-26 LAB
CREAT BLD-MCNC: 1.4 MG/DL (ref 0.55–1.02)
EGFRCR SERPLBLD CKD-EPI 2021: 37 ML/MIN/1.73M2 (ref 60–?)

## 2025-06-26 PROCEDURE — 82565 ASSAY OF CREATININE: CPT

## 2025-06-26 PROCEDURE — 74178 CT ABD&PLV WO CNTR FLWD CNTR: CPT | Performed by: PHYSICIAN ASSISTANT

## 2025-06-27 ENCOUNTER — PATIENT MESSAGE (OUTPATIENT)
Dept: SURGERY | Facility: CLINIC | Age: 85
End: 2025-06-27

## 2025-06-27 NOTE — TELEPHONE ENCOUNTER
Patient calling for test results.Please advise   Patient is not able to access [facility]

## 2025-06-30 NOTE — TELEPHONE ENCOUNTER
This encounter is now closed.     RN called patient. No answer. Left message.     \"No findings that would explain the pain that you are experiencing. Stable stones and renal cysts. No follow up imaging needed at this time. I would recommend follow up with your primary care and possibly gynecologist for the ongoing pain.\"  _ Molly GASTON   Last read by Donna Umaña at 4:08PM on 6/27/2025.

## 2025-07-01 ENCOUNTER — TELEPHONE (OUTPATIENT)
Dept: FAMILY MEDICINE CLINIC | Facility: CLINIC | Age: 85
End: 2025-07-01

## 2025-07-01 NOTE — TELEPHONE ENCOUNTER
Spoke with patient. Patient is having severe pain in her by her ovaries, stated \"I don't have ovaries\" and is asking for something for pain. Reminded patient that Kristan Nunez PA-C is unable to prescribe her pain medication. Advised patient to go to emergency room for further evaluation. Advised patient to keep her upcoming appointment with Obgyn. Patient voiced understanding and agreeable.

## 2025-07-01 NOTE — TELEPHONE ENCOUNTER
Donna wants to schedule an appointment with Kristan Nunez PA-C to discuss her pain on both sides of stomach where ovaries would be.  Her urologist told her to see Kristan Nunez PA-C for this.Her next appointment with Kristan Nunez PA-C is November 5th. She wants to see if she can be seen sooner. Please advise.

## 2025-07-04 DIAGNOSIS — E78.2 MIXED HYPERLIPIDEMIA: ICD-10-CM

## 2025-07-07 ENCOUNTER — TELEPHONE (OUTPATIENT)
Dept: FAMILY MEDICINE CLINIC | Facility: CLINIC | Age: 85
End: 2025-07-07

## 2025-07-07 DIAGNOSIS — E78.2 MIXED HYPERLIPIDEMIA: ICD-10-CM

## 2025-07-07 RX ORDER — PRAVASTATIN SODIUM 40 MG
40 TABLET ORAL NIGHTLY
Qty: 90 TABLET | Refills: 0 | Status: SHIPPED | OUTPATIENT
Start: 2025-07-07

## 2025-07-07 NOTE — TELEPHONE ENCOUNTER
Requested Prescriptions     Pending Prescriptions Disp Refills    PRAVASTATIN 40 MG Oral Tab [Pharmacy Med Name: PRAVASTATIN 40MG TABLETS] 90 tablet 0     Sig: TAKE 1 TABLET(40 MG) BY MOUTH EVERY NIGHT       Last Refill: 4/7    Last OV: 2/19/25    Next OV: 11/5

## 2025-07-07 NOTE — TELEPHONE ENCOUNTER
Donna Umaña requesting medication refill for PRAVASTATIN 40 MG Oral Tab .    LOV: 2/19/2025   Prescribed By: Kristan Nunez PA-C   Pharmacy Location:   [id number] [address] AT [facility] [address] [facility], [phone], [phone] [address] [place] [address]   Phone: [phone] Fax: [phone]   Hours: Not open 24 hours       Next Appointment: 11/5/2025 Kristan Nunez PA-C      Informed patient to allow up to 24 to 48 business hours before checking with Pharmacy.

## 2025-07-08 RX ORDER — PRAVASTATIN SODIUM 40 MG
40 TABLET ORAL NIGHTLY
Qty: 90 TABLET | Refills: 0 | OUTPATIENT
Start: 2025-07-08

## 2025-07-08 NOTE — TELEPHONE ENCOUNTER
Patient called state she received a call this morning from pharmacy that medication had been denied, patient would like to clarify, requesting a nurse call, aware medication was sent over last night to pharmacy.

## 2025-07-10 ENCOUNTER — OFFICE VISIT (OUTPATIENT)
Dept: OBGYN CLINIC | Facility: CLINIC | Age: 85
End: 2025-07-10
Payer: MEDICARE

## 2025-07-10 VITALS
SYSTOLIC BLOOD PRESSURE: 126 MMHG | WEIGHT: 146.81 LBS | DIASTOLIC BLOOD PRESSURE: 82 MMHG | HEART RATE: 87 BPM | BODY MASS INDEX: 25 KG/M2

## 2025-07-10 DIAGNOSIS — R10.30 LOWER ABDOMINAL PAIN: Primary | ICD-10-CM

## 2025-07-10 PROCEDURE — 99203 OFFICE O/P NEW LOW 30 MIN: CPT | Performed by: OBSTETRICS & GYNECOLOGY
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
## 2025-07-16 RX ORDER — ONDANSETRON 4 MG/1
4 TABLET, ORALLY DISINTEGRATING ORAL EVERY 4 HOURS PRN
Qty: 10 TABLET | Refills: 0 | Status: SHIPPED | OUTPATIENT
Start: 2025-07-16

## 2025-07-16 NOTE — TELEPHONE ENCOUNTER
Requested Prescriptions     Pending Prescriptions Disp Refills    ONDANSETRON 4 MG Oral Tablet Dispersible [Pharmacy Med Name: ONDANSETRON ODT 4MG TABLETS] 10 tablet 0     Sig: DISSOLVE 1 TABLET(4 MG) ON THE TONGUE EVERY 4 HOURS AS NEEDED FOR NAUSEA       Last Refill: 5/19/25    Last OV: 2/19/25    Next OV: 11/5/25

## 2025-07-17 NOTE — TELEPHONE ENCOUNTER
Jin Cates is calling because she has pain in her abd, she has not had a BM in 3 days please call Jin Cates at [phone]
Went over GI recommendations from office visit today. She STEPHANIA.
47.6

## 2025-07-25 DIAGNOSIS — I10 ESSENTIAL HYPERTENSION: ICD-10-CM

## 2025-07-25 RX ORDER — AMLODIPINE BESYLATE 10 MG/1
10 TABLET ORAL DAILY
Qty: 30 TABLET | Refills: 0 | Status: SHIPPED | OUTPATIENT
Start: 2025-07-25

## 2025-07-25 RX ORDER — AMLODIPINE BESYLATE 10 MG/1
10 TABLET ORAL DAILY
Qty: 90 TABLET | Refills: 0 | OUTPATIENT
Start: 2025-07-25

## 2025-07-25 RX ORDER — GABAPENTIN 600 MG/1
TABLET ORAL
Qty: 270 TABLET | Refills: 1 | Status: SHIPPED | OUTPATIENT
Start: 2025-07-25

## 2025-07-27 ENCOUNTER — HOSPITAL ENCOUNTER (EMERGENCY)
Age: 85
Discharge: HOME | End: 2025-07-27

## 2025-07-27 VITALS
HEIGHT: 64 IN | HEART RATE: 84 BPM | WEIGHT: 140 LBS | BODY MASS INDEX: 23.9 KG/M2 | RESPIRATION RATE: 18 BRPM | TEMPERATURE: 98 F | DIASTOLIC BLOOD PRESSURE: 83 MMHG | SYSTOLIC BLOOD PRESSURE: 175 MMHG | OXYGEN SATURATION: 96 %

## 2025-07-27 DIAGNOSIS — N30.00 ACUTE CYSTITIS WITHOUT HEMATURIA: Primary | ICD-10-CM

## 2025-07-27 LAB
BILIRUB UR QL STRIP.AUTO: NEGATIVE
CLARITY UR REFRACT.AUTO: CLEAR
COLOR UR AUTO: YELLOW
GLUCOSE UR STRIP.AUTO-MCNC: NEGATIVE MG/DL
KETONES UR STRIP.AUTO-MCNC: NEGATIVE MG/DL
NITRITE UR QL STRIP.AUTO: NEGATIVE
PH UR STRIP.AUTO: 6 (ref 5–8)
RBC UR QL AUTO: NEGATIVE
SP GR UR STRIP.AUTO: 1.01 (ref 1–1.03)
UROBILINOGEN UR STRIP.AUTO-MCNC: 0.2 MG/DL

## 2025-07-27 PROCEDURE — 81015 MICROSCOPIC EXAM OF URINE: CPT | Performed by: PHYSICIAN ASSISTANT

## 2025-07-27 PROCEDURE — 81001 URINALYSIS AUTO W/SCOPE: CPT | Performed by: PHYSICIAN ASSISTANT

## 2025-07-27 PROCEDURE — 99284 EMERGENCY DEPT VISIT MOD MDM: CPT

## 2025-07-27 PROCEDURE — 87086 URINE CULTURE/COLONY COUNT: CPT | Performed by: PHYSICIAN ASSISTANT

## 2025-07-27 PROCEDURE — 99283 EMERGENCY DEPT VISIT LOW MDM: CPT

## 2025-07-27 RX ORDER — NITROFURANTOIN 25; 75 MG/1; MG/1
100 CAPSULE ORAL 2 TIMES DAILY
Qty: 14 CAPSULE | Refills: 0 | Status: SHIPPED | OUTPATIENT
Start: 2025-07-27 | End: 2025-08-03

## 2025-07-27 NOTE — DISCHARGE INSTRUCTIONS
Take nitrofurantoin as directed (100mg twice a day)    Stop taking the prophylactic dose (50mg nightly) when you are on the higher dose.    Call urology tomorrow morning for follow up    You will receive a call if the urine culture shows anything that requires a change in antibiotic.
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
## 2025-08-01 ENCOUNTER — HOSPITAL ENCOUNTER (EMERGENCY)
Facility: HOSPITAL | Age: 85
Discharge: HOME | End: 2025-08-01
Attending: EMERGENCY MEDICINE

## 2025-08-01 VITALS
SYSTOLIC BLOOD PRESSURE: 162 MMHG | WEIGHT: 140 LBS | OXYGEN SATURATION: 97 % | HEART RATE: 91 BPM | BODY MASS INDEX: 24 KG/M2 | RESPIRATION RATE: 16 BRPM | TEMPERATURE: 98 F | DIASTOLIC BLOOD PRESSURE: 77 MMHG

## 2025-08-01 DIAGNOSIS — G89.29 CHRONIC ABDOMINAL PAIN: Primary | ICD-10-CM

## 2025-08-01 DIAGNOSIS — R10.9 CHRONIC ABDOMINAL PAIN: Primary | ICD-10-CM

## 2025-08-01 LAB
ALBUMIN SERPL-MCNC: 5 G/DL (ref 3.2–4.8)
ALBUMIN/GLOB SERPL: 1.7 (ref 1–2)
ALP LIVER SERPL-CCNC: 78 U/L (ref 55–142)
ALT SERPL-CCNC: 19 U/L (ref 10–49)
ANION GAP SERPL CALC-SCNC: 8 MMOL/L (ref 0–18)
AST SERPL-CCNC: 23 U/L (ref ?–34)
BASOPHILS # BLD AUTO: 0.13 X10(3) UL (ref 0–0.2)
BASOPHILS NFR BLD AUTO: 1.2 %
BILIRUB SERPL-MCNC: 0.4 MG/DL (ref 0.2–1.1)
BILIRUB UR QL STRIP.AUTO: NEGATIVE
BUN BLD-MCNC: 17 MG/DL (ref 9–23)
CALCIUM BLD-MCNC: 11.1 MG/DL (ref 8.7–10.6)
CHLORIDE SERPL-SCNC: 108 MMOL/L (ref 98–112)
CLARITY UR REFRACT.AUTO: CLEAR
CO2 SERPL-SCNC: 27 MMOL/L (ref 21–32)
CREAT BLD-MCNC: 1.21 MG/DL (ref 0.55–1.02)
EGFRCR SERPLBLD CKD-EPI 2021: 44 ML/MIN/1.73M2 (ref 60–?)
EOSINOPHIL # BLD AUTO: 0.29 X10(3) UL (ref 0–0.7)
EOSINOPHIL NFR BLD AUTO: 2.6 %
ERYTHROCYTE [DISTWIDTH] IN BLOOD BY AUTOMATED COUNT: 13.2 %
GLOBULIN PLAS-MCNC: 2.9 G/DL (ref 2–3.5)
GLUCOSE BLD-MCNC: 100 MG/DL (ref 70–99)
GLUCOSE UR STRIP.AUTO-MCNC: NORMAL MG/DL
HCT VFR BLD AUTO: 39.5 % (ref 35–48)
HGB BLD-MCNC: 13.4 G/DL (ref 12–16)
IMM GRANULOCYTES # BLD AUTO: 0.03 X10(3) UL (ref 0–1)
IMM GRANULOCYTES NFR BLD: 0.3 %
KETONES UR STRIP.AUTO-MCNC: NEGATIVE MG/DL
LEUKOCYTE ESTERASE UR QL STRIP.AUTO: NEGATIVE
LIPASE SERPL-CCNC: 31 U/L (ref 12–53)
LYMPHOCYTES # BLD AUTO: 3.62 X10(3) UL (ref 1–4)
LYMPHOCYTES NFR BLD AUTO: 32.2 %
MCH RBC QN AUTO: 32.8 PG (ref 26–34)
MCHC RBC AUTO-ENTMCNC: 33.9 G/DL (ref 31–37)
MCV RBC AUTO: 96.6 FL (ref 80–100)
MONOCYTES # BLD AUTO: 0.94 X10(3) UL (ref 0.1–1)
MONOCYTES NFR BLD AUTO: 8.4 %
NEUTROPHILS # BLD AUTO: 6.22 X10 (3) UL (ref 1.5–7.7)
NEUTROPHILS # BLD AUTO: 6.22 X10(3) UL (ref 1.5–7.7)
NEUTROPHILS NFR BLD AUTO: 55.3 %
NITRITE UR QL STRIP.AUTO: NEGATIVE
OSMOLALITY SERPL CALC.SUM OF ELEC: 298 MOSM/KG (ref 275–295)
PH UR STRIP.AUTO: 6.5 (ref 5–8)
PLATELET # BLD AUTO: 353 10(3)UL (ref 150–450)
POTASSIUM SERPL-SCNC: 3.9 MMOL/L (ref 3.5–5.1)
PROT SERPL-MCNC: 7.9 G/DL (ref 5.7–8.2)
RBC # BLD AUTO: 4.09 X10(6)UL (ref 3.8–5.3)
RBC UR QL AUTO: NEGATIVE
SODIUM SERPL-SCNC: 143 MMOL/L (ref 136–145)
SP GR UR STRIP.AUTO: 1.01 (ref 1–1.03)
UROBILINOGEN UR STRIP.AUTO-MCNC: NORMAL MG/DL
WBC # BLD AUTO: 11.2 X10(3) UL (ref 4–11)

## 2025-08-01 PROCEDURE — 99284 EMERGENCY DEPT VISIT MOD MDM: CPT

## 2025-08-01 PROCEDURE — 83690 ASSAY OF LIPASE: CPT | Performed by: EMERGENCY MEDICINE

## 2025-08-01 PROCEDURE — 96374 THER/PROPH/DIAG INJ IV PUSH: CPT

## 2025-08-01 PROCEDURE — 96361 HYDRATE IV INFUSION ADD-ON: CPT

## 2025-08-01 PROCEDURE — 85025 COMPLETE CBC W/AUTO DIFF WBC: CPT | Performed by: EMERGENCY MEDICINE

## 2025-08-01 PROCEDURE — 81003 URINALYSIS AUTO W/O SCOPE: CPT | Performed by: EMERGENCY MEDICINE

## 2025-08-01 PROCEDURE — 80053 COMPREHEN METABOLIC PANEL: CPT | Performed by: EMERGENCY MEDICINE

## 2025-08-01 RX ORDER — HYDROCODONE BITARTRATE AND ACETAMINOPHEN 5; 325 MG/1; MG/1
1 TABLET ORAL ONCE
Refills: 0 | Status: COMPLETED | OUTPATIENT
Start: 2025-08-01 | End: 2025-08-01

## 2025-08-01 RX ORDER — KETOROLAC TROMETHAMINE 15 MG/ML
15 INJECTION, SOLUTION INTRAMUSCULAR; INTRAVENOUS ONCE
Status: COMPLETED | OUTPATIENT
Start: 2025-08-01 | End: 2025-08-01

## 2025-08-01 RX ORDER — HYDROCODONE BITARTRATE AND ACETAMINOPHEN 5; 325 MG/1; MG/1
1 TABLET ORAL 2 TIMES DAILY PRN
Qty: 10 TABLET | Refills: 0 | Status: SHIPPED | OUTPATIENT
Start: 2025-08-01 | End: 2025-08-06

## 2025-08-01 RX ORDER — SENNA AND DOCUSATE SODIUM 50; 8.6 MG/1; MG/1
2 TABLET, FILM COATED ORAL DAILY PRN
Qty: 10 TABLET | Refills: 0 | Status: SHIPPED | OUTPATIENT
Start: 2025-08-01 | End: 2025-08-06

## 2025-08-04 ENCOUNTER — HOSPITAL ENCOUNTER (OUTPATIENT)
Dept: ULTRASOUND IMAGING | Age: 85
Discharge: HOME | End: 2025-08-04
Attending: OBSTETRICS & GYNECOLOGY

## 2025-08-04 ENCOUNTER — HOSPITAL ENCOUNTER (EMERGENCY)
Age: 85
Discharge: HOME | End: 2025-08-04

## 2025-08-04 DIAGNOSIS — R10.30 LOWER ABDOMINAL PAIN: ICD-10-CM

## 2025-08-04 PROCEDURE — 76830 TRANSVAGINAL US NON-OB: CPT | Performed by: OBSTETRICS & GYNECOLOGY

## 2025-08-04 PROCEDURE — 76856 US EXAM PELVIC COMPLETE: CPT | Performed by: OBSTETRICS & GYNECOLOGY

## 2025-08-09 DIAGNOSIS — I10 ESSENTIAL HYPERTENSION: ICD-10-CM

## 2025-08-11 RX ORDER — ONDANSETRON 4 MG/1
4 TABLET, ORALLY DISINTEGRATING ORAL EVERY 4 HOURS PRN
Qty: 10 TABLET | Refills: 0 | Status: SHIPPED | OUTPATIENT
Start: 2025-08-11

## 2025-08-11 RX ORDER — LOSARTAN POTASSIUM 100 MG/1
100 TABLET ORAL DAILY
Qty: 90 TABLET | Refills: 0 | Status: SHIPPED | OUTPATIENT
Start: 2025-08-11

## 2025-08-19 ENCOUNTER — TELEPHONE (OUTPATIENT)
Dept: FAMILY MEDICINE CLINIC | Facility: CLINIC | Age: 85
End: 2025-08-19

## 2025-08-19 DIAGNOSIS — N63.15 UNSPECIFIED LUMP IN THE RIGHT BREAST, OVERLAPPING QUADRANTS: ICD-10-CM

## 2025-08-19 DIAGNOSIS — N63.20 MASS OF LEFT BREAST, UNSPECIFIED QUADRANT: Primary | ICD-10-CM

## 2025-08-19 DIAGNOSIS — R93.89 ABNORMAL FINDINGS ON IMAGING TEST: ICD-10-CM

## 2025-08-24 ENCOUNTER — HOSPITAL ENCOUNTER (OUTPATIENT)
Age: 85
Discharge: HOME | End: 2025-08-24
Attending: EMERGENCY MEDICINE

## 2025-08-24 VITALS
DIASTOLIC BLOOD PRESSURE: 68 MMHG | HEIGHT: 64 IN | RESPIRATION RATE: 20 BRPM | TEMPERATURE: 99 F | BODY MASS INDEX: 22.2 KG/M2 | HEART RATE: 88 BPM | WEIGHT: 130 LBS | OXYGEN SATURATION: 98 % | SYSTOLIC BLOOD PRESSURE: 152 MMHG

## 2025-08-24 DIAGNOSIS — N39.0 URINARY TRACT INFECTION WITHOUT HEMATURIA, SITE UNSPECIFIED: Primary | ICD-10-CM

## 2025-08-24 DIAGNOSIS — I10 ESSENTIAL HYPERTENSION: ICD-10-CM

## 2025-08-24 LAB
BILIRUB UR QL STRIP: NEGATIVE
CLARITY UR: CLEAR
GLUCOSE UR STRIP-MCNC: NEGATIVE MG/DL
KETONES UR STRIP-MCNC: NEGATIVE MG/DL
NITRITE UR QL STRIP: NEGATIVE
PH UR STRIP: 6
PROT UR STRIP-MCNC: 30 MG/DL
SP GR UR STRIP: 1.01
UROBILINOGEN UR STRIP-ACNC: <2 MG/DL

## 2025-08-24 PROCEDURE — 87086 URINE CULTURE/COLONY COUNT: CPT | Performed by: EMERGENCY MEDICINE

## 2025-08-24 PROCEDURE — 81002 URINALYSIS NONAUTO W/O SCOPE: CPT

## 2025-08-24 RX ORDER — CEFADROXIL 500 MG/1
500 CAPSULE ORAL 2 TIMES DAILY
Qty: 14 CAPSULE | Refills: 0 | Status: SHIPPED | OUTPATIENT
Start: 2025-08-24 | End: 2025-08-31

## 2025-08-24 RX ORDER — PHENAZOPYRIDINE HYDROCHLORIDE 200 MG/1
200 TABLET, FILM COATED ORAL 3 TIMES DAILY PRN
Qty: 6 TABLET | Refills: 0 | Status: SHIPPED | OUTPATIENT
Start: 2025-08-24 | End: 2025-08-26

## 2025-08-25 RX ORDER — AMLODIPINE BESYLATE 10 MG/1
10 TABLET ORAL DAILY
Qty: 90 TABLET | Refills: 0 | Status: SHIPPED | OUTPATIENT
Start: 2025-08-25

## 2025-08-26 ENCOUNTER — TELEPHONE (OUTPATIENT)
Dept: FAMILY MEDICINE CLINIC | Facility: CLINIC | Age: 85
End: 2025-08-26

## (undated) DIAGNOSIS — F06.4 ANXIETY DISORDER DUE TO GENERAL MEDICAL CONDITION WITH PANIC ATTACK: ICD-10-CM

## (undated) DIAGNOSIS — F41.0 ANXIETY DISORDER DUE TO GENERAL MEDICAL CONDITION WITH PANIC ATTACK: ICD-10-CM

## (undated) DIAGNOSIS — Z02.9 ENCOUNTERS FOR ADMINISTRATIVE PURPOSE: ICD-10-CM

## (undated) DIAGNOSIS — I10 ESSENTIAL HYPERTENSION: ICD-10-CM

## (undated) DIAGNOSIS — F51.04 PSYCHOPHYSIOLOGICAL INSOMNIA: ICD-10-CM

## (undated) DIAGNOSIS — M54.16 LUMBAR RADICULOPATHY: Primary | ICD-10-CM

## (undated) DEVICE — GLV SURG SZ 7.5 THK10MIL WHT ISOLEX SYN

## (undated) DEVICE — 1200CC GUARDIAN II: Brand: GUARDIAN

## (undated) DEVICE — BANDAID COVERLET 1X3

## (undated) DEVICE — GLOVE SURG SENSICARE SZ 7

## (undated) DEVICE — 3M™ RED DOT™ MONITORING ELECTRODE WITH FOAM TAPE AND STICKY GEL, 50/BAG, 20/CASE, 72/PLT 2570: Brand: RED DOT™

## (undated) DEVICE — REMOVER PREP SOLUTION 4OZ

## (undated) DEVICE — DRAPE C-ARM UNIVERSAL

## (undated) DEVICE — GLOVE SURG SENSICARE SZ 7-1/2

## (undated) DEVICE — REMOVER LOT 4OZ NONIRRITATING UNSCNT SFT

## (undated) DEVICE — Device: Brand: DEFENDO AIR/WATER/SUCTION AND BIOPSY VALVE

## (undated) DEVICE — KENDALL SCD EXPRESS SLEEVES, KNEE LENGTH, MEDIUM: Brand: KENDALL SCD

## (undated) DEVICE — MEDI-VAC NON-CONDUCTIVE SUCTION TUBING: Brand: CARDINAL HEALTH

## (undated) DEVICE — SKIN MARKER DUAL TIP WITH RULER CAP AND LABELS: Brand: DEVON

## (undated) DEVICE — STANDARD HYPODERMIC NEEDLE,POLYPROPYLENE HUB: Brand: MONOJECT

## (undated) DEVICE — KIT ENDO ORCAPOD 160/180/190

## (undated) DEVICE — FILTERLINE NASAL ADULT O2/CO2

## (undated) DEVICE — BANDAID CURAD 3IN X 1IN

## (undated) DEVICE — PAIN TRAY: Brand: MEDLINE INDUSTRIES, INC.

## (undated) DEVICE — ENDOSCOPY PACK UPPER: Brand: MEDLINE INDUSTRIES, INC.

## (undated) DEVICE — 10FT COMBINED O2 DELIVERY/CO2 MONITORING. FILTER WITH MICROSTREAM TYPE LUER: Brand: DUAL ADULT NASAL CANNULA

## (undated) DEVICE — NEEDLE SPINAL 22X5 405148

## (undated) DEVICE — SUTURE VICRYL 3-0 RB-1

## (undated) DEVICE — GLOVE SURG SENSICARE SZ 6-1/2

## (undated) DEVICE — ZIPWIRE GUIDEWIRE .038X150 STR

## (undated) DEVICE — BANDAGE ADH W1INXL3IN NAT FAB WVN N ADH PD

## (undated) DEVICE — NEEDLE SPINAL 22X3-1/2 BLK

## (undated) DEVICE — REMOVER DURAPREP 3M

## (undated) DEVICE — CATH IV 14G X 5-1/4 ANGIO

## (undated) DEVICE — BIOGUARD CLEANING ADAPTER

## (undated) DEVICE — TRANSPOSAL ULTRAFLEX DUO/QUAD ULTRA CART MANIFOLD

## (undated) DEVICE — MOSES 200 FIBER

## (undated) DEVICE — FORCEP RADIAL JAW 4

## (undated) DEVICE — BLOCK BITE MAXI 60FR

## (undated) DEVICE — MASK ETCO2 PANORAMIC

## (undated) DEVICE — MEDI-VAC SUCTION HANDLE REGULAR CAPACITY: Brand: CARDINAL HEALTH

## (undated) DEVICE — Device

## (undated) DEVICE — GOWN SURG AERO CHROME XXL

## (undated) DEVICE — FORCEP BIOPSY RJ4 LG CAP W/ND

## (undated) DEVICE — ALCOHOL 70% 4 OZ

## (undated) DEVICE — LAMINECTOMY CDS: Brand: MEDLINE INDUSTRIES, INC.

## (undated) DEVICE — SHEET, T, LAPAROTOMY, STERILE: Brand: MEDLINE

## (undated) DEVICE — DRAPE,LAPAROTOMY,PCH,STERILE: Brand: MEDLINE

## (undated) DEVICE — PROXIS URETERAL ACCESS SHEATH

## (undated) DEVICE — SOL  .9 3000ML

## (undated) DEVICE — SKIN REG/FINE DUAL MARKER, RULER, LABELS: Brand: MEDLINE

## (undated) DEVICE — CYSTO CDS-LF: Brand: MEDLINE INDUSTRIES, INC.

## (undated) DEVICE — BNDG ADH W1INXL3IN NAT FAB N

## (undated) DEVICE — SUTURE VICRYL 2-0 CT-2

## (undated) DEVICE — GLOVE SUR 6.5 SENSICARE PIP WHT PWD F

## (undated) DEVICE — URETERAL ACCESS SHEATH SET: Brand: NAVIGATOR HD

## (undated) DEVICE — ENDOSCOPY PACK - LOWER: Brand: MEDLINE INDUSTRIES, INC.

## (undated) DEVICE — LEUKOPLAST ELASTIC STERILE 25X75MM TAN 100 1"X3": Brand: LEUKOPLAST®

## (undated) DEVICE — Device: Brand: PLUMEPEN

## (undated) DEVICE — TRAP 4 CPTR CHMBR N EZ INLN

## (undated) DEVICE — SOL  .9 1000ML BTL

## (undated) DEVICE — TIGERTAIL 5F FLXTIP 70CM

## (undated) DEVICE — MARKER SKIN 2 TIP

## (undated) DEVICE — SUTURE VICRYL 0 CT-1

## (undated) DEVICE — GAMMEX® PI HYBRID SIZE 7.5, STERILE POWDER-FREE SURGICAL GLOVE, POLYISOPRENE AND NEOPRENE BLEND: Brand: GAMMEX

## (undated) DEVICE — SUTURE SILK 2-0 FSL

## (undated) DEVICE — GLOVE SURG TRIUMPH SZ 71/2

## (undated) DEVICE — 1.9FR NITINOL TIPLESS BSKT

## (undated) DEVICE — STERILE POLYISOPRENE POWDER-FREE SURGICAL GLOVES: Brand: PROTEXIS

## (undated) DEVICE — DERMABOND LIQUID ADHESIVE

## (undated) DEVICE — #15 STERILE STAINLESS BLADE: Brand: STERILE STAINLESS BLADES

## (undated) DEVICE — GLOVE BIOGEL ORTHO SZ 8

## (undated) DEVICE — NEEDLE SPNL 22GA L3.5IN BLK HUB SS RW FIT

## (undated) DEVICE — LIGHT HANDLE

## (undated) DEVICE — SNARE 9MM 230CM 2.4MM EXACTO

## (undated) DEVICE — SEAL Y BIOPSY PORT P6R SCOPE

## (undated) DEVICE — DRAPE MICROSCOPE NEURO PENTERO

## (undated) DEVICE — 3M(TM) TEGADERM(TM) TRANSPARENT FILM DRESSING FRAME STYLE 1628: Brand: 3M™ TEGADERM™

## (undated) NOTE — LETTER
11/17/17        Daniele Tucker [id number]      Dear Deyanira Player records indicate that you have outstanding lab work and or testing that was ordered for you and has not yet been completed:          Potassium [E]  To provide yo

## (undated) NOTE — LETTER
08/26/17  [facility]  MAYO VARGAS  - Response  Patient c/o severe pain and failed conservative treatment. Based on clinical status, Interventional procedure performed initially rather than prescribing opioids. 5/24/17 Office Visit [facility] MARIO Bowen Previous injection with Dr. Jarod Hall, she states she only had one injection which gave her relief for 2-3 weeks but she never went back because she didn't have a bowel movement for 5 days after the injection.  She went to the chiropractor who worked on it I feel like this would be a safer option for her.  I have given her instructions how to apply the patch including to chose a site above the heart that is hair-free, wash thoroughly with soap and water and not alcohol, dry the area thoroughly prior to placin 7/11/16 telephone note:  Pt. requesting her injection for 7/14 be done on left side not the right. Per PA: supposed to be scheduled for series of three right L3-4, Left 4-5, L5-S1 lumbar transformainal AMADO with sedation.  So the 3rd injection should be a re consisting of  medications,  activity modification. Pt has not done physical therapy. 1.Risks of long term narcotic use d/w pt including dependence, abuse, and death from overdose and mixing narcotic with alcohol. Pt verbalized understanding.    2. Dr Avery Bhatt appropriate for doing each interventional procedure for this patient(see attached). The patient also had pain relief that is documented in the medical record but the pain relief was not enough for her.   So ultimately decision was made to proceed with a sp AmLODIPine Besylate 10 MG Oral Tab Take 1 tablet (10 mg total) by mouth once daily. Disp: 90 tablet Rfl: 1   HYDROcodone-acetaminophen (NORCO) 5-325 MG Oral Tab Take 1 tablet by mouth every 6 (six) hours as needed for Pain.  Disp: 15 tablet Rfl: 0   lidocai /82   Pulse 88   Resp 16   Ht 65\"   Wt 120 lb   BMI 19.97 kg/m²   General: Keerthi Jones IS A 78year old female in no acute distress. Neurologic: Alert, oriented, articulate. Normal gait. Psychiatric: Normal mood.   Inspection: Ambulates with s invasive than the sacroiliac joint injection. There is the picture of both procedure for your review below. Sacroiliac joint arthrogram and injection.

## (undated) NOTE — IP AVS SNAPSHOT
Patient Demographics     Address  [place] [address] Phone  [phone] [facility])  [phone] (Mobile) *Preferred* E-mail Address  [email or web address]      Emergency Contact(s)     Name Relation Home Work Mobile    Hawk Umaña Fluticasone Propionate 50 MCG/ACT Susp  Commonly known as:  FLONASE      2 sprays by Each Nare route daily. ALLEN Downing         FluvoxaMINE Maleate 100 MG Tabs  Commonly known as:  LUVOX      Take 1.5 tablets (150 mg total) by mouth nightly.    Barrett Garces [id number] BusPIRone HCl (BUSPAR) tab 5 mg 06/29/17 0000 Given      [id number] BusPIRone HCl (BUSPAR) tab 5 mg 06/29/17 0907 Given      [id number] Cinacalcet HCl (SENSIPAR) tab 30 mg 06/29/17 0908 Given      [id number] Cinacalcet HCl (SENSIPAR) tab 30 mg 06/2 Pulse  72 Filed at 06/29/2017 1651   Resp  18 Filed at 06/29/2017 1651   Temp  98.4 °F (36.9 °C) Filed at 06/29/2017 1651   SpO2  99 % Filed at 06/29/2017 1651      Patient's Most Recent Weight    Flowsheet Row Most Recent Value   Patient Weight  58.5 kg ( Specimen Information    Type Source Collected On   Blood — 06/28/17 2054          Components    Component Value Reference Range Flag Lab   Hold [facility] Resulted — — [facility]            RAINBOW DRAW GOLD [[id number]]  Resulted: 06/28/17 2200, Result sta Comment:           Total Calciums are not corrected for effects of low albumin. If needed, use the following correction formula.       Corrected Calcium Formula:      ((4.0 - Albumin) x 0.8 + Calcium    Note: Calculation is only valid when Albumin is less t [facility] [address] 02/03/16 180 - Present            Microbiology Results (All)     Procedure Component Value Units Date/Time    STOOL CULTURE W/SHIGATOXIN Once [[id number]]     Order Status:  Sent Lab Status:  No result     Specimen:  Stool from Stool • Depression    • Fibromyalgia    • Hearing impairment     bilateral hearing aids   • Heart murmur    • High blood pressure    • High cholesterol    • History of blood transfusion    • IBS (irritable bowel syndrome)     constipation   • Kidney stones    • Sindi Mendez MD;  Location: [place] [address] MANAGEMENT  2009: OTHER SURGICAL HISTORY      Comment: Cath Stent Placement x 1  2014: OTHER SURGICAL HISTORY      Comment: parathyroidectomy  2/26/2013: PATIENT DOCUMENTED NOT Penicillins             Hives    Comment:Throat swells, difficulty breathing  Bactrim                 Nausea and vomiting    Comment:Tabs  Dairy Products              Comment:Itchy, eyes swelling  Seasonal                  Sulfa Drugs Cross R*    Nausea an Fluticasone Propionate (FLONASE) 50 MCG/ACT Nasal Suspension 2 sprays by Each Nare route daily.  (Patient taking differently: 2 sprays by Each Nare route daily as needed.  ) Disp: 1 Bottle Rfl: 0   B Complex Vitamins (VITAMIN B COMPLEX) Oral Tab Take 1 tabl No results for input(s): TROP, CK in the last 72 hours. Imaging: Imaging data reviewed in Epic. ASSESSMENT / PLAN:     1. 2 weeks of vomiting, mostly post-prandial,[JM.1] modestly[JM.2] elevated lipase-possible pancreatitis vs biliary source[JM. 1]/ pain however. No fevers. Doesn't drink alcohol. Says she has no problems with food going down but about 30 min after eating she has issues.     Past Medical History:  Past Medical History:   Diagnosis Date   • Anxiety state, unspecified    • Arthritis Smith Romero MD;  Location: [address] [place] [address] Drive MANAGEMENT  3/18/2013: INJECTION, W/WO CONTRAST, DX/THERAPEUTIC SUBST*      Comment: Procedure: LUMBAR EPIDURAL;  Surgeon:                Smith Romero MD;  Location: [facility] Family History   Problem Relation Age of Onset   • Hypertension Father    • Heart Attack Father    • Hypertension Daughter    • Cancer Sister      lung with mets to the lymph nodes   • Lipids Sister    • Hypertension Sister    • Lipids Brother    • Heart D daily with meals. Disp: 180 tablet Rfl: 2   Losartan Potassium 100 MG Oral Tab Take 1 tablet (100 mg total) by mouth daily. Disp: 90 tablet Rfl: 3   AmLODIPine Besylate 10 MG Oral Tab Take 1 tablet (10 mg total) by mouth once daily.  Disp: 90 tablet Rfl: 3 1427  06/28/17 2054   GLU  87  85   BUN  19  24*   CREATSERUM  1.67*  1.68*   CA  8.8  8.2*   ALB  4.3  3.8   NA  138  140   K  2.9*  3.1*   CL  99*  101   CO2  30.0  30.0   ALKPHO  60  57   AST  12*  11*   ALT  17  13*   BILT  0.3  0.4   TP  8.1  7.5 Location [address] [place] [address] 5-A Attending April Floyd MD   [facility] Day # 1 PCP Jewell Redmond DO         Reason for consultation: Nausea, vomiting  HPI: Patient is a pleasant 77 y/o female with a h/o arthritis, HTN, Anxiety, IBS-C, Fibromyalgia who prese Comment: Procedure: LUMBAR EPIDURAL;  Surgeon:                Tita Lee MD;  Location: [place] [address] MANAGEMENT  3/18/2013: [address] [facility] Salcedo NDL/CATH SPI DX/THER DPF      Comment: Procedure: LUMBAR EPIDURAL;  Surgeon: PAIN MANAGEMENT  2001: SPINAL FUSION      Comment: Neck Fusion; [address]: SPLENECTOMY      Comment: [address] [place], [place]  No date: TONSILLECTOMY  No date: TOTAL ABDOM HYSTERECTOMY  No date: TUBAL LIGATIO Heparin Sodium (Porcine) 5000 UNIT/ML injection 5,000 Units 5,000 Units Subcutaneous Q8H [facility]   acetaminophen (TYLENOL) tab 650 mg 650 mg Oral Q6H PRN   Zolpidem Tartrate (AMBIEN) tab 5 mg 5 mg Oral Nightly PRN     Allergies:   Penicillins             Hives (Oral)   Resp 18   Ht 160 cm (5' 3\")   Wt 129 lb (58.5 kg)   SpO2 96%   BMI 22.85 kg/m²   Gen: AAO x 3, able to speak in complete sentences  HENT: NCAT, EOMI, PERRL, oropharynx is clear with moist mucosal membranes  Eyes: Sclerae are anicteric  Neck:  Sup TECHNIQUE:  Real time gray-scale ultrasound was used to evaluate the abdomen. The exam includes images of the liver, gallbladder, common bile duct, pancreas, spleen, kidneys, IVC, and aorta. FINDINGS:    LIVER:  Normal size and echogenicity.  No signif Kermit Orr MD  12:52 PM  6/29/2017  [facility]  [phone][SG.1]          Electronically signed by Payam Salazar MD on 6/29/2017 12:58 PM   Attribution Key    SG. 1 - Payam Salazar MD on 6/29/2017 12:52 PM

## (undated) NOTE — Clinical Note
03/09/2017      [address]  PRE-PROCEDURE INSTRUCTIONS WITH IV SEDATION    Appointment Date:  4/4/17   Arrival Time:          1:30pm   Procedure Time:     2:30pm    Prior to the procedure:  Please update us prior to the procedure if you ar Piroxcam (Feldene), Meloxicam (Mobic), Oxaprozin (Daypro), Diclofenac (Voltaren),  Indomethacin (Indocin), Etodolac (Lodine), Nabumetone (Relafen)                      HERBAL SUPPLEMENTS  5 days  · Fish oil, krill oil, vitamin E to eight evenly spaced electrodes that can be programmed to generate an electrical field. Am I a candidate for a spinal cord stimulator?   Spinal cord stimulators are most often used for patients with chronic and severe neuropathic pain, who have not re Typically, after patients have failed conservative treatment options, consideration is given to a trial of spinal cord stimulation. A temporary spinal cord stimulator wire is placed in a patient for 5 days.  This temporary wire gives the patient an opportun The placement of the trial electrodes is done under local anesthesia with patients mildly sedated. This is necessary to ensure proper placement of the wires.  You will be conscious although you may have experience amnesia and you may not remember parts of t diathermy may raise the temperature of the electrical wire or electrodes and potentially harm the spinal cord or spinal nerves. Can I pass through airport security with a spinal cord stimulator?     This depends on the sensitivity of the specific screenin

## (undated) NOTE — IP AVS SNAPSHOT
[facility] [place] [address] [place], [address] ~ [phone]                Discharge Summary   5/1/2017    Lolis Pablo           Admission Information        Provider Department    5/1/2017 Vandana Delgadillo MD  Inge Garcia / Lisette Marie Commonly known as:  LUVOX        Take one tablet by mouth nightly for 2 weeks and then take one and a half tablet by mouth nightly from week 3    Cassia Foster.Jose     [    ]    [    ]    [    ]    [    ]       lidocaine 5 % Ptch   Commonly known as with you before you are discharged. It is our hope that you have a complete and uneventful recovery from our procedure. General  Instructions:  1. Resume your regular diet as tolerated. 2. Resume all previous prescribed medication.   3. We recommend that 5/5/2017 11:00 AM Jace, [facility],1, [address] [facility]    5/15/2017 2:00 PM Clarisse Almendarez [facility]    5/16/2017 2:00 PM Argentina Barreto  [facility] [address] [facility] Additional Information       We are concerned for your overall well being:    - If you are a smoker or have smoked in the last 12 months, we encourage you to explore options for quitting.     - If you have concerns related to behavioral health issues or th

## (undated) NOTE — Clinical Note
Dr. Janice Schwartz just wanted to let you know that Donna's lumbar MRI shows a bunch of large renal cysts. They are briefly mentioned in the report, but I wanted to make sure you knew about them. PAOLO Castillo

## (undated) NOTE — LETTER
Tobacco:  Social History     Tobacco Use   Smoking Status Some Days    Current packs/day: 0.00    Average packs/day: 0.5 packs/day for 20.0 years (10.0 ttl pk-yrs)    Types: Cigarettes    Start date: 1977    Last attempt to quit: 1997    Years since quittin.0    Passive exposure: Never   Smokeless Tobacco Never   Tobacco Comments    Counseling not needed.     E-Cigarettes/Vaping       Questions Responses    E-Cigarette Use Never User    Passive Exposure No    Counseling Given No           Tobacco cessation counseling for <3 minutes.

## (undated) NOTE — ED AVS SNAPSHOT
THE [facility] Emergency Department in [address] [facility]    Phone:  [phone]    Fax:  [address] [facility]   MRN: [id number]    Department:  THE [facility] Emergency Department in [place]   Date of Visit: IF THERE IS ANY CHANGE OR WORSENING OF YOUR CONDITION, CALL YOUR PRIMARY CARE PHYSICIAN AT ONCE OR RETURN IMMEDIATELY TO THE EMERGENCY DEPARTMENT.     If you have been prescribed any medication(s), please fill your prescription right away and begin taking t

## (undated) NOTE — ED AVS SNAPSHOT
[facility] Emergency Department  [place]  [address] [address]  Phone:  [phone]  Fax:  [address]   MRN: [id number]    Department:  [facility] Emergency Department   Date of Visit:  7/22/2017 CARE PHYSICIAN AT ONCE OR RETURN IMMEDIATELY TO THE EMERGENCY DEPARTMENT.     If you have been prescribed any medication(s), please fill your prescription right away and begin taking the medication(s) as directed    If the emergency physician has read X-ray

## (undated) NOTE — MR AVS SNAPSHOT
[place]  [address] [facility]  [address] [facility] [address] [phone]               Thank you for choosing us for your health care visit with Franklin Schreiber MD.  We are glad to serve you and happy to provide you with this summ Generic drug:  Fexofenadine HCl   Take 180 mg by mouth daily as needed. AmLODIPine Besylate 10 MG Tabs   Take 1 tablet (10 mg total) by mouth once daily. Commonly known as:  NORVASC           aspirin 325 MG Tbec   Take 325 mg by mouth daily. Take 1 tablet by mouth daily.                 Where to Get Your Medications      These medications were sent to [facility] [address], [address] [facility] AT [facility] 66 [address] [facility], [phone], [phone]  Nahomy Hernández

## (undated) NOTE — Clinical Note
Hello! I had the pleasure of seeing your patient, Donna Umaña, today in the office and have routed the progress note to you for your review.  Thank you for the referral and please contact me if you have any questions.  Sincerely,  Jeffrey Ward MD [facility]- Obstetrics & Gynecology Office: [phone]

## (undated) NOTE — LETTER
Dana Shultz [address]  [address] [facility] [address], 209 [facility]  Authorization for Surgical Operation and Procedure     Date:___________                                                                                                         Time:__________ and/or blood products. The following are some, but not all, of the potential risks that can occur: fever and allergic reactions, hemolytic reactions, transmission of diseases such as Hepatitis, AIDS and Cytomegalovirus (CMV) and fluid overload.   In the ev authorized to consent on my behalf). The surgeon or my attending physician will determine when the applicable recovery period ends for purposes of reinstating the DNAR order.   10. Patients having a sterilization procedure: I understand that if the procedur for anesthesia services, I agree to:  a. Allow the anesthesiologist (anesthesia doctor) to give me medicine and do additional procedures as necessary.  Some examples are: Starting or using an “IV” to give me medicine, fluids or blood during my procedure, an injury. 7. Regional Anesthesia (“spinal”, “epidural”, & “nerve blocks”): I understand that rare but potential complications include headache, bleeding, infection, seizure, irregular heart rhythms, and nerve injury.     I can change my mind about having an

## (undated) NOTE — MR AVS SNAPSHOT
[facility], [address] [address] [facility], [address] [place] [address] [phone]               Thank you for choosing us for your health care visit with Ramon Giang MD.  We are glad to serve you and happy to provide you with this  protocol for controlled substances:  Written prescriptions      ? EFFECTIVE April 1, 2017 PATIENTS MUST  THEIR OWN NARCOTIC PRESCRIPTIONS. ? Written prescriptions must be picked up in office. ?  Please allow the office 48-72 hours to fill t 110/74 mmHg 89 63\" 120 lb 21.26 kg/m2         Current Medications          This list is accurate as of: 5/5/17 11:08 PM.  Always use your most recent med list.                ALLEGRA ALLERGY 180 MG Tabs   Generic drug:  Fexofenadine HCl   Take 180 mg by Take 1 tablet (50 mg total) by mouth nightly as needed for Sleep (insomnia). Commonly known as:  DESYREL           Vitamin B Complex Tabs   Take 1 tablet by mouth daily.                    MyChart     Visit Panl  You can access your [place]art to more act

## (undated) NOTE — MR AVS SNAPSHOT
[address]  1175 [facility], [address] [place] [address] [phone]               Thank you for choosing us for your health care visit with Vandana Delgadillo MD.  We are glad to serve you and happy to provide you with this of individual in advance and they must present an ID as well. ? The name of the person picking up your prescription must be documented in your chart.   Scheduling Tests    If your physician has ordered radiology tests such as MRI or CT scans, do not schedu BP Pulse Height Weight BMI    108/64 mmHg 60 64\" 120 lb 20.59 kg/m2         Current Medications          This list is accurate as of: 2/8/17  2:56 PM.  Always use your most recent med list.                Alendronate Sodium 70 MG Tabs   Take 1 tablet (70 You can access your MyChart to more actively manage your health care and view more details from this visit by going to [email or web address].   If you've recently had a stay at the Hospital you can access your discharge instructions in [address] by cec[place]

## (undated) NOTE — Clinical Note
TCM call completed. Pt has HFU set 4/10/23. TE was sent to FU on a sooner appt. GI office was contacted to have RN call pt to setup ERCP and discuss an appt for abdominal pain. Pt declined Ed at this time. Thank you.

## (undated) NOTE — LETTER
19        RE: Jimmie Correa     : 1940    Dear Dr. Vashti Maradiaga,    This letter is to inform you that your patient is being scheduled for surgery with Dr. Princess Bullock on 20 at [facility]. We have asked the patient to contact your office to

## (undated) NOTE — ED AVS SNAPSHOT
Hollie Sheppard   MRN: [id number]    Department:  [facility] Emergency Department   Date of Visit:  2/10/2018           Disclosure     Insurance plans vary and the physician(s) referred by the ER may not be covered by your plan.  Please contact your tell this physician (or your personal doctor if your instructions are to return to your personal doctor) about any new or lasting problems. The primary care or specialist physician will see patients referred from the [facility] Emergency Department.  Lilia Obrien

## (undated) NOTE — Clinical Note
Reviewed chart I did not see a follow-up with Dr. Mateusz Newton. There was a message by Dr. Mateusz Newton that he wanted to see her back in the office for her stage III renal insufficiency and for possible biopsy.   Please notify patient she does need to follow-up with

## (undated) NOTE — LETTER
April 12, 2018        [address] [facility]      Dear Lauryn Rosenberg:    I am the Nurse Care Manager with Dr. George Radhika office. Just reaching out to see how you are doing since your discharge home.    When you have a minute would

## (undated) NOTE — LETTER
August 10, 2023    [address] [facility]  [place] [address]      Dear Jean Pettit: It was a pleasure speaking with you over the phone recently. To follow up, I wanted to send you my contact information to utilize when you have a question and or need some assistance. We are excited that you have decided to give our Chronic Care Management program a try. I am available to provide you with the support and education needed to keep you healthy. Together we will work on:  Coordination of care: helping to reduce duplicate orders/tests to save you time and money  Medications: Ensuring you are up-to date and active with your medications. Review, educate, and discuss any concerns or issues you may have  Personalized education and support regarding your health. Offering tips and techniques on how to deal with problems associated with chronic care health conditions like pain, fatigue, frustration and getting a good night's sleep. Helping you connect with community resources  Assisting with goal-setting, problem solving and overcoming barriers  Providing resources to help you better manage your symptoms    I will be reaching out to you tomorrow 8/11/2023 at 1:30 pm as we discussed to complete your welcome call to get to know you a bit better, as well as help you with any concerns or be assistance to you if needed. These services, among others, will help you take control of your health and provide you with optimal quality care. Overall, CCM helps you take an active role in your care. It increases the likelihood that you will get healthier and moves you closer to achieving your health-care goals. I look forward to working with you,      Leland Pedraza, [facility]  [address] [facility] Management Dept. Phone: [phone]  [facility]. org

## (undated) NOTE — LETTER
02/20/19        [place] [phone]      Dear Jesu Marcelino records indicate that you have outstanding lab work and or testing that was ordered for you and has not yet been completed:  Orders Placed This Encounter

## (undated) NOTE — LETTER
Dana Shultz [address]  [address] [facility] [address], 209 [facility]  Authorization for Surgical Operation and Procedure     Date:___________                                                                                                         Time:__________ 4.   Should the need arise during my operation or immediate post-operative period, I also consent to the administration of blood and/or blood products.   Further, I understand that despite careful testing and screening of blood or blood products by kristina 8.   I recognize that in the event my procedure results in extended X-Ray/fluoroscopy time, I may develop a skin reaction. 9.  If I have a Do Not Attempt Resuscitation (DNAR) order in place, that status will be suspended while in the operating room, proc 1. IWes agree to be cared for by an anesthesiologist, who is specially trained to monitor me and give me medicine to put me to sleep or keep me comfortable during my procedure    I understand that my anesthesiologist is not an employee or agen 5. My doctor has explained to me other choices available to me for my care (alternatives).   6. Pregnant Patients (“epidural”):  I understand that the risks of having an epidural (medicine given into my back to help control pain during labor), include itchi

## (undated) NOTE — LETTER
Patient Name: Sindhu Foreman CSN: [id number]  -Age / Sex: 1940-A: [de-identified] y  female Medical Records: [id number]    The above patient had a positive COVID test on: _2022___________      Per [facility] Infection Control guidelines this patient will NOT be retested for COVID  prior to their surgery/procedure on: ___2022___________. Thank you. Krysta JACKMAN

## (undated) NOTE — ED AVS SNAPSHOT
Hollie Sheppard   MRN: [id number]    Department:  [facility] Emergency Department   Date of Visit:  8/25/2018           Disclosure     Insurance plans vary and the physician(s) referred by the ER may not be covered by your plan.  Please contact your tell this physician (or your personal doctor if your instructions are to return to your personal doctor) about any new or lasting problems. The primary care or specialist physician will see patients referred from the [facility] Emergency Department.  Temo Costa

## (undated) NOTE — ED AVS SNAPSHOT
THE [facility] Emergency Department in [address] [facility]    Phone:  [phone]    Fax:  [address] [facility] [address]   MRN: [id number]    Department:  [facility] Emergency Department in [place]   Date of Visit: Take 1 tablet (10 mg total) by mouth 3 (three) times daily as needed.             Where to Get Your Medications      You can get these medications from any pharmacy     Bring a paper prescription for each of these medications    - Metoclopramide HCl 10 MG T primary care or specialist physician will see patients referred from the [facility] Emergency Department. Follow-up care is at the discretion of that Physician.     IF THERE IS ANY CHANGE OR WORSENING OF YOUR CONDITION, CALL YOUR PRIMARY CARE PHYSICIAN - If you have concerns related to behavioral health issues or thoughts of harming yourself, contact [address] [place] [address] at [phone].     - If you don’t have insurance, Katrina Dennison has partnered with [employer] ureteral or bladder calculi. No small bowel obstruction. 2. 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 3. Pericardial effusion. 4. Stable right adrenal adenoma.            Dictated by: Leonela Houser MD on RETROPERITONEUM:  Normal.  No mass or adenopathy. BOWEL/MESENTERY:  Normal.  No visible mass, obstruction, or bowel wall thickening. ABDOMINAL WALL:  Normal.  No mass or hernia. BONES:  Normal.  No bony lesion or fracture.   PELVIC ORGANS:  Normal.

## (undated) NOTE — ED AVS SNAPSHOT
[place]ir [place]   MRN: [id number]    Department:  [facility] Emergency Department   Date of Visit:  1/3/2019           Disclosure     Insurance plans vary and the physician(s) referred by the ER may not be covered by your plan.  Please contact your tell this physician (or your personal doctor if your instructions are to return to your personal doctor) about any new or lasting problems. The primary care or specialist physician will see patients referred from the [facility] Emergency Department.  Blanka Talavera

## (undated) NOTE — MR AVS SNAPSHOT
[place]  [address] [facility]  [address] [facility] [address] [phone]               Thank you for choosing us for your health care visit with Bita Ross MD.  We are glad to serve you and happy to provide you with this summ Fibromyalgia [M79.7], Osteoporosis [M81.0], Coronary artery disease involving native coronary artery of native heart with other form of angina pectoris ([facility] [address] [I25.118], Insomnia, unspecified type [G47.00], Essential hypertension [I10], Hypercalcemia [X31. 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    To schedule an appointment for your radiology test please call Gio Garcia Scheduling   at [phone].          Referral Details     Referred By    Referred To    Leisa Right, M Assoc Dx:   Left flank pain [R10.9], Nephrolithiasis [N20.0], Bilateral renal cysts [N28.1], Encounter for annual health examination [Z00.00], Depression screening [Z13.89], Microcytic anemia [D50.9], AMY (generalized anxiety disorder) [F41.1], Moderate ep A link to the [employer]. This site has a lot of good information including definitions of the different types of Advance Directives.  It also has the State forms available on it's website for anyone to review and print using their home What changed:  Another medication with the same name was removed. Continue taking this medication, and follow the directions you see here. Commonly known as:  LUVOX           losartan 100 MG Tabs   Take 1 tablet (100 mg total) by mouth daily.    Commonly Visits < Visit Summaries. MyChart questions? Call [phone] for help. [employer] is NOT to be used for urgent needs. For medical emergencies, dial 911.         Educational Information     TOP FALL PREVENTION TIPS    INSIDE YOUR HOME   KITCHEN:  ? Use

## (undated) NOTE — MR AVS SNAPSHOT
[facility]  [phone]               Thank you for choosing us for your health care visit with Anjel Cornejo PA-C.   We are glad to serve you and happy to provide you with [facility] from trees, grasses, and weeds.   Symptoms include a drippy, stuffy, and itchy nose. They also include sneezing and red and itchy eyes. You may feel tired more often.  Severe allergies may also affect your breathing and trigger a condition called asthma.  Call your healthcare provider right away if the following occur:  · Coughing or wheezing  · Fever greater than 100.4°F (38°C)  · Continuing symptoms, new symptoms, or worsening symptoms  Call 911 right away if you have:  · Trouble breathing  · Hives (raise · Use filters over heater vents. Date Last Reviewed: 10/1/2016  © 4328-2655 The [address] [place] [address], [address] [place] [address]. All rights reserved. This information is not intended as a substitute for professional medical care.  Always f Dust mites need moist air to live. Use a dehumidifier to reduce air moisture. Don’t use humidifiers, or vaporizers. Talk with your healthcare provider about other ways to reduce dust in your home.  Ask about medicines that can help with your allergy sympto carpet, clothing, and furniture. ? Choose a pet that doesn’t have fur or feathers. Examples are fish and reptiles. ? Keep pets with fur or feathers out of your home. If you can’t do this, be sure to keep them out of your bedroom.  But keeping a pet out of ? Remove garbage from your home daily. ? Store food in tightly sealed containers. Wash dishes promptly as soon as they are used. ? Use bait stations or traps to control roaches. Avoid using chemical sprays.   Date Last Reviewed: 10/1/2016  © 7863-0304 The Take 1 tablet (100 mg total) by mouth daily. Commonly known as:  COZAAR           Montelukast Sodium 10 MG Tabs   Take 10 mg by mouth nightly.    Commonly known as:  CRISSULAIR           Pravastatin Sodium 40 MG Tabs   TAKE 1 TABLET BY MOUTH EVERY NIGHT AT

## (undated) NOTE — LETTER
RICH Notifier: [employer]. Patient Name: Donna Umaña Identification Number: [id number]                          Advance Beneficiary Notice of Noncoverage (ABN)   NOTE:  If Medicare doesn’t pay for D. item/service(s) below, you may have to pay.  Medicare does not pay for everything, even some care that you or your health care provider have good reason to think you need. We expect Medicare may not pay for the D. item/service(s) below.  D. Items or Services E. Reason Medicare May Not Pay: F. Estimated Cost   __ EKG ($87.00)  _x_ Pap smear ($101) _x_Pelvic/Breast ($147.00)  __ Ear Irrigation ($138)  __ Injection(s)  ___ Tdap ($181)       ___ Meningitis ($290)   __Prevnar ($555)  ___ Td ($66)              ___ Prevnar 20 ($549)  ___ Hep A ($152)     ___ Prolia ($1827)         __ Xiaflex ($            )   ___ Hep B ($150)     ___ Pneumovax ($287)                                         ___ Vaccine Administration ($65)   _x_ Medicare does not cover this service      _x_ Medicare may not pay for this   item/service for your condition     __ Medicare may not pay for this item/service as often as this     $248.00   WHAT YOU NEED TO DO NOW:  Read this notice, so you can make an informed decision about your care.  Ask us any questions that you may have after you finish reading.  Choose an option below about whether to receive the D. item/service(s) listed above.  Note: If you choose Option 1 or 2, we may help you to use any other insurance that you might have, but Medicare cannot require us to do this.  G. OPTIONS: Check only one box.  We cannot choose a box for you.   OPTION 1. I want the D. item/service(s) listed above. You may ask to be paid now, but I also want Medicare billed for an official decision on payment, which is sent to me on a Medicare Summary Notice (MSN). I understand that if Medicare doesn’t pay, I am responsible for payment, but I can appeal to Medicare by following the directions on  the MSN. If Medicare does pay, you will refund any payments I made to you, less co-pays or deductibles.  OPTION 2. I want the D. item/service(s) listed above, but do not bill Medicare. You may ask to be paid now as I am responsible for payment. I cannot appeal if Medicare is not billed.  OPTION 3. I don't want the D. item/service(s) listed above. I understand with this choice I am not responsible for payment, and I cannot appeal to see if Medicare would pay.    H. Additional Information:    This notice gives our opinion, not an official Medicare decision. If you have other questions on this notice or Medicare billing, call 1-800-MEDICARE (1-610.736.3584/TTY: 1-557.270.3061). Signing below means that you have received and understand this notice. You also receive a copy.  I. Signature: J. Date:       You have the right to get Medicare information in an accessible format, like large print, Braille, or audio. You also have the right to file a complaint if you feel you’ve been discriminated against. Visit Medicare.gov/about- us/wccuxlxwzxvda-eoawapcasmhvihgzg-zkdojj.  According to the Paperwork Reduction Act of 1995, no persons are required to respond to a collection of information unless it displays a valid OMB control number. The valid OMB control number for this information collection is 8852-4150. The time required to complete this information collection is estimated to average 7 minutes per response, including the time to review instructions, search existing data resources, gather the data needed, and complete and review the information collection. If you have comments concerning the accuracy of the time estimate or suggestions for improving this form, please write to: CMS, [facility] Security     [place]ulema Attn: TREY Reports Clearance Officer, [place], [place] [address].  Form CMS-R-131 (Exp. 1/31/2026) Form Approved OMB No. [id number]

## (undated) NOTE — ED AVS SNAPSHOT
[address] [facility] Emergency Department in [address] [place] [address]    Phone:  [phone]    Fax:  [address] [facility]   MRN: [id number]    Department:  [address] [facility] Emergency Department in [place]   Date of Visit: IF THERE IS ANY CHANGE OR WORSENING OF YOUR CONDITION, CALL YOUR PRIMARY CARE PHYSICIAN AT ONCE OR RETURN IMMEDIATELY TO THE EMERGENCY DEPARTMENT.     If you have been prescribed any medication(s), please fill your prescription right away and begin taking t

## (undated) NOTE — Clinical Note
I had the pleasure of seeing Rita Salomon on 2/12/2018. Please see my attached note.   Pankaj Coley MD FACS EMG--Surgery

## (undated) NOTE — MR AVS SNAPSHOT
[facility]  [phone]               Thank you for choosing us for your health care visit with Tess Torres PA-C.   We are glad to serve you and happy to provide you with [facility] by other factors, such as a stomach virus, often improves with simple home treatment. The tips below may also help relieve your symptoms. · Drink plenty of fluids. This helps prevent too much fluid loss (dehydration).  Water, clear soups, and electrolyte s · Reaction to medicines like antibiotics, laxatives, cancer drugs, and antacids  Along with diarrhea, you may also have:  · Abdominal pain and cramping  · Nausea and vomiting  · Loss of bowel control  · Fever and chills  · Bloody stools  In some cases, ant  is the best way to prevent the spread of infection. · Clean the toilet after each use. · Wash your hands before eating. · Wash your hands before and after preparing food.  Keep in mind that people with diarrhea or vomiting should not prepare fo · Unsweetened canned fruit (no pineapple)  · Bananas  As you recover:  · Limit fat intake to less than 15 grams per day. Don’t eat margarine, butter, oils, mayonnaise, sauces, gravies, fried foods, peanut butter, meat, poultry, or fish. · Limit fiber.  Loreto Arredondo Itchy, eyes swelling      Seasonal     Sulfa Drugs Cross Reactors Nausea and vomiting                Today's Vital Signs     BP Pulse Temp Height Weight BMI    126/80 mmHg 75 98.4 °F (36.9 °C) (Oral) 63\" 121 lb 21.44 kg/m2         Current Medications Take 1 tablet (30 mg total) by mouth 2 (two) times daily with meals. TraZODone HCl 50 MG Tabs   Take 1 tablet (50 mg total) by mouth nightly as needed for Sleep (insomnia).    Commonly known as:  DESYREL           Vitamin B Complex Tabs   Take 1 t

## (undated) NOTE — ED AVS SNAPSHOT
THE [facility] Emergency Department in [address] [facility]    Phone:  [phone]    Fax:  [address]  S   MRN: [id number]    Department:  THE [facility] Emergency Department in [place]   Date of Visit: Where to Get Your Medications      You can get these medications from any pharmacy     Bring a paper prescription for each of these medications    - diazepam 5 MG Tabs  - HYDROcodone-acetaminophen 5-325 MG Tabs              Discharge Instructions       The tell this physician (or your personal doctor if your instructions are to return to your personal doctor) about any new or lasting problems. The primary care or specialist physician will see patients referred from the [facility] Emergency Department.  Severo Pastures - If you are a smoker or have smoked in the last 12 months, we encourage you to explore options for quitting.     - If you have concerns related to behavioral health issues or thoughts of harming yourself, contact [facility] a

## (undated) NOTE — MR AVS SNAPSHOT
[facility] Gerry  Kevon Levysona  [phone]               Thank you for choosing us for your health care visit with Tess Torres PA-C.   We are glad to serve you and happy to provide you with [facility] [address] ([address])    [address] [phone]              Today's Orders     Comp Metabolic Panel (14) [E]    Complete by:   May 22, 2017 (Approximat · Need for more and more medicine to treat headaches  Rebound headaches are most often diagnosed by your symptoms and medicine history. You may need tests to rule out other causes of your headaches.  In the emergency room, you may be given a non-analgesic p When to seek medical advice  Call your healthcare provider right away if any of these occur:  · Fever of 100.4°F (38°C) or higher  · Headaches that wake you from sleep  · Repeated vomiting or visual problems that don't go away  · Headache with a stiff neck Take 1 tablet (0.5 mg total) by mouth 2 (two) times daily as needed for Anxiety. Commonly known as:  KLONOPIN           Fluticasone Propionate 50 MCG/ACT Susp   2 sprays by Each Nare route daily.    What changed:    - when to take this  - reasons to take discharge instructions in [employer] by going to Visits < Admission Summaries. If you've been to the Emergency Department or your doctor's office, you can view your past visit information in EzLikehart by going to Visits < Visit Summaries. Apps4All questions?

## (undated) NOTE — ED AVS SNAPSHOT
Marco Brambila   MRN: [id number]    Department:  [facility] Emergency Department in [place]   Date of Visit:  7/12/2019           Disclosure     Insurance plans vary and the physician(s) referred by the ER may not be covered by your plan.  Please contact tell this physician (or your personal doctor if your instructions are to return to your personal doctor) about any new or lasting problems. The primary care or specialist physician will see patients referred from the [facility] Emergency Department.  Brandee Betancourt

## (undated) NOTE — LETTER
02/01/18        Maya Tucker [id number]      Dear Carlos Eduardo Herndon records indicate that you have outstanding lab work and or testing that was ordered for you and has not yet been completed:       cmp   cbc   TSH and Free T4

## (undated) NOTE — IP AVS SNAPSHOT
[address]            (For Outpatient Use Only) Initial Admit Date: 6/28/2017   Inpt/Obs Admit Date: Inpt: 6/28/17 / Obs: 06/28/17   Discharge Date:    Hospital Acct:  [de-identified]   MRN: [de-identified]   CSN: [id number]        Junie Awan Subscriber ID:  Pt Rel to Subscriber:    Hospital Account Financial Class: Medicare    June 29, 2017

## (undated) NOTE — ED AVS SNAPSHOT
THE [facility] Emergency Department in [address] [facility]    Phone:  [phone]    Fax:  [address] [facility] [address]   MRN: [id number]    Department:  THE [facility] Emergency Department in [place]   Date of Visit: 04/22/2017  01:29 ondansetron HCl (ZOFRAN) injection 4 mg 4 mg                Admin Date Administration Dose                   04/22/2017  02:49 DiphenhydrAMINE HCl (BENADRYL) injection 12.5 mg 12.5 mg                Admin Date Administration Dose To Check ER Wait Times:  TEXT 'ERwait' to 37790      Click [email or web address]      Or call [phone]    If you have any problems with your follow-up, please call our  at [phone]    Si antionette venturane algun problema con jackson sequimiento, I have read and understand the instructions given to me by my caregivers. 24-Hour Pharmacies        Pharmacy Address Phone Number   [facility] [address] ([address]) Venu Leigh view more details from this visit by going to [email or web address]. If you've recently had a stay at the Hospital you can access your discharge instructions in MENA360hart by going to Visits < Admission Summaries.  If you've been to the Emergency Depar

## (undated) NOTE — ED AVS SNAPSHOT
Rita Rosslon   MRN: [id number]    Department:  [facility] Emergency Department in [place]   Date of Visit:  8/10/2019           Disclosure     Insurance plans vary and the physician(s) referred by the ER may not be covered by your plan.  Please contact tell this physician (or your personal doctor if your instructions are to return to your personal doctor) about any new or lasting problems. The primary care or specialist physician will see patients referred from the [facility] Emergency Department.  Dm Sanchez

## (undated) NOTE — LETTER
02/19/21        Alma Tucker [phone]      Dear Franci Lanier records indicate that you have outstanding lab work and or testing that was ordered for you and has not yet been completed:  Orders Placed This Encounter

## (undated) NOTE — MR AVS SNAPSHOT
[address], [address] [place] [phone]               Thank you for choosing us for your health care visit with José Antonio Degroot MD.  We are glad to serve you and happy to provide you with th ? Contact your pharmacy at least 5 days prior to running out of medication and have them send an electronic request or submit request through the “request refill” option in your [employer] account. ?  Refills are not addressed on weekends; covering physicians Unfortunately, [facility] has seen an increase in denial of payment even though the procedure/test has been pre-certified.   You are strongly encouraged to contact your insurance carrier to verify that your procedure/test has been approved and is a COVERED benefit · If you were on blood thinners (such as Coumadin, Pradaxa, Xarelto, etc) prior to surgery that we had you stop for surgery, please make sure you get instructions about when to resume the medication before you are discharged from the hospital  · Post opera Montelukast Sodium 10 MG Tabs   Take 10 mg by mouth nightly. Commonly known as:  SINGULAIR           Ondansetron HCl 4 mg tablet   Take 1 tablet (4 mg total) by mouth every 8 (eight) hours as needed for Nausea.    Commonly known as:  Reilly Orozco

## (undated) NOTE — LETTER
06/27/18        Lucian Tucker [id number]      Dear Alveta Nissen records indicate that you have outstanding lab work and or testing that was ordered for you and has not yet been completed:    Lipid Panel    To provide you with

## (undated) NOTE — LETTER
Tobacco:  Social History     Tobacco Use   Smoking Status Some Days    Current packs/day: 0.00    Average packs/day: 0.5 packs/day for 20.0 years (10.0 ttl pk-yrs)    Types: Cigarettes    Start date: 1977    Last attempt to quit: 1997    Years since quittin.4    Passive exposure: Never   Smokeless Tobacco Never   Tobacco Comments    Counseling not needed.     E-Cigarettes/Vaping       Questions Responses    E-Cigarette Use Never User    Passive Exposure No    Counseling Given No           Tobacco cessation counseling for <3 minutes.

## (undated) NOTE — MR AVS SNAPSHOT
[facility], [place] [address] [address] [phone]               Thank you for choosing us for your health care visit with Lana Hernandez PA-C.   We are glad to serve you and happy to provide you with this jackson ? Patient must present photo ID at time of . If a designated family member will be picking up prescription, office must be given name of individual in advance and they must present an ID as well. ?  The name of the person picking up your prescripti FOLLOW UP with Randy Gant MD   [facility] ENDOCRINOLOGY - [facility] [place] ([address])    [address] [place] [address] [facility]              Allergies as of Feb 07, 2017     Penicillins Hives    Throat swells, difficulty katie TAKE 1 TABLET BY MOUTH EVERY NIGHT AT BEDTIME   Commonly known as:  PRAVACHOL           SENSIPAR 30 MG Tabs   Generic drug:  Cinacalcet HCl   Take 1 tablet (30 mg total) by mouth 2 (two) times daily with meals.            TraZODone HCl 50 MG Tabs   Take 1 t

## (undated) NOTE — Clinical Note
Pt was contacted for TCM. Pt stated she is doing better but notices trembling and feeling hot and cold today. Pt denied any other symptoms and the need for ED. Medications were reviewed. Pt is aware to complete her abx.  TE was sent to the office to FU on a

## (undated) NOTE — Clinical Note
Dear Dr. Tenisha Shah,       Thank you for referring Sam Fernández to the [facility].   Sincerely,  ORTEGA Kerr

## (undated) NOTE — Clinical Note
I had the pleasure of seeing Tabitha Hays on 2/21/2018. Please see my attached note.   Jennifer Samuels MD FACS EMG--Surgery

## (undated) NOTE — Clinical Note
2017      RE: Ade Serra     : 1940    Dear Dr. Gisel Mejia    This letter is to inform you that your patient is being scheduled for surgery with Dr. Judson Sanchez on 17 at [facility]    Diagnosis: Chronic low back pain with sciatica

## (undated) NOTE — MR AVS SNAPSHOT
[facility]evon [facility]  [phone]               Thank you for choosing us for your health care visit with Taylor Valenzuela PA-C.   We are glad to serve you and happy to provide you with [facility] hairlike cilia cover the mucosa. Cilia help carry mucus toward the opening of the sinus. Too much mucus may cause the cilia to stop working. This blocks the sinus opening. A buildup of fluid in the sinuses then causes pain and pressure.  It can also encoura instructions closely. This can make a big difference in getting your sinus problem under control. Drink fluids  Drinking extra fluids helps thin your mucus. This lets it drain from your sinuses more easily. Have a glass of water every hour or two.  A humid Today's Vital Signs     BP Pulse Temp Height Weight BMI    104/58 mmHg 63 98.5 °F (36.9 °C) (Oral) 63.25\" 124 lb 3.2 oz 21.82 kg/m2         Current Medications          This list is accurate as of: 4/20/17  2:29 PM.  Always use your most recent med lis Commonly known as:  PRAVACHOL           SENSIPAR 30 MG Tabs   Generic drug:  Cinacalcet HCl   Take 1 tablet (30 mg total) by mouth 2 (two) times daily with meals.            TraZODone HCl 50 MG Tabs   Take 1 tablet (50 mg total) by mouth nightly as needed f

## (undated) NOTE — MR AVS SNAPSHOT
[address]  [address] [facility], [address] [place] [phone]               Thank you for choosing us for your health care visit with Nunu Moya MD.  We are glad to serve you and happy to provide you with this the maximum allowed. ? If your prescription is due for a refill, you may be due for a follow up appointment. ? To best provide you care, patients receiving routine medications need to be seen at least once a year.      protocol for controlled subst Bactrim Nausea and vomiting    Tabs    Dairy Products     Itchy, eyes swelling      Seasonal     Sulfa Drugs Cross Reactors Nausea and vomiting                Today's Vital Signs     BP Pulse Weight             124/62 mmHg 78 123 lb            Current Med Where to Get Your Medications      These medications were sent to [place] [address] [facility] [facility], [address] [facility] AT [address] [place] [address] Drive, [phone], [phone]  Mayra [address], [address] [facility] [address]     Phone:  [phone]

## (undated) NOTE — ED AVS SNAPSHOT
THE [facility] Emergency Department in [address] [facility]    Phone:  [phone]    Fax:  [address]   MRN: [id number]    Department:  THE [facility] Emergency Department in [place]   Date of Visit: IF THERE IS ANY CHANGE OR WORSENING OF YOUR CONDITION, CALL YOUR PRIMARY CARE PHYSICIAN AT ONCE OR RETURN IMMEDIATELY TO THE EMERGENCY DEPARTMENT.     If you have been prescribed any medication(s), please fill your prescription right away and begin taking t

## (undated) NOTE — LETTER
[facility]  Yuridia Marcos [address] [facility], [address] [place] [address]    Consent for Operation    Date: __________________    Time: _______________    1.  I authorize the performance upon Alex Alvarez the following operation:    Procedure(s):  ESOPHAGOGASTRODUODENO videotape. The [facility] will not be responsible for storage or maintenance of this tape. 6. For the purpose of advancing medical education, I consent to the admittance of observers to the Operating Room.     7. I authorize the use of any specimen, organs Signature of Patient:   ___________________________    When the patient is a minor or mentally incompetent to give consent:  Signature of person authorized to consent for patient: ___________________________   Relationship to patient: _____________________ drugs/illegal medications). Failure to inform my anesthesiologist about these medicines may increase my risk of anesthetic complications. · If I am allergic to anything or have had a reaction to anesthesia before.     3. I understand how the anesthesia med I have discussed the procedure and information above with the patient (or patient’s representative) and answered their questions. The patient or their representative has agreed to have anesthesia services.     _______________________________________________

## (undated) NOTE — Clinical Note
FYI: TCM completed. Patient has upcoming TCM/HFU on 5/25/22. TE to PCP office re: condition update. Thank you.

## (undated) NOTE — LETTER
Patient Name: Wes Mast  : 1940  MRN: [id number]  Patient Address: [facility] [address]      Coronavirus Disease 2019 (COVID-19)     [place] [address] is committed to the safety and well-being of our patients, members, carefully. If your symptoms get worse, call your healthcare provider immediately. 3. Get rest and stay hydrated.    4. If you have a medical appointment, call the healthcare provider ahead of time and tell them that you have or may have COVID-19.  5. For m of fever-reducing medications; and  · Improvement in respiratory symptoms (e.g., cough, shortness of breath); and  · At least 10 days have passed since symptoms first appeared OR if asymptomatic patient or date of symptom onset is unclear then use 10 days donors must:    · Have had a confirmed diagnosis of COVID-19  · Be symptom-free for at least 14 days*    *Some people will be required to have a repeat COVID-19 test in order to be eligible to donate.  If you’re instructed by Xavier that a repeat test is r

## (undated) NOTE — Clinical Note
Dear Dr. Kvng Lo,       Thank you for referring Maura Resendiz to the [facility].   Sincerely,  ORTEGA Fowler

## (undated) NOTE — Clinical Note
Make sure she returns for Medicare annual wellness she was supposed to repeat a urinalysis at the last office visit but due to multiple other complaints this was forgotten. Also is due for other labs just want to make sure that she returns soon.